# Patient Record
Sex: MALE | Race: WHITE | NOT HISPANIC OR LATINO
[De-identification: names, ages, dates, MRNs, and addresses within clinical notes are randomized per-mention and may not be internally consistent; named-entity substitution may affect disease eponyms.]

---

## 2015-06-24 RX ORDER — OMEPRAZOLE 10 MG/1
1 CAPSULE, DELAYED RELEASE ORAL
Qty: 0 | Refills: 0 | COMMUNITY
Start: 2015-06-24

## 2017-08-24 ENCOUNTER — APPOINTMENT (OUTPATIENT)
Dept: CARDIOLOGY | Facility: CLINIC | Age: 82
End: 2017-08-24
Payer: MEDICARE

## 2017-08-24 ENCOUNTER — NON-APPOINTMENT (OUTPATIENT)
Age: 82
End: 2017-08-24

## 2017-08-24 VITALS
DIASTOLIC BLOOD PRESSURE: 83 MMHG | HEIGHT: 71 IN | SYSTOLIC BLOOD PRESSURE: 146 MMHG | OXYGEN SATURATION: 95 % | BODY MASS INDEX: 44.1 KG/M2 | HEART RATE: 64 BPM | WEIGHT: 315 LBS

## 2017-08-24 VITALS — DIASTOLIC BLOOD PRESSURE: 86 MMHG | SYSTOLIC BLOOD PRESSURE: 152 MMHG

## 2017-08-24 DIAGNOSIS — Z00.00 ENCOUNTER FOR GENERAL ADULT MEDICAL EXAMINATION W/OUT ABNORMAL FINDINGS: ICD-10-CM

## 2017-08-24 PROCEDURE — 93000 ELECTROCARDIOGRAM COMPLETE: CPT

## 2017-08-24 PROCEDURE — 99214 OFFICE O/P EST MOD 30 MIN: CPT | Mod: 25

## 2017-08-31 ENCOUNTER — APPOINTMENT (OUTPATIENT)
Dept: CARDIOLOGY | Facility: CLINIC | Age: 82
End: 2017-08-31
Payer: MEDICARE

## 2017-08-31 PROCEDURE — 93306 TTE W/DOPPLER COMPLETE: CPT

## 2017-10-05 ENCOUNTER — OUTPATIENT (OUTPATIENT)
Dept: OUTPATIENT SERVICES | Facility: HOSPITAL | Age: 82
LOS: 1 days | End: 2017-10-05
Payer: MEDICARE

## 2017-10-05 ENCOUNTER — APPOINTMENT (OUTPATIENT)
Dept: CT IMAGING | Facility: CLINIC | Age: 82
End: 2017-10-05
Payer: MEDICARE

## 2017-10-05 DIAGNOSIS — I77.810 THORACIC AORTIC ECTASIA: ICD-10-CM

## 2017-10-05 DIAGNOSIS — Z98.89 OTHER SPECIFIED POSTPROCEDURAL STATES: Chronic | ICD-10-CM

## 2017-10-05 PROCEDURE — 71275 CT ANGIOGRAPHY CHEST: CPT | Mod: 26

## 2017-10-05 PROCEDURE — 71275 CT ANGIOGRAPHY CHEST: CPT

## 2017-10-05 PROCEDURE — 82565 ASSAY OF CREATININE: CPT

## 2018-03-26 ENCOUNTER — APPOINTMENT (OUTPATIENT)
Dept: CARDIOLOGY | Facility: CLINIC | Age: 83
End: 2018-03-26
Payer: MEDICARE

## 2018-03-26 ENCOUNTER — NON-APPOINTMENT (OUTPATIENT)
Age: 83
End: 2018-03-26

## 2018-03-26 VITALS
DIASTOLIC BLOOD PRESSURE: 82 MMHG | WEIGHT: 309 LBS | OXYGEN SATURATION: 97 % | HEART RATE: 68 BPM | SYSTOLIC BLOOD PRESSURE: 154 MMHG | BODY MASS INDEX: 43.1 KG/M2

## 2018-03-26 VITALS — DIASTOLIC BLOOD PRESSURE: 60 MMHG | SYSTOLIC BLOOD PRESSURE: 124 MMHG

## 2018-03-26 PROCEDURE — 93000 ELECTROCARDIOGRAM COMPLETE: CPT

## 2018-03-26 PROCEDURE — 99214 OFFICE O/P EST MOD 30 MIN: CPT

## 2018-09-06 ENCOUNTER — APPOINTMENT (OUTPATIENT)
Dept: CARDIOLOGY | Facility: CLINIC | Age: 83
End: 2018-09-06

## 2018-10-08 ENCOUNTER — APPOINTMENT (OUTPATIENT)
Dept: CARDIOLOGY | Facility: CLINIC | Age: 83
End: 2018-10-08
Payer: MEDICARE

## 2018-10-08 ENCOUNTER — NON-APPOINTMENT (OUTPATIENT)
Age: 83
End: 2018-10-08

## 2018-10-08 VITALS
HEIGHT: 72 IN | BODY MASS INDEX: 40.36 KG/M2 | SYSTOLIC BLOOD PRESSURE: 128 MMHG | OXYGEN SATURATION: 97 % | RESPIRATION RATE: 18 BRPM | HEART RATE: 67 BPM | DIASTOLIC BLOOD PRESSURE: 72 MMHG | WEIGHT: 298 LBS

## 2018-10-08 PROCEDURE — 99214 OFFICE O/P EST MOD 30 MIN: CPT

## 2018-10-08 PROCEDURE — 93000 ELECTROCARDIOGRAM COMPLETE: CPT

## 2018-10-08 PROCEDURE — 93306 TTE W/DOPPLER COMPLETE: CPT

## 2019-01-13 ENCOUNTER — INPATIENT (INPATIENT)
Facility: HOSPITAL | Age: 84
LOS: 3 days | Discharge: HOME | DRG: 100 | End: 2019-01-17
Attending: FAMILY MEDICINE | Admitting: HOSPITALIST
Payer: MEDICARE

## 2019-01-13 VITALS
OXYGEN SATURATION: 95 % | SYSTOLIC BLOOD PRESSURE: 131 MMHG | RESPIRATION RATE: 18 BRPM | HEART RATE: 86 BPM | DIASTOLIC BLOOD PRESSURE: 82 MMHG | TEMPERATURE: 98 F

## 2019-01-13 DIAGNOSIS — R56.9 UNSPECIFIED CONVULSIONS: ICD-10-CM

## 2019-01-13 DIAGNOSIS — Z98.89 OTHER SPECIFIED POSTPROCEDURAL STATES: Chronic | ICD-10-CM

## 2019-01-13 LAB
ALBUMIN SERPL ELPH-MCNC: 3.7 G/DL — SIGNIFICANT CHANGE UP (ref 3.3–5.2)
ALP SERPL-CCNC: 52 U/L — SIGNIFICANT CHANGE UP (ref 40–120)
ALT FLD-CCNC: 12 U/L — SIGNIFICANT CHANGE UP
AMPHET UR-MCNC: NEGATIVE — SIGNIFICANT CHANGE UP
ANION GAP SERPL CALC-SCNC: 13 MMOL/L — SIGNIFICANT CHANGE UP (ref 5–17)
APAP SERPL-MCNC: <7.5 UG/ML — LOW (ref 10–26)
APPEARANCE UR: CLEAR — SIGNIFICANT CHANGE UP
APTT BLD: 41.9 SEC — HIGH (ref 27.5–36.3)
AST SERPL-CCNC: 13 U/L — SIGNIFICANT CHANGE UP
BACTERIA # UR AUTO: ABNORMAL
BARBITURATES UR SCN-MCNC: NEGATIVE — SIGNIFICANT CHANGE UP
BASE EXCESS BLDV CALC-SCNC: -2.5 MMOL/L — LOW (ref -2–2)
BASOPHILS # BLD AUTO: 0 K/UL — SIGNIFICANT CHANGE UP (ref 0–0.2)
BASOPHILS NFR BLD AUTO: 0.3 % — SIGNIFICANT CHANGE UP (ref 0–2)
BENZODIAZ UR-MCNC: NEGATIVE — SIGNIFICANT CHANGE UP
BILIRUB SERPL-MCNC: 0.3 MG/DL — LOW (ref 0.4–2)
BILIRUB UR-MCNC: NEGATIVE — SIGNIFICANT CHANGE UP
BLD GP AB SCN SERPL QL: SIGNIFICANT CHANGE UP
BUN SERPL-MCNC: 23 MG/DL — HIGH (ref 8–20)
CA-I SERPL-SCNC: 1.17 MMOL/L — SIGNIFICANT CHANGE UP (ref 1.15–1.33)
CALCIUM SERPL-MCNC: 9.2 MG/DL — SIGNIFICANT CHANGE UP (ref 8.6–10.2)
CHLORIDE BLDV-SCNC: 111 MMOL/L — HIGH (ref 98–107)
CHLORIDE SERPL-SCNC: 106 MMOL/L — SIGNIFICANT CHANGE UP (ref 98–107)
CO2 SERPL-SCNC: 20 MMOL/L — LOW (ref 22–29)
COCAINE METAB.OTHER UR-MCNC: NEGATIVE — SIGNIFICANT CHANGE UP
COLOR SPEC: YELLOW — SIGNIFICANT CHANGE UP
CREAT SERPL-MCNC: 1.18 MG/DL — SIGNIFICANT CHANGE UP (ref 0.5–1.3)
DIFF PNL FLD: ABNORMAL
EOSINOPHIL # BLD AUTO: 0.2 K/UL — SIGNIFICANT CHANGE UP (ref 0–0.5)
EOSINOPHIL NFR BLD AUTO: 1.7 % — SIGNIFICANT CHANGE UP (ref 0–5)
EPI CELLS # UR: SIGNIFICANT CHANGE UP
ETHANOL SERPL-MCNC: <10 MG/DL — SIGNIFICANT CHANGE UP
GAS PNL BLDV: 141 MMOL/L — SIGNIFICANT CHANGE UP (ref 135–145)
GAS PNL BLDV: SIGNIFICANT CHANGE UP
GAS PNL BLDV: SIGNIFICANT CHANGE UP
GLUCOSE BLDV-MCNC: 128 MG/DL — HIGH (ref 70–99)
GLUCOSE SERPL-MCNC: 128 MG/DL — HIGH (ref 70–115)
GLUCOSE UR QL: NEGATIVE MG/DL — SIGNIFICANT CHANGE UP
HCO3 BLDV-SCNC: 22 MMOL/L — SIGNIFICANT CHANGE UP (ref 20–26)
HCT VFR BLD CALC: 41.6 % — LOW (ref 42–52)
HCT VFR BLDA CALC: 43 — SIGNIFICANT CHANGE UP (ref 39–50)
HGB BLD CALC-MCNC: 14.1 G/DL — SIGNIFICANT CHANGE UP (ref 13–17)
HGB BLD-MCNC: 13.5 G/DL — LOW (ref 14–18)
INR BLD: 5.04 RATIO — CRITICAL HIGH (ref 0.88–1.16)
KETONES UR-MCNC: ABNORMAL
LACTATE BLDV-MCNC: 1.9 MMOL/L — SIGNIFICANT CHANGE UP (ref 0.5–2)
LEUKOCYTE ESTERASE UR-ACNC: ABNORMAL
LYMPHOCYTES # BLD AUTO: 1.1 K/UL — SIGNIFICANT CHANGE UP (ref 1–4.8)
LYMPHOCYTES # BLD AUTO: 12.3 % — LOW (ref 20–55)
MCHC RBC-ENTMCNC: 27.9 PG — SIGNIFICANT CHANGE UP (ref 27–31)
MCHC RBC-ENTMCNC: 32.5 G/DL — SIGNIFICANT CHANGE UP (ref 32–36)
MCV RBC AUTO: 86 FL — SIGNIFICANT CHANGE UP (ref 80–94)
METHADONE UR-MCNC: NEGATIVE — SIGNIFICANT CHANGE UP
MONOCYTES # BLD AUTO: 0.6 K/UL — SIGNIFICANT CHANGE UP (ref 0–0.8)
MONOCYTES NFR BLD AUTO: 6.4 % — SIGNIFICANT CHANGE UP (ref 3–10)
NEUTROPHILS # BLD AUTO: 6.8 K/UL — SIGNIFICANT CHANGE UP (ref 1.8–8)
NEUTROPHILS NFR BLD AUTO: 79.2 % — HIGH (ref 37–73)
NITRITE UR-MCNC: NEGATIVE — SIGNIFICANT CHANGE UP
OPIATES UR-MCNC: NEGATIVE — SIGNIFICANT CHANGE UP
OTHER CELLS CSF MANUAL: 14 ML/DL — LOW (ref 18–22)
PCO2 BLDV: 42 MMHG — SIGNIFICANT CHANGE UP (ref 35–50)
PCP SPEC-MCNC: SIGNIFICANT CHANGE UP
PCP UR-MCNC: NEGATIVE — SIGNIFICANT CHANGE UP
PH BLDV: 7.35 — SIGNIFICANT CHANGE UP (ref 7.32–7.43)
PH UR: 5 — SIGNIFICANT CHANGE UP (ref 5–8)
PLATELET # BLD AUTO: 229 K/UL — SIGNIFICANT CHANGE UP (ref 150–400)
PO2 BLDV: 41 MMHG — SIGNIFICANT CHANGE UP (ref 25–45)
POTASSIUM BLDV-SCNC: 4.2 MMOL/L — SIGNIFICANT CHANGE UP (ref 3.4–4.5)
POTASSIUM SERPL-MCNC: 4.3 MMOL/L — SIGNIFICANT CHANGE UP (ref 3.5–5.3)
POTASSIUM SERPL-SCNC: 4.3 MMOL/L — SIGNIFICANT CHANGE UP (ref 3.5–5.3)
PROT SERPL-MCNC: 6.9 G/DL — SIGNIFICANT CHANGE UP (ref 6.6–8.7)
PROT UR-MCNC: 30 MG/DL
PROTHROM AB SERPL-ACNC: 60.9 SEC — HIGH (ref 10–12.9)
RBC # BLD: 4.84 M/UL — SIGNIFICANT CHANGE UP (ref 4.6–6.2)
RBC # FLD: 13.8 % — SIGNIFICANT CHANGE UP (ref 11–15.6)
RBC CASTS # UR COMP ASSIST: SIGNIFICANT CHANGE UP /HPF (ref 0–4)
SALICYLATES SERPL-MCNC: <0.6 MG/DL — LOW (ref 10–20)
SAO2 % BLDV: 74 % — SIGNIFICANT CHANGE UP
SODIUM SERPL-SCNC: 139 MMOL/L — SIGNIFICANT CHANGE UP (ref 135–145)
SP GR SPEC: 1.02 — SIGNIFICANT CHANGE UP (ref 1.01–1.02)
THC UR QL: NEGATIVE — SIGNIFICANT CHANGE UP
TROPONIN T SERPL-MCNC: <0.01 NG/ML — SIGNIFICANT CHANGE UP (ref 0–0.06)
TYPE + AB SCN PNL BLD: SIGNIFICANT CHANGE UP
UROBILINOGEN FLD QL: NEGATIVE MG/DL — SIGNIFICANT CHANGE UP
WBC # BLD: 8.6 K/UL — SIGNIFICANT CHANGE UP (ref 4.8–10.8)
WBC # FLD AUTO: 8.6 K/UL — SIGNIFICANT CHANGE UP (ref 4.8–10.8)
WBC UR QL: SIGNIFICANT CHANGE UP

## 2019-01-13 PROCEDURE — 99285 EMERGENCY DEPT VISIT HI MDM: CPT

## 2019-01-13 PROCEDURE — 93010 ELECTROCARDIOGRAM REPORT: CPT

## 2019-01-13 PROCEDURE — 71045 X-RAY EXAM CHEST 1 VIEW: CPT | Mod: 26

## 2019-01-13 PROCEDURE — 99223 1ST HOSP IP/OBS HIGH 75: CPT

## 2019-01-13 PROCEDURE — 70450 CT HEAD/BRAIN W/O DYE: CPT | Mod: 26

## 2019-01-13 RX ORDER — SERTRALINE 25 MG/1
50 TABLET, FILM COATED ORAL DAILY
Qty: 0 | Refills: 0 | Status: DISCONTINUED | OUTPATIENT
Start: 2019-01-13 | End: 2019-01-17

## 2019-01-13 RX ORDER — TAMSULOSIN HYDROCHLORIDE 0.4 MG/1
0.4 CAPSULE ORAL AT BEDTIME
Qty: 0 | Refills: 0 | Status: DISCONTINUED | OUTPATIENT
Start: 2019-01-13 | End: 2019-01-17

## 2019-01-13 RX ORDER — AMLODIPINE BESYLATE 2.5 MG/1
5 TABLET ORAL DAILY
Qty: 0 | Refills: 0 | Status: DISCONTINUED | OUTPATIENT
Start: 2019-01-13 | End: 2019-01-17

## 2019-01-13 RX ORDER — VALSARTAN 80 MG/1
160 TABLET ORAL DAILY
Qty: 0 | Refills: 0 | Status: DISCONTINUED | OUTPATIENT
Start: 2019-01-13 | End: 2019-01-17

## 2019-01-13 RX ORDER — PANTOPRAZOLE SODIUM 20 MG/1
40 TABLET, DELAYED RELEASE ORAL
Qty: 0 | Refills: 0 | Status: DISCONTINUED | OUTPATIENT
Start: 2019-01-13 | End: 2019-01-17

## 2019-01-13 RX ORDER — OXYBUTYNIN CHLORIDE 5 MG
10 TABLET ORAL AT BEDTIME
Qty: 0 | Refills: 0 | Status: DISCONTINUED | OUTPATIENT
Start: 2019-01-13 | End: 2019-01-17

## 2019-01-13 RX ORDER — METOPROLOL TARTRATE 50 MG
50 TABLET ORAL
Qty: 0 | Refills: 0 | Status: DISCONTINUED | OUTPATIENT
Start: 2019-01-13 | End: 2019-01-17

## 2019-01-13 NOTE — ED PROVIDER NOTE - NS ED ROS FT
Const: Denies fever, chills  HEENT: Denies blurry vision, sore throat  Neck: Denies neck pain/stiffness  Resp: + SOB. Denies coughing  Cardiovascular: Denies CP, palpitations, LE edema  GI: Denies nausea, vomiting, abdominal pain, diarrhea, constipation, blood in stool  : Denies urinary frequency/urgency/dysuria, hematuria  MSK: Denies back pain  Neuro: + right hand weakness. Denies HA, dizziness, numbness  Skin: Denies rashes.

## 2019-01-13 NOTE — ED PROVIDER NOTE - PHYSICAL EXAMINATION
Const: Awake, alert and oriented. In no acute distress. Dried blood from the right corner of mouth, dried blood on shirt.  HEENT: NC/AT. Moist mucous membranes. Tongue with superficial abrasions & bruising bilaterally on the anterior aspect, no active bleeding, no intraoral lacerations.  Eyes: No scleral icterus. EOMI.  Neck:. Soft and supple. Full ROM without pain.  Cardiac: Regular rate and regular rhythm. +S1/S2. +AS murmur. Peripheral pulses 2+ and symmetric. No LE edema.  Resp: Speaking in full sentences. No evidence of respiratory distress. No wheezes, rales or rhonchi.  Abd: Soft, non-tender, non-distended. Normal bowel sounds in all 4 quadrants. No guarding or rebound.  Back: Spine midline and non-tender. No CVAT.  Skin: No rashes, abrasions or lacerations.  Neuro: CN II-XII grossly in tact. Symmetrical smile. PERRL. EOMI. Bilateral and symmetric sensation of face. Tongue midline. Normal finger to nose. Normal heel to shin. Normal rapid alternating movements. + Right pronator drift, 4/5 vs 5/5 strength in the right upper extremity compared to the left. Sensation symmetrically intact bilateral upper and lower extremities. Reflexes 2+ bilaterally.

## 2019-01-13 NOTE — ED PROVIDER NOTE - OBJECTIVE STATEMENT
Patient with PMH CAD s/p 2 stents ([facility]), HTN, HLD, PE on Coumadin presents BIBA for an episode of AMS which occurred this afternoon while watching the football game (? 2 pm) and on the phone with his neighbor who called him. His neighbor called 911 when he stopped speaking on the phone. When EMS arrived the patient was slumped over in his chair with blood trickling from his mouth, difficult to arouse and slow to respond, however while in the ambulance he became more responsive, speaking normally and answering questions appropriately. EMS noticed right upper extremity weakness, but it is unclear if this is new or not. Patient lives home by himself. He states he noticed this morning upon waking up that his right hand was weaker than usual and he was unable to hold his cup of coffee this morning at 9 am. He states he hadn't noticed that weakness before then, but all this week he has felt "off" and felt that he has noticed his right arm has been shaking at times. He also notes that he fell 1 week ago, hit the side of his face while at a store. It is unclear if he went to the hospital at that time. He is unsure of his medication, except that he takes Coumadin for PE, but he does not recall the dosage. He notes at various times today he has felt short of breath and that he was unable to open his mouth enough to catch his breath. He denies chest pain, nausea, vomiting. He denies smoking, EtOH or illicit drugs.  PMD: Chidi

## 2019-01-13 NOTE — H&P ADULT - PMH
CAD (coronary artery disease)    [place] filter in place    Hyperlipemia    Hypertension    PE (pulmonary embolism)

## 2019-01-13 NOTE — ED ADULT NURSE NOTE - NSIMPLEMENTINTERV_GEN_ALL_ED
Implemented All Fall Risk Interventions:  [place] to call system. Call bell, personal items and telephone within reach. Instruct patient to call for assistance. Room bathroom lighting operational. Non-slip footwear when patient is off stretcher. Physically safe environment: no spills, clutter or unnecessary equipment. Stretcher in lowest position, wheels locked, appropriate side rails in place. Provide visual cue, wrist band, yellow gown, etc. Monitor gait and stability. Monitor for mental status changes and reorient to person, place, and time. Review medications for side effects contributing to fall risk. Reinforce activity limits and safety measures with patient and family.

## 2019-01-13 NOTE — ED PROVIDER NOTE - PMH
CAD (coronary artery disease)    [place] filter in place    Hyperlipemia    Hypertension    PE (pulmonary embolism)

## 2019-01-13 NOTE — ED ADULT NURSE NOTE - OBJECTIVE STATEMENT
PT BIBA for AMS with right-sided weakness. AS per pt, he was on phone with neighbor then he ended up here. Pt states he doesn't recall what happened.  pt noted with slurred speech, was priority CT on arrival. Pt has abrasion to bridge of nose, states he fell on Monday because he was wearing the wrong bi-focal eyeglasses. Pt awake and alert at this time, waiting for test results.

## 2019-01-13 NOTE — H&P ADULT - HISTORY OF PRESENT ILLNESS
85 y/o male with PMHx of CAD s/p 2 stents, HTN, HLD, PE (on Coumadin) came to the ED post syncope. Patient said he was on the phone with his neighbor this afternoon around 3 pm and he stopped talking; his neighbor called EMS when he was not responding on the phone. Patient said he As per EMS, patient was found slumped over in his chair and blood trickling down from his mouth; he was difficult to arouse however, became responsive while in the ambulance and back to baseline. As per patient. he was a little confused after he finally woke up, and he had a tongue bite. Patient reported that on Monday he fell face down in his house attributing it to wearing is reading glass while walking instead of his regular glasses. 83 y/o male with PMHx of CAD s/p 2 stents, HTN, HLD, PE (on Coumadin) came to the ED post syncope. Patient said he was on the phone with his neighbor this afternoon around 3 pm and he stopped talking; his neighbor called EMS when he was not responding on the phone. Patient said he As per EMS, patient was found slumped over in his chair and blood trickling down from his mouth; he was difficult to arouse however, became responsive while in the ambulance and back to baseline. As per patient. he was a little confused after he finally woke up, and he had a tongue bite. Patient reported that on Monday he fell face down in his house attributing it to wearing is reading glasses while walking instead of his regular glasses. Patient also complaint of tremor and weakness in his RUE; he was unable to hold 83 y/o male with PMHx of CAD s/p 2 stents, HTN, HLD, PE (on Coumadin) came to the ED post syncope. Patient said he was on the phone with his neighbor this afternoon around 3 pm and he stopped talking; his neighbor called EMS when he was not responding on the phone. Patient said he As per EMS, patient was found slumped over in his chair and blood trickling down from his mouth; he was difficult to arouse however, became responsive while in the ambulance and back to baseline. As per patient. he was a little confused after he finally woke up, and he had a tongue bite. Patient reported that on Monday he fell face down in his house attributing it to wearing is reading glasses while walking instead of his regular glasses. Patient also complaint of tremor and weakness in his RUE; he was unable to hold a cup or write this morning with his right hand. Patient has no blurry vision, HA, tingling or numbness, chest pain, palpitation, shortness of breath, nausea/vomit, diaphoresis, abdominal pain, change in bowel/urinary habit, no bowel/bladder incontinence.

## 2019-01-13 NOTE — H&P ADULT - ASSESSMENT
85 y/o male with PMHx of CAD s/p 2 stents, HTN, HLD, PE (on Coumadin) came to the ED post syncope.     Syncope likely due to seizure/TIA   Admit to monitor bed   CT head: unremarkable   CTA neck and brain ordered follow up   Neurology consult   As per ED, patient passed dysphagia screening   PT evaluation in AM     Seizure/fall precaution 85 y/o male with PMHx of CAD s/p 2 stents, HTN, HLD, PE (on Coumadin) came to the ED post syncope.     Syncope likely due to seizure/TIA   Admit to monitor bed   CT head: unremarkable   CTA neck and brain ordered follow up   Neurology consult   As per ED, patient passed dysphagia screening   PT evaluation in AM     Seizure/fall precaution     PE   On Coumadin 4mg( M/W/F); 2mg(T/Th/Sat/Sun)   Will hold for now due to elevated INR   Monitor INR     CAD   Continue Pravastatin 20mg     HTN/HLD  Continue Amlodipine 5mg   Metoprolol 50mg bid with holding parameter   Valsartan 160mg     GIGI   On BiPAP HS     BPH   Tamsulosin 0.4mg   Oxybutynin 10mg     Supportive   DVT prophylaxis: on Coumadin   Diet: DASH

## 2019-01-13 NOTE — ED ADULT TRIAGE NOTE - CHIEF COMPLAINT QUOTE
as per ems pt was on phone with neighbor then the line went dead. pt was last normal about 45 mins ago. upon ems arrival pt with AMS and right sided weakness. pt doesn't recall anything that happened. pt noted with blood in mouth. pt noted with bite marks on tongue. pt noted with slurred speech. MD called to bedside for r/o stroke @ [id number]

## 2019-01-13 NOTE — ED PROVIDER NOTE - MEDICAL DECISION MAKING DETAILS
Patient presents with episode of AMS (? post ictal) which was witnessed on the phone by neighbor who called 911, patient at mental status baseline upon arrival to the ED, noted by EMS to have right UE weakness with pronator drift - patient noticed that upon waking up this morning, otherwise neuro-intact. Patient has 2 stents (doesn't know kind or MRI compatibility), so at this time cannot have MRI - will do CT angio of the head and neck to evaluate for thrombus. No Code Stroke called as last normal (per patient) was yesterday - likely had a seizure while on the phone with neighbor. Will try to obtain patient's medication list from his pharmacy. He will require admission and work up for likely CVA and new-onset seizure.

## 2019-01-13 NOTE — H&P ADULT - FAMILY HISTORY
Child  Still living? Yes, Estimated age: Age Unknown  Family history of diabetes mellitus, Age at diagnosis: Age Unknown

## 2019-01-13 NOTE — ED ADULT NURSE NOTE - CHIEF COMPLAINT QUOTE
as per ems pt was on phone with neighbor then the line went dead. pt was last normal about 45 mins ago. upon ems arrival pt with AMS and right sided weakness. pt doesn't recall anything that happened. pt noted with blood in mouth. pt noted with bite marks on tongue. pt noted with slurred speech. MD called to bedside for r/o stroke @ 4740

## 2019-01-13 NOTE — PHARMACOTHERAPY INTERVENTION NOTE - COMMENTS
Spoke to pt at bedside to determine warfarin dosage. Obtained all other medication records from his pharmacy

## 2019-01-13 NOTE — ED ADULT NURSE NOTE - PMH
CAD (coronary artery disease)    [place] filter in place    Hyperlipemia    Hypertension    PE (pulmonary embolism)

## 2019-01-14 DIAGNOSIS — I25.10 ATHEROSCLEROTIC HEART DISEASE OF NATIVE CORONARY ARTERY WITHOUT ANGINA PECTORIS: ICD-10-CM

## 2019-01-14 DIAGNOSIS — R56.9 UNSPECIFIED CONVULSIONS: ICD-10-CM

## 2019-01-14 DIAGNOSIS — I27.82 CHRONIC PULMONARY EMBOLISM: ICD-10-CM

## 2019-01-14 DIAGNOSIS — I10 ESSENTIAL (PRIMARY) HYPERTENSION: ICD-10-CM

## 2019-01-14 LAB
ABO RH CONFIRMATION: SIGNIFICANT CHANGE UP
ANION GAP SERPL CALC-SCNC: 10 MMOL/L — SIGNIFICANT CHANGE UP (ref 5–17)
BUN SERPL-MCNC: 20 MG/DL — SIGNIFICANT CHANGE UP (ref 8–20)
CALCIUM SERPL-MCNC: 8.7 MG/DL — SIGNIFICANT CHANGE UP (ref 8.6–10.2)
CHLORIDE SERPL-SCNC: 107 MMOL/L — SIGNIFICANT CHANGE UP (ref 98–107)
CO2 SERPL-SCNC: 21 MMOL/L — LOW (ref 22–29)
CREAT SERPL-MCNC: 1.19 MG/DL — SIGNIFICANT CHANGE UP (ref 0.5–1.3)
GLUCOSE SERPL-MCNC: 106 MG/DL — SIGNIFICANT CHANGE UP (ref 70–115)
NT-PROBNP SERPL-SCNC: 431 PG/ML — HIGH (ref 0–300)
POTASSIUM SERPL-MCNC: 3.8 MMOL/L — SIGNIFICANT CHANGE UP (ref 3.5–5.3)
POTASSIUM SERPL-SCNC: 3.8 MMOL/L — SIGNIFICANT CHANGE UP (ref 3.5–5.3)
SODIUM SERPL-SCNC: 138 MMOL/L — SIGNIFICANT CHANGE UP (ref 135–145)

## 2019-01-14 PROCEDURE — 95819 EEG AWAKE AND ASLEEP: CPT | Mod: 26

## 2019-01-14 PROCEDURE — 70498 CT ANGIOGRAPHY NECK: CPT | Mod: 26

## 2019-01-14 PROCEDURE — 70496 CT ANGIOGRAPHY HEAD: CPT | Mod: 26

## 2019-01-14 PROCEDURE — 99223 1ST HOSP IP/OBS HIGH 75: CPT

## 2019-01-14 PROCEDURE — 70551 MRI BRAIN STEM W/O DYE: CPT | Mod: 26

## 2019-01-14 RX ORDER — ATORVASTATIN CALCIUM 80 MG/1
10 TABLET, FILM COATED ORAL AT BEDTIME
Qty: 0 | Refills: 0 | Status: DISCONTINUED | OUTPATIENT
Start: 2019-01-14 | End: 2019-01-17

## 2019-01-14 RX ADMIN — SERTRALINE 50 MILLIGRAM(S): 25 TABLET, FILM COATED ORAL at 11:00

## 2019-01-14 RX ADMIN — TAMSULOSIN HYDROCHLORIDE 0.4 MILLIGRAM(S): 0.4 CAPSULE ORAL at 22:48

## 2019-01-14 RX ADMIN — Medication 50 MILLIGRAM(S): at 17:29

## 2019-01-14 RX ADMIN — Medication 50 MILLIGRAM(S): at 06:01

## 2019-01-14 RX ADMIN — PANTOPRAZOLE SODIUM 40 MILLIGRAM(S): 20 TABLET, DELAYED RELEASE ORAL at 11:00

## 2019-01-14 RX ADMIN — Medication 10 MILLIGRAM(S): at 22:48

## 2019-01-14 RX ADMIN — ATORVASTATIN CALCIUM 10 MILLIGRAM(S): 80 TABLET, FILM COATED ORAL at 22:48

## 2019-01-14 RX ADMIN — VALSARTAN 160 MILLIGRAM(S): 80 TABLET ORAL at 06:00

## 2019-01-14 RX ADMIN — AMLODIPINE BESYLATE 5 MILLIGRAM(S): 2.5 TABLET ORAL at 06:01

## 2019-01-14 NOTE — CONSULT NOTE ADULT - ASSESSMENT
The patient is a 84y Male with episode of loss of consciousness.    Possible seizure.  Will check MRI brain.   Will check EEG.  Hold off on antiepileptic drug for now.     Possible syncope.   Recommend cardiac monitor for arrhythmia.    History of PE on coumadin  Monitor INR.    Case discussed with Dr Murillo.

## 2019-01-14 NOTE — CONSULT NOTE ADULT - SUBJECTIVE AND OBJECTIVE BOX
[facility]                                        Neu[facility] at [place]                                  Esther Casiano, & Haroldo                                      [address]                                           [place], [place], [address]                                                [phone]        CC: Syncope vs seizure     HISTORY:  The patient is a 84y Male who was on the phone with his neighbor and stopped talking. When he did not respond, his neighbor called EMS. The patient was found slumped over in his chair with blood trickling from his mouth. He does not recall what happened.   He reports that he had bitten his tongue but had no associated urinary incontinence.    1 Week ago he had tripped and fallen without loss of consciousness.  He has no prior history of seizure or syncope.      PAST MEDICAL & SURGICAL HISTORY:  CAD (coronary artery disease)  [place] filter in place  PE (pulmonary embolism)  Hyperlipemia  Hypertension  S/P IVC filter    MEDICATION PRIOR TO ADMISSION:  Metoprolol, Amlodipine, Omeprazole, Pravastatin, Breo Ellipta, Oxybutynin, Sertraline, Valsartan, Warfarin.    MEDICATIONS  (STANDING):  amLODIPine   Tablet 5 milliGRAM(s) Oral daily  atorvastatin 10 milliGRAM(s) Oral at bedtime  metoprolol tartrate 50 milliGRAM(s) Oral two times a day  oxybutynin 10 milliGRAM(s) Oral at bedtime  pantoprazole    Tablet 40 milliGRAM(s) Oral before breakfast  sertraline 50 milliGRAM(s) Oral daily  tamsulosin 0.4 milliGRAM(s) Oral at bedtime  valsartan 160 milliGRAM(s) Oral daily    Allergies  No Known Allergies    SOCIAL HISTORY:  Former smoker.     FAMILY HISTORY:  Family history of diabetes mellitus (Child)  Mother  heart failure   Father  Lymphoma  Brother  pneumonia   No history of seizure in parents, sibling, or child.    ROS:  Constitutional: The patient denies fevers or weight changes.  Neuro: As per HPI.  Eyes: Denies blurry vision.  Ears/nose/throat: Denies Tinnitus.   Cardiac: Denies chest pain. Denies palpitations.  Respiratory: Denies shortness of breath.  GI: Denies abdominal pain, nausea, or vomiting.  : Denies change in urinary pattern.  Integumentary: Denies rash.  Psych: Denies recent mood changes.  Heme: denies easy bleeding/bruising.    Exam:  Vital Signs Last 24 Hrs  T(C): 36.2 (2019 19:59), Max: 36.5 (2019 16:30)  T(F): 97.2 (2019 19:59), Max: 97.7 (2019 16:30)  HR: 77 (2019 07:30) (70 - 86)  BP: 149/86 (2019 07:30) (131/82 - 149/86)  RR: 19 (2019 07:30) (18 - 19)  SpO2: 97% (2019 07:30) (95% - 97%)  General: NAD.   Carotid bruits absent.     Mental status: The patient is awake, alert, and fully oriented. There is no aphasia.    Cranial nerves: There is no papilledema. Pupils react symmetrically to light. There is no visual field deficit to confrontation. Extraocular motion is full with no nystagmus. There is no ptosis. Facial sensation is intact. Facial musculature is symmetric. Palate elevates symmetrically. Tongue is midline.    Motor: There is normal bulk and tone.  Strength is 5/5 in the right arm and leg.   Strength is 5/5 in the left arm and leg.    Sensation: Intact to light touch and pin.    Reflexes: 2+ throughout and plantar responses are flexor.    Cerebellar: There is no dysmetria on finger to nose testing.    LABS:                         13.5   8.6   )-----------( 229      ( 2019 17:59 )             41.6       01-14    138  |  107  |  20.0  ----------------------------<  106  3.8   |  21.0<L>  |  1.19    Ca    8.7      2019 06:42    TPro  6.9  /  Alb  3.7  /  TBili  0.3<L>  /  DBili  x   /  AST  13  /  ALT  12  /  AlkPhos  52  01-13      PT/INR - ( 2019 17:59 )   PT: 60.9 sec;   INR: 5.04 ratio    PTT - ( 2019 17:59 )  PTT:41.9 sec    RADIOLOGY   CT head images reviewed (and concur with report): There is no acute pathology.

## 2019-01-14 NOTE — PHYSICAL THERAPY INITIAL EVALUATION ADULT - ADDITIONAL COMMENTS
Pt reports living alone in a [place] with 5 steps to enter.  Reports being Modified Independent with all PTA, without devices.  Has a RW at home.

## 2019-01-14 NOTE — ED ADULT NURSE REASSESSMENT NOTE - NS ED NURSE REASSESS COMMENT FT1
Bedside report given to [facility] RN Norah for CDU 9R. VSS, pt in no apparent distress.
Pt ambulated to bathroom with assistance, linens changed and pt placed in gown, denies pain, offers no complaints at this time, pt states "I use bipap at night for my sleep apnea", MD Marline CARBALLO notified, will continue to monitor
Pt placed on nocturnal cpap, resp even and unlabored, color good, showing NSR on monitor, offers no complaints, will continue to monitor
Pt requesting larger cpap mask, unavailable as per RT Ale pt aware and states "I don't want to sleep with it then, it keeps beeping", resp even and unlabored, color good, showing NSR on monitor, awaiting admit bed, will continue to monitor
Pt back from MRI at this time, in no apparent distress. Awaiting bed placement.
Assumed pt care at this time. Pt sleeping in stretcher, respirations are even & unlabored. Pt easy to wake. Pt waiting for room assignment, aware of plan of care.
Assuming care from previous RN, pt A&O x's 4, resp even and unlabored, color good, showing NSR on monitor, denies pain, offers no complaints at this time, pt eating without difficulty at this time, pt with patent 18G IV in LAC, no s/s of seizure like activity, pt reports fall on Monday with slight tremor in right hand, no intervention at this time, awaiting admit bed, pt aware of plan of care and verbalizes understanding, will continue to monitor

## 2019-01-15 LAB
INR BLD: 2.85 RATIO — HIGH (ref 0.88–1.16)
PROTHROM AB SERPL-ACNC: 33.9 SEC — HIGH (ref 10–12.9)

## 2019-01-15 PROCEDURE — 99223 1ST HOSP IP/OBS HIGH 75: CPT

## 2019-01-15 PROCEDURE — 99233 SBSQ HOSP IP/OBS HIGH 50: CPT

## 2019-01-15 RX ORDER — HALOPERIDOL DECANOATE 100 MG/ML
2 INJECTION INTRAMUSCULAR ONCE
Qty: 0 | Refills: 0 | Status: COMPLETED | OUTPATIENT
Start: 2019-01-15 | End: 2019-01-15

## 2019-01-15 RX ORDER — LEVETIRACETAM 250 MG/1
1000 TABLET, FILM COATED ORAL ONCE
Qty: 0 | Refills: 0 | Status: COMPLETED | OUTPATIENT
Start: 2019-01-15 | End: 2019-01-15

## 2019-01-15 RX ADMIN — SERTRALINE 50 MILLIGRAM(S): 25 TABLET, FILM COATED ORAL at 12:24

## 2019-01-15 RX ADMIN — TAMSULOSIN HYDROCHLORIDE 0.4 MILLIGRAM(S): 0.4 CAPSULE ORAL at 21:44

## 2019-01-15 RX ADMIN — Medication 50 MILLIGRAM(S): at 17:09

## 2019-01-15 RX ADMIN — Medication 10 MILLIGRAM(S): at 21:44

## 2019-01-15 RX ADMIN — HALOPERIDOL DECANOATE 2 MILLIGRAM(S): 100 INJECTION INTRAMUSCULAR at 02:15

## 2019-01-15 RX ADMIN — PANTOPRAZOLE SODIUM 40 MILLIGRAM(S): 20 TABLET, DELAYED RELEASE ORAL at 05:00

## 2019-01-15 RX ADMIN — Medication 50 MILLIGRAM(S): at 05:00

## 2019-01-15 RX ADMIN — LEVETIRACETAM 1000 MILLIGRAM(S): 250 TABLET, FILM COATED ORAL at 12:24

## 2019-01-15 NOTE — CONSULT NOTE ADULT - ASSESSMENT
85 y/o male with PMH HTN, HLD, CAD, PCI, PE on coumadin presents to ED s/p syncopal episode.     Syncope  - CT head/neck- mild to moderate atherosclerosis and soft plaque of b/l proximal internal carotid arteries 50-60%; moderate narrowing distal right M1/M2 segments   - MR Head- Small tiny sliver of left convexity subdural collection , probably hemorrhage,  and probable small left posterior frontoparietal subarachnoid component without mass effect, parenchymal edema midline shift.  - Neurology following- appreciate consultation- Possible seizure, keppra started   - ECHO 10/8/18:  EF 55-60%, mild LVH, severely enlarged L atrium, mod dilated R atrium, mild TR, mod AS  - repeat TTE ordered  - Troponin neg x 1   -     PE  - supratherapeutic INR  - Hold coumadin for now  - monitor INR    HTN/ HLD  - continue with metoprolol, valsartan, pravastatin     CAD  - c/w home medications 83 y/o male with PMH HTN, HLD, CAD, PCI, PE on coumadin presents to ED s/p syncopal episode.     Syncope  - CT head/neck- mild to moderate atherosclerosis and soft plaque of b/l proximal internal carotid arteries 50-60%; moderate narrowing distal right M1/M2 segments   - MR Head- Small tiny sliver of left convexity subdural collection , probably hemorrhage,  and probable small left posterior frontoparietal subarachnoid component without mass effect, parenchymal edema midline shift.  - Neurology following- appreciate consultation- Possible seizure, keppra started   - ECHO 10/8/18:  EF 55-60%, mild LVH, severely enlarged L atrium, mod dilated R atrium, mild TR, mod AS  - repeat TTE ordered  - Troponin neg x 1   -     PE  - supratherapeutic INR  - Hold coumadin for now  - monitor INR    HTN/ HLD  - continue with metoprolol, valsartan, pravastatin     CAD  - c/w home medications  - EKG- new LBBB  - Nuclear stress ordered for 11/16. NPO after midnight  - given ataxia and ?subdural hold ASA for now.

## 2019-01-15 NOTE — PROGRESS NOTE ADULT - SUBJECTIVE AND OBJECTIVE BOX
SUBJECTIVE    REVIEW OF SYSTEMS    General: Not in any pain	    Skin/Breast: No rash  	  ENMT: No visual problems, no sore throat	    Respiratory and Thorax: No cough, No CP, No SOB  	  Cardiovascular: No CP, No palpitations    Gastrointestinal: No Abd pain, No N/V/D    Musculoskeletal: No Joint pain, No back pain	    Neurological: No headache    Psychiatric: No anxiety      OBJECTIVE    Vital Signs Last 24 Hrs  T(C): 36.8 (01-15-19 @ 15:15), Max: 36.9 (01-15-19 @ 00:26)  T(F): 98.3 (01-15-19 @ 15:15), Max: 98.5 (01-15-19 @ 00:26)  HR: 72 (01-15-19 @ 17:08) (60 - 72)  BP: 134/84 (01-15-19 @ 17:08) (134/79 - 157/81)  BP(mean): --  RR: 18 (01-15-19 @ 15:15) (17 - 18)  SpO2: 98% (01-15-19 @ 10:00) (95% - 98%)    PHYSICAL EXAM:    Constitutional: Not in any distress    Eyes: No conjunctival injection    ENMT: No oral lesions    Neck: No nodes, no adenopathy    Back: Straight, no defects    Respiratory: clear b/l    Cardiovascular: RRR, no murmur    Gastrointestinal: soft, NT, ND    Extremities: No edema, no erythema    Neurological: no focal deficit    Skin: No rash      MEDICATIONS  (STANDING):  amLODIPine   Tablet 5 milliGRAM(s) Oral daily  atorvastatin 10 milliGRAM(s) Oral at bedtime  metoprolol tartrate 50 milliGRAM(s) Oral two times a day  oxybutynin 10 milliGRAM(s) Oral at bedtime  pantoprazole    Tablet 40 milliGRAM(s) Oral before breakfast  sertraline 50 milliGRAM(s) Oral daily  tamsulosin 0.4 milliGRAM(s) Oral at bedtime  valsartan 160 milliGRAM(s) Oral daily    MEDICATIONS  (PRN):          14 Jan 2019 06:42    138    |  107    |  20.0   ----------------------------<  106    3.8     |  21.0   |  1.19     Ca    8.7        14 Jan 2019 06:42      Allergies    No Known Allergies    Intolerances

## 2019-01-15 NOTE — CONSULT NOTE ADULT - ATTENDING COMMENTS
pt was seen and examined, care d/w son as well.   Hx of DVT/PE, has GFF. Fell last week since he missed the curb. Pt was having ataxia even before the fall. He became severely short of breath prior to episode of syncope. His speech is slurred with tongue contusion. He will need ischemic evaluation and a TE pt was seen and examined, care d/w son as well.   Hx of DVT/PE, has GFF. Fell last week since he missed the curb. Pt was having ataxia even before the fall. He became severely short of breath prior to episode of syncope. His speech is slurred with tongue contusion. He will need ischemic evaluation and a TTE for shortness of breath. There is no arrhythmia on the monitor. He is still off balance and will require physical therapy.   I reviewed the above and agree with a/p.

## 2019-01-15 NOTE — PROGRESS NOTE ADULT - ASSESSMENT
84y Male with episode of loss of consciousness.    Possible seizure.  Given MRI findings will begin Keppra.   Will give 1000 mg x 1 today.  Will need script for 500 mg BID on discharge.     History of PE on coumadin.    Instruct not to drive for now.     Will need outpatient neuro follow up.    Case discussed with Dr Murillo.

## 2019-01-15 NOTE — PROGRESS NOTE ADULT - SUBJECTIVE AND OBJECTIVE BOX
[facility]                                        [facility] [place]                                 Esther Casiano, & Haroldo                                  [address]                                        [place], [place], [address]                                             [phone]        CC: Syncope vs seizure     HPI:   The patient is a 84y Male who was on the phone with his neighbor and stopped talking. When he did not respond, his neighbor called EMS. The patient was found slumped over in his chair with blood trickling from his mouth. He does not recall what happened.   He reports that he had bitten his tongue but had no associated urinary incontinence.    1 Week ago he had tripped and fallen without loss of consciousness.  He has no prior history of seizure or syncope.      Interim history:  No further spells.     ROS:   Denies headache or dizziness.  Denies chest pain.  Denies shortness of breath.    MEDICATIONS  (STANDING):  amLODIPine   Tablet 5 milliGRAM(s) Oral daily  atorvastatin 10 milliGRAM(s) Oral at bedtime  metoprolol tartrate 50 milliGRAM(s) Oral two times a day  oxybutynin 10 milliGRAM(s) Oral at bedtime  pantoprazole    Tablet 40 milliGRAM(s) Oral before breakfast  sertraline 50 milliGRAM(s) Oral daily  tamsulosin 0.4 milliGRAM(s) Oral at bedtime  valsartan 160 milliGRAM(s) Oral daily      Vital Signs Last 24 Hrs  T(C): 36.7 (15 Domenico 2019 05:30), Max: 36.9 (15 Domenico 2019 00:26)  T(F): 98 (15 Domenico 2019 05:30), Max: 98.5 (15 Domenico 2019 00:26)  HR: 72 (15 Domenico 2019 05:30) (64 - 75)  BP: 157/81 (15 Domenico 2019 05:30) (134/81 - 158/87)  RR: 18 (15 Domenico 2019 05:30) (18 - 18)  SpO2: 95% (15 Domenico 2019 05:30) (95% - 98%)    Detailed Neurologic Exam:    Mental status: The patient is awake, alert, and fully oriented. There is no aphasia.    Cranial nerves: There is no papilledema. Pupils react symmetrically to light. There is no visual field deficit to confrontation. Extraocular motion is full with no nystagmus. There is no ptosis. Facial sensation is intact. Facial musculature is symmetric. Palate elevates symmetrically. Tongue is midline.    Motor: There is normal bulk and tone.  Strength is 5/5 in the right arm and leg.   Strength is 5/5 in the left arm and leg.    Sensation: Intact to light touch and pin.    Reflexes: 2+ throughout and plantar responses are flexor.    Cerebellar: There is no dysmetria on finger to nose testing.    Labs:     01-14    138  |  107  |  20.0  ----------------------------<  106  3.8   |  21.0<L>  |  1.19    Ca    8.7      14 Jan 2019 06:42    TPro  6.9  /  Alb  3.7  /  TBili  0.3<L>  /  DBili  x   /  AST  13  /  ALT  12  /  AlkPhos  52  01-13                            13.5   8.6   )-----------( 229      ( 13 Jan 2019 17:59 )             41.6       Rad:   MRI brain images reviewed (and concur with report): There is a tiny left convexity subdural hematoma as well as small left fronto-parietal subarachnoid hemorrhage.    EEG was normal.

## 2019-01-15 NOTE — CONSULT NOTE ADULT - SUBJECTIVE AND OBJECTIVE BOX
Patient is a 84y old  Male who presents with a chief complaint of Syncope/Seizure (15 Domenico 2019 08:14)      HPI:  83 y/o male with PMHx of CAD s/p 2 stents, HTN, HLD, PE (on Coumadin) came to the ED post syncope. Patient said he was on the phone with his neighbor this afternoon around 3 pm and he stopped talking; his neighbor called EMS when he was not responding on the phone. Patient said he As per EMS, patient was found slumped over in his chair and blood trickling down from his mouth; he was difficult to arouse however, became responsive while in the ambulance and back to baseline. As per patient. he was a little confused after he finally woke up, and he had a tongue bite. Patient reported that on Monday he fell face down in his house attributing it to wearing is reading glasses while walking instead of his regular glasses. Patient also complaint of tremor and weakness in his RUE; he was unable to hold a cup or write this morning with his right hand. Patient has no blurry vision, HA, tingling or numbness, chest pain, palpitation, shortness of breath, nausea/vomit, diaphoresis, abdominal pain, change in bowel/urinary habit, no bowel/bladder incontinence. (2019 21:59)  Appreciate above HPI by Dr. Fleming    Pt seen and examined in ED, woken from sleep, laying supine, moving air freely. Pt alert, oriented to self and time only. Poor historian, can not recall syncopal episode, states "had two strokes over the weekend". Difficulty finding words at times. Able to move all extremities equally, without deficit. Denies fever, chills, cough, phlegm production, shortness of breath, dyspnea on exertion, orthopnea, PND, edema, chest pain, pressure, palpitations, irregular, fast heart beat, nausea, vomiting, melena, rectal bleed, hematuria, lightheadedness, dizziness.       PAST MEDICAL & SURGICAL HISTORY:  CAD (coronary artery disease)  Marisol filter in place  PE (pulmonary embolism)  Hyperlipemia  Hypertension  S/P IVC filter      PREVIOUS DIAGNOSTIC TESTING:      ECHO  FINDINGS:  10/8/18:  EF 55-60%, mild LVH, severely enlarged L atrium, mod dilated R atrium, mild TR, mod AS,     STRESS  FINDINGS:  < from: Nuclear Stress Test-Pharmacologic (16 @ 08:27) >    NUCLEAR FINDINGS:  Review of raw data shows: Diaphragmatic artifact., Chest  wall attenuation is noted.  The left ventricle was normal LV size. There are medium  sized, severe defects in inferior and inferolateral walls  that are fixed, suggestive of infarction with minimal  danisha-infarct ischemia.  ------------------------------------------------------------------------      GATED ANALYSIS:  Post-stress resting myocardial perfusion gated SPECT  imaging was performed (LVEF = 46 %)  The distal inferior, the mid inferoseptal, the mid  inferolateral, the basal inferoseptal, the basal inferior,  and the basal inferolateral walls are hypokinetic.  The  mid inferior wall is akinetic.  ------------------------------------------------------------------------    IMPRESSIONS:Abnormal; Non-ischemic study Study  Inability to exercise due to decrease exercise capacity  and dyspnea.  No Symptom. No diagnostic ECG changes.  The left ventricle was normal LV size.  Infarction in RCA and left circumflex territory with  minimal danisha-infarct ischemia.  Post-stress resting myocardial perfusion gated SPECT  imaging was performed (LVEF = 46 %)  Segmental wall motion abnormality in RCA territory.  ------------------------------------------------------------------------      ------------------------------------------------------------------------    Confirmed on  3/1/2016 - 13:01:48 by Corrine Hernandez MD   Cardiology Fellow: Leydi Navarro, ANP-C    < end of copied text >    Allergies  No Known Allergies    Intolerances      MEDICATIONS  (STANDING):  amLODIPine   Tablet 5 milliGRAM(s) Oral daily  atorvastatin 10 milliGRAM(s) Oral at bedtime  levETIRAcetam 1000 milliGRAM(s) Oral once  metoprolol tartrate 50 milliGRAM(s) Oral two times a day  oxybutynin 10 milliGRAM(s) Oral at bedtime  pantoprazole    Tablet 40 milliGRAM(s) Oral before breakfast  sertraline 50 milliGRAM(s) Oral daily  tamsulosin 0.4 milliGRAM(s) Oral at bedtime  valsartan 160 milliGRAM(s) Oral daily    MEDICATIONS  (PRN):      FAMILY HISTORY:  Family history of diabetes mellitus (Child)      SOCIAL HISTORY:  CIGARETTES: Denies   ALCOHOL: Denies     REVIEW OF SYSTEMS:   CONSTITUTIONAL: No fever, weight loss, or fatigue  EYES: No eye pain, visual disturbances, or discharge  ENMT:  No difficulty hearing, tinnitus, vertigo; No sinus or throat pain  NECK: No pain or stiffness  RESPIRATORY: No cough, wheezing, chills or hemoptysis; No Shortness of Breath  CARDIOVASCULAR: No chest pain, palpitations, passing out, dizziness, or leg swelling  GASTROINTESTINAL: No abdominal or epigastric pain. No nausea, vomiting, or hematemesis; No diarrhea or constipation. No melena or hematochezia.  GENITOURINARY: No dysuria, frequency, hematuria, or incontinence  NEUROLOGICAL: + difficulty finding words; No headaches, memory loss, loss of strength, numbness, or tremors  SKIN: No itching, burning, rashes, or lesions   LYMPH Nodes: No enlarged glands  ENDOCRINE: No heat or cold intolerance; No hair loss  MUSCULOSKELETAL: No joint pain or swelling; No muscle, back, or extremity pain  PSYCHIATRIC: No depression, anxiety, mood swings, or difficulty sleeping  HEME/LYMPH: No easy bruising, or bleeding gums  ALLERY AND IMMUNOLOGIC: No hives or eczema	    Vital Signs Last 24 Hrs  T(C): 36.7 (15 Domenico 2019 05:30), Max: 36.9 (15 Domenico 2019 00:26)  T(F): 98 (15 Domenico 2019 05:30), Max: 98.5 (15 Domenico 2019 00:26)  HR: 72 (15 Domenico 2019 05:30) (64 - 75)  BP: 157/81 (15 Domenico 2019 05:30) (134/81 - 158/87)  RR: 18 (15 Domenico 2019 05:30) (18 - 18)  SpO2: 95% (15 Domenico 2019 05:30) (95% - 98%)      PHYSICAL EXAM:  Appearance: Normal, well nourished, 	  HEENT:  Dry oral mucosa, PERRL, EOMI, sclera non-icteric	  Lymphatic: No cervical lymphadenopathy  Cardiovascular: Normal S1 S2, No JVD, 2/6 BG LSB, No carotid bruits, No peripheral edema  Respiratory: Lungs clear to auscultation	  Psychiatry: A & O x 2, calm, cooperative   Gastrointestinal:  Soft, Non-tender, + BS, no bruits	  Skin: No rashes, No ecchymoses, No cyanosis  Neurologic: Grossly non-focal with full strength in all four extremities, no drift noted.   Extremities: Normal range of motion, No clubbing, cyanosis or edema  Vascular: Peripheral pulses palpable 2+ bilaterally      INTERPRETATION OF TELEMETRY: No telemetry overnight  ECG: SR, LBBB    LABS:                      13.5   8.6   )-----------( 229      ( 2019 17:59 )             41.6     01-14  138  |  107  |  20.0  ----------------------------<  106  3.8   |  21.0<L>  |  1.19    Ca    8.7      2019 06:42    TPro  6.9  /  Alb  3.7  /  TBili  0.3<L>  /  DBili  x   /  AST  13  /  ALT  12  /  AlkPhos  52  01-13    CARDIAC MARKERS ( 2019 17:59 )  x     / <0.01 ng/mL / x     / x     / x        PT/INR - ( 2019 17:59 )   PT: 60.9 sec;   INR: 5.04 ratio    PTT - ( 2019 17:59 )  PTT:41.9 sec    Urinalysis Basic - ( 2019 17:42 )    Color: Yellow / Appearance: Clear / S.020 / pH: x  Gluc: x / Ketone: Trace  / Bili: Negative / Urobili: Negative mg/dL   Blood: x / Protein: 30 mg/dL / Nitrite: Negative   Leuk Esterase: Trace / RBC: 0-2 /HPF / WBC 0-2   Sq Epi: x / Non Sq Epi: Occasional / Bacteria: Occasional    BNP  Serum Pro-Brain Natriuretic Peptide: 431 pg/mL (19 @ 01:50)    RADIOLOGY & ADDITIONAL STUDIES:  < from: MR Head No Cont (19 @ 10:30) >    FINDINGS:  There is prominence of the ventricles and cortical sulci. The midline   structures are intact.    There is no diffusion abnormality to suggest an acute infarction or other   causes of cytotoxic edema.     There are scattered patchy white matter abnormality in the   periventricular, subcortical, and deep white matter distribution as well   as brainstem, in particular scout likely chronic small vessel ischemic   disease.    There is a small sliver of left holo- convexity subdural collection   measuring approximately 2 mm from the table of the calvarium and probable   small adjacent posterior frontoparietal subarachnoid component without   significant mass effect. No midline shift. There is no parenchymal edema.   Normal configuration of cerebellar, mid brain, scout and medulla without   tonsillar ectopia.    There is no acute hydrocephalus.    There is normal sella / parasellar structures, tectum and pineal region.    The major intra-arterial flow voids are patent.       The mild scattered mucosal inflammatory disease of visualized paranasal   sinuses without air-fluid levels. There are bilateral mastoid   opacification.    IMPRESSION:    Small tiny sliver of left convexity subdural collection , probably   hemorrhage,  and probable small left posterior frontoparietal   subarachnoid component without mass effect, parenchymal edema midline   shift.    Chronic small vessel ischemic changes.    No acute infarct.    Bilateral mastoid inflammatory changes..    WM VILLALOBOS M.D., ATTENDING RADIOLOGIST    < end of copied text >      < from: CT Angio Head w/ IV Cont (19 @ 00:46) >    CTA HEAD:    Mild atherosclerosis of the cavernous segment of the left internal   carotid   artery.    Mild atherosclerosis of the cavernous segment of the right internal   carotid   artery.    Moderate narrowing of the right A1 segment.     Mild narrowing of the left anterior cerebral artery.    Moderate narrowing of distal right M1and M2 segments.    Severe narrowing of the right intracranial vertebral artery.  Left vertebral artery is dominant.    Persistent fetal origin of the right posterior cerebral artery with mild   narrowing.     Left middle cerebral artery, and left posterior cerebral artery are   unremarkable.    IMPRESSION:   Mild to moderate atherosclerosis and soft plaque of bilateral proximal   internal   carotid arteries resulting in 50-60% stenosis.  Moderate narrowing of distal right M1 and M2 segments.  Moderate narrowing of the right M1 segment.  No occlusion or aneurysm  No acute intracranial abnormality.    WM VILLALOBOS M.D., ATTENDING RADIOLOGIST  This document has been electronically signed. 2019  8:47AM        < end of copied text > Patient is a 84y old  Male who presents with a chief complaint of Syncope/Seizure (15 Domenico 2019 08:14)      HPI:  83 y/o male with PMHx of CAD s/p 2 stents, HTN, HLD, PE (on Coumadin) came to the ED post syncope. Patient said he was on the phone with his neighbor this afternoon around 3 pm and he stopped talking; his neighbor called EMS when he was not responding on the phone. Patient said he As per EMS, patient was found slumped over in his chair and blood trickling down from his mouth; he was difficult to arouse however, became responsive while in the ambulance and back to baseline. As per patient. he was a little confused after he finally woke up, and he had a tongue bite. Patient reported that on Monday he fell face down in his house attributing it to wearing is reading glasses while walking instead of his regular glasses. Patient also complaint of tremor and weakness in his RUE; he was unable to hold a cup or write this morning with his right hand. Patient has no blurry vision, HA, tingling or numbness, chest pain, palpitation, shortness of breath, nausea/vomit, diaphoresis, abdominal pain, change in bowel/urinary habit, no bowel/bladder incontinence. (2019 21:59)  Appreciate above HPI by Dr. Fleming    Pt seen and examined in ED, woken from sleep, laying supine, moving air freely. Pt alert, oriented to self and time only. Poor historian, can not recall syncopal episode, states "had two strokes over the weekend". Difficulty finding words at times. Able to move all extremities equally, without deficit. Denies fever, chills, cough, phlegm production, shortness of breath, dyspnea on exertion, orthopnea, PND, edema, chest pain, pressure, palpitations, irregular, fast heart beat, nausea, vomiting, melena, rectal bleed, hematuria, lightheadedness, dizziness.       PAST MEDICAL & SURGICAL HISTORY:  CAD (coronary artery disease)  Marisol filter in place  PE (pulmonary embolism)  Hyperlipemia  Hypertension  S/P IVC filter      PREVIOUS DIAGNOSTIC TESTING:      ECHO  FINDINGS:  10/8/18:  EF 55-60%, mild LVH, severely enlarged L atrium, mod dilated R atrium, mild TR, mod AS,     STRESS  FINDINGS:  < from: Nuclear Stress Test-Pharmacologic (16 @ 08:27) >    NUCLEAR FINDINGS:  Review of raw data shows: Diaphragmatic artifact., Chest  wall attenuation is noted.  The left ventricle was normal LV size. There are medium  sized, severe defects in inferior and inferolateral walls  that are fixed, suggestive of infarction with minimal  danisha-infarct ischemia.  ------------------------------------------------------------------------      GATED ANALYSIS:  Post-stress resting myocardial perfusion gated SPECT  imaging was performed (LVEF = 46 %)  The distal inferior, the mid inferoseptal, the mid  inferolateral, the basal inferoseptal, the basal inferior,  and the basal inferolateral walls are hypokinetic.  The  mid inferior wall is akinetic.  ------------------------------------------------------------------------    IMPRESSIONS:Abnormal; Non-ischemic study Study  Inability to exercise due to decrease exercise capacity  and dyspnea.  No Symptom. No diagnostic ECG changes.  The left ventricle was normal LV size.  Infarction in RCA and left circumflex territory with  minimal danisha-infarct ischemia.  Post-stress resting myocardial perfusion gated SPECT  imaging was performed (LVEF = 46 %)  Segmental wall motion abnormality in RCA territory.  ------------------------------------------------------------------------      ------------------------------------------------------------------------    Confirmed on  3/1/2016 - 13:01:48 by Corrine Hernandez MD   Cardiology Fellow: Leydi Navarro, ANP-C    < end of copied text >    Allergies  No Known Allergies    Intolerances      MEDICATIONS  (STANDING):  amLODIPine   Tablet 5 milliGRAM(s) Oral daily  atorvastatin 10 milliGRAM(s) Oral at bedtime  levETIRAcetam 1000 milliGRAM(s) Oral once  metoprolol tartrate 50 milliGRAM(s) Oral two times a day  oxybutynin 10 milliGRAM(s) Oral at bedtime  pantoprazole    Tablet 40 milliGRAM(s) Oral before breakfast  sertraline 50 milliGRAM(s) Oral daily  tamsulosin 0.4 milliGRAM(s) Oral at bedtime  valsartan 160 milliGRAM(s) Oral daily    MEDICATIONS  (PRN):      FAMILY HISTORY:  Family history of diabetes mellitus (Child)      SOCIAL HISTORY:  CIGARETTES: Denies   ALCOHOL: Denies     REVIEW OF SYSTEMS:   CONSTITUTIONAL: No fever, weight loss, or fatigue  EYES: No eye pain, visual disturbances, or discharge  ENMT:  No difficulty hearing, tinnitus, vertigo; No sinus or throat pain  NECK: No pain or stiffness  RESPIRATORY: No cough, wheezing, chills or hemoptysis; No Shortness of Breath  CARDIOVASCULAR: No chest pain, palpitations, passing out, dizziness, or leg swelling  GASTROINTESTINAL: No abdominal or epigastric pain. No nausea, vomiting, or hematemesis; No diarrhea or constipation. No melena or hematochezia.  GENITOURINARY: No dysuria, frequency, hematuria, or incontinence  NEUROLOGICAL: + difficulty finding words; No headaches, memory loss, loss of strength, numbness, or tremors  SKIN: No itching, burning, rashes, or lesions   LYMPH Nodes: No enlarged glands  ENDOCRINE: No heat or cold intolerance; No hair loss  MUSCULOSKELETAL: No joint pain or swelling; No muscle, back, or extremity pain  PSYCHIATRIC: No depression, anxiety, mood swings, or difficulty sleeping  HEME/LYMPH: No easy bruising, or bleeding gums  ALLERY AND IMMUNOLOGIC: No hives or eczema	    Vital Signs Last 24 Hrs  T(C): 36.7 (15 Domenico 2019 05:30), Max: 36.9 (15 Domenico 2019 00:26)  T(F): 98 (15 Domenico 2019 05:30), Max: 98.5 (15 Domenico 2019 00:26)  HR: 72 (15 Domenico 2019 05:30) (64 - 75)  BP: 157/81 (15 Domenico 2019 05:30) (134/81 - 158/87)  RR: 18 (15 Domenico 2019 05:30) (18 - 18)  SpO2: 95% (15 Domenico 2019 05:30) (95% - 98%)      PHYSICAL EXAM:  Appearance: Normal, well nourished, 	  HEENT:  Dry oral mucosa, PERRL, EOMI, sclera non-icteric	  Lymphatic: No cervical lymphadenopathy  Cardiovascular: Normal S1 S2, No JVD, 2/6 BG LSB, No carotid bruits, No peripheral edema  Respiratory: Lungs clear to auscultation	  Psychiatry: A & O x 2, calm, cooperative   Gastrointestinal:  Soft, Non-tender, + BS, no bruits	  Skin: No rashes, No ecchymoses, No cyanosis  Neurologic: Grossly non-focal with full strength in all four extremities, no drift noted.   Extremities: Normal range of motion, No clubbing, cyanosis or edema  Vascular: Peripheral pulses palpable 2+ bilaterally      INTERPRETATION OF TELEMETRY: no events - SR  ECG: SR, LBBB (change from prior EKg IVCD)    LABS:                      13.5   8.6   )-----------( 229      ( 2019 17:59 )             41.6     01-14  138  |  107  |  20.0  ----------------------------<  106  3.8   |  21.0<L>  |  1.19    Ca    8.7      2019 06:42    TPro  6.9  /  Alb  3.7  /  TBili  0.3<L>  /  DBili  x   /  AST  13  /  ALT  12  /  AlkPhos  52  01-13    CARDIAC MARKERS ( 2019 17:59 )  x     / <0.01 ng/mL / x     / x     / x        PT/INR - ( 2019 17:59 )   PT: 60.9 sec;   INR: 5.04 ratio    PTT - ( 2019 17:59 )  PTT:41.9 sec    Urinalysis Basic - ( 2019 17:42 )    Color: Yellow / Appearance: Clear / S.020 / pH: x  Gluc: x / Ketone: Trace  / Bili: Negative / Urobili: Negative mg/dL   Blood: x / Protein: 30 mg/dL / Nitrite: Negative   Leuk Esterase: Trace / RBC: 0-2 /HPF / WBC 0-2   Sq Epi: x / Non Sq Epi: Occasional / Bacteria: Occasional    BNP  Serum Pro-Brain Natriuretic Peptide: 431 pg/mL (19 @ 01:50)    RADIOLOGY & ADDITIONAL STUDIES:  < from: MR Head No Cont (19 @ 10:30) >    FINDINGS:  There is prominence of the ventricles and cortical sulci. The midline   structures are intact.    There is no diffusion abnormality to suggest an acute infarction or other   causes of cytotoxic edema.     There are scattered patchy white matter abnormality in the   periventricular, subcortical, and deep white matter distribution as well   as brainstem, in particular scout likely chronic small vessel ischemic   disease.    There is a small sliver of left holo- convexity subdural collection   measuring approximately 2 mm from the table of the calvarium and probable   small adjacent posterior frontoparietal subarachnoid component without   significant mass effect. No midline shift. There is no parenchymal edema.   Normal configuration of cerebellar, mid brain, scout and medulla without   tonsillar ectopia.    There is no acute hydrocephalus.    There is normal sella / parasellar structures, tectum and pineal region.    The major intra-arterial flow voids are patent.       The mild scattered mucosal inflammatory disease of visualized paranasal   sinuses without air-fluid levels. There are bilateral mastoid   opacification.    IMPRESSION:    Small tiny sliver of left convexity subdural collection , probably   hemorrhage,  and probable small left posterior frontoparietal   subarachnoid component without mass effect, parenchymal edema midline   shift.    Chronic small vessel ischemic changes.    No acute infarct.    Bilateral mastoid inflammatory changes..    WM VILLALOBOS M.D., ATTENDING RADIOLOGIST    < end of copied text >      < from: CT Angio Head w/ IV Cont (19 @ 00:46) >    CTA HEAD:    Mild atherosclerosis of the cavernous segment of the left internal   carotid   artery.    Mild atherosclerosis of the cavernous segment of the right internal   carotid   artery.    Moderate narrowing of the right A1 segment.     Mild narrowing of the left anterior cerebral artery.    Moderate narrowing of distal right M1and M2 segments.    Severe narrowing of the right intracranial vertebral artery.  Left vertebral artery is dominant.    Persistent fetal origin of the right posterior cerebral artery with mild   narrowing.     Left middle cerebral artery, and left posterior cerebral artery are   unremarkable.    IMPRESSION:   Mild to moderate atherosclerosis and soft plaque of bilateral proximal   internal   carotid arteries resulting in 50-60% stenosis.  Moderate narrowing of distal right M1 and M2 segments.  Moderate narrowing of the right M1 segment.  No occlusion or aneurysm  No acute intracranial abnormality.    WM VILLALOBOS M.D., ATTENDING RADIOLOGIST  This document has been electronically signed. 2019  8:47AM        < end of copied text > Patient is a 84y old  Male who presents with a chief complaint of Syncope/Seizure (15 Domenico 2019 08:14)      HPI:  83 y/o male with PMHx of CAD s/p 2 stents, HTN, HLD, PE (on Coumadin) came to the ED post syncope. Patient said he was on the phone with his neighbor this afternoon around 3 pm and he stopped talking; his neighbor called EMS when he was not responding on the phone. Patient said he As per EMS, patient was found slumped over in his chair and blood trickling down from his mouth; he was difficult to arouse however, became responsive while in the ambulance and back to baseline. As per patient. he was a little confused after he finally woke up, and he had a tongue bite. Patient reported that on Monday he fell face down in his house attributing it to wearing is reading glasses while walking instead of his regular glasses. Patient also complaint of tremor and weakness in his RUE; he was unable to hold a cup or write this morning with his right hand. Patient has no blurry vision, HA, tingling or numbness, chest pain, palpitation, shortness of breath, nausea/vomit, diaphoresis, abdominal pain, change in bowel/urinary habit, no bowel/bladder incontinence. (2019 21:59)  Appreciate above HPI by Dr. Fleming    Pt seen and examined in ED, woken from sleep, laying supine, moving air freely. Pt alert, oriented to self and time only. Poor historian, can not recall syncopal episode, states "had two strokes over the weekend". Difficulty finding words at times. Able to move all extremities equally, without deficit. Denies fever, chills, cough, phlegm production, shortness of breath, dyspnea on exertion, orthopnea, PND, edema, chest pain, pressure, palpitations, irregular, fast heart beat, nausea, vomiting, melena, rectal bleed, hematuria, lightheadedness, dizziness.       PAST MEDICAL & SURGICAL HISTORY:  CAD (coronary artery disease)  Marisol filter in place  PE (pulmonary embolism)  Hyperlipemia  Hypertension  S/P IVC filter      PREVIOUS DIAGNOSTIC TESTING:    ECHO  FINDINGS:  10/8/18:  EF 55-60%, mild LVH, severely enlarged L atrium, mod dilated R atrium, mild TR, mod AS,     STRESS  FINDINGS:  < from: Nuclear Stress Test-Pharmacologic (16 @ 08:27) >    NUCLEAR FINDINGS:  Review of raw data shows: Diaphragmatic artifact., Chest  wall attenuation is noted.  The left ventricle was normal LV size. There are medium  sized, severe defects in inferior and inferolateral walls  that are fixed, suggestive of infarction with minimal  danisha-infarct ischemia.  ------------------------------------------------------------------------      GATED ANALYSIS:  Post-stress resting myocardial perfusion gated SPECT  imaging was performed (LVEF = 46 %)  The distal inferior, the mid inferoseptal, the mid  inferolateral, the basal inferoseptal, the basal inferior,  and the basal inferolateral walls are hypokinetic.  The  mid inferior wall is akinetic.  ------------------------------------------------------------------------    IMPRESSIONS:Abnormal; Non-ischemic study Study  Inability to exercise due to decrease exercise capacity  and dyspnea.  No Symptom. No diagnostic ECG changes.  The left ventricle was normal LV size.  Infarction in RCA and left circumflex territory with  minimal danisha-infarct ischemia.  Post-stress resting myocardial perfusion gated SPECT  imaging was performed (LVEF = 46 %)  Segmental wall motion abnormality in RCA territory.  ------------------------------------------------------------------------      ------------------------------------------------------------------------    Confirmed on  3/1/2016 - 13:01:48 by Corrine Hernandez MD   Cardiology Fellow: Leydi Navarro, BILLY-C    < end of copied text >    Allergies  No Known Allergies    MEDICATIONS  (STANDING):  amLODIPine   Tablet 5 milliGRAM(s) Oral daily  atorvastatin 10 milliGRAM(s) Oral at bedtime  levETIRAcetam 1000 milliGRAM(s) Oral once  metoprolol tartrate 50 milliGRAM(s) Oral two times a day  oxybutynin 10 milliGRAM(s) Oral at bedtime  pantoprazole    Tablet 40 milliGRAM(s) Oral before breakfast  sertraline 50 milliGRAM(s) Oral daily  tamsulosin 0.4 milliGRAM(s) Oral at bedtime  valsartan 160 milliGRAM(s) Oral daily    FAMILY HISTORY:  Family history of diabetes mellitus (Child)      SOCIAL HISTORY:  CIGARETTES: Denies   ALCOHOL: Denies     REVIEW OF SYSTEMS:   CONSTITUTIONAL: No fever, weight loss, or fatigue  EYES: No eye pain, visual disturbances, or discharge  ENMT:  No difficulty hearing, tinnitus, vertigo; No sinus or throat pain  NECK: No pain or stiffness  RESPIRATORY: No cough, wheezing, chills or hemoptysis; No Shortness of Breath  CARDIOVASCULAR: No chest pain, palpitations, passing out, dizziness, or leg swelling  GASTROINTESTINAL: No abdominal or epigastric pain. No nausea, vomiting, or hematemesis; No diarrhea or constipation. No melena or hematochezia.  GENITOURINARY: No dysuria, frequency, hematuria, or incontinence  NEUROLOGICAL: + difficulty finding words; No headaches, memory loss, loss of strength, numbness, or tremors  SKIN: No itching, burning, rashes, or lesions   LYMPH Nodes: No enlarged glands  ENDOCRINE: No heat or cold intolerance; No hair loss  MUSCULOSKELETAL: No joint pain or swelling; No muscle, back, or extremity pain  PSYCHIATRIC: No depression, anxiety, mood swings, or difficulty sleeping  HEME/LYMPH: No easy bruising, or bleeding gums  ALLERY AND IMMUNOLOGIC: No hives or eczema	    Vital Signs Last 24 Hrs  T(C): 36.7 (15 Domenico 2019 05:30), Max: 36.9 (15 Domenico 2019 00:26)  T(F): 98 (15 Domenico 2019 05:30), Max: 98.5 (15 Domenico 2019 00:26)  HR: 72 (15 Domenico 2019 05:30) (64 - 75)  BP: 157/81 (15 Domenico 2019 05:30) (134/81 - 158/87)  RR: 18 (15 Domenico 2019 05:30) (18 - 18)  SpO2: 95% (15 Domenico 2019 05:30) (95% - 98%)      PHYSICAL EXAM:  Appearance: Normal, well nourished, 	  HEENT:  Dry oral mucosa, PERRL, EOMI, sclera non-icteric	  Lymphatic: No cervical lymphadenopathy  Cardiovascular: Normal S1 S2, No JVD, 2/6 BG LSB, No carotid bruits, No peripheral edema  Respiratory: Lungs clear to auscultation	  Psychiatry: A & O x 2, calm, cooperative   Gastrointestinal:  Soft, Non-tender, + BS, no bruits	  Skin: No rashes, No ecchymoses, No cyanosis  Neurologic: Grossly non-focal with full strength in all four extremities, no drift noted.   Extremities: Normal range of motion, No clubbing, cyanosis or edema  Vascular: Peripheral pulses palpable 2+ bilaterally      INTERPRETATION OF TELEMETRY: no events - SR  ECG: SR, LBBB (change from prior EKg IVCD)    LABS:                      13.5   8.6   )-----------( 229      ( 2019 17:59 )             41.6     01-14  138  |  107  |  20.0  ----------------------------<  106  3.8   |  21.0<L>  |  1.19    Ca    8.7      2019 06:42    TPro  6.9  /  Alb  3.7  /  TBili  0.3<L>  /  DBili  x   /  AST  13  /  ALT  12  /  AlkPhos  52  01-13    CARDIAC MARKERS ( 2019 17:59 )  x     / <0.01 ng/mL / x     / x     / x        PT/INR - ( 2019 17:59 )   PT: 60.9 sec;   INR: 5.04 ratio    PTT - ( 2019 17:59 )  PTT:41.9 sec    Urinalysis Basic - ( 2019 17:42 )    Color: Yellow / Appearance: Clear / S.020 / pH: x  Gluc: x / Ketone: Trace  / Bili: Negative / Urobili: Negative mg/dL   Blood: x / Protein: 30 mg/dL / Nitrite: Negative   Leuk Esterase: Trace / RBC: 0-2 /HPF / WBC 0-2   Sq Epi: x / Non Sq Epi: Occasional / Bacteria: Occasional    BNP  Serum Pro-Brain Natriuretic Peptide: 431 pg/mL (19 @ 01:50)    RADIOLOGY & ADDITIONAL STUDIES:  < from: MR Head No Cont (19 @ 10:30) >    FINDINGS:  There is prominence of the ventricles and cortical sulci. The midline   structures are intact.    There is no diffusion abnormality to suggest an acute infarction or other   causes of cytotoxic edema.     There are scattered patchy white matter abnormality in the   periventricular, subcortical, and deep white matter distribution as well   as brainstem, in particular scout likely chronic small vessel ischemic   disease.    There is a small sliver of left holo- convexity subdural collection   measuring approximately 2 mm from the table of the calvarium and probable   small adjacent posterior frontoparietal subarachnoid component without   significant mass effect. No midline shift. There is no parenchymal edema.   Normal configuration of cerebellar, mid brain, scout and medulla without   tonsillar ectopia.    There is no acute hydrocephalus.    There is normal sella / parasellar structures, tectum and pineal region.    The major intra-arterial flow voids are patent.       The mild scattered mucosal inflammatory disease of visualized paranasal   sinuses without air-fluid levels. There are bilateral mastoid   opacification.    IMPRESSION:    Small tiny sliver of left convexity subdural collection , probably   hemorrhage,  and probable small left posterior frontoparietal   subarachnoid component without mass effect, parenchymal edema midline   shift.    Chronic small vessel ischemic changes.    No acute infarct.    Bilateral mastoid inflammatory changes..    WM VILLALOBOS M.D., ATTENDING RADIOLOGIST    < end of copied text >      < from: CT Angio Head w/ IV Cont (19 @ 00:46) >    CTA HEAD:    Mild atherosclerosis of the cavernous segment of the left internal   carotid   artery.    Mild atherosclerosis of the cavernous segment of the right internal   carotid   artery.    Moderate narrowing of the right A1 segment.     Mild narrowing of the left anterior cerebral artery.    Moderate narrowing of distal right M1and M2 segments.    Severe narrowing of the right intracranial vertebral artery.  Left vertebral artery is dominant.    Persistent fetal origin of the right posterior cerebral artery with mild   narrowing.     Left middle cerebral artery, and left posterior cerebral artery are   unremarkable.    IMPRESSION:   Mild to moderate atherosclerosis and soft plaque of bilateral proximal   internal   carotid arteries resulting in 50-60% stenosis.  Moderate narrowing of distal right M1 and M2 segments.  Moderate narrowing of the right M1 segment.  No occlusion or aneurysm  No acute intracranial abnormality.    WM VILLALOBOS M.D., ATTENDING RADIOLOGIST  This document has been electronically signed. 2019  8:47AM        < end of copied text >

## 2019-01-16 DIAGNOSIS — R55 SYNCOPE AND COLLAPSE: ICD-10-CM

## 2019-01-16 PROCEDURE — 99232 SBSQ HOSP IP/OBS MODERATE 35: CPT

## 2019-01-16 PROCEDURE — 78452 HT MUSCLE IMAGE SPECT MULT: CPT | Mod: 26

## 2019-01-16 PROCEDURE — 93306 TTE W/DOPPLER COMPLETE: CPT | Mod: 26

## 2019-01-16 PROCEDURE — 93016 CV STRESS TEST SUPVJ ONLY: CPT

## 2019-01-16 PROCEDURE — 93018 CV STRESS TEST I&R ONLY: CPT

## 2019-01-16 RX ORDER — LEVETIRACETAM 250 MG/1
500 TABLET, FILM COATED ORAL
Qty: 0 | Refills: 0 | Status: DISCONTINUED | OUTPATIENT
Start: 2019-01-16 | End: 2019-01-17

## 2019-01-16 RX ADMIN — TAMSULOSIN HYDROCHLORIDE 0.4 MILLIGRAM(S): 0.4 CAPSULE ORAL at 21:23

## 2019-01-16 RX ADMIN — SERTRALINE 50 MILLIGRAM(S): 25 TABLET, FILM COATED ORAL at 12:48

## 2019-01-16 RX ADMIN — AMLODIPINE BESYLATE 5 MILLIGRAM(S): 2.5 TABLET ORAL at 05:26

## 2019-01-16 RX ADMIN — PANTOPRAZOLE SODIUM 40 MILLIGRAM(S): 20 TABLET, DELAYED RELEASE ORAL at 05:26

## 2019-01-16 RX ADMIN — Medication 50 MILLIGRAM(S): at 05:26

## 2019-01-16 RX ADMIN — Medication 10 MILLIGRAM(S): at 21:23

## 2019-01-16 RX ADMIN — Medication 50 MILLIGRAM(S): at 18:14

## 2019-01-16 RX ADMIN — LEVETIRACETAM 500 MILLIGRAM(S): 250 TABLET, FILM COATED ORAL at 18:13

## 2019-01-16 RX ADMIN — ATORVASTATIN CALCIUM 10 MILLIGRAM(S): 80 TABLET, FILM COATED ORAL at 21:23

## 2019-01-16 RX ADMIN — VALSARTAN 160 MILLIGRAM(S): 80 TABLET ORAL at 05:26

## 2019-01-16 NOTE — PROGRESS NOTE ADULT - ASSESSMENT
84y Male with episode of loss of consciousness.    Possible seizure.  Given MRI findings will begin Keppra.   Will order standing dose  Will need script for 500 mg BID on discharge.     History of PE on coumadin., hold coumadin for now given SDH/SAH, can likely restart in 2 weeks    Instruct not to drive for now.     Will need outpatient neuro follow up.    Thank you for allowing me to participate in the care of your patient    Harvey Santana MD, PhD   [phone]

## 2019-01-16 NOTE — PROGRESS NOTE ADULT - SUBJECTIVE AND OBJECTIVE BOX
[facility]                                        [facility]                                 Esther Casiano, & Haroldo                                  [address]                                        [place], [place], [address]                                             [phone]        CC: Syncope vs seizure     HPI:   The patient is a 84y Male who was on the phone with his neighbor and stopped talking. When he did not respond, his neighbor called EMS. The patient was found slumped over in his chair with blood trickling from his mouth. He does not recall what happened.  He reports that he had bitten his tongue but had no associated urinary incontinence.1 Week ago he had tripped and fallen without loss of consciousness. He has no prior history of seizure or syncope.  (JW)    Interim history:  no new events.  Feels well    ROS neurology: Denies headache or dizziness. Denies weakness/numbness.  Denies speech/language deficits. Denies diplopia/blurred vision.  Denies confusion.  no seizure    MEDICATIONS  (STANDING):  amLODIPine   Tablet 5 milliGRAM(s) Oral daily  atorvastatin 10 milliGRAM(s) Oral at bedtime  metoprolol tartrate 50 milliGRAM(s) Oral two times a day  oxybutynin 10 milliGRAM(s) Oral at bedtime  pantoprazole    Tablet 40 milliGRAM(s) Oral before breakfast  sertraline 50 milliGRAM(s) Oral daily  tamsulosin 0.4 milliGRAM(s) Oral at bedtime  valsartan 160 milliGRAM(s) Oral daily    MEDICATIONS  (PRN):      Vital Signs Last 24 Hrs  T(C): 36.4 (16 Jan 2019 10:59), Max: 36.8 (15 Domenico 2019 15:15)  T(F): 97.6 (16 Jan 2019 10:59), Max: 98.3 (15 Domenico 2019 15:15)  HR: 62 (16 Jan 2019 10:59) (58 - 72)  BP: 124/77 (16 Jan 2019 10:59) (124/77 - 160/85)  BP(mean): --  RR: 18 (16 Jan 2019 10:59) (18 - 18)  SpO2: 98% (16 Jan 2019 10:59) (98% - 98%)    Detailed Neurologic Exam:    Mental status: The patient is awake, alert, and fully oriented. There is no aphasia.    Cranial nerves:  Pupils react symmetrically to light. There is no visual field deficit to confrontation. Extraocular motion is full with no nystagmus. There is no ptosis. Facial sensation is intact. Facial musculature is symmetric. Palate elevates symmetrically. Tongue is midline.    Motor: There is normal bulk and tone.  Strength is 5/5 in the right arm and leg.   Strength is 5/5 in the left arm and leg.    Sensation: Intact to light touch and pin.    Reflexes: 2+ throughout and plantar responses are flexor.    Cerebellar: There is no dysmetria on finger to nose testing.    Labs:       PT/INR - ( 15 Domenico 2019 15:48 )   PT: 33.9 sec;   INR: 2.85 ratio         Rad:   MRI brain: There is a tiny left convexity subdural hematoma as well as small left fronto-parietal subarachnoid hemorrhage.    EEG was normal.

## 2019-01-16 NOTE — PROGRESS NOTE ADULT - SUBJECTIVE AND OBJECTIVE BOX
SUBJECTIVE    REVIEW OF SYSTEMS    General: Not in any pain	    Skin/Breast: No rash  	  ENMT: No visual problems, no sore throat	    Respiratory and Thorax: No cough, No CP, No SOB  	  Cardiovascular: No CP, No palpitations    Gastrointestinal: No Abd pain, No N/V/D    Musculoskeletal: No Joint pain, No back pain	    Neurological: No headache    Psychiatric: No anxiety      OBJECTIVE    Vital Signs Last 24 Hrs  T(C): 36.6 (01-16-19 @ 16:20), Max: 36.6 (01-16-19 @ 16:20)  T(F): 97.8 (01-16-19 @ 16:20), Max: 97.8 (01-16-19 @ 16:20)  HR: 66 (01-16-19 @ 16:20) (58 - 67)  BP: 126/70 (01-16-19 @ 16:20) (124/77 - 160/85)  BP(mean): --  RR: 18 (01-16-19 @ 16:20) (18 - 18)  SpO2: 98% (01-16-19 @ 10:59) (98% - 98%)    PHYSICAL EXAM:    Constitutional: Not in any distress    Eyes: No conjunctival injection    ENMT: No oral lesions    Neck: No nodes, no adenopathy    Back: Straight, no defects    Respiratory: clear b/l    Cardiovascular: RRR, no murmur    Gastrointestinal: soft, NT, ND    Extremities: No edema, no erythema    Neurological: no focal deficit    Skin: No rash      MEDICATIONS  (STANDING):  amLODIPine   Tablet 5 milliGRAM(s) Oral daily  atorvastatin 10 milliGRAM(s) Oral at bedtime  levETIRAcetam 500 milliGRAM(s) Oral two times a day  metoprolol tartrate 50 milliGRAM(s) Oral two times a day  oxybutynin 10 milliGRAM(s) Oral at bedtime  pantoprazole    Tablet 40 milliGRAM(s) Oral before breakfast  sertraline 50 milliGRAM(s) Oral daily  tamsulosin 0.4 milliGRAM(s) Oral at bedtime  valsartan 160 milliGRAM(s) Oral daily    MEDICATIONS  (PRN):                Allergies    No Known Allergies    Intolerances

## 2019-01-16 NOTE — PROGRESS NOTE ADULT - SUBJECTIVE AND OBJECTIVE BOX
CHIEF COMPLAINT:Patient is a 84y old  Male who presents with a chief complaint of Syncope/Seizure (15 Domenico 2019 20:45)    INTERVAL HISTORY:  Allergies  No Known Allergies  Intolerances    	  MEDICATIONS:  amLODIPine   Tablet 5 milliGRAM(s) Oral daily  metoprolol tartrate 50 milliGRAM(s) Oral two times a day  tamsulosin 0.4 milliGRAM(s) Oral at bedtime  valsartan 160 milliGRAM(s) Oral daily  sertraline 50 milliGRAM(s) Oral daily  pantoprazole    Tablet 40 milliGRAM(s) Oral before breakfast  atorvastatin 10 milliGRAM(s) Oral at bedtime  oxybutynin 10 milliGRAM(s) Oral at bedtime      PHYSICAL EXAM:    T(C): 36.4 (01-16-19 @ 10:59), Max: 36.8 (01-15-19 @ 15:15)  HR: 62 (01-16-19 @ 10:59) (58 - 72)  BP: 124/77 (01-16-19 @ 10:59) (124/77 - 160/85)  RR: 18 (01-16-19 @ 10:59) (18 - 18)  SpO2: 98% (01-16-19 @ 10:59) (98% - 98%)    I&O's Summary    15 Domenico 2019 07:01  -  16 Jan 2019 07:00  --------------------------------------------------------  IN: 0 mL / OUT: 1100 mL / NET: -1100 mL      Appearance: Normal	  HEENT: NC/AT  Eye: Pink Conjunctiva  Lungs: CTA B/L  CVS: RRR, Normal S1 and S2, 2/6 BG LSB, No Edema  Pulses: Normal distal pulses.  Neuro: A&O x3     TELEMETRY: 	    ECG: SR, LBBB   RADIOLOGY:   < from: MR Head No Cont (01.14.19 @ 10:30) >    IMPRESSION:    Small tiny sliver of left convexity subdural collection , probably   hemorrhage,  and probable small left posterior frontoparietal   subarachnoid component without mass effect, parenchymal edema midline   shift.    Chronic small vessel ischemic changes.    No acute infarct.    Bilateral mastoid inflammatory changes..    WM VILLALOBOS M.D., ATTENDING RADIOLOGIST CHIEF COMPLAINT:Patient is a 84y old  Male who presents with a chief complaint of Syncope/Seizure (15 Domenico 2019 20:45)    Seen and examined in Bed. Awake alert oriented x 3, jovial, conversing with son and family friends at bedside. Pt denies CP, SOB, dyspnea, palpitations, dizziness, syncope. Speech in more clear then yesterday.       INTERVAL HISTORY:  Allergies  No Known Allergies  Intolerances    	  MEDICATIONS:  amLODIPine   Tablet 5 milliGRAM(s) Oral daily  metoprolol tartrate 50 milliGRAM(s) Oral two times a day  tamsulosin 0.4 milliGRAM(s) Oral at bedtime  valsartan 160 milliGRAM(s) Oral daily  sertraline 50 milliGRAM(s) Oral daily  pantoprazole    Tablet 40 milliGRAM(s) Oral before breakfast  atorvastatin 10 milliGRAM(s) Oral at bedtime  oxybutynin 10 milliGRAM(s) Oral at bedtime      PHYSICAL EXAM:    T(C): 36.4 (01-16-19 @ 10:59), Max: 36.8 (01-15-19 @ 15:15)  HR: 62 (01-16-19 @ 10:59) (58 - 72)  BP: 124/77 (01-16-19 @ 10:59) (124/77 - 160/85)  RR: 18 (01-16-19 @ 10:59) (18 - 18)  SpO2: 98% (01-16-19 @ 10:59) (98% - 98%)    I&O's Summary    15 Domenico 2019 07:01  -  16 Jan 2019 07:00  --------------------------------------------------------  IN: 0 mL / OUT: 1100 mL / NET: -1100 mL      Appearance: Normal	  HEENT: NC/AT  Eye: Pink Conjunctiva  Lungs: CTA B/L  CVS: RRR, Normal S1 and S2, 2/6 BG LSB, No Edema  Pulses: Normal distal pulses.  Neuro: A&O x3     TELEMETRY: 	    ECG: SR, LBBB   RADIOLOGY:   < from: MR Head No Cont (01.14.19 @ 10:30) >    IMPRESSION:    Small tiny sliver of left convexity subdural collection , probably   hemorrhage,  and probable small left posterior frontoparietal   subarachnoid component without mass effect, parenchymal edema midline   shift.    Chronic small vessel ischemic changes.    No acute infarct.    Bilateral mastoid inflammatory changes..    WM VILLALOBOS M.D., ATTENDING RADIOLOGIST

## 2019-01-16 NOTE — PROGRESS NOTE ADULT - PROBLEM SELECTOR PROBLEM 3
CAD (coronary artery disease)
CAD (coronary artery disease)
Other chronic pulmonary embolism without acute cor pulmonale

## 2019-01-16 NOTE — PROGRESS NOTE ADULT - PROBLEM SELECTOR PLAN 1
will d/c anticoagulation at this time secondary to several recent falls, ICH and pt with IVC filter - discussed with pt and son, Casey and both understand
?seizure; now on keppra, small ICH
reviewed EEG, MRI; likely had TIA; neuro f/u greatly appreciated

## 2019-01-16 NOTE — PROGRESS NOTE ADULT - PROBLEM SELECTOR PLAN 2
now on Keppra; cont p/t; pt will likely need KELSEY prior to home
check pt/inr - restart coumadin when in lowered to therapeutic range
s/p nuclear stress test, which shows small defect on imaging; will discuss with cardiology, ASA on hold

## 2019-01-16 NOTE — CHART NOTE - NSCHARTNOTEFT_GEN_A_CORE
pt patient  refused bipap. pt was educated on risks vs benefits. However ,pt still refused after being informed & educated . pt prefers his home machine. Son to bring it in AM. PT knows that ,should he change his mind or needs it a hospital unit is available.

## 2019-01-16 NOTE — PROGRESS NOTE ADULT - PROBLEM SELECTOR PLAN 3
for nuclear stress tomorrow, echo ordered
hold coumadin, pt with ivc filter; pt will need phys therapy and would benefit from KELSEY, spoke with son, Casey and daughter, Ivon who are in agreement;
in setting of unexplained momentary change in mental status and paralysis, will ask pt's cardiologist (Cuong) to evaluate

## 2019-01-16 NOTE — PROGRESS NOTE ADULT - ASSESSMENT
83 y/o male with PMH HTN, HLD, CAD, PCI, PE on coumadin presents to ED s/p syncopal episode.     Syncope  - CT head/neck- mild to moderate atherosclerosis and soft plaque of b/l proximal internal carotid arteries 50-60%; moderate narrowing distal right M1/M2 segments   - MR Head- Small tiny sliver of left convexity subdural collection , probably hemorrhage,  and probable small left posterior frontoparietal subarachnoid component without mass effect, parenchymal edema midline shift.  - Neurology following- appreciate consultation- Possible seizure, keppra started   - TTE ordered    PE  - Pt with [place] filter  - Hold coumadin for now  - monitor INR    HTN/ HLD  - continue with metoprolol, valsartan, pravastatin     CAD  - c/w home medications  - EKG- new LBBB  - Nuclear stress ordered for 11/16. NPO after midnight  - given ataxia and ?subdural hold ASA for now. 85 y/o male with PMH HTN, HLD, CAD, PCI, PE on coumadin presents to ED s/p syncopal episode.     Syncope  - CT head/neck- mild to moderate atherosclerosis and soft plaque of b/l proximal internal carotid arteries 50-60%; moderate narrowing distal right M1/M2 segments   - MR Head- Small tiny sliver of left convexity subdural collection , probably hemorrhage,  and probable small left posterior frontoparietal subarachnoid component without mass effect, parenchymal edema midline shift.  - Neurology following- appreciate consultation- Possible seizure, keppra started. given SDH, coumadin on hold, maybe able to restart in 2 weeks.   - TTE ordered      PE  - Pt with Marisol filter  - increased fall risk- coumadin on hold for now.     HTN/ HLD  - continue with metoprolol, valsartan, pravastatin     CAD  - c/w home medications  - EKG- new LBBB  - Nuclear stress completed, pending results.   - given ataxia and ?subdural hold ASA for now. 83 y/o male with PMH HTN, HLD, CAD, PCI, PE on coumadin presents to ED s/p syncopal episode.     Syncope  - CT head/neck- mild to moderate atherosclerosis and soft plaque of b/l proximal internal carotid arteries 50-60%; moderate narrowing distal right M1/M2 segments   - MR Head- Small tiny sliver of left convexity subdural collection , probably hemorrhage,  and probable small left posterior frontoparietal subarachnoid component without mass effect, parenchymal edema midline shift.  - Neurology following- appreciate consultation- Possible seizure, keppra started. given SDH, coumadin on hold, maybe able to restart in 2 weeks.   - TTE ordered      PE  - Pt with Marisol filter  - increased fall risk- coumadin on hold for now.     HTN/ HLD  - continue with metoprolol, valsartan, pravastatin     CAD  - c/w home medications  - EKG- new LBBB  - Nuclear stress completed, pending results.   - given ataxia and ?subdural hold ASA for now.

## 2019-01-17 ENCOUNTER — TRANSCRIPTION ENCOUNTER (OUTPATIENT)
Age: 84
End: 2019-01-17

## 2019-01-17 VITALS
HEART RATE: 69 BPM | TEMPERATURE: 98 F | OXYGEN SATURATION: 96 % | SYSTOLIC BLOOD PRESSURE: 119 MMHG | DIASTOLIC BLOOD PRESSURE: 70 MMHG | RESPIRATION RATE: 18 BRPM

## 2019-01-17 PROCEDURE — 82803 BLOOD GASES ANY COMBINATION: CPT

## 2019-01-17 PROCEDURE — 86900 BLOOD TYPING SEROLOGIC ABO: CPT

## 2019-01-17 PROCEDURE — 85027 COMPLETE CBC AUTOMATED: CPT

## 2019-01-17 PROCEDURE — 70496 CT ANGIOGRAPHY HEAD: CPT

## 2019-01-17 PROCEDURE — 95819 EEG AWAKE AND ASLEEP: CPT

## 2019-01-17 PROCEDURE — 71045 X-RAY EXAM CHEST 1 VIEW: CPT

## 2019-01-17 PROCEDURE — 82435 ASSAY OF BLOOD CHLORIDE: CPT

## 2019-01-17 PROCEDURE — 84484 ASSAY OF TROPONIN QUANT: CPT

## 2019-01-17 PROCEDURE — 85014 HEMATOCRIT: CPT

## 2019-01-17 PROCEDURE — 99285 EMERGENCY DEPT VISIT HI MDM: CPT

## 2019-01-17 PROCEDURE — 86850 RBC ANTIBODY SCREEN: CPT

## 2019-01-17 PROCEDURE — 86901 BLOOD TYPING SEROLOGIC RH(D): CPT

## 2019-01-17 PROCEDURE — 80053 COMPREHEN METABOLIC PANEL: CPT

## 2019-01-17 PROCEDURE — 84132 ASSAY OF SERUM POTASSIUM: CPT

## 2019-01-17 PROCEDURE — 85610 PROTHROMBIN TIME: CPT

## 2019-01-17 PROCEDURE — 82330 ASSAY OF CALCIUM: CPT

## 2019-01-17 PROCEDURE — 97530 THERAPEUTIC ACTIVITIES: CPT

## 2019-01-17 PROCEDURE — 70551 MRI BRAIN STEM W/O DYE: CPT

## 2019-01-17 PROCEDURE — 36415 COLL VENOUS BLD VENIPUNCTURE: CPT

## 2019-01-17 PROCEDURE — 99232 SBSQ HOSP IP/OBS MODERATE 35: CPT

## 2019-01-17 PROCEDURE — 80307 DRUG TEST PRSMV CHEM ANLYZR: CPT

## 2019-01-17 PROCEDURE — 80048 BASIC METABOLIC PNL TOTAL CA: CPT

## 2019-01-17 PROCEDURE — 97116 GAIT TRAINING THERAPY: CPT

## 2019-01-17 PROCEDURE — 82947 ASSAY GLUCOSE BLOOD QUANT: CPT

## 2019-01-17 PROCEDURE — 78452 HT MUSCLE IMAGE SPECT MULT: CPT

## 2019-01-17 PROCEDURE — 82962 GLUCOSE BLOOD TEST: CPT

## 2019-01-17 PROCEDURE — 85730 THROMBOPLASTIN TIME PARTIAL: CPT

## 2019-01-17 PROCEDURE — 81001 URINALYSIS AUTO W/SCOPE: CPT

## 2019-01-17 PROCEDURE — 93017 CV STRESS TEST TRACING ONLY: CPT

## 2019-01-17 PROCEDURE — 83880 ASSAY OF NATRIURETIC PEPTIDE: CPT

## 2019-01-17 PROCEDURE — 93306 TTE W/DOPPLER COMPLETE: CPT

## 2019-01-17 PROCEDURE — 84295 ASSAY OF SERUM SODIUM: CPT

## 2019-01-17 PROCEDURE — 83605 ASSAY OF LACTIC ACID: CPT

## 2019-01-17 PROCEDURE — A9500: CPT

## 2019-01-17 PROCEDURE — 93005 ELECTROCARDIOGRAM TRACING: CPT

## 2019-01-17 PROCEDURE — 70498 CT ANGIOGRAPHY NECK: CPT

## 2019-01-17 PROCEDURE — 70450 CT HEAD/BRAIN W/O DYE: CPT

## 2019-01-17 PROCEDURE — 94660 CPAP INITIATION&MGMT: CPT

## 2019-01-17 RX ORDER — LEVETIRACETAM 250 MG/1
1 TABLET, FILM COATED ORAL
Qty: 0 | Refills: 0 | COMMUNITY
Start: 2019-01-17

## 2019-01-17 RX ORDER — WARFARIN SODIUM 2.5 MG/1
1 TABLET ORAL
Qty: 0 | Refills: 0 | COMMUNITY

## 2019-01-17 RX ADMIN — Medication 50 MILLIGRAM(S): at 17:01

## 2019-01-17 RX ADMIN — VALSARTAN 160 MILLIGRAM(S): 80 TABLET ORAL at 05:14

## 2019-01-17 RX ADMIN — PANTOPRAZOLE SODIUM 40 MILLIGRAM(S): 20 TABLET, DELAYED RELEASE ORAL at 05:14

## 2019-01-17 RX ADMIN — LEVETIRACETAM 500 MILLIGRAM(S): 250 TABLET, FILM COATED ORAL at 05:14

## 2019-01-17 RX ADMIN — AMLODIPINE BESYLATE 5 MILLIGRAM(S): 2.5 TABLET ORAL at 05:14

## 2019-01-17 RX ADMIN — Medication 50 MILLIGRAM(S): at 05:14

## 2019-01-17 RX ADMIN — SERTRALINE 50 MILLIGRAM(S): 25 TABLET, FILM COATED ORAL at 11:11

## 2019-01-17 RX ADMIN — LEVETIRACETAM 500 MILLIGRAM(S): 250 TABLET, FILM COATED ORAL at 17:01

## 2019-01-17 NOTE — PROGRESS NOTE ADULT - SUBJECTIVE AND OBJECTIVE BOX
CHIEF COMPLAINT:Patient is a 84y old  Male who presents with a chief complaint of Syncope/Seizure (16 Jan 2019 20:22)    INTERVAL HISTORY:  Feels better, he is able to walk with steady gait.  SOB is improved, no cp    stress test:  IMPRESSIONS:  * There is small size defect in the basal inferior wall  which is unchange between the stress and rest images.  There is no evidence of ischemia.  * The basal inferior wall is hypokinetic. Overall LVEF is  51%.  * Chest Pain: No chest pain with administration of  Regadenoson.  * Symptom: No Symptom.  * HR Response: Appropriate.  * BP Response: Appropriate.  * Heart Rhythm: Normal Sinus Rhythm - 64 BPM.  * Baseline ECG: LAD, LAFB, poor R wave progression.  * ECG Abnormalities: There were no diagnostic changes.  * Arrhythmia: Frequent APC's post injection.    ------------------------------------------------------------------------      ------------------------------------------------------------------------    Confirmed on  1/16/2019 - 12:31:13 by Lul Ta MD  Cardiology Fellow: Hayden LAMPASHLEY  ------------------------------------------------------------------------    TTE:  Summary:   1. Left ventricular ejection fraction, by visual estimation, is 55 to   60%.   2. Normal global left ventricular systolic function.   3. Basal and mid inferolateral wall is abnormal as described above.   4. Spectral Doppler shows impaired relaxation pattern of left   ventricular myocardial filling (Grade I diastolic dysfunction).   5. Mildly increased left ventricular internal cavity size.   6. There is moderate concentric left ventricular hypertrophy.   7. Thickening and calcification of the anterior and posterior mitral   valve leaflets.   8. Moderate mitral annular calcification.   9. Moderate aortic valve stenosis.  10. Mild tricuspid regurgitation.  11. Dilatation of the aortic root and ascending aorta.  12. There is no evidence of pericardial effusion.    [id number] Woody Cooper MD, Electronically signed on 1/16/2019 at 9:01:10     Allergies  No Known Allergies    	  MEDICATIONS:  amLODIPine   Tablet 5 milliGRAM(s) Oral daily  metoprolol tartrate 50 milliGRAM(s) Oral two times a day  tamsulosin 0.4 milliGRAM(s) Oral at bedtime  valsartan 160 milliGRAM(s) Oral daily  levETIRAcetam 500 milliGRAM(s) Oral two times a day  sertraline 50 milliGRAM(s) Oral daily  pantoprazole    Tablet 40 milliGRAM(s) Oral before breakfast  atorvastatin 10 milliGRAM(s) Oral at bedtime  oxybutynin 10 milliGRAM(s) Oral at bedtime    PHYSICAL EXAM:  T(C): 36.8 (01-16-19 @ 23:56), Max: 36.8 (01-16-19 @ 23:56)  HR: 69 (01-17-19 @ 04:38) (62 - 71)  BP: 140/77 (01-17-19 @ 04:38) (124/77 - 140/77)  RR: 18 (01-16-19 @ 23:56) (18 - 18)  SpO2: 98% (01-16-19 @ 10:59) (98% - 98%)  Appearance: Normal	  HEENT:   NC/AT  Eye: Pink Conjunctiva  Lungs: CTA B/L  CVS: RRR, 3/6 sm lsb  Pulses: Normal distal pulses.  Neuro: A&O x3    LABS:	 	    ASSESSMENT/PLAN: 	  1. unsteady gait, resolved  2. bp to goal  3 no more syncope, episode possible seizure  4. cont with current meds  5. stress test with old mi, lvef is preserved  pt is stable for discharge from our standpoint  thank you

## 2019-01-17 NOTE — DISCHARGE NOTE ADULT - MEDICATION SUMMARY - MEDICATIONS TO TAKE
I will START or STAY ON the medications listed below when I get home from the hospital:    valsartan 160 mg oral tablet  -- 1 tab(s) by mouth once a day  -- Indication: For Hypertension    tamsulosin 0.4 mg oral capsule  -- 1 cap(s) by mouth once a day  -- Indication: For Urine    levETIRAcetam 500 mg oral tablet  -- 1 tab(s) by mouth 2 times a day  -- Indication: For prevent seizure    sertraline 50 mg oral tablet  -- 1 tab(s) by mouth once a day  -- Indication: For antidepressant    pravastatin 20 mg oral tablet  -- 1 tab(s) by mouth once a day  -- Indication: For Cholesterol    metoprolol tartrate 50 mg oral tablet  -- 1 tab(s) by mouth 2 times a day  -- Indication: For Hypertension    Breo Ellipta 200 mcg-25 mcg/inh inhalation powder  -- 1 puff(s) inhaled once a day  -- Indication: For lungs    amLODIPine 5 mg oral tablet  -- 1 tab(s) by mouth once a day  -- Indication: For Hypertension    omeprazole 40 mg oral delayed release capsule  -- 1 cap(s) by mouth once a day  -- Indication: For gerd    oxybutynin 10 mg/24 hr oral tablet, extended release  -- 1 tab(s) by mouth once a day  -- Indication: For Urine

## 2019-01-17 NOTE — DISCHARGE NOTE ADULT - PATIENT PORTAL LINK FT
You can access the [facility] Patient Portal, offered by [facility], by registering with the following website: [email or web address]

## 2019-01-17 NOTE — DISCHARGE NOTE ADULT - HOSPITAL COURSE
85 yo 85 yo male with hx of pulm embolism, cad, and asthma presented with LOC, confusion and tongue biting  admitted to r/o cva - syncope vs seizure  found to have small subdural hematoma  EEG normal, started on Keppra by neuro  coumadin discontinued  for d/c home with home care and p/t

## 2019-01-17 NOTE — DISCHARGE NOTE ADULT - CARE PLAN
Principal Discharge DX:	Syncope  Goal:	home  Assessment and plan of treatment:	low salt diet  Secondary Diagnosis:	Hypertension  Secondary Diagnosis:	Other chronic pulmonary embolism without acute cor pulmonale  Secondary Diagnosis:	Hyperlipemia

## 2019-01-17 NOTE — PROGRESS NOTE ADULT - REASON FOR ADMISSION
Syncope/Seizure

## 2019-01-17 NOTE — DISCHARGE NOTE ADULT - MEDICATION SUMMARY - MEDICATIONS TO STOP TAKING
I will STOP taking the medications listed below when I get home from the hospital:    warfarin 2 mg oral tablet  -- Take 2 tablets MWF and 1 tablet on other days of the week

## 2019-02-27 ENCOUNTER — APPOINTMENT (OUTPATIENT)
Dept: MRI IMAGING | Facility: CLINIC | Age: 84
End: 2019-02-27
Payer: MEDICARE

## 2019-02-27 ENCOUNTER — OUTPATIENT (OUTPATIENT)
Dept: OUTPATIENT SERVICES | Facility: HOSPITAL | Age: 84
LOS: 1 days | End: 2019-02-27
Payer: MEDICARE

## 2019-02-27 DIAGNOSIS — Z98.89 OTHER SPECIFIED POSTPROCEDURAL STATES: Chronic | ICD-10-CM

## 2019-02-27 DIAGNOSIS — Z00.8 ENCOUNTER FOR OTHER GENERAL EXAMINATION: ICD-10-CM

## 2019-02-27 PROCEDURE — 70551 MRI BRAIN STEM W/O DYE: CPT | Mod: 26

## 2019-02-27 PROCEDURE — 70551 MRI BRAIN STEM W/O DYE: CPT

## 2019-04-22 ENCOUNTER — INPATIENT (INPATIENT)
Facility: HOSPITAL | Age: 84
LOS: 3 days | Discharge: HOME | DRG: 65 | End: 2019-04-26
Attending: HOSPITALIST | Admitting: HOSPITALIST
Payer: MEDICARE

## 2019-04-22 VITALS
HEART RATE: 70 BPM | RESPIRATION RATE: 18 BRPM | HEIGHT: 73 IN | OXYGEN SATURATION: 95 % | WEIGHT: 289.91 LBS | DIASTOLIC BLOOD PRESSURE: 70 MMHG | SYSTOLIC BLOOD PRESSURE: 134 MMHG | TEMPERATURE: 99 F

## 2019-04-22 DIAGNOSIS — Z98.89 OTHER SPECIFIED POSTPROCEDURAL STATES: Chronic | ICD-10-CM

## 2019-04-22 LAB
ALBUMIN SERPL ELPH-MCNC: 3.8 G/DL — SIGNIFICANT CHANGE UP (ref 3.3–5.2)
ALP SERPL-CCNC: 60 U/L — SIGNIFICANT CHANGE UP (ref 40–120)
ALT FLD-CCNC: 13 U/L — SIGNIFICANT CHANGE UP
ANION GAP SERPL CALC-SCNC: 12 MMOL/L — SIGNIFICANT CHANGE UP (ref 5–17)
APTT BLD: 38 SEC — HIGH (ref 27.5–36.3)
AST SERPL-CCNC: 13 U/L — SIGNIFICANT CHANGE UP
BASOPHILS # BLD AUTO: 0 K/UL — SIGNIFICANT CHANGE UP (ref 0–0.2)
BASOPHILS NFR BLD AUTO: 0.7 % — SIGNIFICANT CHANGE UP (ref 0–2)
BILIRUB SERPL-MCNC: 0.3 MG/DL — LOW (ref 0.4–2)
BUN SERPL-MCNC: 18 MG/DL — SIGNIFICANT CHANGE UP (ref 8–20)
CALCIUM SERPL-MCNC: 9.3 MG/DL — SIGNIFICANT CHANGE UP (ref 8.6–10.2)
CHLORIDE SERPL-SCNC: 108 MMOL/L — HIGH (ref 98–107)
CK SERPL-CCNC: 58 U/L — SIGNIFICANT CHANGE UP (ref 30–200)
CO2 SERPL-SCNC: 21 MMOL/L — LOW (ref 22–29)
CREAT SERPL-MCNC: 1.29 MG/DL — SIGNIFICANT CHANGE UP (ref 0.5–1.3)
EOSINOPHIL # BLD AUTO: 0.2 K/UL — SIGNIFICANT CHANGE UP (ref 0–0.5)
EOSINOPHIL NFR BLD AUTO: 2.8 % — SIGNIFICANT CHANGE UP (ref 0–5)
GLUCOSE SERPL-MCNC: 117 MG/DL — HIGH (ref 70–115)
HCT VFR BLD CALC: 42.3 % — SIGNIFICANT CHANGE UP (ref 42–52)
HGB BLD-MCNC: 13.6 G/DL — LOW (ref 14–18)
INR BLD: 2.98 RATIO — HIGH (ref 0.88–1.16)
LYMPHOCYTES # BLD AUTO: 1.4 K/UL — SIGNIFICANT CHANGE UP (ref 1–4.8)
LYMPHOCYTES # BLD AUTO: 18 % — LOW (ref 20–55)
MCHC RBC-ENTMCNC: 27 PG — SIGNIFICANT CHANGE UP (ref 27–31)
MCHC RBC-ENTMCNC: 32.2 G/DL — SIGNIFICANT CHANGE UP (ref 32–36)
MCV RBC AUTO: 84.1 FL — SIGNIFICANT CHANGE UP (ref 80–94)
MONOCYTES # BLD AUTO: 0.7 K/UL — SIGNIFICANT CHANGE UP (ref 0–0.8)
MONOCYTES NFR BLD AUTO: 9.5 % — SIGNIFICANT CHANGE UP (ref 3–10)
NEUTROPHILS # BLD AUTO: 5.2 K/UL — SIGNIFICANT CHANGE UP (ref 1.8–8)
NEUTROPHILS NFR BLD AUTO: 68.7 % — SIGNIFICANT CHANGE UP (ref 37–73)
NT-PROBNP SERPL-SCNC: 273 PG/ML — SIGNIFICANT CHANGE UP (ref 0–300)
PLATELET # BLD AUTO: 218 K/UL — SIGNIFICANT CHANGE UP (ref 150–400)
POTASSIUM SERPL-MCNC: 4.6 MMOL/L — SIGNIFICANT CHANGE UP (ref 3.5–5.3)
POTASSIUM SERPL-SCNC: 4.6 MMOL/L — SIGNIFICANT CHANGE UP (ref 3.5–5.3)
PROT SERPL-MCNC: 7.1 G/DL — SIGNIFICANT CHANGE UP (ref 6.6–8.7)
PROTHROM AB SERPL-ACNC: 35.4 SEC — HIGH (ref 10–12.9)
RBC # BLD: 5.03 M/UL — SIGNIFICANT CHANGE UP (ref 4.6–6.2)
RBC # FLD: 14 % — SIGNIFICANT CHANGE UP (ref 11–15.6)
SODIUM SERPL-SCNC: 141 MMOL/L — SIGNIFICANT CHANGE UP (ref 135–145)
TROPONIN T SERPL-MCNC: <0.01 NG/ML — SIGNIFICANT CHANGE UP (ref 0–0.06)
WBC # BLD: 7.6 K/UL — SIGNIFICANT CHANGE UP (ref 4.8–10.8)
WBC # FLD AUTO: 7.6 K/UL — SIGNIFICANT CHANGE UP (ref 4.8–10.8)

## 2019-04-22 PROCEDURE — 70450 CT HEAD/BRAIN W/O DYE: CPT | Mod: 26

## 2019-04-22 PROCEDURE — 71046 X-RAY EXAM CHEST 2 VIEWS: CPT | Mod: 26

## 2019-04-22 PROCEDURE — 99220: CPT

## 2019-04-22 PROCEDURE — 93880 EXTRACRANIAL BILAT STUDY: CPT | Mod: 26

## 2019-04-22 RX ORDER — VALSARTAN 80 MG/1
1 TABLET ORAL
Qty: 0 | Refills: 0 | COMMUNITY

## 2019-04-22 RX ORDER — TAMSULOSIN HYDROCHLORIDE 0.4 MG/1
0.4 CAPSULE ORAL AT BEDTIME
Qty: 0 | Refills: 0 | Status: DISCONTINUED | OUTPATIENT
Start: 2019-04-22 | End: 2019-04-26

## 2019-04-22 RX ORDER — PANTOPRAZOLE SODIUM 20 MG/1
40 TABLET, DELAYED RELEASE ORAL AT BEDTIME
Qty: 0 | Refills: 0 | Status: DISCONTINUED | OUTPATIENT
Start: 2019-04-22 | End: 2019-04-26

## 2019-04-22 RX ORDER — AMLODIPINE BESYLATE 2.5 MG/1
5 TABLET ORAL DAILY
Qty: 0 | Refills: 0 | Status: DISCONTINUED | OUTPATIENT
Start: 2019-04-22 | End: 2019-04-26

## 2019-04-22 RX ORDER — OXYBUTYNIN CHLORIDE 5 MG
10 TABLET ORAL DAILY
Qty: 0 | Refills: 0 | Status: DISCONTINUED | OUTPATIENT
Start: 2019-04-22 | End: 2019-04-22

## 2019-04-22 RX ORDER — PANTOPRAZOLE SODIUM 20 MG/1
40 TABLET, DELAYED RELEASE ORAL
Qty: 0 | Refills: 0 | Status: DISCONTINUED | OUTPATIENT
Start: 2019-04-22 | End: 2019-04-22

## 2019-04-22 RX ORDER — SERTRALINE 25 MG/1
50 TABLET, FILM COATED ORAL DAILY
Qty: 0 | Refills: 0 | Status: DISCONTINUED | OUTPATIENT
Start: 2019-04-22 | End: 2019-04-26

## 2019-04-22 RX ORDER — METOPROLOL TARTRATE 50 MG
50 TABLET ORAL ONCE
Qty: 0 | Refills: 0 | Status: COMPLETED | OUTPATIENT
Start: 2019-04-22 | End: 2019-04-22

## 2019-04-22 RX ORDER — OXYBUTYNIN CHLORIDE 5 MG
10 TABLET ORAL DAILY
Qty: 0 | Refills: 0 | Status: DISCONTINUED | OUTPATIENT
Start: 2019-04-22 | End: 2019-04-26

## 2019-04-22 RX ADMIN — PANTOPRAZOLE SODIUM 40 MILLIGRAM(S): 20 TABLET, DELAYED RELEASE ORAL at 22:41

## 2019-04-22 RX ADMIN — Medication 10 MILLIGRAM(S): at 21:43

## 2019-04-22 RX ADMIN — SERTRALINE 50 MILLIGRAM(S): 25 TABLET, FILM COATED ORAL at 21:43

## 2019-04-22 RX ADMIN — Medication 50 MILLIGRAM(S): at 21:43

## 2019-04-22 RX ADMIN — AMLODIPINE BESYLATE 5 MILLIGRAM(S): 2.5 TABLET ORAL at 21:43

## 2019-04-22 RX ADMIN — TAMSULOSIN HYDROCHLORIDE 0.4 MILLIGRAM(S): 0.4 CAPSULE ORAL at 21:43

## 2019-04-22 NOTE — ED ADULT NURSE REASSESSMENT NOTE - NS ED NURSE REASSESS COMMENT FT1
Pt on CM noted to have possible shifting from NSR to a-flutter. TRACEY Castanon notified and acknowledged. Will continue to monitor.

## 2019-04-22 NOTE — ED CDU PROVIDER INITIAL DAY NOTE - PROGRESS NOTE DETAILS
Called pharmacy to reconcile medications. Patient does not know what medications he takes daily, and does not have medication list. Called pharmacy to reconcile medications. Patient does not know what medications he takes daily, and does not have medication list.    On exam, speech clear (although do not know patient baseline), decreased strength 4/5 RUE. Able to puff out cheeks, raise eyebrows, smile.

## 2019-04-22 NOTE — ED ADULT NURSE REASSESSMENT NOTE - NS ED NURSE REASSESS COMMENT FT1
received pt from previous Rn Deep May and Malena Joshi. pt resting in stretcher, with no c/o pain or discomfort.  pt noted with decrease weakness to right side. (+) pedal pulse, cap refill. (+) 1 generalized edema noted to lower extremity.  b/l lower extremity elevated.  VSS and documented as per flow sheet. bed in lowest position,call bell within reach and safety maintained.

## 2019-04-22 NOTE — ED ADULT NURSE NOTE - NSIMPLEMENTINTERV_GEN_ALL_ED
Implemented All Fall with Harm Risk Interventions:  [place] to call system. Call bell, personal items and telephone within reach. Instruct patient to call for assistance. Room bathroom lighting operational. Non-slip footwear when patient is off stretcher. Physically safe environment: no spills, clutter or unnecessary equipment. Stretcher in lowest position, wheels locked, appropriate side rails in place. Provide visual cue, wrist band, yellow gown, etc. Monitor gait and stability. Monitor for mental status changes and reorient to person, place, and time. Review medications for side effects contributing to fall risk. Reinforce activity limits and safety measures with patient and family. Provide visual clues: red socks.

## 2019-04-22 NOTE — ED ADULT NURSE NOTE - OBJECTIVE STATEMENT
84 year old male 84 year old male awake, alert, oriented x3 84 year old male awake, alert, oriented x3 comes to ED c/o of right hand numbness starting this morning at 0700am + pulses. as per "SW supervisor" as per pt. she went over at 0830am and was very SOB when going to the door, and nauseas. pt. denies chest pain, sob. pt. denies abdominal pain or discomfort. +4 pitting edema noted to lt lower leg and +2 pitting edema noted to the rt lower leg.

## 2019-04-22 NOTE — ED ADULT NURSE NOTE - PMH
CAD (coronary artery disease)    [employer] filter in place    Hyperlipemia    Hypertension    PE (pulmonary embolism)

## 2019-04-22 NOTE — ED ADULT NURSE REASSESSMENT NOTE - NS ED NURSE REASSESS COMMENT FT1
Pt A&Ox4, VSS, in NAD, denies any pain. Pt has h/o recent CVA. Pt stated he has h/o losing balance and fall "sometime in last few months," hitting his head, small abrasion present to upper front forehead. Pt denies any residual weakness from previous stroke. Pt stated this morning at approximately 0700 he experienced sudden SOB and right upper extremity weakness while going to the bathroom. Pt currently c/o SOB and RUE weakness since ~0700 today. Pt moved to CDU for observation. Will continue to monitor.

## 2019-04-22 NOTE — ED ADULT NURSE REASSESSMENT NOTE - NS ED NURSE REASSESS COMMENT FT1
notified by monitor tech, pt cardiac monitor now displays A-fib. pt resting in stretcher with no c/o chest pain or SOB. PA Ale Acosta made aware and per PA will come to bedside and re- evaluate. VS documented as per flow sheet. monitoring on-going for any changes.

## 2019-04-22 NOTE — ED PROVIDER NOTE - OBJECTIVE STATEMENT
This patient is an 84 year old man hx of HTN who presents to the ER reporting right sided weakness and SOB.  Patient has a history of CVA with no residual weakness.  He woke up around 7 am and noticed he had right upper extremity weakness.  He states that he was unable to use his urinal due to the weakness in his hand.  The symptoms did not approve when a visitor showed up to his home around 8:30.  Friend states that the patient was very short of breath on her arrival and she encouraged him to come to the ER.  Patient sates that the symptoms resolved prior to arrival.  He has no complaints at this time.

## 2019-04-22 NOTE — ED ADULT NURSE REASSESSMENT NOTE - NS ED NURSE REASSESS COMMENT FT1
Purposeful rounding completed on patient. pt resting in stretcher in stretcher with no complaints of chest pain, SOB or dizziness. VSS and documented as per flow sheet.   TRACEY Acosta and  MD. Waqas Carter at bedside. plan care of care reviewed. Bed in lowest position, call bell within reach, and safety maintained. monitoring on-going for any changes.

## 2019-04-22 NOTE — ED ADULT TRIAGE NOTE - CHIEF COMPLAINT QUOTE
Patient arrived via EMS, awake alert, and oriented times 3, breathing unlabored. Patient complaining of SOB which started this morning.  patient also states right sided weakness and facial droop which he noticed this morning when he woke up at 0700.  History of prior stroke, unknown deficits from prior stroke.  MD called to bedside for eval.

## 2019-04-22 NOTE — ED CDU PROVIDER INITIAL DAY NOTE - PMH
CAD (coronary artery disease)    [place] filter in place    Hyperlipemia    Hypertension    PE (pulmonary embolism)

## 2019-04-22 NOTE — ED CDU PROVIDER INITIAL DAY NOTE - ATTENDING CONTRIBUTION TO CARE
I, Gilles Carter, performed a face to face bedside interview with this patient regarding history of present illness, review of symptoms and relevant past medical, social and family history.  I completed an independent physical examination. I have communicated the patient’s plan of care and disposition with the ACP.  84 year old male with PMH CVA, MI presents with episode of RUE weakness that has resolved. Also states that he felt sweaty and SOB with the Sx, now not sweaty, never had chest pain  Gen: NAD, well appearing  CV: RRR  Pul: CTA b/l  Abd: Soft, non-distended, non-tender  Neuro: no focal deficits  Pt at baseline, placed on Obs for TIA eval

## 2019-04-22 NOTE — ED ADULT NURSE NOTE - CAS EDN DISCHARGE ASSESSMENT
Alert and oriented to person, place and time/Patient A&Ox3. Right hand weakness/numbness persists. Patient NSR on CM. Denies any distress or pain at this time.

## 2019-04-22 NOTE — ED ADULT NURSE REASSESSMENT NOTE - NS ED NURSE REASSESS COMMENT FT1
pt. awake, alert. pt. denies pain or discomfort at this time. informed on plan of care. on cm. NSR. informed on plan of care. will continue to monitor.

## 2019-04-22 NOTE — ED CDU PROVIDER INITIAL DAY NOTE - PHYSICAL EXAMINATION
· CONSTITUTIONAL: Well appearing, well nourished, awake, alert, oriented to person, place, time/situation and in no apparent distress.  · EYES: Clear bilaterally, pupils equal, round and reactive to light.  · CARDIAC: Normal rate, regular rhythm.  Heart sounds S1, S2.  No murmurs, rubs or gallops.  · RESPIRATORY: Breath sounds clear and equal bilaterally.  · GASTROINTESTINAL: Abdomen soft, non-tender, no guarding.  · MUSCULOSKELETAL: Spine appears normal, range of motion is not limited, no muscle or joint tenderness  · NEUROLOGICAL: - - -  · NEURO LOC: alert   · NEURO NERVE: cranial nerves 2-12 intact, extra-ocular movements intact, tongue is midline  · NEURO SPEECH: clear   · NEURO MOTOR: normal  · NEURO POSTURING: normal   · SKIN: Skin normal color for race, warm, dry and intact. No evidence of rash.

## 2019-04-23 DIAGNOSIS — I25.10 ATHEROSCLEROTIC HEART DISEASE OF NATIVE CORONARY ARTERY WITHOUT ANGINA PECTORIS: ICD-10-CM

## 2019-04-23 DIAGNOSIS — I10 ESSENTIAL (PRIMARY) HYPERTENSION: ICD-10-CM

## 2019-04-23 DIAGNOSIS — I63.9 CEREBRAL INFARCTION, UNSPECIFIED: ICD-10-CM

## 2019-04-23 DIAGNOSIS — I48.0 PAROXYSMAL ATRIAL FIBRILLATION: ICD-10-CM

## 2019-04-23 DIAGNOSIS — E78.5 HYPERLIPIDEMIA, UNSPECIFIED: ICD-10-CM

## 2019-04-23 DIAGNOSIS — J44.9 CHRONIC OBSTRUCTIVE PULMONARY DISEASE, UNSPECIFIED: ICD-10-CM

## 2019-04-23 DIAGNOSIS — Z98.49 CATARACT EXTRACTION STATUS, UNSPECIFIED EYE: Chronic | ICD-10-CM

## 2019-04-23 DIAGNOSIS — I63.40 CEREBRAL INFARCTION DUE TO EMBOLISM OF UNSPECIFIED CEREBRAL ARTERY: ICD-10-CM

## 2019-04-23 LAB
APPEARANCE UR: CLEAR — SIGNIFICANT CHANGE UP
BILIRUB UR-MCNC: NEGATIVE — SIGNIFICANT CHANGE UP
CHOLEST SERPL-MCNC: 179 MG/DL — SIGNIFICANT CHANGE UP (ref 110–199)
COLOR SPEC: YELLOW — SIGNIFICANT CHANGE UP
DIFF PNL FLD: ABNORMAL
EPI CELLS # UR: SIGNIFICANT CHANGE UP
GLUCOSE UR QL: NEGATIVE MG/DL — SIGNIFICANT CHANGE UP
HBA1C BLD-MCNC: 6.1 % — HIGH (ref 4–5.6)
HDLC SERPL-MCNC: 34 MG/DL — LOW
KETONES UR-MCNC: NEGATIVE — SIGNIFICANT CHANGE UP
LEUKOCYTE ESTERASE UR-ACNC: NEGATIVE — SIGNIFICANT CHANGE UP
LIPID PNL WITH DIRECT LDL SERPL: 111 MG/DL — SIGNIFICANT CHANGE UP
NITRITE UR-MCNC: NEGATIVE — SIGNIFICANT CHANGE UP
PH UR: 6 — SIGNIFICANT CHANGE UP (ref 5–8)
PROT UR-MCNC: 15 MG/DL
RBC CASTS # UR COMP ASSIST: SIGNIFICANT CHANGE UP /HPF (ref 0–4)
SP GR SPEC: 1.01 — SIGNIFICANT CHANGE UP (ref 1.01–1.02)
TOTAL CHOLESTEROL/HDL RATIO MEASUREMENT: 5 RATIO — SIGNIFICANT CHANGE UP (ref 3.4–9.6)
TRIGL SERPL-MCNC: 169 MG/DL — SIGNIFICANT CHANGE UP (ref 10–200)
TROPONIN T SERPL-MCNC: <0.01 NG/ML — SIGNIFICANT CHANGE UP (ref 0–0.06)
UROBILINOGEN FLD QL: NEGATIVE MG/DL — SIGNIFICANT CHANGE UP
WBC UR QL: NEGATIVE — SIGNIFICANT CHANGE UP

## 2019-04-23 PROCEDURE — 93010 ELECTROCARDIOGRAM REPORT: CPT | Mod: 76

## 2019-04-23 PROCEDURE — 93970 EXTREMITY STUDY: CPT | Mod: 26

## 2019-04-23 PROCEDURE — 70551 MRI BRAIN STEM W/O DYE: CPT | Mod: 26

## 2019-04-23 PROCEDURE — 99223 1ST HOSP IP/OBS HIGH 75: CPT

## 2019-04-23 PROCEDURE — 99223 1ST HOSP IP/OBS HIGH 75: CPT | Mod: AI

## 2019-04-23 PROCEDURE — 99217: CPT

## 2019-04-23 RX ORDER — ASPIRIN/CALCIUM CARB/MAGNESIUM 324 MG
325 TABLET ORAL DAILY
Qty: 0 | Refills: 0 | Status: DISCONTINUED | OUTPATIENT
Start: 2019-04-23 | End: 2019-04-25

## 2019-04-23 RX ORDER — BUDESONIDE AND FORMOTEROL FUMARATE DIHYDRATE 160; 4.5 UG/1; UG/1
2 AEROSOL RESPIRATORY (INHALATION)
Qty: 0 | Refills: 0 | Status: DISCONTINUED | OUTPATIENT
Start: 2019-04-23 | End: 2019-04-26

## 2019-04-23 RX ORDER — WARFARIN SODIUM 2.5 MG/1
1 TABLET ORAL
Qty: 0 | Refills: 0 | COMMUNITY

## 2019-04-23 RX ORDER — WARFARIN SODIUM 2.5 MG/1
2 TABLET ORAL ONCE
Qty: 0 | Refills: 0 | Status: COMPLETED | OUTPATIENT
Start: 2019-04-23 | End: 2019-04-23

## 2019-04-23 RX ADMIN — TAMSULOSIN HYDROCHLORIDE 0.4 MILLIGRAM(S): 0.4 CAPSULE ORAL at 21:30

## 2019-04-23 RX ADMIN — AMLODIPINE BESYLATE 5 MILLIGRAM(S): 2.5 TABLET ORAL at 05:04

## 2019-04-23 RX ADMIN — PANTOPRAZOLE SODIUM 40 MILLIGRAM(S): 20 TABLET, DELAYED RELEASE ORAL at 21:30

## 2019-04-23 RX ADMIN — SERTRALINE 50 MILLIGRAM(S): 25 TABLET, FILM COATED ORAL at 12:55

## 2019-04-23 RX ADMIN — BUDESONIDE AND FORMOTEROL FUMARATE DIHYDRATE 2 PUFF(S): 160; 4.5 AEROSOL RESPIRATORY (INHALATION) at 19:44

## 2019-04-23 RX ADMIN — WARFARIN SODIUM 2 MILLIGRAM(S): 2.5 TABLET ORAL at 21:30

## 2019-04-23 RX ADMIN — Medication 10 MILLIGRAM(S): at 12:55

## 2019-04-23 RX ADMIN — Medication 325 MILLIGRAM(S): at 12:55

## 2019-04-23 NOTE — CONSULT NOTE ADULT - ASSESSMENT
A/P:  83yo male with PMH CAD s/p 2 stents, HTN, HLD, PE/DVT/PAF (on Coumadin), prior small SDH w/subarachnoid component 1/2019, presents to the ED today with right hand weakness and tingling since yesterday.  Patient reports when he woke up he was unable to grab his urinal due to the right hand weakness.  Currently there is slight right hand weakness with very minimal c/o decreased sensation to the RUE. No other deficits noted.  States symptoms have been resolving. He has been having some c/o SOB for the last several days as well, denies CP or cough.  He has been compliant with his medications, INR 2.98.  EKG no acute ischemia. Tele showed approx 3hrs of asymptomatic AFib last night.  CT head-scattered chronic ischemic changes and volume loss.

## 2019-04-23 NOTE — ED ADULT NURSE REASSESSMENT NOTE - NS ED NURSE REASSESS COMMENT FT1
Assumed care of the patient at 0730. Patient A&Ox3. Right hand numbness persists, no other deficits noted. No s/s of respiratory distress. Denies CP or SOB, NSR to Sinus arrythmia on CM. VSS. Respirations even and unlabored. Lungs clear B/L on auscultation. Abdomen soft and nondistended. BS+. Meal tray provided. Awaiting MRI/Echo results and neurology consult. Voiding in urinal at the bedside. Patient verbalizes understanding of plan of care. Patient with no further questions for the nurse. Will continue to monitor. Assumed care of the patient at 0730. Patient A&Ox3. Right hand numbness/weakness persists, no other deficits noted. No s/s of respiratory distress. Denies CP or SOB, NSR to Sinus arrythmia on CM. VSS. Respirations even and unlabored. Lungs clear B/L on auscultation. Abdomen soft and nondistended. BS+. Meal tray provided. Awaiting MRI/Echo results and neurology consult. Voiding in urinal at the bedside. Patient verbalizes understanding of plan of care. Patient with no further questions for the nurse. Will continue to monitor.

## 2019-04-23 NOTE — CONSULT NOTE ADULT - SUBJECTIVE AND OBJECTIVE BOX
Patient is a 84y old  Male who presents with a chief complaint of right side weak (2019 10:05)      HPI:  85yo male with PMH CAD s/p 2 stents, HTN, HLD, PE/DVT/PAF (on Coumadin), prior small SDH w/subarachnoid component 2019, presents to the ED today with right hand weakness and tingling since yesterday.  Patient reports when he woke up he was unable to grab his urinal due to the right hand weakness.  Currently there is slight right hand weakness with very minimal c/o decreased sensation to the RUE. No other deficits noted.  States symptoms have been resolving. He has been having some c/o SOB for the last several days as well, denies CP or cough.  He has been compliant with his medications, INR 2.98. Denies chest pain/pressure, palpitations, irregular and/or rapid heart beat, syncope/near syncope, dizziness, orthopnea, PND, cough, edema, n/v/d, hematuria, or hematochezia.      PAST MEDICAL & SURGICAL HISTORY:  Atrial fibrillation, unspecified type  CAD (coronary artery disease)  [place] filter in place  PE (pulmonary embolism)  Hyperlipemia  Hypertension  S/P IVC filter      PREVIOUS DIAGNOSTIC TESTING:      ECHO  FINDINGS:  < from: TTE Echo Complete w/Doppler (19 @ 16:01) >  Summary:   1. Left ventricular ejection fraction, by visual estimation, is 50 to   55%.   2. Mid inferoseptal segment and basal and mid inferolateral wall are   abnormal as described above.   3. Spectral Doppler shows impaired relaxation pattern of left   ventricular myocardial filling (Grade I diastolic dysfunction).   4. There is moderate concentric left ventricular hypertrophy.   5. There is no evidence of pericardial effusion.   6. Mild mitral annular calcification.   7. Thickening and calcification of the anterior and posterior mitral   valve leaflets.   8. Mild aortic regurgitation.   9. Dilatation of the aortic root and ascending aorta.  10. Ascending Aorta measuring 4.1 cm.  11. Endocardial visualization was enhanced with intravenous echo contrast.  12. Peak transaortic gradient equals 22.2 mmHg, mean transaortic gradient   equals 14.5 mmHg, the calculated aortic valve area equals 1.52 cm² by the   continuity equation consistent with mild aortic stenosis.  13. The aortic valve mean gradient is 14.5 mmHg consistent with mild   aortic stenosis.    < end of copied text >      STRESS  FINDINGS:  < from: Nuclear Stress Test-Pharmacologic (19 @ 09:17) >  IMPRESSIONS:  * There is small size defect in the basal inferior wall  which is unchange between the stress and rest images.  There is no evidence of ischemia.  * The basal inferior wall is hypokinetic. Overall LVEF is  51%.  * Chest Pain: No chest pain with administration of  Regadenoson.  * Symptom: No Symptom.  * HR Response: Appropriate.  * BP Response: Appropriate.  * Heart Rhythm: Normal Sinus Rhythm - 64 BPM.  * Baseline ECG: LAD, LAFB, poor R wave progression.  * ECG Abnormalities: There were no diagnostic changes.  * Arrhythmia: Frequent APC's post injection.    < end of copied text >      Allergies    No Known Allergies    Intolerances        MEDICATIONS  (STANDING):  amLODIPine   Tablet 5 milliGRAM(s) Oral daily  aspirin enteric coated 325 milliGRAM(s) Oral daily  oxybutynin XL 10 milliGRAM(s) Oral daily  pantoprazole    Tablet 40 milliGRAM(s) Oral at bedtime  sertraline 50 milliGRAM(s) Oral daily  tamsulosin 0.4 milliGRAM(s) Oral at bedtime    MEDICATIONS  (PRN):    Home Medications:  amLODIPine 5 mg oral tablet: 1 tab(s) orally once a day (2019 20:35)  Breo Ellipta 200 mcg-25 mcg/inh inhalation powder: 1 puff(s) inhaled once a day (2019 20:35)  omeprazole 40 mg oral delayed release capsule: 1 cap(s) orally once a day (2019 20:35)  oxybutynin 10 mg/24 hr oral tablet, extended release: 1 tab(s) orally once a day (2019 20:35)  sertraline 50 mg oral tablet: 1 tab(s) orally once a day (2019 20:35)  tamsulosin 0.4 mg oral capsule: 1 cap(s) orally once a day (2019 20:35)  warfarin 2 mg oral tablet: 1 tab(s) orally once a day (2019 10:57)      FAMILY HISTORY:  Family history of diabetes mellitus (Child)      SOCIAL HISTORY: lives alone with limited assistance, daughter in [place], son in [place]    CIGARETTES: quit 40+yrs, former 2ppd since     ALCOHOL: rare    REVIEW OF SYSTEMS:  CONSTITUTIONAL: No fever, weight loss, or fatigue  EYES: No eye pain, visual disturbances, or discharge  ENMT:  No difficulty hearing, tinnitus, vertigo; No sinus or throat pain  NECK: No pain or stiffness  RESPIRATORY: as per HPI  CARDIOVASCULAR: as per HPI  GASTROINTESTINAL: No abdominal or epigastric pain. No nausea, vomiting, or hematemesis; No diarrhea or constipation. No melena or hematochezia.  GENITOURINARY: No dysuria, frequency, hematuria, or incontinence  NEUROLOGICAL: as per HPI  SKIN: No itching, burning, rashes, or lesions   LYMPH Nodes: No enlarged glands  ENDOCRINE: No heat or cold intolerance; No hair loss  MUSCULOSKELETAL: No joint pain or swelling; No muscle, back, or extremity pain  PSYCHIATRIC: No depression, anxiety, mood swings, or difficulty sleeping    ALL OTHER ROS NEGATIVE	    Vital Signs Last 24 Hrs  T(C): 36.8 (2019 11:19), Max: 36.8 (2019 11:19)  T(F): 98.2 (2019 11:19), Max: 98.2 (2019 11:19)  HR: 75 (2019 11:19) (64 - 97)  BP: 126/65 (2019 11:19) (126/65 - 157/78)  BP(mean): --  RR: 18 (2019 11:19) (18 - 19)  SpO2: 94% (2019 11:19) (91% - 98%)    Daily     Daily     I&O's Detail      PHYSICAL EXAM:  Appearance: Normal, well nourished	  HEENT:   Normal oral mucosa, PERRL, EOMI, sclera non-icteric	  Lymphatic: No cervical lymphadenopathy  Cardiovascular: Normal S1 S2, No JVD, No cardiac murmurs, No carotid bruits, No peripheral edema  Respiratory: Lungs clear to auscultation	  Psychiatry: A & O x 3, Mood & affect appropriate  Gastrointestinal:  Soft, Non-tender, + BS, no bruits	  Skin: No rashes, No ecchymoses, No cyanosis  Neurologic: Grossly non-focal with slight weakness in RUE with minimal decreased sensation  Extremities: Normal range of motion, No clubbing, cyanosis or edema  Vascular: Peripheral pulses palpable 2+ bilaterally      INTERPRETATION OF TELEMETRY: SR at 77bpm, AFib  approx [id number] 70s-110s    ECG: SR at 89, occasional PVC and PAC, LAD, LBBB    LABS:                        13.6   7.6   )-----------( 218      ( 2019 10:44 )             42.3         141  |  108<H>  |  18.0  ----------------------------<  117<H>  4.6   |  21.0<L>  |  1.29    Ca    9.3      2019 10:44    TPro  7.1  /  Alb  3.8  /  TBili  0.3<L>  /  DBili  x   /  AST  13  /  ALT  13  /  AlkPhos  60      CARDIAC MARKERS ( 2019 00:31 )  x     / <0.01 ng/mL / x     / x     / x      CARDIAC MARKERS ( 2019 10:44 )  x     / <0.01 ng/mL / 58 U/L / x     / x          PT/INR - ( 2019 10:44 )   PT: 35.4 sec;   INR: 2.98 ratio         PTT - ( 2019 10:44 )  PTT:38.0 sec  Urinalysis Basic - ( 2019 00:32 )    Color: Yellow / Appearance: Clear / S.015 / pH: x  Gluc: x / Ketone: Negative  / Bili: Negative / Urobili: Negative mg/dL   Blood: x / Protein: 15 mg/dL / Nitrite: Negative   Leuk Esterase: Negative / RBC: 0-2 /HPF / WBC Negative   Sq Epi: x / Non Sq Epi: Occasional / Bacteria: x      I&O's Summary    BNP  Serum Pro-Brain Natriuretic Peptide: 273 pg/mL (19 @ 10:44)    RADIOLOGY & ADDITIONAL STUDIES:  < from: Xray Chest 2 Views PA/Lat (19 @ 12:46) >   EXAM:  XR CHEST PA LAT 2V                          PROCEDURE DATE:  2019          INTERPRETATION:      CLINICAL INDICATION: Shortness of breath.  TECHNIQUE: PA and lateral chest x-ray.    COMPARISON: Chest x-ray 2019.    FINDINGS:    The lungs are clear. The cardiomediastinal silhouette, salina, and vascular   markings are within normal limits. No pleural effusion or pneumothorax.   Multilevel degenerative change of the thoracic spine.        IMPRESSION:  No acute cardiopulmonary pathology.    < end of copied text >    < from: CT Head No Cont (19 @ 11:23) >  IMPRESSION:    1)  scattered chronic ischemic changes and volume loss. No obvious acute   infarct or hemorrhage.  2)  right mastoid disease.    < end of copied text >    < from: MR Head No Cont (19 @ 06:50) >  IMPRESSION: Scattered small acute/subacute infarcts with associated   cytotoxic edema within the bilateral cerebral hemispheres in different   vascular distributions. Embolic phenomenon should be considered.    No acute intracranial hemorrhage.    Nonspecific bilateral mastoid air cell opacification. Correlate   clinically for the possibility of bilateral mastoiditis.    < end of copied text >

## 2019-04-23 NOTE — ED CDU PROVIDER DISPOSITION NOTE - ATTENDING CONTRIBUTION TO CARE
pt for work up of stroke/tia with mri noted for multiple areas of infarct; pt to be admitted for stroke;

## 2019-04-23 NOTE — ED ADULT NURSE REASSESSMENT NOTE - GENITOURINARY WDL
Bladder non-tender and non-distended. .
Bladder non-tender and non-distended.
Bladder non-tender and non-distended.
Bladder non-tender and non-distended. Urine clear yellow.

## 2019-04-23 NOTE — H&P ADULT - ASSESSMENT
84 year old male with PMH HTN, Dyslipidemia, CAD s/p PCI, COPD, PE s/p IVCF presented with right hand weakness.   NIH 3 at admission, mild slurring.  INR 2.98, , , A1c 6.1, EKG AF, CTH-, Carotids - , Chest X-Ray - MRI positive for CVA.    Acute multiple small bilateral acte/Subacute CVA - appear to be cardioembolic. Surprising given INR therapeutic  - Admit to Stroke Unit  - Stroke protocol  - Permissive HTN  - Continue ASA  - Continue Coumadin  - Neurology consult - discussed with Dr Richardson  - Cardiology consult for UMER in am - Discussed with Dr Ta,   - Hematology Consult for anticoagulation options -Discussed with Dr Magana  - PT/OT/ST/PMR    pAF - rate controlled, therapeutic INR  - Continue Coumadin  - No rate control medications at home    HTN - Stable  - Allow for permissive HTN  - Hold home antihypertensive for now    Dyslipidemia - uncontrolled. Target LDL 70  - Continue Statin    COPD  - Continue ICS/LABA    VTE Hx  - Continue Coumadin    Hx Seizure  - unclear if patient on Keppra or discontinued.   - Will check records    CAD - negative Troponin, no evidence ischemia  - Continue ASA, Statin    Advance Directive - patient states he would want to be DNR/DNI - RN advised to provided patient with MOLST so he can reviewed with family    Estimated Date of Discharge - potentially 4/25/19, however pending further evaluation, findings, clinical course      PMD Dr Murillo notified - asked Hospitalist service to continue to care for as he is on vacation

## 2019-04-23 NOTE — ED ADULT NURSE REASSESSMENT NOTE - NS ED NURSE REASSESS COMMENT FT1
pt resting in stretcher, no apparent distress noted. bed in lowest position, call bell within reach and safety maintained.

## 2019-04-23 NOTE — H&P ADULT - HISTORY OF PRESENT ILLNESS
84 year old male with PMH HTN, Dyslipidemia, CAD s/p PCI, COPD, Seizure disorder and PE s/p IVCF, currently on Coumadin presented to [facility] with right hand weakness.  As per patient he woke up yesterday morning and noted having difficulty holding the urinal. He also noticed some slurring but denies any dizziness, numbness, new visual complaints, aphasia, headache. He states he has has a stroke in the past but unable to recall time and deficits.  He was admitted to OBS unit yesterday and had his stroke work up - MRI showed multiple small embolic infarcts.

## 2019-04-23 NOTE — CONSULT NOTE ADULT - PROBLEM SELECTOR RECOMMENDATION 9
- Tele monitoring  - u/s b/l LE  - NPO after MN  - UMER tomorrow  - c/w ASA, statin, anticoagulant  - will discuss with neuro eventual change to Eliquis or Xarelto

## 2019-04-23 NOTE — CONSULT NOTE ADULT - ASSESSMENT
HX OF PE MANY YEARS AGO  WAS STABLE  ON COUMADIN  HX OF FACTOR V LEIDEN  MUTATION.  EMBOLIC CVAs on COUMADIN  R/O AN UNDERLYING CARDIAC PROBLEM  CONSIDER SWITCHING FROM COUMADIN TO A DIFFERENT AC.  CONSIDER A DOAC.    THANK  YOU HX OF PE MANY YEARS AGO  WAS STABLE  ON COUMADIN  HX OF FACTOR V LEIDEN  MUTATION.  EMBOLIC CVAs on COUMADIN  R/O AN UNDERLYING CARDIAC PROBLEM  CONSIDER SWITCHING FROM COUMADIN TO A DIFFERENT AC.  CONSIDER A DOAC OR CONSIDER ADDING ASA,81 MG A DAY TO COUMADIN  ? OF ADDING PLAVIX OR BRILINTA TO IT.    THANK  YOU

## 2019-04-23 NOTE — CONSULT NOTE ADULT - PROBLEM SELECTOR RECOMMENDATION 2
- currently SR  - approx 3hr asymptomatic episode of AFib, 70s-110s last night  - c/w anticoagulation

## 2019-04-23 NOTE — ED CDU PROVIDER SUBSEQUENT DAY NOTE - ATTENDING CONTRIBUTION TO CARE
I, Gilles Carter, performed a face to face bedside interview with this patient regarding history of present illness, review of symptoms and relevant past medical, social and family history.  I completed an independent physical examination. I have communicated the patient’s plan of care and disposition with the ACP.  pt placed on Obs to eval RUE weakness, improved, no acute events overnight, awaitign mri  Gen: NAD, well appearing  CV: RRR  Pul: CTA b/l  Abd: Soft, non-distended, non-tender  Neuro: no focal deficits

## 2019-04-23 NOTE — ED CDU PROVIDER SUBSEQUENT DAY NOTE - HISTORY
No pertinent interval history. Patient noted to be in A-fib on cardiac monitor without sxms. Patient given metoprolol. Troponin negative. Per monitor tech, pt has episodes of A-fib as well as PACs. Presently in NSR.

## 2019-04-23 NOTE — ED CDU PROVIDER SUBSEQUENT DAY NOTE - MEDICAL DECISION MAKING DETAILS
83 yo male placed in CDU for TIA work up. Patient pending MRI head, FLP, and neuro consult to determine further management plan.

## 2019-04-23 NOTE — ED ADULT NURSE REASSESSMENT NOTE - NS ED NURSE REASSESS COMMENT FT1
Pt alert and oriented. resting in stretcher, no signs of distress noted. Handoff report given to Malena Joshi RN and pt's safety maintained. RN with no questions or concerns at this time

## 2019-04-23 NOTE — H&P ADULT - NSICDXFAMILYHX_GEN_ALL_CORE_FT
FAMILY HISTORY:  Child  Still living? Yes, Estimated age: Age Unknown  Family history of diabetes mellitus, Age at diagnosis: Age Unknown

## 2019-04-23 NOTE — CONSULT NOTE ADULT - SUBJECTIVE AND OBJECTIVE BOX
SANDI SANTOS  84y  Male  [id number]      Patient is a 84y old  Male who presents with a chief complaint of I have a stroke (23 Apr 2019 13:08)    HPI:  84 year old male with PMH HTN, Dyslipidemia, CAD s/p PCI, COPD, Seizure disorder and PE s/p IVCF, currently on Coumadin for several years who presented to [facility] with right hand weakness since the day before admission AM,  As per patient he woke up yesterday morning and noted having difficulty holding the urinal. He also noticed some slurring but denies any dizziness, numbness, new visual complaints, aphasia, headache. He states he has had  a stroke in the past but unable to recall time and deficits.  He was admitted to OBS unit yesterday and had his stroke work up - MRI showed multiple small embolic infarcts. (23 Apr 2019 13:08)  Has an IVC filter as well.  No further DVT or PE  He was found to have factor V Leiden mutation about 3 years ago.    PAST MEDICAL & SURGICAL HISTORY:  Cerebrovascular accident (CVA)  History of COPD  CAD S/P percutaneous coronary angioplasty  Atrial fibrillation, unspecified type  [place] filter in place  PE (pulmonary embolism)  Hyperlipemia  Hypertension    S/P cataract surgery  S/P IVC filter    FAMILY HISTORY:  Family history of diabetes mellitus (Child)    REVIEW OF SYSTEMS      General: See HPI  No anorexia or weight loss.  No bleeding  No GI Sx  No  Sx  No bone or joint/muscle  pain.	  No SOB  No CP  Skin/Breast: Neg.  Endocrine: Neg.  HEENT: Neg:	    PHYSICAL EXAM:      Constitutional:  Alert and oriented  No pallor  No icterus  No LNs  Lungs: Clear  Heart: RR  Systolic murmur  Abd: Nontender  No HSM or ascites  LE: No CCE or DVT  Neuro: Right hand weakness    CBC Full  -  ( 22 Apr 2019 10:44 )  WBC Count : 7.6 K/uL  RBC Count : 5.03 M/uL  Hemoglobin : 13.6 g/dL  Hematocrit : 42.3 %  Platelet Count - Automated : 218 K/uL  Mean Cell Volume : 84.1 fl  Mean Cell Hemoglobin : 27.0 pg  Mean Cell Hemoglobin Concentration : 32.2 g/dL  Auto Neutrophil # : 5.2 K/uL  Auto Lymphocyte # : 1.4 K/uL  Auto Monocyte # : 0.7 K/uL  Auto Eosinophil # : 0.2 K/uL  Auto Basophil # : 0.0 K/uL  Auto Neutrophil % : 68.7 %  Auto Lymphocyte % : 18.0 %  Auto Monocyte % : 9.5 %  Auto Eosinophil % : 2.8 %  Auto Basophil % : 0.7 %    PT/INR - ( 22 Apr 2019 10:44 )   PT: 35.4 sec;   INR: 2.98 ratio         PTT - ( 22 Apr 2019 10:44 )  PTT:38.0 sec  22 Apr 2019 10:44    141    |  108    |  18.0   ----------------------------<  117    4.6     |  21.0   |  1.29     Ca    9.3        22 Apr 2019 10:44    TPro  7.1    /  Alb  3.8    /  TBili  0.3    /  DBili  x      /  AST  13     /  ALT  13     /  AlkPhos  60     22 Apr 2019 10:44              < from: MR Head No Cont (04.23.19 @ 06:50) >     EXAM:  MR BRAIN                          PROCEDURE DATE:  04/23/2019          INTERPRETATION:  .    CLINICAL INFORMATION: Right-sided weakness. Transient ischemic attack   workup.    TECHNIQUE: Multiplanar multisequential MRI of the brain was acquired   without the administration of IV gadolinium.    COMPARISON: Prior brain MRI examination from 2/27/2019. Prior head CT   examination from 4/22/2019.    FINDINGS: Multiple scattered foci of restricted diffusion are notable   throughout the bilateral cerebral hemispheres mostly and cortical   locations. There is an area of restricted diffusion involving the right   body of the corpus callosum.    There is redemonstration of a small chronic transcortical infarct within   the left frontal lobe.    Multiple additional nonspecific T2/FLAIR hyperintense signal changes are   noted throughout the bihemispheric white matter without associated mass   effect or restricted diffusion.    Ventricular size and configuration is unremarkable. There is prominence   of the bilateral convexity extra-axial spaces which is secondary to   volume loss. There is no displacement of the cortical veins onto the   brain surface to suggest an extra-axial collection. Flow-voids are noted   throughout the major intracranial vessels, on the T2 weighted images,   consistent with their patency.    There is scattered mucosal thickening throughout the paranasal sinuses.   There is nonspecific fluid opacification of the bilateral mastoid air   cells. Calvarial signal is otherwise within normal limits. There is   evidence of bilateral cataract removal.    IMPRESSION: Scattered small acute/subacute infarcts with associated   cytotoxic edema within the bilateral cerebral hemispheres in different   vascular distributions. Embolic phenomenon should be considered.    No acute intracranial hemorrhage.    Nonspecific bilateral mastoid air cell opacification. Correlate   clinically for the possibility of bilateral mastoidit    < end of copied text >    < from: US Duplex Venous Lower Ext Complete, Bilateral (04.23.19 @ 17:39) >   EXAM:  US DPLX LWR EXT VEINS COMPL BI                          PROCEDURE DATE:  04/23/2019          INTERPRETATION:  CLINICAL INFORMATION: CVA    COMPARISON: None available.    TECHNIQUE: Duplex sonography of the BILATERAL LOWER extremities with   color and spectral Doppler, with and without compression.      FINDINGS:    There is normal compressibility of the bilateral common femoral, femoral   and popliteal veins. Limited evaluation the calf veins. Vessels are   visualized through the posterior tibial veins.    Doppler examination shows normal spontaneous and phasic flow.    IMPRESSION:     No evidence of bilateral lower extremity deep venous thrombosis.    < end of copied text >

## 2019-04-23 NOTE — ED CDU PROVIDER DISPOSITION NOTE - CLINICAL COURSE
Pt in CDU for TIA workup, pt notes persistent weakness of R hand. MRI head showing: Scattered small acute/subacute infarcts with associated cytotoxic edema within the bilateral cerebral hemispheres in different vascular distributions. Embolic phenomenon should be considered. Pt seen by Dr. Richardson, will admit to step down unit. Pt in CDU for TIA workup, pt notes persistent weakness of R hand. MRI head showing: Scattered small acute/subacute infarcts with associated cytotoxic edema within the bilateral cerebral hemispheres in different vascular distributions. Embolic phenomenon should be considered. Pt seen by Dr. Richardson, will admit to step down unit. Cardiologist Dr. Ta aware.

## 2019-04-23 NOTE — ED CDU PROVIDER SUBSEQUENT DAY NOTE - PMH
CAD (coronary artery disease)    [place] filter in place    Hyperlipemia    Hypertension    PE (pulmonary embolism) Atrial fibrillation, unspecified type    CAD (coronary artery disease)    [place] filter in place    Hyperlipemia    Hypertension    PE (pulmonary embolism)

## 2019-04-23 NOTE — H&P ADULT - NSICDXPASTMEDICALHX_GEN_ALL_CORE_FT
PAST MEDICAL HISTORY:  Atrial fibrillation, unspecified type     CAD S/P percutaneous coronary angioplasty     Cerebrovascular accident (CVA)     [place] filter in place     History of COPD     Hyperlipemia     Hypertension     PE (pulmonary embolism)

## 2019-04-23 NOTE — ED ADULT NURSE REASSESSMENT NOTE - NURSING NEURO LEVEL OF CONSCIOUSNESS
follows commands/alert and awake
follows commands/alert and awake
alert and awake/follows commands
alert and awake/follows commands
follows commands/alert and awake
[Negative] : Heme/Lymph

## 2019-04-24 DIAGNOSIS — I63.9 CEREBRAL INFARCTION, UNSPECIFIED: ICD-10-CM

## 2019-04-24 DIAGNOSIS — I48.91 UNSPECIFIED ATRIAL FIBRILLATION: ICD-10-CM

## 2019-04-24 LAB
ALBUMIN SERPL ELPH-MCNC: 3.7 G/DL — SIGNIFICANT CHANGE UP (ref 3.3–5.2)
ALP SERPL-CCNC: 57 U/L — SIGNIFICANT CHANGE UP (ref 40–120)
ALT FLD-CCNC: 11 U/L — SIGNIFICANT CHANGE UP
ANION GAP SERPL CALC-SCNC: 14 MMOL/L — SIGNIFICANT CHANGE UP (ref 5–17)
AST SERPL-CCNC: 14 U/L — SIGNIFICANT CHANGE UP
BILIRUB SERPL-MCNC: 0.6 MG/DL — SIGNIFICANT CHANGE UP (ref 0.4–2)
BUN SERPL-MCNC: 16 MG/DL — SIGNIFICANT CHANGE UP (ref 8–20)
CALCIUM SERPL-MCNC: 9.5 MG/DL — SIGNIFICANT CHANGE UP (ref 8.6–10.2)
CHLORIDE SERPL-SCNC: 107 MMOL/L — SIGNIFICANT CHANGE UP (ref 98–107)
CHOLEST SERPL-MCNC: 189 MG/DL — SIGNIFICANT CHANGE UP (ref 110–199)
CO2 SERPL-SCNC: 19 MMOL/L — LOW (ref 22–29)
CREAT SERPL-MCNC: 1.31 MG/DL — HIGH (ref 0.5–1.3)
GLUCOSE SERPL-MCNC: 128 MG/DL — HIGH (ref 70–115)
HCT VFR BLD CALC: 41.4 % — LOW (ref 42–52)
HDLC SERPL-MCNC: 35 MG/DL — LOW
HGB BLD-MCNC: 13.4 G/DL — LOW (ref 14–18)
INR BLD: 1.95 RATIO — HIGH (ref 0.88–1.16)
LIPID PNL WITH DIRECT LDL SERPL: 125 MG/DL — SIGNIFICANT CHANGE UP
MAGNESIUM SERPL-MCNC: 1.9 MG/DL — SIGNIFICANT CHANGE UP (ref 1.6–2.6)
MCHC RBC-ENTMCNC: 26.8 PG — LOW (ref 27–31)
MCHC RBC-ENTMCNC: 32.4 G/DL — SIGNIFICANT CHANGE UP (ref 32–36)
MCV RBC AUTO: 82.8 FL — SIGNIFICANT CHANGE UP (ref 80–94)
PHOSPHATE SERPL-MCNC: 2.2 MG/DL — LOW (ref 2.4–4.7)
PLATELET # BLD AUTO: 238 K/UL — SIGNIFICANT CHANGE UP (ref 150–400)
POTASSIUM SERPL-MCNC: 3.8 MMOL/L — SIGNIFICANT CHANGE UP (ref 3.5–5.3)
POTASSIUM SERPL-SCNC: 3.8 MMOL/L — SIGNIFICANT CHANGE UP (ref 3.5–5.3)
PROT SERPL-MCNC: 7.1 G/DL — SIGNIFICANT CHANGE UP (ref 6.6–8.7)
PROTHROM AB SERPL-ACNC: 22.9 SEC — HIGH (ref 10–12.9)
RBC # BLD: 5 M/UL — SIGNIFICANT CHANGE UP (ref 4.6–6.2)
RBC # FLD: 14.2 % — SIGNIFICANT CHANGE UP (ref 11–15.6)
SODIUM SERPL-SCNC: 140 MMOL/L — SIGNIFICANT CHANGE UP (ref 135–145)
T3 SERPL-MCNC: 92 NG/DL — SIGNIFICANT CHANGE UP (ref 80–200)
T4 AB SER-ACNC: 6.8 UG/DL — SIGNIFICANT CHANGE UP (ref 4.5–12)
TOTAL CHOLESTEROL/HDL RATIO MEASUREMENT: 5 RATIO — SIGNIFICANT CHANGE UP (ref 3.4–9.6)
TRIGL SERPL-MCNC: 143 MG/DL — SIGNIFICANT CHANGE UP (ref 10–200)
TSH SERPL-MCNC: 1.35 UIU/ML — SIGNIFICANT CHANGE UP (ref 0.27–4.2)
WBC # BLD: 8.1 K/UL — SIGNIFICANT CHANGE UP (ref 4.8–10.8)
WBC # FLD AUTO: 8.1 K/UL — SIGNIFICANT CHANGE UP (ref 4.8–10.8)

## 2019-04-24 PROCEDURE — 99223 1ST HOSP IP/OBS HIGH 75: CPT

## 2019-04-24 PROCEDURE — 99233 SBSQ HOSP IP/OBS HIGH 50: CPT

## 2019-04-24 PROCEDURE — 93010 ELECTROCARDIOGRAM REPORT: CPT

## 2019-04-24 PROCEDURE — 93312 ECHO TRANSESOPHAGEAL: CPT | Mod: 26

## 2019-04-24 PROCEDURE — 70450 CT HEAD/BRAIN W/O DYE: CPT | Mod: 26

## 2019-04-24 PROCEDURE — 93320 DOPPLER ECHO COMPLETE: CPT | Mod: 26

## 2019-04-24 PROCEDURE — 93325 DOPPLER ECHO COLOR FLOW MAPG: CPT | Mod: 26

## 2019-04-24 PROCEDURE — 99232 SBSQ HOSP IP/OBS MODERATE 35: CPT

## 2019-04-24 RX ORDER — METOPROLOL TARTRATE 50 MG
25 TABLET ORAL EVERY 8 HOURS
Qty: 0 | Refills: 0 | Status: DISCONTINUED | OUTPATIENT
Start: 2019-04-24 | End: 2019-04-25

## 2019-04-24 RX ORDER — POTASSIUM PHOSPHATE, MONOBASIC POTASSIUM PHOSPHATE, DIBASIC 236; 224 MG/ML; MG/ML
15 INJECTION, SOLUTION INTRAVENOUS ONCE
Qty: 0 | Refills: 0 | Status: COMPLETED | OUTPATIENT
Start: 2019-04-24 | End: 2019-04-24

## 2019-04-24 RX ORDER — ATORVASTATIN CALCIUM 80 MG/1
40 TABLET, FILM COATED ORAL AT BEDTIME
Qty: 0 | Refills: 0 | Status: DISCONTINUED | OUTPATIENT
Start: 2019-04-24 | End: 2019-04-26

## 2019-04-24 RX ORDER — AMIODARONE HYDROCHLORIDE 400 MG/1
150 TABLET ORAL ONCE
Qty: 0 | Refills: 0 | Status: COMPLETED | OUTPATIENT
Start: 2019-04-24 | End: 2019-04-24

## 2019-04-24 RX ORDER — WARFARIN SODIUM 2.5 MG/1
4 TABLET ORAL ONCE
Qty: 0 | Refills: 0 | Status: COMPLETED | OUTPATIENT
Start: 2019-04-24 | End: 2019-04-24

## 2019-04-24 RX ORDER — METOPROLOL TARTRATE 50 MG
5 TABLET ORAL ONCE
Qty: 0 | Refills: 0 | Status: COMPLETED | OUTPATIENT
Start: 2019-04-24 | End: 2019-04-24

## 2019-04-24 RX ORDER — SODIUM CHLORIDE 9 MG/ML
500 INJECTION INTRAMUSCULAR; INTRAVENOUS; SUBCUTANEOUS ONCE
Qty: 0 | Refills: 0 | Status: COMPLETED | OUTPATIENT
Start: 2019-04-24 | End: 2019-04-24

## 2019-04-24 RX ORDER — DILTIAZEM HCL 120 MG
10 CAPSULE, EXT RELEASE 24 HR ORAL ONCE
Qty: 0 | Refills: 0 | Status: COMPLETED | OUTPATIENT
Start: 2019-04-24 | End: 2019-04-24

## 2019-04-24 RX ADMIN — Medication 25 MILLIGRAM(S): at 18:22

## 2019-04-24 RX ADMIN — AMIODARONE HYDROCHLORIDE 618 MILLIGRAM(S): 400 TABLET ORAL at 05:46

## 2019-04-24 RX ADMIN — BUDESONIDE AND FORMOTEROL FUMARATE DIHYDRATE 2 PUFF(S): 160; 4.5 AEROSOL RESPIRATORY (INHALATION) at 08:11

## 2019-04-24 RX ADMIN — SODIUM CHLORIDE 500 MILLILITER(S): 9 INJECTION INTRAMUSCULAR; INTRAVENOUS; SUBCUTANEOUS at 09:00

## 2019-04-24 RX ADMIN — WARFARIN SODIUM 4 MILLIGRAM(S): 2.5 TABLET ORAL at 22:21

## 2019-04-24 RX ADMIN — BUDESONIDE AND FORMOTEROL FUMARATE DIHYDRATE 2 PUFF(S): 160; 4.5 AEROSOL RESPIRATORY (INHALATION) at 20:12

## 2019-04-24 RX ADMIN — Medication 25 MILLIGRAM(S): at 11:49

## 2019-04-24 RX ADMIN — Medication 10 MILLIGRAM(S): at 09:00

## 2019-04-24 RX ADMIN — SERTRALINE 50 MILLIGRAM(S): 25 TABLET, FILM COATED ORAL at 18:17

## 2019-04-24 RX ADMIN — Medication 10 MILLIGRAM(S): at 18:17

## 2019-04-24 RX ADMIN — ATORVASTATIN CALCIUM 40 MILLIGRAM(S): 80 TABLET, FILM COATED ORAL at 22:21

## 2019-04-24 RX ADMIN — PANTOPRAZOLE SODIUM 40 MILLIGRAM(S): 20 TABLET, DELAYED RELEASE ORAL at 22:21

## 2019-04-24 RX ADMIN — Medication 5 MILLIGRAM(S): at 01:01

## 2019-04-24 RX ADMIN — POTASSIUM PHOSPHATE, MONOBASIC POTASSIUM PHOSPHATE, DIBASIC 62.5 MILLIMOLE(S): 236; 224 INJECTION, SOLUTION INTRAVENOUS at 18:26

## 2019-04-24 RX ADMIN — TAMSULOSIN HYDROCHLORIDE 0.4 MILLIGRAM(S): 0.4 CAPSULE ORAL at 22:21

## 2019-04-24 RX ADMIN — Medication 325 MILLIGRAM(S): at 18:16

## 2019-04-24 RX ADMIN — Medication 5 MILLIGRAM(S): at 10:13

## 2019-04-24 NOTE — PROGRESS NOTE ADULT - ASSESSMENT
84 year old male with PMH HTN, Dyslipidemia, CAD s/p PCI, COPD, PE s/p IVCF presented with right hand weakness.   NIH 3 at admission, mild slurring.  at admission, INR 2.98, , , A1c 6.1, EKG AF, CTH-, Carotids - , Chest X-Ray - MRI positive for CVA.    Acute multiple small bilateral acte/Subacute CVA - appear to be cardioembolic. Surprising given INR therapeutic  -neuro checks  - Stroke protocol  - Permissive HTN  - Continue ASA  - Continue Coumadin  - Neurology following - discussed with Dr Richardson:   - pt with afib with RVR and h/o PE, now w multiple strokes,  all while on warfarin.  - hematology consult appreciated, Dr Magana gave possible options for anticoagulation  - cardiology following- discussed w Dr Ta, possible change from Coumadin to DOAC   - for UMER today    pAF - Had been rate controlled on admission- was only taking Amlodipine 5 mg at home.- w RvR overnight. Was evaluated by nocturnist, administered IV Lopressor and Amiodarone bolus, rate was controlled for a few hours. Upon my arrival on 4BRKT approx 8:15 pt noted w rate in 160's. Administered Cardizem 10 mg IVP with good effect for one hour, by that time cardiology NP was rounding on floor and called cardiologist to reassess. Additional dose Lopressor IVP administered and started on po Lopressor. Discussed w Dr Ta. A-fib now converted  nSr in 80's  - Continue Coumadin for now, likely transition to DOAC prior to discharge    HTN - Stable  - Started on po Lopressor for rate control    Dyslipidemia -  -   - Continue Statin    COPD  - Continue ICS/LABA    VTE Hx  - Continue Coumadin for now    Hx Seizure  - unclear if patient on Keppra or discontinued.   - need to verify    CAD - negative Troponin, no evidence ischemia  - Continue ASA, Statin    Advance Directive - patient states he would want to be DNR/DNI - RN advised to provide patient with MOLST so he can reviewed with family. Pt stating he needs to speak to his daughter prior to filling it out.   Estimated Date of Discharge - potentially 4/25/19, however pending further evaluation, findings, clinical course.  PMD Dr Murillo notified - asked Hospitalist service to continue to care for as he is on vacation.    Pt evaluated by P, M & R- accepted to acute rehab when medically stable. CCM aware. 84 year old male with PMH HTN, Dyslipidemia, CAD s/p PCI, COPD, PE s/p IVCF presented with right hand weakness.   NIH 3 at admission, mild slurring.  at admission, INR 2.98, , , A1c 6.1, EKG AF, CTH-, Carotids - , Chest X-Ray - MRI positive for CVA.    Acute multiple small bilateral acte/Subacute CVA - appear to be cardioembolic. Surprising given INR therapeutic  - neuro checks  - Stroke protocol  - Permissive HTN  - Continue ASA  - Continue Coumadin  - Neurology following - discussed with Dr Richardson:   - pt with afib with RVR and h/o PE, now w multiple strokes,  all while on warfarin.  - hematology consult appreciated, Dr Magana gave possible options for anticoagulation  - cardiology following- discussed w Dr Ta, possible change from Coumadin to DOAC   - for UMER today    pAF - Had been rate controlled on admission- was only taking Amlodipine 5 mg at home.- w RvR overnight. Was evaluated by nocturnist, administered IV Lopressor and Amiodarone bolus, rate was controlled for a few hours. Upon my arrival on 4BRKT approx 8:15 pt noted w rate in 160's. Administered Cardizem 10 mg IVP with good effect for one hour, by that time cardiology NP was rounding on floor and called cardiologist to reassess. Additional dose Lopressor IVP administered and started on po Lopressor. Discussed w Dr Ta. A-fib now converted  nSr in 80's  - Continue Coumadin for now, likely transition to DOAC prior to discharge    HTN - Stable  - Started on po Lopressor for rate control    Dyslipidemia -  -   - Continue Statin    COPD  - Continue ICS/LABA    VTE Hx  - Continue Coumadin for now    Hx Seizure  - unclear if patient on Keppra or discontinued.   - need to verify    CAD - negative Troponin, no evidence ischemia  - Continue ASA, Statin    Advance Directive - patient states he would want to be DNR/DNI - RN advised to provide patient with MOLST so he can reviewed with family. Pt stating he needs to speak to his daughter prior to filling it out.   Estimated Date of Discharge - potentially 4/25/19, however pending further evaluation, findings, clinical course.  PMD Dr Murillo notified - asked Hospitalist service to continue to care for as he is on vacation.    Pt evaluated by P, M & R- accepted to acute rehab when medically stable. CCM aware.

## 2019-04-24 NOTE — PHYSICAL THERAPY INITIAL EVALUATION ADULT - ADDITIONAL COMMENTS
Pt lives in a high [place] house with 5 steps to enter with 1 rails not within reach and  5 stairs inside with 5 stairs inside to go upstairs to bedroom and bathroom with 2 rails.  Pt owns medical equipment: RW, JULIANNE, w/c, shower chair, commode  Pt lives with: alone, pt states that he has neighbors that could help if needed, a few friends that are close to assist if needed, states his in-laws live out east if he needs some help. States his daughter lives in [place] and son in [place].     Pt reports that he had a fall 1 month ago and states he has had a few falls prior to that in his home and driveway requiring outside help from neighbors

## 2019-04-24 NOTE — OCCUPATIONAL THERAPY INITIAL EVALUATION ADULT - NS ASR FOLLOW COMMAND OT EVAL
pt follows commands with appropriate processing. Pt with good attention throughout evaluation. Pt with overall good safety awareness./100% of the time/able to follow single-step instructions

## 2019-04-24 NOTE — PHYSICAL THERAPY INITIAL EVALUATION ADULT - PERTINENT HX OF CURRENT PROBLEM, REHAB EVAL
84 year old male with PMH HTN, Dyslipidemia, CAD s/p PCI, COPD, Seizure disorder and PE s/p IVCF, currently on Coumadin presented to [facility] reporting right sided weakness and SOB.  Head MRI reveals scattered small acute/subacute infarcts with associated cytotoxic edema within the bilateral cerebral hemispheres in different vascular distributions;,

## 2019-04-24 NOTE — OCCUPATIONAL THERAPY INITIAL EVALUATION ADULT - MODIFIED CLINICAL TEST OF SENSORY INTEGRATION IN BALANCE TEST
static sitting edge of bed with supervision; dynamic sitting edge of bed with contact guard assistance; static standing with minimum assistance; dynamic standing with minimum/moderate assistance

## 2019-04-24 NOTE — OCCUPATIONAL THERAPY INITIAL EVALUATION ADULT - PERTINENT HX OF CURRENT PROBLEM, REHAB EVAL
Pt presents to ED with c/o right sided weakness and SOB. Head MRI reveals scattered small acute/subacute infarcts with associated cytotoxic edema within the bilateral cerebral hemispheres in different vascular distributions; embolic phenomenon should be considered; no acute intracranial hemorrhage; nonspecific bilateral mastoid air cell opacification, correlate clinically for the possibility of bilateral mastoiditis.

## 2019-04-24 NOTE — PROGRESS NOTE ADULT - SUBJECTIVE AND OBJECTIVE BOX
CHIEF COMPLAINT:Patient is a 84y old  Male who presents with a chief complaint of I have a stroke (2019 09:26)      INTERVAL HISTORY:  Overnight patient had AFib with RVR.  HR ranged from , with most of the time lingering in the 150s-160s.  Patient asymptomatic. HR did not respond to multiple doses of IV cardizem, Lopressor, and Amiodarone.  This morning patient HR remained 150-160s with brief drops to the 80s lasting only several seconds.  Patient c/t deny any palpitations, SOB, or CP.  During exam noticed increased RUE weakness, RUE drift, and a slight expressive aphasia.      Allergies    No Known Allergies    Intolerances      	  MEDICATIONS:  amLODIPine   Tablet 5 milliGRAM(s) Oral daily  metoprolol tartrate 25 milliGRAM(s) Oral every 8 hours  tamsulosin 0.4 milliGRAM(s) Oral at bedtime      buDESOnide  80 MICROgram(s)/formoterol 4.5 MICROgram(s) Inhaler 2 Puff(s) Inhalation two times a day    sertraline 50 milliGRAM(s) Oral daily    pantoprazole    Tablet 40 milliGRAM(s) Oral at bedtime    atorvastatin 40 milliGRAM(s) Oral at bedtime    aspirin enteric coated 325 milliGRAM(s) Oral daily  oxybutynin XL 10 milliGRAM(s) Oral daily        PHYSICAL EXAM:    T(C): 36.8 (19 @ 08:01), Max: 37 (19 @ 05:00)  HR: 98 (19 @ 08:13) (68 - 160)  BP: 107/77 (19 @ 08:00) (107/77 - 156/89)  RR: 17 (19 @ 08:00) (17 - 23)  SpO2: 94% (19 @ 08:13) (92% - 98%)  Wt(kg): --    I&O's Summary    2019 07:01  -  2019 07:00  --------------------------------------------------------  IN: 0 mL / OUT: 200 mL / NET: -200 mL    2019 07:01  -  2019 10:35  --------------------------------------------------------  IN: 0 mL / OUT: 0 mL / NET: 0 mL        Daily     Daily Weight in k.9 (2019 05:00)    Appearance: Normal	  HEENT:   NC/AT  Eye: Pink Conjunctiva  Lungs: CTA B/L  CVS: RRR, Normal S1 and S2, No Edema  Pulses: Normal distal pulses.  Neuro: A&O x3    TELEMETRY: AFib w/-160s  ECG: s/p IV medications SR with APCs, LAD, incomplete LBBB  RADIOLOGY:       LABS:	 	    CARDIAC MARKERS:                            13.6   7.6   )-----------( 218      ( 2019 10:44 )             42.3         141  |  108<H>  |  18.0  ----------------------------<  117<H>  4.6   |  21.0<L>  |  1.29    Ca    9.3      2019 10:44    TPro  7.1  /  Alb  3.8  /  TBili  0.3<L>  /  DBili  x   /  AST  13  /  ALT  13  /  AlkPhos  60      proBNP:   Lipid Profile:   HgA1c: Hemoglobin A1C, Whole Blood: 6.1 % ( @ 13:07)    TSH:

## 2019-04-24 NOTE — OCCUPATIONAL THERAPY INITIAL EVALUATION ADULT - MANUAL MUSCLE TESTING RESULTS, REHAB EVAL
left shoulder 4/5, left elbow 4/5, left gross grasp 5/5; right shoulder 3/5, right elbow 3/5, right gross grasp 3/5

## 2019-04-24 NOTE — PROGRESS NOTE ADULT - SUBJECTIVE AND OBJECTIVE BOX
s/p UMER  Verbal report: No PFO   Strand on aortic valve noted  Will get blood C/Sx2 per Dr. Ta  Tolerated procedure well without complications  Seen post procedure by Dr Ta  +gag +swallow Patent airway  Neuro: CN II-XII intact    PULM: CTA renea s/p UMER  Verbal report: No PFO   Strand on aortic valve noted Infectious vs thrombus    Will get blood C/Sx2 per Dr. Ta  Tolerated procedure well without complications  Seen post procedure by Dr Ta  +gag +swallow Patent airway  Neuro: CN II-XII intact    PULM: CTA renea     A- s/p UMER    P- Continue AC  Blood C/S x2 s/p UMER  Verbal report: No PFO   Strand on aortic valve noted Infectious vs thrombus    Will get blood C/Sx2 per Dr. Ta  Tolerated procedure well without complications  Seen post procedure by Dr Ta  +gag +swallow Patent airway  Neuro: CN II-XII intact    PULM: CTA renea     A- s/p UMER    P- Continue AC Will order coumadin 4mg po tonight and check INR in AM  Blood C/S x2  Above D/W Dr Hare

## 2019-04-24 NOTE — PROGRESS NOTE ADULT - ASSESSMENT
Overnight patient had AFib with RVR.  HR ranged from , with most of the time lingering in the 150s-160s.  Patient asymptomatic. HR did not respond to multiple doses of IV cardizem, Lopressor, and Amiodarone.  This morning patient HR remained 150-160s with brief drops to the 80s lasting only several seconds.  Patient c/t deny any palpitations, SOB, or CP.  During exam noticed increased RUE weakness, RUE drift, and a slight expressive aphasia.    ASA 4  Mallampati - per anesthesia

## 2019-04-24 NOTE — PROGRESS NOTE ADULT - PROBLEM SELECTOR PLAN 2
- c/w ASA, warfarin  - added statin  - increased RUE weakness, drift, slight expressive aphasia  - repeat head ct

## 2019-04-24 NOTE — CONSULT NOTE ADULT - SUBJECTIVE AND OBJECTIVE BOX
84yM was admitted on  with right hand weakness. Summary of documentation provided and reviewed. Patient has history of PE s/p IVCF  and as taking Coumadin, INR was 2/98 upon admission.     Imaging showed (ind reviewed):  HEAD CT - 1)  scattered chronic ischemic changes and volume loss. No obvious acute infarct or hemorrhage. 2)  right mastoid disease.  BRAIN MRI - Scattered small acute/subacute infarcts with associated cytotoxic edema within the bilateral cerebral hemispheres in different vascular distributions. Embolic phenomenon should be considered. No acute intracranial hemorrhage.  Nonspecific bilateral mastoid air cell opacification. Correlate clinically for the possibility of bilateral mastoiditis.    Given cardioembolic nature of the CVAs, planned for UMER. Patient also has AFIB.     Patient currently sitting in chair and reports that his right hand continues to be weak. He also endorses left hip/knee weakness from an injury he sustained a few months ago from a fall.       REVIEW OF SYSTEMS  Constitutional - No fever, No weight loss, No fatigue  HEENT - No eye pain, No visual disturbances, No difficulty hearing, No tinnitus, No vertigo, No neck pain  Respiratory - No cough, No wheezing, No shortness of breath  Cardiovascular - No chest pain, No palpitations  Gastrointestinal - No abdominal pain, No nausea, No vomiting, No diarrhea, No constipation  Genitourinary - No dysuria, No frequency, No hematuria, No incontinence  Neurological - No headaches, No memory loss, +loss of strength, +numbness, No tremors  Skin - No itching, No rashes, No lesions   Endocrine - No temperature intolerance  Musculoskeletal - No joint pain, No joint swelling, No muscle pain  Psychiatric - No depression, No anxiety    VITALS  T(C): 36.8 (19 @ 08:01), Max: 37 (19 @ 05:00)  HR: 98 (19 @ 08:13) (68 - 160)  BP: 107/77 (19 @ 08:00) (107/77 - 156/89)  RR: 17 (19 @ 08:00) (17 - 23)  SpO2: 94% (19 @ 08:13) (92% - 98%)  Wt(kg): --    PAST MEDICAL & SURGICAL HISTORY  Cerebrovascular accident (CVA)  History of COPD  CAD S/P percutaneous coronary angioplasty  Atrial fibrillation, unspecified type  CAD (coronary artery disease)  Sidney filter in place  PE (pulmonary embolism)  Hyperlipemia  Hypertension  S/P cataract surgery  S/P IVC filter  No significant past surgical history      SOCIAL HISTORY  Smoking - Quit  EtOH - Socially  Drugs - Denied    FUNCTIONAL HISTORY  Lives alone, 5 [place], [place]  Independent, Drove    CURRENT FUNCTIONAL STATUS    Upper Body Dressing Training:     · Level of [place]	moderate assist (50% patients effort); gown	  · Physical Assist/Nonphysical Assist	1 person assist; nonverbal cues (demo/gestures); verbal cues	    Lower Body Dressing Training:     · Level of [place]	moderate assist (50% patients effort); socks in sitting via LE crossing	  · Physical Assist/Nonphysical Assist	1 person assist; nonverbal cues (demo/gestures); verbal cues	    Toilet Hygiene Training:     · Level of [place]	not observed	    Grooming Training:     · Level of [place]	minimum assist (75% patients effort)	  · Physical Assist/Nonphysical Assist	1 person assist; nonverbal cues (demo/gestures); verbal cues	    Eating/Self-Feeding Training:     · Level of [place]	not observed; pt with active orders for supervision with meals and aspiration precautions	      Bed Mobility: Scooting/Bridging:     · Level of [place]	moderate assist (50% patients effort); scooting	  · Physical Assist/Nonphysical Assist	1 person assist; nonverbal cues (demo/gestures); verbal cues	    Bed Mobility: Supine to Sit:     · Level of [place]	moderate assist (50% patients effort)	  · Physical Assist/Nonphysical Assist	1 person assist; nonverbal cues (demo/gestures); verbal cues	  · Assistive Device	bed rails	    Transfer: Bed to Chair:    Bed to Chair Transfer:    Transfer Skill: Bed to Chair   · Level of [place]	moderate assist (50% patients effort)	  · Physical Assist/Nonphysical Assist	1 person assist; nonverbal cues (demo/gestures); verbal cues	  · Weight-Bearing Restrictions	weight-bearing as tolerated	    Transfer: Sit to Stand:     · Level of [place]	moderate assist (50% patients effort)	  · Physical Assist/Nonphysical Assist	1 person assist; nonverbal cues (demo/gestures); verbal cues	  · Weight-Bearing Restrictions	weight-bearing as tolerated	    Transfer: Stand to Sit:     · Level of [place]	minimum assist (75% patients effort)	  · Physical Assist/Nonphysical Assist	1 person assist; nonverbal cues (demo/gestures); verbal cues	  · Weight-Bearing Restrictions	weight-bearing as tolerated	      FAMILY HISTORY   Family history of diabetes mellitus (Child)  No pertinent family history in first degree relatives      RECENT LABS/IMAGING  CBC Full  -  ( 2019 10:44 )  WBC Count : 7.6 K/uL  RBC Count : 5.03 M/uL  Hemoglobin : 13.6 g/dL  Hematocrit : 42.3 %  Platelet Count - Automated : 218 K/uL  Mean Cell Volume : 84.1 fl  Mean Cell Hemoglobin : 27.0 pg  Mean Cell Hemoglobin Concentration : 32.2 g/dL  Auto Neutrophil # : 5.2 K/uL  Auto Lymphocyte # : 1.4 K/uL  Auto Monocyte # : 0.7 K/uL  Auto Eosinophil # : 0.2 K/uL  Auto Basophil # : 0.0 K/uL  Auto Neutrophil % : 68.7 %  Auto Lymphocyte % : 18.0 %  Auto Monocyte % : 9.5 %  Auto Eosinophil % : 2.8 %  Auto Basophil % : 0.7 %        141  |  108<H>  |  18.0  ----------------------------<  117<H>  4.6   |  21.0<L>  |  1.29    Ca    9.3      2019 10:44    TPro  7.1  /  Alb  3.8  /  TBili  0.3<L>  /  DBili  x   /  AST  13  /  ALT  13  /  AlkPhos  60  -    Urinalysis Basic - ( 2019 00:32 )    Color: Yellow / Appearance: Clear / S.015 / pH: x  Gluc: x / Ketone: Negative  / Bili: Negative / Urobili: Negative mg/dL   Blood: x / Protein: 15 mg/dL / Nitrite: Negative   Leuk Esterase: Negative / RBC: 0-2 /HPF / WBC Negative   Sq Epi: x / Non Sq Epi: Occasional / Bacteria: x        ALLERGIES  No Known Allergies      MEDICATIONS   amLODIPine   Tablet 5 milliGRAM(s) Oral daily  aspirin enteric coated 325 milliGRAM(s) Oral daily  buDESOnide  80 MICROgram(s)/formoterol 4.5 MICROgram(s) Inhaler 2 Puff(s) Inhalation two times a day  diltiazem Injectable 10 milliGRAM(s) IV Push once  oxybutynin XL 10 milliGRAM(s) Oral daily  pantoprazole    Tablet 40 milliGRAM(s) Oral at bedtime  sertraline 50 milliGRAM(s) Oral daily  sodium chloride 0.9% Bolus 500 milliLiter(s) IV Bolus once  tamsulosin 0.4 milliGRAM(s) Oral at bedtime      ----------------------------------------------------------------------------------------  PHYSICAL EXAM  Constitutional - NAD, Comfortable  HEENT - NCAT, EOMI  Neck - Supple, No limited ROM  Chest - Breathing comfortably, No wheezing  Cardiovascular - S1S2   Abdomen - Soft   Extremities - No C/C/E, No calf tenderness   Neurologic Exam -                    Cognitive - Awake, Alert, AAO to self, place, date, year, situation     Communication - Expressive deficits, Dysarthria, Word finding delay     Cranial Nerves - CN 2-12 intact     Motor - Right UE and Left LE weakness                    LEFT    UE - ShAB 5/5, EF 5/5, EE 5/5, WE 5/5,  5/5                    RIGHT UE - ShAB 5/5, EF 5/5, EE 5/5, WE 4/5,  3/5                    LEFT    LE - HF 2/5, KE 4/5, DF 5/5, PF 5/5                    RIGHT LE - HF 5/5, KE 5/5, DF 5/5, PF 5/5        Sensory - decreased to the right UE     Coordination - Impaired on the right UE     OculoVestibular - No saccades, No nystagmus      Balance - WNL Static  Psychiatric - Mood stable, Affect WNL  ----------------------------------------------------------------------------------------  ASSESSMENT/PLAN  84yMale with functional deficits after bilateral acute CVAs  Cardioembolic CVAs - ASA, Coumadin, UMER  AFIB - Coumadin, Cardizem  Pulm - Budesonide  HTN - Norvasc  Mood - Zoloft  BPH - Flomax  PE - Coumadin  Pain - Tylenol  DVT PPX - SCDs  Rehab - Recommend ACUTE inpatient rehabilitation for the functional deficits consisting of 3 hours of therapy/day & 24 hour RN/daily PMR physician for comorbid medical management. Will continue to follow for ongoing rehab needs and recommendations. Patient will be able to tolerate 3 hours a day.    Continue bedside therapy as well as OOB throughout the day with mobilization throughout the day with staff to maintain cardiopulmonary function and prevention of secondary complications related to debility.

## 2019-04-24 NOTE — PROGRESS NOTE ADULT - SUBJECTIVE AND OBJECTIVE BOX
Interval/Overnight: Pt noted w a- fib RvR overnight. Was evaluated by nocturnist, administered IV Lopressor and Amiodarone bolus, rate was controlled for a few hours. Upon my arrival on R approx 8:15 pt noted w rate in 160's. Administered Cardizem 10 mg IVP with good effect for one hour, by that time cardiology NP was rounding on floor and called cardiologist to reassess. Additional dose Metoprolol IVP administered and started on po Lopressor.     Vital Signs Last 24 Hrs  T(C): 36.8 (2019 08:01), Max: 37 (2019 05:00)  T(F): 98.3 (2019 08:01), Max: 98.6 (2019 05:00)  HR: 98 (2019 08:13) (68 - 160)  BP: 107/77 (2019 08:00) (107/77 - 156/89)  BP(mean): 87 (2019 08:00) (87 - 104)  RR: 17 (2019 08:00) (17 - 23)  SpO2: 94% (2019 08:13) (92% - 98%)I&O's Summary    2019 07:  -  2019 07:00  --------------------------------------------------------  IN: 0 mL / OUT: 200 mL / NET: -200 mL    2019 07:  -  2019 10:52  --------------------------------------------------------  IN: 0 mL / OUT: 0 mL / NET: 0 mL    CAPILLARY BLOOD GLUCOSE    PHYSICAL EXAM:  GENERAL: NAD, sitting up inchair, conversing pleasantly w some difficulty word finding  HEENT: NC/AT  NECK: Supple, No JVD   CHEST/LUNG: CTA bilaterally; Normal effort  HEART: S1S2 Normal intensity, no murmurs, gallops or rubs noted  ABDOMEN: Soft, BS Normoactive, NT, ND, no HSM noted  EXTREMITIES:  no sig edema  SKIN: No rashes or lesions noted  NEURO: A&Ox3, RUE weakness and pronator drift, equal strength b/l LE  PSYCH: normal mood and affect; insight/judgement appropriate    LABS:                        13.4   8.1   )-----------( 238      ( 2019 10:36 )             41.4             Urinalysis Basic - ( 2019 00:32 )    Color: Yellow / Appearance: Clear / S.015 / pH: x  Gluc: x / Ketone: Negative  / Bili: Negative / Urobili: Negative mg/dL   Blood: x / Protein: 15 mg/dL / Nitrite: Negative   Leuk Esterase: Negative / RBC: 0-2 /HPF / WBC Negative   Sq Epi: x / Non Sq Epi: Occasional / Bacteria: x      RADIOLOGY & ADDITIONAL TESTS:  MR head 19  IMPRESSION: Scattered small acute/subacute infarcts with associated   cytotoxic edema within the bilateral cerebral hemispheres in different   vascular distributions. Embolic phenomenon should be considered.    No acute intracranial hemorrhage.    Nonspecific bilateral mastoid air cell opacification. Correlate   clinically for the possibility of bilateral mastoiditis.                MEDICATIONS:  MEDICATIONS  (STANDING):  amLODIPine   Tablet 5 milliGRAM(s) Oral daily  aspirin enteric coated 325 milliGRAM(s) Oral daily  atorvastatin 40 milliGRAM(s) Oral at bedtime  buDESOnide  80 MICROgram(s)/formoterol 4.5 MICROgram(s) Inhaler 2 Puff(s) Inhalation two times a day  metoprolol tartrate 25 milliGRAM(s) Oral every 8 hours  oxybutynin XL 10 milliGRAM(s) Oral daily  pantoprazole    Tablet 40 milliGRAM(s) Oral at bedtime  sertraline 50 milliGRAM(s) Oral daily  tamsulosin 0.4 milliGRAM(s) Oral at bedtime    MEDICATIONS  (PRN):

## 2019-04-24 NOTE — PHYSICAL THERAPY INITIAL EVALUATION ADULT - DIAGNOSIS, PT EVAL
Decreased Functional Mobility / s/p CVA / R sided weakness Decreased Functional Mobility / s/p neuro event/ R sided weakness

## 2019-04-24 NOTE — PROGRESS NOTE ADULT - SUBJECTIVE AND OBJECTIVE BOX
INTERVAL HISTORY:  pt had PAFwith RVR yesterday      VITAL SIGNS:  Vital Signs Last 24 Hrs  T(C): 36.8 (24 Apr 2019 08:01), Max: 37 (24 Apr 2019 05:00)  T(F): 98.3 (24 Apr 2019 08:01), Max: 98.6 (24 Apr 2019 05:00)  HR: 98 (24 Apr 2019 08:13) (68 - 160)  BP: 107/77 (24 Apr 2019 08:00) (107/77 - 156/89)  BP(mean): 87 (24 Apr 2019 08:00) (87 - 104)  RR: 17 (24 Apr 2019 08:00) (17 - 23)  SpO2: 94% (24 Apr 2019 08:13) (92% - 98%)    PHYSICAL EXAMINATION:    Mentation:  nl  Language/Speech: mild dysarthria  CN: right facial weakness  Visual Fields: full  Motor: 3+/5 RUE with dec dexterity - unchanged  Sensory:  DTR:  Babinski:      MEDS:  MEDICATIONS  (STANDING):  amLODIPine   Tablet 5 milliGRAM(s) Oral daily  aspirin enteric coated 325 milliGRAM(s) Oral daily  buDESOnide  80 MICROgram(s)/formoterol 4.5 MICROgram(s) Inhaler 2 Puff(s) Inhalation two times a day  diltiazem Injectable 10 milliGRAM(s) IV Push once  oxybutynin XL 10 milliGRAM(s) Oral daily  pantoprazole    Tablet 40 milliGRAM(s) Oral at bedtime  sertraline 50 milliGRAM(s) Oral daily  sodium chloride 0.9% Bolus 500 milliLiter(s) IV Bolus once  tamsulosin 0.4 milliGRAM(s) Oral at bedtime    MEDICATIONS  (PRN):      LABS:                          13.6   7.6   )-----------( 218      ( 22 Apr 2019 10:44 )             42.3     04-22    141  |  108<H>  |  18.0  ----------------------------<  117<H>  4.6   |  21.0<L>  |  1.29    Ca    9.3      22 Apr 2019 10:44    TPro  7.1  /  Alb  3.8  /  TBili  0.3<L>  /  DBili  x   /  AST  13  /  ALT  13  /  AlkPhos  60  04-22    LIVER FUNCTIONS - ( 22 Apr 2019 10:44 )  Alb: 3.8 g/dL / Pro: 7.1 g/dL / ALK PHOS: 60 U/L / ALT: 13 U/L / AST: 13 U/L / GGT: x               RADIOLOGY & ADDITIONAL STUDIES:      IMPRESSION & PLAN:    Multiple acute strokes  afib with RVR  h/o PE  all while on warfarin.\    Hematology consult noted  For UMER today  No further neurodiagnostic testing anticipated.   Await input from other consultants regarding "blood thinners"

## 2019-04-24 NOTE — OCCUPATIONAL THERAPY INITIAL EVALUATION ADULT - ADDITIONAL COMMENTS
Pt lives in house with 5 ITALO and 6 steps inside up to bedroom and bathroom. Bathroom has bathtub with curtains. Pt owns shower chair, RW, cane, commode. Pt is right handed. Pt drives.

## 2019-04-24 NOTE — PROGRESS NOTE ADULT - SUBJECTIVE AND OBJECTIVE BOX
CC:  Patient is a 84y old  Male who presents with a chief complaint of I have a stroke (2019 10:52)      HPI:  84 year old male with PMH HTN, Dyslipidemia, CAD s/p PCI, COPD, Seizure disorder and PE s/p IVCF, currently on Coumadin presented to [facility] with right hand weakness.  As per patient he woke up yesterday morning and noted having difficulty holding the urinal. He also noticed some slurring but denies any dizziness, numbness, new visual complaints, aphasia, headache. He states he has has a stroke in the past but unable to recall time and deficits.  He was admitted to OBS unit yesterday and had his stroke work up - MRI showed multiple small embolic infarcts. (2019 13:08)    ROS: All review of systems negative unless indicated otherwise below.     Lab Results:  CBC  CBC Full  -  ( 2019 10:36 )  WBC Count : 8.1 K/uL  RBC Count : 5.00 M/uL  Hemoglobin : 13.4 g/dL  Hematocrit : 41.4 %  Platelet Count - Automated : 238 K/uL  Mean Cell Volume : 82.8 fl  Mean Cell Hemoglobin : 26.8 pg  Mean Cell Hemoglobin Concentration : 32.4 g/dL  Auto Neutrophil # : x  Auto Lymphocyte # : x  Auto Monocyte # : x  Auto Eosinophil # : x  Auto Basophil # : x  Auto Neutrophil % : x  Auto Lymphocyte % : x  Auto Monocyte % : x  Auto Eosinophil % : x  Auto Basophil % : x    .		Differential:	[] Automated		[] Manual  Chemistry                        13.4   8.1   )-----------( 238      ( 2019 10:36 )             41.4     04-24    140  |  107  |  16.0  ----------------------------<  128<H>  3.8   |  19.0<L>  |  1.31<H>    Ca    9.5      2019 10:36  Phos  2.2     04-24  Mg     1.9     04-24    TPro  7.1  /  Alb  3.7  /  TBili  0.6  /  DBili  x   /  AST  14  /  ALT  11  /  AlkPhos  57  04-24    LIVER FUNCTIONS - ( 2019 10:36 )  Alb: 3.7 g/dL / Pro: 7.1 g/dL / ALK PHOS: 57 U/L / ALT: 11 U/L / AST: 14 U/L / GGT: x           PT/INR - ( 2019 10:36 )   PT: 22.9 sec;   INR: 1.95 ratio      Urinalysis Basic - ( 2019 00:32 )    Color: Yellow / Appearance: Clear / S.015 / pH: x  Gluc: x / Ketone: Negative  / Bili: Negative / Urobili: Negative mg/dL   Blood: x / Protein: 15 mg/dL / Nitrite: Negative   Leuk Esterase: Negative / RBC: 0-2 /HPF / WBC Negative   Sq Epi: x / Non Sq Epi: Occasional / Bacteria: x    CARDIAC MARKERS ( 2019 00:31 )  x     / <0.01 ng/mL / x     / x     / x          MEDICATIONS  (STANDING):  amLODIPine   Tablet 5 milliGRAM(s) Oral daily  aspirin enteric coated 325 milliGRAM(s) Oral daily  atorvastatin 40 milliGRAM(s) Oral at bedtime  buDESOnide  80 MICROgram(s)/formoterol 4.5 MICROgram(s) Inhaler 2 Puff(s) Inhalation two times a day  metoprolol tartrate 25 milliGRAM(s) Oral every 8 hours  oxybutynin XL 10 milliGRAM(s) Oral daily  pantoprazole    Tablet 40 milliGRAM(s) Oral at bedtime  sertraline 50 milliGRAM(s) Oral daily  tamsulosin 0.4 milliGRAM(s) Oral at bedtime    PHYSICAL EXAM:  Vital Signs Last 24 Hrs  T(C): 36.8 (2019 08:01), Max: 37 (2019 05:00)  T(F): 98.3 (2019 08:01), Max: 98.6 (2019 05:00)  HR: 98 (2019 08:13) (84 - 160)  BP: 107/77 (2019 08:00) (107/77 - 156/89)  BP(mean): 87 (2019 08:00) (87 - 104)  RR: 17 (2019 08:00) (17 - 23)  SpO2: 94% (2019 08:13) (92% - 98%)    GENERAL: NAD, well-groomed, well-developed  HEAD:  Atraumatic, Normocephalic  NECK: Supple, No JVD  NERVOUS SYSTEM:  Alert & Oriented X3, Good concentration; Motor Strength 4/5 right side, 5/5 left side  CHEST/LUNG: Clear to auscultation bilaterally, No rales, rhonchi, wheezing, or rubs  HEART: Regular rate and rhythm; s1 and s2 auscultated, No murmurs, rubs, or gallops  ABDOMEN: Soft, Nontender, Nondistended; Bowel sounds present and normoactive.   EXTREMITIES:  2+ Peripheral Pulses, No clubbing, cyanosis, or edema  SKIN: No rashes or lesions

## 2019-04-24 NOTE — CHART NOTE - NSCHARTNOTEFT_GEN_A_CORE
Pt with Afib RVR into 150’s overnight with PVC and PAC on monitor, metoprolol 5mg IVP x 1 given with improvement of HR into 90’s until approx. 4:30AM. BP labile, 112/80’s and HR in 150’s, amiodarone bolus ordered x 1 as pt NPO for UMER this AM.

## 2019-04-24 NOTE — OCCUPATIONAL THERAPY INITIAL EVALUATION ADULT - PLANNED THERAPY INTERVENTIONS, OT EVAL
toilet/motor coordination training/neuromuscular re-education/strengthening/ADL retraining/balance training/bed mobility training/fine motor coordination training/transfer training

## 2019-04-24 NOTE — PROGRESS NOTE ADULT - PROBLEM SELECTOR PLAN 1
- Tele, AFib with -160s  - EKG, repeat SR at 82bpm, APCs, LAD, incomplete LBBB  - Lopressor 5mg IVP x1  - Metoprolol Tartrate 25mg PO q8hrs  - UMER planned for this afternoon  - Thyroid panel

## 2019-04-24 NOTE — PROGRESS NOTE ADULT - ASSESSMENT
A/P:  Overnight patient had AFib with RVR.  HR ranged from , with most of the time lingering in the 150s-160s.  Patient asymptomatic. HR did not respond to multiple doses of IV cardizem, Lopressor, and Amiodarone.  This morning patient HR remained 150-160s with brief drops to the 80s lasting only several seconds.  Patient c/t deny any palpitations, SOB, or CP.  During exam noticed increased RUE weakness, RUE drift, and a slight expressive aphasia.

## 2019-04-25 ENCOUNTER — TRANSCRIPTION ENCOUNTER (OUTPATIENT)
Age: 84
End: 2019-04-25

## 2019-04-25 DIAGNOSIS — I35.9 NONRHEUMATIC AORTIC VALVE DISORDER, UNSPECIFIED: ICD-10-CM

## 2019-04-25 LAB
ALBUMIN SERPL ELPH-MCNC: 3.7 G/DL — SIGNIFICANT CHANGE UP (ref 3.3–5.2)
ALP SERPL-CCNC: 56 U/L — SIGNIFICANT CHANGE UP (ref 40–120)
ALT FLD-CCNC: 11 U/L — SIGNIFICANT CHANGE UP
ANION GAP SERPL CALC-SCNC: 12 MMOL/L — SIGNIFICANT CHANGE UP (ref 5–17)
APPEARANCE UR: CLEAR — SIGNIFICANT CHANGE UP
APTT BLD: 32.9 SEC — SIGNIFICANT CHANGE UP (ref 27.5–36.3)
AST SERPL-CCNC: 18 U/L — SIGNIFICANT CHANGE UP
BACTERIA # UR AUTO: ABNORMAL
BILIRUB SERPL-MCNC: 0.6 MG/DL — SIGNIFICANT CHANGE UP (ref 0.4–2)
BILIRUB UR-MCNC: NEGATIVE — SIGNIFICANT CHANGE UP
BUN SERPL-MCNC: 15 MG/DL — SIGNIFICANT CHANGE UP (ref 8–20)
CALCIUM SERPL-MCNC: 8.9 MG/DL — SIGNIFICANT CHANGE UP (ref 8.6–10.2)
CHLORIDE SERPL-SCNC: 105 MMOL/L — SIGNIFICANT CHANGE UP (ref 98–107)
CO2 SERPL-SCNC: 20 MMOL/L — LOW (ref 22–29)
COLOR SPEC: YELLOW — SIGNIFICANT CHANGE UP
CREAT SERPL-MCNC: 1.07 MG/DL — SIGNIFICANT CHANGE UP (ref 0.5–1.3)
DIFF PNL FLD: ABNORMAL
EPI CELLS # UR: SIGNIFICANT CHANGE UP
GLUCOSE SERPL-MCNC: 106 MG/DL — SIGNIFICANT CHANGE UP (ref 70–115)
GLUCOSE UR QL: NEGATIVE MG/DL — SIGNIFICANT CHANGE UP
HCT VFR BLD CALC: 42.2 % — SIGNIFICANT CHANGE UP (ref 42–52)
HGB BLD-MCNC: 13.5 G/DL — LOW (ref 14–18)
INR BLD: 1.91 RATIO — HIGH (ref 0.88–1.16)
KETONES UR-MCNC: NEGATIVE — SIGNIFICANT CHANGE UP
LEUKOCYTE ESTERASE UR-ACNC: NEGATIVE — SIGNIFICANT CHANGE UP
MAGNESIUM SERPL-MCNC: 2 MG/DL — SIGNIFICANT CHANGE UP (ref 1.6–2.6)
MCHC RBC-ENTMCNC: 27.2 PG — SIGNIFICANT CHANGE UP (ref 27–31)
MCHC RBC-ENTMCNC: 32 G/DL — SIGNIFICANT CHANGE UP (ref 32–36)
MCV RBC AUTO: 84.9 FL — SIGNIFICANT CHANGE UP (ref 80–94)
MRSA PCR RESULT.: SIGNIFICANT CHANGE UP
NITRITE UR-MCNC: NEGATIVE — SIGNIFICANT CHANGE UP
PH UR: 5 — SIGNIFICANT CHANGE UP (ref 5–8)
PHOSPHATE SERPL-MCNC: 3 MG/DL — SIGNIFICANT CHANGE UP (ref 2.4–4.7)
PLATELET # BLD AUTO: 206 K/UL — SIGNIFICANT CHANGE UP (ref 150–400)
POTASSIUM SERPL-MCNC: 4.4 MMOL/L — SIGNIFICANT CHANGE UP (ref 3.5–5.3)
POTASSIUM SERPL-SCNC: 4.4 MMOL/L — SIGNIFICANT CHANGE UP (ref 3.5–5.3)
PROT SERPL-MCNC: 7.1 G/DL — SIGNIFICANT CHANGE UP (ref 6.6–8.7)
PROT UR-MCNC: 30 MG/DL
PROTHROM AB SERPL-ACNC: 22.4 SEC — HIGH (ref 10–12.9)
RBC # BLD: 4.97 M/UL — SIGNIFICANT CHANGE UP (ref 4.6–6.2)
RBC # FLD: 14.1 % — SIGNIFICANT CHANGE UP (ref 11–15.6)
RBC CASTS # UR COMP ASSIST: SIGNIFICANT CHANGE UP /HPF (ref 0–4)
S AUREUS DNA NOSE QL NAA+PROBE: DETECTED
SODIUM SERPL-SCNC: 137 MMOL/L — SIGNIFICANT CHANGE UP (ref 135–145)
SP GR SPEC: 1.02 — SIGNIFICANT CHANGE UP (ref 1.01–1.02)
UROBILINOGEN FLD QL: NEGATIVE MG/DL — SIGNIFICANT CHANGE UP
WBC # BLD: 8.5 K/UL — SIGNIFICANT CHANGE UP (ref 4.8–10.8)
WBC # FLD AUTO: 8.5 K/UL — SIGNIFICANT CHANGE UP (ref 4.8–10.8)
WBC UR QL: SIGNIFICANT CHANGE UP

## 2019-04-25 PROCEDURE — 99222 1ST HOSP IP/OBS MODERATE 55: CPT

## 2019-04-25 PROCEDURE — 99233 SBSQ HOSP IP/OBS HIGH 50: CPT

## 2019-04-25 PROCEDURE — 99232 SBSQ HOSP IP/OBS MODERATE 35: CPT

## 2019-04-25 RX ORDER — MUPIROCIN 20 MG/G
1 OINTMENT TOPICAL
Qty: 0 | Refills: 0 | Status: DISCONTINUED | OUTPATIENT
Start: 2019-04-25 | End: 2019-04-26

## 2019-04-25 RX ORDER — METOPROLOL TARTRATE 50 MG
100 TABLET ORAL DAILY
Qty: 0 | Refills: 0 | Status: DISCONTINUED | OUTPATIENT
Start: 2019-04-25 | End: 2019-04-26

## 2019-04-25 RX ORDER — WARFARIN SODIUM 2.5 MG/1
5 TABLET ORAL ONCE
Qty: 0 | Refills: 0 | Status: COMPLETED | OUTPATIENT
Start: 2019-04-25 | End: 2019-04-25

## 2019-04-25 RX ORDER — ASPIRIN/CALCIUM CARB/MAGNESIUM 324 MG
81 TABLET ORAL DAILY
Qty: 0 | Refills: 0 | Status: DISCONTINUED | OUTPATIENT
Start: 2019-04-25 | End: 2019-04-26

## 2019-04-25 RX ORDER — APIXABAN 2.5 MG/1
5 TABLET, FILM COATED ORAL EVERY 12 HOURS
Qty: 0 | Refills: 0 | Status: DISCONTINUED | OUTPATIENT
Start: 2019-04-25 | End: 2019-04-25

## 2019-04-25 RX ADMIN — AMLODIPINE BESYLATE 5 MILLIGRAM(S): 2.5 TABLET ORAL at 05:17

## 2019-04-25 RX ADMIN — WARFARIN SODIUM 5 MILLIGRAM(S): 2.5 TABLET ORAL at 21:51

## 2019-04-25 RX ADMIN — Medication 10 MILLIGRAM(S): at 13:04

## 2019-04-25 RX ADMIN — Medication 100 MILLIGRAM(S): at 13:04

## 2019-04-25 RX ADMIN — Medication 25 MILLIGRAM(S): at 03:07

## 2019-04-25 RX ADMIN — PANTOPRAZOLE SODIUM 40 MILLIGRAM(S): 20 TABLET, DELAYED RELEASE ORAL at 21:51

## 2019-04-25 RX ADMIN — SERTRALINE 50 MILLIGRAM(S): 25 TABLET, FILM COATED ORAL at 13:04

## 2019-04-25 RX ADMIN — TAMSULOSIN HYDROCHLORIDE 0.4 MILLIGRAM(S): 0.4 CAPSULE ORAL at 21:51

## 2019-04-25 RX ADMIN — ATORVASTATIN CALCIUM 40 MILLIGRAM(S): 80 TABLET, FILM COATED ORAL at 21:51

## 2019-04-25 RX ADMIN — Medication 81 MILLIGRAM(S): at 13:04

## 2019-04-25 RX ADMIN — MUPIROCIN 1 APPLICATION(S): 20 OINTMENT TOPICAL at 21:51

## 2019-04-25 RX ADMIN — Medication 25 MILLIGRAM(S): at 08:38

## 2019-04-25 NOTE — CONSULT NOTE ADULT - SUBJECTIVE AND OBJECTIVE BOX
History of Present Illness:  HPI:  84 year old male with PMH HTN, Dyslipidemia, CAD s/p PCI, COPD, Seizure disorder and PE s/p IVCF, currently on Coumadin presented to [facility] with right hand weakness.  As per patient he woke up yesterday morning and noted having difficulty holding the urinal. He also noticed some slurring but denies any dizziness, numbness, new visual complaints, aphasia, headache. He states he has has a stroke in the past but unable to recall time and deficits.  He was admitted to OBS unit yesterday and had his stroke work up - MRI showed multiple small embolic infarcts. (23 Apr 2019 13:08)      Current Subjective Assessment:    Past Medical History  Cerebrovascular accident (CVA)  History of COPD  CAD S/P percutaneous coronary angioplasty  Atrial fibrillation, unspecified type  CAD (coronary artery disease)  [place] filter in place  PE (pulmonary embolism)  Hyperlipemia  Hypertension      Past Surgical History  S/P cataract surgery  S/P IVC filter  No significant past surgical history      MEDICATIONS  (STANDING):  amLODIPine   Tablet 5 milliGRAM(s) Oral daily  aspirin  chewable 81 milliGRAM(s) Oral daily  atorvastatin 40 milliGRAM(s) Oral at bedtime  buDESOnide  80 MICROgram(s)/formoterol 4.5 MICROgram(s) Inhaler 2 Puff(s) Inhalation two times a day  metoprolol succinate  milliGRAM(s) Oral daily  oxybutynin XL 10 milliGRAM(s) Oral daily  pantoprazole    Tablet 40 milliGRAM(s) Oral at bedtime  sertraline 50 milliGRAM(s) Oral daily  tamsulosin 0.4 milliGRAM(s) Oral at bedtime  warfarin 5 milliGRAM(s) Oral once    MEDICATIONS  (PRN):    Antiplatelet therapy:                           Last dose/amt:    Allergies: No Known Allergies      SOCIAL HISTORY:  Smoker: [ ] Yes  [x] No        PACK YEARS:                         WHEN QUIT?  ETOH use: [ ] Yes  [x ] No              FREQUENCY / QUANTITY:  Ilicit Drug use:  [ ] Yes  [x ] No  Occupation: none  Live with: alone  Assist device use: none    PMD: Tien Murillo  Referring Cardiologist: Keyon    Relevant Family History  FAMILY HISTORY:  Family history of diabetes mellitus (Child)      Review of Systems  GENERAL:  Fevers[] chills[] sweats[] fatigue[] weight loss[] weight gain []                                      [ x] NEGATIVE  NEURO:  parathesias[] seizures []  syncope []  confusion []                                                                [x ] NEGATIVE                 EYES: glasses[]  blurry vision[]  discharge[] pain[] glaucoma []                                                           [x] NEGATIVE                 ENMT:  difficulty hearing []  vertigo[]  dysphagia[] epistaxis[] recent dental work []                     [x ] NEGATIVE                 CV:  chest pain[] palpitations[] CASTILLO [x] diaphoresis [] edema[]                                                           [ ] NEGATIVE                                 RESPIRATORY:  wheezing[x] SOB[x] cough [] sputum[] hemoptysis[]                                                   [ ] NEGATIVE               GI:  nausea[occ]  vomiting []  diarrhea[] constipation [] melena []                                                          [ ] NEGATIVE            : hematuria[ ]  dysuria[ ] urgency[] incontinence[]                                                                          [x ] NEGATIVE                    MUSKULOSKELETAL:  arthritis[ ]  joint swelling [ ] muscle weakness [ ]                                            [x ] NEGATIVE                     SKIN/BREAST:  rash[ ] itching [ ]  hair loss[ ] masses[ ]                                                                          [ x] NEGATIVE                     PSYCH:  dementia [ ] depression [ ] anxiety[ ]                                                                                          [ x] NEGATIVE                        HEME/LYMPH:  bruises easily[ ] enlarged lymph nodes[ ] tender lymph nodes[ ]                           [x ] NEGATIVE                      ENDOCRINE:  cold intolerance[ ] heat intolerance[ ] polydipsia[ ]                                                      [x ] NEGATIVE                        Telemetry: AF 70s    PHYSICAL EXAM  Vital Signs Last 24 Hrs  T(C): 36.6 (25 Apr 2019 08:00), Max: 36.9 (24 Apr 2019 16:00)  T(F): 97.8 (25 Apr 2019 08:00), Max: 98.5 (24 Apr 2019 16:00)  HR: 83 (25 Apr 2019 12:25) (71 - 98)  BP: 116/90 (25 Apr 2019 12:25) (114/95 - 152/80)  BP(mean): 99 (25 Apr 2019 12:25) (89 - 101)  RR: 18 (25 Apr 2019 12:25) (16 - 20)  SpO2: 93% (25 Apr 2019 12:25) (93% - 98%)    General: Obese, well developed, no acute distress.                                                         Neuro: Normal exam oriented to person/place & time with no focal motor or sensory  deficits.                    Eyes: Normal exam of conjunctiva & lids, pupils small but equally reactive.   ENT: Normal exam of nasal/oral mucosa with absence of cyanosis. lower teeth missing, front permanent retainer on top.  Neck: Normal exam of jugular veins, trachea & thyroid.   Chest: Normal lung exam with good air movement absence of wheezes, rales, or rhonchi:                                                                          CV:  Auscultation: normal [x ] S3[ ] S4[ ] Irregular [ ] Rub[ ] Clicks[ ]  Murmurs none:[x ]systolic [ ]  diastolic [ ] holosystolic [ ]  Carotids: No Bruits[x ] Other____________ Abdominal Aorta: normal [ ] nonpalpable[ ]                                                                         GI: Normal exam of abdomen, liver & spleen with no noted masses or tenderness.                                                                                              Extremities: Normal no evidence of cyanosis or deformity Edema: none[ x]trace[ ]1+[ ]2+[ ]3+[ ]4+[ ]  Lower Extremity Pulses: Right[+ ] Left[+ ]Varicosities[ ]  SKIN : Normal exam to inspection & palpation.                                                           LABS:                        13.5   8.5   )-----------( 206      ( 25 Apr 2019 05:21 )             42.2     04-25    137  |  105  |  15.0  ----------------------------<  106  4.4   |  20.0<L>  |  1.07    Ca    8.9      25 Apr 2019 05:21  Phos  3.0     04-25  Mg     2.0     04-25    TPro  7.1  /  Alb  3.7  /  TBili  0.6  /  DBili  x   /  AST  18  /  ALT  11  /  AlkPhos  56  04-25    PT/INR - ( 25 Apr 2019 05:21 )   PT: 22.4 sec;   INR: 1.91 ratio         PTT - ( 25 Apr 2019 05:21 )  PTT:32.9 sec        TTE / UMER: < from: UMER Echo Doppler (04.24.19 @ 14:37) >      Summary:   1. Technically good study.   2. Normal global left ventricular systolic function.   3. Left ventricular ejection fraction, by visual estimation, is 50 to   55%.   4. Spectral Doppler shows impaired relaxation pattern of left   ventricular myocardial filling (Grade I diastolic dysfunction).   5. Normal right ventricular size and function.   6. Color flow doppler and intravenous injection of agitated saline   demonstrates the presence of anintact intra atrial septum.   7. Thickening of the anterior and posterior mitral valve leaflets.   8. Trileaflet aortic jason. There is linear and highly mobile echo   density attached to the right coronary cusp of the aortic valve,   measuring 1.7 cm. This structure moves in and out of the LVOT.   9. Mild aortic regurgitation.  10. Peak transaortic gradient equals 17.5 mmHg, mean transaortic gradient   equals 8.9 mmHg, the calculated aortic valve area equals 1.35 cm² by the   continuity equation consistent with moderate aortic stenosis.  11. The is complex atheroma in the descending aorta and simple atheroma   in the aortic root and ascending aorta.  12. No left atrial appendage thrombus.  13. Trivial pericardial effusion.    MD Brunilda Electronically signed on 4/24/2019 at 3:44:23 PM    < end of copied text > History of Present Illness:  HPI:  84 year old male with PMH HTN, Dyslipidemia, CAD s/p PCI, COPD, Seizure disorder and PE s/p IVCF, currently on Coumadin presented to [facility] with right hand weakness.  As per patient he woke up yesterday morning and noted having difficulty holding the urinal. He also noticed some slurring but denies any dizziness, numbness, new visual complaints, aphasia, headache. He states he has has a stroke in the past but unable to recall time and deficits.  He was admitted to OBS unit yesterday and had his stroke work up - MRI showed multiple small embolic infarcts. (23 Apr 2019 13:08)      Current Subjective Assessment: 84 M with no acute sob or distress complains of residual right hand weakness. Patient states he is unable to hold a fork or feed himself but that he feels it is a little better today than yesterday. Patient reports CVA in the past with slurred speech but this time he woke up in the morning of 4/22 with right hand weakness and SOB and his friend called EMS. Patient reports recently having more dyspnea on exertion, moderate, not at rest, at times relieved with inhalers, at times with wheezing, but without chest pain.     Past Medical History  Cerebrovascular accident (CVA)  History of COPD  CAD S/P percutaneous coronary angioplasty  Atrial fibrillation, unspecified type  CAD (coronary artery disease)  [place] filter in place  PE (pulmonary embolism)  Hyperlipemia  Hypertension      Past Surgical History  S/P cataract surgery  S/P IVC filter  No significant past surgical history      MEDICATIONS  (STANDING):  amLODIPine   Tablet 5 milliGRAM(s) Oral daily  aspirin  chewable 81 milliGRAM(s) Oral daily  atorvastatin 40 milliGRAM(s) Oral at bedtime  buDESOnide  80 MICROgram(s)/formoterol 4.5 MICROgram(s) Inhaler 2 Puff(s) Inhalation two times a day  metoprolol succinate  milliGRAM(s) Oral daily  oxybutynin XL 10 milliGRAM(s) Oral daily  pantoprazole    Tablet 40 milliGRAM(s) Oral at bedtime  sertraline 50 milliGRAM(s) Oral daily  tamsulosin 0.4 milliGRAM(s) Oral at bedtime  warfarin 5 milliGRAM(s) Oral once    MEDICATIONS  (PRN):      Allergies: No Known Allergies      SOCIAL HISTORY:  Smoker: [ ] Yes  [x] No        PACK YEARS:                         WHEN QUIT?  ETOH use: [ ] Yes  [x ] No              FREQUENCY / QUANTITY:  Ilicit Drug use:  [ ] Yes  [x ] No  Occupation: none  Live with: alone  Assist device use: none    PMD: Tien Murillo  Referring Cardiologist: Keyon    Relevant Family History  FAMILY HISTORY:  Family history of diabetes mellitus (Child)      Review of Systems  GENERAL:  Fevers[] chills[] sweats[] fatigue[] weight loss[] weight gain []                                      [ x] NEGATIVE  NEURO:  parathesias[] seizures []  syncope []  confusion []                                                                [x ] NEGATIVE                 EYES: glasses[]  blurry vision[]  discharge[] pain[] glaucoma []                                                           [x] NEGATIVE                 ENMT:  difficulty hearing []  vertigo[]  dysphagia[] epistaxis[] recent dental work []                     [x ] NEGATIVE                 CV:  chest pain[] palpitations[] CASTILLO [x] diaphoresis [] edema[]                                                           [ ] NEGATIVE                                 RESPIRATORY:  wheezing[x] SOB[x] cough [] sputum[] hemoptysis[]                                                   [ ] NEGATIVE               GI:  nausea[occ]  vomiting []  diarrhea[] constipation [] melena []                                                          [ ] NEGATIVE            : hematuria[ ]  dysuria[ ] urgency[] incontinence[]                                                                          [x ] NEGATIVE                    MUSKULOSKELETAL:  arthritis[ ]  joint swelling [ ] muscle weakness [ ]                                            [x ] NEGATIVE                     SKIN/BREAST:  rash[ ] itching [ ]  hair loss[ ] masses[ ]                                                                          [ x] NEGATIVE                     PSYCH:  dementia [ ] depression [ ] anxiety[ ]                                                                                          [ x] NEGATIVE                        HEME/LYMPH:  bruises easily[ ] enlarged lymph nodes[ ] tender lymph nodes[ ]                           [x ] NEGATIVE                      ENDOCRINE:  cold intolerance[ ] heat intolerance[ ] polydipsia[ ]                                                      [x ] NEGATIVE                        Telemetry: AF 70s    PHYSICAL EXAM  Vital Signs Last 24 Hrs  T(C): 36.6 (25 Apr 2019 08:00), Max: 36.9 (24 Apr 2019 16:00)  T(F): 97.8 (25 Apr 2019 08:00), Max: 98.5 (24 Apr 2019 16:00)  HR: 83 (25 Apr 2019 12:25) (71 - 98)  BP: 116/90 (25 Apr 2019 12:25) (114/95 - 152/80)  BP(mean): 99 (25 Apr 2019 12:25) (89 - 101)  RR: 18 (25 Apr 2019 12:25) (16 - 20)  SpO2: 93% (25 Apr 2019 12:25) (93% - 98%)    General: Obese, well developed, no acute distress.                                                         Neuro: Normal exam oriented to person/place & time with no focal motor or sensory  deficits.                    Eyes: Normal exam of conjunctiva & lids, pupils small but equally reactive.   ENT: Normal exam of nasal/oral mucosa with absence of cyanosis. lower teeth missing, front permanent retainer on top.  Neck: Normal exam of jugular veins, trachea & thyroid.   Chest: Normal lung exam with good air movement absence of wheezes, rales, or rhonchi:                                                                          CV:  Auscultation: normal [x ] S3[ ] S4[ ] Irregular [ ] Rub[ ] Clicks[ ]  Murmurs none:[x ]systolic [ ]  diastolic [ ] holosystolic [ ]  Carotids: No Bruits[x ] Other____________ Abdominal Aorta: normal [ ] nonpalpable[ ]                                                                         GI: Normal exam of abdomen, liver & spleen with no noted masses or tenderness.                                                                                              Extremities: Normal no evidence of cyanosis or deformity Edema: none[ x]trace[ ]1+[ ]2+[ ]3+[ ]4+[ ]  Lower Extremity Pulses: Right[+ ] Left[+ ]Varicosities[ ]  SKIN : Normal exam to inspection & palpation.                                                           LABS:                        13.5   8.5   )-----------( 206      ( 25 Apr 2019 05:21 )             42.2     04-25    137  |  105  |  15.0  ----------------------------<  106  4.4   |  20.0<L>  |  1.07    Ca    8.9      25 Apr 2019 05:21  Phos  3.0     04-25  Mg     2.0     04-25    TPro  7.1  /  Alb  3.7  /  TBili  0.6  /  DBili  x   /  AST  18  /  ALT  11  /  AlkPhos  56  04-25    PT/INR - ( 25 Apr 2019 05:21 )   PT: 22.4 sec;   INR: 1.91 ratio         PTT - ( 25 Apr 2019 05:21 )  PTT:32.9 sec        TTE / UMER: < from: UMER Echo Doppler (04.24.19 @ 14:37) >      Summary:   1. Technically good study.   2. Normal global left ventricular systolic function.   3. Left ventricular ejection fraction, by visual estimation, is 50 to   55%.   4. Spectral Doppler shows impaired relaxation pattern of left   ventricular myocardial filling (Grade I diastolic dysfunction).   5. Normal right ventricular size and function.   6. Color flow doppler and intravenous injection of agitated saline   demonstrates the presence of anintact intra atrial septum.   7. Thickening of the anterior and posterior mitral valve leaflets.   8. Trileaflet aortic jason. There is linear and highly mobile echo   density attached to the right coronary cusp of the aortic valve,   measuring 1.7 cm. This structure moves in and out of the LVOT.   9. Mild aortic regurgitation.  10. Peak transaortic gradient equals 17.5 mmHg, mean transaortic gradient   equals 8.9 mmHg, the calculated aortic valve area equals 1.35 cm² by the   continuity equation consistent with moderate aortic stenosis.  11. The is complex atheroma in the descending aorta and simple atheroma   in the aortic root and ascending aorta.  12. No left atrial appendage thrombus.  13. Trivial pericardial effusion.    MD Brunilda Electronically signed on 4/24/2019 at 3:44:23 PM    < end of copied text >

## 2019-04-25 NOTE — PROGRESS NOTE ADULT - SUBJECTIVE AND OBJECTIVE BOX
Patient in chair.   Feels well today.   Reports that his right hand and fingers are moving better.   Laughs at the idea of performing exercises.   Reeducated on motor planning for stroke recovery.  Patient is anxious about his recovery and prospects of returning home. Discussed that it will be possible after rehab.    Patient's UMER noted aortic plaques and growth on AV. CTS now consulted on further management.     Head CT (ind rev) - Stable bilateral embolic infarcts. Mild chronic microvascular changes without evidence of a new transcortical infarction or hemorrhage. MR is a more sensitive imaging modality for the evaluation of an acute infarction.         FUNCTIONAL PROGRESS  4/25  Bed Mobility  Bed Mobility Training Rolling/Turning: moderate assist (50% patient effort);  1 person assist;  nonverbal cues (demo/gestures);  verbal cues;  bed rails  Bed Mobility Training Supine-to-Sit: moderate assist (50% patient effort);  1 person assist;  nonverbal cues (demo/gestures);  verbal cues;  bed rails  Bed Mobility Training Limitations: decreased sensation;  decreased strength;  impaired coordination    Bed-Chair Transfer Training  Transfer Training Bed-to-Chair Transfer: minimum assist (75% patient effort);  1 person assist;  nonverbal cues (demo/gestures);  verbal cues;  weight-bearing as tolerated   rolling walker  Bed-to-Chair Transfer Training Transfer Safety Analysis: decreased strength;  impaired balance;  impaired coordination;  rolling walker    Sit-Stand Transfer Training  Transfer Training Sit-to-Stand Transfer: minimum assist (75% patient effort);  1 person assist;  nonverbal cues (demo/gestures);  verbal cues;  weight-bearing as tolerated   rolling walker  Transfer Training Stand-to-Sit Transfer: minimum assist (75% patient effort);  1 person assist;  nonverbal cues (demo/gestures);  verbal cues;  weight-bearing as tolerated   rolling walker  Sit-to-Stand Transfer Training Transfer Safety Analysis: decreased strength;  impaired balance;  impaired coordination;  rolling walker    Toilet Transfer Training  Transfer Training Toilet Transfer: minimum assist (75% patient effort);  1 person assist;  nonverbal cues (demo/gestures);  verbal cues;  weight-bearing as tolerated   rolling walker;  bedside commode  Toilet Transfer Training Transfer Safety Analysis: decreased strength;  impaired balance;  impaired coordination;  rolling walker;  bedside commode    Functional Endurance  Functional Endurance Detail: Functional mobility for short distances around bed area with minimum assistance and RW due to decreased strength, decreased balance and decreased coordination; cues for foot placement, positioning with relation to RW and RW management with environment/obstacle negotiation. Pt requires increased time and short distance due to decreased activity tolerance/endurance.     Lower Body Dressing Training  Lower Body Dressing Training Assistance: moderate assist (50% patient effort);  1 person assist;  nonverbal cues (demo/gestures);  verbal cues;  for socks in sitting via LE crossing;  decreased strength;  impaired balance;  decreased flexibility;  impaired coordination    Upper Body Dressing Training  Upper Body Dressing Training Assistance: minimum assist (75% patient effort);  1 person assist;  nonverbal cues (demo/gestures);  verbal cues;  for gown;  decreased strength;  impaired coordination;  impaired balance    Eating/Self-Feeding Training  Eating/Self-Feeding Training Assistance: supervsion;  set-up required;  impaired coordination      REVIEW OF SYSTEMS  Constitutional - No fever,  +fatigue  Neurological - No headaches, No memory loss, +loss of strength, +numbness, No tremors  Skin - No rashes, No lesions   Musculoskeletal - No joint pain, No joint swelling, No muscle pain  Psychiatric - No depression, +anxiety    VITALS  T(C): 36.4 (04-25-19 @ 03:26), Max: 36.9 (04-24-19 @ 16:00)  HR: 85 (04-25-19 @ 08:00) (71 - 98)  BP: 114/95 (04-25-19 @ 08:00) (114/95 - 152/80)  RR: 17 (04-25-19 @ 08:00) (16 - 20)  SpO2: 98% (04-25-19 @ 08:00) (95% - 98%)  Wt(kg): --    MEDICATIONS   amLODIPine   Tablet 5 milliGRAM(s) daily  aspirin enteric coated 325 milliGRAM(s) daily  atorvastatin 40 milliGRAM(s) at bedtime  buDESOnide  80 MICROgram(s)/formoterol 4.5 MICROgram(s) Inhaler 2 Puff(s) two times a day  metoprolol succinate  milliGRAM(s) daily  oxybutynin XL 10 milliGRAM(s) daily  pantoprazole    Tablet 40 milliGRAM(s) at bedtime  sertraline 50 milliGRAM(s) daily  tamsulosin 0.4 milliGRAM(s) at bedtime      RECENT LABS - Reviewed                        13.5   8.5   )-----------( 206      ( 25 Apr 2019 05:21 )             42.2     04-25    137  |  105  |  15.0  ----------------------------<  106  4.4   |  20.0<L>  |  1.07    Ca    8.9      25 Apr 2019 05:21  Phos  3.0     04-25  Mg     2.0     04-25    TPro  7.1  /  Alb  3.7  /  TBili  0.6  /  DBili  x   /  AST  18  /  ALT  11  /  AlkPhos  56  04-25    PT/INR - ( 25 Apr 2019 05:21 )   PT: 22.4 sec;   INR: 1.91 ratio         PTT - ( 25 Apr 2019 05:21 )  PTT:32.9 sec        ----------------------------------------------------------------------------------------  PHYSICAL EXAM  Constitutional - NAD, Comfortable  Extremities - No C/C/E, No calf tenderness   Neurologic Exam -                    Communication - Improved Expressive deficits, Dysarthria, Word finding delay     Cranial Nerves - Slight right lip droop more evident     Motor - Right UE (CVA) and Left LE (premorbid/orthopedic) weakness                    LEFT    UE - ShAB 5/5, EF 5/5, EE 5/5, WE 5/5,  5/5                    RIGHT UE - ShAB 5/5, EF 5/5, EE 5/5, WE 4/5,  3/5                    LEFT    LE - HF 2/5, KE 4/5, DF 5/5, PF 5/5                    RIGHT LE - HF 5/5, KE 5/5, DF 5/5, PF 5/5        Sensory - decreased to the right UE     Coordination - Impaired on the right UE  Psychiatric - Mood stable, Affect WNL  ----------------------------------------------------------------------------------------  ASSESSMENT/PLAN  84yMale with functional deficits after bilateral acute CVAs  Cardioembolic CVAs - ASA, Coumadin, UMER found mass, CTS contulted  AFIB - Coumadin, Cardizem, Toprol XL  Pulm - Budesonide  HTN - Norvasc  Mood - Zoloft  BPH - Flomax  PE - Coumadin  Pain - Tylenol  DVT PPX - SCDs  Rehab - Medically being optimized. Continue to recommend ACUTE inpatient rehabilitation for the functional deficits consisting of 3 hours of therapy/day & 24 hour RN/daily PMR physician for comorbid medical management. Will continue to follow for ongoing rehab needs and recommendations. Patient will be able to tolerate 3 hours a day.    Continue bedside therapy as well as OOB throughout the day with mobilization throughout the day with staff to maintain cardiopulmonary function and prevention of secondary complications related to debility.

## 2019-04-25 NOTE — PROGRESS NOTE ADULT - SUBJECTIVE AND OBJECTIVE BOX
SANDI SANTOS  84y  Male  [id number]      Patient is a 84y old  Male who presents with a chief complaint of I have a stroke (23 Apr 2019 13:08)    HPI:  84 year old male with PMH HTN, Dyslipidemia, CAD s/p PCI, COPD, Seizure disorder and PE s/p IVCF, currently on Coumadin for several years who presented to [facility] with right hand weakness since the day before admission AM,  As per patient he woke up yesterday morning and noted having difficulty holding the urinal. He also noticed some slurring but denies any dizziness, numbness, new visual complaints, aphasia, headache. He states he has had  a stroke in the past but unable to recall time and deficits.  He was admitted to OBS unit yesterday and had his stroke work up - MRI showed multiple small embolic infarcts. (23 Apr 2019 13:08)  Has an IVC filter as well.  No further DVT or PE  He was found to have factor V Leiden mutation about 3 years ago.  ASA, 81 mg a day added to Coumadin  Feeling better.  Is able to move RUE better and is a bit stronger  No bleeding.    PAST MEDICAL & SURGICAL HISTORY:  Cerebrovascular accident (CVA)  History of COPD  CAD S/P percutaneous coronary angioplasty  Atrial fibrillation, unspecified type  Hobbs filter in place  PE (pulmonary embolism)  Hyperlipemia  Hypertension    S/P cataract surgery  S/P IVC filter    FAMILY HISTORY:  Family history of diabetes mellitus (Child)    REVIEW OF SYSTEMS      General: See HPI  No anorexia or weight loss.  No bleeding  No GI Sx  No  Sx  No bone or joint/muscle  pain.	  No SOB  No CP  Skin/Breast: Neg.  Endocrine: Neg.  HEENT: Neg:	    PHYSICAL EXAM:      Constitutional:  Alert and oriented  No pallor  No icterus  No LNs  Lungs: Clear  Heart: RR  Systolic murmur  Abd: Nontender  No HSM or ascites  LE: No CCE or DVT  Neuro: Right hand weakness    CBC Full  -  ( 25 Apr 2019 05:21 )  WBC Count : 8.5 K/uL  RBC Count : 4.97 M/uL  Hemoglobin : 13.5 g/dL  Hematocrit : 42.2 %  Platelet Count - Automated : 206 K/uL  Mean Cell Volume : 84.9 fl  Mean Cell Hemoglobin : 27.2 pg  Mean Cell Hemoglobin Concentration : 32.0 g/dL  Auto Neutrophil # : x  Auto Lymphocyte # : x  Auto Monocyte # : x  Auto Eosinophil # : x  Auto Basophil # : x  Auto Neutrophil % : x  Auto Lymphocyte % : x  Auto Monocyte % : x  Auto Eosinophil % : x  Auto Basophil % : x      PT/INR - ( 25 Apr 2019 05:21 )   PT: 22.4 sec;   INR: 1.91 ratio         PTT - ( 25 Apr 2019 05:21 )  PTT:32.9 sec    TPro  7.1    /  Alb  3.8    /  TBili  0.3    /  DBili  x      /  AST  13     /  ALT  13     /  AlkPhos  60     22 Apr 2019 10:44              < from: MR Head No Cont (04.23.19 @ 06:50) >     EXAM:  MR BRAIN                          PROCEDURE DATE:  04/23/2019          INTERPRETATION:  .    CLINICAL INFORMATION: Right-sided weakness. Transient ischemic attack   workup.    TECHNIQUE: Multiplanar multisequential MRI of the brain was acquired   without the administration of IV gadolinium.    COMPARISON: Prior brain MRI examination from 2/27/2019. Prior head CT   examination from 4/22/2019.    FINDINGS: Multiple scattered foci of restricted diffusion are notable   throughout the bilateral cerebral hemispheres mostly and cortical   locations. There is an area of restricted diffusion involving the right   body of the corpus callosum.    There is redemonstration of a small chronic transcortical infarct within   the left frontal lobe.    Multiple additional nonspecific T2/FLAIR hyperintense signal changes are   noted throughout the bihemispheric white matter without associated mass   effect or restricted diffusion.    Ventricular size and configuration is unremarkable. There is prominence   of the bilateral convexity extra-axial spaces which is secondary to   volume loss. There is no displacement of the cortical veins onto the   brain surface to suggest an extra-axial collection. Flow-voids are noted   throughout the major intracranial vessels, on the T2 weighted images,   consistent with their patency.    There is scattered mucosal thickening throughout the paranasal sinuses.   There is nonspecific fluid opacification of the bilateral mastoid air   cells. Calvarial signal is otherwise within normal limits. There is   evidence of bilateral cataract removal.    IMPRESSION: Scattered small acute/subacute infarcts with associated   cytotoxic edema within the bilateral cerebral hemispheres in different   vascular distributions. Embolic phenomenon should be considered.    No acute intracranial hemorrhage.    Nonspecific bilateral mastoid air cell opacification. Correlate   clinically for the possibility of bilateral mastoidit    < end of copied text >    < from: US Duplex Venous Lower Ext Complete, Bilateral (04.23.19 @ 17:39) >   EXAM:  US DPLX LWR EXT VEINS COMPL BI                          PROCEDURE DATE:  04/23/2019          INTERPRETATION:  CLINICAL INFORMATION: CVA    COMPARISON: None available.    TECHNIQUE: Duplex sonography of the BILATERAL LOWER extremities with   color and spectral Doppler, with and without compression.      FINDINGS:    There is normal compressibility of the bilateral common femoral, femoral   and popliteal veins. Limited evaluation the calf veins. Vessels are   visualized through the posterior tibial veins.    Doppler examination shows normal spontaneous and phasic flow.    IMPRESSION:     No evidence of bilateral lower extremity deep venous thrombosis.    < end of copied text >        Assessment and Recommendation:   		  HX OF PE MANY YEARS AGO  WAS STABLE  ON COUMADIN  HX OF FACTOR V LEIDEN  MUTATION.  EMBOLIC CVAs on COUMADIN  R/O AN UNDERLYING CARDIAC PROBLEM  CONSIDER SWITCHING FROM COUMADIN TO A DIFFERENT AC.  AGREE WITH ADDING ASA,81 MG A DAY TO COUMADIN  DOING BETTER      THANK  YOU          Attending Attestation:   I was physically present for the key portions of the evaluation and management (E/M) service provided.  I agree with the above history, physical, and plan which I have reviewed and edited where appropriate.      Electronic Signatures:  Bryanna Magana)  (Signed 23-Apr-2019 20:23)  	Authored: Consult Note, Referral/Consultation, Subjective and Objective, Assessment and Recommendation, Attending Attestation      Last Updated: 23-Apr-2019 20:23 by Bryanna Magana)

## 2019-04-25 NOTE — DISCHARGE NOTE NURSING/CASE MANAGEMENT/SOCIAL WORK - NSDCPEPTSTRK_GEN_ALL_CORE
Stroke support groups for patients, families, and friends/Stroke warning signs and symptoms/Signs and symptoms of stroke/Prescribed medications/Risk factors for stroke/Stroke education booklet/Need for follow up after discharge/Call 911 for stroke

## 2019-04-25 NOTE — DISCHARGE NOTE NURSING/CASE MANAGEMENT/SOCIAL WORK - NSDCFUADDAPPT_GEN_ALL_CORE_FT
Attended by hospital pharmacist to go over all medications  Patient will be d/c to [facility] on 4/26/19

## 2019-04-25 NOTE — DISCHARGE NOTE NURSING/CASE MANAGEMENT/SOCIAL WORK - NSDCDPATPORTLINK_GEN_ALL_CORE
You can access the [facility] Portal, offered by [facility], by registering with the following website: [email or web address]

## 2019-04-25 NOTE — PROGRESS NOTE ADULT - SUBJECTIVE AND OBJECTIVE BOX
Interval/Overnight: UMER completed yesterday with cardio, notable for aortic plaque and long strand like growth attached to the aortic valve leaflet. Cardiology follow up this morning appreciated, CTsx consulted for possible removal of AV growth which could be possible cause for CVA. Cardio also recommended to decrease ASA to 81mg and start eliquis 5 mg bid if ok by neurology.     Pt seen and examined at bedside  Pt is A&O x 3, sitting up in bed  Pt is without medical complaints  +right facial droop/some word finding difficulties but otherwise easy to understand  ROS negative   VSS    Vital Signs Last 24 Hrs  T(C): 36.4 (2019 03:26), Max: 36.9 (2019 16:00)  T(F): 97.6 (2019 03:26), Max: 98.5 (2019 16:00)  HR: 85 (2019 08:00) (71 - 98)  BP: 114/95 (2019 08:00) (114/95 - 152/80)  BP(mean): 101 (2019 08:00) (89 - 101)  RR: 17 (2019 08:00) (16 - 20)  SpO2: 98% (2019 08:00) (95% - 98%)    PHYSICAL EXAM:  GENERAL: NAD, sitting up in bed, conversing pleasantly w some difficulty word finding  HEENT: NC/AT  NECK: Supple, No JVD   CHEST/LUNG: CTA bilaterally; Normal effort  HEART: S1S2 Normal intensity, no murmurs, gallops or rubs noted  ABDOMEN: Soft, BS Normoactive, NT, ND, no HSM noted  EXTREMITIES:  no sig edema  SKIN: No rashes or lesions noted  NEURO: A&Ox3, RUE weakness and pronator drift, equal strength b/l LE  PSYCH: normal mood and affect; insight/judgement appropriate    LABS:                                   13.5   8.5   )-----------( 206      ( 2019 05:21 )             42.2     -    137  |  105  |  15.0  ----------------------------<  106  4.4   |  20.0<L>  |  1.07    Ca    8.9      2019 05:21  Phos  3.0     -  Mg     2.0         TPro  7.1  /  Alb  3.7  /  TBili  0.6  /  DBili  x   /  AST  18  /  ALT  11  /  AlkPhos  56        Urinalysis Basic - ( 2019 00:32 )  Color: Yellow / Appearance: Clear / S.015 / pH: x  Gluc: x / Ketone: Negative  / Bili: Negative / Urobili: Negative mg/dL   Blood: x / Protein: 15 mg/dL / Nitrite: Negative   Leuk Esterase: Negative / RBC: 0-2 /HPF / WBC Negative   Sq Epi: x / Non Sq Epi: Occasional / Bacteria: x      RADIOLOGY & ADDITIONAL TESTS:  MR head 19  IMPRESSION: Scattered small acute/subacute infarcts with associated   cytotoxic edema within the bilateral cerebral hemispheres in different   vascular distributions. Embolic phenomenon should be considered.  No acute intracranial hemorrhage.  Nonspecific bilateral mastoid air cell opacification. Correlate   clinically for the possibility of bilateral mastoiditis.      MEDICATIONS:  MEDICATIONS  (STANDING):  amLODIPine   Tablet 5 milliGRAM(s) Oral daily  aspirin enteric coated 325 milliGRAM(s) Oral daily  atorvastatin 40 milliGRAM(s) Oral at bedtime  buDESOnide  80 MICROgram(s)/formoterol 4.5 MICROgram(s) Inhaler 2 Puff(s) Inhalation two times a day  metoprolol tartrate 25 milliGRAM(s) Oral every 8 hours  oxybutynin XL 10 milliGRAM(s) Oral daily  pantoprazole    Tablet 40 milliGRAM(s) Oral at bedtime  sertraline 50 milliGRAM(s) Oral daily  tamsulosin 0.4 milliGRAM(s) Oral at bedtime Interval/Overnight: UMER completed yesterday with cardio, notable for aortic plaque and long strand like growth attached to the aortic valve leaflet. Cardiology follow up this morning appreciated, CTsx consulted for possible removal of AV growth which could be possible cause for CVA. Cardio also recommended to decrease ASA to 81mg and start eliquis 5 mg bid if ok by neurology. After discussion with cardio and neuro plan is to continue coumadin with target INR 3-3.5    Pt seen and examined at bedside  Pt is A&O x 3, sitting up in bed  Pt is without medical complaints  +right facial droop/some word finding difficulties but otherwise easy to understand  ROS negative   VSS    Vital Signs Last 24 Hrs  T(C): 36.4 (2019 03:26), Max: 36.9 (2019 16:00)  T(F): 97.6 (2019 03:26), Max: 98.5 (2019 16:00)  HR: 85 (2019 08:00) (71 - 98)  BP: 114/95 (2019 08:00) (114/95 - 152/80)  BP(mean): 101 (2019 08:00) (89 - 101)  RR: 17 (2019 08:00) (16 - 20)  SpO2: 98% (2019 08:00) (95% - 98%)    PHYSICAL EXAM:  GENERAL: NAD, sitting up in bed, conversing pleasantly w some difficulty word finding  HEENT: NC/AT  NECK: Supple, No JVD   CHEST/LUNG: CTA bilaterally; Normal effort  HEART: S1S2 Normal intensity, no murmurs, gallops or rubs noted  ABDOMEN: Soft, BS Normoactive, NT, ND, no HSM noted  EXTREMITIES:  no sig edema  SKIN: No rashes or lesions noted  NEURO: A&Ox3, RUE weakness and pronator drift, equal strength b/l LE  PSYCH: normal mood and affect; insight/judgement appropriate    LABS:                                   13.5   8.5   )-----------( 206      ( 2019 05:21 )             42.2         137  |  105  |  15.0  ----------------------------<  106  4.4   |  20.0<L>  |  1.07    Ca    8.9      2019 05:21  Phos  3.0     04-25  Mg     2.0     -25    TPro  7.1  /  Alb  3.7  /  TBili  0.6  /  DBili  x   /  AST  18  /  ALT  11  /  AlkPhos  56  -      Urinalysis Basic - ( 2019 00:32 )  Color: Yellow / Appearance: Clear / S.015 / pH: x  Gluc: x / Ketone: Negative  / Bili: Negative / Urobili: Negative mg/dL   Blood: x / Protein: 15 mg/dL / Nitrite: Negative   Leuk Esterase: Negative / RBC: 0-2 /HPF / WBC Negative   Sq Epi: x / Non Sq Epi: Occasional / Bacteria: x      RADIOLOGY & ADDITIONAL TESTS:  MR head 19  IMPRESSION: Scattered small acute/subacute infarcts with associated   cytotoxic edema within the bilateral cerebral hemispheres in different   vascular distributions. Embolic phenomenon should be considered.  No acute intracranial hemorrhage.  Nonspecific bilateral mastoid air cell opacification. Correlate   clinically for the possibility of bilateral mastoiditis.      MEDICATIONS:  MEDICATIONS  (STANDING):  amLODIPine   Tablet 5 milliGRAM(s) Oral daily  aspirin enteric coated 325 milliGRAM(s) Oral daily  atorvastatin 40 milliGRAM(s) Oral at bedtime  buDESOnide  80 MICROgram(s)/formoterol 4.5 MICROgram(s) Inhaler 2 Puff(s) Inhalation two times a day  metoprolol tartrate 25 milliGRAM(s) Oral every 8 hours  oxybutynin XL 10 milliGRAM(s) Oral daily  pantoprazole    Tablet 40 milliGRAM(s) Oral at bedtime  sertraline 50 milliGRAM(s) Oral daily  tamsulosin 0.4 milliGRAM(s) Oral at bedtime

## 2019-04-25 NOTE — PROGRESS NOTE ADULT - ASSESSMENT
84 year old male with PMH HTN, Dyslipidemia, CAD s/p PCI, COPD, afib (on coumadin) PE s/p IVCF presented with right hand weakness. MRI positive for multiple embolic CVA, possible cardioembolic given hx of afib though therapeutic INR on admission. Seen by cardiology and had UMER on 4/24 which was notable for no PFO but + aortic plaque and long strand like growth attached to the aortic valve leaflet. Cardiology requested CTsx evaluation for possible removal of AV growth as likely the cause of CVA. Hospital course notable with afib with RVR, started on PO lopressor by cardiology. hematology was consulted for AC recommendations given stroked on coumadin, recs appreciated and discussed with cardiology. Pt to start Eliquis 5mg BID as long as OK with neuro.     1. Acute multiple small bilateral acte/Subacute CVA  - appear to be cardioembolic. Surprising given INR therapeutic on admission  - Continue ASA and coumadin for now. Cardiology recommended to stop coumadin and start Eliquis 5mg BID  - Neurology following - discussed with Dr Richardson  - pt with afib with RVR and h/o PE, now w multiple strokes,  all while on warfarin.  - hematology consult appreciated, Dr Magana gave possible options for anticoagulation  - cardiology following- discussed w Dr Ta, recommends asa and Eliquis for AC  - UMER reviewed, no PFO but + aortic plaque and long strand like growth attached to the aortic valve leaflet.    2. pAF   - Had been rate controlled on admission and was only taking Amlodipine 5 mg at home   - On 4/23 rapid afib with RVR    - Seen by cardio, beta blocker increased to toprol xl 100 mg daily and AC recommendations to start Eliquis 5mg BID and ASA 81 previously on coumadin)    3. Aortic valve vegetation found on UMER  - UMER reviewed, no PFO but + aortic plaque and long strand like growth attached to the aortic valve leaflet.  - Cardiology recs appreciated  - CTSx consulted for possible aortic valve growth removal in the near future (not on this admission)  - As per cardio, patient would need LHC prior to surgery    4. HTN   - Stable  - Started on PO Lopressor for rate control    5. Dyslipidemia   -   - Continue Statin    6. COPD  - Continue ICS/LABA    7. VTE Hx  - Will start Eliquis 5mg BID + asa 81    8. Hx Seizure  - unclear if patient on Keppra or discontinued.   - need to verify    9. CAD   - negative Troponin, no evidence ischemia  - Continue ASA, Statin    DISPO: pending CTsx evaluation and then [facility] possibly tomorrow 4/26. As per CCM, bed is available starting tomorrow. . 84 year old male with PMH HTN, Dyslipidemia, CAD s/p PCI, COPD, afib (on coumadin) PE s/p IVCF presented with right hand weakness. MRI positive for multiple embolic CVA, possible cardioembolic given hx of afib though therapeutic INR on admission. Seen by cardiology and had UMER on 4/24 which was notable for no PFO but + aortic plaque and long strand like growth attached to the aortic valve leaflet. Cardiology requested CTsx evaluation for possible removal of AV growth as likely the cause of CVA. Hospital course notable with afib with RVR, started on PO lopressor by cardiology. hematology was consulted for AC recommendations given stroked on coumadin, recs appreciated and discussed with cardiology. Pt to start Eliquis 5mg BID as long as OK with neuro.     1. Acute multiple small bilateral acte/Subacute CVA  - appear to be cardioembolic. Surprising given INR therapeutic on admission  - Continue ASA and coumadin for now. Cardiology recommended to stop coumadin and start Eliquis 5mg BID  - Neurology following - discussed with Dr Richardson  - pt with afib with RVR and h/o PE, now w multiple strokes,  all while on warfarin.  - hematology consult appreciated, Dr Magana gave possible options for anticoagulation  - cardiology following- discussed w Dr Ta, recommends asa and Eliquis for AC  - UMER reviewed, no PFO but + aortic plaque and long strand like growth attached to the aortic valve leaflet.    2. pAF   - Had been rate controlled on admission and was only taking Amlodipine 5 mg at home   - On 4/23 rapid afib with RVR    - Seen by cardio, beta blocker increased to toprol xl 100 mg daily and AC recommendations to start Eliquis 5mg BID and ASA 81 previously on coumadin)    3. Aortic valve vegetation found on UMER  - UMER reviewed, no PFO but + aortic plaque and long strand like growth attached to the aortic valve leaflet.  - Cardiology recs appreciated  - CTSx consulted for possible aortic valve growth removal in the near future (not on this admission)  - As per cardio, patient would need LHC prior to surgery    4. HTN   - Stable  - Started on PO Lopressor for rate control    5. Dyslipidemia   -   - Continue Statin    6. COPD  - Continue ICS/LABA    7. VTE Hx  - Will start Eliquis 5mg BID + asa 81    8. Hx Seizure  - unclear if patient on Keppra or discontinued.   - need to verify    9. CAD   - negative Troponin, no evidence ischemia  - Continue ASA, Statin    DISPO: pending CTsx evaluation and then DC [facility] possibly tomorrow 4/26. As per CCM, bed is available starting tomorrow.   Pt is stable for downgrade to 4T 84 year old male with PMH HTN, Dyslipidemia, CAD s/p PCI, COPD, afib (on coumadin), PE s/p IVCF presented with right hand weakness. MRI positive for multiple embolic CVA, possible cardioembolic given hx of afib though therapeutic INR on admission. Seen by cardiology and had UMER on 4/24 which was notable for no PFO but + aortic plaque and long strand like growth attached to the aortic valve leaflet. Cardiology requested CTsx evaluation for possible removal of AV growth as likely the cause of CVA. Hospital course notable with afib with RVR, started on PO lopressor by cardiology. hematology was consulted for AC recommendations given stroked on coumadin, recs appreciated and discussed with cardiology. Pt to start Eliquis 5mg BID as long as OK with neuro.     1. Acute multiple small bilateral acte/Subacute CVA  - appear to be cardioembolic. Surprising given INR therapeutic on admission  - Continue ASA and coumadin for now. Cardiology recommended to stop coumadin and start Eliquis 5mg BID + asa 81  - Neurology following - discussed with Dr Richardson  - pt with afib with RVR and h/o PE, now w multiple strokes,  all while on warfarin.  - hematology consult appreciated, Dr Magana gave possible options for anticoagulation  - cardiology following- discussed w Dr Ta, recommends asa and Eliquis for AC  - UMER reviewed, no PFO but + aortic plaque and long strand like growth attached to the aortic valve leaflet.    2. pAF   - Had been rate controlled on admission and was only taking Amlodipine 5 mg at home   - On 4/23 rapid afib with RVR    - Seen by cardio, beta blocker increased to toprol xl 100 mg daily and AC recommendations to start Eliquis 5mg BID and ASA 81 previously on coumadin)    3. Aortic valve vegetation found on UMER  - UMER reviewed, no PFO but + aortic plaque and long strand like growth attached to the aortic valve leaflet.  - Cardiology recs appreciated  - CTSx consulted for possible aortic valve growth removal in the near future (not on this admission)  - As per cardio, patient would need LHC prior to surgery    4. HTN   - Stable  - Started on PO Lopressor for rate control    5. Dyslipidemia   -   - Continue Statin    6. COPD  - Continue ICS/LABA    7. VTE Hx  - Will start Eliquis 5mg BID + asa 81    8. Hx Seizure  - unclear if patient on Keppra or discontinued.   - need to verify    9. CAD   - negative Troponin, no evidence ischemia  - Continue ASA, Statin    DISPO: pending CTsx evaluation and then DC [facility] possibly tomorrow 4/26. As per CCM, bed is available starting tomorrow.   Pt is stable for downgrade to 4T 84 year old male with PMH HTN, Dyslipidemia, CAD s/p PCI, COPD, afib (on coumadin), PE s/p IVCF, hx of factor five Leiden presented with right hand weakness. MRI positive for multiple embolic CVA, possible cardioembolic given hx of afib though therapeutic INR on admission. Seen by cardiology and had UMER on 4/24 which was notable for no PFO but + aortic plaque and long strand like growth attached to the aortic valve leaflet. Cardiology requested CTsx evaluation for possible removal of AV growth as likely the cause of CVA. Hospital course notable with afib with RVR, started on PO lopressor by cardiology. hematology was consulted for AC recommendations given stroked on coumadin, recs appreciated and discussed with cardiology. Pt to start Eliquis 5mg BID as long as OK with neuro.     1. Acute multiple small bilateral acte/Subacute CVA  - appear to be cardioembolic. Surprising given INR therapeutic on admission  - Continue ASA and coumadin for now. Cardiology recommended to stop coumadin and start Eliquis 5mg BID + asa 81  - Neurology following - discussed with Dr Richardson  - pt with afib with RVR and h/o PE, now w multiple strokes,  all while on warfarin.  - hematology consult appreciated, Dr Magana gave possible options for anticoagulation  - cardiology following- discussed w Dr Ta, recommends asa and Eliquis for AC  - UMER reviewed, no PFO but + aortic plaque and long strand like growth attached to the aortic valve leaflet.    2. pAF   - Had been rate controlled on admission and was only taking Amlodipine 5 mg at home   - On 4/23 rapid afib with RVR    - Seen by cardio, beta blocker increased to toprol xl 100 mg daily and AC recommendations to start Eliquis 5mg BID and ASA 81 previously on coumadin)    3. Aortic valve vegetation found on UMER  - UMER reviewed, no PFO but + aortic plaque and long strand like growth attached to the aortic valve leaflet.  - Cardiology recs appreciated  - CTSx consulted for possible aortic valve growth removal in the near future (not on this admission)  - As per cardio, patient would need LHC prior to surgery    4. HTN   - Stable  - Started on PO Lopressor for rate control    5. Dyslipidemia   -   - Continue Statin    6. COPD  - Continue ICS/LABA    7. VTE Hx  - Will start Eliquis 5mg BID + asa 81    8. Hx Seizure  - unclear if patient on Keppra or discontinued.   - need to verify    9. CAD   - negative Troponin, no evidence ischemia  - Continue ASA, Statin    DISPO: pending CTsx evaluation and then DC [facility] possibly tomorrow 4/26. As per [facility], bed is available starting tomorrow.   Pt is stable for downgrade to 4T 84 year old male with PMH HTN, Dyslipidemia, CAD s/p PCI, COPD, afib (on coumadin), PE s/p IVCF, hx of factor five Leiden presented with right hand weakness. MRI positive for multiple embolic CVA, possible cardioembolic given hx of afib though therapeutic INR on admission. Seen by cardiology and had UMER on 4/24 which was notable for no PFO but + aortic plaque and long strand like growth attached to the aortic valve leaflet. Cardiology requested CTsx evaluation for possible removal of AV growth as likely the cause of CVA. Hospital course notable with afib with RVR, started on PO lopressor by cardiology. hematology was consulted for AC recommendations given stroked on coumadin, recs appreciated and discussed with cardiology. Pt to start Eliquis 5mg BID as long as OK with neuro.     1. Acute multiple small bilateral acte/Subacute CVA  - appear to be cardioembolic. Surprising given INR therapeutic on admission  - Continue ASA and coumadin for now. Cardiology recommended to stop coumadin and start Eliquis 5mg BID + asa 81  - Neurology following - discussed with Dr Richardson  - pt with afib with RVR and h/o PE, now w multiple strokes,  all while on warfarin.  - hematology consult appreciated, Dr Magana gave possible options for anticoagulation  - cardiology following- discussed w Dr Ta, recommends asa and Eliquis for AC  - UMER reviewed, no PFO but + aortic plaque and long strand like growth attached to the aortic valve leaflet.    2. pAF   - Had been rate controlled on admission and was only taking Amlodipine 5 mg at home   - During hospital stay on 4/23 rapid afib with RVR    - Seen by cardio, toprol xl 100 mg daily added.   - AC recommendations were to start Eliquis 5mg BID and ASA 81 (previously on coumadin)    3. Aortic valve vegetation found on UMER  - UMER reviewed, no PFO but + aortic plaque and long strand like growth attached to the aortic valve leaflet.  - Cardiology recs appreciated  - CTSx consulted for possible aortic valve growth removal in the near future (not on this admission)  - As per cardio, patient would need LHC prior to surgery  - AC as above    4. HTN   - Stable  - Started on PO Lopressor for rate control    5. Dyslipidemia   -   - Continue Statin    6. COPD  - Continue ICS/LABA    7. VTE Hx  - Will start Eliquis 5mg BID + asa 81    8. Hx Seizure  - unclear if patient on Keppra or discontinued.   - need to verify    9. CAD   - negative Troponin, no evidence ischemia  - Continue ASA, Statin    DISPO: pending CTsx evaluation and then DC [facility] possibly tomorrow 4/26. As per [facility], bed is available starting tomorrow.   Pt is stable for downgrade to 4T 84 year old male with PMH HTN, Dyslipidemia, CAD s/p PCI, COPD, afib (on coumadin), PE s/p IVCF, hx of factor five Leiden presented with right hand weakness. MRI positive for multiple embolic CVA, possible cardioembolic given hx of afib though therapeutic INR on admission. Seen by cardiology and had UMER on 4/24 which was notable for no PFO but + aortic plaque and long strand like growth attached to the aortic valve leaflet. Cardiology requested CTsx evaluation for possible removal of AV growth as likely the cause of CVA. Hospital course notable with afib with RVR, started on PO lopressor by cardiology. hematology was consulted for AC recommendations given stroked on coumadin, recs appreciated and discussed with cardiology. Pt to start Eliquis 5mg BID as long as OK with neuro.     1. Acute multiple small bilateral acte/Subacute CVA  - appear to be cardioembolic. Surprising given INR therapeutic on admission  -Cardiology recommended to stop coumadin and start Eliquis 5mg BID + asa 81 for AC  - Neurology following - discussed with Dr Richardson  - pt with afib with RVR and h/o PE, now w multiple strokes,  all while on warfarin.  - hematology consult appreciated, Dr Magana gave possible options for anticoagulation  - cardiology following- discussed w Dr Ta, recommends asa and Eliquis for AC  - UMER reviewed, no PFO but + aortic plaque and long strand like growth attached to the aortic valve leaflet.    2. pAF   - Had been rate controlled on admission and was only taking Amlodipine 5 mg at home   - During hospital stay on 4/23 rapid afib with RVR    - Seen by cardio, toprol xl 100 mg daily added.   - AC recommendations were to start Eliquis 5mg BID and ASA 81 (previously on coumadin)    3. Aortic valve vegetation found on UMER  - UMER reviewed, no PFO but + aortic plaque and long strand like growth attached to the aortic valve leaflet.  - Cardiology recs appreciated  - CTSx consulted for possible aortic valve growth removal in the near future (not on this admission)  - As per cardio, patient would need LHC prior to surgery  - AC as above    4. HTN   - Stable  - Started on PO Lopressor for rate control    5. Dyslipidemia   -   - Continue Statin    6. COPD  - Continue ICS/LABA    7. VTE Hx  - Will start Eliquis 5mg BID + asa 81    8. Hx Seizure  - unclear if patient on Keppra or discontinued.   - need to verify    9. CAD   - negative Troponin, no evidence ischemia  - Continue ASA, Statin    DISPO: pending CTsx evaluation and then DC [facility] possibly tomorrow 4/26. As per CCM, bed is available starting tomorrow.   Pt is stable for downgrade to 4T 84 year old male with PMH HTN, Dyslipidemia, CAD s/p PCI, COPD, afib (on coumadin), PE s/p IVCF, hx of factor five Leiden presented with right hand weakness. MRI positive for multiple embolic CVA, possible cardioembolic given hx of afib though therapeutic INR on admission. Seen by cardiology and had UMER on 4/24 which was notable for no PFO but + aortic plaque and long strand like growth attached to the aortic valve leaflet. Cardiology requested CTsx evaluation for possible removal of AV growth as likely the cause of CVA. Hospital course notable with afib with RVR, started on PO lopressor by cardiology. hematology was consulted for AC recommendations given stroked on coumadin, recs appreciated and discussed with cardiology. Pt to start Eliquis 5mg BID as long as OK with neuro.     1. Acute multiple small bilateral acte/Subacute CVA  - appear to be cardioembolic. Surprising given INR therapeutic on admission  -Cardiology recommended to stop coumadin and start Eliquis 5mg BID + asa 81 for AC.  After discussion with cardio and neuro plan is to continue AC with coumadin with target INR 3-3.5  - Neurology following - discussed with Dr Richardson  - pt with afib with RVR and h/o PE, now w multiple strokes,  all while on warfarin.  - hematology consult appreciated   - cardiology following- discussed w Dr Ta   - UMER reviewed, no PFO but + aortic plaque and long strand like growth attached to the aortic valve leaflet.    2. pAF   - Had been rate controlled on admission and was only taking Amlodipine 5 mg at home   - During hospital stay on 4/23 rapid afib with RVR    - Seen by cardio, toprol xl 100 mg daily added.   -  After discussion with cardio and neuro plan is to continue AC with coumadin with target INR 3-3.5    3. Aortic valve vegetation found on UMER  - UMER reviewed, no PFO but + aortic plaque and long strand like growth attached to the aortic valve leaflet.  - Cardiology recs appreciated  - CTSx consulted for possible aortic valve growth removal in the near future (not on this admission)  - As per cardio, patient would need LHC prior to surgery  - AC as above    4. HTN   - Stable  - Started on PO Lopressor for rate control    5. Dyslipidemia   -   - Continue Statin    6. COPD  - Continue ICS/LABA    7. VTE Hx  -  After discussion with cardio and neuro plan is to continue coumadin with target INR 3-3.5    8. Hx Seizure  - unclear if patient on Keppra or discontinued.   - need to verify    9. CAD   - negative Troponin, no evidence ischemia  - Continue ASA, Statin    DISPO: pending CTsx evaluation and then DC [facility] possibly tomorrow 4/26. As per CCM, bed is available starting tomorrow.   Pt is stable for downgrade to 4T

## 2019-04-25 NOTE — CONSULT NOTE ADULT - PROBLEM SELECTOR RECOMMENDATION 9
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 Preop labs/PFTs ordered.

## 2019-04-25 NOTE — PROGRESS NOTE ADULT - SUBJECTIVE AND OBJECTIVE BOX
CHIEF COMPLAINT:Patient is a 84y old  Male who presents with a chief complaint of I have a stroke (24 Apr 2019 15:29)    INTERVAL HISTORY:  pt with multiple embolic strokes.   UMER yesterday with aortic plaque and long strand like growth attached to the aortic valve leaflet.   No new neurologic symptoms.     Allergies  No Known Allergies  	  MEDICATIONS:  amLODIPine   Tablet 5 milliGRAM(s) Oral daily  metoprolol tartrate 25 milliGRAM(s) Oral every 8 hours  tamsulosin 0.4 milliGRAM(s) Oral at bedtime  buDESOnide  80 MICROgram(s)/formoterol 4.5 MICROgram(s) Inhaler 2 Puff(s) Inhalation two times a day  sertraline 50 milliGRAM(s) Oral daily  pantoprazole    Tablet 40 milliGRAM(s) Oral at bedtime  atorvastatin 40 milliGRAM(s) Oral at bedtime  aspirin enteric coated 325 milliGRAM(s) Oral daily  oxybutynin XL 10 milliGRAM(s) Oral daily    PHYSICAL EXAM:  T(C): 36.4 (04-25-19 @ 03:26), Max: 36.9 (04-24-19 @ 16:00)  HR: 97 (04-25-19 @ 05:16) (71 - 98)  BP: 145/82 (04-25-19 @ 05:16) (129/88 - 152/80)  RR: 19 (04-25-19 @ 04:00) (16 - 20)  SpO2: 96% (04-25-19 @ 04:00) (95% - 96%)  Appearance: Normal	  HEENT:   NC/AT  Eye: Pink Conjunctiva  Lungs: CTA B/L  CVS: IRRR, Normal S1 and S2, No Edema  Pulses: Normal distal pulses.  Neuro: A&O x3      LABS:	    CARDIAC MARKERS:                            13.5   8.5   )-----------( 206      ( 25 Apr 2019 05:21 )             42.2     04-25    137  |  105  |  15.0  ----------------------------<  106  4.4   |  20.0<L>  |  1.07    Ca    8.9      25 Apr 2019 05:21  Phos  3.0     04-25  Mg     2.0     04-25    TPro  7.1  /  Alb  3.7  /  TBili  0.6  /  DBili  x   /  AST  18  /  ALT  11  /  AlkPhos  56  04-25  TSH: Thyroid Stimulating Hormone, Serum: 1.35 uIU/mL (04-24 @ 10:36)      ASSESSMENT/PLAN: 	  1. afib with RVR, BB, increase to toprol xl 100 mg daily  2. I asked CT surgery to evaluate for surgery and removal of AV growth which is a possible cause for CVA  3. Blood cultures are pending  4. Decrease asa to 81mg. Start eliquis 5 mg bid if ok by neurology  5. Pt will need LHC prior to surgery.  6. High dose statin with plaque in aorta.

## 2019-04-25 NOTE — CONSULT NOTE ADULT - PROBLEM SELECTOR RECOMMENDATION 2
Long standing AF, would benefit from reduced CVA risk from LUIS excision during open heart surgery although this is not a primary reason to operate. Long standing AF may be resistant to ablation.

## 2019-04-25 NOTE — CONSULT NOTE ADULT - ASSESSMENT
84M H/O GIGI, CAD s/p stents 1996, CVA in feb, HLD, HTN, PE/DVT, AF on coumadin, IVC filter, likely COPD although patient is not aware of dx, admitted on 4/22 with complaints of right arm weakness and SOB. 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 84 obese M H/O GIGI, CAD s/p stents 1996, CVA in feb, HLD, HTN, PE/DVT, AF on coumadin, IVC filter, likely COPD although patient is not aware of dx, admitted on 4/22 with complaints of right arm weakness and SOB. 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

## 2019-04-25 NOTE — CDI QUERY NOTE - NSCDIOTHERTXTBX_GEN_ALL_CORE_HH
Imaging noted that patient has cytotoxic edema. Can you provide significant clinical diagnosis based on this findings?    A.	Cytotoxic edema  B.	Brain edema  C.	Cerebral edema  D.	Other, please specify  E.	Not clinically significant    Supporting Documentations:     4/23/19 @ 0650 MR Head No Cont:  IMPRESSION: Scattered small acute/subacute infarcts with associated   cytotoxic edema within the bilateral cerebral hemispheres in different   vascular distributions. Embolic phenomenon should be considered.

## 2019-04-25 NOTE — CONSULT NOTE ADULT - PROBLEM SELECTOR RECOMMENDATION 3
H/O BMS in 1996 per patient as well as MI (unknown when)  Patient will need cath following rehab to assess need for bypass surgery at the time of open heart

## 2019-04-25 NOTE — PROGRESS NOTE ADULT - SUBJECTIVE AND OBJECTIVE BOX
INTERVAL HISTORY:  no interval changes. S./p UMER.  Case discussed with Dr. Hare and Keyon      VITAL SIGNS:  Vital Signs Last 24 Hrs  T(C): 36.6 (25 Apr 2019 08:00), Max: 36.9 (24 Apr 2019 16:00)  T(F): 97.8 (25 Apr 2019 08:00), Max: 98.5 (24 Apr 2019 16:00)  HR: 85 (25 Apr 2019 08:00) (71 - 98)  BP: 114/95 (25 Apr 2019 08:00) (114/95 - 152/80)  BP(mean): 101 (25 Apr 2019 08:00) (89 - 101)  RR: 17 (25 Apr 2019 08:00) (16 - 20)  SpO2: 98% (25 Apr 2019 08:00) (95% - 98%)    PHYSICAL EXAMINATION:    Mentation:    Language/Speech:  CN:  Visual Fields:  Motor:  Sensory:  DTR:  Babinski:      MEDS:  MEDICATIONS  (STANDING):  amLODIPine   Tablet 5 milliGRAM(s) Oral daily  apixaban 5 milliGRAM(s) Oral every 12 hours  aspirin  chewable 81 milliGRAM(s) Oral daily  atorvastatin 40 milliGRAM(s) Oral at bedtime  buDESOnide  80 MICROgram(s)/formoterol 4.5 MICROgram(s) Inhaler 2 Puff(s) Inhalation two times a day  metoprolol succinate  milliGRAM(s) Oral daily  oxybutynin XL 10 milliGRAM(s) Oral daily  pantoprazole    Tablet 40 milliGRAM(s) Oral at bedtime  sertraline 50 milliGRAM(s) Oral daily  tamsulosin 0.4 milliGRAM(s) Oral at bedtime    MEDICATIONS  (PRN):      LABS:                          13.5   8.5   )-----------( 206      ( 25 Apr 2019 05:21 )             42.2     04-25    137  |  105  |  15.0  ----------------------------<  106  4.4   |  20.0<L>  |  1.07    Ca    8.9      25 Apr 2019 05:21  Phos  3.0     04-25  Mg     2.0     04-25    TPro  7.1  /  Alb  3.7  /  TBili  0.6  /  DBili  x   /  AST  18  /  ALT  11  /  AlkPhos  56  04-25    LIVER FUNCTIONS - ( 25 Apr 2019 05:21 )  Alb: 3.7 g/dL / Pro: 7.1 g/dL / ALK PHOS: 56 U/L / ALT: 11 U/L / AST: 18 U/L / GGT: x               RADIOLOGY & ADDITIONAL STUDIES:    < from: UMER Echo Doppler (04.24.19 @ 14:37) >   1. Technically good study.   2. Normal global left ventricular systolic function.   3. Left ventricular ejection fraction, by visual estimation, is 50 to   55%.   4. Spectral Doppler shows impaired relaxation pattern of left   ventricular myocardial filling (Grade I diastolic dysfunction).   5. Normal right ventricular size and function.   6. Color flow doppler and intravenous injection of agitated saline   demonstrates the presence of anintact intra atrial septum.   7. Thickening of the anterior and posterior mitral valve leaflets.   8. Trileaflet aortic jason. There is linear and highly mobile echo   density attached to the right coronary cusp of the aortic valve,   measuring 1.7 cm. This structure moves in and out of the LVOT.   9. Mild aortic regurgitation.  10. Peak transaortic gradient equals 17.5 mmHg, mean transaortic gradient   equals 8.9 mmHg, the calculated aortic valve area equals 1.35 cm² by the   continuity equation consistent with moderate aortic stenosis.  11. The is complex atheroma in the descending aorta and simple atheroma   in the aortic root and ascending aorta.  12. No left atrial appendage thrombus.  13. Trivial pericardial effusion.    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   Neurologically cleared for discharge/disposition.  Please recontact as needed. INTERVAL HISTORY:  no interval changes. S./p UMER.  Case discussed with Dr. Hare and Keyon      VITAL SIGNS:  Vital Signs Last 24 Hrs  T(C): 36.6 (25 Apr 2019 08:00), Max: 36.9 (24 Apr 2019 16:00)  T(F): 97.8 (25 Apr 2019 08:00), Max: 98.5 (24 Apr 2019 16:00)  HR: 85 (25 Apr 2019 08:00) (71 - 98)  BP: 114/95 (25 Apr 2019 08:00) (114/95 - 152/80)  BP(mean): 101 (25 Apr 2019 08:00) (89 - 101)  RR: 17 (25 Apr 2019 08:00) (16 - 20)  SpO2: 98% (25 Apr 2019 08:00) (95% - 98%)    PHYSICAL EXAMINATION:    Mentation:    Language/Speech:  CN: right facial  Visual Fields:  Motor: right hemiparesisi  Sensory:  DTR:  Babinski:      MEDS:  MEDICATIONS  (STANDING):  amLODIPine   Tablet 5 milliGRAM(s) Oral daily  apixaban 5 milliGRAM(s) Oral every 12 hours  aspirin  chewable 81 milliGRAM(s) Oral daily  atorvastatin 40 milliGRAM(s) Oral at bedtime  buDESOnide  80 MICROgram(s)/formoterol 4.5 MICROgram(s) Inhaler 2 Puff(s) Inhalation two times a day  metoprolol succinate  milliGRAM(s) Oral daily  oxybutynin XL 10 milliGRAM(s) Oral daily  pantoprazole    Tablet 40 milliGRAM(s) Oral at bedtime  sertraline 50 milliGRAM(s) Oral daily  tamsulosin 0.4 milliGRAM(s) Oral at bedtime    MEDICATIONS  (PRN):      LABS:                          13.5   8.5   )-----------( 206      ( 25 Apr 2019 05:21 )             42.2     04-25    137  |  105  |  15.0  ----------------------------<  106  4.4   |  20.0<L>  |  1.07    Ca    8.9      25 Apr 2019 05:21  Phos  3.0     04-25  Mg     2.0     04-25    TPro  7.1  /  Alb  3.7  /  TBili  0.6  /  DBili  x   /  AST  18  /  ALT  11  /  AlkPhos  56  04-25    LIVER FUNCTIONS - ( 25 Apr 2019 05:21 )  Alb: 3.7 g/dL / Pro: 7.1 g/dL / ALK PHOS: 56 U/L / ALT: 11 U/L / AST: 18 U/L / GGT: x               RADIOLOGY & ADDITIONAL STUDIES:    < from: UMER Echo Doppler (04.24.19 @ 14:37) >   1. Technically good study.   2. Normal global left ventricular systolic function.   3. Left ventricular ejection fraction, by visual estimation, is 50 to   55%.   4. Spectral Doppler shows impaired relaxation pattern of left   ventricular myocardial filling (Grade I diastolic dysfunction).   5. Normal right ventricular size and function.   6. Color flow doppler and intravenous injection of agitated saline   demonstrates the presence of anintact intra atrial septum.   7. Thickening of the anterior and posterior mitral valve leaflets.   8. Trileaflet aortic jason. There is linear and highly mobile echo   density attached to the right coronary cusp of the aortic valve,   measuring 1.7 cm. This structure moves in and out of the LVOT.   9. Mild aortic regurgitation.  10. Peak transaortic gradient equals 17.5 mmHg, mean transaortic gradient   equals 8.9 mmHg, the calculated aortic valve area equals 1.35 cm² by the   continuity equation consistent with moderate aortic stenosis.  11. The is complex atheroma in the descending aorta and simple atheroma   in the aortic root and ascending aorta.  12. No left atrial appendage thrombus.  13. Trivial pericardial effusion.    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   Neurologically cleared for discharge/disposition.  Please recontact as needed.

## 2019-04-26 ENCOUNTER — INPATIENT (INPATIENT)
Facility: HOSPITAL | Age: 84
LOS: 12 days | Discharge: NURSING, REHAB OR LONG-TERM CARE | DRG: 949 | End: 2019-05-09
Attending: PSYCHIATRY & NEUROLOGY | Admitting: PSYCHIATRY & NEUROLOGY
Payer: MEDICARE

## 2019-04-26 ENCOUNTER — TRANSCRIPTION ENCOUNTER (OUTPATIENT)
Age: 84
End: 2019-04-26

## 2019-04-26 VITALS
OXYGEN SATURATION: 94 % | TEMPERATURE: 98 F | RESPIRATION RATE: 18 BRPM | DIASTOLIC BLOOD PRESSURE: 70 MMHG | SYSTOLIC BLOOD PRESSURE: 118 MMHG | HEART RATE: 73 BPM

## 2019-04-26 VITALS
TEMPERATURE: 99 F | HEIGHT: 73 IN | OXYGEN SATURATION: 95 % | WEIGHT: 311.73 LBS | SYSTOLIC BLOOD PRESSURE: 124 MMHG | DIASTOLIC BLOOD PRESSURE: 71 MMHG | HEART RATE: 88 BPM | RESPIRATION RATE: 15 BRPM

## 2019-04-26 DIAGNOSIS — Z98.89 OTHER SPECIFIED POSTPROCEDURAL STATES: Chronic | ICD-10-CM

## 2019-04-26 DIAGNOSIS — I63.9 CEREBRAL INFARCTION, UNSPECIFIED: ICD-10-CM

## 2019-04-26 DIAGNOSIS — Z98.49 CATARACT EXTRACTION STATUS, UNSPECIFIED EYE: Chronic | ICD-10-CM

## 2019-04-26 PROBLEM — I48.91 UNSPECIFIED ATRIAL FIBRILLATION: Chronic | Status: ACTIVE | Noted: 2019-04-23

## 2019-04-26 PROBLEM — Z87.09 PERSONAL HISTORY OF OTHER DISEASES OF THE RESPIRATORY SYSTEM: Chronic | Status: ACTIVE | Noted: 2019-04-23

## 2019-04-26 PROBLEM — I25.10 ATHEROSCLEROTIC HEART DISEASE OF NATIVE CORONARY ARTERY WITHOUT ANGINA PECTORIS: Chronic | Status: ACTIVE | Noted: 2019-04-23

## 2019-04-26 LAB
ALBUMIN SERPL ELPH-MCNC: 3.4 G/DL — SIGNIFICANT CHANGE UP (ref 3.3–5.2)
ALP SERPL-CCNC: 55 U/L — SIGNIFICANT CHANGE UP (ref 40–120)
ALT FLD-CCNC: 10 U/L — SIGNIFICANT CHANGE UP
ANION GAP SERPL CALC-SCNC: 12 MMOL/L — SIGNIFICANT CHANGE UP (ref 5–17)
AST SERPL-CCNC: 14 U/L — SIGNIFICANT CHANGE UP
BILIRUB SERPL-MCNC: 0.5 MG/DL — SIGNIFICANT CHANGE UP (ref 0.4–2)
BUN SERPL-MCNC: 17 MG/DL — SIGNIFICANT CHANGE UP (ref 8–20)
CALCIUM SERPL-MCNC: 9.3 MG/DL — SIGNIFICANT CHANGE UP (ref 8.6–10.2)
CHLORIDE SERPL-SCNC: 107 MMOL/L — SIGNIFICANT CHANGE UP (ref 98–107)
CO2 SERPL-SCNC: 23 MMOL/L — SIGNIFICANT CHANGE UP (ref 22–29)
CREAT SERPL-MCNC: 1.12 MG/DL — SIGNIFICANT CHANGE UP (ref 0.5–1.3)
GLUCOSE SERPL-MCNC: 100 MG/DL — SIGNIFICANT CHANGE UP (ref 70–115)
HCT VFR BLD CALC: 39.4 % — LOW (ref 42–52)
HGB BLD-MCNC: 12.6 G/DL — LOW (ref 14–18)
INR BLD: 2.33 RATIO — HIGH (ref 0.88–1.16)
MCHC RBC-ENTMCNC: 27.1 PG — SIGNIFICANT CHANGE UP (ref 27–31)
MCHC RBC-ENTMCNC: 32 G/DL — SIGNIFICANT CHANGE UP (ref 32–36)
MCV RBC AUTO: 84.7 FL — SIGNIFICANT CHANGE UP (ref 80–94)
PLATELET # BLD AUTO: 209 K/UL — SIGNIFICANT CHANGE UP (ref 150–400)
POTASSIUM SERPL-MCNC: 4.2 MMOL/L — SIGNIFICANT CHANGE UP (ref 3.5–5.3)
POTASSIUM SERPL-SCNC: 4.2 MMOL/L — SIGNIFICANT CHANGE UP (ref 3.5–5.3)
PREALB SERPL-MCNC: 19 MG/DL — SIGNIFICANT CHANGE UP (ref 18–38)
PROT SERPL-MCNC: 6.7 G/DL — SIGNIFICANT CHANGE UP (ref 6.6–8.7)
PROTHROM AB SERPL-ACNC: 27.5 SEC — HIGH (ref 10–12.9)
RBC # BLD: 4.65 M/UL — SIGNIFICANT CHANGE UP (ref 4.6–6.2)
RBC # FLD: 14.1 % — SIGNIFICANT CHANGE UP (ref 11–15.6)
SODIUM SERPL-SCNC: 142 MMOL/L — SIGNIFICANT CHANGE UP (ref 135–145)
WBC # BLD: 8.2 K/UL — SIGNIFICANT CHANGE UP (ref 4.8–10.8)
WBC # FLD AUTO: 8.2 K/UL — SIGNIFICANT CHANGE UP (ref 4.8–10.8)

## 2019-04-26 PROCEDURE — 99285 EMERGENCY DEPT VISIT HI MDM: CPT | Mod: 25

## 2019-04-26 PROCEDURE — 93320 DOPPLER ECHO COMPLETE: CPT

## 2019-04-26 PROCEDURE — 70450 CT HEAD/BRAIN W/O DYE: CPT

## 2019-04-26 PROCEDURE — 93880 EXTRACRANIAL BILAT STUDY: CPT

## 2019-04-26 PROCEDURE — 85027 COMPLETE CBC AUTOMATED: CPT

## 2019-04-26 PROCEDURE — 84480 ASSAY TRIIODOTHYRONINE (T3): CPT

## 2019-04-26 PROCEDURE — 93325 DOPPLER ECHO COLOR FLOW MAPG: CPT

## 2019-04-26 PROCEDURE — 83880 ASSAY OF NATRIURETIC PEPTIDE: CPT

## 2019-04-26 PROCEDURE — 80053 COMPREHEN METABOLIC PANEL: CPT

## 2019-04-26 PROCEDURE — 97535 SELF CARE MNGMENT TRAINING: CPT

## 2019-04-26 PROCEDURE — 85730 THROMBOPLASTIN TIME PARTIAL: CPT

## 2019-04-26 PROCEDURE — 99232 SBSQ HOSP IP/OBS MODERATE 35: CPT

## 2019-04-26 PROCEDURE — 71046 X-RAY EXAM CHEST 2 VIEWS: CPT

## 2019-04-26 PROCEDURE — 93312 ECHO TRANSESOPHAGEAL: CPT

## 2019-04-26 PROCEDURE — 94640 AIRWAY INHALATION TREATMENT: CPT

## 2019-04-26 PROCEDURE — 93306 TTE W/DOPPLER COMPLETE: CPT

## 2019-04-26 PROCEDURE — 82550 ASSAY OF CK (CPK): CPT

## 2019-04-26 PROCEDURE — 80061 LIPID PANEL: CPT

## 2019-04-26 PROCEDURE — 85610 PROTHROMBIN TIME: CPT

## 2019-04-26 PROCEDURE — 94010 BREATHING CAPACITY TEST: CPT

## 2019-04-26 PROCEDURE — 84436 ASSAY OF TOTAL THYROXINE: CPT

## 2019-04-26 PROCEDURE — 83735 ASSAY OF MAGNESIUM: CPT

## 2019-04-26 PROCEDURE — 84134 ASSAY OF PREALBUMIN: CPT

## 2019-04-26 PROCEDURE — 70551 MRI BRAIN STEM W/O DYE: CPT

## 2019-04-26 PROCEDURE — 82962 GLUCOSE BLOOD TEST: CPT

## 2019-04-26 PROCEDURE — 93005 ELECTROCARDIOGRAM TRACING: CPT

## 2019-04-26 PROCEDURE — 83036 HEMOGLOBIN GLYCOSYLATED A1C: CPT

## 2019-04-26 PROCEDURE — 97162 PT EVAL MOD COMPLEX 30 MIN: CPT

## 2019-04-26 PROCEDURE — 84484 ASSAY OF TROPONIN QUANT: CPT

## 2019-04-26 PROCEDURE — 84100 ASSAY OF PHOSPHORUS: CPT

## 2019-04-26 PROCEDURE — 97167 OT EVAL HIGH COMPLEX 60 MIN: CPT

## 2019-04-26 PROCEDURE — 36415 COLL VENOUS BLD VENIPUNCTURE: CPT

## 2019-04-26 PROCEDURE — 87641 MR-STAPH DNA AMP PROBE: CPT

## 2019-04-26 PROCEDURE — 81001 URINALYSIS AUTO W/SCOPE: CPT

## 2019-04-26 PROCEDURE — 97530 THERAPEUTIC ACTIVITIES: CPT

## 2019-04-26 PROCEDURE — G0378: CPT

## 2019-04-26 PROCEDURE — 84443 ASSAY THYROID STIM HORMONE: CPT

## 2019-04-26 PROCEDURE — 87640 STAPH A DNA AMP PROBE: CPT

## 2019-04-26 PROCEDURE — 87040 BLOOD CULTURE FOR BACTERIA: CPT

## 2019-04-26 PROCEDURE — 93970 EXTREMITY STUDY: CPT

## 2019-04-26 PROCEDURE — 99239 HOSP IP/OBS DSCHRG MGMT >30: CPT

## 2019-04-26 RX ORDER — NYSTATIN CREAM 100000 [USP'U]/G
1 CREAM TOPICAL
Qty: 0 | Refills: 0 | Status: DISCONTINUED | OUTPATIENT
Start: 2019-04-26 | End: 2019-04-28

## 2019-04-26 RX ORDER — METOPROLOL TARTRATE 50 MG
100 TABLET ORAL DAILY
Qty: 0 | Refills: 0 | Status: DISCONTINUED | OUTPATIENT
Start: 2019-04-26 | End: 2019-05-09

## 2019-04-26 RX ORDER — METOPROLOL TARTRATE 50 MG
1 TABLET ORAL
Qty: 0 | Refills: 0 | COMMUNITY
Start: 2019-04-26

## 2019-04-26 RX ORDER — ATORVASTATIN CALCIUM 80 MG/1
1 TABLET, FILM COATED ORAL
Qty: 0 | Refills: 0 | COMMUNITY
Start: 2019-04-26

## 2019-04-26 RX ORDER — WARFARIN SODIUM 2.5 MG/1
1 TABLET ORAL
Qty: 0 | Refills: 0 | COMMUNITY

## 2019-04-26 RX ORDER — AMLODIPINE BESYLATE 2.5 MG/1
5 TABLET ORAL DAILY
Qty: 0 | Refills: 0 | Status: DISCONTINUED | OUTPATIENT
Start: 2019-04-26 | End: 2019-05-09

## 2019-04-26 RX ORDER — PANTOPRAZOLE SODIUM 20 MG/1
40 TABLET, DELAYED RELEASE ORAL
Qty: 0 | Refills: 0 | Status: DISCONTINUED | OUTPATIENT
Start: 2019-04-26 | End: 2019-05-09

## 2019-04-26 RX ORDER — WARFARIN SODIUM 2.5 MG/1
5 TABLET ORAL ONCE
Qty: 0 | Refills: 0 | Status: DISCONTINUED | OUTPATIENT
Start: 2019-04-26 | End: 2019-04-26

## 2019-04-26 RX ORDER — DOCUSATE SODIUM 100 MG
100 CAPSULE ORAL
Qty: 0 | Refills: 0 | Status: DISCONTINUED | OUTPATIENT
Start: 2019-04-26 | End: 2019-05-09

## 2019-04-26 RX ORDER — TAMSULOSIN HYDROCHLORIDE 0.4 MG/1
0.4 CAPSULE ORAL AT BEDTIME
Qty: 0 | Refills: 0 | Status: DISCONTINUED | OUTPATIENT
Start: 2019-04-26 | End: 2019-05-09

## 2019-04-26 RX ORDER — WARFARIN SODIUM 2.5 MG/1
5 TABLET ORAL ONCE
Qty: 0 | Refills: 0 | Status: COMPLETED | OUTPATIENT
Start: 2019-04-26 | End: 2019-04-26

## 2019-04-26 RX ORDER — ASPIRIN/CALCIUM CARB/MAGNESIUM 324 MG
81 TABLET ORAL DAILY
Qty: 0 | Refills: 0 | Status: DISCONTINUED | OUTPATIENT
Start: 2019-04-26 | End: 2019-05-09

## 2019-04-26 RX ORDER — SERTRALINE 25 MG/1
50 TABLET, FILM COATED ORAL DAILY
Qty: 0 | Refills: 0 | Status: DISCONTINUED | OUTPATIENT
Start: 2019-04-26 | End: 2019-05-09

## 2019-04-26 RX ORDER — ASPIRIN/CALCIUM CARB/MAGNESIUM 324 MG
1 TABLET ORAL
Qty: 0 | Refills: 0 | COMMUNITY
Start: 2019-04-26

## 2019-04-26 RX ORDER — ATORVASTATIN CALCIUM 80 MG/1
40 TABLET, FILM COATED ORAL AT BEDTIME
Qty: 0 | Refills: 0 | Status: DISCONTINUED | OUTPATIENT
Start: 2019-04-26 | End: 2019-05-09

## 2019-04-26 RX ADMIN — BUDESONIDE AND FORMOTEROL FUMARATE DIHYDRATE 2 PUFF(S): 160; 4.5 AEROSOL RESPIRATORY (INHALATION) at 12:43

## 2019-04-26 RX ADMIN — Medication 100 MILLIGRAM(S): at 05:08

## 2019-04-26 RX ADMIN — Medication 81 MILLIGRAM(S): at 12:01

## 2019-04-26 RX ADMIN — AMLODIPINE BESYLATE 5 MILLIGRAM(S): 2.5 TABLET ORAL at 05:08

## 2019-04-26 RX ADMIN — SERTRALINE 50 MILLIGRAM(S): 25 TABLET, FILM COATED ORAL at 12:01

## 2019-04-26 RX ADMIN — MUPIROCIN 1 APPLICATION(S): 20 OINTMENT TOPICAL at 17:10

## 2019-04-26 RX ADMIN — ATORVASTATIN CALCIUM 40 MILLIGRAM(S): 80 TABLET, FILM COATED ORAL at 21:21

## 2019-04-26 RX ADMIN — MUPIROCIN 1 APPLICATION(S): 20 OINTMENT TOPICAL at 05:08

## 2019-04-26 RX ADMIN — Medication 10 MILLIGRAM(S): at 12:01

## 2019-04-26 RX ADMIN — TAMSULOSIN HYDROCHLORIDE 0.4 MILLIGRAM(S): 0.4 CAPSULE ORAL at 21:21

## 2019-04-26 RX ADMIN — WARFARIN SODIUM 5 MILLIGRAM(S): 2.5 TABLET ORAL at 21:21

## 2019-04-26 NOTE — PROGRESS NOTE ADULT - REASON FOR ADMISSION
I have a stroke

## 2019-04-26 NOTE — DISCHARGE NOTE PROVIDER - HOSPITAL COURSE
84 year old male with PMH HTN, Dyslipidemia, CAD s/p PCI, COPD, afib (on coumadin), PE s/p IVCF, hx of factor five Leiden presented with right hand weakness and slurred specch. MRI positive for multiple embolic CVA, possible cardioembolic given hx of AF though therapeutic INR (2.98) on admission. UMER performed on 4/24 which was notable for no PFO but + aortic plaque and long strand like growth attached to the aortic valve leaflet.  Seen by CT Surgery with recommendations for likely need for open heart surgery to excise the mass, which may be a fibroelastoma vs Lambl's excrescence after Rehab.    Hospital course notable with AF with RVR, started on PO lopressor by cardiology.  It was decided to keep the patient on Coumadin with higher target INR (3-3.5).    Patient was seen by PMR and recommended acute Rehab.    Communicate to PMD Dr Murillo    Discharge time - 35 minutes

## 2019-04-26 NOTE — DISCHARGE NOTE PROVIDER - CARE PROVIDERS DIRECT ADDRESSES
,DirectAddress_Unknown,DirectAddress_Unknown,[email or web address],DirectAddress_Unknown

## 2019-04-26 NOTE — H&P ADULT - ASSESSMENT
SANDI SANTOS is a 83yo Male with PMH prior stroke (Feb '19), HTN, HLD, CAD c/b MI s/p stent '96, COPD, seizure d/o, PE s/p IVC filter, A-fib on warfarin admitted for multiple scattered infarcts, now with gait instability, ADL impairments and functional impairments.     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 Will require open heart surgery for excision  - Per [facility] rehab note 4/26, surgery planned for after acute rehab    # Chronic A-fib  - Toprol XL for rate control  - Warfarin    # COPD: continue symbicort    # HTN  - DASH  - BB as above  - Amlodipine    # BPH - on tamsulosin and oxybutynin    # Depression: continue sertraline    # HLD: continue atorvastatin. TLC diet.    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 1hr/day      O.T. 1hr/day     S.L.P. 1hr/day   EXP FREQUENCY: 5 days per 7 day period     PRESCREEN COMPARISON: I have reviewed the prescreen information and I have found no relevant changes between the preadmission screening and my post admission evalulation     RATIONALE FOR INPATIENT ADMISSION - Patient demonstrates the following: (check all that apply)  [X] Medically appropriate for rehabilitation admission  [X] Has attainable rehab goals with an appropriate initial discharge plan  [X] Has rehabilitation potential (expected to make a significant improvement within a reasonable period of time)   [X] Requires close medical management by a rehab physician, rehab nursing care, Hospitalist and comprehensive interdisciplinary team (including PT, OT, & or SLP, Prosthetics and Orthotics) SANDI SANTOS is a 83yo Male with PMH prior stroke (Feb '19), HTN, HLD, CAD c/b MI s/p stent '96, COPD, seizure d/o, PE s/p IVC filter, A-fib on warfarin admitted for multiple scattered infarcts, now with gait instability, ADL impairments and functional impairments.     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. Will require open heart surgery for excision  - Per [facility] rehab note 4/26, surgery planned for after acute rehab    # Chronic A-fib  - Toprol XL for rate control  - Warfarin, goal INR 3-3.5    # COPD: continue symbicort    # HTN  - DASH  - BB as above  - Amlodipine    # Factor V Leiden  - Seen by hematology at [facility]. Continue AC. F/u outpt  - No evidence of DVT on 4/23 duplex of BLE    # BPH - on tamsulosin and oxybutynin    # Depression: continue sertraline    # HLD: continue atorvastatin. TLC diet.    # Factor V Leidin: pt continued on warfarin at [facility] despite hematology recommendation to switch to different AC  - AC as above    Gait Instability, ADL impairments and Functional impairments: start Comprehensive Rehab Program of PT/OT/SLP  DVT PPx: Warfarin  Diet: DASH/TLC    MEDICAL PROGNOSIS: GOOD                    REHAB POTENTIAL: GOOD  ESTIMATED DISPOSITION: HOME              ELOS: 10-14 Days   EXPECTED THERAPY:   P.T. 1hr/day      O.T. 1hr/day     S.L.P. 1hr/day   EXP FREQUENCY: 5 days per 7 day period     PRESCREEN COMPARISON: I have reviewed the prescreen information and I have found no relevant changes between the preadmission screening and my post admission evalulation     RATIONALE FOR INPATIENT ADMISSION - Patient demonstrates the following: (check all that apply)  [X] Medically appropriate for rehabilitation admission  [X] Has attainable rehab goals with an appropriate initial discharge plan  [X] Has rehabilitation potential (expected to make a significant improvement within a reasonable period of time)   [X] Requires close medical management by a rehab physician, rehab nursing care, Hospitalist and comprehensive interdisciplinary team (including PT, OT, & or SLP, Prosthetics and Orthotics) SANDI SANTOS is a 83yo Male with PMH prior stroke (Feb '19), HTN, HLD, CAD c/b MI s/p stent '96, COPD, seizure d/o, PE s/p IVC filter, A-fib on warfarin admitted for multiple scattered infarcts, now with gait instability, ADL impairments and functional impairments.     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 Will require open heart surgery for excision  - Per [facility] rehab note 4/26, surgery planned for after acute rehab    # Chronic A-fib  - Toprol XL for rate control  - Warfarin, goal INR 3-3.5    # COPD: continue symbicort    # HTN  - DASH  - BB as above  - Amlodipine    # Factor V Leiden  - Seen by hematology at [facility]. Continue AC. F/u outpt  - No evidence of DVT on 4/23 duplex of BLE    # BPH  - D/C recs show tamsulosin and oxybutynin. Only continue tamsulosin, as oxybutynin may worsen retention    # Depression: continue sertraline    # HLD: continue atorvastatin. TLC diet.    # Factor V Leidin: pt continued on warfarin at [facility] despite hematology recommendation to switch to different AC  - AC as above    Gait Instability, ADL impairments and Functional impairments: start Comprehensive Rehab Program of PT/OT/SLP  DVT PPx: Warfarin  Diet: DASH/TLC    MEDICAL PROGNOSIS: GOOD                    REHAB POTENTIAL: GOOD  ESTIMATED DISPOSITION: HOME              ELOS: 10-14 Days   EXPECTED THERAPY:   P.T. 1hr/day      O.T. 1hr/day     S.L.P. 1hr/day   EXP FREQUENCY: 5 days per 7 day period     PRESCREEN COMPARISON: I have reviewed the prescreen information and I have found no relevant changes between the preadmission screening and my post admission evalulation     RATIONALE FOR INPATIENT ADMISSION - Patient demonstrates the following: (check all that apply)  [X] Medically appropriate for rehabilitation admission  [X] Has attainable rehab goals with an appropriate initial discharge plan  [X] Has rehabilitation potential (expected to make a significant improvement within a reasonable period of time)   [X] Requires close medical management by a rehab physician, rehab nursing care, Hospitalist and comprehensive interdisciplinary team (including PT, OT, & or SLP, Prosthetics and Orthotics) SANDI SANTOS is a 85yo Male with PMH prior stroke (Feb '19), HTN, HLD, CAD c/b MI s/p stent '96, COPD, seizure d/o, PE s/p IVC filter, A-fib on warfarin admitted for multiple scattered infarcts, now with gait instability, ADL impairments and functional impairments.     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 Will require open heart surgery for excision  - Per [facility] rehab note 4/26, surgery planned for after acute rehab    # Chronic A-fib  - Toprol XL for rate control  - Warfarin, goal INR 3-3.5    # COPD: continue symbicort    # HTN  - DASH  - BB as above  - Amlodipine    # Factor V Leiden  - Seen by hematology at [facility]. Continue AC. F/u outpt  - No evidence of DVT on 4/23 duplex of BLE    # BPH  - D/C recs show tamsulosin and oxybutynin. Only continue tamsulosin, as oxybutynin may worsen retention    # Depression: continue sertraline    # HLD: continue atorvastatin. TLC diet.    # Buttock rash: nystatin    Gait Instability, ADL impairments and Functional impairments: start Comprehensive Rehab Program of PT/OT/SLP  DVT PPx: Warfarin  Diet: DASH/TLC    MEDICAL PROGNOSIS: GOOD                    REHAB POTENTIAL: GOOD  ESTIMATED DISPOSITION: HOME              ELOS: 10-14 Days   EXPECTED THERAPY:   P.T. 1hr/day      O.T. 1hr/day     S.L.P. 1hr/day   EXP FREQUENCY: 5 days per 7 day period     PRESCREEN COMPARISON: I have reviewed the prescreen information and I have found no relevant changes between the preadmission screening and my post admission evalulation     RATIONALE FOR INPATIENT ADMISSION - Patient demonstrates the following: (check all that apply)  [X] Medically appropriate for rehabilitation admission  [X] Has attainable rehab goals with an appropriate initial discharge plan  [X] Has rehabilitation potential (expected to make a significant improvement within a reasonable period of time)   [X] Requires close medical management by a rehab physician, rehab nursing care, Hospitalist and comprehensive interdisciplinary team (including PT, OT, & or SLP, Prosthetics and Orthotics)

## 2019-04-26 NOTE — DISCHARGE NOTE PROVIDER - NSDCCPCAREPLAN_GEN_ALL_CORE_FT
PRINCIPAL DISCHARGE DIAGNOSIS  Diagnosis: Cerebrovascular accident (CVA) due to embolism of cerebral artery  Assessment and Plan of Treatment: Continue medications as prescribed.  Target INR 3-3.5  Follow up with CT Surgery after discharge from Rehab      SECONDARY DISCHARGE DIAGNOSES  Diagnosis: Atrial fibrillation with rapid ventricular response  Assessment and Plan of Treatment: Continue medications and diet as prescribed.  Target INR 3-3.5    Diagnosis: Aortic valve mass  Assessment and Plan of Treatment: Follow up with CT Surgery after discharge    Diagnosis: COPD without exacerbation  Assessment and Plan of Treatment: Continue medications as prescribed.    Diagnosis: Essential hypertension  Assessment and Plan of Treatment: Continue diet and medications as prescribed.  Follow up with PMD      Diagnosis: Dyslipidemia  Assessment and Plan of Treatment: Continue diet and medications as prescribed.  Follow up with PMD    Diagnosis: Coronary artery disease involving native coronary artery of native heart without angina pectoris  Assessment and Plan of Treatment: Continue diet and medications as prescribed.  Follow up with Cardiology    Diagnosis: Cerebrovascular accident (CVA) due to embolism of cerebral artery  Assessment and Plan of Treatment: Cerebrovascular accident (CVA) due to embolism of cerebral artery

## 2019-04-26 NOTE — H&P ADULT - ATTENDING COMMENTS
Pt. seen 4/27/19 AM.  Agree with documentation above as per resident on call with amendments made as appropriate. Patient medically stable.     Pt. c/o urinary incontinence.  States occurs mostly at night.  No dysuria.  No pain.  Slept ok last night.     Vital Signs Last 24 Hrs  T(C): 36.5 (27 Apr 2019 09:37), Max: 37.2 (26 Apr 2019 19:24)  T(F): 97.7 (27 Apr 2019 09:37), Max: 99 (26 Apr 2019 19:24)  HR: 78 (27 Apr 2019 09:37) (61 - 103)  BP: 126/86 (27 Apr 2019 09:37) (118/70 - 132/88)  BP(mean): --  RR: 16 (27 Apr 2019 09:37) (15 - 18)  SpO2: 96% (27 Apr 2019 09:37) (94% - 97%)    Physical exam as amended above                          13.4   8.0   )-----------( 214      ( 27 Apr 2019 05:45 )             42.6   04-27    141  |  105  |  17  ----------------------------<  118<H>  3.6   |  25  |  1.30    Ca    9.3      27 Apr 2019 05:45    TPro  6.9  /  Alb  3.1<L>  /  TBili  0.6  /  DBili  x   /  AST  15  /  ALT  17  /  AlkPhos  58  04-27    PT/INR - ( 27 Apr 2019 05:45 )   PT: 36.1 sec;   INR: 3.11 ratio             83yo Male with PMH prior stroke (Feb '19), HTN, HLD, CAD c/b MI s/p stent '96, COPD, seizure d/o, PE s/p IVC filter, A-fib on warfarin admitted for multiple scattered infarcts, now with gait instability, ADL impairments and functional impairments.    Afib, CVA--Pt. on coumadin.  INR therapeutic.  Goal 2.5-3.5 Cont. coumadin 5mg,  ASA 81 mg    Urinary incontinence--toileting program,  check PVR  on flomax.    HTN:  bp controlled on norvasc and lopressor

## 2019-04-26 NOTE — DISCHARGE NOTE PROVIDER - CARE PROVIDER_API CALL
Tien Murillo)  Family Medicine  [address] [place] [address]  Phone: [phone]  Fax: [phone]  Follow Up Time: 1 week    Kermit Alvarado)  CTS Surgery  [address] [place] [address]  Phone: [phone]  Fax: [phone]  Follow Up Time: 2 weeks    Lul Ta)  Cardiovascular Disease  [address] [facility], [address]  [place] [address]  Phone: [phone]  Fax: [phone]  Follow Up Time: Routine    Tristin Richardson)  Neurology  [address] [place] [address]  Phone: [phone]  Fax: [phone]  Follow Up Time: 2 weeks

## 2019-04-26 NOTE — PROGRESS NOTE ADULT - PROVIDER SPECIALTY LIST ADULT
Cardiology
Hospitalist
Hospitalist
Neurology
Neurology
Rehab Medicine
Rehab Medicine
Heme/Onc

## 2019-04-26 NOTE — H&P ADULT - NSHPLABSRESULTS_GEN_ALL_CORE
RECENT LABS/IMAGING                        12.6   8.2   )-----------( 209      ( 2019 06:09 )             39.4         142  |  107  |  17.0  ----------------------------<  100  4.2   |  23.0  |  1.12    Ca    9.3      2019 06:09  Phos  3.0     -  Mg     2.0         TPro  6.7  /  Alb  3.4  /  TBili  0.5  /  DBili  x   /  AST  14  /  ALT  10  /  AlkPhos  55      INR: 2.33ratio (19 @ 06:09)  INR: 1.91ratio (19 @ 05:21)    Urinalysis Basic - ( 2019 16:47 )  Color: Yellow / Appearance: Clear / S.020 / pH: x  Gluc: x / Ketone: Negative  / Bili: Negative / Urobili: Negative mg/dL   Blood: x / Protein: 30 mg/dL / Nitrite: Negative   Leuk Esterase: Negative / RBC: 0-2 /HPF / WBC 0-2   Sq Epi: x / Non Sq Epi: Occasional / Bacteria: Occasional    < from: MR Head No Cont (19 @ 06:50) >  IMPRESSION: Scattered small acute/subacute infarcts with associated cytotoxic edema within the bilateral cerebral hemispheres in different vascular distributions. Embolic phenomenon should be considered. No acute intracranial hemorrhage. Nonspecific bilateral mastoid air cell opacification. Correlate clinically for the possibility of bilateral mastoiditis.    < from: UMER Echo Doppler (19 @ 14:37) >   8. Trileaflet aortic jason. There is linear and highly mobile echo density attached to the right coronary cusp of the aortic valve, measuring 1.7 cm. This structure moves in and out of the LVOT. RECENT LABS/IMAGING                        12.6   8.2   )-----------( 209      ( 2019 06:09 )             39.4         142  |  107  |  17.0  ----------------------------<  100  4.2   |  23.0  |  1.12    Ca    9.3      2019 06:09  Phos  3.0     -  Mg     2.0         TPro  6.7  /  Alb  3.4  /  TBili  0.5  /  DBili  x   /  AST  14  /  ALT  10  /  AlkPhos  55      INR: 2.33ratio (19 @ 06:09)  INR: 1.91ratio (19 @ 05:21)    Urinalysis Basic - ( 2019 16:47 )  Color: Yellow / Appearance: Clear / S.020 / pH: x  Gluc: x / Ketone: Negative  / Bili: Negative / Urobili: Negative mg/dL   Blood: x / Protein: 30 mg/dL / Nitrite: Negative   Leuk Esterase: Negative / RBC: 0-2 /HPF / WBC 0-2   Sq Epi: x / Non Sq Epi: Occasional / Bacteria: Occasional    < from: MR Head No Cont (19 @ 06:50) >  IMPRESSION: Scattered small acute/subacute infarcts with associated cytotoxic edema within the bilateral cerebral hemispheres in different vascular distributions. Embolic phenomenon should be considered. No acute intracranial hemorrhage. Nonspecific bilateral mastoid air cell opacification. Correlate clinically for the possibility of bilateral mastoiditis.    < from: UMER Echo Doppler (19 @ 14:37) >   8. Trileaflet aortic jason. There is linear and highly mobile echo density attached to the right coronary cusp of the aortic valve, measuring 1.7 cm. This structure moves in and out of the LVOT.    < from: US Duplex Venous Lower Ext Complete, Bilateral (19 @ 17:39) >  IMPRESSION: No evidence of bilateral lower extremity deep venous thrombosis.

## 2019-04-26 NOTE — DISCHARGE NOTE PROVIDER - PROVIDER TOKENS
PROVIDER:[[id number]],FOLLOWUP:[1 week]],PROVIDER:[TOKEN:[[id number]],FOLLOWUP:[2 weeks]],PROVIDER:[TOKEN:[[id number]],FOLLOWUP:[Routine]],PROVIDER:[TOKEN:[[id number]],FOLLOWUP:[2 weeks]]

## 2019-04-26 NOTE — H&P ADULT - EJECTION FRACTION % YES
Nutrition Care Plan    Nutrition Diagnosis:   Inadequate intake related to bowel ischemia s/p extensive bowel resection (~80 cm proximal jejunum viable), presumed short gut as evidenced by NPO, need for PN, trial trickle tube feeding.    Intervention:    Parenteral formula/solution: Continue PN -  Parenteral Nutrition Order: Non Standard at 48.2 mL/hr, min volume, custom electrolytes  Access Site: CVC  Calories Provided by Parenteral Nutrition: 1920  Protein Provided by Parenteral Nutrition: 100 g  Dextrose Provided by Parenteral Nutrition: 300 g  Other Provided by Parenteral Nutrition: Daily lipids, 50 units insulin     Enteral formula/solution: Continue trickle tube feeding as tolerated -  Enteral Nutrition Formula: Peptide Based High Protein  Current Rate: 10 mL/hr  Access Site: GJ (J-tube coiled in stomach)  Calories Provided by Tube Feedin  Protein Provided by Tube Feedin g  Free Water Provided by Tube Feedin mL     Monitoring and Evaluation:  Total energy intake:  Goal is to meet nutrition needs (progressing with PN, trickle TF)   55

## 2019-04-26 NOTE — PROGRESS NOTE ADULT - SUBJECTIVE AND OBJECTIVE BOX
Patient in chair, eating breakfast.  He is utilizing his right hand more for fine motor functions.   He reports no pain.   Mood is positive and looking to go to rehab, which may be today.     Medically, CTS recommends open heart surgery, pending workup. Planned for acfter acute rehab.   He would also need to be switched from Coumadin to Xarelto.   Patient has Factor 5 mutation.    FUNCTIONAL PROGRESS    Bed Mobility  Bed Mobility Training Rolling/Turning: moderate assist (50% patient effort);  1 person assist;  nonverbal cues (demo/gestures);  verbal cues;  bed rails  Bed Mobility Training Supine-to-Sit: moderate assist (50% patient effort);  1 person assist;  nonverbal cues (demo/gestures);  verbal cues;  bed rails  Bed Mobility Training Limitations: decreased sensation;  decreased strength;  impaired coordination    Bed-Chair Transfer Training  Transfer Training Bed-to-Chair Transfer: minimum assist (75% patient effort);  1 person assist;  nonverbal cues (demo/gestures);  verbal cues;  weight-bearing as tolerated   rolling walker  Bed-to-Chair Transfer Training Transfer Safety Analysis: decreased strength;  impaired balance;  impaired coordination;  rolling walker    Sit-Stand Transfer Training  Transfer Training Sit-to-Stand Transfer: minimum assist (75% patient effort);  1 person assist;  nonverbal cues (demo/gestures);  verbal cues;  weight-bearing as tolerated   rolling walker  Transfer Training Stand-to-Sit Transfer: minimum assist (75% patient effort);  1 person assist;  nonverbal cues (demo/gestures);  verbal cues;  weight-bearing as tolerated   rolling walker  Sit-to-Stand Transfer Training Transfer Safety Analysis: decreased strength;  impaired balance;  impaired coordination;  rolling walker    Toilet Transfer Training  Transfer Training Toilet Transfer: minimum assist (75% patient effort);  1 person assist;  nonverbal cues (demo/gestures);  verbal cues;  weight-bearing as tolerated   rolling walker;  bedside commode  Toilet Transfer Training Transfer Safety Analysis: decreased strength;  impaired balance;  impaired coordination;  rolling walker;  bedside commode    Functional Endurance  Functional Endurance Detail: Functional mobility for short distances around bed area with minimum assistance and RW due to decreased strength, decreased balance and decreased coordination; cues for foot placement, positioning with relation to RW and RW management with environment/obstacle negotiation. Pt requires increased time and short distance due to decreased activity tolerance/endurance.     Lower Body Dressing Training  Lower Body Dressing Training Assistance: moderate assist (50% patient effort);  1 person assist;  nonverbal cues (demo/gestures);  verbal cues;  for socks in sitting via LE crossing;  decreased strength;  impaired balance;  decreased flexibility;  impaired coordination    Upper Body Dressing Training  Upper Body Dressing Training Assistance: minimum assist (75% patient effort);  1 person assist;  nonverbal cues (demo/gestures);  verbal cues;  for gown;  decreased strength;  impaired coordination;  impaired balance    Eating/Self-Feeding Training  Eating/Self-Feeding Training Assistance: supervsion;  set-up required;  impaired coordination          REVIEW OF SYSTEMS  Constitutional - No fever,  +fatigue  Neurological - No headaches, No memory loss, +loss of strength, +numbness, No tremors  Psychiatric - No depression, No anxiety    VITALS  T(C): 36.9 (19 @ 05:06), Max: 37.4 (19 @ 15:17)  HR: 79 (19 @ 05:06) (76 - 83)  BP: 151/87 (19 @ 05:06) (116/90 - 151/87)  RR: 18 (19 @ 05:06) (18 - 18)  SpO2: 94% (19 @ 05:06) (93% - 96%)  Wt(kg): --    MEDICATIONS   amLODIPine   Tablet 5 milliGRAM(s) daily  aspirin  chewable 81 milliGRAM(s) daily  atorvastatin 40 milliGRAM(s) at bedtime  buDESOnide  80 MICROgram(s)/formoterol 4.5 MICROgram(s) Inhaler 2 Puff(s) two times a day  metoprolol succinate  milliGRAM(s) daily  mupirocin 2% Ointment 1 Application(s) two times a day  oxybutynin XL 10 milliGRAM(s) daily  pantoprazole    Tablet 40 milliGRAM(s) at bedtime  sertraline 50 milliGRAM(s) daily  tamsulosin 0.4 milliGRAM(s) at bedtime      RECENT LABS - Reviewed                        12.6   8.2   )-----------( 209      ( 2019 06:09 )             39.4         142  |  107  |  17.0  ----------------------------<  100  4.2   |  23.0  |  1.12    Ca    9.3      2019 06:09  Phos  3.0     04-25  Mg     2.0     04-25    TPro  6.7  /  Alb  3.4  /  TBili  0.5  /  DBili  x   /  AST  14  /  ALT  10  /  AlkPhos  55  04-26    PT/INR - ( 2019 06:09 )   PT: 27.5 sec;   INR: 2.33 ratio         PTT - ( 2019 05:21 )  PTT:32.9 sec  Urinalysis Basic - ( 2019 16:47 )    Color: Yellow / Appearance: Clear / S.020 / pH: x  Gluc: x / Ketone: Negative  / Bili: Negative / Urobili: Negative mg/dL   Blood: x / Protein: 30 mg/dL / Nitrite: Negative   Leuk Esterase: Negative / RBC: 0-2 /HPF / WBC 0-2   Sq Epi: x / Non Sq Epi: Occasional / Bacteria: Occasional          ----------------------------------------------------------------------------------------  PHYSICAL EXAM  Constitutional - NAD, Comfortable  Extremities - No C/C/E, No calf tenderness   Neurologic Exam -                    Communication - Improved Expressive deficits, Dysarthria, Word finding delay     Cranial Nerves - right lip droop       Motor - Right UE (CVA) and Left LE (premorbid/orthopedic) weakness                    LEFT    UE - ShAB 5/5, EF 5/5, EE 5/5, WE 5/5,  5/5                    RIGHT UE - ShAB 5/5, EF 5/5, EE 5/5, WE 4/5,  4/5 - fine motor impairments                     LEFT    LE - HF 2/5, KE 4/5, DF 5/5, PF 5/5                    RIGHT LE - HF 5/5, KE 5/5, DF 5/5, PF 5/5        Sensory - decreased to the right UE     Coordination - Impaired on the right UE  Psychiatric - Mood stable, Affect WNL  ----------------------------------------------------------------------------------------  ASSESSMENT/PLAN  84yMale with functional deficits after bilateral acute CVAs  Cardioembolic CVAs - AC, ASA, UMER found mass -- planned for outpatient workup with eventual open heart surgery  AFIB - AC, Cardizem, Toprol XL  Pulm - Budesonide  HTN - Norvasc  Mood - Zoloft  BPH - Flomax  PE - AC   Pain - Tylenol  DVT PPX - SCDs  Rehab - Continue to recommend ACUTE inpatient rehabilitation for the functional deficits consisting of 3 hours of therapy/day & 24 hour RN/daily PMR physician for comorbid medical management. Will continue to follow for ongoing rehab needs and recommendations. Patient will be able to tolerate 3 hours a day.    Continue bedside therapy as well as OOB throughout the day with mobilization throughout the day with staff to maintain cardiopulmonary function and prevention of secondary complications related to debility.

## 2019-04-26 NOTE — H&P ADULT - NSHPSOCIALHISTORY_GEN_ALL_CORE
SOCIAL HISTORY  Smoking - Former  EtOH - Social  Drugs - Denied    FUNCTIONAL HISTORY  Lives alone, [address], [place]  Independent, Drove    CURRENT FUNCTIONAL STATUS  Bed Mobility: _____  Transfers: _____  Gait: ______ SOCIAL HISTORY  Smoking - Former  EtOH - Social  Drugs - Denied    FUNCTIONAL HISTORY  Lives alone, [address], [place]  Independent, Drove    CURRENT FUNCTIONAL STATUS  Transfers: Mar[place]uz  Gait: modA RW 2steps  ADL: dressing modA

## 2019-04-26 NOTE — H&P ADULT - NSHPPHYSICALEXAM_GEN_ALL_CORE
Vital Signs  T(C): 36.7 (04-26 @ 09:32)  HR: 103 (04-26 @ 12:44)  BP: 114/58 (04-26 @ 09:32)  RR: 16 (04-26 @ 09:32)  SpO2: 97% (04-26 @ 12:44)    Constitutional - NAD, Comfortable  HEENT - NCAT, EOMI  Neck - Supple  Chest - CTAB  Cardiovascular - RRR  Abdomen - BS+, Soft, NTND  Extremities - No C/C/E, No calf tenderness   Neurologic Exam -                    Cognitive - Awake, Alert, AAO to self, place, date, year, situation     Communication - Dysarthria     Cranial Nerves - R lid droop     Motor -                     LEFT    UE - ShAB 5/5, EF 5/5, EE 5/5, WE 5/5,  5/5                    RIGHT UE - ShAB 5/5, EF 5/5, EE 5/5, WE 4/5,  3/5                    LEFT    LE - HF 2/5, KE 4/5, DF 5/5, PF 5/5                    RIGHT LE - HF 5/5, KE 5/5, DF 5/5, PF 5/5        Sensory - Decreased to LT in RUE     Reflexes - DTR Intact, No primitive reflexive     Coordination - impaired R FTN  Psychiatric - Mood stable, Affect WNL VS  T(C): 36.7 (04-26 @ 09:32)  HR: 103 (04-26 @ 12:44)  BP: 114/58 (04-26 @ 09:32)  RR: 16 (04-26 @ 09:32)  SpO2: 97% (04-26 @ 12:44)    Constitutional - NAD, Comfortable  HEENT - NCAT, EOMI  Neck - Supple  Chest - CTAB  Cardiovascular - RRR  Abdomen - BS+, Soft, NTND  Extremities - No C/C/E, No calf tenderness   Neurologic Exam -                    Cognitive - Awake, Alert, AAO to self, place, date, year, situation     Communication - Dysarthria     Cranial Nerves - R lid droop     Motor -                     LEFT    UE - ShAB 5/5, EF 5/5, EE 5/5, WE 5/5,  5/5                    RIGHT UE - ShAB 5/5, EF 5/5, EE 5/5, WE 4/5,  3/5                    LEFT    LE - HF 2/5, KE 4/5, DF 5/5, PF 5/5                    RIGHT LE - HF 5/5, KE 5/5, DF 5/5, PF 5/5        Sensory - Decreased to LT in RUE     Reflexes - DTR Intact, No primitive reflexive     Coordination - impaired R FTN  Psychiatric - Mood stable, Affect WNL VS  T(C): 37.2 (04-26 @ 19:24)  HR: 88 (04-26 @ 19:24)  BP: 124/71 (04-26 @ 19:24)  RR: 15 (04-26 @ 19:24)  SpO2: 95% (04-26 @ 19:24)    Constitutional - NAD, Comfortable  HEENT - NCAT, EOMI  Neck - Supple  Chest - CTAB  Cardiovascular - RRR  Abdomen - BS+, Soft, NTND  Extremities - No C/C/E, No calf tenderness   Neurologic Exam -                    Cognitive - Awake, Alert, AAO to self, place, date, year, situation     Communication - Occasional word finding difficulty, intact naming, repetition, and comprehension.     Cranial Nerves - face symmetric, EOMI, facial sensation intact, tongue midline     Motor -                     LEFT    UE - 5/5                    RIGHT UE - 5/5                    LEFT    LE - 5/5                    RIGHT LE - HF 5/5        Sensory - intact to LT     Reflexes - 2+ bilateral patellar and biceps     Coordination - impaired R FTN  Psychiatric - Mood stable, Affect WNL  Skin - diffuse rash of the bilateral buttocks, L>R VS  T(C): 37.2 (04-26 @ 19:24)  HR: 88 (04-26 @ 19:24)  BP: 124/71 (04-26 @ 19:24)  RR: 15 (04-26 @ 19:24)  SpO2: 95% (04-26 @ 19:24)    Constitutional - NAD, Comfortable  HEENT - NCAT, EOMI  Neck - Supple  Chest - CTAB  Cardiovascular - RRR  Abdomen - BS+, Soft, NTND  Extremities - No C/C/E, No calf tenderness   Neurologic Exam -                    Cognitive - Awake, Alert, AAO to self, place, date, year, situation     Communication - Occasional word finding difficulty, intact naming, repetition, and comprehension.     Cranial Nerves - face symmetric, EOMI, facial sensation intact, tongue midline     Motor -                     LEFT    UE - 5/5                    RIGHT UE - 5/5                    LEFT    LE - 5/5                    RIGHT LE - HF 5/5        Sensory - intact to LT     Reflexes - 2+ bilateral patellar and biceps     Coordination - impaired R HTS,  intact FNF  Psychiatric - Mood stable, Affect WNL  Skin - diffuse rash of the bilateral buttocks, L>R

## 2019-04-26 NOTE — H&P ADULT - NSHPREVIEWOFSYSTEMS_GEN_ALL_CORE
REVIEW OF SYSTEMS  Constitutional - Denies fevers, chills  HEENT - Denies changes in vision or hearing  Respiratory - Denies cough, dyspnea  Cardiovascular - Denies chest pain, palpitations  Gastrointestinal - Denies n/v, constipation, bowel incontinence  Genitourinary - Denies dysuria, urinary incontinence  Neurological - Denies weakness, numbness, headaches  Skin - Denies rashes  Musculoskeletal - Denies arthralgia, myalgias, back pain  Psychiatric - Denies depressed mood, anxiety REVIEW OF SYSTEMS  Constitutional - Denies fevers, chills  HEENT - Denies changes in vision or hearing  Respiratory - Denies cough, dyspnea  Cardiovascular - Denies chest pain, palpitations  Gastrointestinal - Denies n/v, constipation, bowel incontinence. Last BM today.  Genitourinary - +urinary frequency x11yrs  Neurological - +speech difficulty +R hand weak  Skin - Denies rashes  Musculoskeletal - +L knee pain  Psychiatric - Denies depressed mood, anxiety

## 2019-04-26 NOTE — H&P ADULT - HISTORY OF PRESENT ILLNESS
SANDI SANTOS is a 85yo Male with PMH prior stroke (Feb '19), HTN, HLD, CAD c/b MI s/p stent '96, COPD, seizure d/o, PE s/p IVC filter, A-fib on warfarin, presented to [facility] 4/22 w/ R hand weakness x1day with slurred speech. INR therapeutic on admission. MR brain showed scattered infarcts throughout, in no vascular distribution, suspicious for embolic phenomenon. 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 Seen by CT surg, recommended open heart surgery to excise the mass.    Heme onc noted hx of factor V Leiden mutation. 4/23 doppler BLE neg for DVT. SANDI SANTOS is a 85yo Male with PMH prior stroke (Feb '19), HTN, HLD, CAD c/b MI s/p stent '96, COPD, seizure d/o, PE s/p IVC filter, A-fib on warfarin, presented to [facility] 4/22 w/ R hand weakness x1day with slurred speech. INR therapeutic on admission. MR brain showed scattered infarcts throughout, in no vascular distribution, suspicious for embolic phenomenon. 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 Seen by CT surg, recommended open heart surgery to excise the mass.    Heme onc noted hx of factor V Leiden. 4/23 doppler BLE neg for DVT. Heme recommended change to other AC.

## 2019-04-27 LAB
ALBUMIN SERPL ELPH-MCNC: 3.1 G/DL — LOW (ref 3.3–5)
ALP SERPL-CCNC: 58 U/L — SIGNIFICANT CHANGE UP (ref 40–120)
ALT FLD-CCNC: 17 U/L DA — SIGNIFICANT CHANGE UP (ref 10–45)
ANION GAP SERPL CALC-SCNC: 11 MMOL/L — SIGNIFICANT CHANGE UP (ref 5–17)
AST SERPL-CCNC: 15 U/L — SIGNIFICANT CHANGE UP (ref 10–40)
BASOPHILS # BLD AUTO: 0.1 K/UL — SIGNIFICANT CHANGE UP (ref 0–0.2)
BASOPHILS NFR BLD AUTO: 1.5 % — SIGNIFICANT CHANGE UP (ref 0–2)
BILIRUB SERPL-MCNC: 0.6 MG/DL — SIGNIFICANT CHANGE UP (ref 0.2–1.2)
BUN SERPL-MCNC: 17 MG/DL — SIGNIFICANT CHANGE UP (ref 7–23)
CALCIUM SERPL-MCNC: 9.3 MG/DL — SIGNIFICANT CHANGE UP (ref 8.4–10.5)
CHLORIDE SERPL-SCNC: 105 MMOL/L — SIGNIFICANT CHANGE UP (ref 96–108)
CO2 SERPL-SCNC: 25 MMOL/L — SIGNIFICANT CHANGE UP (ref 22–31)
CREAT SERPL-MCNC: 1.3 MG/DL — SIGNIFICANT CHANGE UP (ref 0.5–1.3)
EOSINOPHIL # BLD AUTO: 0.4 K/UL — SIGNIFICANT CHANGE UP (ref 0–0.5)
EOSINOPHIL NFR BLD AUTO: 5 % — SIGNIFICANT CHANGE UP (ref 0–6)
GLUCOSE SERPL-MCNC: 118 MG/DL — HIGH (ref 70–99)
HCT VFR BLD CALC: 42.6 % — SIGNIFICANT CHANGE UP (ref 39–50)
HGB BLD-MCNC: 13.4 G/DL — SIGNIFICANT CHANGE UP (ref 13–17)
INR BLD: 3.11 RATIO — HIGH (ref 0.88–1.16)
LYMPHOCYTES # BLD AUTO: 1.8 K/UL — SIGNIFICANT CHANGE UP (ref 1–3.3)
LYMPHOCYTES # BLD AUTO: 22 % — SIGNIFICANT CHANGE UP (ref 13–44)
MCHC RBC-ENTMCNC: 27.4 PG — SIGNIFICANT CHANGE UP (ref 27–34)
MCHC RBC-ENTMCNC: 31.5 GM/DL — LOW (ref 32–36)
MCV RBC AUTO: 86.8 FL — SIGNIFICANT CHANGE UP (ref 80–100)
MONOCYTES # BLD AUTO: 0.7 K/UL — SIGNIFICANT CHANGE UP (ref 0–0.9)
MONOCYTES NFR BLD AUTO: 8.9 % — SIGNIFICANT CHANGE UP (ref 1–9)
NEUTROPHILS # BLD AUTO: 5 K/UL — SIGNIFICANT CHANGE UP (ref 1.8–7.4)
NEUTROPHILS NFR BLD AUTO: 62.6 % — SIGNIFICANT CHANGE UP (ref 43–77)
PLATELET # BLD AUTO: 214 K/UL — SIGNIFICANT CHANGE UP (ref 150–400)
POTASSIUM SERPL-MCNC: 3.6 MMOL/L — SIGNIFICANT CHANGE UP (ref 3.5–5.3)
POTASSIUM SERPL-SCNC: 3.6 MMOL/L — SIGNIFICANT CHANGE UP (ref 3.5–5.3)
PROT SERPL-MCNC: 6.9 G/DL — SIGNIFICANT CHANGE UP (ref 6–8.3)
PROTHROM AB SERPL-ACNC: 36.1 SEC — HIGH (ref 10–12.9)
RBC # BLD: 4.9 M/UL — SIGNIFICANT CHANGE UP (ref 4.2–5.8)
RBC # FLD: 13.2 % — SIGNIFICANT CHANGE UP (ref 10.3–14.5)
SODIUM SERPL-SCNC: 141 MMOL/L — SIGNIFICANT CHANGE UP (ref 135–145)
WBC # BLD: 8 K/UL — SIGNIFICANT CHANGE UP (ref 3.8–10.5)
WBC # FLD AUTO: 8 K/UL — SIGNIFICANT CHANGE UP (ref 3.8–10.5)

## 2019-04-27 PROCEDURE — 99223 1ST HOSP IP/OBS HIGH 75: CPT

## 2019-04-27 RX ORDER — IPRATROPIUM/ALBUTEROL SULFATE 18-103MCG
3 AEROSOL WITH ADAPTER (GRAM) INHALATION EVERY 6 HOURS
Qty: 0 | Refills: 0 | Status: DISCONTINUED | OUTPATIENT
Start: 2019-04-27 | End: 2019-05-09

## 2019-04-27 RX ORDER — WARFARIN SODIUM 2.5 MG/1
5 TABLET ORAL ONCE
Qty: 0 | Refills: 0 | Status: COMPLETED | OUTPATIENT
Start: 2019-04-27 | End: 2019-04-27

## 2019-04-27 RX ORDER — IPRATROPIUM/ALBUTEROL SULFATE 18-103MCG
3 AEROSOL WITH ADAPTER (GRAM) INHALATION ONCE
Qty: 0 | Refills: 0 | Status: COMPLETED | OUTPATIENT
Start: 2019-04-27 | End: 2019-04-27

## 2019-04-27 RX ADMIN — TAMSULOSIN HYDROCHLORIDE 0.4 MILLIGRAM(S): 0.4 CAPSULE ORAL at 21:47

## 2019-04-27 RX ADMIN — Medication 81 MILLIGRAM(S): at 11:31

## 2019-04-27 RX ADMIN — Medication 3 MILLILITER(S): at 11:47

## 2019-04-27 RX ADMIN — AMLODIPINE BESYLATE 5 MILLIGRAM(S): 2.5 TABLET ORAL at 06:44

## 2019-04-27 RX ADMIN — ATORVASTATIN CALCIUM 40 MILLIGRAM(S): 80 TABLET, FILM COATED ORAL at 21:47

## 2019-04-27 RX ADMIN — Medication 100 MILLIGRAM(S): at 06:44

## 2019-04-27 RX ADMIN — PANTOPRAZOLE SODIUM 40 MILLIGRAM(S): 20 TABLET, DELAYED RELEASE ORAL at 06:44

## 2019-04-27 RX ADMIN — WARFARIN SODIUM 5 MILLIGRAM(S): 2.5 TABLET ORAL at 21:47

## 2019-04-27 RX ADMIN — NYSTATIN CREAM 1 APPLICATION(S): 100000 CREAM TOPICAL at 06:44

## 2019-04-27 RX ADMIN — Medication 100 MILLIGRAM(S): at 06:45

## 2019-04-27 RX ADMIN — Medication 100 MILLIGRAM(S): at 17:29

## 2019-04-27 RX ADMIN — NYSTATIN CREAM 1 APPLICATION(S): 100000 CREAM TOPICAL at 17:29

## 2019-04-27 RX ADMIN — SERTRALINE 50 MILLIGRAM(S): 25 TABLET, FILM COATED ORAL at 11:31

## 2019-04-27 NOTE — CONSULT NOTE ADULT - SUBJECTIVE AND OBJECTIVE BOX
KRYS SANTOS is a 83yo Male with PMH prior stroke (Feb '19), HTN, HLD, CAD c/b MI s/p stent '96, COPD, seizure d/o, PE s/p IVC filter, A-fib on warfarin, presented to [facility] 4/22 w/ R hand weakness x1day with slurred speech. INR therapeutic on admission. MR brain showed scattered infarcts throughout, in no vascular distribution, suspicious for embolic phenomenon. 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 Seen by CT surg, recommended open heart surgery to excise the mass.    Heme onc noted hx of factor V Leiden. 4/23 doppler BLE neg for DVT. Heme recommended change to other AC.    Pt was seen and examined at bedside. Pt states he had urinary accident last night and therefore didnt sleep well. Denies of any CP, SOB, palpitation. Pt states his ext is much stronger. Pt states his primary care/cardiology kept his coumadin instead of change into NOAC. Possible surg in 3 week for heart surg to removal mass.     Allergies    No Known Allergies    Intolerances        REVIEW OF SYSTEMS:    CONSTITUTIONAL: No weakness, fevers or chills  EYES/ENT: No visual changes;  No vertigo or throat pain   NECK: No pain or stiffness  RESPIRATORY: No cough, wheezing, hemoptysis; No shortness of breath  CARDIOVASCULAR: No chest pain or palpitations  GASTROINTESTINAL: No abdominal or epigastric pain. No nausea, vomiting, or hematemesis; No diarrhea or constipation. No melena or hematochezia.  GENITOURINARY: urinary frequency   NEUROLOGICAL: No numbness or weakness  SKIN: No itching, burning, rashes, or lesions   All other review of systems is negative unless indicated above    Vital Signs Last 24 Hrs  T(C): 36.5 (27 Apr 2019 09:37), Max: 37.2 (26 Apr 2019 19:24)  T(F): 97.7 (27 Apr 2019 09:37), Max: 99 (26 Apr 2019 19:24)  HR: 78 (27 Apr 2019 09:37) (61 - 103)  BP: 126/86 (27 Apr 2019 09:37) (118/70 - 132/88)  BP(mean): --  RR: 16 (27 Apr 2019 09:37) (15 - 18)  SpO2: 96% (27 Apr 2019 09:37) (94% - 97%)    I&O's Summary    26 Apr 2019 07:01  -  27 Apr 2019 07:00  --------------------------------------------------------  IN: 0 mL / OUT: 580 mL / NET: -580 mL        CAPILLARY BLOOD GLUCOSE          PHYSICAL EXAM:    Constitutional: NAD, awake and alert, well-developed  HEENT: PERR, EOMI, Normal Hearing, MMM  Neck: Soft and supple, No LAD, No JVD  Respiratory: Breath sounds are clear bilaterally, No wheezing, rales or rhonchi  Cardiovascular: S1 and S2,  Gastrointestinal: Bowel Sounds present, soft, nontender, nondistended, no guarding, no rebound  Extremities: No peripheral edema  Vascular: 2+ peripheral pulses  Neurological: A/O x 3, no focal deficits  Musculoskeletal: 5/5 strength b/l upper and lower extremities  Skin: No rashes    MEDICATIONS:  MEDICATIONS  (STANDING):  amLODIPine   Tablet 5 milliGRAM(s) Oral daily  aspirin  chewable 81 milliGRAM(s) Oral daily  atorvastatin 40 milliGRAM(s) Oral at bedtime  docusate sodium 100 milliGRAM(s) Oral two times a day  metoprolol succinate  milliGRAM(s) Oral daily  nystatin Cream 1 Application(s) Topical two times a day  pantoprazole    Tablet 40 milliGRAM(s) Oral before breakfast  sertraline 50 milliGRAM(s) Oral daily  tamsulosin 0.4 milliGRAM(s) Oral at bedtime  warfarin 5 milliGRAM(s) Oral once      LABS: All Labs Reviewed:                        13.4   8.0   )-----------( 214      ( 27 Apr 2019 05:45 )             42.6     04-27    141  |  105  |  17  ----------------------------<  118<H>  3.6   |  25  |  1.30    Ca    9.3      27 Apr 2019 05:45    TPro  6.9  /  Alb  3.1<L>  /  TBili  0.6  /  DBili  x   /  AST  15  /  ALT  17  /  AlkPhos  58  04-27    PT/INR - ( 27 Apr 2019 05:45 )   PT: 36.1 sec;   INR: 3.11 ratio               Blood Culture: 04-24 @ 16:36  Organism --  Gram Stain Blood -- Gram Stain --  Specimen Source .Blood  Culture-Blood --    04-24 @ 16:35  Organism --  Gram Stain Blood -- Gram Stain --  Specimen Source .Blood  Culture-Blood --        RADIOLOGY/EKG:    DVT PPX:    ASSESSMENT AND PLAN:

## 2019-04-27 NOTE — CONSULT NOTE ADULT - ASSESSMENT
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 surgery 3 weeks post acute rehab for open heart for excision     *A-Fib  Rate controlled  Cont toprol   Warfarin w Goal 3-3.5 given aortic valve mass    *COPD  Stat Duoneb  Cont symbicort     *HTN  BP controlled   Cont Amlodipine     *Factor V Leiden   Cont AC w Coumadin  DVT b/l LE neg     *BPH  Cont tamsulosin     *Depression   Cont Sertraline     *HLD  Cont Statin     *Buttock rash   Nystatin

## 2019-04-28 LAB
INR BLD: 3.81 RATIO — HIGH (ref 0.88–1.16)
PROTHROM AB SERPL-ACNC: 44.5 SEC — HIGH (ref 10–12.9)

## 2019-04-28 PROCEDURE — 99232 SBSQ HOSP IP/OBS MODERATE 35: CPT

## 2019-04-28 RX ORDER — WARFARIN SODIUM 2.5 MG/1
1 TABLET ORAL ONCE
Qty: 0 | Refills: 0 | Status: DISCONTINUED | OUTPATIENT
Start: 2019-04-28 | End: 2019-04-28

## 2019-04-28 RX ORDER — NYSTATIN CREAM 100000 [USP'U]/G
1 CREAM TOPICAL
Qty: 0 | Refills: 0 | Status: DISCONTINUED | OUTPATIENT
Start: 2019-04-28 | End: 2019-05-09

## 2019-04-28 RX ORDER — WARFARIN SODIUM 2.5 MG/1
1 TABLET ORAL AT BEDTIME
Qty: 0 | Refills: 0 | Status: DISCONTINUED | OUTPATIENT
Start: 2019-04-28 | End: 2019-04-28

## 2019-04-28 RX ORDER — BUDESONIDE AND FORMOTEROL FUMARATE DIHYDRATE 160; 4.5 UG/1; UG/1
2 AEROSOL RESPIRATORY (INHALATION)
Qty: 0 | Refills: 0 | Status: DISCONTINUED | OUTPATIENT
Start: 2019-04-28 | End: 2019-05-09

## 2019-04-28 RX ORDER — WARFARIN SODIUM 2.5 MG/1
1 TABLET ORAL AT BEDTIME
Qty: 0 | Refills: 0 | Status: COMPLETED | OUTPATIENT
Start: 2019-04-28 | End: 2019-04-28

## 2019-04-28 RX ADMIN — SERTRALINE 50 MILLIGRAM(S): 25 TABLET, FILM COATED ORAL at 12:13

## 2019-04-28 RX ADMIN — Medication 100 MILLIGRAM(S): at 05:45

## 2019-04-28 RX ADMIN — BUDESONIDE AND FORMOTEROL FUMARATE DIHYDRATE 2 PUFF(S): 160; 4.5 AEROSOL RESPIRATORY (INHALATION) at 21:03

## 2019-04-28 RX ADMIN — AMLODIPINE BESYLATE 5 MILLIGRAM(S): 2.5 TABLET ORAL at 05:45

## 2019-04-28 RX ADMIN — NYSTATIN CREAM 1 APPLICATION(S): 100000 CREAM TOPICAL at 17:36

## 2019-04-28 RX ADMIN — ATORVASTATIN CALCIUM 40 MILLIGRAM(S): 80 TABLET, FILM COATED ORAL at 21:16

## 2019-04-28 RX ADMIN — Medication 81 MILLIGRAM(S): at 12:13

## 2019-04-28 RX ADMIN — NYSTATIN CREAM 1 APPLICATION(S): 100000 CREAM TOPICAL at 05:45

## 2019-04-28 RX ADMIN — TAMSULOSIN HYDROCHLORIDE 0.4 MILLIGRAM(S): 0.4 CAPSULE ORAL at 21:17

## 2019-04-28 RX ADMIN — PANTOPRAZOLE SODIUM 40 MILLIGRAM(S): 20 TABLET, DELAYED RELEASE ORAL at 05:45

## 2019-04-28 RX ADMIN — WARFARIN SODIUM 1 MILLIGRAM(S): 2.5 TABLET ORAL at 21:16

## 2019-04-28 NOTE — PROGRESS NOTE ADULT - ASSESSMENT
SANDI SANTOS is a 85yo Male with PMH prior stroke (Feb '19), HTN, HLD, CAD c/b MI s/p stent '96, COPD, seizure d/o, PE s/p IVC filter, A-fib on warfarin admitted for multiple scattered infarcts, now with gait instability, ADL impairments and functional impairments.     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 Will require open heart surgery for excision  - Per [facility] rehab note 4/26, surgery planned for after acute rehab    # Chronic A-fib  - Toprol XL for rate control  - Warfarin, goal INR 3-3.5  -INR supra therapeutic--dose 1 mg tonight.  f/u INR in AM    # COPD: continue symbicort    # HTN  - DASH  - BB as above  - Amlodipine    # Factor V Leiden  - Seen by hematology at [facility]. Continue AC. F/u outpt  - No evidence of DVT on 4/23 duplex of BLE    # BPH  - D/C recs show tamsulosin and oxybutynin. Only continue tamsulosin, as oxybutynin may worsen retention    # Depression: continue sertraline    # HLD: continue atorvastatin. TLC diet.    # Buttock rash: nystatin    Gait Instability, ADL impairments and Functional impairments: start Comprehensive Rehab Program of PT/OT/SLP  DVT PPx: Warfarin  Diet: DASH/TLC

## 2019-04-28 NOTE — PROGRESS NOTE ADULT - SUBJECTIVE AND OBJECTIVE BOX
HPI:  This is a 85yo Male with PMH prior stroke (Feb '19), HTN, HLD, CAD c/b MI s/p stent '96, COPD, seizure d/o, PE s/p IVC filter, A-fib on warfarin, presented to [facility] 4/22 w/ R hand weakness x1day with slurred speech. INR therapeutic on admission. MR brain showed scattered infarcts throughout, in no vascular distribution, suspicious for embolic phenomenon. 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 Seen by CT surg, recommended open heart surgery to excise the mass.    Heme onc noted hx of factor V Leiden. 4/23 doppler BLE neg for DVT.(26 Apr 2019 14:05)      Subjective  No new complaints.       PAST MEDICAL & SURGICAL HISTORY:  Cerebrovascular accident (CVA)  History of COPD  CAD S/P percutaneous coronary angioplasty  PAF  PE (pulmonary embolism)  Hyperlipemia  Hypertension  S/P cataract surgery  S/P IVC filter      MedsMEDICATIONS  (STANDING):  amLODIPine   Tablet 5 milliGRAM(s) Oral daily  aspirin  chewable 81 milliGRAM(s) Oral daily  atorvastatin 40 milliGRAM(s) Oral at bedtime  docusate sodium 100 milliGRAM(s) Oral two times a day  metoprolol succinate  milliGRAM(s) Oral daily  nystatin Cream 1 Application(s) Topical two times a day  pantoprazole    Tablet 40 milliGRAM(s) Oral before breakfast  sertraline 50 milliGRAM(s) Oral daily  tamsulosin 0.4 milliGRAM(s) Oral at bedtime    MEDICATIONS  (PRN):  ALBUTerol/ipratropium for Nebulization 3 milliLiter(s) Nebulizer every 6 hours PRN Shortness of Breath and/or Wheezing      Vital Signs Last 24 Hrs  T(C): 36.4 (28 Apr 2019 08:14), Max: 36.6 (27 Apr 2019 20:11)  T(F): 97.6 (28 Apr 2019 08:14), Max: 97.9 (27 Apr 2019 20:11)  HR: 67 (28 Apr 2019 08:14) (62 - 71)  BP: 106/71 (28 Apr 2019 08:14) (106/71 - 141/89)  BP(mean): --  RR: 15 (28 Apr 2019 08:14) (15 - 16)  SpO2: 96% (28 Apr 2019 08:14) (96% - 97%)  I&O's Summary    27 Apr 2019 07:01  -  28 Apr 2019 07:00  --------------------------------------------------------  IN: 0 mL / OUT: 520 mL / NET: -520 mL        PHYSICAL EXAM:  GENERAL: NAD  NECK: Supple  NERVOUS SYSTEM:  awake and alert  HEART: S1s2 NL , RRR  CHEST/LUNG: Clear to percussion bilaterally  ABDOMEN: Soft, Nontender, Nondistended; Bowel sounds present  EXTREMITIES:  No edema      LABS:  |04-28-19 @ 05:50            --  -->--------------<--            --      --  |  --  |  --  --------------------------<--  --  |  --  |  --    Mg -- / Phos--    PT 44.5<H> / INR 3.81<H>    PTT --    Lipase --  04-28-19 @ 05:50    Imaging Personally Reviewed:  [ ] YES  [ ] NO      HEALTH ISSUES - PROBLEM Dx:  CVA while being therapeutic on coumadin. 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

## 2019-04-28 NOTE — PROGRESS NOTE ADULT - SUBJECTIVE AND OBJECTIVE BOX
Subjective: Pt. with itchy rash on buttock      REVIEW OF SYSTEMS  Pertinent in last 24 h: Neurological deficits    VITALS  T(C): 36.4 (04-28-19 @ 08:14), Max: 36.6 (04-27-19 @ 20:11)  HR: 67 (04-28-19 @ 08:14) (62 - 71)  BP: 106/71 (04-28-19 @ 08:14) (106/71 - 141/89)  RR: 15 (04-28-19 @ 08:14) (15 - 16)  SpO2: 96% (04-28-19 @ 08:14) (96% - 97%)  Wt(kg): --    Physical Exam:  Constitutional - NAD, Comfortable  	HEENT - NCAT, EOMI  	Neck - Supple  	Chest - CTAB  	Cardiovascular - RRR  	Abdomen - BS+, Soft, NTND  	Extremities - No C/C/E, No calf tenderness   	Neurologic Exam -                 	   Cognitive - Awake, Alert, AAO to self, place, date, year, situation  	   Communication - Occasional word finding difficulty, intact naming, repetition, and comprehension.  	   Cranial Nerves - face symmetric, EOMI, facial sensation intact, tongue midline  	   Motor -   	                  LEFT    UE - 5/5  	                  RIGHT UE - 5/5  	                  LEFT    LE - 5/5  	                  RIGHT LE - HF 5/5     	  	   Coordination - impaired R HTS,  intact FNF  	Psychiatric - Mood stable, Affect WNL  Skin - Petechial fungal rash--diffusely on left buttock and upper thigh ~ 28cm x 20cm, Right buttock ~ 7cm x 5 cm, right thigh 6 cm x 6 cm. Petechial rash on bilateral ankles.       RECENT LABS/IMAGING                        13.4   8.0   )-----------( 214      ( 27 Apr 2019 05:45 )             42.6     04-27    141  |  105  |  17  ----------------------------<  118<H>  3.6   |  25  |  1.30    Ca    9.3      27 Apr 2019 05:45    TPro  6.9  /  Alb  3.1<L>  /  TBili  0.6  /  DBili  x   /  AST  15  /  ALT  17  /  AlkPhos  58  04-27      PT/INR - ( 28 Apr 2019 05:50 )   PT: 44.5 sec;   INR: 3.81 ratio               MEDICATIONS   ALBUTerol/ipratropium for Nebulization 3 milliLiter(s) every 6 hours PRN  amLODIPine   Tablet 5 milliGRAM(s) daily  aspirin  chewable 81 milliGRAM(s) daily  atorvastatin 40 milliGRAM(s) at bedtime  docusate sodium 100 milliGRAM(s) two times a day  metoprolol succinate  milliGRAM(s) daily  nystatin Cream 1 Application(s) two times a day  pantoprazole    Tablet 40 milliGRAM(s) before breakfast  sertraline 50 milliGRAM(s) daily  tamsulosin 0.4 milliGRAM(s) at bedtime      --------------------------------------------------------------------

## 2019-04-29 DIAGNOSIS — I82.409 ACUTE EMBOLISM AND THROMBOSIS OF UNSPECIFIED DEEP VEINS OF UNSPECIFIED LOWER EXTREMITY: ICD-10-CM

## 2019-04-29 DIAGNOSIS — R41.9 UNSPECIFIED SYMPTOMS AND SIGNS INVOLVING COGNITIVE FUNCTIONS AND AWARENESS: ICD-10-CM

## 2019-04-29 DIAGNOSIS — I48.91 UNSPECIFIED ATRIAL FIBRILLATION: ICD-10-CM

## 2019-04-29 DIAGNOSIS — I63.9 CEREBRAL INFARCTION, UNSPECIFIED: ICD-10-CM

## 2019-04-29 LAB
CULTURE RESULTS: SIGNIFICANT CHANGE UP
CULTURE RESULTS: SIGNIFICANT CHANGE UP
INR BLD: 4.44 RATIO — HIGH (ref 0.88–1.16)
PROTHROM AB SERPL-ACNC: 52.1 SEC — HIGH (ref 10–12.9)
SPECIMEN SOURCE: SIGNIFICANT CHANGE UP
SPECIMEN SOURCE: SIGNIFICANT CHANGE UP

## 2019-04-29 PROCEDURE — 99233 SBSQ HOSP IP/OBS HIGH 50: CPT

## 2019-04-29 PROCEDURE — 99223 1ST HOSP IP/OBS HIGH 75: CPT

## 2019-04-29 RX ORDER — LANOLIN ALCOHOL/MO/W.PET/CERES
6 CREAM (GRAM) TOPICAL AT BEDTIME
Qty: 0 | Refills: 0 | Status: DISCONTINUED | OUTPATIENT
Start: 2019-04-29 | End: 2019-04-29

## 2019-04-29 RX ORDER — QUETIAPINE FUMARATE 200 MG/1
12.5 TABLET, FILM COATED ORAL AT BEDTIME
Qty: 0 | Refills: 0 | Status: DISCONTINUED | OUTPATIENT
Start: 2019-04-29 | End: 2019-05-09

## 2019-04-29 RX ADMIN — SERTRALINE 50 MILLIGRAM(S): 25 TABLET, FILM COATED ORAL at 11:53

## 2019-04-29 RX ADMIN — ATORVASTATIN CALCIUM 40 MILLIGRAM(S): 80 TABLET, FILM COATED ORAL at 21:57

## 2019-04-29 RX ADMIN — AMLODIPINE BESYLATE 5 MILLIGRAM(S): 2.5 TABLET ORAL at 05:56

## 2019-04-29 RX ADMIN — PANTOPRAZOLE SODIUM 40 MILLIGRAM(S): 20 TABLET, DELAYED RELEASE ORAL at 05:57

## 2019-04-29 RX ADMIN — BUDESONIDE AND FORMOTEROL FUMARATE DIHYDRATE 2 PUFF(S): 160; 4.5 AEROSOL RESPIRATORY (INHALATION) at 08:55

## 2019-04-29 RX ADMIN — TAMSULOSIN HYDROCHLORIDE 0.4 MILLIGRAM(S): 0.4 CAPSULE ORAL at 21:57

## 2019-04-29 RX ADMIN — Medication 81 MILLIGRAM(S): at 11:53

## 2019-04-29 RX ADMIN — NYSTATIN CREAM 1 APPLICATION(S): 100000 CREAM TOPICAL at 17:48

## 2019-04-29 RX ADMIN — Medication 100 MILLIGRAM(S): at 05:57

## 2019-04-29 RX ADMIN — NYSTATIN CREAM 1 APPLICATION(S): 100000 CREAM TOPICAL at 05:57

## 2019-04-29 RX ADMIN — BUDESONIDE AND FORMOTEROL FUMARATE DIHYDRATE 2 PUFF(S): 160; 4.5 AEROSOL RESPIRATORY (INHALATION) at 20:49

## 2019-04-29 RX ADMIN — Medication 100 MILLIGRAM(S): at 17:48

## 2019-04-29 RX ADMIN — QUETIAPINE FUMARATE 12.5 MILLIGRAM(S): 200 TABLET, FILM COATED ORAL at 21:57

## 2019-04-29 NOTE — DIETITIAN INITIAL EVALUATION ADULT. - PERTINENT LABORATORY DATA
(4/27/19) Hgb 13.4 wnl, Hct 42.6 wnl, Na 141 wnl, K 36 wnl, Cl 105 wnl, CO2 25 wnl, BUN 17 wnl, Creat 1.30 wnl, Glu 118 wnl, eGFR 50 L

## 2019-04-29 NOTE — PROGRESS NOTE ADULT - ASSESSMENT
SANDI SANTOS is a 83yo Male with PMH prior stroke (Feb '19), HTN, HLD, CAD c/b MI s/p stent '96, COPD, seizure d/o, PE s/p IVC filter, A-fib on warfarin admitted for multiple scattered infarcts, now with gait instability, ADL impairments and functional impairments.     # Acute ischemic stroke: cardioembolic etiology  - Aspirin, warfarin. Coumadin held-supratherapeutic. Goal INR 3-3.5. Heme eval. Continue PT/OT/SLP therapy. Plan discuaaed c DTR at length. Melatonin added for sleep.     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 Will require open heart surgery for excision.   - Per [facility] rehab note 4/26, surgery planned for after acute rehab. Cardiology evaluation.    # Chronic A-fib  - Toprol XL for rate control  - Warfarin, goal INR 3-3.5    # COPD: continue symbicort    # HTN  - DASH  - BB as above  - Amlodipine    # Factor V Leiden  - Hematology evaluation. On Coumadin, held for supratherapeutic INR. F/u outpt  - No evidence of DVT on 4/23 duplex of BLE.     # BPH  - Flomax.     # Depression: continue sertraline    # HLD: continue atorvastatin. Cardiac diet. Follow LFTs.     # Buttock rash: nystatin    Gait Instability, ADL impairments and Functional impairments: Comprehensive Rehab Program of PT/OT/SLP  DVT PPx: Warfarin  Diet: DASH/TLC    MEDICAL PROGNOSIS: GOOD                    REHAB POTENTIAL: GOOD  ESTIMATED DISPOSITION: HOME              ELOS: 10-14 Days   EXPECTED THERAPY:   P.T. 1hr/day      O.T. 1hr/day     S.L.P. 1hr/day   EXP FREQUENCY: 5 days per 7 day period     PRESCREEN COMPARISON: I have reviewed the prescreen information and I have found no relevant changes between the preadmission screening and my post admission evalulation     RATIONALE FOR INPATIENT ADMISSION - Patient demonstrates the following: (check all that apply)  [X] Medically appropriate for rehabilitation admission  [X] Has attainable rehab goals with an appropriate initial discharge plan  [X] Has rehabilitation potential (expected to make a significant improvement within a reasonable period of time)   [X] Requires close medical management by a rehab physician, rehab nursing care, Hospitalist and comprehensive interdisciplinary team (including PT, OT, & or SLP, Prosthetics and Orthotics) SANDI SANTOS is a 83yo Male with PMH prior stroke (Feb '19), HTN, HLD, CAD c/b MI s/p stent '96, COPD, seizure d/o, PE s/p IVC filter, A-fib on warfarin admitted for multiple scattered infarcts, now with gait instability, ADL impairments and functional impairments.     # Acute ischemic stroke: cardioembolic etiology  - Aspirin, warfarin. Coumadin held-supratherapeutic. Goal INR 3-3.5. Heme eval. Continue PT/OT/SLP therapy. Plan discuaaed c DTR at length. Melatonin added for sleep.     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 Will require open heart surgery for excision.   - Per [facility] rehab note 4/26, surgery planned for after acute rehab. Cardiology evaluation.    # Chronic A-fib  - Toprol XL for rate control  - Warfarin, goal INR 3-3.5    # COPD: continue symbicort    # HTN  - DASH  - BB as above  - Amlodipine    # Factor V Leiden  - Hematology evaluation. On Coumadin, held for supratherapeutic INR. F/u outpt  - No evidence of DVT on 4/23 duplex of BLE.     # BPH  - Flomax.     # Depression: continue sertraline    # HLD: continue atorvastatin. Cardiac diet. Follow LFTs.     # Buttock rash: nystatin    Gait Instability, ADL impairments and Functional impairments: Comprehensive Rehab Program of PT/OT/SLP  DVT PPx: Warfarin  Diet: DASH/TLC

## 2019-04-29 NOTE — CONSULT NOTE ADULT - ASSESSMENT
This is an 84 year old man with a history of cardiovascular disease, stroke this past february, CAD s/p MI with a stent in 1996, an aortic mass.  Was recommended to have a resection, PE and DVT s/p IVC filter. MRI shows diffuse disease, suggestive of emboli.   Patient reports he as been worked up by another hematologist at [place] Dr. Kelsie Linda and knows he has a Factor V leiden mutation.  Clearly remembers that he was asked to have his family screened for the condition which is consistent with what is reported. Unclear if this is the heterozygous or the much more clinically relevant homozygous mutation.  Offered to simply retest it to make sure.  Patient declines.  In that case I will reach out to Dr. Linda tomorrow see if I can get a report from them or at least a verbal confirmation.  This does not change the recommendations on anticoagulation however, its clear he needs it.      Discussed possibility of changing him to Eliquis.  Discussed issues such as bleeding risk, the reversibility of it, the the variable cost prohibitions to it.  Also discussed full dose lovenox therapy.   He states that this discussion has been had before with Dr. Linda, patient again declined. In this case can stay on coumadin but treat to a higher goal INR of 3.0-3.5.  INR supratherapeutic today at 4.4.  Over past 3 days had recieved  26th 5mg, 27th 5mg, 28th 1mg.   Recommend holding it tonight then tomorrow watch INR come down, if < 3.5 restart his usual 2mg TTSS and 4mg MWF schedule. This is an 84 year old man with a history of cardiovascular disease, stroke this past february, CAD s/p MI with a stent in 1996, an aortic mass.  Was recommended to have a resection, PE and DVT s/p IVC filter. MRI shows diffuse disease, suggestive of emboli.   Patient reports he as been worked up by another hematologist at [place] Dr. Spicer (now at [place])  and knows he has a Factor V leiden mutation.  Clearly remembers that he was asked to have his family screened for the condition which is consistent with what is reported. Unclear if this is the heterozygous or the much more clinically relevant homozygous mutation.  Offered to simply retest it to make sure.  Patient declines.  In that case I will reach out to Dr. Spicer or Patient's primary Dr. Barrientos tomorrow see if I can get a report from them or at least a verbal confirmation.  This does not change the recommendations on anticoagulation however, its clear he needs it.      Discussed possibility of changing him to Eliquis.  Discussed issues such as bleeding risk, the reversibility of it, the the variable cost prohibitions to it.  Also discussed full dose lovenox therapy.   He states that this discussion has been had before with Dr. Linda, patient again declined. In this case can stay on coumadin but treat to a higher goal INR of 3.0-3.5.  INR supratherapeutic today at 4.4.  Over past 3 days had recieved  26th 5mg, 27th 5mg, 28th 1mg.   Recommend holding it tonight then tomorrow watch INR come down, if < 3.5 restart his usual 2mg TTSS and 4mg MWF schedule.      Please note this note was eddited to reflect correct name of hematologist.

## 2019-04-29 NOTE — PROGRESS NOTE ADULT - SUBJECTIVE AND OBJECTIVE BOX
Patient is a 84y old  Male who presents with a chief complaint of CVA (27 Apr 2019 12:16)      Patient seen and examined at bedside. feels well, no complaints     ALLERGIES:  No Known Allergies    MEDICATIONS:  ALBUTerol/ipratropium for Nebulization 3 milliLiter(s) Nebulizer every 6 hours PRN  buDESOnide 160 MICROgram(s)/formoterol 4.5 MICROgram(s) Inhaler 2 Puff(s) Inhalation two times a day  melatonin 6 milliGRAM(s) Oral at bedtime  nystatin Cream 1 Application(s) Topical two times a day    Vital Signs Last 24 Hrs  T(F): 97.7 (29 Apr 2019 08:10), Max: 98.1 (28 Apr 2019 20:39)  HR: 74 (29 Apr 2019 08:57) (69 - 75)  BP: 114/81 (29 Apr 2019 08:10) (114/81 - 162/89)  RR: 15 (29 Apr 2019 08:10) (14 - 15)  SpO2: 97% (29 Apr 2019 08:57) (94% - 97%)  I&O's Summary    28 Apr 2019 07:01  -  29 Apr 2019 07:00  --------------------------------------------------------  IN: 0 mL / OUT: 1 mL / NET: -1 mL    29 Apr 2019 07:01  -  29 Apr 2019 15:37  --------------------------------------------------------  IN: 300 mL / OUT: 300 mL / NET: 0 mL        PHYSICAL EXAM:  General: NAD, alert oriented   Neck: Supple, No JVD  Lungs: Clear to auscultation bilaterally  Cardio: RRR, S1/S2, No murmurs  Abdomen: Soft, Nontender, Nondistended; Bowel sounds present  Extremities: No clubbing, cyanosis, or edema      LABS:                        13.4   8.0   )-----------( 214      ( 27 Apr 2019 05:45 )             42.6     04-27    141  |  105  |  17  ----------------------------<  118  3.6   |  25  |  1.30    Ca    9.3      27 Apr 2019 05:45    TPro  6.9  /  Alb  3.1  /  TBili  0.6  /  DBili  x   /  AST  15  /  ALT  17  /  AlkPhos  58  04-27    eGFR if Non African American: 50 mL/min/1.73M2 (04-27-19 @ 05:45)  eGFR if African American: 58 mL/min/1.73M2 (04-27-19 @ 05:45)    PT/INR - ( 29 Apr 2019 06:16 )   PT: 52.1 sec;   INR: 4.44 ratio       04-24 Chol 189 mg/dL  mg/dL HDL 35 mg/dL Trig 143 mg/dL    CAPILLARY BLOOD GLUCOSE    04-23 [id number] 6.1    Culture - Blood (collected 24 Apr 2019 16:36)  Source: .Blood  Preliminary Report (26 Apr 2019 17:01):    No growth at 48 hours    Culture - Blood (collected 24 Apr 2019 16:35)  Source: .Blood  Preliminary Report (26 Apr 2019 17:01):    No growth at 48 hours    RADIOLOGY & ADDITIONAL TESTS:    Care Discussed with Consultants/Other Providers:

## 2019-04-29 NOTE — PROGRESS NOTE ADULT - SUBJECTIVE AND OBJECTIVE BOX
CHIEF COMPLAINT: Offers no complaints.       HISTORY OF PRESENT ILLNESS  SANDI SANTOS is a 83yo Male with PMH prior stroke (Feb '19), HTN, HLD, CAD c/b MI s/p stent '96, COPD, seizure d/o, PE s/p IVC filter, A-fib on warfarin, presented to [facility] 4/22 w/ R hand weakness x1day with slurred speech. INR therapeutic on admission. MR brain showed scattered infarcts throughout, in no vascular distribution, suspicious for embolic phenomenon. 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 Seen by CT surg, recommended open heart surgery to excise the mass.    Heme onc noted hx of factor V Leiden. 4/23 doppler BLE neg for DVT. Heme recommended change to other AC. (26 Apr 2019 14:05)      PAST MEDICAL & SURGICAL HISTORY:  Cerebrovascular accident (CVA)  History of COPD  CAD S/P percutaneous coronary angioplasty  Atrial fibrillation, unspecified type  [place] filter in place  PE (pulmonary embolism)  Hyperlipemia  Hypertension  S/P cataract surgery  S/P IVC filter       REVIEW OF SYMPTOMS  [X] Constitutional WNL     [X] Cardio WNL            [X] Resp WNL           [X] GI WNL                           [X] Endo WNL                     [X] Skin WNL                 [X] MSK WNL                          [X] Cognitive WNL            VITALS  Vital Signs Last 24 Hrs  T(C): 36.5 (29 Apr 2019 08:10), Max: 36.7 (28 Apr 2019 20:39)  T(F): 97.7 (29 Apr 2019 08:10), Max: 98.1 (28 Apr 2019 20:39)  HR: 74 (29 Apr 2019 08:57) (69 - 75)  BP: 114/81 (29 Apr 2019 08:10) (114/81 - 162/89)  BP(mean): --  RR: 15 (29 Apr 2019 08:10) (14 - 15)  SpO2: 97% (29 Apr 2019 08:57) (94% - 97%)      PHYSICAL EXAM  Constitutional - NAD, CASTILLO  HEENT - NCAT, EOMI  Neck - Supple, No limited ROM  Chest - CTA bilaterally, No wheeze, No rhonchi, No crackles, dyspnea on exertion  Cardiovascular -irregular, murmur  Abdomen - BS+, Soft, NTND  Extremities - No C/C/E, No calf tenderness   Skin-no rash  Wounds-      Neurologic Exam - no new deficits.                     Balance - impaired     Psychiatric - Mood stable, Affect WNL, insomnia         FUNCTIONAL PROGRESS  Gait - 40ft RW mod A   ADLs - min/CG  Transfers - min A  Functional transfer - min A    RECENT LABS          PT/INR - ( 29 Apr 2019 06:16 )   PT: 52.1 sec;   INR: 4.44 ratio               Direct LDL: 125 mg/dL (04-24-19 @ 05:05)  Direct LDL: 111 mg/dL (04-23-19 @ 05:30)    Hemoglobin A1C, Whole Blood: 6.1 % (04-23-19 @ 13:07)              RADIOLOGY/OTHER RESULTS      CURRENT MEDICATIONS  MEDICATIONS  (STANDING):  amLODIPine   Tablet 5 milliGRAM(s) Oral daily  aspirin  chewable 81 milliGRAM(s) Oral daily  atorvastatin 40 milliGRAM(s) Oral at bedtime  buDESOnide 160 MICROgram(s)/formoterol 4.5 MICROgram(s) Inhaler 2 Puff(s) Inhalation two times a day  docusate sodium 100 milliGRAM(s) Oral two times a day  melatonin 6 milliGRAM(s) Oral at bedtime  metoprolol succinate  milliGRAM(s) Oral daily  nystatin Cream 1 Application(s) Topical two times a day  pantoprazole    Tablet 40 milliGRAM(s) Oral before breakfast  sertraline 50 milliGRAM(s) Oral daily  tamsulosin 0.4 milliGRAM(s) Oral at bedtime    MEDICATIONS  (PRN):  ALBUTerol/ipratropium for Nebulization 3 milliLiter(s) Nebulizer every 6 hours PRN Shortness of Breath and/or Wheezing      ASSESSMENT & PLAN          GI/Bowel Management - colace   Management - Toilet Q2  Skin - Turn Q2  Pain - Tylenol PRN  DVT PPX - on Coumadin  Diet - reg c thins     Continue comprehensive acute rehab program consisting of 3hrs/day of OT/PT and SLP. CHIEF COMPLAINT: Reports confusion overnight, couldn't recognize his surroundings when awoke in the middle of the night. Admits to felling scared then, but was reoriented quickly.  Tolerates therapy well. No new complaints now.      HISTORY OF PRESENT ILLNESS  SANDI SANTOS is a 83yo Male with PMH prior stroke (Feb '19), HTN, HLD, CAD c/b MI s/p stent '96, COPD, seizure d/o, PE s/p IVC filter, A-fib on warfarin, presented to [facility] 4/22 w/ R hand weakness x1day with slurred speech. INR therapeutic on admission. MR brain showed scattered infarcts throughout, in no vascular distribution, suspicious for embolic phenomenon. 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 Seen by CT surg, recommended open heart surgery to excise the mass.    Heme onc noted hx of factor V Leiden. 4/23 doppler BLE neg for DVT. Heme recommended change to other AC. (26 Apr 2019 14:05)      PAST MEDICAL & SURGICAL HISTORY:  Cerebrovascular accident (CVA)  History of COPD  CAD S/P percutaneous coronary angioplasty  Atrial fibrillation, unspecified type  [place] filter in place  PE (pulmonary embolism)  Hyperlipemia  Hypertension  S/P cataract surgery  S/P IVC filter       REVIEW OF SYMPTOMS  [X] Constitutional WNL     [X] Cardio WNL            [X] Resp WNL           [X] GI WNL                           [X] Endo WNL                     [X] Skin WNL                 [X] MSK WNL                          [X] Cognitive WNL            VITALS  Vital Signs Last 24 Hrs  T(C): 36.5 (29 Apr 2019 08:10), Max: 36.7 (28 Apr 2019 20:39)  T(F): 97.7 (29 Apr 2019 08:10), Max: 98.1 (28 Apr 2019 20:39)  HR: 74 (29 Apr 2019 08:57) (69 - 75)  BP: 114/81 (29 Apr 2019 08:10) (114/81 - 162/89)  BP(mean): --  RR: 15 (29 Apr 2019 08:10) (14 - 15)  SpO2: 97% (29 Apr 2019 08:57) (94% - 97%)      PHYSICAL EXAM  Constitutional - NAD, CASTILLO  HEENT - NCAT, EOMI  Neck - Supple, No limited ROM  Chest - CTA bilaterally, No wheeze, No rhonchi, No crackles, dyspnea on exertion  Cardiovascular -irregular, murmur  Abdomen - BS+, Soft, NTND  Extremities - No C/C/E, No calf tenderness   Skin-no rash  Neurologic Exam - no new  focal deficits.                     Balance - impaired     Psychiatric - Mood stable, Affect WNL, insomnia         FUNCTIONAL PROGRESS  Gait - 40ft RW mod A   ADLs - min/CG  Transfers - min A  Functional transfer - min A    RECENT LABS          PT/INR - ( 29 Apr 2019 06:16 )   PT: 52.1 sec;   INR: 4.44 ratio               Direct LDL: 125 mg/dL (04-24-19 @ 05:05)  Direct LDL: 111 mg/dL (04-23-19 @ 05:30)    Hemoglobin A1C, Whole Blood: 6.1 % (04-23-19 @ 13:07)        CURRENT MEDICATIONS  MEDICATIONS  (STANDING):  amLODIPine   Tablet 5 milliGRAM(s) Oral daily  aspirin  chewable 81 milliGRAM(s) Oral daily  atorvastatin 40 milliGRAM(s) Oral at bedtime  buDESOnide 160 MICROgram(s)/formoterol 4.5 MICROgram(s) Inhaler 2 Puff(s) Inhalation two times a day  docusate sodium 100 milliGRAM(s) Oral two times a day  melatonin 6 milliGRAM(s) Oral at bedtime  metoprolol succinate  milliGRAM(s) Oral daily  nystatin Cream 1 Application(s) Topical two times a day  pantoprazole    Tablet 40 milliGRAM(s) Oral before breakfast  sertraline 50 milliGRAM(s) Oral daily  tamsulosin 0.4 milliGRAM(s) Oral at bedtime    MEDICATIONS  (PRN):  ALBUTerol/ipratropium for Nebulization 3 milliLiter(s) Nebulizer every 6 hours PRN Shortness of Breath and/or Wheezing      ASSESSMENT & PLAN          GI/Bowel Management - colace   Management - Toilet Q2  Skin - Turn Q2  Pain - Tylenol PRN  DVT PPX - on Coumadin  Diet - reg c thins     Continue comprehensive acute rehab program consisting of 3hrs/day of OT/PT and SLP.

## 2019-04-29 NOTE — DIETITIAN INITIAL EVALUATION ADULT. - OTHER INFO
85 y/o admitted s/p cerebral infarction (4/22) w/ PMH prior stroke (2/19), HTN, HLD, CAD c/b MI, COPD, seizure d/o afib. Pt tolerating DASH/TLC diet w/ good PO intakes (consuming % of meals per chart review); pt reports appetite is good. PTA, pt reports eating many meals away from home; based on diet recall, pt consumes a high sodium, high fat diet. Pt has class II obesity based on BMI of 38.1 (using wt of 288.5 lbs on 4/26/19); pt stated he wants to lose weight (after discharge) to prevent further health complications. Discussed dietary modifications and nutrition therapy for strokes (including DASH/TLC guidelines). Pt reports UBW ranges from 280-300 lbs (fluctuates) depending on intake. No c/o n/v/d/c; bowels assited w/ colace. No difficulties chewing per pt report. Skin intact of PU; no edema per chart review.

## 2019-04-29 NOTE — CONSULT NOTE ADULT - SUBJECTIVE AND OBJECTIVE BOX
This is a pleasant 84 year old man with history of stroke (February this year), HTN, Hyperlipidemia, CAD s/p MI s/p stent 1996, COPD, Seizure disorder, hx of PE and PE 5 years ago s/p IVC filter on Coumadin, Afib also on coumadin.  Patient was admitted to [facility] on 4/22 with right hand weakness and slurred speach, INR therapeutic on admission. Patient usually on 4mg of coumadin on MWF and 2mg on TTSS.  INR today 4.44, over last 3 days, 26th 5mg, 27th, 5mg, 28th 1mg.  He does have an outside hematologist, Dr. Kelsie Linda who had performed a hypercoagulation workup. Patient only recalls that he as told he had Factor V Leiden, but does not remember if this was the heterozygous or homozygous variant.        MRI brain showed scattered infarcts throughout, in no vascular distribution, suspicious for embolic phenomenon.   UMER found aortic valve mass, ddx fibroelastoma vs Lambl's excrescence.   doppler BLE 4/23/19  neg for DVT. This is a pleasant 84 year old man with history of stroke (February this year), HTN, Hyperlipidemia, CAD s/p MI s/p stent 1996, COPD, Seizure disorder, hx of PE and PE 5 years ago s/p IVC filter on Coumadin, Afib also on coumadin.  Patient was admitted to [facility] on 4/22 with right hand weakness and slurred speach, INR therapeutic on admission. Patient usually on 4mg of coumadin on MWF and 2mg on TTSS.  INR today 4.44, over last 3 days, 26th 5mg, 27th, 5mg, 28th 1mg.  He does have an outside hematologist, Dr. Spicer from [place] who had performed a hypercoagulation workup. Patient only recalls that he as told he had Factor V Leiden, but does not remember if this was the heterozygous or homozygous variant.        MRI brain showed scattered infarcts throughout, in no vascular distribution, suspicious for embolic phenomenon.   UMER found aortic valve mass, ddx fibroelastoma vs Lambl's excrescence.   doppler BLE 4/23/19  neg for DVT.

## 2019-04-29 NOTE — CONSULT NOTE ADULT - SUBJECTIVE AND OBJECTIVE BOX
Source:MD. Chart, patient.   Reliability: Reliable    Identifying Data: 84yyo male,  - PROBLEM Dx: cerebral  infarction          Chief Complaint:' I am confused at night , do not know where i am '    History of Present Illness:  HPI:  SANDI SANTOS is a 83yo Male with PMH prior stroke (), HTN, HLD, CAD c/b MI s/p stent , COPD, seizure d/o, PE s/p IVC filter, A-fib on warfarin, presented to [facility]  w/ R hand weakness x1day with slurred speech. INR therapeutic on admission. MR brain showed scattered infarcts throughout, in no vascular distribution, suspicious for embolic phenomenon. 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 Seen by CT surg, recommended open heart surgery to excise the mass.    Heme onc noted hx of factor V Leiden.  doppler BLE neg for DVT. Heme recommended change to other AC. (2019 14:05)      Psychiatric History of Present Illness: patient has been on Zoloft  since his CVA. never seen psychiatrist ,but lost his wife  1 y ago  and has tears  talking about it now.            Psychiatric Review of Systems:        Suicidality: denies    Injury to others: none    Mental Status Exam:  General Appearance: 84 y old, overweight , looks younger ,  Remarkable Features: pleasant, verbal, eager to talk  Affect: full range   Mood: euthymic  Psychomotor state:   Abnormal Movements and posture: laying  in a bed, comfortable  Cognition, Perceptual Abnormalities  Consciousness: alert  Orientation: x 2, not exact  date  Memory: Recent/Past/Remote:   Attention: good  Judgment/Insight: good  Fund of Information/Intelligence: fair  List abnormalities:  Ideas of reference, bizarre ideations, recurrent illusions, phobias, obsessions/compulsions not elicited  Hallucinations: Auditory/visual/tactile/olfactory/other not elicited  Delusions: Persecutory/somatic none                  Medications:  MEDICATIONS  (STANDING):  amLODIPine   Tablet 5 milliGRAM(s) Oral daily  aspirin  chewable 81 milliGRAM(s) Oral daily  atorvastatin 40 milliGRAM(s) Oral at bedtime  buDESOnide 160 MICROgram(s)/formoterol 4.5 MICROgram(s) Inhaler 2 Puff(s) Inhalation two times a day  docusate sodium 100 milliGRAM(s) Oral two times a day  metoprolol succinate  milliGRAM(s) Oral daily  nystatin Cream 1 Application(s) Topical two times a day  pantoprazole    Tablet 40 milliGRAM(s) Oral before breakfast  QUEtiapine 12.5 milliGRAM(s) Oral at bedtime  sertraline 50 milliGRAM(s) Oral daily  tamsulosin 0.4 milliGRAM(s) Oral at bedtime    MEDICATIONS  (PRN):  ALBUTerol/ipratropium for Nebulization 3 milliLiter(s) Nebulizer every 6 hours PRN Shortness of Breath and/or Wheezing    No Known Allergies    Intolerances        Past Medical and Surgical History:  PAST MEDICAL & SURGICAL HISTORY:  Cerebrovascular accident (CVA)  History of COPD  CAD S/P percutaneous coronary angioplasty  Atrial fibrillation, unspecified type  Marisol filter in place  PE (pulmonary embolism)  Hyperlipemia  Hypertension  S/P cataract surgery  S/P IVC filter  h/o sleep apnea, on CPAP machine for 10 years    Past Psychiatric History: depression since his wife  1 y ago. not treated        Hospitalizations-none  -#-    Psychotropic medication trials (agent/adequacy/ effects)-currently on Zoloft 50mg.          Substance Use History: denies      Social and Personal History: 10 y retired from a company, working as a ..,, lives alone , has daughter in [facility] and son  in [place]        Marriage/ children, romantic and meaningful relationships/Psychosocial supports- Daughter is coming from [place] to be with him          FAMILY HISTORY: not contributory  Family history of diabetes mellitus (Child)         T(C): 36.5 (19 @ 08:10), Max: 36.7 (19 @ 20:39)  HR: 74 (19 @ 08:57) (69 - 75)  BP: 114/81 (19 @ 08:10) (114/81 - 162/89)  RR: 15 (19 @ 08:10) (14 - 15)  SpO2: 97% (19 @ 08:57) (94% - 97%)  Wt(kg): --            PT/INR - ( 2019 06:16 )   PT: 52.1 sec;   INR: 4.44 ratio         Psychiatric Diagnosis: Mild neurocognitive disorder. adjustment disorder with anxiety.    Recommendations: Continue Zoloft 50mg, Seroquel 12,5 mg qhs prn for agitation, CPAP mashine to restart .      Other Treatment considerations- Supportive therapy.    Referrals-     Hospital course Will  Follow-up

## 2019-04-29 NOTE — PROGRESS NOTE ADULT - ATTENDING COMMENTS
Patient examined, chart reviewed.  Neurological exam unchanged,  Serotherapeutic  INR , will hold Coumadin, no signs of toxicity, GI ppx, will continue to monitor.  Depression with suspected mild  nocturnal delirium. Continue SSRI, will ask Psychiatry to evaluate Patient examined, chart reviewed.  Neurological exam unchanged,  Serotherapeutic  INR , will hold Coumadin, no signs of toxicity, GI ppx, will continue to monitor.  Depression with suspected mild  nocturnal delirium. Continue SSRI, will ask Psychiatry to evaluate, case discussed , will give trial of small dose Seroquel before bedtime. Full Psychiatric evaluation to follow.

## 2019-04-29 NOTE — PROGRESS NOTE ADULT - ASSESSMENT
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 surgery 3 weeks post acute rehab for open heart for excision     *A-Fib  Rate controlled  Cont toprol   Warfarin w Goal 3-3.5 given aortic valve mass    *COPD  Stat Duoneb  Cont symbicort     *HTN  BP controlled   Cont Amlodipine     *Factor V Leiden   Cont AC w Coumadin    *BPH  Cont tamsulosin     *Depression   Cont Sertraline     *HLD  Cont Statin

## 2019-04-30 LAB
INR BLD: 3.28 RATIO — HIGH (ref 0.88–1.16)
PROTHROM AB SERPL-ACNC: 38.1 SEC — HIGH (ref 10–12.9)

## 2019-04-30 PROCEDURE — 99232 SBSQ HOSP IP/OBS MODERATE 35: CPT

## 2019-04-30 RX ORDER — WARFARIN SODIUM 2.5 MG/1
2 TABLET ORAL ONCE
Qty: 0 | Refills: 0 | Status: COMPLETED | OUTPATIENT
Start: 2019-04-30 | End: 2019-04-30

## 2019-04-30 RX ADMIN — TAMSULOSIN HYDROCHLORIDE 0.4 MILLIGRAM(S): 0.4 CAPSULE ORAL at 21:38

## 2019-04-30 RX ADMIN — ATORVASTATIN CALCIUM 40 MILLIGRAM(S): 80 TABLET, FILM COATED ORAL at 21:40

## 2019-04-30 RX ADMIN — NYSTATIN CREAM 1 APPLICATION(S): 100000 CREAM TOPICAL at 15:06

## 2019-04-30 RX ADMIN — QUETIAPINE FUMARATE 12.5 MILLIGRAM(S): 200 TABLET, FILM COATED ORAL at 21:39

## 2019-04-30 RX ADMIN — Medication 81 MILLIGRAM(S): at 09:31

## 2019-04-30 RX ADMIN — Medication 100 MILLIGRAM(S): at 06:15

## 2019-04-30 RX ADMIN — BUDESONIDE AND FORMOTEROL FUMARATE DIHYDRATE 2 PUFF(S): 160; 4.5 AEROSOL RESPIRATORY (INHALATION) at 08:51

## 2019-04-30 RX ADMIN — NYSTATIN CREAM 1 APPLICATION(S): 100000 CREAM TOPICAL at 06:15

## 2019-04-30 RX ADMIN — WARFARIN SODIUM 2 MILLIGRAM(S): 2.5 TABLET ORAL at 21:38

## 2019-04-30 RX ADMIN — PANTOPRAZOLE SODIUM 40 MILLIGRAM(S): 20 TABLET, DELAYED RELEASE ORAL at 06:15

## 2019-04-30 RX ADMIN — BUDESONIDE AND FORMOTEROL FUMARATE DIHYDRATE 2 PUFF(S): 160; 4.5 AEROSOL RESPIRATORY (INHALATION) at 20:52

## 2019-04-30 RX ADMIN — AMLODIPINE BESYLATE 5 MILLIGRAM(S): 2.5 TABLET ORAL at 06:15

## 2019-04-30 RX ADMIN — SERTRALINE 50 MILLIGRAM(S): 25 TABLET, FILM COATED ORAL at 09:31

## 2019-04-30 NOTE — PROGRESS NOTE ADULT - SUBJECTIVE AND OBJECTIVE BOX
CHIEF COMPLAINT: Poor sleep.       HISTORY OF PRESENT ILLNESS  SANDI SANTOS is a 85yo Male with PMH prior stroke (Feb '19), HTN, HLD, CAD c/b MI s/p stent '96, COPD, seizure d/o, PE s/p IVC filter, A-fib on warfarin, presented to [facility] 4/22 w/ R hand weakness x1day with slurred speech. INR therapeutic on admission. MR brain showed scattered infarcts throughout, in no vascular distribution, suspicious for embolic phenomenon. 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 Seen by CT surg, recommended open heart surgery to excise the mass.    Heme onc noted hx of factor V Leiden. 4/23 doppler BLE neg for DVT. Heme recommended change to other AC. (26 Apr 2019 14:05)      PAST MEDICAL & SURGICAL HISTORY:  Cerebrovascular accident (CVA)  History of COPD  CAD S/P percutaneous coronary angioplasty  Atrial fibrillation, unspecified type  Butler filter in place  PE (pulmonary embolism)  Hyperlipemia  Hypertension  S/P cataract surgery  S/P IVC filter       REVIEW OF SYMPTOMS  [X] Constitutional WNL         [X] GI WNL                          [X]  WNL                        [X] Endo WNL                     [X] Skin WNL                [X] MSK WNL                [X] Cognitive WNL        [X] Psych WNL      VITALS  Vital Signs Last 24 Hrs  T(C): 36.9 (30 Apr 2019 08:05), Max: 36.9 (29 Apr 2019 20:12)  T(F): 98.4 (30 Apr 2019 08:05), Max: 98.5 (29 Apr 2019 20:12)  HR: 80 (30 Apr 2019 08:05) (70 - 80)  BP: 127/84 (30 Apr 2019 08:05) (127/84 - 155/88)  BP(mean): --  RR: 15 (30 Apr 2019 08:05) (15 - 16)  SpO2: 93% (30 Apr 2019 08:05) (93% - 97%)    PHYSICAL EXAM  Constitutional - NAD, CASTILLO  HEENT - NCAT, EOMI  Neck - Supple, No limited ROM  Chest - CTA bilaterally, No wheeze, No rhonchi, No crackles, dyspnea on exertion  Cardiovascular -irregular, murmur  Abdomen - BS+, Soft, NTND  Extremities - No C/C/E, No calf tenderness   Skin-no rash      Neurologic Exam - no new  focal deficits.                     Balance - impaired     Psychiatric - Mood stable, Affect WNL, insomnia         FUNCTIONAL PROGRESS  Gait - 40ft RW mod A   ADLs - min/CG  Transfers - min A  Functional transfer - min A    RECENT LABS          PT/INR - ( 30 Apr 2019 05:38 )   PT: 38.1 sec;   INR: 3.28 ratio               Direct LDL: 125 mg/dL (04-24-19 @ 05:05)  Direct LDL: 111 mg/dL (04-23-19 @ 05:30)    Hemoglobin A1C, Whole Blood: 6.1 % (04-23-19 @ 13:07)              RADIOLOGY/OTHER RESULTS      CURRENT MEDICATIONS  MEDICATIONS  (STANDING):  amLODIPine   Tablet 5 milliGRAM(s) Oral daily  aspirin  chewable 81 milliGRAM(s) Oral daily  atorvastatin 40 milliGRAM(s) Oral at bedtime  buDESOnide 160 MICROgram(s)/formoterol 4.5 MICROgram(s) Inhaler 2 Puff(s) Inhalation two times a day  docusate sodium 100 milliGRAM(s) Oral two times a day  metoprolol succinate  milliGRAM(s) Oral daily  nystatin Cream 1 Application(s) Topical two times a day  pantoprazole    Tablet 40 milliGRAM(s) Oral before breakfast  QUEtiapine 12.5 milliGRAM(s) Oral at bedtime  sertraline 50 milliGRAM(s) Oral daily  tamsulosin 0.4 milliGRAM(s) Oral at bedtime  warfarin 2 milliGRAM(s) Oral once    MEDICATIONS  (PRN):  ALBUTerol/ipratropium for Nebulization 3 milliLiter(s) Nebulizer every 6 hours PRN Shortness of Breath and/or Wheezing      ASSESSMENT & PLAN    GI/Bowel Management - colace   Management - Toilet Q2  Skin - Turn Q2  Pain - Tylenol PRN  DVT PPX - on Coumadin  Diet - reg c thins       Continue comprehensive acute rehab program consisting of 3hrs/day of OT/PT and SLP.

## 2019-04-30 NOTE — PROGRESS NOTE ADULT - SUBJECTIVE AND OBJECTIVE BOX
Brief note patient declined having the FVL retested. Brought a HIPPA form to try to get records from his primary.  Noted his primary oncologist had moved offices and health systems, they may no longer have access to [facility] information there.    FVL status makes no difference to clinical management now. He still needs long term anticoagulation.

## 2019-04-30 NOTE — PROGRESS NOTE ADULT - ATTENDING COMMENTS
No nightmares last night, but uncomfortable with hospital CPAP machine. Tolerated Seroquel last night.  Neurological exam unchanged.  Goal INR today  Case discussed with Medicine, Psychiatry and , Hematology input appreciated.  Patient will ask family to deliver home CPAP machine.  Discharge plan discussed with SHRUTHI, team.

## 2019-04-30 NOTE — PROGRESS NOTE ADULT - ASSESSMENT
SANDI SANTOS is a 85yo Male with PMH prior stroke (Feb '19), HTN, HLD, CAD c/b MI s/p stent '96, COPD, seizure d/o, PE s/p IVC filter, A-fib on warfarin admitted for multiple scattered infarcts, now with gait instability, ADL impairments and functional impairments.     # Acute ischemic stroke: cardioembolic etiology  - Aspirin, warfarin. Coumadin dose today-therapeutic. Goal INR 3-3.5. Heme in. Continue PT/OT/SLP therapy. Melatonin added for sleep.     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 Will require open heart surgery for excision.   - Per [facility] rehab note 4/26, surgery planned for after acute rehab. Cardiology evaluation.    # Chronic A-fib  - Toprol XL for rate control  - Warfarin, goal INR 3-3.5    # COPD: continue symbicort    # HTN  - DASH  - BB as above  - Amlodipine    # Factor V Leiden  - Hematology in. On Coumadin, dose today-therapeutic INR.   - No evidence of DVT on 4/23 duplex of BLE.     # BPH  - Flomax.     # Depression: continue sertraline. Psych eval. Seroquel added nightly.     # HLD: continue atorvastatin. Cardiac diet. Follow LFTs.     # Buttock rash: nystatin    Gait Instability, ADL impairments and Functional impairments: Comprehensive Rehab Program of PT/OT/SLP  DVT PPx: Warfarin  Diet: DASH/TLC

## 2019-05-01 LAB
ALBUMIN SERPL ELPH-MCNC: 3.2 G/DL — LOW (ref 3.3–5)
ALP SERPL-CCNC: 73 U/L — SIGNIFICANT CHANGE UP (ref 40–120)
ALT FLD-CCNC: 18 U/L DA — SIGNIFICANT CHANGE UP (ref 10–45)
ANION GAP SERPL CALC-SCNC: 11 MMOL/L — SIGNIFICANT CHANGE UP (ref 5–17)
AST SERPL-CCNC: 12 U/L — SIGNIFICANT CHANGE UP (ref 10–40)
BILIRUB SERPL-MCNC: 0.5 MG/DL — SIGNIFICANT CHANGE UP (ref 0.2–1.2)
BUN SERPL-MCNC: 26 MG/DL — HIGH (ref 7–23)
CALCIUM SERPL-MCNC: 9.3 MG/DL — SIGNIFICANT CHANGE UP (ref 8.4–10.5)
CHLORIDE SERPL-SCNC: 105 MMOL/L — SIGNIFICANT CHANGE UP (ref 96–108)
CO2 SERPL-SCNC: 23 MMOL/L — SIGNIFICANT CHANGE UP (ref 22–31)
CREAT SERPL-MCNC: 1.47 MG/DL — HIGH (ref 0.5–1.3)
GLUCOSE SERPL-MCNC: 119 MG/DL — HIGH (ref 70–99)
HCT VFR BLD CALC: 43.6 % — SIGNIFICANT CHANGE UP (ref 39–50)
HGB BLD-MCNC: 13.8 G/DL — SIGNIFICANT CHANGE UP (ref 13–17)
INR BLD: 2.36 RATIO — HIGH (ref 0.88–1.16)
MCHC RBC-ENTMCNC: 27.1 PG — SIGNIFICANT CHANGE UP (ref 27–34)
MCHC RBC-ENTMCNC: 31.8 GM/DL — LOW (ref 32–36)
MCV RBC AUTO: 85.2 FL — SIGNIFICANT CHANGE UP (ref 80–100)
PLATELET # BLD AUTO: 239 K/UL — SIGNIFICANT CHANGE UP (ref 150–400)
POTASSIUM SERPL-MCNC: 4 MMOL/L — SIGNIFICANT CHANGE UP (ref 3.5–5.3)
POTASSIUM SERPL-SCNC: 4 MMOL/L — SIGNIFICANT CHANGE UP (ref 3.5–5.3)
PROT SERPL-MCNC: 7.4 G/DL — SIGNIFICANT CHANGE UP (ref 6–8.3)
PROTHROM AB SERPL-ACNC: 27.1 SEC — HIGH (ref 10–12.9)
RBC # BLD: 5.11 M/UL — SIGNIFICANT CHANGE UP (ref 4.2–5.8)
RBC # FLD: 13.5 % — SIGNIFICANT CHANGE UP (ref 10.3–14.5)
SODIUM SERPL-SCNC: 139 MMOL/L — SIGNIFICANT CHANGE UP (ref 135–145)
WBC # BLD: 7.8 K/UL — SIGNIFICANT CHANGE UP (ref 3.8–10.5)
WBC # FLD AUTO: 7.8 K/UL — SIGNIFICANT CHANGE UP (ref 3.8–10.5)

## 2019-05-01 PROCEDURE — 99232 SBSQ HOSP IP/OBS MODERATE 35: CPT

## 2019-05-01 PROCEDURE — 99233 SBSQ HOSP IP/OBS HIGH 50: CPT

## 2019-05-01 RX ORDER — SODIUM CHLORIDE 9 MG/ML
1000 INJECTION INTRAMUSCULAR; INTRAVENOUS; SUBCUTANEOUS
Qty: 0 | Refills: 0 | Status: DISCONTINUED | OUTPATIENT
Start: 2019-05-01 | End: 2019-05-03

## 2019-05-01 RX ORDER — ENOXAPARIN SODIUM 100 MG/ML
140 INJECTION SUBCUTANEOUS EVERY 12 HOURS
Qty: 0 | Refills: 0 | Status: DISCONTINUED | OUTPATIENT
Start: 2019-05-01 | End: 2019-05-04

## 2019-05-01 RX ORDER — WARFARIN SODIUM 2.5 MG/1
5 TABLET ORAL ONCE
Qty: 0 | Refills: 0 | Status: COMPLETED | OUTPATIENT
Start: 2019-05-01 | End: 2019-05-01

## 2019-05-01 RX ORDER — LANOLIN ALCOHOL/MO/W.PET/CERES
3 CREAM (GRAM) TOPICAL AT BEDTIME
Qty: 0 | Refills: 0 | Status: DISCONTINUED | OUTPATIENT
Start: 2019-05-01 | End: 2019-05-09

## 2019-05-01 RX ORDER — WARFARIN SODIUM 2.5 MG/1
4 TABLET ORAL ONCE
Qty: 0 | Refills: 0 | Status: DISCONTINUED | OUTPATIENT
Start: 2019-05-01 | End: 2019-05-01

## 2019-05-01 RX ADMIN — SERTRALINE 50 MILLIGRAM(S): 25 TABLET, FILM COATED ORAL at 12:50

## 2019-05-01 RX ADMIN — ENOXAPARIN SODIUM 140 MILLIGRAM(S): 100 INJECTION SUBCUTANEOUS at 17:34

## 2019-05-01 RX ADMIN — NYSTATIN CREAM 1 APPLICATION(S): 100000 CREAM TOPICAL at 23:54

## 2019-05-01 RX ADMIN — NYSTATIN CREAM 1 APPLICATION(S): 100000 CREAM TOPICAL at 05:34

## 2019-05-01 RX ADMIN — Medication 100 MILLIGRAM(S): at 05:33

## 2019-05-01 RX ADMIN — TAMSULOSIN HYDROCHLORIDE 0.4 MILLIGRAM(S): 0.4 CAPSULE ORAL at 21:08

## 2019-05-01 RX ADMIN — BUDESONIDE AND FORMOTEROL FUMARATE DIHYDRATE 2 PUFF(S): 160; 4.5 AEROSOL RESPIRATORY (INHALATION) at 21:04

## 2019-05-01 RX ADMIN — Medication 100 MILLIGRAM(S): at 17:34

## 2019-05-01 RX ADMIN — SODIUM CHLORIDE 75 MILLILITER(S): 9 INJECTION INTRAMUSCULAR; INTRAVENOUS; SUBCUTANEOUS at 17:31

## 2019-05-01 RX ADMIN — ATORVASTATIN CALCIUM 40 MILLIGRAM(S): 80 TABLET, FILM COATED ORAL at 21:08

## 2019-05-01 RX ADMIN — AMLODIPINE BESYLATE 5 MILLIGRAM(S): 2.5 TABLET ORAL at 05:33

## 2019-05-01 RX ADMIN — Medication 81 MILLIGRAM(S): at 12:50

## 2019-05-01 RX ADMIN — Medication 3 MILLIGRAM(S): at 21:32

## 2019-05-01 RX ADMIN — PANTOPRAZOLE SODIUM 40 MILLIGRAM(S): 20 TABLET, DELAYED RELEASE ORAL at 05:33

## 2019-05-01 RX ADMIN — BUDESONIDE AND FORMOTEROL FUMARATE DIHYDRATE 2 PUFF(S): 160; 4.5 AEROSOL RESPIRATORY (INHALATION) at 08:24

## 2019-05-01 RX ADMIN — QUETIAPINE FUMARATE 12.5 MILLIGRAM(S): 200 TABLET, FILM COATED ORAL at 21:08

## 2019-05-01 RX ADMIN — WARFARIN SODIUM 5 MILLIGRAM(S): 2.5 TABLET ORAL at 21:08

## 2019-05-01 NOTE — PROGRESS NOTE ADULT - ASSESSMENT
SANDI SANTOS is a 85yo Male with PMH prior stroke (Feb '19), HTN, HLD, CAD c/b MI s/p stent '96, COPD, seizure d/o, PE s/p IVC filter, A-fib on warfarin admitted for multiple scattered infarcts, now with gait instability, ADL impairments and functional impairments.     # Acute ischemic stroke: cardioembolic etiology  - Aspirin, warfarin. Coumadin dose today. Goal INR 3-3.5. Heme in. Continue PT/OT/SLP therapy. Melatonin added for sleep.     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 Will require open heart surgery for excision.   - Per [facility] rehab note 4/26, surgery planned for after acute rehab.    # Chronic A-fib  - Toprol XL for rate control  - Warfarin, goal INR 3-3.5    # COPD: continue symbicort    # HTN  - DASH  - BB as above  - Amlodipine    # Factor V Leiden  - Hematology in. On Coumadin, dose today c goal 3-3.5.   - No evidence of DVT on 4/23 duplex of BLE.     # BPH  - Flomax.     # Depression: continue sertraline. Psych eval. Seroquel added nightly. Slept until 3 am.     # HLD: continue atorvastatin. Cardiac diet. Follow LFTs.     # Buttock rash: nystatin    Gait Instability, ADL impairments and Functional impairments: Comprehensive Rehab Program of PT/OT/SLP  DVT PPx: Warfarin  Diet: DASH/TLC SANDI SANTOS is a 85yo Male with PMH prior stroke (Feb '19), HTN, HLD, CAD c/b MI s/p stent '96, COPD, seizure d/o, PE s/p IVC filter, A-fib on warfarin admitted for multiple scattered infarcts, now with gait instability, ADL impairments and functional impairments.     # Acute ischemic stroke: cardioembolic etiology  - Aspirin, warfarin. Coumadin dose today-5mg. Goal INR 3-3.5. Heme in. Lovenox bridge until INR therapeutic, discussed c Dr Song suazo. Continue PT/OT/SLP therapy. Melatonin added for sleep.     #elevated creatinine/bun  bmp am, IVF-gentle hydration overnight. Follow up medicine am.    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 Will require open heart surgery for excision.   - Per [facility] rehab note 4/26, surgery planned for after acute rehab.    # Chronic A-fib  - Toprol XL for rate control  - Warfarin, goal INR 3-3.5. Lovenox bridge.     # COPD: continue symbicort    # HTN  - DASH  - BB as above  - Amlodipine    # Factor V Leiden  - Hematology in. On Coumadin, dose today c goal 3-3.5. Lovenox bridge until therapeutic as per gabriele.    - No evidence of DVT on 4/23 duplex of BLE.     # BPH  - Flomax.     # Depression: continue sertraline. Psych eval. Seroquel added nightly. Slept until 3 am.     # HLD: continue atorvastatin. Cardiac diet. Follow LFTs.     # Buttock rash: nystatin    Gait Instability, ADL impairments and Functional impairments: Comprehensive Rehab Program of PT/OT/SLP  DVT PPx: Warfarin  Diet: DASH/TLC

## 2019-05-01 NOTE — PROGRESS NOTE ADULT - ATTENDING COMMENTS
Better mood today, no nightmares reported, better sleep.  Stable neurological exam  Lovenox bridge started given subtherapeutic INR  Gentle IV hydration ( elevated Creatinine)    Multidisciplinary team meeting today:  patient's functional goals and needs, functional and clinical  progress were discussed, barriers to discharge were identified. Anticipate discharge home with home care, 24/7 supervision for safety.    EDOD 5/10/19 Better mood today, no nightmares reported, better sleep.  Stable neurological exam  Lovenox bridge started given subtherapeutic INR  Gentle IV hydration ( elevated Creatinine)    Multidisciplinary team meeting today:  patient's functional goals and needs, functional and clinical  progress were discussed, barriers to discharge were identified. Anticipate discharge home with home care, 24/7 supervision for safety.    EDOD 5/17/19

## 2019-05-01 NOTE — PROGRESS NOTE ADULT - SUBJECTIVE AND OBJECTIVE BOX
Patient is a 84y old  Male who presents with a chief complaint of cerebral infarction (29 Apr 2019 18:15)      Patient seen and examined at bedside.     ALLERGIES:  No Known Allergies    MEDICATIONS:  ALBUTerol/ipratropium for Nebulization 3 milliLiter(s) Nebulizer every 6 hours PRN  buDESOnide 160 MICROgram(s)/formoterol 4.5 MICROgram(s) Inhaler 2 Puff(s) Inhalation two times a day  enoxaparin Injectable 140 milliGRAM(s) SubCutaneous every 12 hours  nystatin Cream 1 Application(s) Topical two times a day  QUEtiapine 12.5 milliGRAM(s) Oral at bedtime  sodium chloride 0.9%. 1000 milliLiter(s) IV Continuous <Continuous>  warfarin 5 milliGRAM(s) Oral once    Vital Signs Last 24 Hrs  T(F): 97.8 (01 May 2019 08:26), Max: 97.8 (01 May 2019 08:26)  HR: 78 (01 May 2019 08:26) (72 - 78)  BP: 131/82 (01 May 2019 08:26) (128/76 - 151/87)  RR: 16 (01 May 2019 08:26) (15 - 16)  SpO2: 97% (01 May 2019 08:26) (94% - 97%)  I&O's Summary    30 Apr 2019 07:01  -  01 May 2019 07:00  --------------------------------------------------------  IN: 600 mL / OUT: 900 mL / NET: -300 mL        PHYSICAL EXAM:  General: NAD, comfortable   ENT: MMM  Neck: Supple, No JVD  Lungs: CTA, BLAE, No added sounds.   Cardio: RRR, S1/S2, No murmurs  Abdomen: Soft, NT/ND, BS+   Extremities: No edema  CNS: no new deficits     LABS:                        13.8   7.8   )-----------( 239      ( 01 May 2019 11:09 )             43.6     05-01    139  |  105  |  26  ----------------------------<  119  4.0   |  23  |  1.47    Ca    9.3      01 May 2019 11:09    TPro  7.4  /  Alb  3.2  /  TBili  0.5  /  DBili  x   /  AST  12  /  ALT  18  /  AlkPhos  73  05-01    eGFR if Non African American: 43 mL/min/1.73M2 (05-01-19 @ 11:09)  eGFR if : 50 mL/min/1.73M2 (05-01-19 @ 11:09)    PT/INR - ( 01 May 2019 05:45 )   PT: 27.1 sec;   INR: 2.36 ratio                 04-24 Chol 189 mg/dL  mg/dL HDL 35 mg/dL Trig 143 mg/dL        CAPILLARY BLOOD GLUCOSE        04-23 [id number] 6.1          RADIOLOGY & ADDITIONAL TESTS:    Care Discussed with Consultants/Other Providers:

## 2019-05-01 NOTE — PROGRESS NOTE ADULT - ASSESSMENT
This is an 84 year old man with a history of cardiovascular disease, stroke this past february, CAD s/p MI with a stent in 1996, an aortic mass.  Was recommended to have a resection, PE and DVT s/p IVC filter. MRI shows diffuse disease, suggestive of emboli.   Patient reports he as been worked up by another hematologist at [facility] Dr. Spicer (now at [place])  and knows he has a Factor V leiden mutation.  My office has sent the HIPPAA form for acquiring the records from his primary.   INR subtherapeutic today 2.38 down from 3.32. Couamdin over hte last3 days 1/0/2mg now 5mg tonight.  Recommend bridge on lovenox 1mg/kg BID until INR therapeutic again.

## 2019-05-01 NOTE — PROGRESS NOTE ADULT - SUBJECTIVE AND OBJECTIVE BOX
Patient exasperated over his brother not bringing in his home CPAP for him.  Otherwise feels well no complaints.

## 2019-05-01 NOTE — PROGRESS NOTE ADULT - SUBJECTIVE AND OBJECTIVE BOX
CHIEF COMPLAINT: Interrupted sleep.       HISTORY OF PRESENT ILLNESS  SANDI SANTOS is a 83yo Male with PMH prior stroke (Feb '19), HTN, HLD, CAD c/b MI s/p stent '96, COPD, seizure d/o, PE s/p IVC filter, A-fib on warfarin, presented to [facility] 4/22 w/ R hand weakness x1day with slurred speech. INR therapeutic on admission. MR brain showed scattered infarcts throughout, in no vascular distribution, suspicious for embolic phenomenon. 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 Seen by CT surg, recommended open heart surgery to excise the mass.    Heme onc noted hx of factor V Leiden. 4/23 doppler BLE neg for DVT. Heme recommended change to other AC. (26 Apr 2019 14:05)      PAST MEDICAL & SURGICAL HISTORY:  Cerebrovascular accident (CVA)  History of COPD  CAD S/P percutaneous coronary angioplasty  Atrial fibrillation, unspecified type  [place] filter in place  PE (pulmonary embolism)  Hyperlipemia  Hypertension  S/P cataract surgery  S/P IVC filter       REVIEW OF SYMPTOMS  [X] Constitutional WNL         [X] Resp WNL           [X] GI WNL                          [X]  WNL                 [X] Endo WNL                    [X] Skin WNL                 [X] MSK WNL                [X] Cognitive WNL       VITALS  Vital Signs Last 24 Hrs  T(C): 36.6 (01 May 2019 08:26), Max: 36.6 (01 May 2019 08:26)  T(F): 97.8 (01 May 2019 08:26), Max: 97.8 (01 May 2019 08:26)  HR: 78 (01 May 2019 08:26) (72 - 78)  BP: 131/82 (01 May 2019 08:26) (128/76 - 151/87)  BP(mean): --  RR: 16 (01 May 2019 08:26) (15 - 16)  SpO2: 97% (01 May 2019 08:26) (94% - 97%)     PHYSICAL EXAM  Constitutional - NAD, CASTILLO  HEENT - NCAT, EOMI  Neck - Supple, No limited ROM  Chest - CTA bilaterally, No wheeze, No rhonchi, No crackles, dyspnea on exertion  Cardiovascular -irregular, murmur  Abdomen - BS+, Soft, NTND  Extremities - No C/C/E, No calf tenderness   Skin-no rash      Neurologic Exam - no new  focal deficits.                     Balance - impaired     Psychiatric - Mood stable, Affect WNL, insomnia         FUNCTIONAL PROGRESS  Gait - 40ft RW mod A   ADLs - min/CG  Transfers - min A  Functional transfer - min A      RECENT LABS          PT/INR - ( 01 May 2019 05:45 )   PT: 27.1 sec;   INR: 2.36 ratio               Direct LDL: 125 mg/dL (04-24-19 @ 05:05)  Direct LDL: 111 mg/dL (04-23-19 @ 05:30)    Hemoglobin A1C, Whole Blood: 6.1 % (04-23-19 @ 13:07)              RADIOLOGY/OTHER RESULTS      CURRENT MEDICATIONS  MEDICATIONS  (STANDING):  amLODIPine   Tablet 5 milliGRAM(s) Oral daily  aspirin  chewable 81 milliGRAM(s) Oral daily  atorvastatin 40 milliGRAM(s) Oral at bedtime  buDESOnide 160 MICROgram(s)/formoterol 4.5 MICROgram(s) Inhaler 2 Puff(s) Inhalation two times a day  docusate sodium 100 milliGRAM(s) Oral two times a day  metoprolol succinate  milliGRAM(s) Oral daily  nystatin Cream 1 Application(s) Topical two times a day  pantoprazole    Tablet 40 milliGRAM(s) Oral before breakfast  QUEtiapine 12.5 milliGRAM(s) Oral at bedtime  sertraline 50 milliGRAM(s) Oral daily  tamsulosin 0.4 milliGRAM(s) Oral at bedtime  warfarin 4 milliGRAM(s) Oral once    MEDICATIONS  (PRN):  ALBUTerol/ipratropium for Nebulization 3 milliLiter(s) Nebulizer every 6 hours PRN Shortness of Breath and/or Wheezing      ASSESSMENT & PLAN          GI/Bowel Management - Colace   Management - Toilet Q2  Skin - Turn Q2  Pain - Tylenol PRN  DVT PPX - On Coumadin  Diet - reg c thins    Continue comprehensive acute rehab program consisting of 3hrs/day of OT/PT and SLP. CHIEF COMPLAINT: Interrupted sleep.       HISTORY OF PRESENT ILLNESS  SANDI SANTOS is a 85yo Male with PMH prior stroke (Feb '19), HTN, HLD, CAD c/b MI s/p stent '96, COPD, seizure d/o, PE s/p IVC filter, A-fib on warfarin, presented to [facility] 4/22 w/ R hand weakness x1day with slurred speech. INR therapeutic on admission. MR brain showed scattered infarcts throughout, in no vascular distribution, suspicious for embolic phenomenon. 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 Seen by CT surg, recommended open heart surgery to excise the mass.    Heme onc noted hx of factor V Leiden. 4/23 doppler BLE neg for DVT. Heme recommended change to other AC. (26 Apr 2019 14:05)      PAST MEDICAL & SURGICAL HISTORY:  Cerebrovascular accident (CVA)  History of COPD  CAD S/P percutaneous coronary angioplasty  Atrial fibrillation, unspecified type  [place] filter in place  PE (pulmonary embolism)  Hyperlipemia  Hypertension  S/P cataract surgery  S/P IVC filter       REVIEW OF SYMPTOMS  [X] Constitutional WNL         [X] Resp WNL           [X] GI WNL                          [X]  WNL                 [X] Endo WNL                    [X] Skin WNL                 [X] MSK WNL                [X] Cognitive WNL       VITALS  Vital Signs Last 24 Hrs  T(C): 36.6 (01 May 2019 08:26), Max: 36.6 (01 May 2019 08:26)  T(F): 97.8 (01 May 2019 08:26), Max: 97.8 (01 May 2019 08:26)  HR: 78 (01 May 2019 08:26) (72 - 78)  BP: 131/82 (01 May 2019 08:26) (128/76 - 151/87)  BP(mean): --  RR: 16 (01 May 2019 08:26) (15 - 16)  SpO2: 97% (01 May 2019 08:26) (94% - 97%)     PHYSICAL EXAM  Constitutional - NAD, CASTILLO  HEENT - NCAT, EOMI  Neck - Supple, No limited ROM  Chest - CTA bilaterally, No wheeze, No rhonchi, No crackles, dyspnea on exertion  Cardiovascular -irregular, murmur  Abdomen - BS+, Soft, NTND  Extremities - No C/C/E, No calf tenderness   Skin-no rash      Neurologic Exam - no new  focal deficits.                     Balance - impaired     Psychiatric - Mood stable, Affect WNL, insomnia         FUNCTIONAL PROGRESS  Gait - 40ft RW mod A   ADLs - min/CG  Transfers - min A  Functional transfer - min A      RECENT LABS          PT/INR - ( 01 May 2019 05:45 )   PT: 27.1 sec;   INR: 2.36 ratio               Direct LDL: 125 mg/dL (04-24-19 @ 05:05)  Direct LDL: 111 mg/dL (04-23-19 @ 05:30)    Hemoglobin A1C, Whole Blood: 6.1 % (04-23-19 @ 13:07)              RADIOLOGY/OTHER RESULTS      CURRENT MEDICATIONS  MEDICATIONS  (STANDING):  amLODIPine   Tablet 5 milliGRAM(s) Oral daily  aspirin  chewable 81 milliGRAM(s) Oral daily  atorvastatin 40 milliGRAM(s) Oral at bedtime  buDESOnide 160 MICROgram(s)/formoterol 4.5 MICROgram(s) Inhaler 2 Puff(s) Inhalation two times a day  docusate sodium 100 milliGRAM(s) Oral two times a day  metoprolol succinate  milliGRAM(s) Oral daily  nystatin Cream 1 Application(s) Topical two times a day  pantoprazole    Tablet 40 milliGRAM(s) Oral before breakfast  QUEtiapine 12.5 milliGRAM(s) Oral at bedtime  sertraline 50 milliGRAM(s) Oral daily  tamsulosin 0.4 milliGRAM(s) Oral at bedtime  warfarin 4 milliGRAM(s) Oral once    MEDICATIONS  (PRN):  ALBUTerol/ipratropium for Nebulization 3 milliLiter(s) Nebulizer every 6 hours PRN Shortness of Breath and/or Wheezing      ASSESSMENT & PLAN          GI/Bowel Management - Colace   Management - Toilet Q2  Skin - Turn Q2  Pain - Tylenol PRN  DVT PPX - On Coumadin c Bridge Lovenox.   Diet - reg c thins    Continue comprehensive acute rehab program consisting of 3hrs/day of OT/PT and SLP. CHIEF COMPLAINT: Interrupted sleep.       HISTORY OF PRESENT ILLNESS  SANDI SANTOS is a 85yo Male with PMH prior stroke (Feb '19), HTN, HLD, CAD c/b MI s/p stent '96, COPD, seizure d/o, PE s/p IVC filter, A-fib on warfarin, presented to [facility] 4/22 w/ R hand weakness x1day with slurred speech. INR therapeutic on admission. MR brain showed scattered infarcts throughout, in no vascular distribution, suspicious for embolic phenomenon. 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 Seen by CT surg, recommended open heart surgery to excise the mass.    Heme onc noted hx of factor V Leiden. 4/23 doppler BLE neg for DVT. Heme recommended change to other AC. (26 Apr 2019 14:05)      PAST MEDICAL & SURGICAL HISTORY:  Cerebrovascular accident (CVA)  History of COPD  CAD S/P percutaneous coronary angioplasty  Atrial fibrillation, unspecified type  [place] filter in place  PE (pulmonary embolism)  Hyperlipemia  Hypertension  S/P cataract surgery  S/P IVC filter  morbid obesity       REVIEW OF SYMPTOMS  [X] Constitutional WNL         [X] Resp WNL           [X] GI WNL                          [X]  WNL                 [X] Endo WNL                    [X] Skin WNL                 [X] MSK WNL                [X] Cognitive WNL       VITALS  Vital Signs Last 24 Hrs  T(C): 36.6 (01 May 2019 08:26), Max: 36.6 (01 May 2019 08:26)  T(F): 97.8 (01 May 2019 08:26), Max: 97.8 (01 May 2019 08:26)  HR: 78 (01 May 2019 08:26) (72 - 78)  BP: 131/82 (01 May 2019 08:26) (128/76 - 151/87)  BP(mean): --  RR: 16 (01 May 2019 08:26) (15 - 16)  SpO2: 97% (01 May 2019 08:26) (94% - 97%)     PHYSICAL EXAM  Constitutional - NAD, CASTILLO  HEENT - NCAT, EOMI  Neck - Supple, No limited ROM  Chest - CTA bilaterally, No wheeze, No rhonchi, No crackles, dyspnea on exertion  Cardiovascular -irregular, murmur  Abdomen - BS+, Soft, NTND  Extremities - No C/C/E, No calf tenderness   Skin-no rash      Neurologic Exam - no new  focal deficits.                     Balance - impaired     Psychiatric - Mood stable, Affect WNL, insomnia         FUNCTIONAL PROGRESS  Gait - 40ft RW mod A   ADLs - min/CG  Transfers - min A  Functional transfer - min A      RECENT LABS          PT/INR - ( 01 May 2019 05:45 )   PT: 27.1 sec;   INR: 2.36 ratio               Direct LDL: 125 mg/dL (04-24-19 @ 05:05)  Direct LDL: 111 mg/dL (04-23-19 @ 05:30)    Hemoglobin A1C, Whole Blood: 6.1 % (04-23-19 @ 13:07)              RADIOLOGY/OTHER RESULTS      CURRENT MEDICATIONS  MEDICATIONS  (STANDING):  amLODIPine   Tablet 5 milliGRAM(s) Oral daily  aspirin  chewable 81 milliGRAM(s) Oral daily  atorvastatin 40 milliGRAM(s) Oral at bedtime  buDESOnide 160 MICROgram(s)/formoterol 4.5 MICROgram(s) Inhaler 2 Puff(s) Inhalation two times a day  docusate sodium 100 milliGRAM(s) Oral two times a day  metoprolol succinate  milliGRAM(s) Oral daily  nystatin Cream 1 Application(s) Topical two times a day  pantoprazole    Tablet 40 milliGRAM(s) Oral before breakfast  QUEtiapine 12.5 milliGRAM(s) Oral at bedtime  sertraline 50 milliGRAM(s) Oral daily  tamsulosin 0.4 milliGRAM(s) Oral at bedtime  warfarin 4 milliGRAM(s) Oral once    MEDICATIONS  (PRN):  ALBUTerol/ipratropium for Nebulization 3 milliLiter(s) Nebulizer every 6 hours PRN Shortness of Breath and/or Wheezing      ASSESSMENT & PLAN          GI/Bowel Management - Colace   Management - Toilet Q2  Skin - Turn Q2  Pain - Tylenol PRN  DVT PPX - On Coumadin c Bridge Lovenox.   Diet - reg c thins    Continue comprehensive acute rehab program consisting of 3hrs/day of OT/PT and SLP.

## 2019-05-01 NOTE — PROGRESS NOTE ADULT - ASSESSMENT
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 surgery 3 weeks post acute rehab for open heart for excision     #MILAGRO   start IVF   Monitor BMP     # A-Fib  Rate controlled  c/w Toprol   Warfarin w Goal 3-3.5 given aortic valve mass    # COPD  c/w  symbicort     # HTN- controlled   c/w Amlodipine     # Factor V Leiden   A/C with  Coumadin    # BPH  c/w Tamsulosin     # Depression   c/w rtraline     # HLD  c/w Statin     # DVT px  c/w Coumadin

## 2019-05-02 LAB
ANION GAP SERPL CALC-SCNC: 10 MMOL/L — SIGNIFICANT CHANGE UP (ref 5–17)
BUN SERPL-MCNC: 22 MG/DL — SIGNIFICANT CHANGE UP (ref 7–23)
CALCIUM SERPL-MCNC: 8.8 MG/DL — SIGNIFICANT CHANGE UP (ref 8.4–10.5)
CHLORIDE SERPL-SCNC: 107 MMOL/L — SIGNIFICANT CHANGE UP (ref 96–108)
CO2 SERPL-SCNC: 24 MMOL/L — SIGNIFICANT CHANGE UP (ref 22–31)
CREAT SERPL-MCNC: 1.32 MG/DL — HIGH (ref 0.5–1.3)
GLUCOSE SERPL-MCNC: 119 MG/DL — HIGH (ref 70–99)
INR BLD: 2.14 RATIO — HIGH (ref 0.88–1.16)
POTASSIUM SERPL-MCNC: 4 MMOL/L — SIGNIFICANT CHANGE UP (ref 3.5–5.3)
POTASSIUM SERPL-SCNC: 4 MMOL/L — SIGNIFICANT CHANGE UP (ref 3.5–5.3)
PROTHROM AB SERPL-ACNC: 24.5 SEC — HIGH (ref 10–12.9)
SODIUM SERPL-SCNC: 141 MMOL/L — SIGNIFICANT CHANGE UP (ref 135–145)

## 2019-05-02 PROCEDURE — 99232 SBSQ HOSP IP/OBS MODERATE 35: CPT

## 2019-05-02 RX ORDER — ACETAMINOPHEN 500 MG
650 TABLET ORAL EVERY 6 HOURS
Qty: 0 | Refills: 0 | Status: DISCONTINUED | OUTPATIENT
Start: 2019-05-02 | End: 2019-05-09

## 2019-05-02 RX ORDER — LIDOCAINE 4 G/100G
1 CREAM TOPICAL DAILY
Qty: 0 | Refills: 0 | Status: DISCONTINUED | OUTPATIENT
Start: 2019-05-02 | End: 2019-05-09

## 2019-05-02 RX ORDER — ALPRAZOLAM 0.25 MG
0.25 TABLET ORAL EVERY 6 HOURS
Qty: 0 | Refills: 0 | Status: DISCONTINUED | OUTPATIENT
Start: 2019-05-02 | End: 2019-05-09

## 2019-05-02 RX ORDER — WARFARIN SODIUM 2.5 MG/1
5 TABLET ORAL ONCE
Qty: 0 | Refills: 0 | Status: COMPLETED | OUTPATIENT
Start: 2019-05-02 | End: 2019-05-02

## 2019-05-02 RX ADMIN — PANTOPRAZOLE SODIUM 40 MILLIGRAM(S): 20 TABLET, DELAYED RELEASE ORAL at 06:13

## 2019-05-02 RX ADMIN — Medication 650 MILLIGRAM(S): at 08:51

## 2019-05-02 RX ADMIN — Medication 100 MILLIGRAM(S): at 06:13

## 2019-05-02 RX ADMIN — QUETIAPINE FUMARATE 12.5 MILLIGRAM(S): 200 TABLET, FILM COATED ORAL at 21:27

## 2019-05-02 RX ADMIN — NYSTATIN CREAM 1 APPLICATION(S): 100000 CREAM TOPICAL at 06:14

## 2019-05-02 RX ADMIN — LIDOCAINE 1 PATCH: 4 CREAM TOPICAL at 18:11

## 2019-05-02 RX ADMIN — BUDESONIDE AND FORMOTEROL FUMARATE DIHYDRATE 2 PUFF(S): 160; 4.5 AEROSOL RESPIRATORY (INHALATION) at 08:50

## 2019-05-02 RX ADMIN — Medication 81 MILLIGRAM(S): at 11:55

## 2019-05-02 RX ADMIN — Medication 650 MILLIGRAM(S): at 19:17

## 2019-05-02 RX ADMIN — WARFARIN SODIUM 5 MILLIGRAM(S): 2.5 TABLET ORAL at 21:27

## 2019-05-02 RX ADMIN — AMLODIPINE BESYLATE 5 MILLIGRAM(S): 2.5 TABLET ORAL at 06:13

## 2019-05-02 RX ADMIN — LIDOCAINE 1 PATCH: 4 CREAM TOPICAL at 21:31

## 2019-05-02 RX ADMIN — Medication 100 MILLIGRAM(S): at 18:10

## 2019-05-02 RX ADMIN — Medication 650 MILLIGRAM(S): at 18:17

## 2019-05-02 RX ADMIN — TAMSULOSIN HYDROCHLORIDE 0.4 MILLIGRAM(S): 0.4 CAPSULE ORAL at 21:27

## 2019-05-02 RX ADMIN — Medication 650 MILLIGRAM(S): at 09:51

## 2019-05-02 RX ADMIN — ENOXAPARIN SODIUM 140 MILLIGRAM(S): 100 INJECTION SUBCUTANEOUS at 18:10

## 2019-05-02 RX ADMIN — ATORVASTATIN CALCIUM 40 MILLIGRAM(S): 80 TABLET, FILM COATED ORAL at 21:27

## 2019-05-02 RX ADMIN — NYSTATIN CREAM 1 APPLICATION(S): 100000 CREAM TOPICAL at 21:31

## 2019-05-02 RX ADMIN — BUDESONIDE AND FORMOTEROL FUMARATE DIHYDRATE 2 PUFF(S): 160; 4.5 AEROSOL RESPIRATORY (INHALATION) at 20:38

## 2019-05-02 RX ADMIN — LIDOCAINE 1 PATCH: 4 CREAM TOPICAL at 11:55

## 2019-05-02 RX ADMIN — SERTRALINE 50 MILLIGRAM(S): 25 TABLET, FILM COATED ORAL at 11:55

## 2019-05-02 RX ADMIN — ENOXAPARIN SODIUM 140 MILLIGRAM(S): 100 INJECTION SUBCUTANEOUS at 06:13

## 2019-05-02 NOTE — PROGRESS NOTE ADULT - ASSESSMENT
SANDI SANTOS is a 85yo Male with PMH prior stroke (Feb '19), HTN, HLD, CAD c/b MI s/p stent '96, COPD, seizure d/o, PE s/p IVC filter, A-fib on warfarin admitted for multiple scattered infarcts, now with gait instability, ADL impairments and functional impairments.     # Acute ischemic stroke: cardioembolic etiology  - Aspirin, warfarin. Coumadin dose today-5mg. Goal INR 3-3.5. Heme in. Lovenox bridge until INR therapeutic, Dr Zuleta heme following. Continue PT/OT/SLP therapy. Melatonin added for sleep.     #elevated creatinine/bun  s/p IVF-gentle hydration overnight. Improved. PO hydration encouraged.     #Left knee pain  Reports chronic pain/OA. Lidocaine patch and prn Tylenol.     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. Will require open heart surgery for excision.   - Per [facility] rehab note 4/26, surgery planned for after acute rehab.    # Chronic A-fib  - Toprol XL for rate control  - Warfarin, goal INR 3-3.5. Lovenox bridge.     # COPD: continue symbicort    # HTN  - DASH  - BB as above  - Amlodipine    # Factor V Leiden  - Hematology in. On Coumadin, dose today c goal 3-3.5. Lovenox bridge until therapeutic as per gabriele.    - No evidence of DVT on 4/23 duplex of BLE.     # BPH  - Flomax.     # Depression: continue sertraline. Psych eval. Seroquel added nightly. Denies nightmares.     # HLD: continue atorvastatin. Cardiac diet. Follow LFTs.     # Buttock rash: nystatin    Gait Instability, ADL impairments and Functional impairments: Comprehensive Rehab Program of PT/OT/SLP  DVT PPx: Warfarin  Diet: DASH/TLC SANDI SANTOS is a 85yo Male with PMH prior stroke (Feb '19), HTN, HLD, CAD c/b MI s/p stent '96, COPD, seizure d/o, PE s/p IVC filter, A-fib on warfarin admitted for multiple scattered infarcts, now with gait instability, ADL impairments and functional impairments.     # Acute ischemic stroke: cardioembolic etiology  -  Coumadin dose today-5mg. Goal INR 3-3.5. Hematology is following. Lovenox bridge until INR therapeutic.     - Continue statin with goal LDL <70  - Continue PT/OT/SLP therapy.    #elevated creatinine/bun  s/p IVF-gentle hydration overnight. Improved. PO hydration encouraged.     #Left knee pain  Reports chronic pain/OA. Lidocaine patch and prn Tylenol.     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 Will require open heart surgery for excision.   - Per [facility] rehab note 4/26, surgery planned for after acute rehab.    # Chronic A-fib  - Toprol XL for rate control  - Warfarin, goal INR 3-3.5. Lovenox bridge.     # COPD: continue symbicort    # HTN  - DASH  - BB as above  - Amlodipine    # Factor V Leiden  - Hematology in. On Coumadin, dose today c goal 3-3.5. Lovenox bridge until therapeutic as per heme.    - No evidence of DVT on 4/23 duplex of BLE.     # BPH  - Flomax.     # Depression: continue sertraline. Psych eval. Seroquel added nightly. Denies nightmares.     # HLD: continue atorvastatin. Cardiac diet. Follow LFTs.     # Buttock rash: nystatin    Gait Instability, ADL impairments and Functional impairments: Comprehensive Rehab Program of PT/OT/SLP  DVT PPx: Warfarin  Diet: DASH/TLC

## 2019-05-02 NOTE — PROGRESS NOTE ADULT - ATTENDING COMMENTS
Patient medically and neurologically  stable. Making progress towards rehab goals.   Continue rehab program. Encourage PO fluids, full AC for INR goal 3-3.5

## 2019-05-02 NOTE — PROGRESS NOTE ADULT - SUBJECTIVE AND OBJECTIVE BOX
CHIEF COMPLAINT: Left knee pain.       HISTORY OF PRESENT ILLNESS  SANDI SANTOS is a 83yo Male with PMH prior stroke (Feb '19), HTN, HLD, CAD c/b MI s/p stent '96, COPD, seizure d/o, PE s/p IVC filter, A-fib on warfarin, presented to [facility] 4/22 w/ R hand weakness x1day with slurred speech. INR therapeutic on admission. MR brain showed scattered infarcts throughout, in no vascular distribution, suspicious for embolic phenomenon. 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 Seen by CT surg, recommended open heart surgery to excise the mass.  Heme onc noted hx of factor V Leiden. 4/23 doppler BLE neg for DVT. Heme recommended change to other AC. (26 Apr 2019 14:05)        PAST MEDICAL & SURGICAL HISTORY:  Cerebrovascular accident (CVA)  History of COPD  CAD S/P percutaneous coronary angioplasty  Atrial fibrillation, unspecified type  [place] filter in place  PE (pulmonary embolism)  Hyperlipemia  Hypertension  S/P cataract surgery  S/P IVC filter  x -        REVIEW OF SYMPTOMS  [X] Constitutional WNL     [X] Cardio WNL            [X] Resp WNL           [X] GI WNL                          [X]  WNL                    [X] Endo WNL                     [X] Skin WNL              [X] Cognitive WNL          VITALS  Vital Signs Last 24 Hrs  T(C): 36.9 (01 May 2019 22:49), Max: 36.9 (01 May 2019 22:49)  T(F): 98.4 (01 May 2019 22:49), Max: 98.4 (01 May 2019 22:49)  HR: 68 (02 May 2019 06:18) (68 - 78)  BP: 132/78 (02 May 2019 06:18) (112/72 - 132/78)  BP(mean): --  RR: 16 (02 May 2019 06:18) (16 - 16)  SpO2: 95% (02 May 2019 06:18) (94% - 97%)      PHYSICAL EXAM  Constitutional - NAD, CASTILLO  HEENT - NCAT, EOMI  Neck - Supple, No limited ROM  Chest - CTA bilaterally, No wheeze, No rhonchi, No crackles, dyspnea on exertion  Cardiovascular -irregular, murmur  Abdomen - BS+, Soft, NTND  Extremities - No C/C/E, No calf tenderness   Skin-nystatin on buttocks      Neurologic Exam - no new  focal deficits.                     Balance - impaired     Psychiatric - Mood stable, Affect WNL, insomnia         FUNCTIONAL PROGRESS  Gait - 40ft RW mod A   ADLs - min/CG  Transfers - min A  Functional transfer - min A      RECENT LABS                        13.8   7.8   )-----------( 239      ( 01 May 2019 11:09 )             43.6     05-02    141  |  107  |  22  ----------------------------<  119<H>  4.0   |  24  |  1.32<H>    Ca    8.8      02 May 2019 07:20    TPro  7.4  /  Alb  3.2<L>  /  TBili  0.5  /  DBili  x   /  AST  12  /  ALT  18  /  AlkPhos  73  05-01    PT/INR - ( 02 May 2019 07:20 )   PT: 24.5 sec;   INR: 2.14 ratio           LIVER FUNCTIONS - ( 01 May 2019 11:09 )  Alb: 3.2 g/dL / Pro: 7.4 g/dL / ALK PHOS: 73 U/L / ALT: 18 U/L DA / AST: 12 U/L / GGT: x             Direct LDL: 125 mg/dL (04-24-19 @ 05:05)  Direct LDL: 111 mg/dL (04-23-19 @ 05:30)    Hemoglobin A1C, Whole Blood: 6.1 % (04-23-19 @ 13:07)              RADIOLOGY/OTHER RESULTS      CURRENT MEDICATIONS  MEDICATIONS  (STANDING):  amLODIPine   Tablet 5 milliGRAM(s) Oral daily  aspirin  chewable 81 milliGRAM(s) Oral daily  atorvastatin 40 milliGRAM(s) Oral at bedtime  buDESOnide 160 MICROgram(s)/formoterol 4.5 MICROgram(s) Inhaler 2 Puff(s) Inhalation two times a day  docusate sodium 100 milliGRAM(s) Oral two times a day  enoxaparin Injectable 140 milliGRAM(s) SubCutaneous every 12 hours  lidocaine   Patch 1 Patch Transdermal daily  metoprolol succinate  milliGRAM(s) Oral daily  nystatin Cream 1 Application(s) Topical two times a day  pantoprazole    Tablet 40 milliGRAM(s) Oral before breakfast  QUEtiapine 12.5 milliGRAM(s) Oral at bedtime  sertraline 50 milliGRAM(s) Oral daily  sodium chloride 0.9%. 1000 milliLiter(s) (75 mL/Hr) IV Continuous <Continuous>  tamsulosin 0.4 milliGRAM(s) Oral at bedtime  warfarin 5 milliGRAM(s) Oral once    MEDICATIONS  (PRN):  acetaminophen   Tablet .. 650 milliGRAM(s) Oral every 6 hours PRN Moderate Pain (4 - 6)  ALBUTerol/ipratropium for Nebulization 3 milliLiter(s) Nebulizer every 6 hours PRN Shortness of Breath and/or Wheezing  melatonin 3 milliGRAM(s) Oral at bedtime PRN Insomnia      ASSESSMENT & PLAN        GI/Bowel Management - Colace   Management - Toilet Q2  Skin - Turn Q2  Pain - Tylenol PRN  DVT PPX - On Coumadin c Bridge Lovenox.   Diet - reg c thins    Continue comprehensive acute rehab program consisting of 3hrs/day of OT/PT and SLP. CHIEF COMPLAINT: Left knee pain.       HISTORY OF PRESENT ILLNESS  SANDI SANTOS is a 83yo Male with PMH prior stroke (Feb '19), HTN, HLD, CAD c/b MI s/p stent '96, COPD, seizure d/o, PE s/p IVC filter, A-fib on warfarin, presented to [facility] 4/22 w/ R hand weakness x1day with slurred speech. INR therapeutic on admission. MR brain showed scattered infarcts throughout, in no vascular distribution, suspicious for embolic phenomenon. 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 Seen by CT surg, recommended open heart surgery to excise the mass.  Heme onc noted hx of factor V Leiden. 4/23 doppler BLE neg for DVT. Heme recommended change to other AC. (26 Apr 2019 14:05)        PAST MEDICAL & SURGICAL HISTORY:  Cerebrovascular accident (CVA)  History of COPD  CAD S/P percutaneous coronary angioplasty  Atrial fibrillation, unspecified type  [place] filter in place  PE (pulmonary embolism)  Hyperlipemia  Hypertension  S/P cataract surgery  S/P IVC filter  morbid obesity        REVIEW OF SYMPTOMS  [X] Constitutional WNL     [X] Cardio WNL            [X] Resp WNL           [X] GI WNL                          [X]  WNL                    [X] Endo WNL                     [X] Skin WNL              [X] Cognitive WNL          VITALS  Vital Signs Last 24 Hrs  T(C): 36.9 (01 May 2019 22:49), Max: 36.9 (01 May 2019 22:49)  T(F): 98.4 (01 May 2019 22:49), Max: 98.4 (01 May 2019 22:49)  HR: 68 (02 May 2019 06:18) (68 - 78)  BP: 132/78 (02 May 2019 06:18) (112/72 - 132/78)  BP(mean): --  RR: 16 (02 May 2019 06:18) (16 - 16)  SpO2: 95% (02 May 2019 06:18) (94% - 97%)      PHYSICAL EXAM  Constitutional - NAD, CASTILLO  HEENT - NCAT, EOMI  Neck - Supple, No limited ROM  Chest - CTA bilaterally, No wheeze, No rhonchi, No crackles, dyspnea on exertion  Cardiovascular -irregular, murmur  Abdomen - BS+, Soft, NTND  Extremities - No C/C/E, No calf tenderness   Skin-nystatin on buttocks      Neurologic Exam - no new  focal deficits.                     Balance - impaired     Psychiatric - Mood stable, Affect WNL, insomnia         FUNCTIONAL PROGRESS  Gait - 40ft RW mod A   ADLs - min/CG  Transfers - min A  Functional transfer - min A      RECENT LABS                        13.8   7.8   )-----------( 239      ( 01 May 2019 11:09 )             43.6     05-02    141  |  107  |  22  ----------------------------<  119<H>  4.0   |  24  |  1.32<H>    Ca    8.8      02 May 2019 07:20    TPro  7.4  /  Alb  3.2<L>  /  TBili  0.5  /  DBili  x   /  AST  12  /  ALT  18  /  AlkPhos  73  05-01    PT/INR - ( 02 May 2019 07:20 )   PT: 24.5 sec;   INR: 2.14 ratio           LIVER FUNCTIONS - ( 01 May 2019 11:09 )  Alb: 3.2 g/dL / Pro: 7.4 g/dL / ALK PHOS: 73 U/L / ALT: 18 U/L DA / AST: 12 U/L / GGT: x             Direct LDL: 125 mg/dL (04-24-19 @ 05:05)  Direct LDL: 111 mg/dL (04-23-19 @ 05:30)    Hemoglobin A1C, Whole Blood: 6.1 % (04-23-19 @ 13:07)              RADIOLOGY/OTHER RESULTS      CURRENT MEDICATIONS  MEDICATIONS  (STANDING):  amLODIPine   Tablet 5 milliGRAM(s) Oral daily  aspirin  chewable 81 milliGRAM(s) Oral daily  atorvastatin 40 milliGRAM(s) Oral at bedtime  buDESOnide 160 MICROgram(s)/formoterol 4.5 MICROgram(s) Inhaler 2 Puff(s) Inhalation two times a day  docusate sodium 100 milliGRAM(s) Oral two times a day  enoxaparin Injectable 140 milliGRAM(s) SubCutaneous every 12 hours  lidocaine   Patch 1 Patch Transdermal daily  metoprolol succinate  milliGRAM(s) Oral daily  nystatin Cream 1 Application(s) Topical two times a day  pantoprazole    Tablet 40 milliGRAM(s) Oral before breakfast  QUEtiapine 12.5 milliGRAM(s) Oral at bedtime  sertraline 50 milliGRAM(s) Oral daily  sodium chloride 0.9%. 1000 milliLiter(s) (75 mL/Hr) IV Continuous <Continuous>  tamsulosin 0.4 milliGRAM(s) Oral at bedtime  warfarin 5 milliGRAM(s) Oral once    MEDICATIONS  (PRN):  acetaminophen   Tablet .. 650 milliGRAM(s) Oral every 6 hours PRN Moderate Pain (4 - 6)  ALBUTerol/ipratropium for Nebulization 3 milliLiter(s) Nebulizer every 6 hours PRN Shortness of Breath and/or Wheezing  melatonin 3 milliGRAM(s) Oral at bedtime PRN Insomnia      ASSESSMENT & PLAN        GI/Bowel Management - Colace   Management - Toilet Q2  Skin - Turn Q2  Pain - Tylenol PRN  DVT PPX - On Coumadin c Bridge Lovenox.   Diet - reg c thins    Continue comprehensive acute rehab program consisting of 3hrs/day of OT/PT and SLP. CHIEF COMPLAINT: Left knee pain intermittent      HISTORY OF PRESENT ILLNESS  SANDI SANTOS is a 85yo Male with PMH prior stroke (Feb '19), HTN, HLD, CAD c/b MI s/p stent '96, COPD, seizure d/o, PE s/p IVC filter, A-fib on warfarin, presented to [facility] 4/22 w/ R hand weakness x1day with slurred speech. INR therapeutic on admission. MR brain showed scattered infarcts throughout, in no vascular distribution, suspicious for embolic phenomenon. 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 Seen by CT surg, recommended open heart surgery to excise the mass.  Heme onc noted hx of factor V Leiden. 4/23 doppler BLE neg for DVT. Heme recommended change to other AC. (26 Apr 2019 14:05)        PAST MEDICAL & SURGICAL HISTORY:  Cerebrovascular accident (CVA)  History of COPD  CAD S/P percutaneous coronary angioplasty  Atrial fibrillation, unspecified type  [place] filter in place  PE (pulmonary embolism)  Hyperlipemia  Hypertension  S/P cataract surgery  S/P IVC filter  morbid obesity        REVIEW OF SYMPTOMS  [X] Constitutional WNL     [X] Cardio WNL            [X] Resp WNL           [X] GI WNL                          [X]  WNL                    [X] Endo WNL                     [X] Skin WNL              [X] Cognitive WNL          VITALS  Vital Signs Last 24 Hrs  T(C): 36.9 (01 May 2019 22:49), Max: 36.9 (01 May 2019 22:49)  T(F): 98.4 (01 May 2019 22:49), Max: 98.4 (01 May 2019 22:49)  HR: 68 (02 May 2019 06:18) (68 - 78)  BP: 132/78 (02 May 2019 06:18) (112/72 - 132/78)  BP(mean): --  RR: 16 (02 May 2019 06:18) (16 - 16)  SpO2: 95% (02 May 2019 06:18) (94% - 97%)      PHYSICAL EXAM  Constitutional - NAD, CASTILLO  HEENT - NCAT, EOMI  Neck - Supple, No limited ROM  Chest - CTA bilaterally, No wheeze, No rhonchi, No crackles, dyspnea on exertion  Cardiovascular -irregular, murmur  Abdomen - BS+, Soft, NTND  Extremities - No C/C/E, No calf tenderness   Skin-nystatin on buttocks      Neurologic Exam - no new  focal deficits.                     Balance - impaired     Psychiatric - Mood stable, Affect WNL, insomnia         FUNCTIONAL PROGRESS  Gait - 40ft RW mod A   ADLs - min/CG  Transfers - min A  Functional transfer - min A      RECENT LABS                        13.8   7.8   )-----------( 239      ( 01 May 2019 11:09 )             43.6     05-02    141  |  107  |  22  ----------------------------<  119<H>  4.0   |  24  |  1.32<H>    Ca    8.8      02 May 2019 07:20    TPro  7.4  /  Alb  3.2<L>  /  TBili  0.5  /  DBili  x   /  AST  12  /  ALT  18  /  AlkPhos  73  05-01    PT/INR - ( 02 May 2019 07:20 )   PT: 24.5 sec;   INR: 2.14 ratio           LIVER FUNCTIONS - ( 01 May 2019 11:09 )  Alb: 3.2 g/dL / Pro: 7.4 g/dL / ALK PHOS: 73 U/L / ALT: 18 U/L DA / AST: 12 U/L / GGT: x             Direct LDL: 125 mg/dL (04-24-19 @ 05:05)  Direct LDL: 111 mg/dL (04-23-19 @ 05:30)    Hemoglobin A1C, Whole Blood: 6.1 % (04-23-19 @ 13:07)    CURRENT MEDICATIONS  MEDICATIONS  (STANDING):  amLODIPine   Tablet 5 milliGRAM(s) Oral daily  aspirin  chewable 81 milliGRAM(s) Oral daily  atorvastatin 40 milliGRAM(s) Oral at bedtime  buDESOnide 160 MICROgram(s)/formoterol 4.5 MICROgram(s) Inhaler 2 Puff(s) Inhalation two times a day  docusate sodium 100 milliGRAM(s) Oral two times a day  enoxaparin Injectable 140 milliGRAM(s) SubCutaneous every 12 hours  lidocaine   Patch 1 Patch Transdermal daily  metoprolol succinate  milliGRAM(s) Oral daily  nystatin Cream 1 Application(s) Topical two times a day  pantoprazole    Tablet 40 milliGRAM(s) Oral before breakfast  QUEtiapine 12.5 milliGRAM(s) Oral at bedtime  sertraline 50 milliGRAM(s) Oral daily  sodium chloride 0.9%. 1000 milliLiter(s) (75 mL/Hr) IV Continuous <Continuous>  tamsulosin 0.4 milliGRAM(s) Oral at bedtime  warfarin 5 milliGRAM(s) Oral once    MEDICATIONS  (PRN):  acetaminophen   Tablet .. 650 milliGRAM(s) Oral every 6 hours PRN Moderate Pain (4 - 6)  ALBUTerol/ipratropium for Nebulization 3 milliLiter(s) Nebulizer every 6 hours PRN Shortness of Breath and/or Wheezing  melatonin 3 milliGRAM(s) Oral at bedtime PRN Insomnia      ASSESSMENT & PLAN        GI/Bowel Management - Colace   Management - Toilet Q2  Skin - Turn Q2  Pain - Tylenol PRN  DVT PPX - On Coumadin c Bridge Lovenox.   Diet - reg c thins    Continue comprehensive acute rehab program consisting of 3hrs/day of OT/PT and SLP.

## 2019-05-02 NOTE — PROGRESS NOTE ADULT - SUBJECTIVE AND OBJECTIVE BOX
Psychiatric Consult Follow up note:5/2/19    Identifying Data: 84y    History of Present Illness:  HPI:  SANDI SANTOS is a 83yo Male with PMH prior stroke (Feb '19), HTN, HLD, CAD c/b MI s/p stent '96, COPD, seizure d/o, PE s/p IVC filter, A-fib on warfarin, presented to [facility] 4/22 w/ R hand weakness x1day with slurred speech. INR therapeutic on admission. MR brain showed scattered infarcts throughout, in no vascular distribution, suspicious for embolic phenomenon. 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 Seen by CT surg, recommended open heart surgery to excise the mass.    Heme onc noted hx of factor V Leiden. 4/23 doppler BLE neg for DVT. Heme recommended change to other AC. (26 Apr 2019 14:05)      Chief Complaint/Reason for F/U Anxiety    Health Issues: Cerebral infarction  Undefined  H/o or current diagnosis of HF- Contraindication to ACEI/ARBs  H/o or current diagnosis of HF- ACEI/ARB contraindication unknown  Family history of diabetes mellitus (Child)  No pertinent family history in first degree relatives  Handoff  MEWS Score  Cerebrovascular accident (CVA)  History of COPD  CAD S/P percutaneous coronary angioplasty  Atrial fibrillation, unspecified type  CAD (coronary artery disease)  [place] filter in place  PE (pulmonary embolism)  Hyperlipemia  Hypertension  Deep vein thrombosis (DVT)  Atrial fibrillation, unspecified type  Cerebrovascular accident (CVA)  Neurocognitive disorder  S/P cataract surgery  S/P IVC filter  No significant past surgical history      Psychiatric Review of Systems:    Current medications: acetaminophen   Tablet .. 650 milliGRAM(s) Oral every 6 hours PRN  ALBUTerol/ipratropium for Nebulization 3 milliLiter(s) Nebulizer every 6 hours PRN  ALPRAZolam 0.25 milliGRAM(s) Oral every 6 hours PRN  amLODIPine   Tablet 5 milliGRAM(s) Oral daily  aspirin  chewable 81 milliGRAM(s) Oral daily  atorvastatin 40 milliGRAM(s) Oral at bedtime  buDESOnide 160 MICROgram(s)/formoterol 4.5 MICROgram(s) Inhaler 2 Puff(s) Inhalation two times a day  docusate sodium 100 milliGRAM(s) Oral two times a day  enoxaparin Injectable 140 milliGRAM(s) SubCutaneous every 12 hours  lidocaine   Patch 1 Patch Transdermal daily  melatonin 3 milliGRAM(s) Oral at bedtime PRN  metoprolol succinate  milliGRAM(s) Oral daily  nystatin Cream 1 Application(s) Topical two times a day  pantoprazole    Tablet 40 milliGRAM(s) Oral before breakfast  QUEtiapine 12.5 milliGRAM(s) Oral at bedtime  sertraline 50 milliGRAM(s) Oral daily  sodium chloride 0.9%. 1000 milliLiter(s) IV Continuous <Continuous>  tamsulosin 0.4 milliGRAM(s) Oral at bedtime  warfarin 5 milliGRAM(s) Oral once      Labs:      Vital Signs Last 24 hours: T(C): 36.6 (05-02-19 @ 08:25), Max: 36.9 (05-01-19 @ 22:49)  HR: 70 (05-02-19 @ 08:51) (68 - 73)  BP: 147/81 (05-02-19 @ 08:25) (112/72 - 147/81)  RR: 16 (05-02-19 @ 08:25) (16 - 16)  SpO2: 95% (05-02-19 @ 08:51) (94% - 97%)    Allergies: No Known Allergies      Past Psychiatric or Substance use History:  patient has been sleeping better on Seroquel, still anxious  to go home,     Current Mental Status Exam:  Appearance:- well groomed,  Attitude: - cooperative,  Gait/Station: - , bedrest, per physiatry or PT notes.  Motor Activity: - , psychomotor restlessness  Affect: -  labile,   Mood: -, anxious,   Speech: -normal,   Thought process: -intact,  Thought content/perceptions:   Hallucinations:  , not present.  Delusions: not present,    Suicidal ideation: none  Homicidal ideation:  none   Sensorium: alert,   Orientation x3  Attention: good  Concentration: good  Memory: fair  Insight/Judgment: limited    Assessment and Plan: Continue Zoloft, Seroquel 12,5 start Xanax 0,25 PRN     Follow up status: as needed

## 2019-05-03 LAB
INR BLD: 2.65 RATIO — HIGH (ref 0.88–1.16)
PROTHROM AB SERPL-ACNC: 30.6 SEC — HIGH (ref 10–12.9)

## 2019-05-03 PROCEDURE — 99232 SBSQ HOSP IP/OBS MODERATE 35: CPT

## 2019-05-03 RX ORDER — WARFARIN SODIUM 2.5 MG/1
4 TABLET ORAL ONCE
Qty: 0 | Refills: 0 | Status: COMPLETED | OUTPATIENT
Start: 2019-05-03 | End: 2019-05-03

## 2019-05-03 RX ADMIN — NYSTATIN CREAM 1 APPLICATION(S): 100000 CREAM TOPICAL at 05:40

## 2019-05-03 RX ADMIN — WARFARIN SODIUM 4 MILLIGRAM(S): 2.5 TABLET ORAL at 21:02

## 2019-05-03 RX ADMIN — ENOXAPARIN SODIUM 140 MILLIGRAM(S): 100 INJECTION SUBCUTANEOUS at 18:23

## 2019-05-03 RX ADMIN — ENOXAPARIN SODIUM 140 MILLIGRAM(S): 100 INJECTION SUBCUTANEOUS at 05:35

## 2019-05-03 RX ADMIN — Medication 3 MILLIGRAM(S): at 21:02

## 2019-05-03 RX ADMIN — NYSTATIN CREAM 1 APPLICATION(S): 100000 CREAM TOPICAL at 21:05

## 2019-05-03 RX ADMIN — Medication 100 MILLIGRAM(S): at 18:23

## 2019-05-03 RX ADMIN — Medication 650 MILLIGRAM(S): at 06:30

## 2019-05-03 RX ADMIN — QUETIAPINE FUMARATE 12.5 MILLIGRAM(S): 200 TABLET, FILM COATED ORAL at 21:06

## 2019-05-03 RX ADMIN — LIDOCAINE 1 PATCH: 4 CREAM TOPICAL at 23:59

## 2019-05-03 RX ADMIN — Medication 81 MILLIGRAM(S): at 12:55

## 2019-05-03 RX ADMIN — Medication 650 MILLIGRAM(S): at 05:35

## 2019-05-03 RX ADMIN — PANTOPRAZOLE SODIUM 40 MILLIGRAM(S): 20 TABLET, DELAYED RELEASE ORAL at 05:36

## 2019-05-03 RX ADMIN — BUDESONIDE AND FORMOTEROL FUMARATE DIHYDRATE 2 PUFF(S): 160; 4.5 AEROSOL RESPIRATORY (INHALATION) at 20:35

## 2019-05-03 RX ADMIN — LIDOCAINE 1 PATCH: 4 CREAM TOPICAL at 12:55

## 2019-05-03 RX ADMIN — BUDESONIDE AND FORMOTEROL FUMARATE DIHYDRATE 2 PUFF(S): 160; 4.5 AEROSOL RESPIRATORY (INHALATION) at 08:49

## 2019-05-03 RX ADMIN — Medication 650 MILLIGRAM(S): at 12:55

## 2019-05-03 RX ADMIN — AMLODIPINE BESYLATE 5 MILLIGRAM(S): 2.5 TABLET ORAL at 05:35

## 2019-05-03 RX ADMIN — LIDOCAINE 1 PATCH: 4 CREAM TOPICAL at 18:22

## 2019-05-03 RX ADMIN — TAMSULOSIN HYDROCHLORIDE 0.4 MILLIGRAM(S): 0.4 CAPSULE ORAL at 21:06

## 2019-05-03 RX ADMIN — ATORVASTATIN CALCIUM 40 MILLIGRAM(S): 80 TABLET, FILM COATED ORAL at 21:02

## 2019-05-03 RX ADMIN — Medication 100 MILLIGRAM(S): at 05:35

## 2019-05-03 RX ADMIN — SERTRALINE 50 MILLIGRAM(S): 25 TABLET, FILM COATED ORAL at 12:55

## 2019-05-03 RX ADMIN — Medication 650 MILLIGRAM(S): at 13:59

## 2019-05-03 NOTE — PROGRESS NOTE ADULT - SUBJECTIVE AND OBJECTIVE BOX
CHIEF COMPLAINT: Offers no complaints.       HISTORY OF PRESENT ILLNESS  SANDI SANTOS is a 85yo Male with PMH prior stroke (Feb '19), HTN, HLD, CAD c/b MI s/p stent '96, COPD, seizure d/o, PE s/p IVC filter, A-fib on warfarin, presented to [facility] 4/22 w/ R hand weakness x1day with slurred speech. INR therapeutic on admission. MR brain showed scattered infarcts throughout, in no vascular distribution, suspicious for embolic phenomenon. 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 Seen by CT surg, recommended open heart surgery to excise the mass.    Heme onc noted hx of factor V Leiden. 4/23 doppler BLE neg for DVT. (Heme recommended change to other AC. ?) Heme eval at [facility]-continue Coumadin.       PAST MEDICAL & SURGICAL HISTORY:  Cerebrovascular accident (CVA)  History of COPD  CAD S/P percutaneous coronary angioplasty  Atrial fibrillation, unspecified type  [place] filter in place  PE (pulmonary embolism)  Hyperlipemia  Hypertension  S/P cataract surgery  S/P IVC filter  morbid obesity       REVIEW OF SYMPTOMS  [X] Constitutional WNL          [X] Resp WNL           [X] GI WNL                          [X]  WNL                   [X] Endo WNL                     [X] Skin WNL                 [X] MSK WNL                        [X] Cognitive WNL        [X] Psych WNL      VITALS  Vital Signs Last 24 Hrs  T(C): 36.6 (03 May 2019 07:27), Max: 36.7 (02 May 2019 21:33)  T(F): 97.8 (03 May 2019 07:27), Max: 98.1 (02 May 2019 21:33)  HR: 62 (03 May 2019 07:27) (62 - 69)  BP: 146/- (03 May 2019 07:27) (146/- - 150/80)  BP(mean): --  RR: 14 (03 May 2019 07:27) (14 - 14)  SpO2: 98% (03 May 2019 08:50) (96% - 98%)    PHYSICAL EXAM  Constitutional - NAD, CASTILLO  HEENT - NCAT, EOMI  Neck - Supple, No limited ROM  Chest - CTA bilaterally, No wheeze, No rhonchi, No crackles, dyspnea on exertion  Cardiovascular -irregular, murmur  Abdomen - BS+, Soft, NTND  Extremities - No C/C/E, No calf tenderness   Skin-nystatin on buttocks      Neurologic Exam - no new  focal deficits.                     Balance - impaired     Psychiatric - Mood stable, Affect WNL, insomnia         FUNCTIONAL PROGRESS  Gait - 40ft RW mod A   ADLs - min/CG  Transfers - min A  Functional transfer - min A    RECENT LABS                        13.8   7.8   )-----------( 239      ( 01 May 2019 11:09 )             43.6     05-02    141  |  107  |  22  ----------------------------<  119<H>  4.0   |  24  |  1.32<H>    Ca    8.8      02 May 2019 07:20    TPro  7.4  /  Alb  3.2<L>  /  TBili  0.5  /  DBili  x   /  AST  12  /  ALT  18  /  AlkPhos  73  05-01    PT/INR - ( 03 May 2019 05:35 )   PT: 30.6 sec;   INR: 2.65 ratio           LIVER FUNCTIONS - ( 01 May 2019 11:09 )  Alb: 3.2 g/dL / Pro: 7.4 g/dL / ALK PHOS: 73 U/L / ALT: 18 U/L DA / AST: 12 U/L / GGT: x             Direct LDL: 125 mg/dL (04-24-19 @ 05:05)  Direct LDL: 111 mg/dL (04-23-19 @ 05:30)    Hemoglobin A1C, Whole Blood: 6.1 % (04-23-19 @ 13:07)              RADIOLOGY/OTHER RESULTS      CURRENT MEDICATIONS  MEDICATIONS  (STANDING):  amLODIPine   Tablet 5 milliGRAM(s) Oral daily  aspirin  chewable 81 milliGRAM(s) Oral daily  atorvastatin 40 milliGRAM(s) Oral at bedtime  buDESOnide 160 MICROgram(s)/formoterol 4.5 MICROgram(s) Inhaler 2 Puff(s) Inhalation two times a day  docusate sodium 100 milliGRAM(s) Oral two times a day  enoxaparin Injectable 140 milliGRAM(s) SubCutaneous every 12 hours  lidocaine   Patch 1 Patch Transdermal daily  metoprolol succinate  milliGRAM(s) Oral daily  nystatin Cream 1 Application(s) Topical two times a day  pantoprazole    Tablet 40 milliGRAM(s) Oral before breakfast  QUEtiapine 12.5 milliGRAM(s) Oral at bedtime  sertraline 50 milliGRAM(s) Oral daily  sodium chloride 0.9%. 1000 milliLiter(s) (75 mL/Hr) IV Continuous <Continuous>  tamsulosin 0.4 milliGRAM(s) Oral at bedtime    MEDICATIONS  (PRN):  acetaminophen   Tablet .. 650 milliGRAM(s) Oral every 6 hours PRN Moderate Pain (4 - 6)  ALBUTerol/ipratropium for Nebulization 3 milliLiter(s) Nebulizer every 6 hours PRN Shortness of Breath and/or Wheezing  ALPRAZolam 0.25 milliGRAM(s) Oral every 6 hours PRN anxiety  melatonin 3 milliGRAM(s) Oral at bedtime PRN Insomnia      ASSESSMENT & PLAN        GI/Bowel Management - Colace   Management - Toilet Q2  Skin - Turn Q2  Pain - Tylenol PRN  DVT PPX - On Coumadin c Bridge Lovenox.   Diet - reg c thins      Continue comprehensive acute rehab program consisting of 3hrs/day of OT/PT and SLP.

## 2019-05-03 NOTE — PROGRESS NOTE ADULT - ATTENDING COMMENTS
Patient medically  and neurologically stable. Making progress towards rehab goals.   Continue rehab program. Coumadin/Lovenox bridge until INR >3.

## 2019-05-03 NOTE — PROGRESS NOTE ADULT - SUBJECTIVE AND OBJECTIVE BOX
Psychiatric Consult Follow up note:5/3/19    Identifying Data: 84y    History of Present Illness:  HPI:  SANDI SANTOS is a 83yo Male with PMH prior stroke (Feb '19), HTN, HLD, CAD c/b MI s/p stent '96, COPD, seizure d/o, PE s/p IVC filter, A-fib on warfarin, presented to [facility] 4/22 w/ R hand weakness x1day with slurred speech. INR therapeutic on admission. MR brain showed scattered infarcts throughout, in no vascular distribution, suspicious for embolic phenomenon. 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 Seen by CT surg, recommended open heart surgery to excise the mass.    Heme onc noted hx of factor V Leiden. 4/23 doppler BLE neg for DVT. Heme recommended change to other AC. (26 Apr 2019 14:05)      Chief Complaint/Reason for F/U Anxiety. Patient is doing better, compliant  with  PT and medications. Sleeps well with Seroquel    Health Issues: Cerebral infarction  Undefined  H/o or current diagnosis of HF- Contraindication to ACEI/ARBs  H/o or current diagnosis of HF- ACEI/ARB contraindication unknown  Family history of diabetes mellitus (Child)  No pertinent family history in first degree relatives  Handoff  MEWS Score  Cerebrovascular accident (CVA)  History of COPD  CAD S/P percutaneous coronary angioplasty  Atrial fibrillation, unspecified type  CAD (coronary artery disease)  [place] filter in place  PE (pulmonary embolism)  Hyperlipemia  Hypertension  Deep vein thrombosis (DVT)  Atrial fibrillation, unspecified type  Cerebrovascular accident (CVA)  Neurocognitive disorder  S/P cataract surgery  S/P IVC filter  No significant past surgical history      Psychiatric Review of Systems:    Current medications: acetaminophen   Tablet .. 650 milliGRAM(s) Oral every 6 hours PRN  ALBUTerol/ipratropium for Nebulization 3 milliLiter(s) Nebulizer every 6 hours PRN  ALPRAZolam 0.25 milliGRAM(s) Oral every 6 hours PRN  amLODIPine   Tablet 5 milliGRAM(s) Oral daily  aspirin  chewable 81 milliGRAM(s) Oral daily  atorvastatin 40 milliGRAM(s) Oral at bedtime  buDESOnide 160 MICROgram(s)/formoterol 4.5 MICROgram(s) Inhaler 2 Puff(s) Inhalation two times a day  docusate sodium 100 milliGRAM(s) Oral two times a day  enoxaparin Injectable 140 milliGRAM(s) SubCutaneous every 12 hours  lidocaine   Patch 1 Patch Transdermal daily  melatonin 3 milliGRAM(s) Oral at bedtime PRN  metoprolol succinate  milliGRAM(s) Oral daily  nystatin Cream 1 Application(s) Topical two times a day  pantoprazole    Tablet 40 milliGRAM(s) Oral before breakfast  QUEtiapine 12.5 milliGRAM(s) Oral at bedtime  sertraline 50 milliGRAM(s) Oral daily  sodium chloride 0.9%. 1000 milliLiter(s) IV Continuous <Continuous>  tamsulosin 0.4 milliGRAM(s) Oral at bedtime  warfarin 5 milliGRAM(s) Oral once      Labs:      Vital Signs Last 24 hours: T(C): 36.6 (05-02-19 @ 08:25), Max: 36.9 (05-01-19 @ 22:49)  HR: 70 (05-02-19 @ 08:51) (68 - 73)  BP: 147/81 (05-02-19 @ 08:25) (112/72 - 147/81)  RR: 16 (05-02-19 @ 08:25) (16 - 16)  SpO2: 95% (05-02-19 @ 08:51) (94% - 97%)    Allergies: No Known Allergies      Past Psychiatric or Substance use History:  patient has been sleeping better on Seroquel, still anxious  to go home,     Current Mental Status Exam:  Appearance:- well groomed,  Attitude: - cooperative,  Gait/Station: - , bedrest, per physiatry or PT notes.  Motor Activity: - , psychomotor restlessness  Affect: -  labile,   Mood: -, anxious,   Speech: -normal,   Thought process: -intact,  Thought content/perceptions:   Hallucinations:  , not present.  Delusions: not present,    Suicidal ideation: none  Homicidal ideation:  none   Sensorium: alert,   Orientation x3  Attention: good  Concentration: good  Memory: fair  Insight/Judgment: limited    Assessment and Plan: Continue Zoloft, Seroquel 12,5 start Xanax 0,25 PRN     Follow up status: as needed

## 2019-05-03 NOTE — PROGRESS NOTE ADULT - ASSESSMENT
SANDI SANTOS is a 83yo Male with PMH prior stroke (Feb '19), HTN, HLD, CAD c/b MI s/p stent '96, COPD, seizure d/o, PE s/p IVC filter, A-fib on warfarin admitted for multiple scattered infarcts, now with gait instability, ADL impairments and functional impairments.     # Acute ischemic stroke: cardioembolic etiology  -  Coumadin dose today. Goal INR 3-3.5. Hematology is following. Lovenox bridge until INR therapeutic.     - Continue statin with goal LDL <70  - Continue PT/OT/SLP therapy. Spoke with dtr in [place], discussed d/c planning process, extensive update given on father's status, potential post d/c needs discussed.    #Elevated creatinine/bun  s/p IVF. Improved creat. PO hydration encouraged. Follow labs.     #Left knee pain  Reports chronic pain/OA. Lidocaine patch and prn Tylenol.     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 Will require open heart surgery for excision.   - Per [facility] rehab note 4/26, surgery planned for after acute rehab.    # Chronic A-fib  - Toprol XL for rate control  - Warfarin, goal INR 3-3.5. Lovenox bridge.     # COPD: continue symbicort    # HTN  - DASH  - BB as above  - Amlodipine    # Factor V Leiden  - Hematology in. On Coumadin, dose today c goal 3-3.5. Lovenox bridge until therapeutic as per heme.    - No evidence of DVT on 4/23 duplex of BLE.     # BPH  - Flomax.     # Depression: continue sertraline. Psych eval. Seroquel added nightly. Denies nightmares.     # HLD: continue atorvastatin. Cardiac diet. Follow LFTs.     # Buttock rash: nystatin    Gait Instability, ADL impairments and Functional impairments: Comprehensive Rehab Program of PT/OT/SLP  DVT PPx: Warfarin  Diet: DASH/TLC SANDI SANTOS is a 83yo Male with PMH prior stroke (Feb '19), HTN, HLD, CAD c/b MI s/p stent '96, COPD, seizure d/o, PE s/p IVC filter, A-fib on warfarin admitted for multiple scattered infarcts, now with gait instability, ADL impairments and functional impairments.     # Acute ischemic stroke: cardioembolic etiology  -  Coumadin dose today. Goal INR 3-3.5. Hematology is following.     - Continue statin with goal LDL <70  - Continue PT/OT/SLP therapy. Spoke with dtr in [place], discussed d/c planning process, extensive update given on father's status, potential post d/c needs discussed.    #Elevated creatinine/bun  s/p IVF. Improved creat. PO hydration encouraged. Follow labs.     #Left knee pain  Reports chronic pain/OA. Lidocaine patch and prn Tylenol.     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 Will require open heart surgery for excision.   - Per [facility] rehab note 4/26, surgery planned for after acute rehab.    # Chronic A-fib  - Toprol XL for rate control  - Warfarin, goal INR 3-3.5. Lovenox bridge.     # COPD: continue symbicort    # HTN  - DASH  - BB as above  - Amlodipine    # Factor V Leiden  - Hematology in. On Coumadin, dose today c goal 3-3.5. Lovenox bridge until therapeutic as per heme.    - No evidence of DVT on 4/23 duplex of BLE.     # BPH  - Flomax.     # Depression: continue sertraline. Psych eval. Seroquel added nightly. Denies nightmares.     # HLD: continue atorvastatin. Cardiac diet. Follow LFTs.     # Buttock rash: nystatin    Gait Instability, ADL impairments and Functional impairments: Comprehensive Rehab Program of PT/OT/SLP  DVT PPx: Warfarin  Diet: DASH/TLC

## 2019-05-04 LAB
ANION GAP SERPL CALC-SCNC: 7 MMOL/L — SIGNIFICANT CHANGE UP (ref 5–17)
BUN SERPL-MCNC: 16 MG/DL — SIGNIFICANT CHANGE UP (ref 7–23)
CALCIUM SERPL-MCNC: 9 MG/DL — SIGNIFICANT CHANGE UP (ref 8.4–10.5)
CHLORIDE SERPL-SCNC: 105 MMOL/L — SIGNIFICANT CHANGE UP (ref 96–108)
CO2 SERPL-SCNC: 27 MMOL/L — SIGNIFICANT CHANGE UP (ref 22–31)
CREAT SERPL-MCNC: 1.34 MG/DL — HIGH (ref 0.5–1.3)
GLUCOSE SERPL-MCNC: 111 MG/DL — HIGH (ref 70–99)
INR BLD: 3.29 RATIO — HIGH (ref 0.88–1.16)
POTASSIUM SERPL-MCNC: 3.9 MMOL/L — SIGNIFICANT CHANGE UP (ref 3.5–5.3)
POTASSIUM SERPL-SCNC: 3.9 MMOL/L — SIGNIFICANT CHANGE UP (ref 3.5–5.3)
PROTHROM AB SERPL-ACNC: 38.2 SEC — HIGH (ref 10–12.9)
SODIUM SERPL-SCNC: 139 MMOL/L — SIGNIFICANT CHANGE UP (ref 135–145)

## 2019-05-04 PROCEDURE — 99232 SBSQ HOSP IP/OBS MODERATE 35: CPT

## 2019-05-04 RX ORDER — WARFARIN SODIUM 2.5 MG/1
4 TABLET ORAL ONCE
Qty: 0 | Refills: 0 | Status: COMPLETED | OUTPATIENT
Start: 2019-05-04 | End: 2019-05-04

## 2019-05-04 RX ADMIN — Medication 650 MILLIGRAM(S): at 09:00

## 2019-05-04 RX ADMIN — NYSTATIN CREAM 1 APPLICATION(S): 100000 CREAM TOPICAL at 05:13

## 2019-05-04 RX ADMIN — Medication 650 MILLIGRAM(S): at 19:21

## 2019-05-04 RX ADMIN — ATORVASTATIN CALCIUM 40 MILLIGRAM(S): 80 TABLET, FILM COATED ORAL at 21:01

## 2019-05-04 RX ADMIN — TAMSULOSIN HYDROCHLORIDE 0.4 MILLIGRAM(S): 0.4 CAPSULE ORAL at 21:01

## 2019-05-04 RX ADMIN — ENOXAPARIN SODIUM 140 MILLIGRAM(S): 100 INJECTION SUBCUTANEOUS at 05:12

## 2019-05-04 RX ADMIN — BUDESONIDE AND FORMOTEROL FUMARATE DIHYDRATE 2 PUFF(S): 160; 4.5 AEROSOL RESPIRATORY (INHALATION) at 21:01

## 2019-05-04 RX ADMIN — WARFARIN SODIUM 4 MILLIGRAM(S): 2.5 TABLET ORAL at 21:01

## 2019-05-04 RX ADMIN — NYSTATIN CREAM 1 APPLICATION(S): 100000 CREAM TOPICAL at 18:28

## 2019-05-04 RX ADMIN — Medication 81 MILLIGRAM(S): at 12:18

## 2019-05-04 RX ADMIN — Medication 100 MILLIGRAM(S): at 05:12

## 2019-05-04 RX ADMIN — LIDOCAINE 1 PATCH: 4 CREAM TOPICAL at 20:56

## 2019-05-04 RX ADMIN — QUETIAPINE FUMARATE 12.5 MILLIGRAM(S): 200 TABLET, FILM COATED ORAL at 21:01

## 2019-05-04 RX ADMIN — LIDOCAINE 1 PATCH: 4 CREAM TOPICAL at 12:18

## 2019-05-04 RX ADMIN — Medication 650 MILLIGRAM(S): at 18:36

## 2019-05-04 RX ADMIN — Medication 3 MILLIGRAM(S): at 21:00

## 2019-05-04 RX ADMIN — Medication 650 MILLIGRAM(S): at 08:17

## 2019-05-04 RX ADMIN — AMLODIPINE BESYLATE 5 MILLIGRAM(S): 2.5 TABLET ORAL at 05:12

## 2019-05-04 RX ADMIN — BUDESONIDE AND FORMOTEROL FUMARATE DIHYDRATE 2 PUFF(S): 160; 4.5 AEROSOL RESPIRATORY (INHALATION) at 08:29

## 2019-05-04 RX ADMIN — Medication 100 MILLIGRAM(S): at 18:28

## 2019-05-04 RX ADMIN — PANTOPRAZOLE SODIUM 40 MILLIGRAM(S): 20 TABLET, DELAYED RELEASE ORAL at 05:13

## 2019-05-04 RX ADMIN — SERTRALINE 50 MILLIGRAM(S): 25 TABLET, FILM COATED ORAL at 12:18

## 2019-05-04 NOTE — PROGRESS NOTE ADULT - SUBJECTIVE AND OBJECTIVE BOX
Psychiatric Consult Follow up note:5/4/19    Identifying Data: 84y    History of Present Illness:  HPI:  SANDI SANTOS is a 85yo Male with PMH prior stroke (Feb '19), HTN, HLD, CAD c/b MI s/p stent '96, COPD, seizure d/o, PE s/p IVC filter, A-fib on warfarin, presented to [facility] 4/22 w/ R hand weakness x1day with slurred speech. INR therapeutic on admission. MR brain showed scattered infarcts throughout, in no vascular distribution, suspicious for embolic phenomenon. 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 Seen by CT surg, recommended open heart surgery to excise the mass.    Heme onc noted hx of factor V Leiden. 4/23 doppler BLE neg for DVT. Heme recommended change to other AC. (26 Apr 2019 14:05)      Chief Complaint/Reason for F/U Anxiety. Patient is doing much  better, had  his CPAP mashine , brought by his friend.     Health Issues: Cerebral infarction  Undefined  H/o or current diagnosis of HF- Contraindication to ACEI/ARBs  H/o or current diagnosis of HF- ACEI/ARB contraindication unknown  Family history of diabetes mellitus (Child)  No pertinent family history in first degree relatives  Handoff  MEWS Score  Cerebrovascular accident (CVA)  History of COPD  CAD S/P percutaneous coronary angioplasty  Atrial fibrillation, unspecified type  CAD (coronary artery disease)  [place] filter in place  PE (pulmonary embolism)  Hyperlipemia  Hypertension  Deep vein thrombosis (DVT)  Atrial fibrillation, unspecified type  Cerebrovascular accident (CVA)  Neurocognitive disorder  S/P cataract surgery  S/P IVC filter  No significant past surgical history      Psychiatric Review of Systems:    Current medications: acetaminophen   Tablet .. 650 milliGRAM(s) Oral every 6 hours PRN  ALBUTerol/ipratropium for Nebulization 3 milliLiter(s) Nebulizer every 6 hours PRN  ALPRAZolam 0.25 milliGRAM(s) Oral every 6 hours PRN  amLODIPine   Tablet 5 milliGRAM(s) Oral daily  aspirin  chewable 81 milliGRAM(s) Oral daily  atorvastatin 40 milliGRAM(s) Oral at bedtime  buDESOnide 160 MICROgram(s)/formoterol 4.5 MICROgram(s) Inhaler 2 Puff(s) Inhalation two times a day  docusate sodium 100 milliGRAM(s) Oral two times a day  enoxaparin Injectable 140 milliGRAM(s) SubCutaneous every 12 hours  lidocaine   Patch 1 Patch Transdermal daily  melatonin 3 milliGRAM(s) Oral at bedtime PRN  metoprolol succinate  milliGRAM(s) Oral daily  nystatin Cream 1 Application(s) Topical two times a day  pantoprazole    Tablet 40 milliGRAM(s) Oral before breakfast  QUEtiapine 12.5 milliGRAM(s) Oral at bedtime  sertraline 50 milliGRAM(s) Oral daily  sodium chloride 0.9%. 1000 milliLiter(s) IV Continuous <Continuous>  tamsulosin 0.4 milliGRAM(s) Oral at bedtime  warfarin 5 milliGRAM(s) Oral once      Labs:      Vital Signs Last 24 hours: T(C): 36.6 (05-02-19 @ 08:25), Max: 36.9 (05-01-19 @ 22:49)  HR: 70 (05-02-19 @ 08:51) (68 - 73)  BP: 147/81 (05-02-19 @ 08:25) (112/72 - 147/81)  RR: 16 (05-02-19 @ 08:25) (16 - 16)  SpO2: 95% (05-02-19 @ 08:51) (94% - 97%)    Allergies: No Known Allergies      Past Psychiatric or Substance use History:  patient has been sleeping better on Seroquel, still anxious  to go home,     Current Mental Status Exam:  Appearance:- well groomed,  Attitude: - cooperative,  Gait/Station: - , bedrest, per physiatry or PT notes.  Motor Activity: - , psychomotor restlessness  Affect: -  appropriate   Mood: -, euthimic   Speech: -normal,   Thought process: -intact,  Thought content/perceptions:   Hallucinations:  , not present.  Delusions: not present,    Suicidal ideation: none  Homicidal ideation:  none   Sensorium: alert,   Orientation x3  Attention: good  Concentration: good  Memory: fair  Insight/Judgment: limited    Assessment and Plan: Continue Zoloft, Seroquel 12,5 start Xanax 0,25 PRN     Follow up status: as needed

## 2019-05-04 NOTE — PROGRESS NOTE ADULT - SUBJECTIVE AND OBJECTIVE BOX
CC: Gait dysfunction    Subjective: Patient seen clarke valuated at the bedside.    No acute events overngiht  Has been tolerating rehabilitation program.    acetaminophen   Tablet .. 650 milliGRAM(s) Oral every 6 hours PRN  ALBUTerol/ipratropium for Nebulization 3 milliLiter(s) Nebulizer every 6 hours PRN  ALPRAZolam 0.25 milliGRAM(s) Oral every 6 hours PRN  amLODIPine   Tablet 5 milliGRAM(s) Oral daily  aspirin  chewable 81 milliGRAM(s) Oral daily  atorvastatin 40 milliGRAM(s) Oral at bedtime  buDESOnide 160 MICROgram(s)/formoterol 4.5 MICROgram(s) Inhaler 2 Puff(s) Inhalation two times a day  docusate sodium 100 milliGRAM(s) Oral two times a day  lidocaine   Patch 1 Patch Transdermal daily  melatonin 3 milliGRAM(s) Oral at bedtime PRN  metoprolol succinate  milliGRAM(s) Oral daily  nystatin Cream 1 Application(s) Topical two times a day  pantoprazole    Tablet 40 milliGRAM(s) Oral before breakfast  QUEtiapine 12.5 milliGRAM(s) Oral at bedtime  sertraline 50 milliGRAM(s) Oral daily  tamsulosin 0.4 milliGRAM(s) Oral at bedtime  warfarin 4 milliGRAM(s) Oral once      T(C): 36.7 (05-04-19 @ 08:24), Max: 36.9 (05-03-19 @ 20:47)  HR: 66 (05-04-19 @ 08:29) (58 - 73)  BP: 133/68 (05-04-19 @ 08:24) (133/68 - 169/79)  RR: 14 (05-04-19 @ 08:24) (14 - 14)  SpO2: 94% (05-04-19 @ 08:29) (93% - 97%)    Exam:  NAD  HEENT: EOMI  Lungs: CTA bilaterally  Cardiovascular -irregular, murmur  Abdomen - BS+, Soft, NTND  Extremities - No C/C/E, No calf tenderness   Skin-nystatin on buttocks      Impression:  Cerebral infarction  Undefined  H/o or current diagnosis of HF- Contraindication to ACEI/ARBs  H/o or current diagnosis of HF- ACEI/ARB contraindication unknown  Family history of diabetes mellitus (Child)  No pertinent family history in first degree relatives  Handoff  MEWS Score  Cerebrovascular accident (CVA)  History of COPD  CAD S/P percutaneous coronary angioplasty  Atrial fibrillation, unspecified type  CAD (coronary artery disease)  [place] filter in place  PE (pulmonary embolism)  Hyperlipemia  Hypertension  Deep vein thrombosis (DVT)  Atrial fibrillation, unspecified type  Cerebrovascular accident (CVA)  Neurocognitive disorder  S/P cataract surgery  S/P IVC filter  No significant past surgical history      DULCEENT DANIELLE with 83yo Male with PMH prior stroke (Feb '19), HTN, HLD, CAD c/b MI s/p stent '96, COPD, seizure d/o, PE s/p IVC filter, A-fib on warfarin admitted for multiple scattered infarcts, now with gait instability, ADL impairments and functional impairments.     Plan:  - continue medical management as per primary team  - continue PT/OT/SLP  - DVT prophylaxis  - CVS stable  - Patient is stable to continue current rehabilitation program  - dc lovenox, INR 3.29

## 2019-05-05 LAB
INR BLD: 3.14 RATIO — HIGH (ref 0.88–1.16)
PROTHROM AB SERPL-ACNC: 36.4 SEC — HIGH (ref 10–12.9)

## 2019-05-05 PROCEDURE — 99232 SBSQ HOSP IP/OBS MODERATE 35: CPT

## 2019-05-05 RX ORDER — WARFARIN SODIUM 2.5 MG/1
4 TABLET ORAL ONCE
Qty: 0 | Refills: 0 | Status: COMPLETED | OUTPATIENT
Start: 2019-05-05 | End: 2019-05-05

## 2019-05-05 RX ORDER — SODIUM CHLORIDE 9 MG/ML
1000 INJECTION INTRAMUSCULAR; INTRAVENOUS; SUBCUTANEOUS
Qty: 0 | Refills: 0 | Status: DISCONTINUED | OUTPATIENT
Start: 2019-05-05 | End: 2019-05-06

## 2019-05-05 RX ADMIN — LIDOCAINE 1 PATCH: 4 CREAM TOPICAL at 12:09

## 2019-05-05 RX ADMIN — SODIUM CHLORIDE 50 MILLILITER(S): 9 INJECTION INTRAMUSCULAR; INTRAVENOUS; SUBCUTANEOUS at 14:10

## 2019-05-05 RX ADMIN — NYSTATIN CREAM 1 APPLICATION(S): 100000 CREAM TOPICAL at 17:38

## 2019-05-05 RX ADMIN — LIDOCAINE 1 PATCH: 4 CREAM TOPICAL at 22:04

## 2019-05-05 RX ADMIN — Medication 650 MILLIGRAM(S): at 08:00

## 2019-05-05 RX ADMIN — Medication 81 MILLIGRAM(S): at 12:09

## 2019-05-05 RX ADMIN — LIDOCAINE 1 PATCH: 4 CREAM TOPICAL at 19:20

## 2019-05-05 RX ADMIN — Medication 650 MILLIGRAM(S): at 18:20

## 2019-05-05 RX ADMIN — PANTOPRAZOLE SODIUM 40 MILLIGRAM(S): 20 TABLET, DELAYED RELEASE ORAL at 06:05

## 2019-05-05 RX ADMIN — Medication 650 MILLIGRAM(S): at 07:33

## 2019-05-05 RX ADMIN — SERTRALINE 50 MILLIGRAM(S): 25 TABLET, FILM COATED ORAL at 12:09

## 2019-05-05 RX ADMIN — AMLODIPINE BESYLATE 5 MILLIGRAM(S): 2.5 TABLET ORAL at 06:05

## 2019-05-05 RX ADMIN — WARFARIN SODIUM 4 MILLIGRAM(S): 2.5 TABLET ORAL at 21:23

## 2019-05-05 RX ADMIN — Medication 100 MILLIGRAM(S): at 06:05

## 2019-05-05 RX ADMIN — TAMSULOSIN HYDROCHLORIDE 0.4 MILLIGRAM(S): 0.4 CAPSULE ORAL at 21:23

## 2019-05-05 RX ADMIN — LIDOCAINE 1 PATCH: 4 CREAM TOPICAL at 07:32

## 2019-05-05 RX ADMIN — Medication 100 MILLIGRAM(S): at 17:37

## 2019-05-05 RX ADMIN — QUETIAPINE FUMARATE 12.5 MILLIGRAM(S): 200 TABLET, FILM COATED ORAL at 21:23

## 2019-05-05 RX ADMIN — BUDESONIDE AND FORMOTEROL FUMARATE DIHYDRATE 2 PUFF(S): 160; 4.5 AEROSOL RESPIRATORY (INHALATION) at 21:10

## 2019-05-05 RX ADMIN — ATORVASTATIN CALCIUM 40 MILLIGRAM(S): 80 TABLET, FILM COATED ORAL at 21:23

## 2019-05-05 RX ADMIN — NYSTATIN CREAM 1 APPLICATION(S): 100000 CREAM TOPICAL at 06:05

## 2019-05-05 RX ADMIN — Medication 650 MILLIGRAM(S): at 17:38

## 2019-05-05 RX ADMIN — BUDESONIDE AND FORMOTEROL FUMARATE DIHYDRATE 2 PUFF(S): 160; 4.5 AEROSOL RESPIRATORY (INHALATION) at 08:16

## 2019-05-05 NOTE — PROGRESS NOTE ADULT - SUBJECTIVE AND OBJECTIVE BOX
Psychiatric Consult Follow up note:5/5/19    Identifying Data: 84y    History of Present Illness:  HPI:  SANDI SANTOS is a 83yo Male with PMH prior stroke (Feb '19), HTN, HLD, CAD c/b MI s/p stent '96, COPD, seizure d/o, PE s/p IVC filter, A-fib on warfarin, presented to [facility] 4/22 w/ R hand weakness x1day with slurred speech. INR therapeutic on admission. MR brain showed scattered infarcts throughout, in no vascular distribution, suspicious for embolic phenomenon. 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 Seen by CT surg, recommended open heart surgery to excise the mass.    Heme onc noted hx of factor V Leiden. 4/23 doppler BLE neg for DVT. Heme recommended change to other AC. (26 Apr 2019 14:05)      Chief Complaint/Reason for F/U Anxiety. Patient is doing   better, slept well with  CPAP machine ,wants to go home, but understands why he better be here.      Health Issues: Cerebral infarction  Undefined  H/o or current diagnosis of HF- Contraindication to ACEI/ARBs  H/o or current diagnosis of HF- ACEI/ARB contraindication unknown  Family history of diabetes mellitus (Child)  No pertinent family history in first degree relatives  Handoff  MEWS Score  Cerebrovascular accident (CVA)  History of COPD  CAD S/P percutaneous coronary angioplasty  Atrial fibrillation, unspecified type  CAD (coronary artery disease)  [place] filter in place  PE (pulmonary embolism)  Hyperlipemia  Hypertension  Deep vein thrombosis (DVT)  Atrial fibrillation, unspecified type  Cerebrovascular accident (CVA)  Neurocognitive disorder  S/P cataract surgery  S/P IVC filter  No significant past surgical history      Psychiatric Review of Systems:    Current medications: acetaminophen   Tablet .. 650 milliGRAM(s) Oral every 6 hours PRN  ALBUTerol/ipratropium for Nebulization 3 milliLiter(s) Nebulizer every 6 hours PRN  ALPRAZolam 0.25 milliGRAM(s) Oral every 6 hours PRN  amLODIPine   Tablet 5 milliGRAM(s) Oral daily  aspirin  chewable 81 milliGRAM(s) Oral daily  atorvastatin 40 milliGRAM(s) Oral at bedtime  buDESOnide 160 MICROgram(s)/formoterol 4.5 MICROgram(s) Inhaler 2 Puff(s) Inhalation two times a day  docusate sodium 100 milliGRAM(s) Oral two times a day  enoxaparin Injectable 140 milliGRAM(s) SubCutaneous every 12 hours  lidocaine   Patch 1 Patch Transdermal daily  melatonin 3 milliGRAM(s) Oral at bedtime PRN  metoprolol succinate  milliGRAM(s) Oral daily  nystatin Cream 1 Application(s) Topical two times a day  pantoprazole    Tablet 40 milliGRAM(s) Oral before breakfast  QUEtiapine 12.5 milliGRAM(s) Oral at bedtime  sertraline 50 milliGRAM(s) Oral daily  sodium chloride 0.9%. 1000 milliLiter(s) IV Continuous <Continuous>  tamsulosin 0.4 milliGRAM(s) Oral at bedtime  warfarin 5 milliGRAM(s) Oral once      Labs:      Vital Signs Last 24 hours: T(C): 36.6 (05-02-19 @ 08:25), Max: 36.9 (05-01-19 @ 22:49)  HR: 70 (05-02-19 @ 08:51) (68 - 73)  BP: 147/81 (05-02-19 @ 08:25) (112/72 - 147/81)  RR: 16 (05-02-19 @ 08:25) (16 - 16)  SpO2: 95% (05-02-19 @ 08:51) (94% - 97%)    Allergies: No Known Allergies      Past Psychiatric or Substance use History:  patient has been sleeping better on Seroquel, still anxious  to go home,     Current Mental Status Exam:  Appearance:- well groomed,  Attitude: - cooperative,  Gait/Station: - , bedrest, per physiatry or PT notes.  Motor Activity: - , psychomotor restlessness  Affect: -  appropriate   Mood: -, euthimic   Speech: -normal,   Thought process: -intact,  Thought content/perceptions:   Hallucinations:  , not present.  Delusions: not present,    Suicidal ideation: none  Homicidal ideation:  none   Sensorium: alert,   Orientation x3  Attention: good  Concentration: good  Memory: fair  Insight/Judgment: limited    Assessment and Plan: Continue Zoloft, Seroquel 12,5 start Xanax 0,25 PRN     Follow up status: as needed

## 2019-05-05 NOTE — PROGRESS NOTE ADULT - SUBJECTIVE AND OBJECTIVE BOX
CC: Gait dysfunction    Subjective: Patient seen clarke valuated at the bedside.    No acute events overngiht  Has been tolerating rehabilitation program.    acetaminophen   Tablet .. 650 milliGRAM(s) Oral every 6 hours PRN  ALBUTerol/ipratropium for Nebulization 3 milliLiter(s) Nebulizer every 6 hours PRN  ALPRAZolam 0.25 milliGRAM(s) Oral every 6 hours PRN  amLODIPine   Tablet 5 milliGRAM(s) Oral daily  aspirin  chewable 81 milliGRAM(s) Oral daily  atorvastatin 40 milliGRAM(s) Oral at bedtime  buDESOnide 160 MICROgram(s)/formoterol 4.5 MICROgram(s) Inhaler 2 Puff(s) Inhalation two times a day  docusate sodium 100 milliGRAM(s) Oral two times a day  lidocaine   Patch 1 Patch Transdermal daily  melatonin 3 milliGRAM(s) Oral at bedtime PRN  metoprolol succinate  milliGRAM(s) Oral daily  nystatin Cream 1 Application(s) Topical two times a day  pantoprazole    Tablet 40 milliGRAM(s) Oral before breakfast  QUEtiapine 12.5 milliGRAM(s) Oral at bedtime  sertraline 50 milliGRAM(s) Oral daily  tamsulosin 0.4 milliGRAM(s) Oral at bedtime  warfarin 4 milliGRAM(s) Oral once    Vital Signs Last 24 Hrs  T(C): 36.7 (05 May 2019 07:48), Max: 36.9 (04 May 2019 20:53)  T(F): 98.1 (05 May 2019 07:48), Max: 98.5 (04 May 2019 20:53)  HR: 64 (05 May 2019 08:17) (64 - 80)  BP: 154/91 (05 May 2019 07:48) (154/91 - 161/91)  RR: 16 (05 May 2019 07:48) (16 - 16)  SpO2: 97% (05 May 2019 08:17) (95% - 98%)    Exam:  NAD  HEENT: EOMI  Lungs: CTA bilaterally  Cardiovascular -irregular, murmur  Abdomen - BS+, Soft, NTND  Extremities - No C/C/E, No calf tenderness   Skin-nystatin on buttocks      Impression:  Cerebral infarction  Undefined  H/o or current diagnosis of HF- Contraindication to ACEI/ARBs  H/o or current diagnosis of HF- ACEI/ARB contraindication unknown  Family history of diabetes mellitus (Child)  No pertinent family history in first degree relatives  Handoff  MEWS Score  Cerebrovascular accident (CVA)  History of COPD  CAD S/P percutaneous coronary angioplasty  Atrial fibrillation, unspecified type  CAD (coronary artery disease)  [place] filter in place  PE (pulmonary embolism)  Hyperlipemia  Hypertension  Deep vein thrombosis (DVT)  Atrial fibrillation, unspecified type  Cerebrovascular accident (CVA)  Neurocognitive disorder  S/P cataract surgery  S/P IVC filter  No significant past surgical history      SANDI SANTOS with 83yo Male with PMH prior stroke (Feb '19), HTN, HLD, CAD c/b MI s/p stent '96, COPD, seizure d/o, PE s/p IVC filter, A-fib on warfarin admitted for multiple scattered infarcts, now with gait instability, ADL impairments and functional impairments.     Plan:  - continue medical management as per primary team  - continue PT/OT/SLP  - DVT prophylaxis  - CVS stable  - Patient is stable to continue current rehabilitation program  - dc lovenox, INR > 3

## 2019-05-05 NOTE — PROGRESS NOTE ADULT - ASSESSMENT
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 surgery 3 weeks post acute rehab for open heart for excision     #MILAGRO   start IVF @ 50 ml/hr   Monitor BMP     # A-Fib  Rate controlled  c/w Toprol   Warfarin w Goal 3-3.5 given aortic valve mass    # COPD  c/w  symbicort     # HTN- controlled   c/w Amlodipine     # Factor V Leiden   A/C with  Coumadin    # BPH  c/w Tamsulosin     # Depression   c/w Sertraline     # HLD  c/w Statin     # DVT px  c/w Coumadin

## 2019-05-05 NOTE — PROGRESS NOTE ADULT - SUBJECTIVE AND OBJECTIVE BOX
Patient is a 84y old  Male who presents with a chief complaint of CVA (04 May 2019 18:11)      Patient seen and examined at bedside.     ALLERGIES:  No Known Allergies    MEDICATIONS:  acetaminophen   Tablet .. 650 milliGRAM(s) Oral every 6 hours PRN  ALBUTerol/ipratropium for Nebulization 3 milliLiter(s) Nebulizer every 6 hours PRN  ALPRAZolam 0.25 milliGRAM(s) Oral every 6 hours PRN  buDESOnide 160 MICROgram(s)/formoterol 4.5 MICROgram(s) Inhaler 2 Puff(s) Inhalation two times a day  lidocaine   Patch 1 Patch Transdermal daily  melatonin 3 milliGRAM(s) Oral at bedtime PRN  nystatin Cream 1 Application(s) Topical two times a day  QUEtiapine 12.5 milliGRAM(s) Oral at bedtime  warfarin 4 milliGRAM(s) Oral once    Vital Signs Last 24 Hrs  T(F): 98.1 (05 May 2019 07:48), Max: 98.5 (04 May 2019 20:53)  HR: 64 (05 May 2019 08:17) (64 - 80)  BP: 154/91 (05 May 2019 07:48) (154/91 - 161/91)  RR: 16 (05 May 2019 07:48) (16 - 16)  SpO2: 97% (05 May 2019 08:17) (95% - 98%)  I&O's Summary    04 May 2019 07:01  -  05 May 2019 07:00  --------------------------------------------------------  IN: 0 mL / OUT: 300 mL / NET: -300 mL    05 May 2019 07:01  -  05 May 2019 10:20  --------------------------------------------------------  IN: 0 mL / OUT: 200 mL / NET: -200 mL        PHYSICAL EXAM:  General: NAD, comfortable   ENT: MMM  Neck: Supple, No JVD  Lungs: CTA, BLAE, No added sounds.   Cardio: RRR, S1/S2, No murmurs  Abdomen: Soft, NT/ND, BS+   Extremities: No edema  CNS: no new deficits     LABS:    05-04    139  |  105  |  16  ----------------------------<  111  3.9   |  27  |  1.34    Ca    9.0      04 May 2019 06:00      eGFR if Non African American: 48 mL/min/1.73M2 (05-04-19 @ 06:00)  eGFR if : 56 mL/min/1.73M2 (05-04-19 @ 06:00)    PT/INR - ( 05 May 2019 05:42 )   PT: 36.4 sec;   INR: 3.14 ratio                 04-24 Chol 189 mg/dL  mg/dL HDL 35 mg/dL Trig 143 mg/dL        CAPILLARY BLOOD GLUCOSE        04-23 [id number] 6.1          RADIOLOGY & ADDITIONAL TESTS:    Care Discussed with Consultants/Other Providers:

## 2019-05-06 LAB
ANION GAP SERPL CALC-SCNC: 7 MMOL/L — SIGNIFICANT CHANGE UP (ref 5–17)
BUN SERPL-MCNC: 18 MG/DL — SIGNIFICANT CHANGE UP (ref 7–23)
CALCIUM SERPL-MCNC: 8.8 MG/DL — SIGNIFICANT CHANGE UP (ref 8.4–10.5)
CHLORIDE SERPL-SCNC: 103 MMOL/L — SIGNIFICANT CHANGE UP (ref 96–108)
CO2 SERPL-SCNC: 27 MMOL/L — SIGNIFICANT CHANGE UP (ref 22–31)
CREAT SERPL-MCNC: 1.83 MG/DL — HIGH (ref 0.5–1.3)
GLUCOSE SERPL-MCNC: 103 MG/DL — HIGH (ref 70–99)
HCT VFR BLD CALC: 44.8 % — SIGNIFICANT CHANGE UP (ref 39–50)
HGB BLD-MCNC: 13.9 G/DL — SIGNIFICANT CHANGE UP (ref 13–17)
INR BLD: 3.55 RATIO — HIGH (ref 0.88–1.16)
MCHC RBC-ENTMCNC: 27.1 PG — SIGNIFICANT CHANGE UP (ref 27–34)
MCHC RBC-ENTMCNC: 31 GM/DL — LOW (ref 32–36)
MCV RBC AUTO: 87.6 FL — SIGNIFICANT CHANGE UP (ref 80–100)
PLATELET # BLD AUTO: 259 K/UL — SIGNIFICANT CHANGE UP (ref 150–400)
POTASSIUM SERPL-MCNC: 4 MMOL/L — SIGNIFICANT CHANGE UP (ref 3.5–5.3)
POTASSIUM SERPL-SCNC: 4 MMOL/L — SIGNIFICANT CHANGE UP (ref 3.5–5.3)
PROTHROM AB SERPL-ACNC: 41.4 SEC — HIGH (ref 10–12.9)
RBC # BLD: 5.12 M/UL — SIGNIFICANT CHANGE UP (ref 4.2–5.8)
RBC # FLD: 13.8 % — SIGNIFICANT CHANGE UP (ref 10.3–14.5)
SODIUM SERPL-SCNC: 137 MMOL/L — SIGNIFICANT CHANGE UP (ref 135–145)
WBC # BLD: 8.4 K/UL — SIGNIFICANT CHANGE UP (ref 3.8–10.5)
WBC # FLD AUTO: 8.4 K/UL — SIGNIFICANT CHANGE UP (ref 3.8–10.5)

## 2019-05-06 PROCEDURE — 99233 SBSQ HOSP IP/OBS HIGH 50: CPT

## 2019-05-06 PROCEDURE — 99232 SBSQ HOSP IP/OBS MODERATE 35: CPT

## 2019-05-06 RX ORDER — WARFARIN SODIUM 2.5 MG/1
2 TABLET ORAL ONCE
Qty: 0 | Refills: 0 | Status: COMPLETED | OUTPATIENT
Start: 2019-05-06 | End: 2019-05-06

## 2019-05-06 RX ADMIN — SERTRALINE 50 MILLIGRAM(S): 25 TABLET, FILM COATED ORAL at 12:08

## 2019-05-06 RX ADMIN — Medication 100 MILLIGRAM(S): at 17:53

## 2019-05-06 RX ADMIN — Medication 81 MILLIGRAM(S): at 12:08

## 2019-05-06 RX ADMIN — TAMSULOSIN HYDROCHLORIDE 0.4 MILLIGRAM(S): 0.4 CAPSULE ORAL at 21:37

## 2019-05-06 RX ADMIN — Medication 650 MILLIGRAM(S): at 16:13

## 2019-05-06 RX ADMIN — Medication 650 MILLIGRAM(S): at 05:34

## 2019-05-06 RX ADMIN — WARFARIN SODIUM 2 MILLIGRAM(S): 2.5 TABLET ORAL at 21:37

## 2019-05-06 RX ADMIN — LIDOCAINE 1 PATCH: 4 CREAM TOPICAL at 18:05

## 2019-05-06 RX ADMIN — AMLODIPINE BESYLATE 5 MILLIGRAM(S): 2.5 TABLET ORAL at 05:28

## 2019-05-06 RX ADMIN — Medication 650 MILLIGRAM(S): at 06:35

## 2019-05-06 RX ADMIN — QUETIAPINE FUMARATE 12.5 MILLIGRAM(S): 200 TABLET, FILM COATED ORAL at 21:38

## 2019-05-06 RX ADMIN — ATORVASTATIN CALCIUM 40 MILLIGRAM(S): 80 TABLET, FILM COATED ORAL at 21:37

## 2019-05-06 RX ADMIN — LIDOCAINE 1 PATCH: 4 CREAM TOPICAL at 12:08

## 2019-05-06 RX ADMIN — BUDESONIDE AND FORMOTEROL FUMARATE DIHYDRATE 2 PUFF(S): 160; 4.5 AEROSOL RESPIRATORY (INHALATION) at 20:26

## 2019-05-06 RX ADMIN — Medication 100 MILLIGRAM(S): at 05:28

## 2019-05-06 RX ADMIN — Medication 650 MILLIGRAM(S): at 15:18

## 2019-05-06 RX ADMIN — PANTOPRAZOLE SODIUM 40 MILLIGRAM(S): 20 TABLET, DELAYED RELEASE ORAL at 05:29

## 2019-05-06 RX ADMIN — NYSTATIN CREAM 1 APPLICATION(S): 100000 CREAM TOPICAL at 05:30

## 2019-05-06 NOTE — PROGRESS NOTE ADULT - SUBJECTIVE AND OBJECTIVE BOX
SANDI SANTOS   84y   Male    Admitting: EMILY Ding  HPI:  84-year-old gentleman with history of hypertension, hyperlipidemia, coronary artery disease, COPD, seizure disorder, pulmonary embolism status post IVC filter, as well as atrial fibrillation, who presented to [facility] 4/22/19 with right hand weakness and slurred speech. Found to have CVA's with suspicion for embolic phenomena. 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  Hematology consulted at outside hospital, and  found patient to have factor V leiden gene mutation. Patient maintained on Coumadin anticoagulation.    PAST MEDICAL & SURGICAL HISTORY:  Cerebrovascular accident (CVA)  History of COPD  CAD S/P percutaneous coronary angioplasty  Atrial fibrillation, unspecified type  [place] filter in place  PE (pulmonary embolism)  Hyperlipemia  Hypertension  S/P cataract surgery  S/P IVC filter    HEALTH ISSUES - PROBLEM Dx:  Deep vein thrombosis (DVT): Deep vein thrombosis (DVT)  Atrial fibrillation, unspecified type: Atrial fibrillation, unspecified type  Cerebrovascular accident (CVA): Cerebrovascular accident (CVA)  Neurocognitive disorder: Neurocognitive disorder    MEDICATIONS  (STANDING):  amLODIPine   Tablet 5 milliGRAM(s) Oral daily  aspirin  chewable 81 milliGRAM(s) Oral daily  atorvastatin 40 milliGRAM(s) Oral at bedtime  buDESOnide 160 MICROgram(s)/formoterol 4.5 MICROgram(s) Inhaler 2 Puff(s) Inhalation two times a day  docusate sodium 100 milliGRAM(s) Oral two times a day  lidocaine   Patch 1 Patch Transdermal daily  metoprolol succinate  milliGRAM(s) Oral daily  nystatin Cream 1 Application(s) Topical two times a day  pantoprazole    Tablet 40 milliGRAM(s) Oral before breakfast  QUEtiapine 12.5 milliGRAM(s) Oral at bedtime  sertraline 50 milliGRAM(s) Oral daily  tamsulosin 0.4 milliGRAM(s) Oral at bedtime    MEDICATIONS  (PRN):  acetaminophen   Tablet .. 650 milliGRAM(s) Oral every 6 hours PRN Moderate Pain (4 - 6)  ALBUTerol/ipratropium for Nebulization 3 milliLiter(s) Nebulizer every 6 hours PRN Shortness of Breath and/or Wheezing  ALPRAZolam 0.25 milliGRAM(s) Oral every 6 hours PRN anxiety  melatonin 3 milliGRAM(s) Oral at bedtime PRN Insomnia    Allergies    No Known Allergies    INTERVAL HPI/OVERNIGHT EVENTS:  Patient S&E at bedside. No c/o H/A, CP or SOB.    VITAL SIGNS:  T(F): 97.9 (05-06-19 @ 07:43)  HR: 87 (05-06-19 @ 07:43)  BP: 160/90 (05-06-19 @ 07:43)  RR: 16 (05-06-19 @ 07:43)  SpO2: 97% (05-06-19 @ 07:43)    PHYSICAL EXAM:  Constitutional: NAD  Eyes: sclera non-icteric  Neck: no JVD  Respiratory: CTA b/l, good air entry b/l ant.  Cardiovascular: RRR  Gastrointestinal: soft, NTND, no masses palpable, no hepatosplenomegaly appreciated  Extremities: no calf tenderness  Neurological: Awake, alert.    Labs:  Complete Blood Count (05.01.19 @ 11:09)    WBC Count: 7.8 K/uL    RBC Count: 5.11 M/uL    Hemoglobin: 13.8 g/dL    Hematocrit: 43.6 %    Mean Cell Volume: 85.2 fl    Mean Cell Hemoglobin: 27.1 pg    Mean Cell Hemoglobin Conc: 31.8 gm/dL    Red Cell Distrib Width: 13.5 %    Platelet Count - Automated: 239 K/uL    PT/INR - ( 06 May 2019 06:12 )   PT: 41.4 sec;   INR: 3.55 ratio      RADIOLOGY & ADDITIONAL TESTS:  < from: US Duplex Venous Lower Ext Complete, Bilateral (04.23.19 @ 17:39) >   EXAM:  US DPLX LWR EXT VEINS COMPL BI                          PROCEDURE DATE:  04/23/2019          INTERPRETATION:  CLINICAL INFORMATION: CVA    COMPARISON: None available.    TECHNIQUE: Duplex sonography of the BILATERAL LOWER extremities with   color and spectral Doppler, with and without compression.      FINDINGS:    There is normal compressibility of the bilateral common femoral, femoral   and popliteal veins. Limited evaluation the calf veins. Vessels are   visualized through the posterior tibial veins.    Doppler examination shows normal spontaneous and phasic flow.    IMPRESSION:     No evidence of bilateral lower extremity deep venous thrombosis.    CASPER GONZALEZ M.D., ATTENDING RADIOLOGIST  This document has beenelectronically signed. Apr 23 2019  5:45PM        < end of copied text >

## 2019-05-06 NOTE — PROGRESS NOTE ADULT - ATTENDING COMMENTS
Unchanged neurological exam  MILAGRO on today's blood work, case discussed with Medicine, Renal evaluation requested.  Goal INR 3.0-3.5  Multidisciplinary team meeting today:  patient's functional goals and needs, functional and clinical  progress were discussed, barriers to discharge were identified. Anticipate discharge home with home care, 24/7 supervision for safety vs  [facility] facility placement.   ELOS 5-7 days

## 2019-05-06 NOTE — PROGRESS NOTE ADULT - SUBJECTIVE AND OBJECTIVE BOX
CHIEF COMPLAINT: Offers no complaints. NAD.       HISTORY OF PRESENT ILLNESS  SANDI SANTOS is a 83yo Male with PMH prior stroke (Feb '19), HTN, HLD, CAD c/b MI s/p stent '96, COPD, seizure d/o, PE s/p IVC filter, A-fib on warfarin, presented to [facility] 4/22 w/ R hand weakness x1day with slurred speech. INR therapeutic on admission. MR brain showed scattered infarcts throughout, in no vascular distribution, suspicious for embolic phenomenon. 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 Seen by CT surg, recommended open heart surgery to excise the mass.    Heme onc noted hx of factor V Leiden. 4/23 doppler BLE neg for DVT. Heme recommended change to other AC. (26 Apr 2019 14:05)      PAST MEDICAL & SURGICAL HISTORY:  Cerebrovascular accident (CVA)  History of COPD  CAD S/P percutaneous coronary angioplasty  Atrial fibrillation, unspecified type  [place] filter in place  PE (pulmonary embolism)  Hyperlipemia  Hypertension  S/P cataract surgery  S/P IVC filter  morbid obesity       REVIEW OF SYMPTOMS  [X] Constitutional WNL     [X] Cardio WNL            [X] Resp WNL           [X] GI WNL                                   [X] Endo WNL                     [X] Skin WNL                 [X] MSK WNL                             [X] Cognitive WNL        [X] Psych WNL      VITALS  Vital Signs Last 24 Hrs  T(C): 36.6 (06 May 2019 07:43), Max: 36.6 (06 May 2019 07:43)  T(F): 97.9 (06 May 2019 07:43), Max: 97.9 (06 May 2019 07:43)  HR: 87 (06 May 2019 07:43) (64 - 87)  BP: 160/90 (06 May 2019 07:43) (135/80 - 160/90)  BP(mean): --  RR: 16 (06 May 2019 07:43) (14 - 16)  SpO2: 97% (06 May 2019 07:43) (97% - 97%)    PHYSICAL EXAM  Constitutional - NAD, CASTILLO  HEENT - NCAT, EOMI  Neck - Supple, No limited ROM  Chest - CTA bilaterally, No wheeze, No rhonchi, No crackles, dyspnea on exertion  Cardiovascular -irregular, murmur  Abdomen - BS+, Soft, NTND  Extremities - No C/C/E, No calf tenderness   Skin-nystatin on buttocks      Neurologic Exam - no new  focal deficits.                     Balance - impaired     Psychiatric - Mood stable, Affect WNL, insomnia         FUNCTIONAL PROGRESS  Gait - 40ft RW mod A   ADLs - min/CG  Transfers - min A  Functional transfer - min A      RECENT LABS                        13.9   8.4   )-----------( 259      ( 06 May 2019 14:15 )             44.8     05-06    137  |  103  |  18  ----------------------------<  103<H>  4.0   |  27  |  1.83<H>    Ca    8.8      06 May 2019 14:15      PT/INR - ( 06 May 2019 06:12 )   PT: 41.4 sec;   INR: 3.55 ratio               Direct LDL: 125 mg/dL (04-24-19 @ 05:05)  Direct LDL: 111 mg/dL (04-23-19 @ 05:30)    Hemoglobin A1C, Whole Blood: 6.1 % (04-23-19 @ 13:07)              RADIOLOGY/OTHER RESULTS      CURRENT MEDICATIONS  MEDICATIONS  (STANDING):  amLODIPine   Tablet 5 milliGRAM(s) Oral daily  aspirin  chewable 81 milliGRAM(s) Oral daily  atorvastatin 40 milliGRAM(s) Oral at bedtime  buDESOnide 160 MICROgram(s)/formoterol 4.5 MICROgram(s) Inhaler 2 Puff(s) Inhalation two times a day  docusate sodium 100 milliGRAM(s) Oral two times a day  lidocaine   Patch 1 Patch Transdermal daily  metoprolol succinate  milliGRAM(s) Oral daily  nystatin Cream 1 Application(s) Topical two times a day  pantoprazole    Tablet 40 milliGRAM(s) Oral before breakfast  QUEtiapine 12.5 milliGRAM(s) Oral at bedtime  sertraline 50 milliGRAM(s) Oral daily  tamsulosin 0.4 milliGRAM(s) Oral at bedtime  warfarin 2 milliGRAM(s) Oral once    MEDICATIONS  (PRN):  acetaminophen   Tablet .. 650 milliGRAM(s) Oral every 6 hours PRN Moderate Pain (4 - 6)  ALBUTerol/ipratropium for Nebulization 3 milliLiter(s) Nebulizer every 6 hours PRN Shortness of Breath and/or Wheezing  ALPRAZolam 0.25 milliGRAM(s) Oral every 6 hours PRN anxiety  melatonin 3 milliGRAM(s) Oral at bedtime PRN Insomnia      ASSESSMENT & PLAN      GI/Bowel Management - Colace   Management - Toilet Q2  Skin - Turn Q2  Pain - Tylenol PRN  DVT PPX - On Coumadin. therapeutic.    Diet - reg c thins    Continue comprehensive acute rehab program consisting of 3hrs/day of OT/PT and SLP.

## 2019-05-06 NOTE — CHART NOTE - NSCHARTNOTEFT_GEN_A_CORE
Nutrition Follow Up Note  Hospital Course (Per Electronic Medical Record):   Source: Medical Record [X] Patient [X]     Diet: DASH/TLC  Patient tolerating his DASH/ TLC diet, consuming 100% as per flow sheet.     Current Weight: No recent f/u weight since (5/2) 313.9/142.4kg    Pertinent Medications: MEDICATIONS  (STANDING):  amLODIPine   Tablet 5 milliGRAM(s) Oral daily  aspirin  chewable 81 milliGRAM(s) Oral daily  atorvastatin 40 milliGRAM(s) Oral at bedtime  buDESOnide 160 MICROgram(s)/formoterol 4.5 MICROgram(s) Inhaler 2 Puff(s) Inhalation two times a day  docusate sodium 100 milliGRAM(s) Oral two times a day  lidocaine   Patch 1 Patch Transdermal daily  metoprolol succinate  milliGRAM(s) Oral daily  nystatin Cream 1 Application(s) Topical two times a day  pantoprazole    Tablet 40 milliGRAM(s) Oral before breakfast  QUEtiapine 12.5 milliGRAM(s) Oral at bedtime  sertraline 50 milliGRAM(s) Oral daily  tamsulosin 0.4 milliGRAM(s) Oral at bedtime  warfarin 2 milliGRAM(s) Oral once    MEDICATIONS  (PRN):  acetaminophen   Tablet .. 650 milliGRAM(s) Oral every 6 hours PRN Moderate Pain (4 - 6)  ALBUTerol/ipratropium for Nebulization 3 milliLiter(s) Nebulizer every 6 hours PRN Shortness of Breath and/or Wheezing  ALPRAZolam 0.25 milliGRAM(s) Oral every 6 hours PRN anxiety  melatonin 3 milliGRAM(s) Oral at bedtime PRN Insomnia      Pertinent Labs:  05-04 Na139 mmol/L Glu 111 mg/dL<H> K+ 3.9 mmol/L Cr  1.34 mg/dL<H> BUN 16 mg/dL 05-01 Alb 3.2 g/dL<L> 04-26 PAB 19 mg/dL 04-23 [id number] 6.1 %<H> 04-24 Chol 189 mg/dL  mg/dL HDL 35 mg/dL<L> Trig 143 mg/dL        Skin: intact    Edema: none    Last BM: on (5/5)    Estimated Needs:   [X] No Change since Previous Assessment  [X] Recalculated:     Previous Nutrition Diagnosis: Weight/Obese    Nutrition Diagnosis is [X] Ongoing, encourage weight loss      New Nutrition Diagnosis: [X] Not Applicable    Interventions:   1. Recommend continue current diet       Monitoring & Evaluation: will monitor:  [X] Weights   [X] PO Intake   [X] Follow Up (Per Protocol)  [X] Tolerance to Diet Prescription       RD to follow as per Nutrition protocol  Jazlyn Cutler RDN

## 2019-05-06 NOTE — PROGRESS NOTE ADULT - ASSESSMENT
84-year-old gentleman with history of hypertension, hyperlipidemia, coronary artery disease, COPD, seizure disorder, pulmonary embolism status post IVC filter, as well as atrial fibrillation, who presented to [facility] 4/22/19 with right hand weakness and slurred speech. Found to have CVA's with suspicion for embolic phenomena. 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  Hematology consulted at outside hospital, and  found patient to have factor V leiden gene mutation. Patient maintained on Coumadin anticoagulation.

## 2019-05-06 NOTE — PROGRESS NOTE ADULT - ASSESSMENT
SANDI SANTOS is a 85yo Male with PMH prior stroke (Feb '19), HTN, HLD, CAD c/b MI s/p stent '96, COPD, seizure d/o, PE s/p IVC filter, A-fib on warfarin admitted for multiple scattered infarcts, now with gait instability, ADL impairments and functional impairments.     # Acute ischemic stroke: cardioembolic etiology  -  Coumadin dose today. Goal INR 3-3.5. Hematology is following.     - Continue statin with goal LDL <70  - Continue PT/OT/SLP therapy.     #Elevated creatinine/bun  s/p IVF last week. Elevated creatinine. renal evaluation.     #Left knee pain  Reports chronic pain/OA. Lidocaine patch and prn Tylenol.     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 Will require open heart surgery for excision.   - Per [facility] rehab note 4/26, surgery planned for after acute rehab.    # Chronic A-fib  - Toprol XL for rate control  - Warfarin, goal INR 3-3.5. Lovenox bridge.     # COPD: continue symbicort    # HTN  - DASH  - BB as above  - Amlodipine    # Factor V Leiden  - Hematology in. On Coumadin, dose today c goal 3-3.5.   - No evidence of DVT on 4/23 duplex of BLE.     # BPH  - Flomax.     # Depression: continue sertraline. Psych in. Seroquel added nightly. Denies nightmares.     # HLD: continue atorvastatin. Cardiac diet. Follow LFTs.     # Buttock rash: nystatin    Gait Instability, ADL impairments and Functional impairments: Comprehensive Rehab Program of PT/OT/SLP  DVT PPx: Warfarin  Diet: DASH/TLC

## 2019-05-06 NOTE — PROGRESS NOTE ADULT - ASSESSMENT
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

## 2019-05-06 NOTE — PROGRESS NOTE ADULT - SUBJECTIVE AND OBJECTIVE BOX
Patient is a 84y old  Male who presents with a chief complaint of CVA (06 May 2019 09:30)      Patient seen and examined at bedside.     ALLERGIES:  No Known Allergies    MEDICATIONS:  acetaminophen   Tablet .. 650 milliGRAM(s) Oral every 6 hours PRN  ALBUTerol/ipratropium for Nebulization 3 milliLiter(s) Nebulizer every 6 hours PRN  ALPRAZolam 0.25 milliGRAM(s) Oral every 6 hours PRN  buDESOnide 160 MICROgram(s)/formoterol 4.5 MICROgram(s) Inhaler 2 Puff(s) Inhalation two times a day  lidocaine   Patch 1 Patch Transdermal daily  melatonin 3 milliGRAM(s) Oral at bedtime PRN  nystatin Cream 1 Application(s) Topical two times a day  QUEtiapine 12.5 milliGRAM(s) Oral at bedtime  warfarin 2 milliGRAM(s) Oral once    Vital Signs Last 24 Hrs  T(F): 97.9 (06 May 2019 07:43), Max: 97.9 (06 May 2019 07:43)  HR: 87 (06 May 2019 07:43) (64 - 87)  BP: 160/90 (06 May 2019 07:43) (135/80 - 160/90)  RR: 16 (06 May 2019 07:43) (14 - 16)  SpO2: 97% (06 May 2019 07:43) (97% - 97%)  I&O's Summary    05 May 2019 07:01  -  06 May 2019 07:00  --------------------------------------------------------  IN: 900 mL / OUT: 1400 mL / NET: -500 mL    06 May 2019 07:01  -  06 May 2019 13:49  --------------------------------------------------------  IN: 0 mL / OUT: 250 mL / NET: -250 mL        PHYSICAL EXAM:  General: NAD, comfortable   ENT: MMM  Neck: Supple, No JVD  Lungs: CTA, BLAE, No added sounds.   Cardio: RRR, S1/S2, No murmurs  Abdomen: Soft, NT/ND, BS+   Extremities: No edema  CNS: no new deficits     LABS:    05-04    139  |  105  |  16  ----------------------------<  111  3.9   |  27  |  1.34    Ca    9.0      04 May 2019 06:00      eGFR if Non African American: 48 mL/min/1.73M2 (05-04-19 @ 06:00)  eGFR if : 56 mL/min/1.73M2 (05-04-19 @ 06:00)    PT/INR - ( 06 May 2019 06:12 )   PT: 41.4 sec;   INR: 3.55 ratio                 04-24 Chol 189 mg/dL  mg/dL HDL 35 mg/dL Trig 143 mg/dL        CAPILLARY BLOOD GLUCOSE        04-23 [id number] 6.1          RADIOLOGY & ADDITIONAL TESTS:    Care Discussed with Consultants/Other Providers:

## 2019-05-06 NOTE — CONSULT NOTE ADULT - SUBJECTIVE AND OBJECTIVE BOX
NEPHROLOGY CONSULTATION    CHIEF COMPLAINT: CVA    HPI:  Pt is 85yo Male with PMH prior CVA (Feb '19), HTN, HLD, CAD c/b MI s/p stent '96, COPD, seizure d/o, PE, s/p IVC filter, A-fib on warfarin, presented to [facility] 4/22 w/R hand weakness x 1 day and slurred speech. INR therapeutic on admission. MR brain showed scattered infarcts throughout, in no vascular distribution, suspicious for embolic phenomenon. Cards seen, UMER found aortic valve mass. Seen by CTS, recommended open heart surgery to excise the mass. Heme onc noted hx of factor V Leiden. 4/23 b/l dopplers neg for DVT. In AR since 4/26 w/gradually worsening renal fx.    ROS:  as above    Allergies:  No Known Allergies    PAST MEDICAL & SURGICAL HISTORY:  Cerebrovascular accident (CVA)  History of COPD  CAD S/P percutaneous coronary angioplasty  Atrial fibrillation, unspecified type  PE (pulmonary embolism)  Hyperlipemia  Hypertension  S/P cataract surgery  S/P IVC filter    SOCIAL HISTORY:  negative    FAMILY HISTORY:  Family history of diabetes mellitus (Child)    MEDICATIONS  (STANDING):  amLODIPine   Tablet 5 milliGRAM(s) Oral daily  aspirin  chewable 81 milliGRAM(s) Oral daily  atorvastatin 40 milliGRAM(s) Oral at bedtime  buDESOnide 160 MICROgram(s)/formoterol 4.5 MICROgram(s) Inhaler 2 Puff(s) Inhalation two times a day  docusate sodium 100 milliGRAM(s) Oral two times a day  lidocaine   Patch 1 Patch Transdermal daily  metoprolol succinate  milliGRAM(s) Oral daily  nystatin Cream 1 Application(s) Topical two times a day  pantoprazole    Tablet 40 milliGRAM(s) Oral before breakfast  QUEtiapine 12.5 milliGRAM(s) Oral at bedtime  sertraline 50 milliGRAM(s) Oral daily  tamsulosin 0.4 milliGRAM(s) Oral at bedtime  warfarin 2 milliGRAM(s) Oral once    Home Medications:  amLODIPine 5 mg oral tablet: 1 tab(s) orally once a day (22 Apr 2019 20:35)  aspirin 81 mg oral tablet, chewable: 1 tab(s) orally once a day (26 Apr 2019 14:16)  atorvastatin 40 mg oral tablet: 1 tab(s) orally once a day (at bedtime) (26 Apr 2019 14:16)  Breo Ellipta 200 mcg-25 mcg/inh inhalation powder: 1 puff(s) inhaled once a day (22 Apr 2019 20:35)  metoprolol succinate 100 mg oral tablet, extended release: 1 tab(s) orally once a day (26 Apr 2019 14:16)  omeprazole 40 mg oral delayed release capsule: 1 cap(s) orally once a day (22 Apr 2019 20:35)  oxybutynin 10 mg/24 hr oral tablet, extended release: 1 tab(s) orally once a day (22 Apr 2019 20:35)  sertraline 50 mg oral tablet: 1 tab(s) orally once a day (22 Apr 2019 20:35)  tamsulosin 0.4 mg oral capsule: 1 cap(s) orally once a day (22 Apr 2019 20:35)  warfarin 2 mg oral tablet: 1 tab(s) orally once a day on Tue,Thurs&lt; Sat and 2 tabs on Mon, Wed, Fri and Sunday  Titrate to keep INR 3-3.5 (26 Apr 2019 14:16)    Vital Signs Last 24 Hrs  T(C): 36.6 (05-06-19 @ 07:43), Max: 36.6 (05-06-19 @ 07:43)  T(F): 97.9 (05-06-19 @ 07:43), Max: 97.9 (05-06-19 @ 07:43)  HR: 87 (05-06-19 @ 07:43) (64 - 87)  BP: 160/90 (05-06-19 @ 07:43) (135/80 - 160/90)  RR: 16 (05-06-19 @ 07:43) (14 - 16)  SpO2: 97% (05-06-19 @ 07:43) (97% - 97%)    LABS:                        13.9   8.4   )-----------( 259      ( 06 May 2019 14:15 )             44.8     05-06    137  |  103  |  18  ----------------------------<  103<H>  4.0   |  27  |  1.83<H>    Ca    8.8      06 May 2019 14:15    PT/INR - ( 06 May 2019 06:12 )   PT: 41.4 sec;   INR: 3.55 ratio      A/P: NEPHROLOGY CONSULTATION    CHIEF COMPLAINT: CVA    HPI:  Pt is 85yo Male with PMH prior CVA (Feb '19), HTN, HLD, CAD c/b MI s/p stents '96, COPD, seizure d/o, PE, s/p IVC filter, A-fib on warfarin, presented to [facility] 4/22 w/R hand weakness x 1 day and slurred speech. INR therapeutic on admission. MR brain showed scattered infarcts throughout, suspicious for embolic phenomenon. Cards seen, UMER found aortic valve mass. Seen by CTS, recommended open heart surgery to excise the mass. Heme onc noted hx of factor V Leiden. 4/23 b/l dopplers neg for DVT. In AR since 4/26 w/gradually worsening renal fx. Awake, alert, denies CP, SOB, N/V/D/C/F/C, dysuria.    ROS:  as above    Allergies:  No Known Allergies    PAST MEDICAL & SURGICAL HISTORY:  Cerebrovascular accident (CVA)  History of COPD  CAD S/P percutaneous coronary angioplasty  Atrial fibrillation, unspecified type  PE (pulmonary embolism)  Hyperlipemia  Hypertension  S/P cataract surgery  S/P IVC filter    SOCIAL HISTORY:  negative    FAMILY HISTORY:  Family history of diabetes mellitus (Child)    MEDICATIONS  (STANDING):  amLODIPine   Tablet 5 milliGRAM(s) Oral daily  aspirin  chewable 81 milliGRAM(s) Oral daily  atorvastatin 40 milliGRAM(s) Oral at bedtime  buDESOnide 160 MICROgram(s)/formoterol 4.5 MICROgram(s) Inhaler 2 Puff(s) Inhalation two times a day  docusate sodium 100 milliGRAM(s) Oral two times a day  lidocaine   Patch 1 Patch Transdermal daily  metoprolol succinate  milliGRAM(s) Oral daily  nystatin Cream 1 Application(s) Topical two times a day  pantoprazole    Tablet 40 milliGRAM(s) Oral before breakfast  QUEtiapine 12.5 milliGRAM(s) Oral at bedtime  sertraline 50 milliGRAM(s) Oral daily  tamsulosin 0.4 milliGRAM(s) Oral at bedtime  warfarin 2 milliGRAM(s) Oral once    Home Medications:  amLODIPine 5 mg oral tablet: 1 tab(s) orally once a day (22 Apr 2019 20:35)  aspirin 81 mg oral tablet, chewable: 1 tab(s) orally once a day (26 Apr 2019 14:16)  atorvastatin 40 mg oral tablet: 1 tab(s) orally once a day (at bedtime) (26 Apr 2019 14:16)  Breo Ellipta 200 mcg-25 mcg/inh inhalation powder: 1 puff(s) inhaled once a day (22 Apr 2019 20:35)  metoprolol succinate 100 mg oral tablet, extended release: 1 tab(s) orally once a day (26 Apr 2019 14:16)  omeprazole 40 mg oral delayed release capsule: 1 cap(s) orally once a day (22 Apr 2019 20:35)  oxybutynin 10 mg/24 hr oral tablet, extended release: 1 tab(s) orally once a day (22 Apr 2019 20:35)  sertraline 50 mg oral tablet: 1 tab(s) orally once a day (22 Apr 2019 20:35)  tamsulosin 0.4 mg oral capsule: 1 cap(s) orally once a day (22 Apr 2019 20:35)  warfarin 2 mg oral tablet: 1 tab(s) orally once a day on Tue,Thurs&lt; Sat and 2 tabs on Mon, Wed, Fri and Sunday  Titrate to keep INR 3-3.5 (26 Apr 2019 14:16)    Vital Signs Last 24 Hrs  T(C): 36.6 (05-06-19 @ 07:43), Max: 36.6 (05-06-19 @ 07:43)  T(F): 97.9 (05-06-19 @ 07:43), Max: 97.9 (05-06-19 @ 07:43)  HR: 87 (05-06-19 @ 07:43) (64 - 87)  BP: 160/90 (05-06-19 @ 07:43) (135/80 - 160/90)  RR: 16 (05-06-19 @ 07:43) (14 - 16)  SpO2: 97% (05-06-19 @ 07:43) (97% - 97%)    Card S1S2  Lungs fair air entry  Abd soft, NT, ND  Extr no edema    LABS:                        13.9   8.4   )-----------( 259      ( 06 May 2019 14:15 )             44.8     05-06    137  |  103  |  18  ----------------------------<  103<H>  4.0   |  27  |  1.83<H>    Ca    8.8      06 May 2019 14:15    PT/INR - ( 06 May 2019 06:12 )   PT: 41.4 sec;   INR: 3.55 ratio      A/P:    Etiology of rising Cr not clear  Will check UA, renal SONO, PVR  Encourage PO fluids  No nephrotoxins  BMP in am

## 2019-05-06 NOTE — PROGRESS NOTE ADULT - PROBLEM SELECTOR PLAN 1
Patient's status post CVA's , with embolic etiology. Has aortic valve mass. Also, reported history of pulmonary embolism.  He is maintained on Coumadin anticoagulation with INR aim approximately 3-3.5. For cardiac surgery post acute rehab.

## 2019-05-07 LAB
ANION GAP SERPL CALC-SCNC: 9 MMOL/L — SIGNIFICANT CHANGE UP (ref 5–17)
APPEARANCE UR: CLEAR — SIGNIFICANT CHANGE UP
BACTERIA # UR AUTO: NEGATIVE /HPF — SIGNIFICANT CHANGE UP
BILIRUB UR-MCNC: NEGATIVE — SIGNIFICANT CHANGE UP
BUN SERPL-MCNC: 18 MG/DL — SIGNIFICANT CHANGE UP (ref 7–23)
CALCIUM SERPL-MCNC: 9.1 MG/DL — SIGNIFICANT CHANGE UP (ref 8.4–10.5)
CHLORIDE SERPL-SCNC: 106 MMOL/L — SIGNIFICANT CHANGE UP (ref 96–108)
CO2 SERPL-SCNC: 23 MMOL/L — SIGNIFICANT CHANGE UP (ref 22–31)
COLOR SPEC: YELLOW — SIGNIFICANT CHANGE UP
CREAT SERPL-MCNC: 1.2 MG/DL — SIGNIFICANT CHANGE UP (ref 0.5–1.3)
DIFF PNL FLD: NEGATIVE — SIGNIFICANT CHANGE UP
EPI CELLS # UR: SIGNIFICANT CHANGE UP
GLUCOSE SERPL-MCNC: 113 MG/DL — HIGH (ref 70–99)
GLUCOSE UR QL: NEGATIVE — SIGNIFICANT CHANGE UP
INR BLD: 3.96 RATIO — HIGH (ref 0.88–1.16)
KETONES UR-MCNC: NEGATIVE — SIGNIFICANT CHANGE UP
LEUKOCYTE ESTERASE UR-ACNC: ABNORMAL
NITRITE UR-MCNC: NEGATIVE — SIGNIFICANT CHANGE UP
PH UR: 5 — SIGNIFICANT CHANGE UP (ref 5–8)
POTASSIUM SERPL-MCNC: 4.4 MMOL/L — SIGNIFICANT CHANGE UP (ref 3.5–5.3)
POTASSIUM SERPL-SCNC: 4.4 MMOL/L — SIGNIFICANT CHANGE UP (ref 3.5–5.3)
PROT UR-MCNC: NEGATIVE — SIGNIFICANT CHANGE UP
PROTHROM AB SERPL-ACNC: 46.3 SEC — HIGH (ref 10–12.9)
RBC CASTS # UR COMP ASSIST: NEGATIVE /HPF — SIGNIFICANT CHANGE UP (ref 0–4)
SODIUM SERPL-SCNC: 138 MMOL/L — SIGNIFICANT CHANGE UP (ref 135–145)
SP GR SPEC: 1.01 — SIGNIFICANT CHANGE UP (ref 1.01–1.02)
UROBILINOGEN FLD QL: NEGATIVE — SIGNIFICANT CHANGE UP
WBC UR QL: SIGNIFICANT CHANGE UP /HPF (ref 0–5)

## 2019-05-07 PROCEDURE — 76775 US EXAM ABDO BACK WALL LIM: CPT | Mod: 26

## 2019-05-07 PROCEDURE — 99232 SBSQ HOSP IP/OBS MODERATE 35: CPT

## 2019-05-07 PROCEDURE — 99233 SBSQ HOSP IP/OBS HIGH 50: CPT

## 2019-05-07 RX ADMIN — NYSTATIN CREAM 1 APPLICATION(S): 100000 CREAM TOPICAL at 06:02

## 2019-05-07 RX ADMIN — Medication 650 MILLIGRAM(S): at 06:05

## 2019-05-07 RX ADMIN — LIDOCAINE 1 PATCH: 4 CREAM TOPICAL at 18:30

## 2019-05-07 RX ADMIN — Medication 650 MILLIGRAM(S): at 18:02

## 2019-05-07 RX ADMIN — AMLODIPINE BESYLATE 5 MILLIGRAM(S): 2.5 TABLET ORAL at 06:00

## 2019-05-07 RX ADMIN — PANTOPRAZOLE SODIUM 40 MILLIGRAM(S): 20 TABLET, DELAYED RELEASE ORAL at 06:00

## 2019-05-07 RX ADMIN — QUETIAPINE FUMARATE 12.5 MILLIGRAM(S): 200 TABLET, FILM COATED ORAL at 21:20

## 2019-05-07 RX ADMIN — LIDOCAINE 1 PATCH: 4 CREAM TOPICAL at 12:48

## 2019-05-07 RX ADMIN — Medication 650 MILLIGRAM(S): at 06:52

## 2019-05-07 RX ADMIN — SERTRALINE 50 MILLIGRAM(S): 25 TABLET, FILM COATED ORAL at 12:48

## 2019-05-07 RX ADMIN — LIDOCAINE 1 PATCH: 4 CREAM TOPICAL at 00:00

## 2019-05-07 RX ADMIN — LIDOCAINE 1 PATCH: 4 CREAM TOPICAL at 21:22

## 2019-05-07 RX ADMIN — BUDESONIDE AND FORMOTEROL FUMARATE DIHYDRATE 2 PUFF(S): 160; 4.5 AEROSOL RESPIRATORY (INHALATION) at 09:25

## 2019-05-07 RX ADMIN — Medication 100 MILLIGRAM(S): at 18:03

## 2019-05-07 RX ADMIN — ATORVASTATIN CALCIUM 40 MILLIGRAM(S): 80 TABLET, FILM COATED ORAL at 21:20

## 2019-05-07 RX ADMIN — Medication 100 MILLIGRAM(S): at 06:00

## 2019-05-07 RX ADMIN — TAMSULOSIN HYDROCHLORIDE 0.4 MILLIGRAM(S): 0.4 CAPSULE ORAL at 21:20

## 2019-05-07 RX ADMIN — Medication 650 MILLIGRAM(S): at 19:02

## 2019-05-07 RX ADMIN — Medication 81 MILLIGRAM(S): at 12:48

## 2019-05-07 RX ADMIN — BUDESONIDE AND FORMOTEROL FUMARATE DIHYDRATE 2 PUFF(S): 160; 4.5 AEROSOL RESPIRATORY (INHALATION) at 20:46

## 2019-05-07 NOTE — PROGRESS NOTE ADULT - PROBLEM SELECTOR PLAN 1
Patient status post CVA's , with embolic etiology. Has aortic valve mass. Also, reported history of pulmonary embolism.  He is maintained on Coumadin anticoagulation with INR aim approximately 3-3.5. Coumadin on hold at present for supratherapeutic PT/INR.  Disposition planning in progress.

## 2019-05-07 NOTE — PROGRESS NOTE ADULT - SUBJECTIVE AND OBJECTIVE BOX
Denies complaints    Vital Signs Last 24 Hrs  T(C): 36.6 (19 @ 07:42), Max: 36.6 (19 @ 20:04)  T(F): 97.9 (19 @ 07:42), Max: 97.9 (19 @ 20:04)  HR: 71 (19 @ 09:27) (62 - 71)  BP: 160/86 (19 @ 07:42) (134/69 - 160/86)  RR: 15 (19 @ 07:42) (14 - 15)  SpO2: 98% (19 @ 09:27) (96% - 98%)    Card S1S2  Lungs fair air entry  Abd soft, NT, ND  Extr no edema                        13.9   8.4   )-----------( 259      ( 06 May 2019 14:15 )             44.8     07 May 2019 05:40    138    |  106    |  18     ----------------------------<  113    4.4     |  23     |  1.20     Ca    9.1        07 May 2019 05:40    PT/INR - ( 07 May 2019 05:40 )   PT: 46.3 sec;   INR: 3.96 ratio      Urinalysis Basic - ( 07 May 2019 01:08 )    Color: Yellow / Appearance: Clear / S.015 / pH: x  Gluc: x / Ketone: Negative  / Bili: Negative / Urobili: Negative   Blood: x / Protein: Negative / Nitrite: Negative   Leuk Esterase: Trace / RBC: Negative /HPF / WBC 0-2 /HPF   Sq Epi: x / Non Sq Epi: Neg.-Few / Bacteria: Negative /HPF    acetaminophen   Tablet .. 650 milliGRAM(s) Oral every 6 hours PRN  ALBUTerol/ipratropium for Nebulization 3 milliLiter(s) Nebulizer every 6 hours PRN  ALPRAZolam 0.25 milliGRAM(s) Oral every 6 hours PRN  amLODIPine   Tablet 5 milliGRAM(s) Oral daily  aspirin  chewable 81 milliGRAM(s) Oral daily  atorvastatin 40 milliGRAM(s) Oral at bedtime  buDESOnide 160 MICROgram(s)/formoterol 4.5 MICROgram(s) Inhaler 2 Puff(s) Inhalation two times a day  docusate sodium 100 milliGRAM(s) Oral two times a day  lidocaine   Patch 1 Patch Transdermal daily  melatonin 3 milliGRAM(s) Oral at bedtime PRN  metoprolol succinate  milliGRAM(s) Oral daily  nystatin Cream 1 Application(s) Topical two times a day  pantoprazole    Tablet 40 milliGRAM(s) Oral before breakfast  QUEtiapine 12.5 milliGRAM(s) Oral at bedtime  sertraline 50 milliGRAM(s) Oral daily  tamsulosin 0.4 milliGRAM(s) Oral at bedtime    A/P:    Stable renal fx  UA negative, renal SONO w/o hydro, PVR small  Encourage PO fluids  No nephrotoxins  Interval renal fx f/u

## 2019-05-07 NOTE — PROGRESS NOTE ADULT - SUBJECTIVE AND OBJECTIVE BOX
SANDI SANTOS   84y   Male    Admitting: EMILY Ding  HPI:  84-year-old gentleman with history of hypertension, hyperlipidemia, coronary artery disease, COPD, seizure disorder, pulmonary embolism status post IVC filter, as well as atrial fibrillation, who presented to [facility] 4/22/19 with right hand weakness and slurred speech. Found to have CVA's with suspicion for embolic phenomena. 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  Hematology consulted at outside hospital, and  found patient to have factor V leiden gene mutation. Patient maintained on Coumadin anticoagulation.    PAST MEDICAL & SURGICAL HISTORY:  Cerebrovascular accident (CVA)  History of COPD  CAD S/P percutaneous coronary angioplasty  Atrial fibrillation, unspecified type  [place] filter in place  PE (pulmonary embolism)  Hyperlipemia  Hypertension  S/P cataract surgery  S/P IVC filter    HEALTH ISSUES - PROBLEM Dx:  Deep vein thrombosis (DVT): Deep vein thrombosis (DVT)  Atrial fibrillation, unspecified type: Atrial fibrillation, unspecified type  Cerebrovascular accident (CVA): Cerebrovascular accident (CVA)  Neurocognitive disorder: Neurocognitive disorder    MEDICATIONS  (STANDING):  amLODIPine   Tablet 5 milliGRAM(s) Oral daily  aspirin  chewable 81 milliGRAM(s) Oral daily  atorvastatin 40 milliGRAM(s) Oral at bedtime  buDESOnide 160 MICROgram(s)/formoterol 4.5 MICROgram(s) Inhaler 2 Puff(s) Inhalation two times a day  docusate sodium 100 milliGRAM(s) Oral two times a day  lidocaine   Patch 1 Patch Transdermal daily  metoprolol succinate  milliGRAM(s) Oral daily  nystatin Cream 1 Application(s) Topical two times a day  pantoprazole    Tablet 40 milliGRAM(s) Oral before breakfast  QUEtiapine 12.5 milliGRAM(s) Oral at bedtime  sertraline 50 milliGRAM(s) Oral daily  tamsulosin 0.4 milliGRAM(s) Oral at bedtime    MEDICATIONS  (PRN):  acetaminophen   Tablet .. 650 milliGRAM(s) Oral every 6 hours PRN Moderate Pain (4 - 6)  ALBUTerol/ipratropium for Nebulization 3 milliLiter(s) Nebulizer every 6 hours PRN Shortness of Breath and/or Wheezing  ALPRAZolam 0.25 milliGRAM(s) Oral every 6 hours PRN anxiety  melatonin 3 milliGRAM(s) Oral at bedtime PRN Insomnia    Allergies    No Known Allergies    INTERVAL HPI/OVERNIGHT EVENTS:  Patient S&E at bedside. No c/o H/A, CP or SOB.    VITAL SIGNS:  T(F): 97.9 (05-07-19 @ 07:42)  HR: 71 (05-07-19 @ 09:27)  BP: 160/86 (05-07-19 @ 07:42)  RR: 15 (05-07-19 @ 07:42)  SpO2: 98% (05-07-19 @ 09:27)    PHYSICAL EXAM:  Constitutional: NAD  Eyes: sclera non-icteric  Neck: no JVD  Respiratory: CTA b/l, good air entry b/l ant.  Cardiovascular: RRR  Gastrointestinal: soft, NTND, no masses palpable, no hepatosplenomegaly appreciated  Extremities: no calf tenderness  Neurological: Awake, alert.    Labs:             13.9   8.4   )-----------( 259      ( 05-06 @ 14:15 )             44.8       05-07    138  |  106  |  18  ----------------------------<  113<H>  4.4   |  23  |  1.20    Ca    9.1      07 May 2019 05:40      PT/INR - ( 07 May 2019 05:40 )   PT: 46.3 sec;   INR: 3.96 ratio

## 2019-05-07 NOTE — PROGRESS NOTE ADULT - ATTENDING COMMENTS
No new complaints, tolerates therapy well/  Hold Coumadin, supra therapeutic INR  Discharge plan discussed at length with patient, SW, team, family/ [facility] suggested due to persisting significant gait impairment, cognitive impairment and risk of falls on full dose anticoagulation. Patient agreed with plan.

## 2019-05-07 NOTE — PROGRESS NOTE ADULT - ASSESSMENT
84-year-old gentleman with history of hypertension, hyperlipidemia, coronary artery disease, COPD, seizure disorder, pulmonary embolism status post IVC filter, as well as atrial fibrillation, who presented to [facility] 4/22/19 with right hand weakness and slurred speech. Found to have CVA's with suspicion for embolic phenomena. 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  Hematology consulted at outside hospital, and  found patient to have factor V leiden gene mutation. Patient maintained on Coumadin anticoagulation.

## 2019-05-07 NOTE — PROGRESS NOTE ADULT - SUBJECTIVE AND OBJECTIVE BOX
CHIEF COMPLAINT: Poor sleep.       HISTORY OF PRESENT ILLNESS  SANDI SANTOS is a 83yo Male with PMH prior stroke (), HTN, HLD, CAD c/b MI s/p , COPD, seizure d/o, PE s/p IVC filter, A-fib on warfarin, presented to [facility]  w/ R hand weakness x1day with slurred speech. INR therapeutic on admission. MR brain showed scattered infarcts throughout, in no vascular distribution, suspicious for embolic phenomenon. 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 Seen by CT surg, recommended open heart surgery to excise the mass.    Heme onc noted hx of factor V Leiden.  doppler BLE neg for DVT. Heme recommended change to other AC. (2019 14:05)      PAST MEDICAL & SURGICAL HISTORY:  Cerebrovascular accident (CVA)  History of COPD  CAD S/P percutaneous coronary angioplasty  Atrial fibrillation, unspecified type  [place] filter in place  PE (pulmonary embolism)  Hyperlipemia  Hypertension  S/P cataract surgery  S/P IVC filter  morbid obesity        REVIEW OF SYMPTOMS  [X] Constitutional WNL     [X] Cardio WNL            [X] Resp WNL           [X] GI WNL                          [X]  WNL                 [X] Endo WNL                     [X] Skin WNL                 [X] MSK WNL              [X] Psych WNL      VITALS  Vital Signs Last 24 Hrs  T(C): 36.6 (07 May 2019 07:42), Max: 36.6 (06 May 2019 20:04)  T(F): 97.9 (07 May 2019 07:42), Max: 97.9 (06 May 2019 20:04)  HR: 71 (07 May 2019 09:27) (62 - 71)  BP: 160/86 (07 May 2019 07:42) (134/69 - 160/86)  BP(mean): --  RR: 15 (07 May 2019 07:42) (14 - 15)  SpO2: 98% (07 May 2019 09:27) (96% - 98%)    PHYSICAL EXAM  Constitutional - NAD, CASTILLO  HEENT - NCAT, EOMI  Neck - Supple, No limited ROM  Chest - CTA bilaterally, No wheeze, No rhonchi, No crackles, dyspnea on exertion  Cardiovascular -irregular, murmur  Abdomen - BS+, Soft, NTND  Extremities - No C/C/E, No calf tenderness   Skin-nystatin on buttocks      Neurologic Exam - no new  focal deficits.                     Balance - impaired     Psychiatric - Mood stable, Affect WNL, insomnia         FUNCTIONAL PROGRESS  Gait - 40ft RW mod A   ADLs - min/CG  Transfers - min A  Functional transfer - min A      RECENT LABS                        13.9   8.4   )-----------( 259      ( 06 May 2019 14:15 )             44.8     05-07    138  |  106  |  18  ----------------------------<  113<H>  4.4   |  23  |  1.20    Ca    9.1      07 May 2019 05:40      PT/INR - ( 07 May 2019 05:40 )   PT: 46.3 sec;   INR: 3.96 ratio             Urinalysis Basic - ( 07 May 2019 01:08 )    Color: Yellow / Appearance: Clear / S.015 / pH: x  Gluc: x / Ketone: Negative  / Bili: Negative / Urobili: Negative   Blood: x / Protein: Negative / Nitrite: Negative   Leuk Esterase: Trace / RBC: Negative /HPF / WBC 0-2 /HPF   Sq Epi: x / Non Sq Epi: Neg.-Few / Bacteria: Negative /HPF      Direct LDL: 125 mg/dL (19 @ 05:05)  Direct LDL: 111 mg/dL (19 @ 05:30)    Hemoglobin A1C, Whole Blood: 6.1 % (19 @ 13:07)              RADIOLOGY/OTHER RESULTS      CURRENT MEDICATIONS  MEDICATIONS  (STANDING):  amLODIPine   Tablet 5 milliGRAM(s) Oral daily  aspirin  chewable 81 milliGRAM(s) Oral daily  atorvastatin 40 milliGRAM(s) Oral at bedtime  buDESOnide 160 MICROgram(s)/formoterol 4.5 MICROgram(s) Inhaler 2 Puff(s) Inhalation two times a day  docusate sodium 100 milliGRAM(s) Oral two times a day  lidocaine   Patch 1 Patch Transdermal daily  metoprolol succinate  milliGRAM(s) Oral daily  nystatin Cream 1 Application(s) Topical two times a day  pantoprazole    Tablet 40 milliGRAM(s) Oral before breakfast  QUEtiapine 12.5 milliGRAM(s) Oral at bedtime  sertraline 50 milliGRAM(s) Oral daily  tamsulosin 0.4 milliGRAM(s) Oral at bedtime    MEDICATIONS  (PRN):  acetaminophen   Tablet .. 650 milliGRAM(s) Oral every 6 hours PRN Moderate Pain (4 - 6)  ALBUTerol/ipratropium for Nebulization 3 milliLiter(s) Nebulizer every 6 hours PRN Shortness of Breath and/or Wheezing  ALPRAZolam 0.25 milliGRAM(s) Oral every 6 hours PRN anxiety  melatonin 3 milliGRAM(s) Oral at bedtime PRN Insomnia      ASSESSMENT & PLAN      GI/Bowel Management - Colace   Management - Toilet Q2  Skin - Turn Q2  Pain - Tylenol PRN  DVT PPX - On Coumadin. therapeutic.    Diet - reg c thins      Continue comprehensive acute rehab program consisting of 3hrs/day of OT/PT and SLP.

## 2019-05-07 NOTE — PROGRESS NOTE ADULT - ASSESSMENT
SANDI SANTOS is a 83yo Male with PMH prior stroke (Feb '19), HTN, HLD, CAD c/b MI s/p stent '96, COPD, seizure d/o, PE s/p IVC filter, A-fib on warfarin admitted for multiple scattered infarcts, now with gait instability, ADL impairments and functional impairments.     # Acute ischemic stroke: cardioembolic etiology  -  Coumadin held today. Supratherapeutic. Goal INR 3-3.5. Hematology is following.     - Continue statin with goal LDL <70  - Continue PT/OT/SLP therapy.     #Elevated creatinine/bun  Elevated creatinine. Renal evaluation pending. Will follow recommendations.      #Left knee pain  Reports chronic pain/OA. Lidocaine patch and prn Tylenol. Improved.     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 Will require open heart surgery for excision.   - Per [facility] rehab note 4/26, surgery planned for after acute rehab.    # Chronic A-fib  - Toprol XL for rate control  - Warfarin held today, goal INR 3-3.5. Repeat am.     # COPD: continue Symbicort    # HTN  - DASH  - BB as above  - Amlodipine    # Factor V Leiden  - Hematology in. On Coumadin, goal 3-3.5.   - No evidence of DVT on 4/23 duplex of BLE.     # BPH  - Flomax.     # Depression: continue sertraline. Psych in. Seroquel added nightly. Denies nightmares.     # HLD: continue atorvastatin. Cardiac diet. Follow LFTs.     # Buttock rash: nystatin    Gait Instability, ADL impairments and Functional impairments: Comprehensive Rehab Program of PT/OT/SLP  DVT PPx: Warfarin  Diet: DASH/TLC

## 2019-05-08 ENCOUNTER — TRANSCRIPTION ENCOUNTER (OUTPATIENT)
Age: 84
End: 2019-05-08

## 2019-05-08 LAB
ALBUMIN SERPL ELPH-MCNC: 3 G/DL — LOW (ref 3.3–5)
ALP SERPL-CCNC: 64 U/L — SIGNIFICANT CHANGE UP (ref 40–120)
ALT FLD-CCNC: 44 U/L DA — SIGNIFICANT CHANGE UP (ref 10–45)
ANION GAP SERPL CALC-SCNC: 8 MMOL/L — SIGNIFICANT CHANGE UP (ref 5–17)
AST SERPL-CCNC: 25 U/L — SIGNIFICANT CHANGE UP (ref 10–40)
BILIRUB SERPL-MCNC: 0.4 MG/DL — SIGNIFICANT CHANGE UP (ref 0.2–1.2)
BUN SERPL-MCNC: 20 MG/DL — SIGNIFICANT CHANGE UP (ref 7–23)
CALCIUM SERPL-MCNC: 9 MG/DL — SIGNIFICANT CHANGE UP (ref 8.4–10.5)
CHLORIDE SERPL-SCNC: 105 MMOL/L — SIGNIFICANT CHANGE UP (ref 96–108)
CO2 SERPL-SCNC: 22 MMOL/L — SIGNIFICANT CHANGE UP (ref 22–31)
CREAT SERPL-MCNC: 1.32 MG/DL — HIGH (ref 0.5–1.3)
GLUCOSE SERPL-MCNC: 113 MG/DL — HIGH (ref 70–99)
HCT VFR BLD CALC: 45.1 % — SIGNIFICANT CHANGE UP (ref 39–50)
HGB BLD-MCNC: 13.9 G/DL — SIGNIFICANT CHANGE UP (ref 13–17)
INR BLD: 3.18 RATIO — HIGH (ref 0.88–1.16)
MCHC RBC-ENTMCNC: 26.9 PG — LOW (ref 27–34)
MCHC RBC-ENTMCNC: 30.9 GM/DL — LOW (ref 32–36)
MCV RBC AUTO: 87.1 FL — SIGNIFICANT CHANGE UP (ref 80–100)
PLATELET # BLD AUTO: 243 K/UL — SIGNIFICANT CHANGE UP (ref 150–400)
POTASSIUM SERPL-MCNC: 4.2 MMOL/L — SIGNIFICANT CHANGE UP (ref 3.5–5.3)
POTASSIUM SERPL-SCNC: 4.2 MMOL/L — SIGNIFICANT CHANGE UP (ref 3.5–5.3)
PROT SERPL-MCNC: 7.1 G/DL — SIGNIFICANT CHANGE UP (ref 6–8.3)
PROTHROM AB SERPL-ACNC: 36.9 SEC — HIGH (ref 10–12.9)
RBC # BLD: 5.18 M/UL — SIGNIFICANT CHANGE UP (ref 4.2–5.8)
RBC # FLD: 13.7 % — SIGNIFICANT CHANGE UP (ref 10.3–14.5)
SODIUM SERPL-SCNC: 135 MMOL/L — SIGNIFICANT CHANGE UP (ref 135–145)
WBC # BLD: 8 K/UL — SIGNIFICANT CHANGE UP (ref 3.8–10.5)
WBC # FLD AUTO: 8 K/UL — SIGNIFICANT CHANGE UP (ref 3.8–10.5)

## 2019-05-08 PROCEDURE — 99232 SBSQ HOSP IP/OBS MODERATE 35: CPT

## 2019-05-08 RX ORDER — LANOLIN ALCOHOL/MO/W.PET/CERES
1 CREAM (GRAM) TOPICAL
Qty: 0 | Refills: 0 | DISCHARGE
Start: 2019-05-08

## 2019-05-08 RX ORDER — ASPIRIN/CALCIUM CARB/MAGNESIUM 324 MG
1 TABLET ORAL
Qty: 0 | Refills: 0 | DISCHARGE
Start: 2019-05-08

## 2019-05-08 RX ORDER — SERTRALINE 25 MG/1
1 TABLET, FILM COATED ORAL
Qty: 0 | Refills: 0 | COMMUNITY

## 2019-05-08 RX ORDER — TAMSULOSIN HYDROCHLORIDE 0.4 MG/1
1 CAPSULE ORAL
Qty: 0 | Refills: 0 | DISCHARGE
Start: 2019-05-08

## 2019-05-08 RX ORDER — OXYBUTYNIN CHLORIDE 5 MG
1 TABLET ORAL
Qty: 0 | Refills: 0 | COMMUNITY

## 2019-05-08 RX ORDER — QUETIAPINE FUMARATE 200 MG/1
12.5 TABLET, FILM COATED ORAL
Qty: 0 | Refills: 0 | DISCHARGE
Start: 2019-05-08

## 2019-05-08 RX ORDER — SERTRALINE 25 MG/1
1 TABLET, FILM COATED ORAL
Qty: 0 | Refills: 0 | DISCHARGE
Start: 2019-05-08

## 2019-05-08 RX ORDER — TAMSULOSIN HYDROCHLORIDE 0.4 MG/1
1 CAPSULE ORAL
Qty: 0 | Refills: 0 | COMMUNITY

## 2019-05-08 RX ORDER — AMLODIPINE BESYLATE 2.5 MG/1
1 TABLET ORAL
Qty: 0 | Refills: 0 | DISCHARGE
Start: 2019-05-08

## 2019-05-08 RX ORDER — NYSTATIN CREAM 100000 [USP'U]/G
1 CREAM TOPICAL
Qty: 0 | Refills: 0 | DISCHARGE
Start: 2019-05-08

## 2019-05-08 RX ORDER — WARFARIN SODIUM 2.5 MG/1
3 TABLET ORAL ONCE
Qty: 0 | Refills: 0 | Status: COMPLETED | OUTPATIENT
Start: 2019-05-08 | End: 2019-05-08

## 2019-05-08 RX ORDER — IPRATROPIUM/ALBUTEROL SULFATE 18-103MCG
3 AEROSOL WITH ADAPTER (GRAM) INHALATION
Qty: 0 | Refills: 0 | DISCHARGE
Start: 2019-05-08

## 2019-05-08 RX ORDER — PANTOPRAZOLE SODIUM 20 MG/1
1 TABLET, DELAYED RELEASE ORAL
Qty: 0 | Refills: 0 | DISCHARGE
Start: 2019-05-08

## 2019-05-08 RX ORDER — ATORVASTATIN CALCIUM 80 MG/1
1 TABLET, FILM COATED ORAL
Qty: 0 | Refills: 0 | DISCHARGE
Start: 2019-05-08

## 2019-05-08 RX ORDER — METOPROLOL TARTRATE 50 MG
1 TABLET ORAL
Qty: 0 | Refills: 0 | DISCHARGE
Start: 2019-05-08

## 2019-05-08 RX ADMIN — Medication 100 MILLIGRAM(S): at 05:45

## 2019-05-08 RX ADMIN — QUETIAPINE FUMARATE 12.5 MILLIGRAM(S): 200 TABLET, FILM COATED ORAL at 20:43

## 2019-05-08 RX ADMIN — AMLODIPINE BESYLATE 5 MILLIGRAM(S): 2.5 TABLET ORAL at 05:45

## 2019-05-08 RX ADMIN — WARFARIN SODIUM 3 MILLIGRAM(S): 2.5 TABLET ORAL at 20:42

## 2019-05-08 RX ADMIN — BUDESONIDE AND FORMOTEROL FUMARATE DIHYDRATE 2 PUFF(S): 160; 4.5 AEROSOL RESPIRATORY (INHALATION) at 08:52

## 2019-05-08 RX ADMIN — SERTRALINE 50 MILLIGRAM(S): 25 TABLET, FILM COATED ORAL at 12:08

## 2019-05-08 RX ADMIN — ATORVASTATIN CALCIUM 40 MILLIGRAM(S): 80 TABLET, FILM COATED ORAL at 20:42

## 2019-05-08 RX ADMIN — LIDOCAINE 1 PATCH: 4 CREAM TOPICAL at 12:08

## 2019-05-08 RX ADMIN — Medication 650 MILLIGRAM(S): at 20:47

## 2019-05-08 RX ADMIN — PANTOPRAZOLE SODIUM 40 MILLIGRAM(S): 20 TABLET, DELAYED RELEASE ORAL at 05:45

## 2019-05-08 RX ADMIN — Medication 100 MILLIGRAM(S): at 17:12

## 2019-05-08 RX ADMIN — Medication 81 MILLIGRAM(S): at 12:08

## 2019-05-08 RX ADMIN — TAMSULOSIN HYDROCHLORIDE 0.4 MILLIGRAM(S): 0.4 CAPSULE ORAL at 20:42

## 2019-05-08 RX ADMIN — Medication 650 MILLIGRAM(S): at 05:46

## 2019-05-08 RX ADMIN — BUDESONIDE AND FORMOTEROL FUMARATE DIHYDRATE 2 PUFF(S): 160; 4.5 AEROSOL RESPIRATORY (INHALATION) at 20:32

## 2019-05-08 RX ADMIN — LIDOCAINE 1 PATCH: 4 CREAM TOPICAL at 20:48

## 2019-05-08 RX ADMIN — NYSTATIN CREAM 1 APPLICATION(S): 100000 CREAM TOPICAL at 05:47

## 2019-05-08 RX ADMIN — Medication 650 MILLIGRAM(S): at 06:52

## 2019-05-08 RX ADMIN — NYSTATIN CREAM 1 APPLICATION(S): 100000 CREAM TOPICAL at 17:12

## 2019-05-08 RX ADMIN — Medication 650 MILLIGRAM(S): at 21:07

## 2019-05-08 NOTE — DISCHARGE NOTE PROVIDER - HOSPITAL COURSE
85yo Male with PMH prior stroke (Feb '19), HTN, HLD, CAD c/b MI s/p stent '96, COPD, seizure d/o, PE s/p IVC filter, A-fib on warfarin, presented to [facility] 4/22 w/ R hand weakness x1day with slurred speech. INR therapeutic on admission. MR brain showed scattered infarcts throughout, in no vascular distribution, suspicious for embolic phenomenon. 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 Seen by CT surg, recommended open heart surgery to excise the mass.    Heme onc noted hx of factor V Leiden. 4/23 doppler BLE neg for DVT. Heme recommended change to other AC. At rehab [facility] patient evaluated by hematology Dr Zuleta. Coumadin continued c goal INR 3-3.5. IV fluids give for elevated creatinine, renal workup negative. Patient completed comprehensive program of PT/OT/SLP and performing all activities with CG/min assistance. Cleared for d/c on 5/ with INR check and coumadin dosing. 85yo Male with PMH prior stroke (Feb '19), HTN, HLD, CAD c/b MI s/p stent '96, COPD, seizure d/o, PE s/p IVC filter, A-fib on warfarin, presented to [facility] 4/22 w/ R hand weakness x1day with slurred speech. INR therapeutic on admission. MR brain showed scattered infarcts throughout, in no vascular distribution, suspicious for embolic phenomenon. 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 Seen by CT surg, recommended open heart surgery to excise the mass.    Heme onc noted hx of factor V Leiden. 4/23 doppler BLE neg for DVT. Heme recommended change to other AC. At rehab [facility] patient evaluated by hematology Dr Zuleta. Coumadin continued c goal INR 3-3.5. IV fluids give for elevated creatinine, renal workup negative. Patient completed comprehensive program of PT/OT/SLP and performing all activities with CG/min assistance. Cleared for d/c to [facility] on 5/9/19 with Lovenox bridge until INR >3.

## 2019-05-08 NOTE — PROGRESS NOTE ADULT - PROBLEM SELECTOR PLAN 1
Patient status post CVA's , with embolic etiology. Has aortic valve mass. Also, reported history of pulmonary embolism.  He is maintained on Coumadin anticoagulation with INR aim approximately 3-3.5-level currently acceptable.  Disposition planning in progress.

## 2019-05-08 NOTE — DISCHARGE NOTE PROVIDER - NSDCCPCAREPLAN_GEN_ALL_CORE_FT
PRINCIPAL DISCHARGE DIAGNOSIS  Diagnosis: Cerebrovascular accident (CVA)  Assessment and Plan of Treatment: Cerebrovascular accident (CVA)      SECONDARY DISCHARGE DIAGNOSES  Diagnosis: HTN (hypertension)  Assessment and Plan of Treatment:     Diagnosis: Factor V Leiden  Assessment and Plan of Treatment:     Diagnosis: Deep vein thrombosis (DVT)  Assessment and Plan of Treatment: Deep vein thrombosis (DVT)

## 2019-05-08 NOTE — DISCHARGE NOTE PROVIDER - PROVIDER TOKENS
PROVIDER:[TOKEN:[[id number]]],PROVIDER:[TOKEN:[[id number]]],PROVIDER:[TOKEN:[[id number]]],PROVIDER:[TOKEN:[[id number]]] PROVIDER:[TOKEN:[[id number]]],PROVIDER:[TOKEN:[[id number]]],PROVIDER:[TOKEN:[[id number]]],PROVIDER:[TOKEN:[[id number]]],PROVIDER:[TOKEN:[[id number]]]

## 2019-05-08 NOTE — PROGRESS NOTE ADULT - SUBJECTIVE AND OBJECTIVE BOX
CHIEF COMPLAINT: Offers no complaints this am.       HISTORY OF PRESENT ILLNESS  SANDI SANTOS is a 85yo Male with PMH prior stroke (), HTN, HLD, CAD c/b MI s/p , COPD, seizure d/o, PE s/p IVC filter, A-fib on warfarin, presented to [facility]  w/ R hand weakness x1day with slurred speech. INR therapeutic on admission. MR brain showed scattered infarcts throughout, in no vascular distribution, suspicious for embolic phenomenon. 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 Seen by CT surg, recommended open heart surgery to excise the mass.    Heme onc noted hx of factor V Leiden.  doppler BLE neg for DVT. Heme recommended change to other AC. (2019 14:05)      PAST MEDICAL & SURGICAL HISTORY:  Cerebrovascular accident (CVA)  History of COPD  CAD S/P percutaneous coronary angioplasty  Atrial fibrillation, unspecified type  [place] filter in place  PE (pulmonary embolism)  Hyperlipemia  Hypertension  S/P cataract surgery  S/P IVC filter  morbid obesity       REVIEW OF SYMPTOMS  [X] Constitutional WNL             [X] GI WNL                          [X]  WNL                   [X] Endo WNL                     [X] Skin WNL                 [X] MSK WNL                            [X] Cognitive WNL        [X] Psych WNL      VITALS  Vital Signs Last 24 Hrs  T(C): 36.7 (08 May 2019 07:43), Max: 36.7 (07 May 2019 21:41)  T(F): 98 (08 May 2019 07:43), Max: 98 (07 May 2019 21:41)  HR: 70 (08 May 2019 07:43) (65 - 71)  BP: 157/96 (08 May 2019 07:43) (144/75 - 163/89)  BP(mean): --  RR: 13 (08 May 2019 07:43) (13 - 14)  SpO2: 94% (08 May 2019 08:52) (94% - 98%)     PHYSICAL EXAM  Constitutional - NAD, CASTILLO  HEENT - NCAT, EOMI  Neck - Supple, No limited ROM  Chest - CTA bilaterally, No wheeze, No rhonchi, No crackles, dyspnea on exertion  Cardiovascular -irregular, murmur  Abdomen - BS+, Soft, NTND  Extremities - No C/C/E, No calf tenderness   Skin-nystatin on buttocks      Neurologic Exam - no new  focal deficits.                     Balance - impaired     Psychiatric - Mood stable, Affect WNL, insomnia         FUNCTIONAL PROGRESS  Gait - 40ft RW mod A   ADLs - min/CG  Transfers - min A  Functional transfer - min A    RECENT LABS                        13.9   8.0   )-----------( 243      ( 08 May 2019 06:22 )             45.1         135  |  105  |  20  ----------------------------<  113<H>  4.2   |  22  |  1.32<H>    Ca    9.0      08 May 2019 06:22    TPro  7.1  /  Alb  3.0<L>  /  TBili  0.4  /  DBili  x   /  AST  25  /  ALT  44  /  AlkPhos  64  05-08    PT/INR - ( 08 May 2019 06:22 )   PT: 36.9 sec;   INR: 3.18 ratio           LIVER FUNCTIONS - ( 08 May 2019 06:22 )  Alb: 3.0 g/dL / Pro: 7.1 g/dL / ALK PHOS: 64 U/L / ALT: 44 U/L DA / AST: 25 U/L / GGT: x           Urinalysis Basic - ( 07 May 2019 01:08 )    Color: Yellow / Appearance: Clear / S.015 / pH: x  Gluc: x / Ketone: Negative  / Bili: Negative / Urobili: Negative   Blood: x / Protein: Negative / Nitrite: Negative   Leuk Esterase: Trace / RBC: Negative /HPF / WBC 0-2 /HPF   Sq Epi: x / Non Sq Epi: Neg.-Few / Bacteria: Negative /HPF      Direct LDL: 125 mg/dL (19 @ 05:05)  Direct LDL: 111 mg/dL (19 @ 05:30)    Hemoglobin A1C, Whole Blood: 6.1 % (19 @ 13:07)              RADIOLOGY/OTHER RESULTS      CURRENT MEDICATIONS  MEDICATIONS  (STANDING):  amLODIPine   Tablet 5 milliGRAM(s) Oral daily  aspirin  chewable 81 milliGRAM(s) Oral daily  atorvastatin 40 milliGRAM(s) Oral at bedtime  buDESOnide 160 MICROgram(s)/formoterol 4.5 MICROgram(s) Inhaler 2 Puff(s) Inhalation two times a day  docusate sodium 100 milliGRAM(s) Oral two times a day  lidocaine   Patch 1 Patch Transdermal daily  metoprolol succinate  milliGRAM(s) Oral daily  nystatin Cream 1 Application(s) Topical two times a day  pantoprazole    Tablet 40 milliGRAM(s) Oral before breakfast  QUEtiapine 12.5 milliGRAM(s) Oral at bedtime  sertraline 50 milliGRAM(s) Oral daily  tamsulosin 0.4 milliGRAM(s) Oral at bedtime  warfarin 3 milliGRAM(s) Oral once    MEDICATIONS  (PRN):  acetaminophen   Tablet .. 650 milliGRAM(s) Oral every 6 hours PRN Moderate Pain (4 - 6)  ALBUTerol/ipratropium for Nebulization 3 milliLiter(s) Nebulizer every 6 hours PRN Shortness of Breath and/or Wheezing  ALPRAZolam 0.25 milliGRAM(s) Oral every 6 hours PRN anxiety  melatonin 3 milliGRAM(s) Oral at bedtime PRN Insomnia      ASSESSMENT & PLAN    GI/Bowel Management - Colace   Management - Toilet Q2  Skin - Turn Q2  Pain - Tylenol PRN  DVT PPX - On Coumadin. therapeutic.    Diet - reg c thins      Continue comprehensive acute rehab program consisting of 3hrs/day of OT/PT and SLP.

## 2019-05-08 NOTE — DISCHARGE NOTE PROVIDER - CARE PROVIDERS DIRECT ADDRESSES
,DirectAddress_Unknown,DirectAddress_Unknown,[email or web address],DirectAddress_Unknown ,DirectAddress_Unknown,DirectAddress_Unknown,[email or web address],DirectAddress_Unknown,[email or web address]

## 2019-05-08 NOTE — DISCHARGE NOTE PROVIDER - CARE PROVIDER_API CALL
Tien Murillo)  Family Medicine  [address] [place] [address]  Phone: [phone]  Fax: [phone]  Follow Up Time:     Kermit Alvarado)  CTS Surgery  [address] [place] [address]  Phone: [phone]  Fax: [phone]  Follow Up Time:     Lul Ta)  Cardiovascular Disease  [address], [address]  [place] [address]  Phone: [phone]  Fax: [phone]  Follow Up Time:     Tristin Richardson)  Neurology  [address] [place] [address]  Phone: [phone]  Fax: [phone]  Follow Up Time: Tien Murillo)  Family Medicine  [address] [place] [address]  Phone: [phone]  Fax: [phone]  Follow Up Time:     Kermit Alvarado)  CTS Surgery  [address] [place] [address]  Phone: [phone]  Fax: [phone]  Follow Up Time:     Lul Ta)  Cardiovascular Disease  [address] [facility], [address]  [place] [address]  Phone: [phone]  Fax: [phone]  Follow Up Time:     Tristin Richardson)  Neurology  [address] [place] [address]  Phone: [phone]  Fax: [phone]  Follow Up Time:     Zak Spicer)  Hematology; Internal Medicine; Medical Oncology  [address] [place] [address]  Phone: [phone]  Fax: [phone]  Follow Up Time:

## 2019-05-08 NOTE — PROGRESS NOTE ADULT - ATTENDING COMMENTS
Multidisciplinary team meeting today:  patient's functional goals and needs, functional and clinical  progress were discussed, barriers to discharge were identified. Anticipate [facility] facility placement this week when a bed is available.

## 2019-05-08 NOTE — PROGRESS NOTE ADULT - ASSESSMENT
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

## 2019-05-08 NOTE — PROGRESS NOTE ADULT - SUBJECTIVE AND OBJECTIVE BOX
Patient is a 84y old  Male who presents with a chief complaint of s/p CVA c functional deficits. (08 May 2019 08:58)      Patient seen and examined at bedside.     ALLERGIES:  No Known Allergies    MEDICATIONS:  acetaminophen   Tablet .. 650 milliGRAM(s) Oral every 6 hours PRN  ALBUTerol/ipratropium for Nebulization 3 milliLiter(s) Nebulizer every 6 hours PRN  ALPRAZolam 0.25 milliGRAM(s) Oral every 6 hours PRN  buDESOnide 160 MICROgram(s)/formoterol 4.5 MICROgram(s) Inhaler 2 Puff(s) Inhalation two times a day  lidocaine   Patch 1 Patch Transdermal daily  melatonin 3 milliGRAM(s) Oral at bedtime PRN  nystatin Cream 1 Application(s) Topical two times a day  QUEtiapine 12.5 milliGRAM(s) Oral at bedtime  warfarin 3 milliGRAM(s) Oral once    Vital Signs Last 24 Hrs  T(F): 98 (08 May 2019 07:43), Max: 98 (07 May 2019 21:41)  HR: 70 (08 May 2019 07:43) (65 - 70)  BP: 157/96 (08 May 2019 07:43) (144/75 - 163/89)  RR: 13 (08 May 2019 07:43) (13 - 14)  SpO2: 94% (08 May 2019 08:52) (94% - 95%)  I&O's Summary      PHYSICAL EXAM:  General: NAD, comfortable   ENT: MMM  Neck: Supple, No JVD  Lungs: CTA, BLAE, No added sounds.   Cardio: RRR, S1/S2, No murmurs  Abdomen: Soft, NT/ND, BS+   Extremities: No edema  CNS: no new deficits     LABS:                        13.9   8.0   )-----------( 243      ( 08 May 2019 06:22 )             45.1         135  |  105  |  20  ----------------------------<  113  4.2   |  22  |  1.32    Ca    9.0      08 May 2019 06:22    TPro  7.1  /  Alb  3.0  /  TBili  0.4  /  DBili  x   /  AST  25  /  ALT  44  /  AlkPhos  64  05-08    eGFR if Non African American: 49 mL/min/1.73M2 (19 @ 06:22)  eGFR if African American: 57 mL/min/1.73M2 (19 @ 06:22)    PT/INR - ( 08 May 2019 06:22 )   PT: 36.9 sec;   INR: 3.18 ratio                  Chol 189 mg/dL  mg/dL HDL 35 mg/dL Trig 143 mg/dL        CAPILLARY BLOOD GLUCOSE         [id number] 6.1    Urinalysis Basic - ( 07 May 2019 01:08 )    Color: Yellow / Appearance: Clear / S.015 / pH: x  Gluc: x / Ketone: Negative  / Bili: Negative / Urobili: Negative   Blood: x / Protein: Negative / Nitrite: Negative   Leuk Esterase: Trace / RBC: Negative /HPF / WBC 0-2 /HPF   Sq Epi: x / Non Sq Epi: Neg.-Few / Bacteria: Negative /HPF          RADIOLOGY & ADDITIONAL TESTS:    Care Discussed with Consultants/Other Providers:

## 2019-05-08 NOTE — PROGRESS NOTE ADULT - SUBJECTIVE AND OBJECTIVE BOX
Denies complaints    Vital Signs Last 24 Hrs  T(C): 36.7 (19 @ 07:43), Max: 36.7 (19 @ 21:41)  T(F): 98 (19 @ 07:43), Max: 98 (19 @ 21:41)  HR: 70 (19 @ 07:43) (65 - 70)  BP: 157/96 (19 @ 07:43) (144/75 - 163/89)  RR: 13 (19 @ 07:43) (13 - 14)  SpO2: 94% (19 @ 08:52) (94% - 95%)    Card S1S2  Lungs fair air entry  Abd soft, NT, ND  Extr no edema                        13.9   8.0   )-----------( 243      ( 08 May 2019 06:22 )             45.1     08 May 2019 06:22    135    |  105    |  20     ----------------------------<  113    4.2     |  22     |  1.32     Ca    9.0        08 May 2019 06:22    TPro  7.1    /  Alb  3.0    /  TBili  0.4    /  DBili  x      /  AST  25     /  ALT  44     /  AlkPhos  64     08 May 2019 06:22    LIVER FUNCTIONS - ( 08 May 2019 06:22 )  Alb: 3.0 g/dL / Pro: 7.1 g/dL / ALK PHOS: 64 U/L / ALT: 44 U/L DA / AST: 25 U/L / GGT: x           PT/INR - ( 08 May 2019 06:22 )   PT: 36.9 sec;   INR: 3.18 ratio      Urinalysis Basic - ( 07 May 2019 01:08 )    Color: Yellow / Appearance: Clear / S.015 / pH: x  Gluc: x / Ketone: Negative  / Bili: Negative / Urobili: Negative   Blood: x / Protein: Negative / Nitrite: Negative   Leuk Esterase: Trace / RBC: Negative /HPF / WBC 0-2 /HPF   Sq Epi: x / Non Sq Epi: Neg.-Few / Bacteria: Negative /HPF    acetaminophen   Tablet .. 650 milliGRAM(s) Oral every 6 hours PRN  ALBUTerol/ipratropium for Nebulization 3 milliLiter(s) Nebulizer every 6 hours PRN  ALPRAZolam 0.25 milliGRAM(s) Oral every 6 hours PRN  amLODIPine   Tablet 5 milliGRAM(s) Oral daily  aspirin  chewable 81 milliGRAM(s) Oral daily  atorvastatin 40 milliGRAM(s) Oral at bedtime  buDESOnide 160 MICROgram(s)/formoterol 4.5 MICROgram(s) Inhaler 2 Puff(s) Inhalation two times a day  docusate sodium 100 milliGRAM(s) Oral two times a day  lidocaine   Patch 1 Patch Transdermal daily  melatonin 3 milliGRAM(s) Oral at bedtime PRN  metoprolol succinate  milliGRAM(s) Oral daily  nystatin Cream 1 Application(s) Topical two times a day  pantoprazole    Tablet 40 milliGRAM(s) Oral before breakfast  QUEtiapine 12.5 milliGRAM(s) Oral at bedtime  sertraline 50 milliGRAM(s) Oral daily  tamsulosin 0.4 milliGRAM(s) Oral at bedtime  warfarin 3 milliGRAM(s) Oral once    A/P:    Stable renal fx  UA negative, renal SONO w/o hydro, PVR small  Encourage PO fluids  No nephrotoxins  Interval renal fx f/u  No objection to d/c from renal pov

## 2019-05-08 NOTE — PROGRESS NOTE ADULT - ASSESSMENT
84-year-old gentleman with history of hypertension, hyperlipidemia, coronary artery disease, COPD, seizure disorder, pulmonary embolism status post IVC filter, as well as atrial fibrillation, who presented to [facility] 4/22/19 with right hand weakness and slurred speech. Found to have CVA's with suspicion for embolic phenomena. 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  Hematology consulted at outside hospital, and  found patient to have factor V leiden gene mutation. Patient maintained on Coumadin anticoagulation.

## 2019-05-08 NOTE — PROGRESS NOTE ADULT - SUBJECTIVE AND OBJECTIVE BOX
SANDI SANTOS   84y   Male    Admitting: EMILY Ding  HPI:  84-year-old gentleman with history of hypertension, hyperlipidemia, coronary artery disease, COPD, seizure disorder, pulmonary embolism status post IVC filter, as well as atrial fibrillation, who presented to [facility] 4/22/19 with right hand weakness and slurred speech. Found to have CVA's with suspicion for embolic phenomena. 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  Hematology consulted at outside hospital, and  found patient to have factor V leiden gene mutation. Patient maintained on Coumadin anticoagulation.    PAST MEDICAL & SURGICAL HISTORY:  Cerebrovascular accident (CVA)  History of COPD  CAD S/P percutaneous coronary angioplasty  Atrial fibrillation, unspecified type  [place] filter in place  PE (pulmonary embolism)  Hyperlipemia  Hypertension  S/P cataract surgery  S/P IVC filter    HEALTH ISSUES - PROBLEM Dx:  Deep vein thrombosis (DVT): Deep vein thrombosis (DVT)  Atrial fibrillation, unspecified type: Atrial fibrillation, unspecified type  Cerebrovascular accident (CVA): Cerebrovascular accident (CVA)  Neurocognitive disorder: Neurocognitive disorder    MEDICATIONS  (STANDING):  amLODIPine   Tablet 5 milliGRAM(s) Oral daily  aspirin  chewable 81 milliGRAM(s) Oral daily  atorvastatin 40 milliGRAM(s) Oral at bedtime  buDESOnide 160 MICROgram(s)/formoterol 4.5 MICROgram(s) Inhaler 2 Puff(s) Inhalation two times a day  docusate sodium 100 milliGRAM(s) Oral two times a day  lidocaine   Patch 1 Patch Transdermal daily  metoprolol succinate  milliGRAM(s) Oral daily  nystatin Cream 1 Application(s) Topical two times a day  pantoprazole    Tablet 40 milliGRAM(s) Oral before breakfast  QUEtiapine 12.5 milliGRAM(s) Oral at bedtime  sertraline 50 milliGRAM(s) Oral daily  tamsulosin 0.4 milliGRAM(s) Oral at bedtime  warfarin 3 milliGRAM(s) Oral once    MEDICATIONS  (PRN):  acetaminophen   Tablet .. 650 milliGRAM(s) Oral every 6 hours PRN Moderate Pain (4 - 6)  ALBUTerol/ipratropium for Nebulization 3 milliLiter(s) Nebulizer every 6 hours PRN Shortness of Breath and/or Wheezing  ALPRAZolam 0.25 milliGRAM(s) Oral every 6 hours PRN anxiety  melatonin 3 milliGRAM(s) Oral at bedtime PRN Insomnia    Allergies    No Known Allergies    INTERVAL HPI/OVERNIGHT EVENTS:  Patient S&E at bedside. No c/o CP or SOB or bleeding.    VITAL SIGNS:  T(F): 98 (05-08-19 @ 07:43)  HR: 70 (05-08-19 @ 07:43)  BP: 157/96 (05-08-19 @ 07:43)  RR: 13 (05-08-19 @ 07:43)  SpO2: 94% (05-08-19 @ 08:52)    PHYSICAL EXAM:  Constitutional: NAD  Eyes: sclera non-icteric  Neck: no JVD  Respiratory: CTA b/l, good air entry b/l ant.  Cardiovascular: RRR  Gastrointestinal: soft, NTND, no masses palpable, no hepatosplenomegaly appreciated  Extremities: no calf tenderness  Neurological: Awake, alert.    Labs:             13.9   8.0   )-----------( 243      ( 05-08 @ 06:22 )             45.1                13.9   8.4   )-----------( 259      ( 05-06 @ 14:15 )             44.8       05-08    135  |  105  |  20  ----------------------------<  113<H>  4.2   |  22  |  1.32<H>    Ca    9.0      08 May 2019 06:22    TPro  7.1  /  Alb  3.0<L>  /  TBili  0.4  /  DBili  x   /  AST  25  /  ALT  44  /  AlkPhos  64  05-08    PT/INR - ( 08 May 2019 06:22 )   PT: 36.9 sec;   INR: 3.18 ratio

## 2019-05-08 NOTE — PROGRESS NOTE ADULT - ASSESSMENT
SANDI SANTOS is a 85yo Male with PMH prior stroke (Feb '19), HTN, HLD, CAD c/b MI s/p stent '96, COPD, seizure d/o, PE s/p IVC filter, A-fib on warfarin admitted for multiple scattered infarcts, now with gait instability, ADL impairments and functional impairments.     # Acute ischemic stroke: cardioembolic etiology  -  Coumadin dose today-therapeutic. Goal INR 3-3.5. Hematology is following.     - Continue statin with goal LDL <70  - Continue PT/OT/SLP therapy.     #Elevated creatinine/bun  Elevated creatinine improved today. Renal in. US renal neg hydronephrosis. Avoid nephrotoxins.     #Left knee pain  Reports chronic pain/OA. Lidocaine patch and prn Tylenol. Improved.     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 Will require open heart surgery for excision.   - Per [facility] rehab note 4/26, surgery planned for after acute rehab.    # Chronic A-fib  - Toprol XL for rate control  - Warfarin dosed, goal INR 3-3.5.      # COPD: continue Symbicort    # HTN  - DASH  - BB as above  - Amlodipine    # Factor V Leiden  - Hematology in. On Coumadin, goal 3-3.5.   - No evidence of DVT on 4/23 duplex of BLE.     # BPH  - Flomax.     # Depression: continue sertraline. Psych in. Seroquel added nightly. Slept well.     # HLD: continue atorvastatin. Cardiac diet. Follow LFTs.     # Buttock rash: nystatin continues.    Gait Instability, ADL impairments and Functional impairments: Comprehensive Rehab Program of PT/OT/SLP  DVT PPx: Warfarin  Diet: DASH/TLC

## 2019-05-08 NOTE — DISCHARGE NOTE PROVIDER - NSDCFUADDINST_GEN_ALL_CORE_FT
Patient INR goal with Factor V Leiden is 3-3.5 as per neurology. Subtherapeutic on 5/9/19. Lovenox bridge mg/kg started (total 140mg SQ BID) and should be continued until INR>3. Check INR again on 5/10/19. Coumadin dosed on 5/8/19 at 3mg and on 5/9/19 at 4mg. Patient seen by gabriele.

## 2019-05-08 NOTE — DISCHARGE NOTE PROVIDER - NSDCACTIVITY_GEN_ALL_CORE
Sex allowed/Stairs allowed/Do not drive or operate machinery/Showering allowed/No heavy lifting/straining/Walking - Outdoors allowed/Do not make important decisions/Walking - Indoors allowed

## 2019-05-09 ENCOUNTER — TRANSCRIPTION ENCOUNTER (OUTPATIENT)
Age: 84
End: 2019-05-09

## 2019-05-09 ENCOUNTER — APPOINTMENT (OUTPATIENT)
Dept: CARDIOLOGY | Facility: CLINIC | Age: 84
End: 2019-05-09

## 2019-05-09 VITALS — OXYGEN SATURATION: 98 %

## 2019-05-09 LAB
INR BLD: 2.58 RATIO — HIGH (ref 0.88–1.16)
PROTHROM AB SERPL-ACNC: 29.7 SEC — HIGH (ref 10–12.9)

## 2019-05-09 PROCEDURE — 99239 HOSP IP/OBS DSCHRG MGMT >30: CPT

## 2019-05-09 RX ORDER — WARFARIN SODIUM 2.5 MG/1
1 TABLET ORAL
Qty: 0 | Refills: 0 | DISCHARGE
Start: 2019-05-09

## 2019-05-09 RX ORDER — WARFARIN SODIUM 2.5 MG/1
1 TABLET ORAL
Qty: 0 | Refills: 0 | DISCHARGE

## 2019-05-09 RX ORDER — ENOXAPARIN SODIUM 100 MG/ML
140 INJECTION SUBCUTANEOUS
Qty: 0 | Refills: 0 | DISCHARGE
Start: 2019-05-09

## 2019-05-09 RX ORDER — WARFARIN SODIUM 2.5 MG/1
4 TABLET ORAL ONCE
Refills: 0 | Status: COMPLETED | OUTPATIENT
Start: 2019-05-09 | End: 2019-05-09

## 2019-05-09 RX ORDER — ENOXAPARIN SODIUM 100 MG/ML
140 INJECTION SUBCUTANEOUS EVERY 12 HOURS
Refills: 0 | Status: DISCONTINUED | OUTPATIENT
Start: 2019-05-09 | End: 2019-05-09

## 2019-05-09 RX ADMIN — Medication 100 MILLIGRAM(S): at 06:21

## 2019-05-09 RX ADMIN — AMLODIPINE BESYLATE 5 MILLIGRAM(S): 2.5 TABLET ORAL at 06:21

## 2019-05-09 RX ADMIN — LIDOCAINE 1 PATCH: 4 CREAM TOPICAL at 00:41

## 2019-05-09 RX ADMIN — BUDESONIDE AND FORMOTEROL FUMARATE DIHYDRATE 2 PUFF(S): 160; 4.5 AEROSOL RESPIRATORY (INHALATION) at 08:31

## 2019-05-09 RX ADMIN — PANTOPRAZOLE SODIUM 40 MILLIGRAM(S): 20 TABLET, DELAYED RELEASE ORAL at 06:21

## 2019-05-09 RX ADMIN — Medication 81 MILLIGRAM(S): at 11:11

## 2019-05-09 RX ADMIN — NYSTATIN CREAM 1 APPLICATION(S): 100000 CREAM TOPICAL at 07:43

## 2019-05-09 RX ADMIN — SERTRALINE 50 MILLIGRAM(S): 25 TABLET, FILM COATED ORAL at 11:11

## 2019-05-09 RX ADMIN — WARFARIN SODIUM 4 MILLIGRAM(S): 2.5 TABLET ORAL at 09:11

## 2019-05-09 RX ADMIN — LIDOCAINE 1 PATCH: 4 CREAM TOPICAL at 11:11

## 2019-05-09 NOTE — PROGRESS NOTE ADULT - SUBJECTIVE AND OBJECTIVE BOX
CHIEF COMPLAINT: Offers no complaints this am.       HISTORY OF PRESENT ILLNESS  SANDI SANTOS is a 83yo Male with PMH prior stroke (Feb '19), HTN, HLD, CAD c/b MI s/p stent '96, COPD, seizure d/o, PE s/p IVC filter, A-fib on warfarin, presented to [facility] 4/22 w/ R hand weakness x1day with slurred speech. INR therapeutic on admission. MR brain showed scattered infarcts throughout, in no vascular distribution, suspicious for embolic phenomenon. 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 Seen by CT surg, recommended open heart surgery to excise the mass.    Heme onc noted hx of factor V Leiden. 4/23 doppler BLE neg for DVT.       PAST MEDICAL & SURGICAL HISTORY:  Cerebrovascular accident (CVA)  History of COPD  CAD S/P percutaneous coronary angioplasty  Atrial fibrillation, unspecified type  [place] filter in place  PE (pulmonary embolism)  Hyperlipemia  Hypertension  S/P cataract surgery  S/P IVC filter  morbid obesity        REVIEW OF SYMPTOMS  [X] Constitutional WNL     [X] Cardio WNL            [X] Resp WNL           [X] GI WNL                          [X]  WNL                 [X] Endo WNL                     [X] Skin WNL                 [X] MSK WNL                 [X] Cognitive WNL        [X] Psych WNL      VITALS  Vital Signs Last 24 Hrs  T(C): 37.1 (09 May 2019 08:12), Max: 37.1 (09 May 2019 08:12)  T(F): 98.8 (09 May 2019 08:12), Max: 98.8 (09 May 2019 08:12)  HR: 68 (09 May 2019 08:12) (67 - 70)  BP: 133/85 (09 May 2019 08:12) (126/68 - 144/71)  BP(mean): --  RR: 14 (09 May 2019 08:12) (14 - 14)  SpO2: 98% (09 May 2019 08:35) (94% - 98%)     PHYSICAL EXAM  Constitutional - NAD, CASTILLO  HEENT - NCAT, EOMI  Neck - Supple, No limited ROM  Chest - CTA bilaterally, No wheeze, No rhonchi, No crackles, dyspnea on exertion  Cardiovascular -irregular, murmur  Abdomen - BS+, Soft, NTND  Extremities - No C/C/E, No calf tenderness   Skin-nystatin on buttocks      Neurologic Exam - no new  focal deficits.                     Balance - impaired     Psychiatric - Mood stable, Affect WNL, insomnia         FUNCTIONAL PROGRESS  Gait - 40ft RW mod A   ADLs - min/CG  Transfers - min A  Functional transfer - min A      RECENT LABS                        13.9   8.0   )-----------( 243      ( 08 May 2019 06:22 )             45.1     05-08    135  |  105  |  20  ----------------------------<  113<H>  4.2   |  22  |  1.32<H>    Ca    9.0      08 May 2019 06:22    TPro  7.1  /  Alb  3.0<L>  /  TBili  0.4  /  DBili  x   /  AST  25  /  ALT  44  /  AlkPhos  64  05-08    PT/INR - ( 09 May 2019 07:05 )   PT: 29.7 sec;   INR: 2.58 ratio           LIVER FUNCTIONS - ( 08 May 2019 06:22 )  Alb: 3.0 g/dL / Pro: 7.1 g/dL / ALK PHOS: 64 U/L / ALT: 44 U/L DA / AST: 25 U/L / GGT: x             Direct LDL: 125 mg/dL (04-24-19 @ 05:05)  Direct LDL: 111 mg/dL (04-23-19 @ 05:30)    Hemoglobin A1C, Whole Blood: 6.1 % (04-23-19 @ 13:07)              RADIOLOGY/OTHER RESULTS      CURRENT MEDICATIONS  MEDICATIONS  (STANDING):  amLODIPine   Tablet 5 milliGRAM(s) Oral daily  aspirin  chewable 81 milliGRAM(s) Oral daily  atorvastatin 40 milliGRAM(s) Oral at bedtime  buDESOnide 160 MICROgram(s)/formoterol 4.5 MICROgram(s) Inhaler 2 Puff(s) Inhalation two times a day  docusate sodium 100 milliGRAM(s) Oral two times a day  enoxaparin Injectable 140 milliGRAM(s) SubCutaneous every 12 hours  lidocaine   Patch 1 Patch Transdermal daily  metoprolol succinate  milliGRAM(s) Oral daily  nystatin Cream 1 Application(s) Topical two times a day  pantoprazole    Tablet 40 milliGRAM(s) Oral before breakfast  QUEtiapine 12.5 milliGRAM(s) Oral at bedtime  sertraline 50 milliGRAM(s) Oral daily  tamsulosin 0.4 milliGRAM(s) Oral at bedtime  warfarin 4 milliGRAM(s) Oral once    MEDICATIONS  (PRN):  acetaminophen   Tablet .. 650 milliGRAM(s) Oral every 6 hours PRN Moderate Pain (4 - 6)  ALBUTerol/ipratropium for Nebulization 3 milliLiter(s) Nebulizer every 6 hours PRN Shortness of Breath and/or Wheezing  ALPRAZolam 0.25 milliGRAM(s) Oral every 6 hours PRN anxiety  melatonin 3 milliGRAM(s) Oral at bedtime PRN Insomnia      ASSESSMENT & PLAN    GI/Bowel Management - Colace   Management - Toilet Q2  Skin - Turn Q2  Pain - Tylenol PRN  DVT PPX - On Coumadin. therapeutic.    Diet - reg c thins      Completed comprehensive acute rehab program consisting of 3hrs/day of OT/PT and SLP.

## 2019-05-09 NOTE — PROGRESS NOTE ADULT - ATTENDING COMMENTS
No new complaints, stable exam  Patient is being discharged  to [facility] today.  Discharge instructions were discussed with patient and family, all current medications were reviewed with patient . Patient and family were educated on importance of medication compliance,  continued  care with PMD and follow-up care with the specialists in the community. Safety and fall risk precautions  were discussed in detail, counseled on healthy life style modifications.  All questions were answered to their satisfaction.

## 2019-05-09 NOTE — PROGRESS NOTE ADULT - ASSESSMENT
SANDI SANTOS is a 83yo Male with PMH prior stroke (Feb '19), HTN, HLD, CAD c/b MI s/p stent '96, COPD, seizure d/o, PE s/p IVC filter, A-fib on warfarin admitted for multiple scattered infarcts, now with gait instability, ADL impairments and functional impairments.     # Acute ischemic stroke: cardioembolic etiology  -  Coumadin dose today-subtherapeutic. Goal INR 3-3.5. Lovenox bridge until INR>3    - Continue statin with goal LDL <70  - Completed PT/OT/SLP therapy.     #Elevated creatinine/bun   Renal in. US renal neg hydronephrosis. Avoid nephrotoxins. Cleared for d/c.     #Left knee pain  Reports chronic pain/OA. Lidocaine patch and prn Tylenol. Improved.     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 Will require open heart surgery for excision.   - Per [facility] rehab note 4/26, surgery planned for after acute rehab.    # Chronic A-fib  - Toprol XL for rate control  - Warfarin dosed, goal INR 3-3.5.      # COPD: continue Symbicort    # HTN  - DASH  - BB as above  - Amlodipine    # Factor V Leiden  - Hematology in. On Coumadin, goal 3-3.5. Lovenox bridge until INR>3   - No evidence of DVT on 4/23 duplex of BLE.     # BPH  - Flomax.     # Depression: continue sertraline. Psych in. Seroquel added nightly. Slept well.     # HLD: continue atorvastatin. Cardiac diet. Follow LFTs.     # Buttock rash: nystatin continues.    Gait Instability, ADL impairments and Functional impairments: Comprehensive Rehab Program of PT/OT/SLP completed.   DVT PPx: Warfarin  Diet: DASH/TLC

## 2019-05-09 NOTE — DISCHARGE NOTE NURSING/CASE MANAGEMENT/SOCIAL WORK - NSDCPEPTSTRK_GEN_ALL_CORE
Need for follow up after discharge/Stroke support groups for patients, families, and friends/Prescribed medications/Risk factors for stroke/Stroke education booklet/Call 911 for stroke/Stroke warning signs and symptoms/Signs and symptoms of stroke

## 2019-05-09 NOTE — DISCHARGE NOTE NURSING/CASE MANAGEMENT/SOCIAL WORK - NSDCDPATPORTLINK_GEN_ALL_CORE
You can access the [facility] Patient Portal, offered by [facility], by registering with the following website: [email or web address]

## 2019-05-09 NOTE — PROGRESS NOTE ADULT - REASON FOR ADMISSION
CVA
s/p CVA c deficits.
s/p CVA c deficits.
s/p CVA c functional deficits
s/p CVA c functional deficits.
s/p CVA c functional deficits.
CVA
Rehab
CVA
CVA's

## 2019-05-09 NOTE — PROGRESS NOTE ADULT - SUBJECTIVE AND OBJECTIVE BOX
No distress    Vital Signs Last 24 Hrs  T(C): 37.1 (05-09-19 @ 08:12), Max: 37.1 (05-09-19 @ 08:12)  T(F): 98.8 (05-09-19 @ 08:12), Max: 98.8 (05-09-19 @ 08:12)  HR: 68 (05-09-19 @ 08:12) (67 - 70)  BP: 133/85 (05-09-19 @ 08:12) (126/68 - 144/71)  RR: 14 (05-09-19 @ 08:12) (14 - 14)  SpO2: 98% (05-09-19 @ 08:35) (95% - 98%)                        13.9   8.0   )-----------( 243      ( 08 May 2019 06:22 )             45.1     08 May 2019 06:22    135    |  105    |  20     ----------------------------<  113    4.2     |  22     |  1.32     Ca    9.0        08 May 2019 06:22    TPro  7.1    /  Alb  3.0    /  TBili  0.4    /  DBili  x      /  AST  25     /  ALT  44     /  AlkPhos  64     08 May 2019 06:22    LIVER FUNCTIONS - ( 08 May 2019 06:22 )  Alb: 3.0 g/dL / Pro: 7.1 g/dL / ALK PHOS: 64 U/L / ALT: 44 U/L DA / AST: 25 U/L / GGT: x           PT/INR - ( 09 May 2019 07:05 )   PT: 29.7 sec;   INR: 2.58 ratio      acetaminophen   Tablet .. 650 milliGRAM(s) Oral every 6 hours PRN  ALBUTerol/ipratropium for Nebulization 3 milliLiter(s) Nebulizer every 6 hours PRN  ALPRAZolam 0.25 milliGRAM(s) Oral every 6 hours PRN  amLODIPine   Tablet 5 milliGRAM(s) Oral daily  aspirin  chewable 81 milliGRAM(s) Oral daily  atorvastatin 40 milliGRAM(s) Oral at bedtime  buDESOnide 160 MICROgram(s)/formoterol 4.5 MICROgram(s) Inhaler 2 Puff(s) Inhalation two times a day  docusate sodium 100 milliGRAM(s) Oral two times a day  enoxaparin Injectable 140 milliGRAM(s) SubCutaneous every 12 hours  lidocaine   Patch 1 Patch Transdermal daily  melatonin 3 milliGRAM(s) Oral at bedtime PRN  metoprolol succinate  milliGRAM(s) Oral daily  nystatin Cream 1 Application(s) Topical two times a day  pantoprazole    Tablet 40 milliGRAM(s) Oral before breakfast  QUEtiapine 12.5 milliGRAM(s) Oral at bedtime  sertraline 50 milliGRAM(s) Oral daily  tamsulosin 0.4 milliGRAM(s) Oral at bedtime    A/P:    Stable renal fx  UA negative, renal SONO w/o hydro, PVR small  Encourage PO fluids  No nephrotoxins  Interval renal fx f/u  KELSEY  F/u w/PMD as op

## 2019-05-09 NOTE — PROGRESS NOTE ADULT - PROVIDER SPECIALTY LIST ADULT
Heme/Onc
Hospitalist
Nephrology
Neurology
Psychiatry
Rehab Medicine
Heme/Onc

## 2019-05-14 DIAGNOSIS — F43.22 ADJUSTMENT DISORDER WITH ANXIETY: ICD-10-CM

## 2019-05-14 DIAGNOSIS — I10 ESSENTIAL (PRIMARY) HYPERTENSION: ICD-10-CM

## 2019-05-14 DIAGNOSIS — E66.01 MORBID (SEVERE) OBESITY DUE TO EXCESS CALORIES: ICD-10-CM

## 2019-05-14 DIAGNOSIS — F32.9 MAJOR DEPRESSIVE DISORDER, SINGLE EPISODE, UNSPECIFIED: ICD-10-CM

## 2019-05-14 DIAGNOSIS — I35.9 NONRHEUMATIC AORTIC VALVE DISORDER, UNSPECIFIED: ICD-10-CM

## 2019-05-14 DIAGNOSIS — Z95.828 PRESENCE OF OTHER VASCULAR IMPLANTS AND GRAFTS: ICD-10-CM

## 2019-05-14 DIAGNOSIS — R26.9 UNSPECIFIED ABNORMALITIES OF GAIT AND MOBILITY: ICD-10-CM

## 2019-05-14 DIAGNOSIS — I48.2 CHRONIC ATRIAL FIBRILLATION: ICD-10-CM

## 2019-05-14 DIAGNOSIS — G40.909 EPILEPSY, UNSPECIFIED, NOT INTRACTABLE, WITHOUT STATUS EPILEPTICUS: ICD-10-CM

## 2019-05-14 DIAGNOSIS — E78.5 HYPERLIPIDEMIA, UNSPECIFIED: ICD-10-CM

## 2019-05-14 DIAGNOSIS — J44.9 CHRONIC OBSTRUCTIVE PULMONARY DISEASE, UNSPECIFIED: ICD-10-CM

## 2019-05-14 DIAGNOSIS — Z86.73 PERSONAL HISTORY OF TRANSIENT ISCHEMIC ATTACK (TIA), AND CEREBRAL INFARCTION WITHOUT RESIDUAL DEFICITS: ICD-10-CM

## 2019-05-14 DIAGNOSIS — R35.0 FREQUENCY OF MICTURITION: ICD-10-CM

## 2019-05-14 DIAGNOSIS — M17.12 UNILATERAL PRIMARY OSTEOARTHRITIS, LEFT KNEE: ICD-10-CM

## 2019-05-14 DIAGNOSIS — N40.1 BENIGN PROSTATIC HYPERPLASIA WITH LOWER URINARY TRACT SYMPTOMS: ICD-10-CM

## 2019-05-14 DIAGNOSIS — Z95.5 PRESENCE OF CORONARY ANGIOPLASTY IMPLANT AND GRAFT: ICD-10-CM

## 2019-05-14 DIAGNOSIS — Z79.82 LONG TERM (CURRENT) USE OF ASPIRIN: ICD-10-CM

## 2019-05-14 DIAGNOSIS — Z86.711 PERSONAL HISTORY OF PULMONARY EMBOLISM: ICD-10-CM

## 2019-05-14 DIAGNOSIS — B49 UNSPECIFIED MYCOSIS: ICD-10-CM

## 2019-05-14 DIAGNOSIS — M25.562 PAIN IN LEFT KNEE: ICD-10-CM

## 2019-05-14 DIAGNOSIS — Z51.89 ENCOUNTER FOR OTHER SPECIFIED AFTERCARE: ICD-10-CM

## 2019-05-14 DIAGNOSIS — D68.51 ACTIVATED PROTEIN C RESISTANCE: ICD-10-CM

## 2019-05-14 DIAGNOSIS — I25.2 OLD MYOCARDIAL INFARCTION: ICD-10-CM

## 2019-05-14 DIAGNOSIS — R79.89 OTHER SPECIFIED ABNORMAL FINDINGS OF BLOOD CHEMISTRY: ICD-10-CM

## 2019-05-14 DIAGNOSIS — I69.319 UNSPECIFIED SYMPTOMS AND SIGNS INVOLVING COGNITIVE FUNCTIONS FOLLOWING CEREBRAL INFARCTION: ICD-10-CM

## 2019-05-14 DIAGNOSIS — Z86.718 PERSONAL HISTORY OF OTHER VENOUS THROMBOSIS AND EMBOLISM: ICD-10-CM

## 2019-05-14 DIAGNOSIS — Z79.01 LONG TERM (CURRENT) USE OF ANTICOAGULANTS: ICD-10-CM

## 2019-05-14 DIAGNOSIS — I25.10 ATHEROSCLEROTIC HEART DISEASE OF NATIVE CORONARY ARTERY WITHOUT ANGINA PECTORIS: ICD-10-CM

## 2019-05-14 PROCEDURE — 94640 AIRWAY INHALATION TREATMENT: CPT

## 2019-05-14 PROCEDURE — 80048 BASIC METABOLIC PNL TOTAL CA: CPT

## 2019-05-14 PROCEDURE — 85610 PROTHROMBIN TIME: CPT

## 2019-05-14 PROCEDURE — 92610 EVALUATE SWALLOWING FUNCTION: CPT

## 2019-05-14 PROCEDURE — 92523 SPEECH SOUND LANG COMPREHEN: CPT

## 2019-05-14 PROCEDURE — 97535 SELF CARE MNGMENT TRAINING: CPT

## 2019-05-14 PROCEDURE — 81001 URINALYSIS AUTO W/SCOPE: CPT

## 2019-05-14 PROCEDURE — 94660 CPAP INITIATION&MGMT: CPT

## 2019-05-14 PROCEDURE — 97163 PT EVAL HIGH COMPLEX 45 MIN: CPT

## 2019-05-14 PROCEDURE — G0515: CPT

## 2019-05-14 PROCEDURE — 92507 TX SP LANG VOICE COMM INDIV: CPT

## 2019-05-14 PROCEDURE — 97110 THERAPEUTIC EXERCISES: CPT

## 2019-05-14 PROCEDURE — 36415 COLL VENOUS BLD VENIPUNCTURE: CPT

## 2019-05-14 PROCEDURE — 80053 COMPREHEN METABOLIC PANEL: CPT

## 2019-05-14 PROCEDURE — 97116 GAIT TRAINING THERAPY: CPT

## 2019-05-14 PROCEDURE — 76775 US EXAM ABDO BACK WALL LIM: CPT

## 2019-05-14 PROCEDURE — 85027 COMPLETE CBC AUTOMATED: CPT

## 2019-05-14 PROCEDURE — 97167 OT EVAL HIGH COMPLEX 60 MIN: CPT

## 2019-05-14 PROCEDURE — 97530 THERAPEUTIC ACTIVITIES: CPT

## 2019-05-29 NOTE — ED ADULT NURSE REASSESSMENT NOTE - CARDIO WDL
The patient is a 8y Female complaining of knee pain/injury.
Normal rate, regular rhythm, normal S1, S2 heart sounds heard.

## 2019-05-31 ENCOUNTER — APPOINTMENT (OUTPATIENT)
Dept: CARDIOTHORACIC SURGERY | Facility: CLINIC | Age: 84
End: 2019-05-31
Payer: MEDICARE

## 2019-05-31 PROCEDURE — 99213 OFFICE O/P EST LOW 20 MIN: CPT

## 2019-05-31 NOTE — CONSULT LETTER
[Dear  ___] : Dear  [unfilled], [Consult Letter:] : I had the pleasure of evaluating your patient, [unfilled]. [Consult Closing:] : Thank you very much for allowing me to participate in the care of this patient.  If you have any questions, please do not hesitate to contact me. [Sincerely,] : Sincerely, [FreeTextEntry2] : Lul Ta MD  [FreeTextEntry3] : Kermit Alvarado MD\par Cardiothoracic Surgery\par [facility]\par [place], [place]\par

## 2019-05-31 NOTE — ASSESSMENT
[FreeTextEntry1] : Mr. 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 Her was admitted at [facility] in April with CVA. He also has IVC filter. He lives in an assisted living facility. He has CAD and stents in the past\par \par Given his recent CVA episodes, posable aortic valve fibroelastoma, multiple medical comorbidities, and hematological issues, he will require careful evaluation for risk/benefit of aortic valve surgery.\par \par I will discuss this with Dr. Macedo in detail.\par \par \par The plan was discussed in detail with him and his daughter (via phone from [place]), and all questions were answered.

## 2019-05-31 NOTE — HISTORY OF PRESENT ILLNESS
[FreeTextEntry1] : Mr. SANTOS is a 84 year old male referred by Dr. Ta who presents for consultation for aortic valve disease. His past medical history includes HTN, MI, PVD, CVA (Warfarin), COPD, Asthma and pulmonary embolism (IVC filter).\par \par He was originally evaluated at [facility] on April 25, 2019 after admission for CVA.  A UMER was preformed noting a highly mobile echodensity attached to the right coronary cusp measuring 1.7cm that moves in and out of the LVOT.  EF 50-55% on ECHO.\par \par \par \par

## 2019-07-26 ENCOUNTER — NON-APPOINTMENT (OUTPATIENT)
Age: 84
End: 2019-07-26

## 2019-07-26 ENCOUNTER — APPOINTMENT (OUTPATIENT)
Dept: CARDIOLOGY | Facility: CLINIC | Age: 84
End: 2019-07-26
Payer: MEDICARE

## 2019-07-26 VITALS
OXYGEN SATURATION: 97 % | BODY MASS INDEX: 39.82 KG/M2 | HEART RATE: 64 BPM | HEIGHT: 72 IN | DIASTOLIC BLOOD PRESSURE: 80 MMHG | WEIGHT: 294 LBS | SYSTOLIC BLOOD PRESSURE: 128 MMHG

## 2019-07-26 PROCEDURE — 99215 OFFICE O/P EST HI 40 MIN: CPT

## 2019-07-26 PROCEDURE — 93000 ELECTROCARDIOGRAM COMPLETE: CPT

## 2019-07-26 NOTE — DISCUSSION/SUMMARY
[FreeTextEntry1] : We looked at UMER from [facility]\par I spoke with him regarding surgery.\par We spoke to Dr Alvarado as well\par Plan for UMER, cont with warfarin\par If the strand is still present, we can admit him, take him off warfarin, start heparin.\par cont with cpap\par pt will see Pulmonologist\par Plan for surgery if strand is present.

## 2019-07-26 NOTE — REVIEW OF SYSTEMS
[Feeling Fatigued] : feeling fatigued [Dyspnea on exertion] : dyspnea during exertion [Urinary Frequency] : urinary frequency [Recent Weight Gain (___ Lbs)] : no recent weight gain [Recent Weight Loss (___ Lbs)] : no recent weight loss [Blurry Vision] : no blurred vision [Eyeglasses] : not currently wearing eyeglasses [Earache] : no earache [Mouth Sores] : no mouth sores [Sore Throat] : no sore throat [Chest  Pressure] : no chest pressure [Chest Pain] : no chest pain [Lower Ext Edema] : no extremity edema [Leg Claudication] : no intermittent leg claudication [Palpitations] : no palpitations [Cough] : no cough [Wheezing] : no wheezing [Abdominal Pain] : no abdominal pain [Nausea] : no nausea [Heartburn] : no heartburn [Hematuria] : no hematuria [Joint Pain] : no joint pain [Skin: A Rash] : no rash: [Skin Lesions] : no skin lesions [Dizziness] : no dizziness [Tremor] : no tremor was seen [Convulsions] : no convulsions [Confusion] : no confusion was observed [Memory Lapses Or Loss] : no memory lapses or loss [Anxiety] : no anxiety [Under Stress] : not under stress [Excessive Thirst] : no polydipsia [Easy Bleeding] : no tendency for easy bleeding [Easy Bruising] : no tendency for easy bruising

## 2019-07-26 NOTE — PHYSICAL EXAM
[Well Groomed] : well groomed [No Deformities] : no deformities [General Appearance - In No Acute Distress] : no acute distress [Normal Conjunctiva] : the conjunctiva exhibited no abnormalities [Eyelids - No Xanthelasma] : the eyelids demonstrated no xanthelasmas [No Oral Pallor] : no oral pallor [Normal Oropharynx] : normal oropharynx [Normal Jugular Venous V Waves Present] : normal jugular venous V waves present [Respiration, Rhythm And Depth] : normal respiratory rhythm and effort [Auscultation Breath Sounds / Voice Sounds] : lungs were clear to auscultation bilaterally [Heart Rate And Rhythm] : heart rate and rhythm were normal [Heart Sounds] : normal S1 and S2 [Arterial Pulses Normal] : the arterial pulses were normal [Edema] : no peripheral edema present [Bowel Sounds] : normal bowel sounds [Abdomen Soft] : soft [Abnormal Walk] : normal gait [Nail Clubbing] : no clubbing of the fingernails [Cyanosis, Localized] : no localized cyanosis [Skin Color & Pigmentation] : normal skin color and pigmentation [Skin Turgor] : normal skin turgor [] : no rash [Oriented To Time, Place, And Person] : oriented to person, place, and time [Impaired Insight] : insight and judgment were intact [FreeTextEntry1] : 3/6 sm lsb and right second ics

## 2019-07-26 NOTE — HISTORY OF PRESENT ILLNESS
[FreeTextEntry1] : This is a 83 yo male who is here with his daughter. 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 He had CVA in april.There was a strand in the LVOT, attached to the aortic valve leaflet which could be the cause for stroke. He lopez seen Dr. Alvarado and is willing to go for surgery. \par No new CVA. \par \par From prior note:This is a very pleasant 82-year-old male with history of morbid obesity, hypertension, coronary artery disease with PCI x2 in 1996 at [facility]. I had the privilege of taking care of his wife in the hospital who has since passed away. He is here for evaluation of dyspnea on mild exertion such as taking a few steps. He has no chest pain or palpitations. He was diagnosed with PE x2, has a GFF and also on warfarin. He denies any leg edema. He denies any syncope or presyncope. \par He had a recent echocardiogram at [facility]. LVEF is normal. aortic valve area was measured at 1.3 cm, moderate aortic valve stenosis. He has a Hx of COPD and GIGI, followed by Dr. Locke.

## 2019-07-29 ENCOUNTER — APPOINTMENT (OUTPATIENT)
Dept: PULMONOLOGY | Facility: CLINIC | Age: 84
End: 2019-07-29
Payer: MEDICARE

## 2019-07-29 VITALS — BODY MASS INDEX: 40.55 KG/M2 | WEIGHT: 299 LBS

## 2019-07-29 VITALS
RESPIRATION RATE: 18 BRPM | HEART RATE: 72 BPM | DIASTOLIC BLOOD PRESSURE: 80 MMHG | SYSTOLIC BLOOD PRESSURE: 132 MMHG | OXYGEN SATURATION: 97 %

## 2019-07-29 DIAGNOSIS — R06.2 WHEEZING: ICD-10-CM

## 2019-07-29 DIAGNOSIS — Z01.811 ENCOUNTER FOR PREPROCEDURAL RESPIRATORY EXAMINATION: ICD-10-CM

## 2019-07-29 PROCEDURE — 99215 OFFICE O/P EST HI 40 MIN: CPT | Mod: 25

## 2019-07-29 PROCEDURE — 94010 BREATHING CAPACITY TEST: CPT

## 2019-07-29 NOTE — REVIEW OF SYSTEMS
[Focal Weakness] : focal weakness [Diabetes] : diabetes mellitus [As Noted in HPI] : as noted in HPI [Negative] : Pulmonary Hypertension [de-identified] : Right sided weakness

## 2019-07-29 NOTE — PHYSICAL EXAM
[General Appearance - Well Nourished] : well nourished [General Appearance - Well Developed] : well developed [Normal Conjunctiva] : the conjunctiva exhibited no abnormalities [Normal Oropharynx] : normal oropharynx [Neck Appearance] : the appearance of the neck was normal [Heart Rate And Rhythm] : heart rate and rhythm were normal [Heart Sounds] : normal S1 and S2 [Murmurs] : no murmurs present [Arterial Pulses Normal] : the arterial pulses were normal [] : no respiratory distress [Respiration, Rhythm And Depth] : normal respiratory rhythm and effort [Exaggerated Use Of Accessory Muscles For Inspiration] : no accessory muscle use [Auscultation Breath Sounds / Voice Sounds] : lungs were clear to auscultation bilaterally [Abdomen Soft] : soft [Abnormal Walk] : normal gait [Nail Clubbing] : no clubbing of the fingernails [Cyanosis, Localized] : no localized cyanosis [1+ Pitting] : 1+  pitting [Skin Turgor] : normal skin turgor [FreeTextEntry1] : Right mild facial [Oriented To Time, Place, And Person] : oriented to person, place, and time [Impaired Insight] : insight and judgment were intact [Affect] : the affect was normal

## 2019-07-31 ENCOUNTER — OUTPATIENT (OUTPATIENT)
Dept: OUTPATIENT SERVICES | Facility: HOSPITAL | Age: 84
LOS: 1 days | End: 2019-07-31
Payer: MEDICARE

## 2019-07-31 VITALS
HEIGHT: 73 IN | TEMPERATURE: 98 F | DIASTOLIC BLOOD PRESSURE: 79 MMHG | SYSTOLIC BLOOD PRESSURE: 132 MMHG | HEART RATE: 67 BPM | OXYGEN SATURATION: 97 % | WEIGHT: 291.01 LBS | RESPIRATION RATE: 18 BRPM

## 2019-07-31 DIAGNOSIS — Z92.89 PERSONAL HISTORY OF OTHER MEDICAL TREATMENT: ICD-10-CM

## 2019-07-31 DIAGNOSIS — Z98.49 CATARACT EXTRACTION STATUS, UNSPECIFIED EYE: Chronic | ICD-10-CM

## 2019-07-31 DIAGNOSIS — I26.99 OTHER PULMONARY EMBOLISM WITHOUT ACUTE COR PULMONALE: ICD-10-CM

## 2019-07-31 DIAGNOSIS — Z98.89 OTHER SPECIFIED POSTPROCEDURAL STATES: Chronic | ICD-10-CM

## 2019-07-31 DIAGNOSIS — Z01.810 ENCOUNTER FOR PREPROCEDURAL CARDIOVASCULAR EXAMINATION: ICD-10-CM

## 2019-07-31 PROCEDURE — 93005 ELECTROCARDIOGRAM TRACING: CPT

## 2019-07-31 PROCEDURE — 93010 ELECTROCARDIOGRAM REPORT: CPT

## 2019-07-31 NOTE — H&P PST ADULT - NEUROLOGICAL DETAILS
responds to verbal commands/alert and oriented x 3/sensation intact/cranial nerves intact/no spontaneous movement/responds to pain/normal strength

## 2019-07-31 NOTE — H&P PST ADULT - ASSESSMENT
85 y/o M here for UMER to evaluate strand identified in LVOT with Dr. Ta and possible hospitalization requiring IV anticoagulation with heparin gtt and possible surgical intervention.

## 2019-07-31 NOTE — H&P PST ADULT - NEGATIVE NEUROLOGICAL SYMPTOMS
Statement Selected
no transient paralysis/no weakness/no generalized seizures/no headache/no focal seizures/no paresthesias/no vertigo/no loss of sensation/no syncope/no tremors

## 2019-07-31 NOTE — H&P PST ADULT - HISTORY OF PRESENT ILLNESS
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  Pt's last CVA was in April and he has not had a stroke since. He has also had an IVC filter in place (Dr. Hutchison, Vascular Surgeon at ).  He endorses CASTILLO, denies palpitations, chest pain, lower extremity edema. He endorses compliance with coumadin 5mg daily.   His daughter is currently in visiting from [place] and would like to be here if her father is going to undergo surgery.  Plan per Dr. Ta as discussed with the patient and his daughter previously would be to evaluate if the strand is still present; admit the patient, discontinue his coumadin and start him on IV anticoagulation with Heparin gtt. Pt and his daughter both understand the plan at this time. Pt was also advised to bring his CPAP machine in case he requires admission.    His ECG reveals sinus rhythm with APCs 66bpm; left axis deviation    Assessment of LVEF:  EF: 50-55%  Assessed by: UMER  Date: 4/24/2019; report revealed normal global LV systolic function, grade 1 diastolic dysfunction, intact intra atrial septum, thickening of the anterior and posterior mitral valve leaflets, trileaflet aortic valve, linear and highly mobile echo density attached to the right coronary cusp of the aortic valve, measuring 1.7cm. the structure moves in and out of the LVOT, mild AR, peak transaortic gradient equals 17.5mmHg, mean transaortic gradient equals 8.9mmHg the calculated aortic valve area equals 1.35cm2 by the continuity equation consistent with moderate AS, complex atheroma in the descending aorta and simple atheroma in the aortic root and ascending aorta, no LUIS thrombus, trivial pericardial effusion.    Pharmacologic stress test from 1/16/2019 revealed basal inferior wall hypokinesis EF 51%,  small size defect in the basal inferior wall which is unchanged bt the stress and rest images, no evidence of ischemia, no chest pain, baseline ECG is LAD, LAFB, pooor R wave progression nsr 64bpm, freq APCs    TTE from 10/8/2018 revealed normal global LV systolic function, EF 55-60%, grade 1 diastolic dysfunction, mild concentric LVH, normal RV size and function, severely enlarged LA, moderated dilated RA, trace MR, mod AS (0.35), aortic root (4.5cm) and ascending aorta (4.3cm) are dilated, no evidence of pericardial effusion.     ASA and Mallampati per anesthesia

## 2019-07-31 NOTE — H&P PST ADULT - RS GEN PE MLT RESP DETAILS PC
good air movement/no chest wall tenderness/no intercostal retractions/no rales/normal/clear to auscultation bilaterally/airway patent/respirations non-labored/breath sounds equal

## 2019-07-31 NOTE — H&P PST ADULT - NSICDXPROBLEM_GEN_ALL_CORE_FT
PROBLEM DIAGNOSES  Problem: H/O transesophageal echocardiography (UMER) for monitoring  Assessment and Plan: -UMER to eval strand/ atheroma/ aortic valve with Dr. Ta  -Procedure d/w patient and daughter at bedside; risks and benefits explained, questions answered  -plan to admit if strand still present and dc warfarin, start IV heparin gtt  -advised pt to bring CPAP machine

## 2019-07-31 NOTE — H&P PST ADULT - NSICDXPASTMEDICALHX_GEN_ALL_CORE_FT
PAST MEDICAL HISTORY:  Aortic stenosis     Atheroma     Atrial fibrillation, unspecified type     CAD S/P percutaneous coronary angioplasty     Cerebrovascular accident (CVA)     [place] filter in place     History of COPD     Hyperlipemia     Hypertension     PE (pulmonary embolism)

## 2019-08-02 ENCOUNTER — TRANSCRIPTION ENCOUNTER (OUTPATIENT)
Age: 84
End: 2019-08-02

## 2019-08-02 ENCOUNTER — OUTPATIENT (OUTPATIENT)
Dept: OUTPATIENT SERVICES | Facility: HOSPITAL | Age: 84
LOS: 1 days | End: 2019-08-02
Payer: MEDICARE

## 2019-08-02 VITALS — DIASTOLIC BLOOD PRESSURE: 90 MMHG | SYSTOLIC BLOOD PRESSURE: 168 MMHG

## 2019-08-02 VITALS
DIASTOLIC BLOOD PRESSURE: 89 MMHG | WEIGHT: 291.01 LBS | HEART RATE: 67 BPM | SYSTOLIC BLOOD PRESSURE: 152 MMHG | OXYGEN SATURATION: 97 % | HEIGHT: 73 IN | TEMPERATURE: 98 F | RESPIRATION RATE: 18 BRPM

## 2019-08-02 DIAGNOSIS — Z98.89 OTHER SPECIFIED POSTPROCEDURAL STATES: Chronic | ICD-10-CM

## 2019-08-02 DIAGNOSIS — I69.359 HEMIPLEGIA AND HEMIPARESIS FOLLOWING CEREBRAL INFARCTION AFFECTING UNSPECIFIED SIDE: ICD-10-CM

## 2019-08-02 DIAGNOSIS — Z98.49 CATARACT EXTRACTION STATUS, UNSPECIFIED EYE: Chronic | ICD-10-CM

## 2019-08-02 PROBLEM — I70.90 UNSPECIFIED ATHEROSCLEROSIS: Chronic | Status: ACTIVE | Noted: 2019-07-31

## 2019-08-02 PROBLEM — I35.0 NONRHEUMATIC AORTIC (VALVE) STENOSIS: Chronic | Status: ACTIVE | Noted: 2019-07-31

## 2019-08-02 LAB
ANION GAP SERPL CALC-SCNC: 10 MMOL/L — SIGNIFICANT CHANGE UP (ref 5–17)
APTT BLD: 49.5 SEC — HIGH (ref 27.5–36.3)
BASOPHILS # BLD AUTO: 0.08 K/UL — SIGNIFICANT CHANGE UP (ref 0–0.2)
BASOPHILS NFR BLD AUTO: 1 % — SIGNIFICANT CHANGE UP (ref 0–2)
BUN SERPL-MCNC: 16 MG/DL — SIGNIFICANT CHANGE UP (ref 8–20)
CALCIUM SERPL-MCNC: 9.4 MG/DL — SIGNIFICANT CHANGE UP (ref 8.6–10.2)
CHLORIDE SERPL-SCNC: 106 MMOL/L — SIGNIFICANT CHANGE UP (ref 98–107)
CO2 SERPL-SCNC: 23 MMOL/L — SIGNIFICANT CHANGE UP (ref 22–29)
CREAT SERPL-MCNC: 1.21 MG/DL — SIGNIFICANT CHANGE UP (ref 0.5–1.3)
EOSINOPHIL # BLD AUTO: 0.35 K/UL — SIGNIFICANT CHANGE UP (ref 0–0.5)
EOSINOPHIL NFR BLD AUTO: 4.3 % — SIGNIFICANT CHANGE UP (ref 0–6)
GLUCOSE SERPL-MCNC: 119 MG/DL — HIGH (ref 70–115)
HCT VFR BLD CALC: 44.6 % — SIGNIFICANT CHANGE UP (ref 39–50)
HGB BLD-MCNC: 14 G/DL — SIGNIFICANT CHANGE UP (ref 13–17)
IMM GRANULOCYTES NFR BLD AUTO: 0.4 % — SIGNIFICANT CHANGE UP (ref 0–1.5)
INR BLD: 5.93 RATIO — CRITICAL HIGH (ref 0.88–1.16)
INR BLD: 6.43 RATIO — CRITICAL HIGH (ref 0.88–1.16)
LYMPHOCYTES # BLD AUTO: 1.75 K/UL — SIGNIFICANT CHANGE UP (ref 1–3.3)
LYMPHOCYTES # BLD AUTO: 21.7 % — SIGNIFICANT CHANGE UP (ref 13–44)
MCHC RBC-ENTMCNC: 26.8 PG — LOW (ref 27–34)
MCHC RBC-ENTMCNC: 31.4 GM/DL — LOW (ref 32–36)
MCV RBC AUTO: 85.3 FL — SIGNIFICANT CHANGE UP (ref 80–100)
MONOCYTES # BLD AUTO: 0.68 K/UL — SIGNIFICANT CHANGE UP (ref 0–0.9)
MONOCYTES NFR BLD AUTO: 8.4 % — SIGNIFICANT CHANGE UP (ref 2–14)
NEUTROPHILS # BLD AUTO: 5.18 K/UL — SIGNIFICANT CHANGE UP (ref 1.8–7.4)
NEUTROPHILS NFR BLD AUTO: 64.2 % — SIGNIFICANT CHANGE UP (ref 43–77)
PLATELET # BLD AUTO: 273 K/UL — SIGNIFICANT CHANGE UP (ref 150–400)
POTASSIUM SERPL-MCNC: 4.5 MMOL/L — SIGNIFICANT CHANGE UP (ref 3.5–5.3)
POTASSIUM SERPL-SCNC: 4.5 MMOL/L — SIGNIFICANT CHANGE UP (ref 3.5–5.3)
PROTHROM AB SERPL-ACNC: 72 SEC — HIGH (ref 10–12.9)
PROTHROM AB SERPL-ACNC: 78.3 SEC — HIGH (ref 10–12.9)
RBC # BLD: 5.23 M/UL — SIGNIFICANT CHANGE UP (ref 4.2–5.8)
RBC # FLD: 14.6 % — HIGH (ref 10.3–14.5)
SODIUM SERPL-SCNC: 139 MMOL/L — SIGNIFICANT CHANGE UP (ref 135–145)
WBC # BLD: 8.07 K/UL — SIGNIFICANT CHANGE UP (ref 3.8–10.5)
WBC # FLD AUTO: 8.07 K/UL — SIGNIFICANT CHANGE UP (ref 3.8–10.5)

## 2019-08-02 RX ORDER — ASPIRIN/CALCIUM CARB/MAGNESIUM 324 MG
81 TABLET ORAL DAILY
Refills: 0 | Status: DISCONTINUED | OUTPATIENT
Start: 2019-08-02 | End: 2019-08-29

## 2019-08-02 NOTE — DISCHARGE NOTE PROVIDER - NSDCCPTREATMENT_GEN_ALL_CORE_FT
PRINCIPAL PROCEDURE  Procedure: Transesophageal echocardiogram (UMER)  Findings and Treatment: -Pt was unable to have UMER today due to elevated INR  -discussed with patient, daughter, Dr. Ta and Dr. Murillo  -Advised NOT TO TAKE coumadin  -Follow up with Dr. Murillo on Monday 8/5/19  -UMER rescheduled for TUESDAY 8/6/19 at 11am APPT IS FOR 12:00PM  -take all other medications as directed  -IF YOU EXHIBIT ANY SIGNS OF BLEEDING, BLOOD IN URINE, STOOL, COUGHING UP BLOOD CALL YOUR DOCTOR IMMEDIATELY OR CALL 911

## 2019-08-02 NOTE — DISCHARGE NOTE NURSING/CASE MANAGEMENT/SOCIAL WORK - NSDCFUADDAPPT_GEN_ALL_CORE_FT
FOLLOW UP WITH dR. REYES ON MONDAY 8/5/19  RETURN TO [facility] FOR YOUR UMER APPT ON 8/6/19 AT 11AM; YOUR APPT IS SCHEDULED FOR 12:00PM

## 2019-08-02 NOTE — DISCHARGE NOTE PROVIDER - HOSPITAL COURSE
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  Pt's last CVA was in April and he has not had a stroke since. He has also had an IVC filter in place (Dr. Hutchison, Vascular Surgeon at ).  He endorses CASTILLO, denies palpitations, chest pain, lower extremity edema. He endorses compliance with coumadin 5mg daily.     His daughter is currently in visiting from [place] and would like to be here if her father is going to undergo surgery.            Pt unfortunately was unable to have UMER at this time due to elevated INR    Discussed with Dr. Ta and Dr. Murillo    Pt to return Tuesday, August 6th at 11am for UMER procedure    Pt advised not to take any coumadin until his appointment    He will follow up with Dr. Murillo on Monday

## 2019-08-02 NOTE — DISCHARGE NOTE PROVIDER - NSDCFUADDAPPT_GEN_ALL_CORE_FT
FOLLOW UP WITH dR. REYES ON MONDAY 8/5/19  RETURN TO [facility] FOR YOUR UMER APPT ON 8/6/19 AT 11AM; YOUR APPT IS SCHEDULED FOR 12:00PM

## 2019-08-02 NOTE — DISCHARGE NOTE PROVIDER - NSDCCPCAREPLAN_GEN_ALL_CORE_FT
PRINCIPAL DISCHARGE DIAGNOSIS  Diagnosis: H/O transesophageal echocardiography (UMER) for monitoring  Assessment and Plan of Treatment:

## 2019-08-02 NOTE — DISCHARGE NOTE NURSING/CASE MANAGEMENT/SOCIAL WORK - NSDCDPATPORTLINK_GEN_ALL_CORE
You can access the [facility] Patient Portal, offered by [facility], by registering with the following website: [email or web address]

## 2019-08-02 NOTE — DISCHARGE NOTE PROVIDER - CARE PROVIDER_API CALL
Tien Murillo)  Family Medicine  [address] [place] [address]  Phone: [phone]  Fax: [phone]  Follow Up Time:

## 2019-08-06 ENCOUNTER — TRANSCRIPTION ENCOUNTER (OUTPATIENT)
Age: 84
End: 2019-08-06

## 2019-08-06 ENCOUNTER — INPATIENT (INPATIENT)
Facility: HOSPITAL | Age: 84
LOS: 6 days | Discharge: HOME | DRG: 229 | End: 2019-08-13
Attending: THORACIC SURGERY (CARDIOTHORACIC VASCULAR SURGERY) | Admitting: INTERNAL MEDICINE
Payer: MEDICARE

## 2019-08-06 VITALS
RESPIRATION RATE: 16 BRPM | HEART RATE: 66 BPM | OXYGEN SATURATION: 99 % | TEMPERATURE: 98 F | DIASTOLIC BLOOD PRESSURE: 83 MMHG | SYSTOLIC BLOOD PRESSURE: 159 MMHG

## 2019-08-06 DIAGNOSIS — Z98.89 OTHER SPECIFIED POSTPROCEDURAL STATES: Chronic | ICD-10-CM

## 2019-08-06 DIAGNOSIS — Z98.49 CATARACT EXTRACTION STATUS, UNSPECIFIED EYE: Chronic | ICD-10-CM

## 2019-08-06 DIAGNOSIS — I69.359 HEMIPLEGIA AND HEMIPARESIS FOLLOWING CEREBRAL INFARCTION AFFECTING UNSPECIFIED SIDE: ICD-10-CM

## 2019-08-06 LAB
ALBUMIN SERPL ELPH-MCNC: 3.5 G/DL — SIGNIFICANT CHANGE UP (ref 3.3–5.2)
ALP SERPL-CCNC: 60 U/L — SIGNIFICANT CHANGE UP (ref 40–120)
ALT FLD-CCNC: 13 U/L — SIGNIFICANT CHANGE UP
ANION GAP SERPL CALC-SCNC: 9 MMOL/L — SIGNIFICANT CHANGE UP (ref 5–17)
APPEARANCE UR: CLEAR — SIGNIFICANT CHANGE UP
APPEARANCE UR: CLEAR — SIGNIFICANT CHANGE UP
APTT BLD: 29.6 SEC — SIGNIFICANT CHANGE UP (ref 27.5–36.3)
APTT BLD: 32.9 SEC — SIGNIFICANT CHANGE UP (ref 27.5–36.3)
AST SERPL-CCNC: 12 U/L — SIGNIFICANT CHANGE UP
BASOPHILS # BLD AUTO: 0.07 K/UL — SIGNIFICANT CHANGE UP (ref 0–0.2)
BASOPHILS NFR BLD AUTO: 0.8 % — SIGNIFICANT CHANGE UP (ref 0–2)
BILIRUB SERPL-MCNC: 0.4 MG/DL — SIGNIFICANT CHANGE UP (ref 0.4–2)
BILIRUB UR-MCNC: NEGATIVE — SIGNIFICANT CHANGE UP
BILIRUB UR-MCNC: NEGATIVE — SIGNIFICANT CHANGE UP
BLD GP AB SCN SERPL QL: SIGNIFICANT CHANGE UP
BUN SERPL-MCNC: 17 MG/DL — SIGNIFICANT CHANGE UP (ref 8–20)
CALCIUM SERPL-MCNC: 9.3 MG/DL — SIGNIFICANT CHANGE UP (ref 8.6–10.2)
CHLORIDE SERPL-SCNC: 106 MMOL/L — SIGNIFICANT CHANGE UP (ref 98–107)
CO2 SERPL-SCNC: 24 MMOL/L — SIGNIFICANT CHANGE UP (ref 22–29)
COLOR SPEC: YELLOW — SIGNIFICANT CHANGE UP
COLOR SPEC: YELLOW — SIGNIFICANT CHANGE UP
CREAT SERPL-MCNC: 1.21 MG/DL — SIGNIFICANT CHANGE UP (ref 0.5–1.3)
DIFF PNL FLD: NEGATIVE — SIGNIFICANT CHANGE UP
DIFF PNL FLD: NEGATIVE — SIGNIFICANT CHANGE UP
EOSINOPHIL # BLD AUTO: 0.37 K/UL — SIGNIFICANT CHANGE UP (ref 0–0.5)
EOSINOPHIL NFR BLD AUTO: 4.5 % — SIGNIFICANT CHANGE UP (ref 0–6)
GLUCOSE SERPL-MCNC: 100 MG/DL — SIGNIFICANT CHANGE UP (ref 70–115)
GLUCOSE UR QL: NEGATIVE MG/DL — SIGNIFICANT CHANGE UP
GLUCOSE UR QL: NEGATIVE MG/DL — SIGNIFICANT CHANGE UP
HBA1C BLD-MCNC: 6 % — HIGH (ref 4–5.6)
HCT VFR BLD CALC: 39.2 % — SIGNIFICANT CHANGE UP (ref 39–50)
HGB BLD-MCNC: 12.2 G/DL — LOW (ref 13–17)
IMM GRANULOCYTES NFR BLD AUTO: 0.4 % — SIGNIFICANT CHANGE UP (ref 0–1.5)
INR BLD: 1.6 RATIO — HIGH (ref 0.88–1.16)
INR BLD: 1.64 RATIO — HIGH (ref 0.88–1.16)
KETONES UR-MCNC: NEGATIVE — SIGNIFICANT CHANGE UP
KETONES UR-MCNC: NEGATIVE — SIGNIFICANT CHANGE UP
LEUKOCYTE ESTERASE UR-ACNC: NEGATIVE — SIGNIFICANT CHANGE UP
LEUKOCYTE ESTERASE UR-ACNC: NEGATIVE — SIGNIFICANT CHANGE UP
LYMPHOCYTES # BLD AUTO: 1.99 K/UL — SIGNIFICANT CHANGE UP (ref 1–3.3)
LYMPHOCYTES # BLD AUTO: 23.9 % — SIGNIFICANT CHANGE UP (ref 13–44)
MCHC RBC-ENTMCNC: 27 PG — SIGNIFICANT CHANGE UP (ref 27–34)
MCHC RBC-ENTMCNC: 31.1 GM/DL — LOW (ref 32–36)
MCV RBC AUTO: 86.7 FL — SIGNIFICANT CHANGE UP (ref 80–100)
MONOCYTES # BLD AUTO: 0.72 K/UL — SIGNIFICANT CHANGE UP (ref 0–0.9)
MONOCYTES NFR BLD AUTO: 8.7 % — SIGNIFICANT CHANGE UP (ref 2–14)
NEUTROPHILS # BLD AUTO: 5.13 K/UL — SIGNIFICANT CHANGE UP (ref 1.8–7.4)
NEUTROPHILS NFR BLD AUTO: 61.7 % — SIGNIFICANT CHANGE UP (ref 43–77)
NITRITE UR-MCNC: NEGATIVE — SIGNIFICANT CHANGE UP
NITRITE UR-MCNC: NEGATIVE — SIGNIFICANT CHANGE UP
NT-PROBNP SERPL-SCNC: 461 PG/ML — HIGH (ref 0–300)
PA ADP PRP-ACNC: 242 PRU — SIGNIFICANT CHANGE UP (ref 180–376)
PH UR: 6 — SIGNIFICANT CHANGE UP (ref 5–8)
PH UR: 6.5 — SIGNIFICANT CHANGE UP (ref 5–8)
PLATELET # BLD AUTO: 249 K/UL — SIGNIFICANT CHANGE UP (ref 150–400)
POTASSIUM SERPL-MCNC: 3.9 MMOL/L — SIGNIFICANT CHANGE UP (ref 3.5–5.3)
POTASSIUM SERPL-SCNC: 3.9 MMOL/L — SIGNIFICANT CHANGE UP (ref 3.5–5.3)
PREALB SERPL-MCNC: 20 MG/DL — SIGNIFICANT CHANGE UP (ref 18–38)
PROT SERPL-MCNC: 6.5 G/DL — LOW (ref 6.6–8.7)
PROT UR-MCNC: NEGATIVE MG/DL — SIGNIFICANT CHANGE UP
PROT UR-MCNC: NEGATIVE MG/DL — SIGNIFICANT CHANGE UP
PROTHROM AB SERPL-ACNC: 18.7 SEC — HIGH (ref 10–12.9)
PROTHROM AB SERPL-ACNC: 19.2 SEC — HIGH (ref 10–12.9)
RBC # BLD: 4.52 M/UL — SIGNIFICANT CHANGE UP (ref 4.2–5.8)
RBC # FLD: 14.6 % — HIGH (ref 10.3–14.5)
SODIUM SERPL-SCNC: 139 MMOL/L — SIGNIFICANT CHANGE UP (ref 135–145)
SP GR SPEC: 1.01 — SIGNIFICANT CHANGE UP (ref 1.01–1.02)
SP GR SPEC: 1.01 — SIGNIFICANT CHANGE UP (ref 1.01–1.02)
TSH SERPL-MCNC: 0.88 UIU/ML — SIGNIFICANT CHANGE UP (ref 0.27–4.2)
UROBILINOGEN FLD QL: NEGATIVE MG/DL — SIGNIFICANT CHANGE UP
UROBILINOGEN FLD QL: NEGATIVE MG/DL — SIGNIFICANT CHANGE UP
WBC # BLD: 8.31 K/UL — SIGNIFICANT CHANGE UP (ref 3.8–10.5)
WBC # FLD AUTO: 8.31 K/UL — SIGNIFICANT CHANGE UP (ref 3.8–10.5)

## 2019-08-06 PROCEDURE — 99221 1ST HOSP IP/OBS SF/LOW 40: CPT | Mod: 57

## 2019-08-06 PROCEDURE — 99152 MOD SED SAME PHYS/QHP 5/>YRS: CPT

## 2019-08-06 PROCEDURE — 93010 ELECTROCARDIOGRAM REPORT: CPT

## 2019-08-06 PROCEDURE — 93454 CORONARY ARTERY ANGIO S&I: CPT | Mod: 26

## 2019-08-06 PROCEDURE — 93880 EXTRACRANIAL BILAT STUDY: CPT | Mod: 26

## 2019-08-06 PROCEDURE — 99223 1ST HOSP IP/OBS HIGH 75: CPT | Mod: 57

## 2019-08-06 PROCEDURE — 93312 ECHO TRANSESOPHAGEAL: CPT | Mod: 26

## 2019-08-06 PROCEDURE — 93325 DOPPLER ECHO COLOR FLOW MAPG: CPT | Mod: 26

## 2019-08-06 PROCEDURE — 93320 DOPPLER ECHO COMPLETE: CPT | Mod: 26

## 2019-08-06 RX ORDER — CHLORHEXIDINE GLUCONATE 213 G/1000ML
15 SOLUTION TOPICAL
Refills: 0 | Status: DISCONTINUED | OUTPATIENT
Start: 2019-08-06 | End: 2019-08-07

## 2019-08-06 RX ORDER — ATORVASTATIN CALCIUM 80 MG/1
40 TABLET, FILM COATED ORAL AT BEDTIME
Refills: 0 | Status: DISCONTINUED | OUTPATIENT
Start: 2019-08-06 | End: 2019-08-07

## 2019-08-06 RX ORDER — SODIUM CHLORIDE 9 MG/ML
3 INJECTION INTRAMUSCULAR; INTRAVENOUS; SUBCUTANEOUS EVERY 8 HOURS
Refills: 0 | Status: DISCONTINUED | OUTPATIENT
Start: 2019-08-06 | End: 2019-08-07

## 2019-08-06 RX ORDER — SERTRALINE 25 MG/1
50 TABLET, FILM COATED ORAL DAILY
Refills: 0 | Status: DISCONTINUED | OUTPATIENT
Start: 2019-08-06 | End: 2019-08-07

## 2019-08-06 RX ORDER — TAMSULOSIN HYDROCHLORIDE 0.4 MG/1
0.4 CAPSULE ORAL AT BEDTIME
Refills: 0 | Status: DISCONTINUED | OUTPATIENT
Start: 2019-08-06 | End: 2019-08-07

## 2019-08-06 RX ORDER — CHLORHEXIDINE GLUCONATE 213 G/1000ML
1 SOLUTION TOPICAL
Refills: 0 | Status: DISCONTINUED | OUTPATIENT
Start: 2019-08-06 | End: 2019-08-07

## 2019-08-06 RX ORDER — LANOLIN ALCOHOL/MO/W.PET/CERES
5 CREAM (GRAM) TOPICAL AT BEDTIME
Refills: 0 | Status: DISCONTINUED | OUTPATIENT
Start: 2019-08-06 | End: 2019-08-07

## 2019-08-06 RX ORDER — SODIUM CHLORIDE 9 MG/ML
3 INJECTION INTRAMUSCULAR; INTRAVENOUS; SUBCUTANEOUS EVERY 8 HOURS
Refills: 0 | Status: DISCONTINUED | OUTPATIENT
Start: 2019-08-06 | End: 2019-08-29

## 2019-08-06 RX ORDER — AMLODIPINE BESYLATE 2.5 MG/1
5 TABLET ORAL DAILY
Refills: 0 | Status: DISCONTINUED | OUTPATIENT
Start: 2019-08-06 | End: 2019-08-07

## 2019-08-06 RX ORDER — SODIUM CHLORIDE 9 MG/ML
1000 INJECTION INTRAMUSCULAR; INTRAVENOUS; SUBCUTANEOUS
Refills: 0 | Status: DISCONTINUED | OUTPATIENT
Start: 2019-08-06 | End: 2019-08-07

## 2019-08-06 RX ORDER — VANCOMYCIN HCL 1 G
1250 VIAL (EA) INTRAVENOUS ONCE
Refills: 0 | Status: COMPLETED | OUTPATIENT
Start: 2019-08-07 | End: 2019-08-07

## 2019-08-06 RX ORDER — PANTOPRAZOLE SODIUM 20 MG/1
40 TABLET, DELAYED RELEASE ORAL
Refills: 0 | Status: DISCONTINUED | OUTPATIENT
Start: 2019-08-06 | End: 2019-08-07

## 2019-08-06 RX ORDER — CEFUROXIME AXETIL 250 MG
1500 TABLET ORAL ONCE
Refills: 0 | Status: COMPLETED | OUTPATIENT
Start: 2019-08-07 | End: 2019-08-07

## 2019-08-06 RX ORDER — ASPIRIN/CALCIUM CARB/MAGNESIUM 324 MG
81 TABLET ORAL DAILY
Refills: 0 | Status: DISCONTINUED | OUTPATIENT
Start: 2019-08-06 | End: 2019-08-07

## 2019-08-06 RX ORDER — METOPROLOL TARTRATE 50 MG
100 TABLET ORAL DAILY
Refills: 0 | Status: DISCONTINUED | OUTPATIENT
Start: 2019-08-06 | End: 2019-08-07

## 2019-08-06 RX ORDER — ALBUTEROL 90 UG/1
2 AEROSOL, METERED ORAL EVERY 6 HOURS
Refills: 0 | Status: DISCONTINUED | OUTPATIENT
Start: 2019-08-06 | End: 2019-08-07

## 2019-08-06 RX ADMIN — CHLORHEXIDINE GLUCONATE 1 APPLICATION(S): 213 SOLUTION TOPICAL at 20:58

## 2019-08-06 RX ADMIN — ATORVASTATIN CALCIUM 40 MILLIGRAM(S): 80 TABLET, FILM COATED ORAL at 21:16

## 2019-08-06 RX ADMIN — SODIUM CHLORIDE 3 MILLILITER(S): 9 INJECTION INTRAMUSCULAR; INTRAVENOUS; SUBCUTANEOUS at 21:15

## 2019-08-06 RX ADMIN — Medication 5 MILLIGRAM(S): at 23:02

## 2019-08-06 RX ADMIN — SODIUM CHLORIDE 30 MILLILITER(S): 9 INJECTION INTRAMUSCULAR; INTRAVENOUS; SUBCUTANEOUS at 14:27

## 2019-08-06 RX ADMIN — TAMSULOSIN HYDROCHLORIDE 0.4 MILLIGRAM(S): 0.4 CAPSULE ORAL at 21:16

## 2019-08-06 NOTE — CONSULT NOTE ADULT - ATTENDING COMMENTS
Above H& P reviewed in detail and independently verified. He has recent history of TIA/CVA twice this year. He has multiple medical comorbidities  including Factor V Leiden deficiency, H/O PE, IVC Filter, and COPD. 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 He also has significant CAD.    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 Given his moderate aortic valve stenosis, and CAD, he will require CABG and possible aortic valve replacement, apart from the fibroelastoma excision. Given his multiple medical comorbidities, he is at relatively high risk for surgery.    I discussed with him and his daughter in detail. Plan is for surgery on 8/7/19. All questions were answered.

## 2019-08-06 NOTE — CONSULT NOTE ADULT - SUBJECTIVE AND OBJECTIVE BOX
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  Pt's last CVA was in April and he has not had a stroke since. He has also had an IVC filter in place (Dr. Hutchison, Vascular Surgeon at ).  He endorses CASTILLO, denies palpitations, chest pain, lower extremity edema. He endorses compliance with coumadin 5mg daily.   His daughter is currently in visiting from [facility] and would like to be here if her father is going to undergo surgery.  Plan per Dr. Ta as discussed with the patient and his daughter previously would be to evaluate if the strand is still present; admit the patient, discontinue his coumadin and start him on IV anticoagulation with Heparin gtt. Pt and his daughter both understand the plan at this time. Pt was also advised to bring his CPAP machine in case he requires admission.    His ECG reveals sinus rhythm with APCs 66bpm; left axis deviation    Current Subjective Assessment: "I'm feeling much better" Patient is an 84M with a recent history of CVA x 2 (January 2019 with confusion and balance issues) and then came home but then again April 22, presented with right side weakness and dysphasia and recovered in [facility] rehab and subsequently at sub acute rehab to improve functional status. Patient feels he still has some balance issues and uses a walker for distance but overall feels stronger. 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 Patient is currently waiting for cath today. Patient reports feeling continued shortness of breath and balance issues but no new acute changes.     Past Medical History  Aortic stenosis  Atheroma  Cerebrovascular accident (CVA)  History of COPD  CAD S/P percutaneous coronary angioplasty  Atrial fibrillation, unspecified type  CAD (coronary artery disease)  [place] filter in place  PE (pulmonary embolism)  Hyperlipemia  Hypertension      Past Surgical History  S/P cataract surgery  S/P IVC filter  No significant past surgical history      MEDICATIONS  (STANDING):  aspirin  chewable 81 milliGRAM(s) Oral daily    MEDICATIONS  (PRN):    Antiplatelet therapy:                           Last dose/amt:    Allergies: No Known Allergies      SOCIAL HISTORY:  Smoker: [x] Yes  [ ] No        PACK YEARS:                         WHEN QUIT?  ETOH use: [ ] yes  [x ] No              FREQUENCY / QUANTITY:  Ilicit Drug use:  [ ] Yes  [ x] No  Occupation: retired   Live with: alone, daughter visiting from [place]  Assist device use: walker and cane    PMD: Muratori  Referring Cardiologist: Keyon    Relevant Family History  FAMILY HISTORY:  Family history of diabetes mellitus (Child)        Review of Systems  GENERAL:  Fevers[] chills[] sweats[] fatigue[] weight loss[] weight gain []                                      [ x] NEGATIVE  NEURO:  parathesias[] seizures []  syncope []  confusion []                                                                [x ] NEGATIVE                 EYES: glasses[]  blurry vision[]  discharge[] pain[] glaucoma []                                                           [x ] NEGATIVE                 ENMT:  difficulty hearing []  vertigo[]  dysphagia[] epistaxis[] recent dental work []                     [x ] NEGATIVE                 CV:  chest pain[] palpitations[] CASTILLO [] diaphoresis [] edema[x]                                                           [ ] NEGATIVE                                 RESPIRATORY:  wheezing[] SOB[x] cough [] sputum[] hemoptysis[]                                                   [ ] NEGATIVE               GI:  nausea[]  vomiting []  diarrhea[] constipation [] melena []                                                          [ x] NEGATIVE            : hematuria[ ]  dysuria[ ] urgency[] incontinence[]                                                                          [ x] NEGATIVE                    MUSKULOSKELETAL:  arthritis[ ]  joint swelling [ ] muscle weakness [ ]                                            x[ ] NEGATIVE                     SKIN/BREAST:  rash[ ] itching [ ]  hair loss[ ] masses[ ]                                                                          [ x] NEGATIVE                     PSYCH:  dementia [ ] depression [ ] anxiety[ ]                                                                                          [ x] NEGATIVE                        HEME/LYMPH:  bruises easily[ ] enlarged lymph nodes[ ] tender lymph nodes[ ]                           [x ] NEGATIVE                      ENDOCRINE:  cold intolerance[ ] heat intolerance[ ] polydipsia[ ]                                                      [ x] NEGATIVE                        Telemetry: SR    PHYSICAL EXAM  Vital Signs Last 24 Hrs  T(C): 36.7 (06 Aug 2019 11:17), Max: 36.7 (06 Aug 2019 11:17)  T(F): 98.1 (06 Aug 2019 11:17), Max: 98.1 (06 Aug 2019 11:17)  HR: 65 (06 Aug 2019 14:52) (58 - 66)  BP: 139/77 (06 Aug 2019 14:52) (110/66 - 159/83)  BP(mean): --  RR: 18 (06 Aug 2019 14:52) (16 - 18)  SpO2: 97% (06 Aug 2019 14:52) (95% - 99%)    General: Well nourished, well developed, no acute distress.                                                         Neuro: Normal exam oriented to person/place & time with no focal motor or sensory  deficits.                    Eyes: Normal exam of conjunctiva & lids, pupils equally reactive.   ENT: Normal exam of nasal/oral mucosa with absence of cyanosis.   Neck: Normal exam of jugular veins, trachea & thyroid.   Chest: Normal lung exam with good air movement absence of wheezes, rales, or rhonchi:                                                                          CV:  Auscultation: normal [ ] S3[ ] S4[ ] Irregular [ ] Rub[ ] Clicks[ ]  Murmurs none:[ ]systolic [ ]  diastolic [ ] holosystolic [ ]  Carotids: No Bruits[ ] Other____________ Abdominal Aorta: normal [ ] nonpalpable[ ]                                                                         GI: Normal exam of abdomen, liver & spleen with no noted masses or tenderness.                                                                                              Extremities: Normal no evidence of cyanosis or deformity Edema: none[ ]trace[ ]1+[ ]2+[ ]3+[ ]4+[ ]  Lower Extremity Pulses: Right[ ] Left[ ]Varicosities[ ]  SKIN : Normal exam to inspection & palpation.                                                           LABS:          PT/INR - ( 06 Aug 2019 12:17 )   PT: 19.2 sec;   INR: 1.64 ratio         PTT - ( 06 Aug 2019 12:17 )  PTT:32.9 sec            Cardiac Cath:  pending

## 2019-08-06 NOTE — PROGRESS NOTE ADULT - SUBJECTIVE AND OBJECTIVE BOX
S/P cath  80% mid to distal LAD  70% mid LCX  100% RCA with collaterals  Official cath report pending.  Plan admit to Dr Alvarado for cardiac surgery consult  Right radial band benign  Remove band at 1830 if stable  OOB as tolerated  F/U with CT surgery

## 2019-08-06 NOTE — PROGRESS NOTE ADULT - SUBJECTIVE AND OBJECTIVE BOX
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  Pt's last CVA was in April and he has not had a stroke since. He has also had an IVC filter in place (Dr. Hutchison, Vascular Surgeon at ).  He endorses CASTILLO, denies palpitations, chest pain, lower extremity edema. He endorses compliance with coumadin 5mg daily.   His daughter is currently in visiting from [place] and would like to be here if her father is going to undergo surgery.  S/P UMER  Strand on valve noted  Plan for CT surgery with Dr Alvarado.  Patient to be admitted in CT surgery.  Post UMER NPO for 2 hours  Patient for cardiac cath pre-op for surgery,  VSS  ICU Vital Signs Last 24 Hrs  T(C): 36.7 (06 Aug 2019 11:17), Max: 36.7 (06 Aug 2019 11:17)  T(F): 98.1 (06 Aug 2019 11:17), Max: 98.1 (06 Aug 2019 11:17)  HR: 61 (06 Aug 2019 13:15) (61 - 66)  BP: 137/75 (06 Aug 2019 13:15) (110/66 - 159/83)  BP(mean): --  ABP: --  ABP(mean): --  RR: 17 (06 Aug 2019 13:15) (16 - 17)  SpO2: 96% (06 Aug 2019 13:15) (96% - 99%)

## 2019-08-07 ENCOUNTER — APPOINTMENT (OUTPATIENT)
Dept: CARDIOTHORACIC SURGERY | Facility: HOSPITAL | Age: 84
End: 2019-08-07

## 2019-08-07 ENCOUNTER — RESULT REVIEW (OUTPATIENT)
Age: 84
End: 2019-08-07

## 2019-08-07 DIAGNOSIS — I63.9 CEREBRAL INFARCTION, UNSPECIFIED: ICD-10-CM

## 2019-08-07 DIAGNOSIS — I25.10 ATHEROSCLEROTIC HEART DISEASE OF NATIVE CORONARY ARTERY WITHOUT ANGINA PECTORIS: ICD-10-CM

## 2019-08-07 DIAGNOSIS — I27.82 CHRONIC PULMONARY EMBOLISM: ICD-10-CM

## 2019-08-07 DIAGNOSIS — Z87.09 PERSONAL HISTORY OF OTHER DISEASES OF THE RESPIRATORY SYSTEM: ICD-10-CM

## 2019-08-07 DIAGNOSIS — I35.0 NONRHEUMATIC AORTIC (VALVE) STENOSIS: ICD-10-CM

## 2019-08-07 LAB
ALBUMIN SERPL ELPH-MCNC: 3.7 G/DL — SIGNIFICANT CHANGE UP (ref 3.3–5.2)
ALP SERPL-CCNC: 63 U/L — SIGNIFICANT CHANGE UP (ref 40–120)
ALT FLD-CCNC: 13 U/L — SIGNIFICANT CHANGE UP
ANION GAP SERPL CALC-SCNC: 11 MMOL/L — SIGNIFICANT CHANGE UP (ref 5–17)
ANION GAP SERPL CALC-SCNC: 9 MMOL/L — SIGNIFICANT CHANGE UP (ref 5–17)
APTT BLD: 27 SEC — LOW (ref 27.5–36.3)
AST SERPL-CCNC: 12 U/L — SIGNIFICANT CHANGE UP
BASOPHILS # BLD AUTO: 0.07 K/UL — SIGNIFICANT CHANGE UP (ref 0–0.2)
BASOPHILS NFR BLD AUTO: 0.9 % — SIGNIFICANT CHANGE UP (ref 0–2)
BILIRUB SERPL-MCNC: 0.5 MG/DL — SIGNIFICANT CHANGE UP (ref 0.4–2)
BUN SERPL-MCNC: 15 MG/DL — SIGNIFICANT CHANGE UP (ref 8–20)
BUN SERPL-MCNC: 16 MG/DL — SIGNIFICANT CHANGE UP (ref 8–20)
CALCIUM SERPL-MCNC: 8.7 MG/DL — SIGNIFICANT CHANGE UP (ref 8.6–10.2)
CALCIUM SERPL-MCNC: 9.7 MG/DL — SIGNIFICANT CHANGE UP (ref 8.6–10.2)
CHLORIDE SERPL-SCNC: 104 MMOL/L — SIGNIFICANT CHANGE UP (ref 98–107)
CHLORIDE SERPL-SCNC: 107 MMOL/L — SIGNIFICANT CHANGE UP (ref 98–107)
CK MB CFR SERPL CALC: 21.4 NG/ML — HIGH (ref 0–6.7)
CK SERPL-CCNC: 345 U/L — HIGH (ref 30–200)
CO2 SERPL-SCNC: 20 MMOL/L — LOW (ref 22–29)
CO2 SERPL-SCNC: 26 MMOL/L — SIGNIFICANT CHANGE UP (ref 22–29)
CREAT SERPL-MCNC: 1.14 MG/DL — SIGNIFICANT CHANGE UP (ref 0.5–1.3)
CREAT SERPL-MCNC: 1.16 MG/DL — SIGNIFICANT CHANGE UP (ref 0.5–1.3)
EOSINOPHIL # BLD AUTO: 0.38 K/UL — SIGNIFICANT CHANGE UP (ref 0–0.5)
EOSINOPHIL NFR BLD AUTO: 4.7 % — SIGNIFICANT CHANGE UP (ref 0–6)
GAS PNL BLDA: SIGNIFICANT CHANGE UP
GLUCOSE BLDC GLUCOMTR-MCNC: 102 MG/DL — HIGH (ref 70–99)
GLUCOSE BLDC GLUCOMTR-MCNC: 148 MG/DL — HIGH (ref 70–99)
GLUCOSE BLDC GLUCOMTR-MCNC: 151 MG/DL — HIGH (ref 70–99)
GLUCOSE BLDC GLUCOMTR-MCNC: 159 MG/DL — HIGH (ref 70–99)
GLUCOSE BLDC GLUCOMTR-MCNC: 164 MG/DL — HIGH (ref 70–99)
GLUCOSE BLDC GLUCOMTR-MCNC: 97 MG/DL — SIGNIFICANT CHANGE UP (ref 70–99)
GLUCOSE SERPL-MCNC: 110 MG/DL — SIGNIFICANT CHANGE UP (ref 70–115)
GLUCOSE SERPL-MCNC: 132 MG/DL — HIGH (ref 70–115)
HCT VFR BLD CALC: 36.2 % — LOW (ref 39–50)
HCT VFR BLD CALC: 42 % — SIGNIFICANT CHANGE UP (ref 39–50)
HGB BLD-MCNC: 11.6 G/DL — LOW (ref 13–17)
HGB BLD-MCNC: 12.7 G/DL — LOW (ref 13–17)
IMM GRANULOCYTES NFR BLD AUTO: 0.4 % — SIGNIFICANT CHANGE UP (ref 0–1.5)
INR BLD: 1.52 RATIO — HIGH (ref 0.88–1.16)
LYMPHOCYTES # BLD AUTO: 1.89 K/UL — SIGNIFICANT CHANGE UP (ref 1–3.3)
LYMPHOCYTES # BLD AUTO: 23.6 % — SIGNIFICANT CHANGE UP (ref 13–44)
MAGNESIUM SERPL-MCNC: 2.5 MG/DL — SIGNIFICANT CHANGE UP (ref 1.6–2.6)
MCHC RBC-ENTMCNC: 26.7 PG — LOW (ref 27–34)
MCHC RBC-ENTMCNC: 27.2 PG — SIGNIFICANT CHANGE UP (ref 27–34)
MCHC RBC-ENTMCNC: 30.2 GM/DL — LOW (ref 32–36)
MCHC RBC-ENTMCNC: 32 GM/DL — SIGNIFICANT CHANGE UP (ref 32–36)
MCV RBC AUTO: 84.8 FL — SIGNIFICANT CHANGE UP (ref 80–100)
MCV RBC AUTO: 88.2 FL — SIGNIFICANT CHANGE UP (ref 80–100)
MONOCYTES # BLD AUTO: 0.71 K/UL — SIGNIFICANT CHANGE UP (ref 0–0.9)
MONOCYTES NFR BLD AUTO: 8.9 % — SIGNIFICANT CHANGE UP (ref 2–14)
MRSA PCR RESULT.: SIGNIFICANT CHANGE UP
NEUTROPHILS # BLD AUTO: 4.93 K/UL — SIGNIFICANT CHANGE UP (ref 1.8–7.4)
NEUTROPHILS NFR BLD AUTO: 61.5 % — SIGNIFICANT CHANGE UP (ref 43–77)
PHOSPHATE SERPL-MCNC: 0.7 MG/DL — CRITICAL LOW (ref 2.4–4.7)
PLATELET # BLD AUTO: 227 K/UL — SIGNIFICANT CHANGE UP (ref 150–400)
PLATELET # BLD AUTO: 250 K/UL — SIGNIFICANT CHANGE UP (ref 150–400)
POTASSIUM SERPL-MCNC: 4.1 MMOL/L — SIGNIFICANT CHANGE UP (ref 3.5–5.3)
POTASSIUM SERPL-MCNC: 4.1 MMOL/L — SIGNIFICANT CHANGE UP (ref 3.5–5.3)
POTASSIUM SERPL-SCNC: 4.1 MMOL/L — SIGNIFICANT CHANGE UP (ref 3.5–5.3)
POTASSIUM SERPL-SCNC: 4.1 MMOL/L — SIGNIFICANT CHANGE UP (ref 3.5–5.3)
PROT SERPL-MCNC: 6.8 G/DL — SIGNIFICANT CHANGE UP (ref 6.6–8.7)
PROTHROM AB SERPL-ACNC: 17.7 SEC — HIGH (ref 10–12.9)
RBC # BLD: 4.27 M/UL — SIGNIFICANT CHANGE UP (ref 4.2–5.8)
RBC # BLD: 4.76 M/UL — SIGNIFICANT CHANGE UP (ref 4.2–5.8)
RBC # FLD: 14.3 % — SIGNIFICANT CHANGE UP (ref 10.3–14.5)
RBC # FLD: 14.6 % — HIGH (ref 10.3–14.5)
S AUREUS DNA NOSE QL NAA+PROBE: DETECTED
SODIUM SERPL-SCNC: 138 MMOL/L — SIGNIFICANT CHANGE UP (ref 135–145)
SODIUM SERPL-SCNC: 139 MMOL/L — SIGNIFICANT CHANGE UP (ref 135–145)
T4 FREE SERPL-MCNC: 1.2 NG/DL — SIGNIFICANT CHANGE UP (ref 0.9–1.8)
TROPONIN T SERPL-MCNC: 0.29 NG/ML — HIGH (ref 0–0.06)
WBC # BLD: 16.42 K/UL — HIGH (ref 3.8–10.5)
WBC # BLD: 8.01 K/UL — SIGNIFICANT CHANGE UP (ref 3.8–10.5)
WBC # FLD AUTO: 16.42 K/UL — HIGH (ref 3.8–10.5)
WBC # FLD AUTO: 8.01 K/UL — SIGNIFICANT CHANGE UP (ref 3.8–10.5)

## 2019-08-07 PROCEDURE — 36415 COLL VENOUS BLD VENIPUNCTURE: CPT

## 2019-08-07 PROCEDURE — 71045 X-RAY EXAM CHEST 1 VIEW: CPT | Mod: 26

## 2019-08-07 PROCEDURE — 88305 TISSUE EXAM BY PATHOLOGIST: CPT | Mod: 26

## 2019-08-07 PROCEDURE — 33405 REPLACEMENT AORTIC VALVE OPN: CPT

## 2019-08-07 PROCEDURE — 85027 COMPLETE CBC AUTOMATED: CPT

## 2019-08-07 PROCEDURE — 33508 ENDOSCOPIC VEIN HARVEST: CPT

## 2019-08-07 PROCEDURE — 33517 CABG ARTERY-VEIN SINGLE: CPT

## 2019-08-07 PROCEDURE — 33517 CABG ARTERY-VEIN SINGLE: CPT | Mod: AS

## 2019-08-07 PROCEDURE — 33120 EXC ICAR TUM RESC W/CARD BYP: CPT

## 2019-08-07 PROCEDURE — 76376 3D RENDER W/INTRP POSTPROCES: CPT | Mod: 26

## 2019-08-07 PROCEDURE — 93010 ELECTROCARDIOGRAM REPORT: CPT | Mod: 76

## 2019-08-07 PROCEDURE — 80048 BASIC METABOLIC PNL TOTAL CA: CPT

## 2019-08-07 PROCEDURE — 93005 ELECTROCARDIOGRAM TRACING: CPT

## 2019-08-07 PROCEDURE — 85730 THROMBOPLASTIN TIME PARTIAL: CPT

## 2019-08-07 PROCEDURE — 81003 URINALYSIS AUTO W/O SCOPE: CPT

## 2019-08-07 PROCEDURE — 93355 ECHO TRANSESOPHAGEAL (TEE): CPT

## 2019-08-07 PROCEDURE — 33533 CABG ARTERIAL SINGLE: CPT | Mod: AS

## 2019-08-07 PROCEDURE — 33405 REPLACEMENT AORTIC VALVE OPN: CPT | Mod: AS

## 2019-08-07 PROCEDURE — 33533 CABG ARTERIAL SINGLE: CPT

## 2019-08-07 PROCEDURE — 33120 EXC ICAR TUM RESC W/CARD BYP: CPT | Mod: AS

## 2019-08-07 PROCEDURE — 85610 PROTHROMBIN TIME: CPT

## 2019-08-07 PROCEDURE — 93010 ELECTROCARDIOGRAM REPORT: CPT

## 2019-08-07 RX ORDER — ALBUMIN HUMAN 25 %
250 VIAL (ML) INTRAVENOUS ONCE
Refills: 0 | Status: COMPLETED | OUTPATIENT
Start: 2019-08-07 | End: 2019-08-07

## 2019-08-07 RX ORDER — POTASSIUM CHLORIDE 20 MEQ
10 PACKET (EA) ORAL
Refills: 0 | Status: COMPLETED | OUTPATIENT
Start: 2019-08-07 | End: 2019-08-07

## 2019-08-07 RX ORDER — POTASSIUM CHLORIDE 20 MEQ
10 PACKET (EA) ORAL
Refills: 0 | Status: DISCONTINUED | OUTPATIENT
Start: 2019-08-07 | End: 2019-08-08

## 2019-08-07 RX ORDER — SODIUM CHLORIDE 9 MG/ML
1000 INJECTION INTRAMUSCULAR; INTRAVENOUS; SUBCUTANEOUS
Refills: 0 | Status: DISCONTINUED | OUTPATIENT
Start: 2019-08-07 | End: 2019-08-09

## 2019-08-07 RX ORDER — ASPIRIN/CALCIUM CARB/MAGNESIUM 324 MG
325 TABLET ORAL DAILY
Refills: 0 | Status: DISCONTINUED | OUTPATIENT
Start: 2019-08-08 | End: 2019-08-13

## 2019-08-07 RX ORDER — CHLORHEXIDINE GLUCONATE 213 G/1000ML
5 SOLUTION TOPICAL EVERY 4 HOURS
Refills: 0 | Status: DISCONTINUED | OUTPATIENT
Start: 2019-08-07 | End: 2019-08-08

## 2019-08-07 RX ORDER — MAGNESIUM SULFATE 500 MG/ML
2 VIAL (ML) INJECTION ONCE
Refills: 0 | Status: COMPLETED | OUTPATIENT
Start: 2019-08-07 | End: 2019-08-07

## 2019-08-07 RX ORDER — PANTOPRAZOLE SODIUM 20 MG/1
40 TABLET, DELAYED RELEASE ORAL ONCE
Refills: 0 | Status: COMPLETED | OUTPATIENT
Start: 2019-08-07 | End: 2019-08-07

## 2019-08-07 RX ORDER — ASPIRIN/CALCIUM CARB/MAGNESIUM 324 MG
325 TABLET ORAL ONCE
Refills: 0 | Status: COMPLETED | OUTPATIENT
Start: 2019-08-07 | End: 2019-08-07

## 2019-08-07 RX ORDER — CEFUROXIME AXETIL 250 MG
1500 TABLET ORAL EVERY 8 HOURS
Refills: 0 | Status: COMPLETED | OUTPATIENT
Start: 2019-08-07 | End: 2019-08-08

## 2019-08-07 RX ORDER — SODIUM CHLORIDE 9 MG/ML
500 INJECTION, SOLUTION INTRAVENOUS ONCE
Refills: 0 | Status: COMPLETED | OUTPATIENT
Start: 2019-08-07 | End: 2019-08-07

## 2019-08-07 RX ORDER — INSULIN HUMAN 100 [IU]/ML
2 INJECTION, SOLUTION SUBCUTANEOUS
Qty: 50 | Refills: 0 | Status: DISCONTINUED | OUTPATIENT
Start: 2019-08-07 | End: 2019-08-08

## 2019-08-07 RX ORDER — DOCUSATE SODIUM 100 MG
100 CAPSULE ORAL THREE TIMES A DAY
Refills: 0 | Status: DISCONTINUED | OUTPATIENT
Start: 2019-08-08 | End: 2019-08-13

## 2019-08-07 RX ORDER — PANTOPRAZOLE SODIUM 20 MG/1
40 TABLET, DELAYED RELEASE ORAL DAILY
Refills: 0 | Status: DISCONTINUED | OUTPATIENT
Start: 2019-08-08 | End: 2019-08-13

## 2019-08-07 RX ORDER — NOREPINEPHRINE BITARTRATE/D5W 8 MG/250ML
0.05 PLASTIC BAG, INJECTION (ML) INTRAVENOUS
Qty: 8 | Refills: 0 | Status: DISCONTINUED | OUTPATIENT
Start: 2019-08-07 | End: 2019-08-08

## 2019-08-07 RX ORDER — SODIUM CHLORIDE 9 MG/ML
250 INJECTION, SOLUTION INTRAVENOUS ONCE
Refills: 0 | Status: COMPLETED | OUTPATIENT
Start: 2019-08-07 | End: 2019-08-07

## 2019-08-07 RX ORDER — WARFARIN SODIUM 2.5 MG/1
5 TABLET ORAL ONCE
Refills: 0 | Status: COMPLETED | OUTPATIENT
Start: 2019-08-07 | End: 2019-08-07

## 2019-08-07 RX ADMIN — CHLORHEXIDINE GLUCONATE 1 APPLICATION(S): 213 SOLUTION TOPICAL at 05:57

## 2019-08-07 RX ADMIN — SODIUM CHLORIDE 1500 MILLILITER(S): 9 INJECTION, SOLUTION INTRAVENOUS at 15:30

## 2019-08-07 RX ADMIN — Medication 100 MILLIEQUIVALENT(S): at 15:41

## 2019-08-07 RX ADMIN — Medication 100 MILLIEQUIVALENT(S): at 14:30

## 2019-08-07 RX ADMIN — CHLORHEXIDINE GLUCONATE 5 MILLILITER(S): 213 SOLUTION TOPICAL at 14:29

## 2019-08-07 RX ADMIN — Medication 125 MILLILITER(S): at 19:15

## 2019-08-07 RX ADMIN — Medication 100 MILLIGRAM(S): at 05:58

## 2019-08-07 RX ADMIN — PANTOPRAZOLE SODIUM 40 MILLIGRAM(S): 20 TABLET, DELAYED RELEASE ORAL at 14:28

## 2019-08-07 RX ADMIN — Medication 325 MILLIGRAM(S): at 20:30

## 2019-08-07 RX ADMIN — Medication 12.41 MICROGRAM(S)/KG/MIN: at 15:32

## 2019-08-07 RX ADMIN — INSULIN HUMAN 2 UNIT(S)/HR: 100 INJECTION, SOLUTION SUBCUTANEOUS at 17:22

## 2019-08-07 RX ADMIN — SODIUM CHLORIDE 10 MILLILITER(S): 9 INJECTION INTRAMUSCULAR; INTRAVENOUS; SUBCUTANEOUS at 13:54

## 2019-08-07 RX ADMIN — AMLODIPINE BESYLATE 5 MILLIGRAM(S): 2.5 TABLET ORAL at 05:57

## 2019-08-07 RX ADMIN — WARFARIN SODIUM 5 MILLIGRAM(S): 2.5 TABLET ORAL at 23:15

## 2019-08-07 RX ADMIN — Medication 50 GRAM(S): at 14:57

## 2019-08-07 RX ADMIN — Medication 400 MILLIGRAM(S): at 21:15

## 2019-08-07 RX ADMIN — Medication 1000 MILLILITER(S): at 18:44

## 2019-08-07 RX ADMIN — PANTOPRAZOLE SODIUM 40 MILLIGRAM(S): 20 TABLET, DELAYED RELEASE ORAL at 05:58

## 2019-08-07 RX ADMIN — SODIUM CHLORIDE 5 MILLILITER(S): 9 INJECTION INTRAMUSCULAR; INTRAVENOUS; SUBCUTANEOUS at 14:30

## 2019-08-07 RX ADMIN — SODIUM CHLORIDE 3 MILLILITER(S): 9 INJECTION INTRAMUSCULAR; INTRAVENOUS; SUBCUTANEOUS at 05:56

## 2019-08-07 RX ADMIN — CHLORHEXIDINE GLUCONATE 5 MILLILITER(S): 213 SOLUTION TOPICAL at 17:24

## 2019-08-07 RX ADMIN — Medication 100 MILLIEQUIVALENT(S): at 15:00

## 2019-08-07 RX ADMIN — Medication 1000 MILLILITER(S): at 18:49

## 2019-08-07 RX ADMIN — SODIUM CHLORIDE 5 MILLILITER(S): 9 INJECTION INTRAMUSCULAR; INTRAVENOUS; SUBCUTANEOUS at 14:29

## 2019-08-07 RX ADMIN — CHLORHEXIDINE GLUCONATE 15 MILLILITER(S): 213 SOLUTION TOPICAL at 05:58

## 2019-08-07 RX ADMIN — SODIUM CHLORIDE 2000 MILLILITER(S): 9 INJECTION, SOLUTION INTRAVENOUS at 17:45

## 2019-08-07 RX ADMIN — Medication 100 MILLIGRAM(S): at 16:04

## 2019-08-07 NOTE — PROCEDURE NOTE - NSPROCDETAILS_GEN_ALL_CORE
ultrasound guidance/guidewire recovered/sterile technique, catheter placed/lumen(s) aspirated and flushed/sterile dressing applied

## 2019-08-07 NOTE — PROGRESS NOTE ADULT - ASSESSMENT
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  He returned yesterday for UMER and cardiac catheterization and was found to have multivessel coronary artery disease.

## 2019-08-07 NOTE — PROGRESS NOTE ADULT - SUBJECTIVE AND OBJECTIVE BOX
SUBJECTIVE    Patient seen and examined by me and ICU at 5:30 PM.  Daughter at bedside, patient still intubated.  Patient awakes to commands and gives a thumbs up on how he is feeling.    OBJECTIVE    Vital Signs Last 24 Hrs  T(C): 36.9 (08-07-19 @ 20:30), Max: 36.9 (08-07-19 @ 18:00)  T(F): 98.4 (08-07-19 @ 20:30), Max: 98.4 (08-07-19 @ 18:00)  HR: 81 (08-07-19 @ 21:10) (57 - 81)  BP: 135/81 (08-07-19 @ 19:30) (113/66 - 147/89)  BP(mean): 103 (08-07-19 @ 19:30) (85 - 103)  RR: 23 (08-07-19 @ 21:10) (10 - 31)  SpO2: 96% (08-07-19 @ 21:10) (90% - 100%)    PHYSICAL EXAM:    Constitutional: Not in any distress    Eyes: No conjunctival injection    ENMT: Intubated    Neck: No nodes, no adenopathy    Back: Straight, no defects    Respiratory: Fairly clear b/l    Cardiovascular: RRR, no murmur    Gastrointestinal: soft, NT, ND    Extremities: No edema, no erythema    Neurological: no focal deficit    Skin: No rash      MEDICATIONS  (STANDING):  cefuroxime  IVPB 1500 milliGRAM(s) IV Intermittent every 8 hours  chlorhexidine 0.12% Liquid 5 milliLiter(s) Oral Mucosa every 4 hours  insulin regular Infusion 2 Unit(s)/Hr (2 mL/Hr) IV Continuous <Continuous>  norepinephrine Infusion 0.05 MICROgram(s)/kG/Min (12.413 mL/Hr) IV Continuous <Continuous>  potassium chloride  10 mEq/50 mL IVPB 10 milliEquivalent(s) IV Intermittent every 1 hour  potassium chloride  10 mEq/50 mL IVPB 10 milliEquivalent(s) IV Intermittent every 1 hour  potassium chloride  10 mEq/50 mL IVPB 10 milliEquivalent(s) IV Intermittent every 1 hour  sodium chloride 0.9%. 1000 milliLiter(s) (10 mL/Hr) IV Continuous <Continuous>  sodium chloride 0.9%. 1000 milliLiter(s) (5 mL/Hr) IV Continuous <Continuous>  warfarin 5 milliGRAM(s) Oral once    MEDICATIONS  (PRN):    CARDIAC MARKERS ( 07 Aug 2019 14:21 )  x     / 0.29 ng/mL / 345 U/L / x     / 21.4 ng/mL                            11.6   16.42 )-----------( 227      ( 07 Aug 2019 14:21 )             36.2     07 Aug 2019 14:21    138    |  107    |  15.0   ----------------------------<  132    4.1     |  20.0   |  1.14     Ca    8.7        07 Aug 2019 14:21  Phos  0.7       07 Aug 2019 14:21  Mg     2.5       07 Aug 2019 14:21    TPro  6.8    /  Alb  3.7    /  TBili  0.5    /  DBili  x      /  AST  12     /  ALT  13     /  AlkPhos  63     07 Aug 2019 06:06    Allergies    No Known Allergies    Intolerances

## 2019-08-07 NOTE — BRIEF OPERATIVE NOTE - NSICDXBRIEFPROCEDURE_GEN_ALL_CORE_FT
PROCEDURES:  CABG, with UMER 07-Aug-2019 12:17:04  Harvey Rivera  Resection of aortic valve mass 07-Aug-2019 12:16:55  Harvey Rivera

## 2019-08-07 NOTE — BRIEF OPERATIVE NOTE - NSICDXBRIEFPREOP_GEN_ALL_CORE_FT
PRE-OP DIAGNOSIS:  CAD in native artery 07-Aug-2019 12:16:25  Harvey Rivera  Aortic valve mass 07-Aug-2019 12:16:17  Harvey Rivera

## 2019-08-07 NOTE — BRIEF OPERATIVE NOTE - NSICDXBRIEFPOSTOP_GEN_ALL_CORE_FT
POST-OP DIAGNOSIS:  CAD in native artery 07-Aug-2019 12:16:40  Harvey Rivera  Aortic valve mass 07-Aug-2019 12:16:34  Harvey Rivera

## 2019-08-07 NOTE — PROGRESS NOTE ADULT - PROBLEM SELECTOR PLAN 5
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  Patient managed as an outpatient on Coumadin, anticoagulation at this time per CT surgery team.

## 2019-08-07 NOTE — BRIEF OPERATIVE NOTE - COMMENTS
EBL inaccurate due to utilization of cell-saver. C2L LIMA-LAD, SVG-RPDA. EBL inaccurate due to utilization of cell-saver. C2L LIMA-LAD, SVG-RPDA.  Aortic cross clamp time 105 minutes, Total bypass time 121 minutes.

## 2019-08-08 DIAGNOSIS — I10 ESSENTIAL (PRIMARY) HYPERTENSION: ICD-10-CM

## 2019-08-08 DIAGNOSIS — I25.810 ATHEROSCLEROSIS OF CORONARY ARTERY BYPASS GRAFT(S) WITHOUT ANGINA PECTORIS: ICD-10-CM

## 2019-08-08 DIAGNOSIS — I35.0 NONRHEUMATIC AORTIC (VALVE) STENOSIS: ICD-10-CM

## 2019-08-08 DIAGNOSIS — E78.49 OTHER HYPERLIPIDEMIA: ICD-10-CM

## 2019-08-08 DIAGNOSIS — I48.91 UNSPECIFIED ATRIAL FIBRILLATION: ICD-10-CM

## 2019-08-08 LAB
ALBUMIN SERPL ELPH-MCNC: 3.4 G/DL — SIGNIFICANT CHANGE UP (ref 3.3–5.2)
ALP SERPL-CCNC: 44 U/L — SIGNIFICANT CHANGE UP (ref 40–120)
ALT FLD-CCNC: 11 U/L — SIGNIFICANT CHANGE UP
ANION GAP SERPL CALC-SCNC: 8 MMOL/L — SIGNIFICANT CHANGE UP (ref 5–17)
APTT BLD: 23.3 SEC — LOW (ref 27.5–36.3)
AST SERPL-CCNC: 27 U/L — SIGNIFICANT CHANGE UP
BILIRUB DIRECT SERPL-MCNC: 0.2 MG/DL — SIGNIFICANT CHANGE UP (ref 0–0.3)
BILIRUB INDIRECT FLD-MCNC: 0.4 MG/DL — SIGNIFICANT CHANGE UP (ref 0.2–1)
BILIRUB SERPL-MCNC: 0.6 MG/DL — SIGNIFICANT CHANGE UP (ref 0.4–2)
BUN SERPL-MCNC: 20 MG/DL — SIGNIFICANT CHANGE UP (ref 8–20)
CALCIUM SERPL-MCNC: 8.6 MG/DL — SIGNIFICANT CHANGE UP (ref 8.6–10.2)
CHLORIDE SERPL-SCNC: 107 MMOL/L — SIGNIFICANT CHANGE UP (ref 98–107)
CK MB CFR SERPL CALC: 17.3 NG/ML — HIGH (ref 0–6.7)
CK SERPL-CCNC: 403 U/L — HIGH (ref 30–200)
CO2 SERPL-SCNC: 21 MMOL/L — LOW (ref 22–29)
CREAT SERPL-MCNC: 1.39 MG/DL — HIGH (ref 0.5–1.3)
GLUCOSE BLDC GLUCOMTR-MCNC: 118 MG/DL — HIGH (ref 70–99)
GLUCOSE BLDC GLUCOMTR-MCNC: 122 MG/DL — HIGH (ref 70–99)
GLUCOSE BLDC GLUCOMTR-MCNC: 122 MG/DL — HIGH (ref 70–99)
GLUCOSE BLDC GLUCOMTR-MCNC: 128 MG/DL — HIGH (ref 70–99)
GLUCOSE BLDC GLUCOMTR-MCNC: 129 MG/DL — HIGH (ref 70–99)
GLUCOSE BLDC GLUCOMTR-MCNC: 136 MG/DL — HIGH (ref 70–99)
GLUCOSE BLDC GLUCOMTR-MCNC: 136 MG/DL — HIGH (ref 70–99)
GLUCOSE BLDC GLUCOMTR-MCNC: 142 MG/DL — HIGH (ref 70–99)
GLUCOSE SERPL-MCNC: 128 MG/DL — HIGH (ref 70–115)
HCT VFR BLD CALC: 32.6 % — LOW (ref 39–50)
HGB BLD-MCNC: 10.5 G/DL — LOW (ref 13–17)
INR BLD: 1.44 RATIO — HIGH (ref 0.88–1.16)
MAGNESIUM SERPL-MCNC: 2.6 MG/DL — SIGNIFICANT CHANGE UP (ref 1.6–2.6)
MCHC RBC-ENTMCNC: 27.5 PG — SIGNIFICANT CHANGE UP (ref 27–34)
MCHC RBC-ENTMCNC: 32.2 GM/DL — SIGNIFICANT CHANGE UP (ref 32–36)
MCV RBC AUTO: 85.3 FL — SIGNIFICANT CHANGE UP (ref 80–100)
PHOSPHATE SERPL-MCNC: 3 MG/DL — SIGNIFICANT CHANGE UP (ref 2.4–4.7)
PLATELET # BLD AUTO: 197 K/UL — SIGNIFICANT CHANGE UP (ref 150–400)
POTASSIUM SERPL-MCNC: 5.3 MMOL/L — SIGNIFICANT CHANGE UP (ref 3.5–5.3)
POTASSIUM SERPL-SCNC: 5.3 MMOL/L — SIGNIFICANT CHANGE UP (ref 3.5–5.3)
PROT SERPL-MCNC: 5.8 G/DL — LOW (ref 6.6–8.7)
PROTHROM AB SERPL-ACNC: 16.7 SEC — HIGH (ref 10–12.9)
RBC # BLD: 3.82 M/UL — LOW (ref 4.2–5.8)
RBC # FLD: 14.4 % — SIGNIFICANT CHANGE UP (ref 10.3–14.5)
SODIUM SERPL-SCNC: 136 MMOL/L — SIGNIFICANT CHANGE UP (ref 135–145)
TROPONIN T SERPL-MCNC: 0.41 NG/ML — HIGH (ref 0–0.06)
WBC # BLD: 14.46 K/UL — HIGH (ref 3.8–10.5)
WBC # FLD AUTO: 14.46 K/UL — HIGH (ref 3.8–10.5)

## 2019-08-08 PROCEDURE — 71045 X-RAY EXAM CHEST 1 VIEW: CPT | Mod: 26

## 2019-08-08 PROCEDURE — 99232 SBSQ HOSP IP/OBS MODERATE 35: CPT

## 2019-08-08 PROCEDURE — 93010 ELECTROCARDIOGRAM REPORT: CPT

## 2019-08-08 RX ORDER — FUROSEMIDE 40 MG
40 TABLET ORAL ONCE
Refills: 0 | Status: COMPLETED | OUTPATIENT
Start: 2019-08-08 | End: 2019-08-08

## 2019-08-08 RX ORDER — IPRATROPIUM/ALBUTEROL SULFATE 18-103MCG
3 AEROSOL WITH ADAPTER (GRAM) INHALATION EVERY 6 HOURS
Refills: 0 | Status: DISCONTINUED | OUTPATIENT
Start: 2019-08-08 | End: 2019-08-13

## 2019-08-08 RX ORDER — WARFARIN SODIUM 2.5 MG/1
5 TABLET ORAL ONCE
Refills: 0 | Status: COMPLETED | OUTPATIENT
Start: 2019-08-08 | End: 2019-08-08

## 2019-08-08 RX ORDER — HYDROMORPHONE HYDROCHLORIDE 2 MG/ML
0.5 INJECTION INTRAMUSCULAR; INTRAVENOUS; SUBCUTANEOUS ONCE
Refills: 0 | Status: DISCONTINUED | OUTPATIENT
Start: 2019-08-08 | End: 2019-08-08

## 2019-08-08 RX ORDER — ATORVASTATIN CALCIUM 80 MG/1
40 TABLET, FILM COATED ORAL AT BEDTIME
Refills: 0 | Status: DISCONTINUED | OUTPATIENT
Start: 2019-08-08 | End: 2019-08-13

## 2019-08-08 RX ORDER — INSULIN LISPRO 100/ML
VIAL (ML) SUBCUTANEOUS
Refills: 0 | Status: DISCONTINUED | OUTPATIENT
Start: 2019-08-08 | End: 2019-08-11

## 2019-08-08 RX ORDER — CHLORHEXIDINE GLUCONATE 213 G/1000ML
1 SOLUTION TOPICAL DAILY
Refills: 0 | Status: DISCONTINUED | OUTPATIENT
Start: 2019-08-08 | End: 2019-08-09

## 2019-08-08 RX ORDER — SENNA PLUS 8.6 MG/1
2 TABLET ORAL AT BEDTIME
Refills: 0 | Status: DISCONTINUED | OUTPATIENT
Start: 2019-08-08 | End: 2019-08-13

## 2019-08-08 RX ORDER — ACETAMINOPHEN 500 MG
1000 TABLET ORAL ONCE
Refills: 0 | Status: COMPLETED | OUTPATIENT
Start: 2019-08-08 | End: 2019-08-08

## 2019-08-08 RX ORDER — ENOXAPARIN SODIUM 100 MG/ML
40 INJECTION SUBCUTANEOUS
Refills: 0 | Status: DISCONTINUED | OUTPATIENT
Start: 2019-08-08 | End: 2019-08-09

## 2019-08-08 RX ORDER — METOPROLOL TARTRATE 50 MG
25 TABLET ORAL EVERY 12 HOURS
Refills: 0 | Status: DISCONTINUED | OUTPATIENT
Start: 2019-08-08 | End: 2019-08-09

## 2019-08-08 RX ORDER — ACETAMINOPHEN 500 MG
1000 TABLET ORAL ONCE
Refills: 0 | Status: COMPLETED | OUTPATIENT
Start: 2019-08-08 | End: 2019-08-07

## 2019-08-08 RX ADMIN — HYDROMORPHONE HYDROCHLORIDE 0.5 MILLIGRAM(S): 2 INJECTION INTRAMUSCULAR; INTRAVENOUS; SUBCUTANEOUS at 05:35

## 2019-08-08 RX ADMIN — CHLORHEXIDINE GLUCONATE 1 APPLICATION(S): 213 SOLUTION TOPICAL at 12:42

## 2019-08-08 RX ADMIN — Medication 3 MILLILITER(S): at 14:18

## 2019-08-08 RX ADMIN — Medication 100 MILLIGRAM(S): at 21:29

## 2019-08-08 RX ADMIN — PANTOPRAZOLE SODIUM 40 MILLIGRAM(S): 20 TABLET, DELAYED RELEASE ORAL at 12:41

## 2019-08-08 RX ADMIN — Medication 325 MILLIGRAM(S): at 12:41

## 2019-08-08 RX ADMIN — Medication 1000 MILLIGRAM(S): at 14:22

## 2019-08-08 RX ADMIN — Medication 25 MILLIGRAM(S): at 09:35

## 2019-08-08 RX ADMIN — Medication 40 MILLIGRAM(S): at 09:34

## 2019-08-08 RX ADMIN — HYDROMORPHONE HYDROCHLORIDE 0.5 MILLIGRAM(S): 2 INJECTION INTRAMUSCULAR; INTRAVENOUS; SUBCUTANEOUS at 15:06

## 2019-08-08 RX ADMIN — Medication 100 MILLIGRAM(S): at 06:39

## 2019-08-08 RX ADMIN — Medication 100 MILLIGRAM(S): at 00:01

## 2019-08-08 RX ADMIN — WARFARIN SODIUM 5 MILLIGRAM(S): 2.5 TABLET ORAL at 21:29

## 2019-08-08 RX ADMIN — Medication 3 MILLILITER(S): at 08:21

## 2019-08-08 RX ADMIN — Medication 3 MILLILITER(S): at 20:55

## 2019-08-08 RX ADMIN — Medication 25 MILLIGRAM(S): at 18:02

## 2019-08-08 RX ADMIN — Medication 100 MILLIGRAM(S): at 14:30

## 2019-08-08 RX ADMIN — ATORVASTATIN CALCIUM 40 MILLIGRAM(S): 80 TABLET, FILM COATED ORAL at 21:29

## 2019-08-08 RX ADMIN — ENOXAPARIN SODIUM 40 MILLIGRAM(S): 100 INJECTION SUBCUTANEOUS at 18:02

## 2019-08-08 RX ADMIN — SENNA PLUS 2 TABLET(S): 8.6 TABLET ORAL at 21:29

## 2019-08-08 RX ADMIN — HYDROMORPHONE HYDROCHLORIDE 0.5 MILLIGRAM(S): 2 INJECTION INTRAMUSCULAR; INTRAVENOUS; SUBCUTANEOUS at 16:27

## 2019-08-08 RX ADMIN — Medication 3 MILLILITER(S): at 04:09

## 2019-08-08 RX ADMIN — Medication 40 MILLIGRAM(S): at 17:23

## 2019-08-08 RX ADMIN — HYDROMORPHONE HYDROCHLORIDE 0.5 MILLIGRAM(S): 2 INJECTION INTRAMUSCULAR; INTRAVENOUS; SUBCUTANEOUS at 05:20

## 2019-08-08 RX ADMIN — Medication 100 MILLIGRAM(S): at 11:39

## 2019-08-08 RX ADMIN — Medication 400 MILLIGRAM(S): at 13:07

## 2019-08-08 NOTE — PHYSICAL THERAPY INITIAL EVALUATION ADULT - PRECAUTIONS/LIMITATIONS, REHAB EVAL
sternal precautions/oxygen therapy device and L/min/cardiac precautions/fall precautions/surgical precautions

## 2019-08-08 NOTE — PROGRESS NOTE ADULT - ASSESSMENT
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 Patient went to the OR 8/7 for a C2L and excision of fibroelastoma. 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  Pt's last CVA was in April and he has not had a stroke since. He has also had an IVC filter in place (Dr. Hutchison, Vascular Surgeon at ).  He endorses CASTILLO, denies palpitations, chest pain, lower extremity edema. 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 Patient extubated 8/7.

## 2019-08-08 NOTE — PROGRESS NOTE ADULT - ASSESSMENT
Patient's home medications include amlodipine 5 mg daily, aspirin 81 mg, atorvastatin 40 mg daily Brio the lip there for COPD level albuterol when necessary, metoprolol  daily omeprazole 40 daily satchel lean 50 mg daily, tamsulosin 0.4 mg daily and Coumadin 5 mg daily for anticoagulation.

## 2019-08-08 NOTE — PHYSICAL THERAPY INITIAL EVALUATION ADULT - ACTIVE RANGE OF MOTION EXAMINATION, REHAB EVAL
bilateral upper extremity Active ROM was WFL (within functional limits)/bilateral  lower extremity Active ROM was WFL (within functional limits)/assessed during functional mobility, resistance not applied, within precautions

## 2019-08-08 NOTE — PROGRESS NOTE ADULT - SUBJECTIVE AND OBJECTIVE BOX
Brief Hospital Course:   85 yo male who comes in to PST 19 in anticipation fo UMER for further evaluation after multiple syncopal falls. 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 Patient went to the OR  for a C2L and excision of fibroelastoma.     Significant recent/past 24 hr events:    Subjective:    Review of Systems    ROS negative x 10 systems except as noted above    Patient is a 84y old  Male who presents with a chief complaint of s/p  CABG/fibroelastoma excision (07 Aug 2019 14:11)    HPI:    PAST MEDICAL & SURGICAL HISTORY:  Aortic stenosis  Atheroma  Cerebrovascular accident (CVA)  History of COPD  CAD S/P percutaneous coronary angioplasty  Atrial fibrillation, unspecified type  [place] filter in place  PE (pulmonary embolism)  Hyperlipemia  Hypertension  S/P cataract surgery  S/P IVC filter    FAMILY HISTORY:  Family history of diabetes mellitus (Child)      Vitals   ICU Vital Signs Last 24 Hrs  T(C): 36.9 (08 Aug 2019 00:00), Max: 36.9 (07 Aug 2019 18:00)  T(F): 98.4 (08 Aug 2019 00:00), Max: 98.4 (07 Aug 2019 18:00)  HR: 69 (08 Aug 2019 00:00) (57 - 82)  BP: 135/81 (07 Aug 2019 19:30) (113/66 - 147/89)  BP(mean): 103 (07 Aug 2019 19:30) (85 - 103)  ABP: 116/57 (08 Aug 2019 00:00) (71/36 - 161/75)  ABP(mean): 75 (08 Aug 2019 00:00) (47 - 103)  RR: 20 (08 Aug 2019 00:00) (10 - 31)  SpO2: 94% (08 Aug 2019 00:00) (90% - 100%)        ABG - ( 07 Aug 2019 22:05 )  pH, Arterial: 7.38  pH, Blood: x     /  pCO2: 38    /  pO2: 66    / HCO3: 22    / Base Excess: -2.5  /  SaO2: 92            I&O's Detail    06 Aug 2019 07:01  -  07 Aug 2019 07:00  --------------------------------------------------------  IN:    Oral Fluid: 120 mL  Total IN: 120 mL    OUT:    Voided: 500 mL  Total OUT: 500 mL    Total NET: -380 mL      07 Aug 2019 07:01  -  08 Aug 2019 00:26  --------------------------------------------------------  IN:    Albumin 5%  - 250 mL: 750 mL    insulin regular Infusion: 44 mL    Lactated Ringers IV Bolus: 750 mL    norepinephrine Infusion: 49 mL    sodium chloride 0.9%.: 50 mL    sodium chloride 0.9%.: 100 mL    Solution: 100 mL    Solution: 100 mL    Solution: 150 mL    Solution: 50 mL  Total IN: 2143 mL    OUT:    Chest Tube: 190 mL    Chest Tube: 90 mL    Indwelling Catheter - Urethral: 10 mL    Indwelling Catheter - Urethral: 585 mL    Nasoenteral Tube: 25 mL  Total OUT: 900 mL    Total NET: 1243 mL      LABS                        11.6   16.42 )-----------( 227      ( 07 Aug 2019 14:21 )             36.2     08-07    138  |  107  |  15.0  ----------------------------<  132<H>  4.1   |  20.0<L>  |  1.14    Ca    8.7      07 Aug 2019 14:21  Phos  0.7     08-  Mg     2.5     08-    TPro  6.8  /  Alb  3.7  /  TBili  0.5  /  DBili  x   /  AST  12  /  ALT  13  /  AlkPhos  63  08-07    PT/INR - ( 07 Aug 2019 14:21 )   PT: 17.7 sec;   INR: 1.52 ratio         PTT - ( 07 Aug 2019 14:21 )  PTT:27.0 sec  CARDIAC MARKERS ( 07 Aug 2019 14:21 )   U/L / CKMB21.4 ng/mL / Troponin T0.29 ng/mL /        Urinalysis Basic - ( 06 Aug 2019 22:57 )    Color: Yellow / Appearance: Clear / S.010 / pH: x  Gluc: x / Ketone: Negative  / Bili: Negative / Urobili: Negative mg/dL   Blood: x / Protein: Negative mg/dL / Nitrite: Negative   Leuk Esterase: Negative / RBC: x / WBC x   Sq Epi: x / Non Sq Epi: x / Bacteria: x    POCT Blood Glucose.: 128 mg/dL (19 @ 23:59)  POCT Blood Glucose.: 148 mg/dL (19 @ 20:20)  POCT Blood Glucose.: 159 mg/dL (19 @ 18:31)  POCT Blood Glucose.: 151 mg/dL (19 @ 18:05)  POCT Blood Glucose.: 164 mg/dL (19 @ 17:19)  POCT Blood Glucose.: 97 mg/dL (19 @ 15:49)  POCT Blood Glucose.: 102 mg/dL (19 @ 15:06)      MEDICATIONS  (STANDING):  aspirin enteric coated 325 milliGRAM(s) Oral daily  atorvastatin 40 milliGRAM(s) Oral at bedtime  cefuroxime  IVPB 1500 milliGRAM(s) IV Intermittent every 8 hours  chlorhexidine 0.12% Liquid 5 milliLiter(s) Oral Mucosa every 4 hours  docusate sodium 100 milliGRAM(s) Oral three times a day  insulin regular Infusion 2 Unit(s)/Hr (2 mL/Hr) IV Continuous <Continuous>  pantoprazole    Tablet 40 milliGRAM(s) Oral daily    MEDICATIONS  (PRN):    Allergies:  No Known Allergies      Physical Exam:   Constitutional: NAD, well-groomed, well-developed  HEENT: PERRLA, EOMI, no drainage or redness  Neck: supple,  No JVD  Respiratory:  some expiratory wheezes noted, good inspiratory effort   Cardiovascular: Regular rate, regular rhythm, normal S1, S2; no murmurs or rub  Gastrointestinal: Soft, non-tender, non distended, + bowel sounds  Extremities: NOE x 4, no peripheral edema, no cyanosis, no clubbing   Neurological: A+O x 3; speech clear and intact; no sensory, motor  deficits, normal reflexes  Skin: warm, dry, well perfused Brief Hospital Course:   85 yo male who comes in to PST 19 in anticipation fo UMER for further evaluation after multiple syncopal falls. 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 Patient went to the OR  for a C2L and excision of fibroelastoma.     Significant recent/past 24 hr events:  Extubate to high flow overnight   No hemodynamic issues       Subjective:    Review of Systems    ROS negative x 10 systems except as noted above    Patient is a 84y old  Male who presents with a chief complaint of s/p  CABG/fibroelastoma excision (07 Aug 2019 14:11)    HPI:    PAST MEDICAL & SURGICAL HISTORY:  Aortic stenosis  Atheroma  Cerebrovascular accident (CVA)  History of COPD  CAD S/P percutaneous coronary angioplasty  Atrial fibrillation, unspecified type  [place] filter in place  PE (pulmonary embolism)  Hyperlipemia  Hypertension  S/P cataract surgery  S/P IVC filter    FAMILY HISTORY:  Family history of diabetes mellitus (Child)      Vitals   ICU Vital Signs Last 24 Hrs  T(C): 36.9 (08 Aug 2019 00:00), Max: 36.9 (07 Aug 2019 18:00)  T(F): 98.4 (08 Aug 2019 00:00), Max: 98.4 (07 Aug 2019 18:00)  HR: 69 (08 Aug 2019 00:00) (57 - 82)  BP: 135/81 (07 Aug 2019 19:30) (113/66 - 147/89)  BP(mean): 103 (07 Aug 2019 19:30) (85 - 103)  ABP: 116/57 (08 Aug 2019 00:00) (71/36 - 161/75)  ABP(mean): 75 (08 Aug 2019 00:00) (47 - 103)  RR: 20 (08 Aug 2019 00:00) (10 - 31)  SpO2: 94% (08 Aug 2019 00:00) (90% - 100%)        ABG - ( 07 Aug 2019 22:05 )  pH, Arterial: 7.38  pH, Blood: x     /  pCO2: 38    /  pO2: 66    / HCO3: 22    / Base Excess: -2.5  /  SaO2: 92            I&O's Detail    06 Aug 2019 07:01  -  07 Aug 2019 07:00  --------------------------------------------------------  IN:    Oral Fluid: 120 mL  Total IN: 120 mL    OUT:    Voided: 500 mL  Total OUT: 500 mL    Total NET: -380 mL      07 Aug 2019 07:01  -  08 Aug 2019 00:26  --------------------------------------------------------  IN:    Albumin 5%  - 250 mL: 750 mL    insulin regular Infusion: 44 mL    Lactated Ringers IV Bolus: 750 mL    norepinephrine Infusion: 49 mL    sodium chloride 0.9%.: 50 mL    sodium chloride 0.9%.: 100 mL    Solution: 100 mL    Solution: 100 mL    Solution: 150 mL    Solution: 50 mL  Total IN: 2143 mL    OUT:    Chest Tube: 190 mL    Chest Tube: 90 mL    Indwelling Catheter - Urethral: 10 mL    Indwelling Catheter - Urethral: 585 mL    Nasoenteral Tube: 25 mL  Total OUT: 900 mL    Total NET: 1243 mL      LABS                        11.6   16.42 )-----------( 227      ( 07 Aug 2019 14:21 )             36.2     08-07    138  |  107  |  15.0  ----------------------------<  132<H>  4.1   |  20.0<L>  |  1.14    Ca    8.7      07 Aug 2019 14:21  Phos  0.7     08-  Mg     2.5     -    TPro  6.8  /  Alb  3.7  /  TBili  0.5  /  DBili  x   /  AST  12  /  ALT  13  /  AlkPhos  63  08-07    PT/INR - ( 07 Aug 2019 14:21 )   PT: 17.7 sec;   INR: 1.52 ratio         PTT - ( 07 Aug 2019 14:21 )  PTT:27.0 sec  CARDIAC MARKERS ( 07 Aug 2019 14:21 )   U/L / CKMB21.4 ng/mL / Troponin T0.29 ng/mL /        Urinalysis Basic - ( 06 Aug 2019 22:57 )    Color: Yellow / Appearance: Clear / S.010 / pH: x  Gluc: x / Ketone: Negative  / Bili: Negative / Urobili: Negative mg/dL   Blood: x / Protein: Negative mg/dL / Nitrite: Negative   Leuk Esterase: Negative / RBC: x / WBC x   Sq Epi: x / Non Sq Epi: x / Bacteria: x    POCT Blood Glucose.: 128 mg/dL (19 @ 23:59)  POCT Blood Glucose.: 148 mg/dL (19 @ 20:20)  POCT Blood Glucose.: 159 mg/dL (19 @ 18:31)  POCT Blood Glucose.: 151 mg/dL (19 @ 18:05)  POCT Blood Glucose.: 164 mg/dL (19 @ 17:19)  POCT Blood Glucose.: 97 mg/dL (19 @ 15:49)  POCT Blood Glucose.: 102 mg/dL (19 @ 15:06)      MEDICATIONS  (STANDING):  aspirin enteric coated 325 milliGRAM(s) Oral daily  atorvastatin 40 milliGRAM(s) Oral at bedtime  cefuroxime  IVPB 1500 milliGRAM(s) IV Intermittent every 8 hours  chlorhexidine 0.12% Liquid 5 milliLiter(s) Oral Mucosa every 4 hours  docusate sodium 100 milliGRAM(s) Oral three times a day  insulin regular Infusion 2 Unit(s)/Hr (2 mL/Hr) IV Continuous <Continuous>  pantoprazole    Tablet 40 milliGRAM(s) Oral daily    MEDICATIONS  (PRN):    Allergies:  No Known Allergies      Physical Exam:   Constitutional: NAD, well-groomed, well-developed  HEENT: PERRLA, EOMI, no drainage or redness  Neck: supple,  No JVD  Respiratory:  some expiratory wheezes noted, good inspiratory effort   Cardiovascular: Regular rate, regular rhythm, normal S1, S2; no murmurs or rub  Gastrointestinal: Soft, non-tender, non distended, + bowel sounds  Extremities: NOE x 4, no peripheral edema, no cyanosis, no clubbing   Neurological: A+O x 3; speech clear and intact; no sensory, motor  deficits, normal reflexes  Skin: warm, dry, well perfused Brief Hospital Course:  This is an 83 y/o obese, White M, former smoker (1PPD x20 years remote hx), with a PMHx of factor V Leiden (on warfarin), moderate AS, HTN, CAD with PCI x2 ( at [facility]; report requested), PE x2, 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  Pt's last CVA was in April and he has not had a stroke since. He has also had an IVC filter in place (Dr. Hutchison, Vascular Surgeon at ).  He endorses CASTILLO, denies palpitations, chest pain, lower extremity edema. Patient went to the operating room for an C2 and excision of fibroelastoma          Significant recent/past 24 hr events:  Extubate to high flow overnight   No hemodynamic issues       Subjective:    Review of Systems    ROS negative x 10 systems except as noted above    Patient is a 84y old  Male who presents with a chief complaint of s/p  CABG/fibroelastoma excision (07 Aug 2019 14:11)    HPI:    PAST MEDICAL & SURGICAL HISTORY:  Aortic stenosis  Atheroma  Cerebrovascular accident (CVA)  History of COPD  CAD S/P percutaneous coronary angioplasty  Atrial fibrillation, unspecified type  [place] filter in place  PE (pulmonary embolism)  Hyperlipemia  Hypertension  S/P cataract surgery  S/P IVC filter    FAMILY HISTORY:  Family history of diabetes mellitus (Child)      Vitals   ICU Vital Signs Last 24 Hrs  T(C): 36.9 (08 Aug 2019 00:00), Max: 36.9 (07 Aug 2019 18:00)  T(F): 98.4 (08 Aug 2019 00:00), Max: 98.4 (07 Aug 2019 18:00)  HR: 69 (08 Aug 2019 00:00) (57 - 82)  BP: 135/81 (07 Aug 2019 19:30) (113/66 - 147/89)  BP(mean): 103 (07 Aug 2019 19:30) (85 - 103)  ABP: 116/57 (08 Aug 2019 00:00) (71/36 - 161/75)  ABP(mean): 75 (08 Aug 2019 00:00) (47 - 103)  RR: 20 (08 Aug 2019 00:00) (10 - 31)  SpO2: 94% (08 Aug 2019 00:00) (90% - 100%)        ABG - ( 07 Aug 2019 22:05 )  pH, Arterial: 7.38  pH, Blood: x     /  pCO2: 38    /  pO2: 66    / HCO3: 22    / Base Excess: -2.5  /  SaO2: 92            I&O's Detail    06 Aug 2019 07:01  -  07 Aug 2019 07:00  --------------------------------------------------------  IN:    Oral Fluid: 120 mL  Total IN: 120 mL    OUT:    Voided: 500 mL  Total OUT: 500 mL    Total NET: -380 mL      07 Aug 2019 07:01  -  08 Aug 2019 00:26  --------------------------------------------------------  IN:    Albumin 5%  - 250 mL: 750 mL    insulin regular Infusion: 44 mL    Lactated Ringers IV Bolus: 750 mL    norepinephrine Infusion: 49 mL    sodium chloride 0.9%.: 50 mL    sodium chloride 0.9%.: 100 mL    Solution: 100 mL    Solution: 100 mL    Solution: 150 mL    Solution: 50 mL  Total IN: 2143 mL    OUT:    Chest Tube: 190 mL    Chest Tube: 90 mL    Indwelling Catheter - Urethral: 10 mL    Indwelling Catheter - Urethral: 585 mL    Nasoenteral Tube: 25 mL  Total OUT: 900 mL    Total NET: 1243 mL      LABS                        11.6   16.42 )-----------( 227      ( 07 Aug 2019 14:21 )             36.2     -    138  |  107  |  15.0  ----------------------------<  132<H>  4.1   |  20.0<L>  |  1.14    Ca    8.7      07 Aug 2019 14:21  Phos  0.7     08-07  Mg     2.5         TPro  6.8  /  Alb  3.7  /  TBili  0.5  /  DBili  x   /  AST  12  /  ALT  13  /  AlkPhos  63      PT/INR - ( 07 Aug 2019 14:21 )   PT: 17.7 sec;   INR: 1.52 ratio         PTT - ( 07 Aug 2019 14:21 )  PTT:27.0 sec  CARDIAC MARKERS ( 07 Aug 2019 14:21 )   U/L / CKMB21.4 ng/mL / Troponin T0.29 ng/mL /        Urinalysis Basic - ( 06 Aug 2019 22:57 )    Color: Yellow / Appearance: Clear / S.010 / pH: x  Gluc: x / Ketone: Negative  / Bili: Negative / Urobili: Negative mg/dL   Blood: x / Protein: Negative mg/dL / Nitrite: Negative   Leuk Esterase: Negative / RBC: x / WBC x   Sq Epi: x / Non Sq Epi: x / Bacteria: x    POCT Blood Glucose.: 128 mg/dL (19 @ 23:59)  POCT Blood Glucose.: 148 mg/dL (19 @ 20:20)  POCT Blood Glucose.: 159 mg/dL (19 @ 18:31)  POCT Blood Glucose.: 151 mg/dL (19 @ 18:05)  POCT Blood Glucose.: 164 mg/dL (19 @ 17:19)  POCT Blood Glucose.: 97 mg/dL (19 @ 15:49)  POCT Blood Glucose.: 102 mg/dL (19 @ 15:06)      MEDICATIONS  (STANDING):  aspirin enteric coated 325 milliGRAM(s) Oral daily  atorvastatin 40 milliGRAM(s) Oral at bedtime  cefuroxime  IVPB 1500 milliGRAM(s) IV Intermittent every 8 hours  chlorhexidine 0.12% Liquid 5 milliLiter(s) Oral Mucosa every 4 hours  docusate sodium 100 milliGRAM(s) Oral three times a day  insulin regular Infusion 2 Unit(s)/Hr (2 mL/Hr) IV Continuous <Continuous>  pantoprazole    Tablet 40 milliGRAM(s) Oral daily    MEDICATIONS  (PRN):    Allergies:  No Known Allergies      Physical Exam:   Constitutional: NAD, well-groomed, well-developed  HEENT: PERRLA, EOMI, no drainage or redness  Neck: supple,  No JVD  Respiratory:  some expiratory wheezes noted, good inspiratory effort   Cardiovascular: Regular rate, regular rhythm, normal S1, S2; no murmurs or rub  Gastrointestinal: Soft, non-tender, non distended, + bowel sounds  Extremities: NOE x 4, no peripheral edema, no cyanosis, no clubbing   Neurological: A+O x 3; speech clear and intact; no sensory, motor  deficits, normal reflexes  Skin: warm, dry, well perfused

## 2019-08-08 NOTE — PROGRESS NOTE ADULT - SUBJECTIVE AND OBJECTIVE BOX
SUBJECTIVE    Patient seen in ICU bed 5 now extubated.  Daughter, Ivon, at bedside.  Patient feeling okay today, complains of pain at the incision site.    REVIEW OF SYSTEMS    General: Patient feels beat up    Skin/Breast: No rash  	  ENMT: No visual problems, no sore throat	    Respiratory and Thorax: No cough, No CP, No SOB  	  Cardiovascular: No CP, No palpitations    Gastrointestinal: No Abd pain, No N/V/D    Musculoskeletal: No Joint pain, No back pain	    Neurological: No headache    Psychiatric: No anxiety      OBJECTIVE    Vital Signs Last 24 Hrs  T(C): 36.9 (08-08-19 @ 20:00), Max: 36.9 (08-08-19 @ 00:00)  T(F): 98.5 (08-08-19 @ 20:00), Max: 98.5 (08-08-19 @ 20:00)  HR: 83 (08-08-19 @ 21:00) (67 - 86)  BP: 156/92 (08-08-19 @ 21:00) (98/63 - 159/82)  BP(mean): 114 (08-08-19 @ 21:00) (76 - 114)  RR: 42 (08-08-19 @ 21:00) (12 - 42)  SpO2: 89% (08-08-19 @ 21:00) (89% - 98%)    PHYSICAL EXAM:    Constitutional: Not in any distress    Eyes: No conjunctival injection    ENMT: No oral lesions    Neck: No nodes, no adenopathy    Back: Straight, no defects    Respiratory: clear b/l    Cardiovascular: RRR, no murmur    Gastrointestinal: soft, NT, ND    Extremities: No edema, no erythema    Neurological: no focal deficit    Skin: No rash      MEDICATIONS  (STANDING):  ALBUTerol/ipratropium for Nebulization 3 milliLiter(s) Nebulizer every 6 hours  aspirin enteric coated 325 milliGRAM(s) Oral daily  atorvastatin 40 milliGRAM(s) Oral at bedtime  chlorhexidine 2% Cloths 1 Application(s) Topical daily  docusate sodium 100 milliGRAM(s) Oral three times a day  enoxaparin Injectable 40 milliGRAM(s) SubCutaneous two times a day  insulin lispro (HumaLOG) corrective regimen sliding scale   SubCutaneous Before meals and at bedtime  metoprolol tartrate 25 milliGRAM(s) Oral every 12 hours  pantoprazole    Tablet 40 milliGRAM(s) Oral daily  senna 2 Tablet(s) Oral at bedtime  sodium chloride 0.9%. 1000 milliLiter(s) (10 mL/Hr) IV Continuous <Continuous>  sodium chloride 0.9%. 1000 milliLiter(s) (5 mL/Hr) IV Continuous <Continuous>    MEDICATIONS  (PRN):    CARDIAC MARKERS ( 08 Aug 2019 02:33 )  x     / 0.41 ng/mL / 403 U/L / x     / 17.3 ng/mL  CARDIAC MARKERS ( 07 Aug 2019 14:21 )  x     / 0.29 ng/mL / 345 U/L / x     / 21.4 ng/mL                            10.5   14.46 )-----------( 197      ( 08 Aug 2019 02:33 )             32.6     08 Aug 2019 02:33    136    |  107    |  20.0   ----------------------------<  128    5.3     |  21.0   |  1.39     Ca    8.6        08 Aug 2019 02:33  Phos  3.0       08 Aug 2019 02:33  Mg     2.6       08 Aug 2019 02:33    TPro  5.8    /  Alb  3.4    /  TBili  0.6    /  DBili  0.2    /  AST  27     /  ALT  11     /  AlkPhos  44     08 Aug 2019 02:33    Allergies    No Known Allergies    Intolerances    OHS (Unknown)

## 2019-08-08 NOTE — DIETITIAN INITIAL EVALUATION ADULT. - PERTINENT LABORATORY DATA
08-08 Na136 mmol/L Glu 128 mg/dL<H> K+ 5.3 mmol/L Cr  1.39 mg/dL<H> BUN 20.0 mg/dL Phos 3.0 mg/dL Alb 3.4 g/dL PAB n/a     n/a  HgbA1C

## 2019-08-08 NOTE — PROGRESS NOTE ADULT - SUBJECTIVE AND OBJECTIVE BOX
CHIEF COMPLAINT:Patient is a 84y old  Male who presents with a chief complaint of s/p  CABG/fibroelastoma excision (07 Aug 2019 14:11)    INTERVAL HISTORY:  pt seen and examined.   Doing well, extubated. On hi-flow.   slight cp      Allergies  No Known Allergies    	  MEDICATIONS:  cefuroxime  IVPB 1500 milliGRAM(s) IV Intermittent every 8 hours  ALBUTerol/ipratropium for Nebulization 3 milliLiter(s) Nebulizer every 6 hours    docusate sodium 100 milliGRAM(s) Oral three times a day  pantoprazole    Tablet 40 milliGRAM(s) Oral daily  senna 2 Tablet(s) Oral at bedtime  atorvastatin 40 milliGRAM(s) Oral at bedtime  insulin regular Infusion 2 Unit(s)/Hr IV Continuous <Continuous>  aspirin enteric coated 325 milliGRAM(s) Oral daily  chlorhexidine 0.12% Liquid 5 milliLiter(s) Oral Mucosa every 4 hours  chlorhexidine 2% Cloths 1 Application(s) Topical daily  potassium chloride  10 mEq/50 mL IVPB 10 milliEquivalent(s) IV Intermittent every 1 hour  potassium chloride  10 mEq/50 mL IVPB 10 milliEquivalent(s) IV Intermittent every 1 hour  potassium chloride  10 mEq/50 mL IVPB 10 milliEquivalent(s) IV Intermittent every 1 hour  sodium chloride 0.9%. 1000 milliLiter(s) IV Continuous <Continuous>  sodium chloride 0.9%. 1000 milliLiter(s) IV Continuous <Continuous>      PHYSICAL EXAM:  T(C): 36.8 (08-08-19 @ 02:00), Max: 36.9 (08-07-19 @ 18:00)  HR: 73 (08-08-19 @ 07:00) (60 - 82)  BP: 98/63 (08-08-19 @ 06:00) (98/63 - 135/81)  RR: 19 (08-08-19 @ 07:00) (10 - 32)  SpO2: 94% (08-08-19 @ 07:00) (90% - 100%)  I&O's Summary    07 Aug 2019 07:01  -  08 Aug 2019 07:00  --------------------------------------------------------  IN: 2274 mL / OUT: 1360 mL / NET: 914 mL    Appearance: Normal	  HEENT:   NC/AT  Eye: Pink Conjunctiva  Lungs: decrease air entry at bases  CVS: RRR, Normal S1 and S2, No Edema  Pulses: Normal distal pulses.  Neuro: A&O x3    TELEMETRY: V-couplets       LABS:	 	                        10.5   14.46 )-----------( 197      ( 08 Aug 2019 02:33 )             32.6     08-08    136  |  107  |  20.0  ----------------------------<  128<H>  5.3   |  21.0<L>  |  1.39<H>    Ca    8.6      08 Aug 2019 02:33  Phos  3.0     08-08  Mg     2.6     08-08    TPro  5.8<L>  /  Alb  3.4  /  TBili  0.6  /  DBili  0.2  /  AST  27  /  ALT  11  /  AlkPhos  44  08-08    proBNP:   Lipid Profile:   HgA1c:   TSH:     ASSESSMENT/PLAN:

## 2019-08-08 NOTE — DIETITIAN INITIAL EVALUATION ADULT. - OTHER INFO
Patient is an 84-year-old male with history of hypertension COPD, pulmonary embolism, factor V Leyden mutation, coronary artery disease, S/P cardiac catheterization and was found to have multivessel coronary artery disease. 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 Pt awake and alert (s/p extubation). Pt tolerating clear liquids well. Pt with slight confusion noted. Pt reports eating well PTA, no recent wt changes. Follows a "No added salt diet". Food preferences obtained. Pt declined diet education at this time, reports getting literature in past.

## 2019-08-08 NOTE — PHYSICAL THERAPY INITIAL EVALUATION ADULT - CRITERIA FOR SKILLED THERAPEUTIC INTERVENTIONS
impairments found/functional limitations in following categories/anticipated equipment needs at discharge/anticipated discharge recommendation/predicted duration of therapy intervention/risk reduction/prevention/rehab potential/therapy frequency

## 2019-08-08 NOTE — PHYSICAL THERAPY INITIAL EVALUATION ADULT - ADDITIONAL COMMENTS
as per pt.:  lives alone in the house with 5 steps to enter, walks with RW vs holding to obstacles around the house, (+) falls in past 6 months indoor and outdoor more than 3 times, family not available to assist pt. upon D/C home

## 2019-08-08 NOTE — PROGRESS NOTE ADULT - PROBLEM SELECTOR PLAN 5
continue aspirin, BB to start soon and statin continue aspirin, BB to start soon and statin  Coumadin 5mg to start, factor 5 leiden deficiency

## 2019-08-08 NOTE — PROGRESS NOTE ADULT - ASSESSMENT
1. S/P Removal of AV mass, path pending  2. CABG, on ASA  3,. start BB today  4. CVP of 10, lasix prn  5. Hx of factor 5 leiden. Start heparin drip today, Warfarin.  6. pt

## 2019-08-09 DIAGNOSIS — N17.9 ACUTE KIDNEY FAILURE, UNSPECIFIED: ICD-10-CM

## 2019-08-09 LAB
ANION GAP SERPL CALC-SCNC: 12 MMOL/L — SIGNIFICANT CHANGE UP (ref 5–17)
ANION GAP SERPL CALC-SCNC: 14 MMOL/L — SIGNIFICANT CHANGE UP (ref 5–17)
BUN SERPL-MCNC: 27 MG/DL — HIGH (ref 8–20)
BUN SERPL-MCNC: 29 MG/DL — HIGH (ref 8–20)
CALCIUM SERPL-MCNC: 8.9 MG/DL — SIGNIFICANT CHANGE UP (ref 8.6–10.2)
CALCIUM SERPL-MCNC: 9.3 MG/DL — SIGNIFICANT CHANGE UP (ref 8.6–10.2)
CHLORIDE SERPL-SCNC: 101 MMOL/L — SIGNIFICANT CHANGE UP (ref 98–107)
CHLORIDE SERPL-SCNC: 104 MMOL/L — SIGNIFICANT CHANGE UP (ref 98–107)
CO2 SERPL-SCNC: 22 MMOL/L — SIGNIFICANT CHANGE UP (ref 22–29)
CO2 SERPL-SCNC: 22 MMOL/L — SIGNIFICANT CHANGE UP (ref 22–29)
CREAT SERPL-MCNC: 1.55 MG/DL — HIGH (ref 0.5–1.3)
CREAT SERPL-MCNC: 1.78 MG/DL — HIGH (ref 0.5–1.3)
GLUCOSE BLDC GLUCOMTR-MCNC: 118 MG/DL — HIGH (ref 70–99)
GLUCOSE BLDC GLUCOMTR-MCNC: 118 MG/DL — HIGH (ref 70–99)
GLUCOSE BLDC GLUCOMTR-MCNC: 121 MG/DL — HIGH (ref 70–99)
GLUCOSE BLDC GLUCOMTR-MCNC: 123 MG/DL — HIGH (ref 70–99)
GLUCOSE BLDC GLUCOMTR-MCNC: 136 MG/DL — HIGH (ref 70–99)
GLUCOSE SERPL-MCNC: 129 MG/DL — HIGH (ref 70–115)
GLUCOSE SERPL-MCNC: 130 MG/DL — HIGH (ref 70–115)
HCT VFR BLD CALC: 35.5 % — LOW (ref 39–50)
HGB BLD-MCNC: 11.3 G/DL — LOW (ref 13–17)
INR BLD: 1.86 RATIO — HIGH (ref 0.88–1.16)
INR BLD: 2.21 RATIO — HIGH (ref 0.88–1.16)
MAGNESIUM SERPL-MCNC: 2.4 MG/DL — SIGNIFICANT CHANGE UP (ref 1.6–2.6)
MCHC RBC-ENTMCNC: 27.3 PG — SIGNIFICANT CHANGE UP (ref 27–34)
MCHC RBC-ENTMCNC: 31.8 GM/DL — LOW (ref 32–36)
MCV RBC AUTO: 85.7 FL — SIGNIFICANT CHANGE UP (ref 80–100)
PLATELET # BLD AUTO: 222 K/UL — SIGNIFICANT CHANGE UP (ref 150–400)
POTASSIUM SERPL-MCNC: 4.4 MMOL/L — SIGNIFICANT CHANGE UP (ref 3.5–5.3)
POTASSIUM SERPL-MCNC: 4.4 MMOL/L — SIGNIFICANT CHANGE UP (ref 3.5–5.3)
POTASSIUM SERPL-SCNC: 4.4 MMOL/L — SIGNIFICANT CHANGE UP (ref 3.5–5.3)
POTASSIUM SERPL-SCNC: 4.4 MMOL/L — SIGNIFICANT CHANGE UP (ref 3.5–5.3)
PROTHROM AB SERPL-ACNC: 21.8 SEC — HIGH (ref 10–12.9)
PROTHROM AB SERPL-ACNC: 26.1 SEC — HIGH (ref 10–12.9)
RBC # BLD: 4.14 M/UL — LOW (ref 4.2–5.8)
RBC # FLD: 14.8 % — HIGH (ref 10.3–14.5)
SODIUM SERPL-SCNC: 135 MMOL/L — SIGNIFICANT CHANGE UP (ref 135–145)
SODIUM SERPL-SCNC: 140 MMOL/L — SIGNIFICANT CHANGE UP (ref 135–145)
WBC # BLD: 16.36 K/UL — HIGH (ref 3.8–10.5)
WBC # FLD AUTO: 16.36 K/UL — HIGH (ref 3.8–10.5)

## 2019-08-09 PROCEDURE — 71045 X-RAY EXAM CHEST 1 VIEW: CPT | Mod: 26

## 2019-08-09 PROCEDURE — 99223 1ST HOSP IP/OBS HIGH 75: CPT | Mod: GC

## 2019-08-09 PROCEDURE — 99223 1ST HOSP IP/OBS HIGH 75: CPT

## 2019-08-09 PROCEDURE — 99233 SBSQ HOSP IP/OBS HIGH 50: CPT

## 2019-08-09 PROCEDURE — 93306 TTE W/DOPPLER COMPLETE: CPT | Mod: 26

## 2019-08-09 PROCEDURE — 93010 ELECTROCARDIOGRAM REPORT: CPT

## 2019-08-09 RX ORDER — SODIUM CHLORIDE 9 MG/ML
3 INJECTION INTRAMUSCULAR; INTRAVENOUS; SUBCUTANEOUS EVERY 8 HOURS
Refills: 0 | Status: DISCONTINUED | OUTPATIENT
Start: 2019-08-09 | End: 2019-08-13

## 2019-08-09 RX ORDER — WARFARIN SODIUM 2.5 MG/1
1 TABLET ORAL ONCE
Refills: 0 | Status: COMPLETED | OUTPATIENT
Start: 2019-08-09 | End: 2019-08-09

## 2019-08-09 RX ORDER — BUDESONIDE AND FORMOTEROL FUMARATE DIHYDRATE 160; 4.5 UG/1; UG/1
2 AEROSOL RESPIRATORY (INHALATION)
Refills: 0 | Status: DISCONTINUED | OUTPATIENT
Start: 2019-08-09 | End: 2019-08-09

## 2019-08-09 RX ORDER — SODIUM CHLORIDE 9 MG/ML
1000 INJECTION INTRAMUSCULAR; INTRAVENOUS; SUBCUTANEOUS
Refills: 0 | Status: DISCONTINUED | OUTPATIENT
Start: 2019-08-09 | End: 2019-08-10

## 2019-08-09 RX ORDER — TAMSULOSIN HYDROCHLORIDE 0.4 MG/1
0.4 CAPSULE ORAL AT BEDTIME
Refills: 0 | Status: DISCONTINUED | OUTPATIENT
Start: 2019-08-09 | End: 2019-08-13

## 2019-08-09 RX ORDER — LANOLIN ALCOHOL/MO/W.PET/CERES
5 CREAM (GRAM) TOPICAL AT BEDTIME
Refills: 0 | Status: DISCONTINUED | OUTPATIENT
Start: 2019-08-09 | End: 2019-08-12

## 2019-08-09 RX ORDER — METOPROLOL TARTRATE 50 MG
25 TABLET ORAL ONCE
Refills: 0 | Status: COMPLETED | OUTPATIENT
Start: 2019-08-09 | End: 2019-08-09

## 2019-08-09 RX ORDER — SERTRALINE 25 MG/1
50 TABLET, FILM COATED ORAL DAILY
Refills: 0 | Status: DISCONTINUED | OUTPATIENT
Start: 2019-08-09 | End: 2019-08-13

## 2019-08-09 RX ORDER — METOPROLOL TARTRATE 50 MG
50 TABLET ORAL EVERY 12 HOURS
Refills: 0 | Status: DISCONTINUED | OUTPATIENT
Start: 2019-08-09 | End: 2019-08-13

## 2019-08-09 RX ORDER — SODIUM CHLORIDE 9 MG/ML
1000 INJECTION INTRAMUSCULAR; INTRAVENOUS; SUBCUTANEOUS
Refills: 0 | Status: DISCONTINUED | OUTPATIENT
Start: 2019-08-09 | End: 2019-08-09

## 2019-08-09 RX ADMIN — Medication 25 MILLIGRAM(S): at 05:09

## 2019-08-09 RX ADMIN — Medication 3 MILLILITER(S): at 21:25

## 2019-08-09 RX ADMIN — TAMSULOSIN HYDROCHLORIDE 0.4 MILLIGRAM(S): 0.4 CAPSULE ORAL at 20:38

## 2019-08-09 RX ADMIN — PANTOPRAZOLE SODIUM 40 MILLIGRAM(S): 20 TABLET, DELAYED RELEASE ORAL at 14:29

## 2019-08-09 RX ADMIN — Medication 3 MILLILITER(S): at 04:02

## 2019-08-09 RX ADMIN — Medication 3 MILLILITER(S): at 09:06

## 2019-08-09 RX ADMIN — SENNA PLUS 2 TABLET(S): 8.6 TABLET ORAL at 20:42

## 2019-08-09 RX ADMIN — SERTRALINE 50 MILLIGRAM(S): 25 TABLET, FILM COATED ORAL at 14:29

## 2019-08-09 RX ADMIN — Medication 100 MILLIGRAM(S): at 20:37

## 2019-08-09 RX ADMIN — WARFARIN SODIUM 1 MILLIGRAM(S): 2.5 TABLET ORAL at 20:40

## 2019-08-09 RX ADMIN — ATORVASTATIN CALCIUM 40 MILLIGRAM(S): 80 TABLET, FILM COATED ORAL at 20:37

## 2019-08-09 RX ADMIN — Medication 50 MILLIGRAM(S): at 17:04

## 2019-08-09 RX ADMIN — SODIUM CHLORIDE 3 MILLILITER(S): 9 INJECTION INTRAMUSCULAR; INTRAVENOUS; SUBCUTANEOUS at 20:36

## 2019-08-09 RX ADMIN — Medication 100 MILLIGRAM(S): at 05:09

## 2019-08-09 RX ADMIN — ENOXAPARIN SODIUM 40 MILLIGRAM(S): 100 INJECTION SUBCUTANEOUS at 05:09

## 2019-08-09 RX ADMIN — Medication 100 MILLIGRAM(S): at 14:29

## 2019-08-09 RX ADMIN — Medication 325 MILLIGRAM(S): at 14:28

## 2019-08-09 RX ADMIN — Medication 5 MILLIGRAM(S): at 20:41

## 2019-08-09 RX ADMIN — Medication 25 MILLIGRAM(S): at 09:35

## 2019-08-09 NOTE — PROGRESS NOTE ADULT - SUBJECTIVE AND OBJECTIVE BOX
CHIEF COMPLAINT:Patient is a 84y old  Male who presents with a chief complaint of s/p AV mass removal and CABG (08 Aug 2019 07:36)    INTERVAL HISTORY:  pt with SOB,   Not tachycardic. He is on hi-flow. Using IS but unable to take deep breaths. Occasional dry cough.  Still has 2 CT in place.   CXR is unchanged.    Allergies  No Known Allergies    MEDICATIONS:  metoprolol tartrate 25 milliGRAM(s) Oral every 12 hours  ALBUTerol/ipratropium for Nebulization 3 milliLiter(s) Nebulizer every 6 hours  docusate sodium 100 milliGRAM(s) Oral three times a day  pantoprazole    Tablet 40 milliGRAM(s) Oral daily  senna 2 Tablet(s) Oral at bedtime  atorvastatin 40 milliGRAM(s) Oral at bedtime  insulin lispro (HumaLOG) corrective regimen sliding scale   SubCutaneous Before meals and at bedtime  aspirin enteric coated 325 milliGRAM(s) Oral daily  chlorhexidine 2% Cloths 1 Application(s) Topical daily  enoxaparin Injectable 40 milliGRAM(s) SubCutaneous two times a day  sodium chloride 0.9%. 1000 milliLiter(s) IV Continuous <Continuous>  sodium chloride 0.9%. 1000 milliLiter(s) IV Continuous <Continuous>    PHYSICAL EXAM:  T(C): 37.1 (08-09-19 @ 04:48), Max: 37.1 (08-09-19 @ 04:48)  HR: 83 (08-09-19 @ 07:00) (68 - 96)  BP: 148/82 (08-09-19 @ 07:00) (135/71 - 159/82)  RR: 30 (08-09-19 @ 07:00) (18 - 42)  SpO2: 93% (08-09-19 @ 07:00) (89% - 96%)  I&O's Summary    08 Aug 2019 07:01  -  09 Aug 2019 07:00  --------------------------------------------------------  IN: 985 mL / OUT: 2520 mL / NET: -1535 mL  Appearance: Appears agitated 	  HEENT:   NC/AT  Eye: Pink Conjunctiva  Lungs: exp wheeze, decrease air entry at both bases   CVS: RRR, Normal S1 and S2, trace ankle edema   Pulses: Normal distal pulses.  Neuro: A&O x2    TELEMETRY: NSR, NSVT.     LABS:	 	    CARDIAC MARKERS:                            11.3   16.36 )-----------( 222      ( 09 Aug 2019 03:16 )             35.5     08-09    135  |  101  |  29.0<H>  ----------------------------<  130<H>  4.4   |  22.0  |  1.78<H>    Ca    9.3      09 Aug 2019 03:16  Phos  3.0     08-08  Mg     2.4     08-09    TPro  5.8<L>  /  Alb  3.4  /  TBili  0.6  /  DBili  0.2  /  AST  27  /  ALT  11  /  AlkPhos  44  08-08    ASSESSMENT/PLAN: 	    1. S/P CABG, no cp, no new EKG changes,   2. Pt with NSVT, Last episode yesterday, if it reoccurs load with amiodarone, Decrease warfarin dose in this case by 50%.  3. Repeat TTE, look at right heart and LVEF. Evaluate PASP  4. 1.5 L negative yesterday  5. Asked [facility] to call [facility] who know him very well  6. On warfarin, INR subtherapeutic, Adjust lovenox dose to full dose if INR is subtherapeutic  7. Pt confused at this time with days, but otherwise appropriate, no new neurologic finding. ? sundowning. Monitor closely, 1:1

## 2019-08-09 NOTE — CONSULT NOTE ADULT - SUBJECTIVE AND OBJECTIVE BOX
[facility] DIVISION OF KIDNEY DISEASES AND HYPERTENSION -- INITIAL CONSULT NOTE  --------------------------------------------------------------------------------  HPI:  84M with history of factor V Leiden on warfarin, AS, HTN, CAD sp PCI, PE x 2, CVA x 2, R sided weakness, former smoker, here for CABG, AV mass fibroelastoma removal on 8/7, IVC in place; seen/examined this AM; nephrology consulted for elevated SCr 1.78 ; new MILAGRO. Seen/examined this AM; on high flow; no complaints; appears well.    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  Pt's last CVA was in April and he has not had a stroke since. He has also had an IVC filter in place (Dr. Hutchison, Vascular Surgeon at ).  He endorses CASTILLO, denies palpitations, chest pain, lower extremity edema. 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 Patient is now extubated and is doing better. He is still having mild pain at the surgical site, and having some shortness of breath requiring high flow.      PAST HISTORY  --------------------------------------------------------------------------------  PAST MEDICAL & SURGICAL HISTORY:  Aortic stenosis  Atheroma  Cerebrovascular accident (CVA)  History of COPD  CAD S/P percutaneous coronary angioplasty  Atrial fibrillation, unspecified type  [place] filter in place  PE (pulmonary embolism)  Hyperlipemia  Hypertension  S/P cataract surgery  S/P IVC filter    FAMILY HISTORY:  Family history of diabetes mellitus (Child)    PAST SOCIAL HISTORY:    ALLERGIES & MEDICATIONS  --------------------------------------------------------------------------------  Allergies    No Known Allergies    Intolerances    OHS (Unknown)    Standing Inpatient Medications  ALBUTerol/ipratropium for Nebulization 3 milliLiter(s) Nebulizer every 6 hours  aspirin enteric coated 325 milliGRAM(s) Oral daily  atorvastatin 40 milliGRAM(s) Oral at bedtime  docusate sodium 100 milliGRAM(s) Oral three times a day  insulin lispro (HumaLOG) corrective regimen sliding scale   SubCutaneous Before meals and at bedtime  metoprolol tartrate 50 milliGRAM(s) Oral every 12 hours  pantoprazole    Tablet 40 milliGRAM(s) Oral daily  senna 2 Tablet(s) Oral at bedtime  sertraline 50 milliGRAM(s) Oral daily  sodium chloride 0.9%. 1000 milliLiter(s) IV Continuous <Continuous>  tamsulosin 0.4 milliGRAM(s) Oral at bedtime    PRN Inpatient Medications  melatonin 5 milliGRAM(s) Oral at bedtime PRN      REVIEW OF SYSTEMS  --------------------------------------------------------------------------------  Gen: No weight changes, fatigue, fevers/chills, weakness  Skin: No rashes  Head/Eyes/Ears/Mouth: No headache; Normal hearing; Normal vision w/o blurriness; No sinus pain/discomfort, sore throat  Respiratory: No dyspnea, cough, wheezing, hemoptysis  CV: No chest pain, PND, orthopnea  GI: No abdominal pain, diarrhea, constipation, nausea, vomiting, melena, hematochezia  : No increased frequency, dysuria, hematuria, nocturia  MSK: No joint pain/swelling; no back pain; no edema  Neuro: No dizziness/lightheadedness, weakness, seizures, numbness, tingling  Heme: No easy bruising or bleeding  Endo: No heat/cold intolerance  Psych: No significant nervousness, anxiety, stress, depression    All other systems were reviewed and are negative, except as noted.    VITALS/PHYSICAL EXAM  --------------------------------------------------------------------------------  T(C): 37.5 (08-09-19 @ 11:00), Max: 37.5 (08-09-19 @ 11:00)  HR: 87 (08-09-19 @ 12:00) (68 - 96)  BP: 163/93 (08-09-19 @ 12:00) (135/71 - 163/93)  RR: 30 (08-09-19 @ 12:00) (20 - 42)  SpO2: 96% (08-09-19 @ 12:00) (89% - 96%)  Wt(kg): --        08-08-19 @ 07:01  -  08-09-19 @ 07:00  --------------------------------------------------------  IN: 985 mL / OUT: 2520 mL / NET: -1535 mL    08-09-19 @ 07:01  -  08-09-19 @ 12:30  --------------------------------------------------------  IN: 0 mL / OUT: 330 mL / NET: -330 mL      Physical Exam:  	Gen: NAD   	HEENT: PERRL, supple neck, clear oropharynx  	Pulm: dec BS; chest tube x 2 in place  	CV: RRR, S1S2; no rub  	Back: No spinal or CVA tenderness; no sacral edema  	Abd: +BS, soft, nontender/nondistended  	: alcaraz  	UE: Warm, FROM, no clubbing, intact strength; no edema; no asterixis  	LE: Warm, FROM, no clubbing, intact strength; no edema  	Neuro: No focal deficits, intact gait  	Psych: Normal affect and mood  	Skin: Warm, without rashes  	Vascular access:    LABS/STUDIES  --------------------------------------------------------------------------------              11.3   16.36 >-----------<  222      [08-09-19 @ 03:16]              35.5     135  |  101  |  29.0  ----------------------------<  130      [08-09-19 @ 03:16]  4.4   |  22.0  |  1.78        Ca     9.3     [08-09-19 @ 03:16]      Mg     2.4     [08-09-19 @ 03:16]      Phos  3.0     [08-08-19 @ 02:33]    TPro  5.8  /  Alb  3.4  /  TBili  0.6  /  DBili  0.2  /  AST  27  /  ALT  11  /  AlkPhos  44  [08-08-19 @ 02:33]    PT/INR: PT 21.8 , INR 1.86       [08-09-19 @ 03:16]  PTT: 23.3       [08-08-19 @ 02:33]    Troponin 0.41      [08-08-19 @ 02:33]        [08-08-19 @ 02:33]    Creatinine Trend:  SCr 1.78 [08-09 @ 03:16]  SCr 1.39 [08-08 @ 02:33]  SCr 1.14 [08-07 @ 14:21]  SCr 1.16 [08-07 @ 06:06]  SCr 1.21 [08-06 @ 22:11]    Urinalysis - [08-06-19 @ 22:57]      Color Yellow / Appearance Clear / SG 1.010 / pH 6.5      Gluc Negative / Ketone Negative  / Bili Negative / Urobili Negative       Blood Negative / Protein Negative / Leuk Est Negative / Nitrite Negative      RBC  / WBC  / Hyaline  / Gran  / Sq Epi  / Non Sq Epi  / Bacteria       HbA1c 6.0      [08-06-19 @ 23:16]  TSH 0.88      [08-06-19 @ 22:11]  Lipid: chol 189, , HDL 35,       [04-24-19 @ 05:05]      ALOK: titer 1:160, pattern Nucleolar      [06-22-15 @ 21:09]

## 2019-08-09 NOTE — PROGRESS NOTE ADULT - SUBJECTIVE AND OBJECTIVE BOX
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  Pt's last CVA was in April and he has not had a stroke since. He has also had an IVC filter in place (Dr. Hutchison, Vascular Surgeon at ).  He endorses CASTILLO, denies palpitations, chest pain, lower extremity edema. Patient went to the operating room for an C2 and excision of fibroelastoma 8/7         Significant recent/past 24 hr events:  Extubate to high flow overnight   No hemodynamic issues       Subjective:    Review of Systems    ROS negative x 10 systems except as noted above    Patient is a 84y old  Male who presents with a chief complaint of s/p  CABG/fibroelastoma excision (07 Aug 2019 14:11)    HPI:    PAST MEDICAL & SURGICAL HISTORY:  Aortic stenosis  Atheroma  Cerebrovascular accident (CVA)  History of COPD  CAD S/P percutaneous coronary angioplasty  Atrial fibrillation, unspecified type  [place] filter in place  PE (pulmonary embolism)  Hyperlipemia  Hypertension  S/P cataract surgery  S/P IVC filter    FAMILY HISTORY:  Family history of diabetes mellitus (Child)    ALBUTerol/ipratropium for Nebulization 3 milliLiter(s) Nebulizer every 6 hours  aspirin enteric coated 325 milliGRAM(s) Oral daily  atorvastatin 40 milliGRAM(s) Oral at bedtime  chlorhexidine 2% Cloths 1 Application(s) Topical daily  docusate sodium 100 milliGRAM(s) Oral three times a day  enoxaparin Injectable 40 milliGRAM(s) SubCutaneous two times a day  insulin lispro (HumaLOG) corrective regimen sliding scale   SubCutaneous Before meals and at bedtime  metoprolol tartrate 25 milliGRAM(s) Oral every 12 hours  pantoprazole    Tablet 40 milliGRAM(s) Oral daily  senna 2 Tablet(s) Oral at bedtime  sodium chloride 0.9%. 1000 milliLiter(s) IV Continuous <Continuous>  sodium chloride 0.9%. 1000 milliLiter(s) IV Continuous <Continuous>                            10.5   14.46 )-----------( 197      ( 08 Aug 2019 02:33 )             32.6       Hemoglobin: 10.5 g/dL (08-08 @ 02:33)  Hemoglobin: 11.6 g/dL (08-07 @ 14:21)  Hemoglobin: 12.7 g/dL (08-07 @ 06:06)  Hemoglobin: 12.2 g/dL (08-06 @ 22:11)      08-08    136  |  107  |  20.0  ----------------------------<  128<H>  5.3   |  21.0<L>  |  1.39<H>    Ca    8.6      08 Aug 2019 02:33  Phos  3.0     08-08  Mg     2.6     08-08    TPro  5.8<L>  /  Alb  3.4  /  TBili  0.6  /  DBili  0.2  /  AST  27  /  ALT  11  /  AlkPhos  44  08-08    Creatinine Trend: 1.39<--, 1.14<--, 1.16<--, 1.21<--, 1.21<--    COAGS:     CARDIAC MARKERS ( 08 Aug 2019 02:33 )  x     / 0.41 ng/mL / 403 U/L / x     / 17.3 ng/mL  CARDIAC MARKERS ( 07 Aug 2019 14:21 )  x     / 0.29 ng/mL / 345 U/L / x     / 21.4 ng/mL        T(C): 37 (08-09-19 @ 00:00), Max: 37 (08-09-19 @ 00:00)  HR: 76 (08-09-19 @ 01:00) (68 - 86)  BP: 149/82 (08-09-19 @ 01:00) (98/63 - 159/82)  RR: 21 (08-09-19 @ 01:00) (12 - 42)  SpO2: 92% (08-09-19 @ 01:00) (89% - 98%)  Wt(kg): --    I&O's Summary    07 Aug 2019 07:01  -  08 Aug 2019 07:00  --------------------------------------------------------  IN: 2274 mL / OUT: 1360 mL / NET: 914 mL    08 Aug 2019 07:01  -  09 Aug 2019 02:29  --------------------------------------------------------  IN: 925 mL / OUT: 1945 mL / NET: -1020 mL        Allergies:  No Known Allergies      Physical Exam:   Constitutional: NAD, well-groomed, well-developed  HEENT: PERRLA, EOMI, no drainage or redness  Neck: supple,  No JVD  Respiratory:  some expiratory wheezes noted, good inspiratory effort   Cardiovascular: Regular rate, regular rhythm, normal S1, S2; no murmurs or rub  Gastrointestinal: Soft, non-tender, non distended, + bowel sounds  Extremities: NOE x 4, no peripheral edema, no cyanosis, no clubbing   Neurological: A+O x 3; speech clear and intact; no sensory, motor  deficits, normal reflexes  Skin: warm, dry, well perfused

## 2019-08-09 NOTE — CONSULT NOTE ADULT - ATTENDING COMMENTS
Patient seen and examined and cased discussed with resident. Agree with recommendations.     Patient meets criteria for AR. Has been to [facility], does not want to return because he does not want to perform 3 hours a day of rehab. In addition, it was too far for his neighbors to bring him his clothes and would spend 3 hours in traffic. He has made his decision and only wants [facility]. PT made aware.     Will continue to follow. Functional progress will determine ongoing rehab dispo recommendations, which may change.    Continue bedside therapy as well as OOB throughout the day with mobilization by staff to maintain cardiopulmonary function and prevention of secondary complications related to debility.

## 2019-08-09 NOTE — PROGRESS NOTE ADULT - ASSESSMENT
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  Pt's last CVA was in April and he has not had a stroke since. He has also had an IVC filter in place (Dr. Hutchison, Vascular Surgeon at ).  He endorses CASTILLO, denies palpitations, chest pain, lower extremity edema. 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 Patient extubated 8/7.   8/9 pt intro and medial chest tube d/c . pt still requiring high flow oxygen

## 2019-08-09 NOTE — CONSULT NOTE ADULT - SUBJECTIVE AND OBJECTIVE BOX
84yM was admitted on 08-06    In ED, GCS=15    Patient is a 84y old  Male who presents with a chief complaint of CABG, AV mass removal (09 Aug 2019 08:07)    HPI: Patient is an 85 y/o obese, White M, former smoker (1PPD x20 years remote hx), with a PMHx of factor V Leiden (on warfarin), moderate AS, HTN, CAD with PCI x2 (1996 at 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  Pt's last CVA was in April and he has not had a stroke since. He has also had an IVC filter in place (Dr. Hutchison, Vascular Surgeon at ).  He endorses CASTILLO, denies palpitations, chest pain, lower extremity edema. 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 Patient is now extubated and is doing better. He is still having mild pain at the surgical site, and having some shortness of breath requiring high flow.        Imaging showed (reviewed):  US DUPLEX CAROTID ARTERIES BILATERAL - 8/6 - Mild soft plaque in the carotid bulbs, greater on the left with less than 50% internal carotid artery stenosis bilaterally, not significantly changed since 4/22/2019. Incidental note of right thyroid lobe nodules measuring up to 4.3 cm; a thyroid ultrasound is recommended for complete evaluation of the thyroid.      REVIEW OF SYSTEMS  Constitutional - No fever, No weight loss, No fatigue  HEENT - No eye pain, No visual disturbances, No difficulty hearing, No tinnitus, No vertigo, No neck pain  Respiratory - + cough, No wheezing, + shortness of breath  Cardiovascular - + chest pain, No palpitations  Gastrointestinal - No abdominal pain, No nausea, No vomiting, No diarrhea, No constipation  Genitourinary - No dysuria, No frequency, No hematuria, No incontinence  Neurological - No headaches, No memory loss, No loss of strength, No numbness, No tremors  Skin - No itching, No rashes, No lesions   Endocrine - No temperature intolerance  Musculoskeletal - No joint pain, No joint swelling, No muscle pain  Psychiatric - No depression, No anxiety    VITALS  T(C): 37.1 (08-09-19 @ 04:48), Max: 37.1 (08-09-19 @ 04:48)  HR: 85 (08-09-19 @ 09:22) (68 - 96)  BP: 144/74 (08-09-19 @ 09:00) (135/71 - 159/82)  RR: 33 (08-09-19 @ 09:00) (18 - 42)  SpO2: 93% (08-09-19 @ 09:22) (89% - 94%)  Wt(kg): --    PAST MEDICAL & SURGICAL HISTORY  Aortic stenosis  Atheroma  Cerebrovascular accident (CVA)  History of COPD  CAD S/P percutaneous coronary angioplasty  Atrial fibrillation, unspecified type  CAD (coronary artery disease)  Marisol filter in place  PE (pulmonary embolism)  Hyperlipemia  Hypertension  S/P cataract surgery  S/P IVC filter  No significant past surgical history      SOCIAL HISTORY  Smoking - Denied  EtOH - Denied   Drugs - Denied    FUNCTIONAL HISTORY  Lives alone in a private home. The house has 5 steps to enter and no stairs inside. Patient uses a rolling walker to help with ambulation, but is otherwise independent with his ADLs.       CURRENT FUNCTIONAL STATUS    8/8/19  Bed Mobility: Supine to Sit:     · Level of Clarkson	moderate assist (50% patients effort); as per pt.	  · Physical Assist/Nonphysical Assist	2 person assist	    Transfer: Sit to Stand:     · Level of Clarkson	minimum assist (75% patients effort); moderate assist (50% patients effort)	  · Physical Assist/Nonphysical Assist	2 person assist	  · Weight-Bearing Restrictions	full weight-bearing; Bhanu LE	  · Assistive Device	rolling walker	    Transfer: Stand to Sit:     · Level of Clarkson	minimum assist (75% patients effort)	  · Physical Assist/Nonphysical Assist	2 person assist	  · Weight-Bearing Restrictions	full weight-bearing; Bhanu LE	  · Assistive Device	rolling walker	    Sit/Stand Transfer Safety Analysis:     · Transfer Safety Concerns Noted	decreased sequencing ability	  · Impairments Contributing to Impaired Transfers	impaired balance; decreased strength	    Gait Skills:     · Level of Clarkson	moderate assist (50% patients effort)	  · Physical Assist/Nonphysical Assist	1 person + 1 person to manage equipment	  · Weight-Bearing Restrictions	full weight-bearing; Bhanu LE	  · Assistive Device	rolling walker	  · Gait Distance	40 feet	    Gait Analysis:     · Gait Pattern Used	3-point gait	  · Gait Deviations Noted	decreased kieran; decreased step length; decreased velocity of limb motion	  · Impairments Contributing to Gait Deviations	impaired balance; decreased strength	    Stair Negotiation:     · Level of Clarkson	NA	      FAMILY HISTORY   Family history of diabetes mellitus (Child)  No pertinent family history in first degree relatives      RECENT LABS/IMAGING  CBC Full  -  ( 09 Aug 2019 03:16 )  WBC Count : 16.36 K/uL  RBC Count : 4.14 M/uL  Hemoglobin : 11.3 g/dL  Hematocrit : 35.5 %  Platelet Count - Automated : 222 K/uL  Mean Cell Volume : 85.7 fl  Mean Cell Hemoglobin : 27.3 pg  Mean Cell Hemoglobin Concentration : 31.8 gm/dL  Auto Neutrophil # : x  Auto Lymphocyte # : x  Auto Monocyte # : x  Auto Eosinophil # : x  Auto Basophil # : x  Auto Neutrophil % : x  Auto Lymphocyte % : x  Auto Monocyte % : x  Auto Eosinophil % : x  Auto Basophil % : x    08-09    135  |  101  |  29.0<H>  ----------------------------<  130<H>  4.4   |  22.0  |  1.78<H>    Ca    9.3      09 Aug 2019 03:16  Phos  3.0     08-08  Mg     2.4     08-09    TPro  5.8<L>  /  Alb  3.4  /  TBili  0.6  /  DBili  0.2  /  AST  27  /  ALT  11  /  AlkPhos  44  08-08        ALLERGIES  No Known Allergies  OHS (Unknown)      MEDICATIONS   ALBUTerol/ipratropium for Nebulization 3 milliLiter(s) Nebulizer every 6 hours  aspirin enteric coated 325 milliGRAM(s) Oral daily  atorvastatin 40 milliGRAM(s) Oral at bedtime  docusate sodium 100 milliGRAM(s) Oral three times a day  insulin lispro (HumaLOG) corrective regimen sliding scale   SubCutaneous Before meals and at bedtime  melatonin 5 milliGRAM(s) Oral at bedtime PRN  metoprolol tartrate 50 milliGRAM(s) Oral every 12 hours  pantoprazole    Tablet 40 milliGRAM(s) Oral daily  senna 2 Tablet(s) Oral at bedtime  sertraline 50 milliGRAM(s) Oral daily  tamsulosin 0.4 milliGRAM(s) Oral at bedtime      ----------------------------------------------------------------------------------------  PHYSICAL EXAM  Constitutional - NAD, Comfortably breathing with high flow  HEENT - NCAT, EOMI  Neck - Supple, No limited ROM  Chest - Breathing comfortably, No wheezing  Cardiovascular - S1S2   Extremities - No C/C/E, No calf tenderness   Neurologic Exam -                    Cognitive - Awake, Alert, AAO to self, place, date, year, situation     Communication - Fluent, No dysarthria     Cranial Nerves - CN 2-12 intact     Motor -                     LEFT    UE - ShAB 5/5, EF 5/5, EE 5/5, WE 5/5,  5/5                    RIGHT UE - ShAB 5/5, EF 5/5, EE 5/5, WE 5/5,  5/5                    LEFT    LE - HF 5/5, KE 5/5, DF 5/5, PF 5/5                    RIGHT LE - HF 5/5, KE 5/5, DF 5/5, PF 5/5        Sensory - Intact to LT     Psychiatric - Mood stable, Affect WNL  ----------------------------------------------------------------------------------------  ASSESSMENT/PLAN  84yMale with functional deficits after 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 He is doing well post-operatively.    CAD - Aspirin, Lipitor  COPD - Duoneb  HTN - Lopressor  DM - Insulin sliding scale  BPH - Flomax  Pain - Tylenol  DVT PPX - SCDs        Rehab - Patient will require rehabilitation after his cardiac surgery.   Recommend ACUTE inpatient rehabilitation for the functional deficits consisting of 3 hours of therapy/day & 24 hour RN/daily PMR physician for comorbid medical management.   Patient states that he only wants to be discharged to [facility] as it is closer to his home and he can have friends and neighbors visit him and bring him things. He states that he does not want to return to [facility] for acute rehabilitation and says that 3 hours of therapy a day was too much for him. Will continue to follow for ongoing rehab needs and recommendations.      Continue bedside therapy as well as OOB throughout the day with mobilization throughout the day with staff to maintain cardiopulmonary function and prevention of secondary complications related to debility.

## 2019-08-09 NOTE — CONSULT NOTE ADULT - SUBJECTIVE AND OBJECTIVE BOX
PULMONARY CONSULT NOTE      [id number]    Patient is a 84y old  Male who presents with a chief complaint of CABG, AV mass removal (09 Aug 2019 08:07)      INTERVAL HPI/OVERNIGHT EVENTS:Pt known to us with severe GIGI--AHI 50 on CPAP 11 x years.  Adm for excision AV fibroelastoma, CABG x 2.  Postop with hypoxemia.  Pt morbidly obese.  Has normal PFT's.  Denies congestion,cough.    MEDICATIONS  (STANDING):  ALBUTerol/ipratropium for Nebulization 3 milliLiter(s) Nebulizer every 6 hours  aspirin enteric coated 325 milliGRAM(s) Oral daily  atorvastatin 40 milliGRAM(s) Oral at bedtime  buDESOnide 160 MICROgram(s)/formoterol 4.5 MICROgram(s) Inhaler 2 Puff(s) Inhalation two times a day  docusate sodium 100 milliGRAM(s) Oral three times a day  insulin lispro (HumaLOG) corrective regimen sliding scale   SubCutaneous Before meals and at bedtime  metoprolol tartrate 50 milliGRAM(s) Oral every 12 hours  metoprolol tartrate 25 milliGRAM(s) Oral once  pantoprazole    Tablet 40 milliGRAM(s) Oral daily  senna 2 Tablet(s) Oral at bedtime  sertraline 50 milliGRAM(s) Oral daily  tamsulosin 0.4 milliGRAM(s) Oral at bedtime      MEDICATIONS  (PRN):  melatonin 5 milliGRAM(s) Oral at bedtime PRN Insomnia      Allergies    No Known Allergies    Intolerances    OHS (Unknown)      PAST MEDICAL & SURGICAL HISTORY:  Aortic stenosis  Atheroma  Cerebrovascular accident (CVA)  History of COPD  CAD S/P percutaneous coronary angioplasty  Atrial fibrillation, unspecified type  Marenisco filter in place  PE (pulmonary embolism)  Hyperlipemia  Hypertension  S/P cataract surgery  S/P IVC filter      FAMILY HISTORY:  Family history of diabetes mellitus (Child)      SOCIAL HISTORY  Smoking History: former 20 pk-yr smoker.    REVIEW OF SYSTEMS:    CONSTITUTIONAL:  As per HPI.    HEENT:  Eyes:  No diplopia or blurred vision. ENT:  No earache, sore throat or runny nose.    CARDIOVASCULAR:  No pressure, squeezing, tightness, heaviness or aching about the chest; no palpitations.    RESPIRATORY:  Per HPI    GASTROINTESTINAL:  No nausea, vomiting or diarrhea.    GENITOURINARY:  No dysuria, frequency or urgency.    MUSCULOSKELETAL:  No joint pains    SKIN:  No new lesions.    NEUROLOGIC:  No paresthesias, fasciculations, seizures or weakness.    PSYCHIATRIC:  No disorder of thought or mood.    ENDOCRINE:  No heat or cold intolerance, polyuria or polydipsia.    HEMATOLOGICAL:  No easy bruising or bleeding.     Vital Signs Last 24 Hrs  T(C): 37.1 (09 Aug 2019 04:48), Max: 37.1 (09 Aug 2019 04:48)  T(F): 98.7 (09 Aug 2019 04:48), Max: 98.7 (09 Aug 2019 04:48)  HR: 82 (09 Aug 2019 08:00) (68 - 96)  BP: 148/77 (09 Aug 2019 08:00) (135/71 - 159/82)  BP(mean): 104 (09 Aug 2019 08:00) (95 - 114)  RR: 29 (09 Aug 2019 08:00) (18 - 42)  SpO2: 92% (09 Aug 2019 08:00) (89% - 94%)    PHYSICAL EXAMINATION:    GENERAL: The patient is a well-developed, Obese WM_in no apparent distress.     HEENT: Head is normocephalic and atraumatic. Extraocular muscles are intact. Mucous membranes are moist.     NECK: Supple.     LUNGS:diminished bs bases. few wheezes.  HEART: Regular rate and rhythm without murmur.    ABDOMEN: Soft, nontender, and nondistended.  No hepatosplenomegaly is noted.    EXTREMITIES: Without any cyanosis, clubbing, rash, lesions or edema.    NEUROLOGIC: Grossly intact.    SKIN: No ulceration or induration present.      LABS:                        11.3   16.36 )-----------( 222      ( 09 Aug 2019 03:16 )             35.5     08-09    135  |  101  |  29.0<H>  ----------------------------<  130<H>  4.4   |  22.0  |  1.78<H>    Ca    9.3      09 Aug 2019 03:16  Phos  3.0     08-08  Mg     2.4     08-09    TPro  5.8<L>  /  Alb  3.4  /  TBili  0.6  /  DBili  0.2  /  AST  27  /  ALT  11  /  AlkPhos  44  08-08    PT/INR - ( 09 Aug 2019 03:16 )   PT: 21.8 sec;   INR: 1.86 ratio         PTT - ( 08 Aug 2019 02:33 )  PTT:23.3 sec    ABG - ( 07 Aug 2019 22:05 )  pH, Arterial: 7.38  pH, Blood: x     /  pCO2: 38    /  pO2: 66    / HCO3: 22    / Base Excess: -2.5  /  SaO2: 92                CARDIAC MARKERS ( 08 Aug 2019 02:33 )  x     / 0.41 ng/mL / 403 U/L / x     / 17.3 ng/mL  CARDIAC MARKERS ( 07 Aug 2019 14:21 )  x     / 0.29 ng/mL / 345 U/L / x     / 21.4 ng/mL        Serum Pro-Brain Natriuretic Peptide: 461 pg/mL (08-06-19 @ 22:11)          MICROBIOLOGY:    RADIOLOGY & ADDITIONAL STUDIES:< from: Xray Chest 1 View- PORTABLE-Routine (08.08.19 @ 05:33) >    The lungs  are clear.  No pleural abnormality is seen.        The heart and mediastinum are within normal limits following cardiac    surgery.     Visualized osseous structures are intact.    < end of copied text >

## 2019-08-09 NOTE — PROGRESS NOTE ADULT - SUBJECTIVE AND OBJECTIVE BOX
SUBJECTIVE    Patient seen and examined by me at 11 AM this morning in ICU.  Patient is a little confused this morning, but pleasant and responds 50% of the time appropriately.    REVIEW OF SYSTEMS    General: Not in any pain	    Skin/Breast: No rash  	  ENMT: No visual problems, no sore throat	    Respiratory and Thorax: No cough, No CP, No SOB  	  Cardiovascular: No CP, No palpitations    Gastrointestinal: No Abd pain, No N/V/D    Musculoskeletal: No Joint pain, No back pain	    Neurological: No headache    Psychiatric: No anxiety      OBJECTIVE    Vital Signs Last 24 Hrs  T(C): 37.8 (08-09-19 @ 18:52), Max: 37.8 (08-09-19 @ 18:52)  T(F): 100 (08-09-19 @ 18:52), Max: 100 (08-09-19 @ 18:52)  HR: 92 (08-09-19 @ 16:00) (75 - 96)  BP: 152/90 (08-09-19 @ 16:00) (137/83 - 163/93)  BP(mean): 105 (08-09-19 @ 14:00) (102 - 122)  RR: 23 (08-09-19 @ 16:00) (20 - 42)  SpO2: 94% (08-09-19 @ 16:00) (80% - 96%)    PHYSICAL EXAM:    Constitutional: Not in any distress    Eyes: No conjunctival injection    ENMT: No oral lesions    Neck: No nodes, no adenopathy    Back: Straight, no defects    Respiratory: clear b/l    Cardiovascular: RRR, no murmur    Gastrointestinal: soft, NT, ND    Extremities: No edema, no erythema    Neurological: no focal deficit    Skin: No rash      MEDICATIONS  (STANDING):  ALBUTerol/ipratropium for Nebulization 3 milliLiter(s) Nebulizer every 6 hours  aspirin enteric coated 325 milliGRAM(s) Oral daily  atorvastatin 40 milliGRAM(s) Oral at bedtime  docusate sodium 100 milliGRAM(s) Oral three times a day  insulin lispro (HumaLOG) corrective regimen sliding scale   SubCutaneous Before meals and at bedtime  metoprolol tartrate 50 milliGRAM(s) Oral every 12 hours  pantoprazole    Tablet 40 milliGRAM(s) Oral daily  senna 2 Tablet(s) Oral at bedtime  sertraline 50 milliGRAM(s) Oral daily  sodium chloride 0.9% lock flush 3 milliLiter(s) IV Push every 8 hours  sodium chloride 0.9%. 1000 milliLiter(s) (75 mL/Hr) IV Continuous <Continuous>  tamsulosin 0.4 milliGRAM(s) Oral at bedtime  warfarin 1 milliGRAM(s) Oral once    MEDICATIONS  (PRN):  melatonin 5 milliGRAM(s) Oral at bedtime PRN Insomnia    CARDIAC MARKERS ( 08 Aug 2019 02:33 )  x     / 0.41 ng/mL / 403 U/L / x     / 17.3 ng/mL                            11.3   16.36 )-----------( 222      ( 09 Aug 2019 03:16 )             35.5     09 Aug 2019 13:08    140    |  104    |  27.0   ----------------------------<  129    4.4     |  22.0   |  1.55     Ca    8.9        09 Aug 2019 13:08  Phos  3.0       08 Aug 2019 02:33  Mg     2.4       09 Aug 2019 03:16    TPro  5.8    /  Alb  3.4    /  TBili  0.6    /  DBili  0.2    /  AST  27     /  ALT  11     /  AlkPhos  44     08 Aug 2019 02:33    Allergies    No Known Allergies    Intolerances    OHS (Unknown)

## 2019-08-09 NOTE — CHART NOTE - NSCHARTNOTEFT_GEN_A_CORE
CTS Transfer Acceptance Note to 4 CTU from CTICU     Pt seen an examined.  Pt on 1:1 observation for safety and is on high flow for hypoxemia.  Pt is  confused and in NAD.  VSS

## 2019-08-10 DIAGNOSIS — R41.0 DISORIENTATION, UNSPECIFIED: ICD-10-CM

## 2019-08-10 DIAGNOSIS — K59.01 SLOW TRANSIT CONSTIPATION: ICD-10-CM

## 2019-08-10 DIAGNOSIS — M72.9 FIBROBLASTIC DISORDER, UNSPECIFIED: ICD-10-CM

## 2019-08-10 DIAGNOSIS — J44.9 CHRONIC OBSTRUCTIVE PULMONARY DISEASE, UNSPECIFIED: ICD-10-CM

## 2019-08-10 DIAGNOSIS — D68.51 ACTIVATED PROTEIN C RESISTANCE: ICD-10-CM

## 2019-08-10 LAB
ANION GAP SERPL CALC-SCNC: 11 MMOL/L — SIGNIFICANT CHANGE UP (ref 5–17)
BUN SERPL-MCNC: 28 MG/DL — HIGH (ref 8–20)
CALCIUM SERPL-MCNC: 9.1 MG/DL — SIGNIFICANT CHANGE UP (ref 8.6–10.2)
CHLORIDE SERPL-SCNC: 104 MMOL/L — SIGNIFICANT CHANGE UP (ref 98–107)
CO2 SERPL-SCNC: 24 MMOL/L — SIGNIFICANT CHANGE UP (ref 22–29)
CREAT SERPL-MCNC: 1.52 MG/DL — HIGH (ref 0.5–1.3)
GLUCOSE BLDC GLUCOMTR-MCNC: 125 MG/DL — HIGH (ref 70–99)
GLUCOSE BLDC GLUCOMTR-MCNC: 129 MG/DL — HIGH (ref 70–99)
GLUCOSE BLDC GLUCOMTR-MCNC: 138 MG/DL — HIGH (ref 70–99)
GLUCOSE BLDC GLUCOMTR-MCNC: 145 MG/DL — HIGH (ref 70–99)
GLUCOSE SERPL-MCNC: 119 MG/DL — HIGH (ref 70–115)
HCT VFR BLD CALC: 33.9 % — LOW (ref 39–50)
HGB BLD-MCNC: 10.3 G/DL — LOW (ref 13–17)
INR BLD: 2.02 RATIO — HIGH (ref 0.88–1.16)
MAGNESIUM SERPL-MCNC: 2.3 MG/DL — SIGNIFICANT CHANGE UP (ref 1.6–2.6)
MCHC RBC-ENTMCNC: 26.7 PG — LOW (ref 27–34)
MCHC RBC-ENTMCNC: 30.4 GM/DL — LOW (ref 32–36)
MCV RBC AUTO: 87.8 FL — SIGNIFICANT CHANGE UP (ref 80–100)
PLATELET # BLD AUTO: 191 K/UL — SIGNIFICANT CHANGE UP (ref 150–400)
POTASSIUM SERPL-MCNC: 4.3 MMOL/L — SIGNIFICANT CHANGE UP (ref 3.5–5.3)
POTASSIUM SERPL-SCNC: 4.3 MMOL/L — SIGNIFICANT CHANGE UP (ref 3.5–5.3)
PROTHROM AB SERPL-ACNC: 23.8 SEC — HIGH (ref 10–12.9)
RBC # BLD: 3.86 M/UL — LOW (ref 4.2–5.8)
RBC # FLD: 14.9 % — HIGH (ref 10.3–14.5)
SODIUM SERPL-SCNC: 139 MMOL/L — SIGNIFICANT CHANGE UP (ref 135–145)
WBC # BLD: 11.53 K/UL — HIGH (ref 3.8–10.5)
WBC # FLD AUTO: 11.53 K/UL — HIGH (ref 3.8–10.5)

## 2019-08-10 PROCEDURE — 99233 SBSQ HOSP IP/OBS HIGH 50: CPT

## 2019-08-10 PROCEDURE — 71045 X-RAY EXAM CHEST 1 VIEW: CPT | Mod: 26

## 2019-08-10 RX ORDER — AMIODARONE HYDROCHLORIDE 400 MG/1
400 TABLET ORAL EVERY 8 HOURS
Refills: 0 | Status: DISCONTINUED | OUTPATIENT
Start: 2019-08-10 | End: 2019-08-11

## 2019-08-10 RX ORDER — AMIODARONE HYDROCHLORIDE 400 MG/1
TABLET ORAL
Refills: 0 | Status: DISCONTINUED | OUTPATIENT
Start: 2019-08-10 | End: 2019-08-11

## 2019-08-10 RX ORDER — WARFARIN SODIUM 2.5 MG/1
2.5 TABLET ORAL ONCE
Refills: 0 | Status: COMPLETED | OUTPATIENT
Start: 2019-08-10 | End: 2019-08-10

## 2019-08-10 RX ORDER — SORBITOL SOLUTION 70 %
30 SOLUTION, ORAL MISCELLANEOUS ONCE
Refills: 0 | Status: COMPLETED | OUTPATIENT
Start: 2019-08-10 | End: 2019-08-10

## 2019-08-10 RX ADMIN — ATORVASTATIN CALCIUM 40 MILLIGRAM(S): 80 TABLET, FILM COATED ORAL at 21:12

## 2019-08-10 RX ADMIN — SENNA PLUS 2 TABLET(S): 8.6 TABLET ORAL at 21:12

## 2019-08-10 RX ADMIN — Medication 3 MILLILITER(S): at 09:52

## 2019-08-10 RX ADMIN — SODIUM CHLORIDE 3 MILLILITER(S): 9 INJECTION INTRAMUSCULAR; INTRAVENOUS; SUBCUTANEOUS at 05:41

## 2019-08-10 RX ADMIN — SODIUM CHLORIDE 3 MILLILITER(S): 9 INJECTION INTRAMUSCULAR; INTRAVENOUS; SUBCUTANEOUS at 21:14

## 2019-08-10 RX ADMIN — Medication 30 MILLILITER(S): at 17:53

## 2019-08-10 RX ADMIN — Medication 5 MILLIGRAM(S): at 21:12

## 2019-08-10 RX ADMIN — Medication 100 MILLIGRAM(S): at 21:12

## 2019-08-10 RX ADMIN — WARFARIN SODIUM 2.5 MILLIGRAM(S): 2.5 TABLET ORAL at 21:12

## 2019-08-10 RX ADMIN — AMIODARONE HYDROCHLORIDE 400 MILLIGRAM(S): 400 TABLET ORAL at 21:14

## 2019-08-10 RX ADMIN — Medication 3 MILLILITER(S): at 14:58

## 2019-08-10 RX ADMIN — SODIUM CHLORIDE 3 MILLILITER(S): 9 INJECTION INTRAMUSCULAR; INTRAVENOUS; SUBCUTANEOUS at 14:21

## 2019-08-10 RX ADMIN — Medication 3 MILLILITER(S): at 04:29

## 2019-08-10 RX ADMIN — Medication 50 MILLIGRAM(S): at 17:53

## 2019-08-10 RX ADMIN — SERTRALINE 50 MILLIGRAM(S): 25 TABLET, FILM COATED ORAL at 14:21

## 2019-08-10 RX ADMIN — TAMSULOSIN HYDROCHLORIDE 0.4 MILLIGRAM(S): 0.4 CAPSULE ORAL at 21:12

## 2019-08-10 RX ADMIN — Medication 50 MILLIGRAM(S): at 05:41

## 2019-08-10 RX ADMIN — Medication 3 MILLILITER(S): at 21:05

## 2019-08-10 RX ADMIN — Medication 100 MILLIGRAM(S): at 05:41

## 2019-08-10 RX ADMIN — PANTOPRAZOLE SODIUM 40 MILLIGRAM(S): 20 TABLET, DELAYED RELEASE ORAL at 09:20

## 2019-08-10 RX ADMIN — Medication 325 MILLIGRAM(S): at 09:20

## 2019-08-10 NOTE — PROGRESS NOTE ADULT - SUBJECTIVE AND OBJECTIVE BOX
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  Pt's last CVA was in April and he has not had a stroke since. He has also had an IVC filter in place (Dr. Hutchison, Vascular Surgeon at ).  He endorses CASTILLO, denies palpitations, chest pain, lower extremity edema. 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 Patient extubated 8/7.   8/9 pt intro and medial chest tube d/c . pt still requiring high flow oxygen     ROS: All review of systems negative unless indicated otherwise below.     Lab Results:  CBC  CBC Full  -  ( 10 Aug 2019 06:13 )  WBC Count : 11.53 K/uL  RBC Count : 3.86 M/uL  Hemoglobin : 10.3 g/dL  Hematocrit : 33.9 %  Platelet Count - Automated : 191 K/uL  Mean Cell Volume : 87.8 fl  Mean Cell Hemoglobin : 26.7 pg  Mean Cell Hemoglobin Concentration : 30.4 gm/dL  Auto Neutrophil # : x  Auto Lymphocyte # : x  Auto Monocyte # : x  Auto Eosinophil # : x  Auto Basophil # : x  Auto Neutrophil % : x                          10.3   11.53 )-----------( 191      ( 10 Aug 2019 06:13 )             33.9     08-10    139  |  104  |  28.0<H>  ----------------------------<  119<H>  4.3   |  24.0  |  1.52<H>    Ca    9.1      10 Aug 2019 06:13  Mg     2.3     08-10    PT/INR - ( 10 Aug 2019 06:13 )   PT: 23.8 sec;   INR: 2.02 ratio      Operative Findings:  · Operative Findings	CAD, Aortic valve mass.	    Specimens/Blood Loss/IV/Output/Protocol/VTE:   Specimens/Blood Loss/IV/Output/Protocol/VTE:  · Specimens	Fibroelastoma	  · Estimated Blood Loss	200 milliLiter(s)	  · Antibiotic Protocol	Followed protocol	  · Venous Thromboembolism Prophylaxis Therapy	Heparin	      Other Details/Condition Post op/Disposition:  · Comments/Other Details	EBL inaccurate due to utilization of cell-saver. C2L LIMA-LAD, SVG-RPDA. Aortic cross clamp time 105 minutes, Total bypass time 121 minutes.	  		  		  		    ECHO:    Summary:   1. Technically difficult study.   2. Left ventricular ejection fraction, by visual estimation, is 55 to   60%.   3. Normal global left ventricular systolic function.   4. Abnormal septal motion consistent with post-operative status.   5. The left ventricular diastolic function could not be assessed in this   study.   6. Normal left ventricular internal cavity size.   7. Mild to moderate mitral annular calcification.   8. Moderate thickening and calcification of the anterior and posterior   mitral valve leaflets.   9. Trace mitral valve regurgitation.  10. Mild aortic valve stenosis.  11. There is no evidence of pericardial effusion.  12. Endocardial visualization was enhanced with intravenous echo contrast.    CATH REPORT:     VENTRICLES: No left ventriculogram was performed.  CORONARY VESSELS: The coronary circulation is right dominant.  LM:   --  LM: The vessel was large sized and mildly calcified. Angiography  showed mild atherosclerosis with no flow limiting lesions.  LAD:   --  Proximal LAD: There was a tubular 50 % stenosis. The lesion was  moderately calcified.  --  Mid LAD: There was a tubular 80 % stenosis.  --  Distal LAD: This is a good target for bypass.  --  D1: The vessel was medium to large sized. The artery was supplied by  collaterals from the left system There was a stenosis at the ostium of the  vessel segment. This lesion is a chronic total occlusion.  CX:   --  Mid circumflex: Moderate to severe disease.  --  OM1: The vessel was large sized. Angiography showed mild  atherosclerosis with no flow limiting lesions.  RCA:   --  Proximal RCA: There was a stenosis. This lesion is a chronic  total occlusion.  --  RPDA: The vessel was medium sized. Angiography showed mild  atherosclerosis with no flow limiting lesions. The artery was supplied by  collaterals. This is a good target for bypass.  COMPLICATIONS: No complications occurred during the cath lab visit.  DIAGNOSTIC IMPRESSIONS: Severe LAD disease  Moderate to severe CX disease   of the RCA,    DIAGNOSTIC RECOMMENDATIONS: Evaluation by CT surgery for AVR ,  fibroelastoma removal and bypass to LAD and RCA PDA,    Prepared and signed by  Wagner Santoyo MD    MEDICATIONS  (STANDING):  ALBUTerol/ipratropium for Nebulization 3 milliLiter(s) Nebulizer every 6 hours  aspirin enteric coated 325 milliGRAM(s) Oral daily  atorvastatin 40 milliGRAM(s) Oral at bedtime  docusate sodium 100 milliGRAM(s) Oral three times a day  insulin lispro (HumaLOG) corrective regimen sliding scale   SubCutaneous Before meals and at bedtime  metoprolol tartrate 50 milliGRAM(s) Oral every 12 hours  pantoprazole    Tablet 40 milliGRAM(s) Oral daily  senna 2 Tablet(s) Oral at bedtime  sertraline 50 milliGRAM(s) Oral daily  sodium chloride 0.9% lock flush 3 milliLiter(s) IV Push every 8 hours  sorbitol 70% Solution 30 milliLiter(s) Oral once  tamsulosin 0.4 milliGRAM(s) Oral at bedtime  warfarin 2.5 milliGRAM(s) Oral once    MEDICATIONS  (PRN):  melatonin 5 milliGRAM(s) Oral at bedtime PRN Insomnia    PHYSICAL EXAM:  Vital Signs Last 24 Hrs  T(C): 36.9 (10 Aug 2019 16:06), Max: 37.8 (09 Aug 2019 18:52)  T(F): 98.5 (10 Aug 2019 16:06), Max: 100 (09 Aug 2019 18:52)  HR: 93 (10 Aug 2019 16:06) (76 - 93)  BP: 124/64 (10 Aug 2019 11:00) (111/62 - 166/85)  BP(mean): --  RR: 18 (10 Aug 2019 16:06) (16 - 22)  SpO2: 93% (10 Aug 2019 16:06) (93% - 97%)    GENERAL: NAD, well-groomed, well-developed, obese  HEAD:  Atraumatic, Normocephalic  NECK: Supple, No JVD  NERVOUS SYSTEM:  Alert & Oriented X3, Good concentration; Motor Strength 5/5 B/L upper and lower extremities, sensation intact  CHEST/LUNG: diminished to auscultation bilaterally, bibasilar crackles. No rales, rhonchi, wheezing, or rubs, chest tube in place/pacing wires in place.   STERNUM: chest wall integrity stable, no drainage on op-site dressing, no clicks.   HEART: Regular rate and rhythm; s1 and s2 auscultated, No murmurs, rubs, or gallops  ABDOMEN: obese Soft, Nontender, Nondistended; Bowel sounds present and normoactive.   EXTREMITIES:  2+ Peripheral Pulses, No clubbing, cyanosis, trace edema, LLE ace wrapped.  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  Pt's last CVA was in April and he has not had a stroke since. He has also had an IVC filter in place (Dr. Hutchison, Vascular Surgeon at ).  He endorses CASTILLO, denies palpitations, chest pain, lower extremity edema. 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 Patient extubated 8/7. 8/9 pt intro and medial chest tube d/c.     Problem/Plan - 1:  ·  Problem: CAD (coronary artery disease) of artery bypass graft.  Plan: S/p CABG  no new CP  no new EKG changes   continue metoprolol, lipitor, ASA, coumadin  5 beats NSVT last night, 3 beats NSVT this AM. Will load with amiodarone  EF 55-60% on echo, normal global left vent systolic function, mild AS, trace MR, no effusion. Diastolic dysfunction could not be assessed.   Strict I/O Daily weights  net negative 1L   132kg on admission, 118 kg this AM - 14kg loss this admission.     Problem/Plan - 2:  ·  Problem: Atrial fibrillation, unspecified type.  Plan: Patient SR now 90s   continue coumadin,   INR 2.02 today.  no need for lovenox now, but may use if subtherapeutic.     Problem/Plan - 3:  ·  Problem: Acute kidney injury.  Plan: avoid nephrotoxins  resolving  creatinine 1.52.     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     Problem/Plan - 5:  ·  Problem: COPD (chronic obstructive pulmonary disease).  Plan: Patient long time smoker   Encourage incentive spirometer  Duoenbs ordered  wean highflow as donna - now on 3 L NC   aggressive chest pt and pulmonary toileting.     Problem/Plan - 6:  Problem: Factor 5 Leiden mutation, heterozygous.     Plan: continue coumadin  goal INR 2-3.        In Conclusion :    85 y/o male s/p fibroelastoma removal and CABG , moderate AS   Doing better now on 3L NC from [place]  NSVT, on amiodarone ,    Hx of PE and Factor V Leiden,  on coumadin ,    Pleural tube draining still,    PT/OT .

## 2019-08-10 NOTE — PROGRESS NOTE ADULT - SUBJECTIVE AND OBJECTIVE BOX
SUBJECTIVE    Patient doing well, feels better today than yesterday.  Sometimes has shortness of breath on exertion.    REVIEW OF SYSTEMS    General: Not in any pain	    Skin/Breast: No rash  	  ENMT: No visual problems, no sore throat	    Respiratory and Thorax: No cough, No CP, occas SOB  	  Cardiovascular: No CP, No palpitations    Gastrointestinal: No Abd pain, No N/V/D    Musculoskeletal: No Joint pain, No back pain	    Neurological: No headache    Psychiatric: No anxiety      OBJECTIVE    Vital Signs Last 24 Hrs  T(C): 36.8 (08-10-19 @ 10:00), Max: 37.8 (08-09-19 @ 18:52)  T(F): 98.3 (08-10-19 @ 10:00), Max: 100 (08-09-19 @ 18:52)  HR: 82 (08-10-19 @ 11:00) (76 - 92)  BP: 124/64 (08-10-19 @ 11:00) (111/62 - 166/85)  BP(mean): --  RR: 18 (08-10-19 @ 11:00) (16 - 23)  SpO2: 97% (08-10-19 @ 10:00) (93% - 97%)    PHYSICAL EXAM:    Constitutional: Not in any distress    Eyes: No conjunctival injection    ENMT: No oral lesions    Neck: No nodes, no adenopathy    Back: Straight, no defects    Respiratory: clear b/l    Cardiovascular: RRR, no murmur    Gastrointestinal: soft, NT, ND    Extremities: No edema, no erythema    Neurological: no focal deficit    Skin: No rash      MEDICATIONS  (STANDING):  ALBUTerol/ipratropium for Nebulization 3 milliLiter(s) Nebulizer every 6 hours  aspirin enteric coated 325 milliGRAM(s) Oral daily  atorvastatin 40 milliGRAM(s) Oral at bedtime  docusate sodium 100 milliGRAM(s) Oral three times a day  insulin lispro (HumaLOG) corrective regimen sliding scale   SubCutaneous Before meals and at bedtime  metoprolol tartrate 50 milliGRAM(s) Oral every 12 hours  pantoprazole    Tablet 40 milliGRAM(s) Oral daily  senna 2 Tablet(s) Oral at bedtime  sertraline 50 milliGRAM(s) Oral daily  sodium chloride 0.9% lock flush 3 milliLiter(s) IV Push every 8 hours  sorbitol 70% Solution 30 milliLiter(s) Oral once  tamsulosin 0.4 milliGRAM(s) Oral at bedtime  warfarin 2.5 milliGRAM(s) Oral once    MEDICATIONS  (PRN):  melatonin 5 milliGRAM(s) Oral at bedtime PRN Insomnia                              10.3   11.53 )-----------( 191      ( 10 Aug 2019 06:13 )             33.9     10 Aug 2019 06:13    139    |  104    |  28.0   ----------------------------<  119    4.3     |  24.0   |  1.52     Ca    9.1        10 Aug 2019 06:13  Mg     2.3       10 Aug 2019 06:13      Allergies    No Known Allergies    Intolerances    OHS (Unknown)

## 2019-08-10 NOTE — PROGRESS NOTE ADULT - SUBJECTIVE AND OBJECTIVE BOX
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  Pt's last CVA was in April and he has not had a stroke since. He has also had an IVC filter in place (Dr. Hutchison, Vascular Surgeon at ).  He endorses CASTILLO, denies palpitations, chest pain, lower extremity edema. 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 Patient extubated 8/7.   8/9 pt intro and medial chest tube d/c . pt still requiring high flow oxygen     ROS: All review of systems negative unless indicated otherwise below.     Lab Results:  CBC  CBC Full  -  ( 10 Aug 2019 06:13 )  WBC Count : 11.53 K/uL  RBC Count : 3.86 M/uL  Hemoglobin : 10.3 g/dL  Hematocrit : 33.9 %  Platelet Count - Automated : 191 K/uL  Mean Cell Volume : 87.8 fl  Mean Cell Hemoglobin : 26.7 pg  Mean Cell Hemoglobin Concentration : 30.4 gm/dL  Auto Neutrophil # : x  Auto Lymphocyte # : x  Auto Monocyte # : x  Auto Eosinophil # : x  Auto Basophil # : x  Auto Neutrophil % : x  Auto Lymphocyte % : x  Auto Monocyte % : x  Auto Eosinophil % : x  Auto Basophil % : x    .		Differential:	[] Automated		[] Manual  Chemistry                        10.3   11.53 )-----------( 191      ( 10 Aug 2019 06:13 )             33.9     08-10    139  |  104  |  28.0<H>  ----------------------------<  119<H>  4.3   |  24.0  |  1.52<H>    Ca    9.1      10 Aug 2019 06:13  Mg     2.3     08-10    PT/INR - ( 10 Aug 2019 06:13 )   PT: 23.8 sec;   INR: 2.02 ratio      Operative Findings:  · Operative Findings	CAD, Aortic valve mass.	    Specimens/Blood Loss/IV/Output/Protocol/VTE:   Specimens/Blood Loss/IV/Output/Protocol/VTE:  · Specimens	Fibroelastoma	  · Estimated Blood Loss	200 milliLiter(s)	  · Antibiotic Protocol	Followed protocol	  · Venous Thromboembolism Prophylaxis Therapy	Heparin	      Other Details/Condition Post op/Disposition:  · Comments/Other Details	EBL inaccurate due to utilization of cell-saver. C2L LIMA-LAD, SVG-RPDA. Aortic cross clamp time 105 minutes, Total bypass time 121 minutes.	  		  · Condition Post op	Critical	  · Disposition	CTICU	    ECHO:  < from: TTE Echo Complete w/Doppler (08.09.19 @ 11:27) >  Summary:   1. Technically difficult study.   2. Left ventricular ejection fraction, by visual estimation, is 55 to   60%.   3. Normal global left ventricular systolic function.   4. Abnormal septal motion consistent with post-operative status.   5. The left ventricular diastolic function could not be assessed in this   study.   6. Normal left ventricular internal cavity size.   7. Mild to moderate mitral annular calcification.   8. Moderate thickening and calcification of the anterior and posterior   mitral valve leaflets.   9. Trace mitral valve regurgitation.  10. Mild aortic valve stenosis.  11. There is no evidence of pericardial effusion.  12. Endocardial visualization was enhanced with intravenous echo contrast.    < end of copied text >    CATH REPORT:   < from: Cardiac Cath Lab - Adult (08.06.19 @ 15:20) >  VENTRICLES: No left ventriculogram was performed.  CORONARY VESSELS: The coronary circulation is right dominant.  LM:   --  LM: The vessel was large sized and mildly calcified. Angiography  showed mild atherosclerosis with no flow limiting lesions.  LAD:   --  Proximal LAD: There was a tubular 50 % stenosis. The lesion was  moderately calcified.  --  Mid LAD: There was a tubular 80 % stenosis.  --  Distal LAD: This is a good target for bypass.  --  D1: The vessel was medium to large sized. The artery was supplied by  collaterals from the left system There was a stenosis at the ostium of the  vessel segment. This lesion is a chronic total occlusion.  CX:   --  Mid circumflex: Moderate to severe disease.  --  OM1: The vessel was large sized. Angiography showed mild  atherosclerosis with no flow limiting lesions.  RCA:   --  Proximal RCA: There was a stenosis. This lesion is a chronic  total occlusion.  --  RPDA: The vessel was medium sized. Angiography showed mild  atherosclerosis with no flow limiting lesions. The artery was supplied by  collaterals. This is a good target for bypass.  COMPLICATIONS: No complications occurred during the cath lab visit.  DIAGNOSTIC IMPRESSIONS: Severe LAD disease  Moderate to severe CX disease   of the RCA  DIAGNOSTIC RECOMMENDATIONS: Evaluation by CT surgery for AVR ,  fibroelastoma removal and bypass to LAD and RCA PDA  Prepared and signed by  Wagner Santoyo MD    < end of copied text >    MEDICATIONS  (STANDING):  ALBUTerol/ipratropium for Nebulization 3 milliLiter(s) Nebulizer every 6 hours  aspirin enteric coated 325 milliGRAM(s) Oral daily  atorvastatin 40 milliGRAM(s) Oral at bedtime  docusate sodium 100 milliGRAM(s) Oral three times a day  insulin lispro (HumaLOG) corrective regimen sliding scale   SubCutaneous Before meals and at bedtime  metoprolol tartrate 50 milliGRAM(s) Oral every 12 hours  pantoprazole    Tablet 40 milliGRAM(s) Oral daily  senna 2 Tablet(s) Oral at bedtime  sertraline 50 milliGRAM(s) Oral daily  sodium chloride 0.9% lock flush 3 milliLiter(s) IV Push every 8 hours  sorbitol 70% Solution 30 milliLiter(s) Oral once  tamsulosin 0.4 milliGRAM(s) Oral at bedtime  warfarin 2.5 milliGRAM(s) Oral once    MEDICATIONS  (PRN):  melatonin 5 milliGRAM(s) Oral at bedtime PRN Insomnia    PHYSICAL EXAM:  Vital Signs Last 24 Hrs  T(C): 36.9 (10 Aug 2019 16:06), Max: 37.8 (09 Aug 2019 18:52)  T(F): 98.5 (10 Aug 2019 16:06), Max: 100 (09 Aug 2019 18:52)  HR: 93 (10 Aug 2019 16:06) (76 - 93)  BP: 124/64 (10 Aug 2019 11:00) (111/62 - 166/85)  BP(mean): --  RR: 18 (10 Aug 2019 16:06) (16 - 22)  SpO2: 93% (10 Aug 2019 16:06) (93% - 97%)    GENERAL: NAD, well-groomed, well-developed, obese  HEAD:  Atraumatic, Normocephalic  NECK: Supple, No JVD  NERVOUS SYSTEM:  Alert & Oriented X3, Good concentration; Motor Strength 5/5 B/L upper and lower extremities, sensation intact  CHEST/LUNG: diminished to auscultation bilaterally, bibasilar crackles. No rales, rhonchi, wheezing, or rubs, chest tube in place/pacing wires in place.   STERNUM: chest wall integrity stable, no drainage on op-site dressing, no clicks.   HEART: Regular rate and rhythm; s1 and s2 auscultated, No murmurs, rubs, or gallops  ABDOMEN: obese Soft, Nontender, Nondistended; Bowel sounds present and normoactive.   EXTREMITIES:  2+ Peripheral Pulses, No clubbing, cyanosis, trace edema, LLE ace wrapped.  SKIN: No rashes or lesions

## 2019-08-10 NOTE — PROGRESS NOTE ADULT - ASSESSMENT
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  Pt's last CVA was in April and he has not had a stroke since. He has also had an IVC filter in place (Dr. Hutchison, Vascular Surgeon at ).  He endorses CASTILLO, denies palpitations, chest pain, lower extremity edema. 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 Patient extubated 8/7. 8/9 pt intro and medial chest tube d/c.

## 2019-08-10 NOTE — PROGRESS NOTE ADULT - SUBJECTIVE AND OBJECTIVE BOX
Subjective:  PT in bed NAD.  off 1:1 observation.  Off high flow     VITAL SIGNS  T(C): 36.8 (08-10-19 @ 10:00), Max: 37.8 (19 @ 18:52)  HR: 82 (08-10-19 @ 11:00) (76 - 92)  BP: 124/64 (08-10-19 @ 11:00) (111/62 - 166/85)  RR: 18 (08-10-19 @ 11:00) (16 - 26)  SpO2: 97% (08-10-19 @ 10:00) (93% - 97%) RA             Daily     Daily Weight in k.9 (10 Aug 2019 04:17)  Admit Wt: Drug Dosing Weight  Height (cm): 185.42 (07 Aug 2019 06:26)  Weight (kg): 132.4 (07 Aug 2019 06:26)    Telemetry:   SR   LVEF:  nml     MEDICATIONS  ALBUTerol/ipratropium for Nebulization 3 milliLiter(s) Nebulizer every 6 hours  aspirin enteric coated 325 milliGRAM(s) Oral daily  atorvastatin 40 milliGRAM(s) Oral at bedtime  docusate sodium 100 milliGRAM(s) Oral three times a day  insulin lispro (HumaLOG) corrective regimen sliding scale   SubCutaneous Before meals and at bedtime  melatonin 5 milliGRAM(s) Oral at bedtime PRN  metoprolol tartrate 50 milliGRAM(s) Oral every 12 hours  pantoprazole    Tablet 40 milliGRAM(s) Oral daily  senna 2 Tablet(s) Oral at bedtime  sertraline 50 milliGRAM(s) Oral daily  sodium chloride 0.9% lock flush 3 milliLiter(s) IV Push every 8 hours  sorbitol 70% Solution 30 milliLiter(s) Oral once  tamsulosin 0.4 milliGRAM(s) Oral at bedtime  warfarin 2.5 milliGRAM(s) Oral once    MEDICATIONS  (PRN):  melatonin 5 milliGRAM(s) Oral at bedtime PRN Insomnia        PHYSICAL EXAM  General: Alert Awake NAD   Respiratory:  decreased at bases b/l + Lt CT with serosang drainage   CV: RRR S1 S2   Abdomen:  soft NT ND + BS - BM  Extremities:  1+ edema b/ l LE + EVH site C/D/I    Incisions: MSI C/D/I  stable sternum       Chest tubes: LT CT 220cc x 24 hours no lizzette        I&O's Detail    09 Aug 2019 07:01  -  10 Aug 2019 07:00  --------------------------------------------------------  IN:    Oral Fluid: 220 mL    sodium chloride 0.9%: 150 mL  Total IN: 370 mL    OUT:    Chest Tube: 220 mL    Indwelling Catheter - Urethral: 310 mL    Voided: 900 mL  Total OUT: 1430 mL    Total NET: -1060 mL      10 Aug 2019 07:01  -  10 Aug 2019 13:01  --------------------------------------------------------  IN:    Oral Fluid: 240 mL  Total IN: 240 mL    OUT:  Total OUT: 0 mL    Total NET: 240 mL          LABS  08-10    139  |  104  |  28.0<H>  ----------------------------<  119<H>  4.3   |  24.0  |  1.52<H>    Ca    9.1      10 Aug 2019 06:13  Mg     2.3     08-10                                   10.3   11.53 )-----------( 191      ( 10 Aug 2019 06:13 )             33.9          PT/INR - ( 10 Aug 2019 06:13 )   PT: 23.8 sec;   INR: 2.02 ratio                    CAPILLARY BLOOD GLUCOSE      POCT Blood Glucose.: 129 mg/dL (10 Aug 2019 12:10)  POCT Blood Glucose.: 125 mg/dL (10 Aug 2019 08:05)  POCT Blood Glucose.: 121 mg/dL (09 Aug 2019 21:57)  POCT Blood Glucose.: 118 mg/dL (09 Aug 2019 16:58)  POCT Blood Glucose.: 118 mg/dL (09 Aug 2019 15:25)           Today's CXR:  < from: Xray Chest 1 View- PORTABLE-Routine (08.10.19 @ 05:47) >  Left chest tube in place.  Mild left-sided postoperative medial pneumomediastinum noted  The lungs  are clear.  No pleural abnormality is seen.        The heart and mediastinum are within normal limits following cardiac    surgery.     Visualized osseous structures are intact.        IMPRESSION:    Status post open heart surgery. Small LEFT postoperative   pneumomediastinum.  No evidence of active chest pathology.    Catheters in place.    < end of copied text >      PAST MEDICAL & SURGICAL HISTORY:  Aortic stenosis  Atheroma  Cerebrovascular accident (CVA)  History of COPD  CAD S/P percutaneous coronary angioplasty  Atrial fibrillation, unspecified type  [place] filter in place  PE (pulmonary embolism)  Hyperlipemia  Hypertension  S/P cataract surgery  S/P IVC filter

## 2019-08-10 NOTE — PROGRESS NOTE ADULT - PROBLEM SELECTOR PLAN 5
Patient long time smoker   Encourage incentive spirometer  Duoenbs ordered  wean highflow as donna - now on 3 L NC   aggressive chest pt and pulmonary toileting.

## 2019-08-10 NOTE — PROGRESS NOTE ADULT - ASSESSMENT
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  Pt's last CVA was in April and he has not had a stroke since. He has also had an IVC filter in place (Dr. Hutchison, Vascular Surgeon at ).  He endorses CASTILLO, denies palpitations, chest pain, lower extremity edema. 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 Patient extubated 8/7.   8/9 pt intro and medial chest tube d/c . pt still requiring high flow oxygen

## 2019-08-11 ENCOUNTER — TRANSCRIPTION ENCOUNTER (OUTPATIENT)
Age: 84
End: 2019-08-11

## 2019-08-11 DIAGNOSIS — E87.6 HYPOKALEMIA: ICD-10-CM

## 2019-08-11 DIAGNOSIS — K59.00 CONSTIPATION, UNSPECIFIED: ICD-10-CM

## 2019-08-11 DIAGNOSIS — E78.5 HYPERLIPIDEMIA, UNSPECIFIED: ICD-10-CM

## 2019-08-11 DIAGNOSIS — G47.00 INSOMNIA, UNSPECIFIED: ICD-10-CM

## 2019-08-11 DIAGNOSIS — Z29.9 ENCOUNTER FOR PROPHYLACTIC MEASURES, UNSPECIFIED: ICD-10-CM

## 2019-08-11 DIAGNOSIS — I10 ESSENTIAL (PRIMARY) HYPERTENSION: ICD-10-CM

## 2019-08-11 LAB
ANION GAP SERPL CALC-SCNC: 10 MMOL/L — SIGNIFICANT CHANGE UP (ref 5–17)
BUN SERPL-MCNC: 28 MG/DL — HIGH (ref 8–20)
CALCIUM SERPL-MCNC: 8.5 MG/DL — LOW (ref 8.6–10.2)
CHLORIDE SERPL-SCNC: 104 MMOL/L — SIGNIFICANT CHANGE UP (ref 98–107)
CO2 SERPL-SCNC: 25 MMOL/L — SIGNIFICANT CHANGE UP (ref 22–29)
CREAT SERPL-MCNC: 1.28 MG/DL — SIGNIFICANT CHANGE UP (ref 0.5–1.3)
GLUCOSE BLDC GLUCOMTR-MCNC: 115 MG/DL — HIGH (ref 70–99)
GLUCOSE BLDC GLUCOMTR-MCNC: 133 MG/DL — HIGH (ref 70–99)
GLUCOSE SERPL-MCNC: 129 MG/DL — HIGH (ref 70–115)
HCT VFR BLD CALC: 32.3 % — LOW (ref 39–50)
HGB BLD-MCNC: 10.2 G/DL — LOW (ref 13–17)
INR BLD: 2.06 RATIO — HIGH (ref 0.88–1.16)
MAGNESIUM SERPL-MCNC: 2.1 MG/DL — SIGNIFICANT CHANGE UP (ref 1.6–2.6)
MCHC RBC-ENTMCNC: 27.4 PG — SIGNIFICANT CHANGE UP (ref 27–34)
MCHC RBC-ENTMCNC: 31.6 GM/DL — LOW (ref 32–36)
MCV RBC AUTO: 86.8 FL — SIGNIFICANT CHANGE UP (ref 80–100)
PLATELET # BLD AUTO: 211 K/UL — SIGNIFICANT CHANGE UP (ref 150–400)
POTASSIUM SERPL-MCNC: 3.7 MMOL/L — SIGNIFICANT CHANGE UP (ref 3.5–5.3)
POTASSIUM SERPL-SCNC: 3.7 MMOL/L — SIGNIFICANT CHANGE UP (ref 3.5–5.3)
PROTHROM AB SERPL-ACNC: 24.2 SEC — HIGH (ref 10–12.9)
RBC # BLD: 3.72 M/UL — LOW (ref 4.2–5.8)
RBC # FLD: 14.8 % — HIGH (ref 10.3–14.5)
SODIUM SERPL-SCNC: 139 MMOL/L — SIGNIFICANT CHANGE UP (ref 135–145)
WBC # BLD: 11.23 K/UL — HIGH (ref 3.8–10.5)
WBC # FLD AUTO: 11.23 K/UL — HIGH (ref 3.8–10.5)

## 2019-08-11 PROCEDURE — 93010 ELECTROCARDIOGRAM REPORT: CPT

## 2019-08-11 PROCEDURE — 99233 SBSQ HOSP IP/OBS HIGH 50: CPT

## 2019-08-11 PROCEDURE — 99024 POSTOP FOLLOW-UP VISIT: CPT

## 2019-08-11 RX ORDER — ACETAMINOPHEN 500 MG
650 TABLET ORAL EVERY 6 HOURS
Refills: 0 | Status: DISCONTINUED | OUTPATIENT
Start: 2019-08-11 | End: 2019-08-13

## 2019-08-11 RX ORDER — PSYLLIUM SEED (WITH DEXTROSE)
1 POWDER (GRAM) ORAL DAILY
Refills: 0 | Status: DISCONTINUED | OUTPATIENT
Start: 2019-08-11 | End: 2019-08-13

## 2019-08-11 RX ORDER — POTASSIUM CHLORIDE 20 MEQ
40 PACKET (EA) ORAL ONCE
Refills: 0 | Status: COMPLETED | OUTPATIENT
Start: 2019-08-11 | End: 2019-08-11

## 2019-08-11 RX ORDER — WARFARIN SODIUM 2.5 MG/1
2.5 TABLET ORAL ONCE
Refills: 0 | Status: COMPLETED | OUTPATIENT
Start: 2019-08-11 | End: 2019-08-11

## 2019-08-11 RX ORDER — AMIODARONE HYDROCHLORIDE 400 MG/1
400 TABLET ORAL EVERY 8 HOURS
Refills: 0 | Status: DISCONTINUED | OUTPATIENT
Start: 2019-08-11 | End: 2019-08-13

## 2019-08-11 RX ORDER — AMIODARONE HYDROCHLORIDE 400 MG/1
TABLET ORAL
Refills: 0 | Status: DISCONTINUED | OUTPATIENT
Start: 2019-08-11 | End: 2019-08-13

## 2019-08-11 RX ORDER — AMIODARONE HYDROCHLORIDE 400 MG/1
200 TABLET ORAL DAILY
Refills: 0 | Status: DISCONTINUED | OUTPATIENT
Start: 2019-08-14 | End: 2019-08-13

## 2019-08-11 RX ADMIN — WARFARIN SODIUM 2.5 MILLIGRAM(S): 2.5 TABLET ORAL at 21:06

## 2019-08-11 RX ADMIN — AMIODARONE HYDROCHLORIDE 400 MILLIGRAM(S): 400 TABLET ORAL at 21:06

## 2019-08-11 RX ADMIN — TAMSULOSIN HYDROCHLORIDE 0.4 MILLIGRAM(S): 0.4 CAPSULE ORAL at 21:06

## 2019-08-11 RX ADMIN — Medication 100 MILLIGRAM(S): at 21:06

## 2019-08-11 RX ADMIN — Medication 3 MILLILITER(S): at 09:15

## 2019-08-11 RX ADMIN — Medication 325 MILLIGRAM(S): at 09:29

## 2019-08-11 RX ADMIN — SERTRALINE 50 MILLIGRAM(S): 25 TABLET, FILM COATED ORAL at 09:29

## 2019-08-11 RX ADMIN — SODIUM CHLORIDE 3 MILLILITER(S): 9 INJECTION INTRAMUSCULAR; INTRAVENOUS; SUBCUTANEOUS at 09:13

## 2019-08-11 RX ADMIN — Medication 100 MILLIGRAM(S): at 13:00

## 2019-08-11 RX ADMIN — Medication 50 MILLIGRAM(S): at 17:18

## 2019-08-11 RX ADMIN — AMIODARONE HYDROCHLORIDE 400 MILLIGRAM(S): 400 TABLET ORAL at 05:55

## 2019-08-11 RX ADMIN — AMIODARONE HYDROCHLORIDE 400 MILLIGRAM(S): 400 TABLET ORAL at 13:00

## 2019-08-11 RX ADMIN — Medication 50 MILLIGRAM(S): at 05:55

## 2019-08-11 RX ADMIN — Medication 1 PACKET(S): at 17:18

## 2019-08-11 RX ADMIN — SENNA PLUS 2 TABLET(S): 8.6 TABLET ORAL at 21:06

## 2019-08-11 RX ADMIN — Medication 40 MILLIEQUIVALENT(S): at 09:29

## 2019-08-11 RX ADMIN — ATORVASTATIN CALCIUM 40 MILLIGRAM(S): 80 TABLET, FILM COATED ORAL at 21:06

## 2019-08-11 RX ADMIN — PANTOPRAZOLE SODIUM 40 MILLIGRAM(S): 20 TABLET, DELAYED RELEASE ORAL at 09:29

## 2019-08-11 RX ADMIN — SODIUM CHLORIDE 3 MILLILITER(S): 9 INJECTION INTRAMUSCULAR; INTRAVENOUS; SUBCUTANEOUS at 21:06

## 2019-08-11 RX ADMIN — Medication 3 MILLILITER(S): at 21:10

## 2019-08-11 RX ADMIN — Medication 3 MILLILITER(S): at 04:00

## 2019-08-11 RX ADMIN — Medication 5 MILLIGRAM(S): at 21:06

## 2019-08-11 RX ADMIN — SODIUM CHLORIDE 3 MILLILITER(S): 9 INJECTION INTRAMUSCULAR; INTRAVENOUS; SUBCUTANEOUS at 05:55

## 2019-08-11 RX ADMIN — Medication 100 MILLIGRAM(S): at 05:55

## 2019-08-11 NOTE — PROGRESS NOTE ADULT - SUBJECTIVE AND OBJECTIVE BOX
Subjective: OOB to chair, denies any SOB or chest pain, NAD noted.    VITAL SIGNS  Vital Signs Last 24 Hrs  T(C): 37 (19 @ 05:53), Max: 37 (08-10-19 @ 21:09)  T(F): 98.6 (19 @ 05:53), Max: 98.6 (08-10-19 @ 21:09)  HR: 90 (19 @ 05:53) (80 - 103)  BP: 140/79 (19 @ 05:53) (111/62 - 149/83)  RR: 16 (19 @ 05:53) (16 - 18)  SpO2: 94% (19 @ 05:53) (92% - 97%)  on (O2)              Telemetry: NSR,   LVEF: normal    MEDICATIONS  ALBUTerol/ipratropium for Nebulization 3 milliLiter(s) Nebulizer every 6 hours  amiodarone    Tablet   Oral   amiodarone    Tablet 400 milliGRAM(s) Oral every 8 hours  aspirin enteric coated 325 milliGRAM(s) Oral daily  atorvastatin 40 milliGRAM(s) Oral at bedtime  docusate sodium 100 milliGRAM(s) Oral three times a day  insulin lispro (HumaLOG) corrective regimen sliding scale   SubCutaneous Before meals and at bedtime  melatonin 5 milliGRAM(s) Oral at bedtime PRN  metoprolol tartrate 50 milliGRAM(s) Oral every 12 hours  pantoprazole    Tablet 40 milliGRAM(s) Oral daily  potassium chloride   Powder 40 milliEquivalent(s) Oral once  senna 2 Tablet(s) Oral at bedtime  sertraline 50 milliGRAM(s) Oral daily  sodium chloride 0.9% lock flush 3 milliLiter(s) IV Push every 8 hours  tamsulosin 0.4 milliGRAM(s) Oral at bedtime  warfarin 2.5 milliGRAM(s) Oral once    PHYSICAL EXAM  General: well nourished, well developed, no acute distress  Neurology: alert and oriented x 3, nonfocal, no gross deficits  Respiratory: clear to auscultation bilaterally  CV: regular rate and rhythm, normal S1, S2  Abdomen: soft, nontender, obese, nondistended, positive bowel sounds, last bowel movement 19  Extremities: warm, well perfused. +1 ankle edema. + DP pulses  Incisions: Midline sternal incision, + Mepilex CDI. Sternum stable. Left SVG incision CDI with +Mepilex dressing.  Chest tubes: left chest tube to water seal, no air leak detected    I&O's Detail    10 Aug 2019 07:01  -  11 Aug 2019 07:00  --------------------------------------------------------  IN:    Oral Fluid: 440 mL  Total IN: 440 mL    OUT:    Chest Tube: 140 mL    Voided: 1250 mL  Total OUT: 1390 mL    Total NET: -950 mL    Daily Weight in k.4 (11 Aug 2019 06:49)  Admit Wt: Drug Dosing Weight  Height (cm): 185.42 (07 Aug 2019 06:26)  Weight (kg): 132.4 (07 Aug 2019 06:26)  BMI (kg/m2): 38.5 (07 Aug 2019 06:26)  BSA (m2): 2.53 (07 Aug 2019 06:26)    LABS      139  |  104  |  28.0<H>  ----------------------------<  129<H>  3.7   |  25.0  |  1.28    Ca    8.5<L>      11 Aug 2019 06:19  Mg     2.1                            10.2   11.23 )-----------( 211      ( 11 Aug 2019 06:25 )             32.3          PT/INR - ( 11 Aug 2019 06:24 )   PT: 24.2 sec;   INR: 2.06 ratio      CAPILLARY BLOOD GLUCOSE    POCT Blood Glucose.: 133 mg/dL (11 Aug 2019 08:11)  POCT Blood Glucose.: 138 mg/dL (10 Aug 2019 20:59)  POCT Blood Glucose.: 145 mg/dL (10 Aug 2019 17:06)  POCT Blood Glucose.: 129 mg/dL (10 Aug 2019 12:10)         EXAM:  XR CHEST PORTABLE ROUTINE 1V                          PROCEDURE DATE:  08/10/2019      INTERPRETATION:  Portable chest radiograph       CLINICAL INFORMATION:  Postoperative  Cardiac surgery.    TECHNIQUE:  Portable  AP view of the chestwas obtained.    FINDINGS:   2019 available for review.  Left chest tube in place.  Mild left-sided postoperative medial pneumomediastinum noted  The lungs  are clear.  No pleural abnormality is seen.    The heart and mediastinum are within normal limits following cardiac    surgery.     Visualized osseous structures are intact.    IMPRESSION:    Status post open heart surgery. Small LEFT postoperative   pneumomediastinum.  No evidence of active chest pathology.  Catheters in place.    JANESSA REED M.D., ATTENDING RADIOLOGIST  This document has been electronically signed. Aug 10 2019 11:41AM    Today's EKG:  Ventricular Rate 83 BPM    Atrial Rate 83 BPM    QRS Duration 122 ms    Q-T Interval 398 ms    QTC Calculation(Bezet) 467 ms    R Axis -36 degrees    T Axis -12 degrees    Diagnosis Line Normal sinus rhythm  Inferior infarct , age undetermined  Abnormal ECG    PAST MEDICAL & SURGICAL HISTORY:  Aortic stenosis  Atheroma  Cerebrovascular accident (CVA)  History of COPD  CAD S/P percutaneous coronary angioplasty  Atrial fibrillation, unspecified type  [place] filter in place  PE (pulmonary embolism)  Hyperlipemia  Hypertension  S/P cataract surgery  S/P IVC filter     Assessment and Plan:     This is an 83 y/o obese, White M, former smoker (1PPD x20 years remote hx), with a PMHx of factor V Leiden (on warfarin), moderate AS, HTN, CAD with PCI x2 ( at [facility]; report requested), PE x2, 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  Pt's last CVA was in April and he has not had a stroke since. He has also had an IVC filter in place (Dr. Hutchison, Vascular Surgeon at ).  He endorses CASTILLO, denies palpitations, chest pain, lower extremity edema. Patient went to the operating room for an C2 and excision of fibroelastoma . Patient extubated .    pt intro and medial chest tube d/c . pt still requiring high flow oxygen      ·  Problem: CAD (coronary artery disease) of artery bypass graft.  Plan: S/P  C2L on 19,       ·  Problem: Aortic stenosis.  Plan: s/p AV fibroelastoma      ·  Problem: Hypertension.  Plan: Continue DASH diet.  Continue Lopressor.     ·  Problem: Atrial fibrillation, unspecified type.  Plan: Remains in Sinus rhythm    ·  Problem: Hypokalemia.  Plan: Potassium replaced, repeat serum potassium in morning.     MILAGRO Resolved, Continue Current Management,    OP F.U As Needed, TY.

## 2019-08-11 NOTE — PROGRESS NOTE ADULT - ASSESSMENT
85 y/o male s/p fibroelastoma removal and CABG , moderate AS   Doing better now on 3L NC,     NSR,     Hx of PE and Factor V Leiden,  on coumadin ,    Pleural tube draining still,    PT/OT .

## 2019-08-11 NOTE — CHART NOTE - NSCHARTNOTEFT_GEN_A_CORE
Pt. had 13 beats of VT, pt. asymptomatic & hemodynamically stable. Amio load resumed, discussed plan with Dr. Alvarado

## 2019-08-11 NOTE — PROGRESS NOTE ADULT - SUBJECTIVE AND OBJECTIVE BOX
SUBJECTIVE    Patient doing okay, unable to sleep last night.  Patient tolerating by mouth and ambulating okay.    REVIEW OF SYSTEMS    General: Not in any pain	    Skin/Breast: No rash  	  ENMT: No visual problems, no sore throat	    Respiratory and Thorax: No cough, No CP, No SOB  	  Cardiovascular: No CP, No palpitations    Gastrointestinal: No Abd pain, No N/V/D    Musculoskeletal: No Joint pain, No back pain	    Neurological: No headache    Psychiatric: No anxiety      OBJECTIVE    Vital Signs Last 24 Hrs  T(C): 36.8 (08-11-19 @ 09:34), Max: 37 (08-10-19 @ 21:09)  T(F): 98.2 (08-11-19 @ 09:34), Max: 98.6 (08-10-19 @ 21:09)  HR: 86 (08-11-19 @ 09:34) (79 - 103)  BP: 131/82 (08-11-19 @ 10:33) (120/68 - 149/83)  BP(mean): --  RR: 19 (08-11-19 @ 09:34) (16 - 19)  SpO2: 95% (08-11-19 @ 09:34) (89% - 95%)    PHYSICAL EXAM:    Constitutional: Not in any distress    Eyes: No conjunctival injection    ENMT: No oral lesions    Neck: No nodes, no adenopathy    Back: Straight, no defects    Respiratory: clear b/l    Cardiovascular: RRR, no murmur    Gastrointestinal: soft, NT, ND    Extremities: No edema, no erythema    Neurological: no focal deficit    Skin: No rash      MEDICATIONS  (STANDING):  ALBUTerol/ipratropium for Nebulization 3 milliLiter(s) Nebulizer every 6 hours  amiodarone    Tablet   Oral   amiodarone    Tablet 400 milliGRAM(s) Oral every 8 hours  aspirin enteric coated 325 milliGRAM(s) Oral daily  atorvastatin 40 milliGRAM(s) Oral at bedtime  docusate sodium 100 milliGRAM(s) Oral three times a day  metoprolol tartrate 50 milliGRAM(s) Oral every 12 hours  pantoprazole    Tablet 40 milliGRAM(s) Oral daily  psyllium Powder 1 Packet(s) Oral daily  senna 2 Tablet(s) Oral at bedtime  sertraline 50 milliGRAM(s) Oral daily  sodium chloride 0.9% lock flush 3 milliLiter(s) IV Push every 8 hours  tamsulosin 0.4 milliGRAM(s) Oral at bedtime  warfarin 2.5 milliGRAM(s) Oral once    MEDICATIONS  (PRN):  acetaminophen   Tablet .. 650 milliGRAM(s) Oral every 6 hours PRN Moderate Pain (4 - 6)  melatonin 5 milliGRAM(s) Oral at bedtime PRN Insomnia                              10.2   11.23 )-----------( 211      ( 11 Aug 2019 06:25 )             32.3     11 Aug 2019 06:19    139    |  104    |  28.0   ----------------------------<  129    3.7     |  25.0   |  1.28     Ca    8.5        11 Aug 2019 06:19  Mg     2.1       11 Aug 2019 06:19      Allergies    No Known Allergies    Intolerances    OHS (Unknown)

## 2019-08-11 NOTE — PROGRESS NOTE ADULT - ATTENDING COMMENTS
83 y/o male with moderate AS , history of CVA x 2 s/p AV fibroelastoma removal and CABG x  2   left pleural effusion s/p left chest tube 90 ml drain   on amiodarone for NSVT   on coumadin for PE
83 y/o male s/p fibroelastoma removal and CABG , moderate AS   Doing better now on 3L NC from [place]  NSVT  will start amiodarone   Hx of PE and Factor V leiden on coumadin   Pleural tube draining still  PT/OT

## 2019-08-11 NOTE — PROGRESS NOTE ADULT - SUBJECTIVE AND OBJECTIVE BOX
[facility]                                                        Office: [address] [address]                                                       Telephone: [phone]. Fax:[phone]                                                                             PROGRESS NOTE   Reason for follow up: CAD/AVR                            Overnight: No new events.   Update: Atrial tachy this am and PVC"s with runs up to 13 beats.  Denies chest pain, sob, wheezing, or palpitations.    Subjective: "I need to go to rehab"   Complains of:  Nothing  Review of symptoms: Cardiac:  No chest pain. No dyspnea. No palpitations.  Respiratory: no cough. No dyspnea  Gastrointestinal: No diarrhea. No abdominal pain. No bleeding.     Past medical history: No updates.   Hypokalemia: Hypokalemia  Constipation: Constipation  Insomnia: Insomnia  Hyperlipemia: Hyperlipemia  Hypertension: Hypertension  Factor 5 Leiden mutation, heterozygous: Factor 5 Leiden mutation, heterozygous  COPD (chronic obstructive pulmonary disease): COPD (chronic obstructive pulmonary disease)  Fibroblastic disorder: Fibroblastic disorder  Slow transit constipation: Slow transit constipation  Delirium: Delirium  Acute kidney injury: Acute kidney injury  Atrial fibrillation, unspecified type: Atrial fibrillation, unspecified type  CAD (coronary artery disease) of artery bypass graft: CAD (coronary artery disease) of artery bypass graft  Aortic valve stenosis, etiology of cardiac valve disease unspecified: Aortic valve stenosis, etiology of cardiac valve disease unspecified  Chronic pulmonary embolism without acute cor pulmonale, unspecified pulmonary embolism type: Chronic pulmonary embolism without acute cor pulmonale, unspecified pulmonary embolism type  Cerebrovascular accident (CVA): Cerebrovascular accident (CVA)  History of COPD: History of COPD  CAD S/P percutaneous coronary angioplasty    	  Vitals:  T(C): 36.8 (08-11-19 @ 09:34), Max: 37 (08-10-19 @ 21:09)  HR: 86 (08-11-19 @ 09:34) (79 - 103)  BP: 131/82 (08-11-19 @ 10:33) (120/68 - 149/83)  RR: 19 (08-11-19 @ 09:34) (16 - 19)  SpO2: 95% (08-11-19 @ 09:34) (89% - 95%)  I&O's Summary  10 Aug 2019 07:01  -  11 Aug 2019 07:00  --------------------------------------------------------  IN: 440 mL / OUT: 1390 mL / NET: -950 mL  Weight (kg): 132.4 (08-07 @ 06:26)  PHYSICAL EXAM:  Appearance: Comfortable. No acute distress, obese male in NAD  HEENT:  Head and neck: Atraumatic. Normocephalic.  Normal oral mucosa, PERRL, Neck is supple. No JVD, No carotid bruit.   Neurologic: A & O x 3, no focal deficits. EOMI , Cranial nerves are intact.  Lymphatic: No cervical lymphadenopathy  Cardiovascular: Normal S1 S2, No murmur, rubs/gallops. No JVD, No edema  Respiratory: Lungs clear to auscultation, diminished r lung base posterior. Mid incision dry and intact +Mepilex dressing  Gastrointestinal:  Soft, Non-tender, + BS  Lower Extremities:  + edema, Left SVG incision CDI with +Mepilex dressing.  Psychiatry: Patient is calm. No agitation. Mood & affect appropriate  Skin: No rashes/ ecchymoses/cyanosis/ulcers visualized on the face, hands or feet.    CURRENT MEDICATIONS:  amiodarone    Tablet   Oral   amiodarone    Tablet 400 milliGRAM(s) Oral every 8 hours  metoprolol tartrate 50 milliGRAM(s) Oral every 12 hours  tamsulosin 0.4 milliGRAM(s) Oral at bedtime  ALBUTerol/ipratropium for Nebulization  sertraline  docusate sodium  pantoprazole    Tablet  psyllium Powder  senna  atorvastatin  aspirin enteric coated  sodium chloride 0.9% lock flush  warfarin    LABS:	 	                     10.2   11.23 )-----------( 211      ( 11 Aug 2019 06:25 )             32.3   08-11  139  |  104  |  28.0<H>  ----------------------------<  129<H>  3.7   |  25.0  |  1.28  Ca    8.5<L>      11 Aug 2019 06:19  Mg     2.1     08-11  proBNP: Serum Pro-Brain Natriuretic Peptide: 461 pg/mL (08-06 @ 22:11)  HgA1c: Hemoglobin A1C, Whole Blood: 6.0 %  TSH: Thyroid Stimulating Hormone, Serum: 0.88 uIU/mL      TELEMETRY: Reviewed, ST, with runs of PVC's up to 13 beats and atrial tachy this am, currently stable, occasional desats no

## 2019-08-11 NOTE — PROGRESS NOTE ADULT - ASSESSMENT
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  Pt's last CVA was in April and he has not had a stroke since. He has also had an IVC filter in place (Dr. Hutchison, Vascular Surgeon at ).  He endorses CASTILLO, denies palpitations, chest pain, lower extremity edema. 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 Patient extubated 8/7.   8/9 pt intro and medial chest tube d/c . pt still requiring high flow oxygen

## 2019-08-11 NOTE — PROGRESS NOTE ADULT - SUBJECTIVE AND OBJECTIVE BOX
Subjective: "I had a horrible night last night, I couldn't sleep, the melatonin isn't helping."  Pt. OOB to chair, denies any SOB or chest pain, NAD noted.    VITAL SIGNS  Vital Signs Last 24 Hrs  T(C): 37 (19 @ 05:53), Max: 37 (08-10-19 @ 21:09)  T(F): 98.6 (19 @ 05:53), Max: 98.6 (08-10-19 @ 21:09)  HR: 90 (19 @ 05:53) (80 - 103)  BP: 140/79 (19 @ 05:53) (111/62 - 149/83)  RR: 16 (19 @ 05:53) (16 - 18)  SpO2: 94% (19 @ 05:53) (92% - 97%)  on (O2)              Telemetry:  SInus rhythm  LVEF: normal    MEDICATIONS  ALBUTerol/ipratropium for Nebulization 3 milliLiter(s) Nebulizer every 6 hours  amiodarone    Tablet   Oral   amiodarone    Tablet 400 milliGRAM(s) Oral every 8 hours  aspirin enteric coated 325 milliGRAM(s) Oral daily  atorvastatin 40 milliGRAM(s) Oral at bedtime  docusate sodium 100 milliGRAM(s) Oral three times a day  insulin lispro (HumaLOG) corrective regimen sliding scale   SubCutaneous Before meals and at bedtime  melatonin 5 milliGRAM(s) Oral at bedtime PRN  metoprolol tartrate 50 milliGRAM(s) Oral every 12 hours  pantoprazole    Tablet 40 milliGRAM(s) Oral daily  potassium chloride   Powder 40 milliEquivalent(s) Oral once  senna 2 Tablet(s) Oral at bedtime  sertraline 50 milliGRAM(s) Oral daily  sodium chloride 0.9% lock flush 3 milliLiter(s) IV Push every 8 hours  tamsulosin 0.4 milliGRAM(s) Oral at bedtime  warfarin 2.5 milliGRAM(s) Oral once      PHYSICAL EXAM  General: well nourished, well developed, no acute distress  Neurology: alert and oriented x 3, nonfocal, no gross deficits  Respiratory: clear to auscultation bilaterally  CV: regular rate and rhythm, normal S1, S2  Abdomen: soft, nontender, obese, nondistended, positive bowel sounds, last bowel movement 19  Extremities: warm, well perfused. no edema. + DP pulses  Incisions: Midline sternal incision, + Mepilex CDI. Sternum stable. Left SVG incision CDI with +Mepilex dressing.  Chest tubes: left chest tube to water seal, no air leak detected    I&O's Detail    10 Aug 2019 07:01  -  11 Aug 2019 07:00  --------------------------------------------------------  IN:    Oral Fluid: 440 mL  Total IN: 440 mL    OUT:    Chest Tube: 140 mL    Voided: 1250 mL  Total OUT: 1390 mL    Total NET: -950 mL          Weights:  Daily     Daily Weight in k.4 (11 Aug 2019 06:49)  Admit Wt: Drug Dosing Weight  Height (cm): 185.42 (07 Aug 2019 06:26)  Weight (kg): 132.4 (07 Aug 2019 06:26)  BMI (kg/m2): 38.5 (07 Aug 2019 06:26)  BSA (m2): 2.53 (07 Aug 2019 06:26)    LABS      139  |  104  |  28.0<H>  ----------------------------<  129<H>  3.7   |  25.0  |  1.28    Ca    8.5<L>      11 Aug 2019 06:19  Mg     2.1                                        10.2   11.23 )-----------( 211      ( 11 Aug 2019 06:25 )             32.3          PT/INR - ( 11 Aug 2019 06:24 )   PT: 24.2 sec;   INR: 2.06 ratio                 CAPILLARY BLOOD GLUCOSE      POCT Blood Glucose.: 133 mg/dL (11 Aug 2019 08:11)  POCT Blood Glucose.: 138 mg/dL (10 Aug 2019 20:59)  POCT Blood Glucose.: 145 mg/dL (10 Aug 2019 17:06)  POCT Blood Glucose.: 129 mg/dL (10 Aug 2019 12:10)           Today's CXR:< from: Xray Chest 1 View- PORTABLE-Routine (08.10.19 @ 05:47) >  EXAM:  XR CHEST PORTABLE ROUTINE 1V                          PROCEDURE DATE:  08/10/2019          INTERPRETATION:  Portable chest radiograph       CLINICAL INFORMATION:  Postoperative  Cardiac surgery.    TECHNIQUE:  Portable  AP view of the chestwas obtained.    FINDINGS:   2019 available for review.  Left chest tube in place.  Mild left-sided postoperative medial pneumomediastinum noted  The lungs  are clear.  No pleural abnormality is seen.        The heart and mediastinum are within normal limits following cardiac    surgery.     Visualized osseous structures are intact.        IMPRESSION:    Status post open heart surgery. Small LEFT postoperative   pneumomediastinum.  No evidence of active chest pathology.    Catheters in place.        JANESSA REED M.D., ATTENDING RADIOLOGIST  This document has been electronically signed. Aug 10 2019 11:41AM   < end of copied text >      Today's EKG:< from: 12 Lead ECG (19 @ 03:46) >  Ventricular Rate 83 BPM    Atrial Rate 83 BPM    QRS Duration 122 ms    Q-T Interval 398 ms    QTC Calculation(Bezet) 467 ms    R Axis -36 degrees    T Axis -12 degrees    Diagnosis Line Normal sinus rhythm  Inferior infarct , age undetermined  Abnormal ECG    Confirmed by CAMI VITALE ([id number]) on 8/10/2019 3:07:38 PM    < end of copied text >      PAST MEDICAL & SURGICAL HISTORY:  Aortic stenosis  Atheroma  Cerebrovascular accident (CVA)  History of COPD  CAD S/P percutaneous coronary angioplasty  Atrial fibrillation, unspecified type  [place] filter in place  PE (pulmonary embolism)  Hyperlipemia  Hypertension  S/P cataract surgery  S/P IVC filter Subjective: "I had a horrible night last night, I couldn't sleep, the melatonin isn't helping."  Pt. OOB to chair, denies any SOB or chest pain, NAD noted.    VITAL SIGNS  Vital Signs Last 24 Hrs  T(C): 37 (19 @ 05:53), Max: 37 (08-10-19 @ 21:09)  T(F): 98.6 (19 @ 05:53), Max: 98.6 (08-10-19 @ 21:09)  HR: 90 (19 @ 05:53) (80 - 103)  BP: 140/79 (19 @ 05:53) (111/62 - 149/83)  RR: 16 (19 @ 05:53) (16 - 18)  SpO2: 94% (19 @ 05:53) (92% - 97%)  on (O2)              Telemetry:  SInus rhythm  LVEF: normal    MEDICATIONS  ALBUTerol/ipratropium for Nebulization 3 milliLiter(s) Nebulizer every 6 hours  amiodarone    Tablet   Oral   amiodarone    Tablet 400 milliGRAM(s) Oral every 8 hours  aspirin enteric coated 325 milliGRAM(s) Oral daily  atorvastatin 40 milliGRAM(s) Oral at bedtime  docusate sodium 100 milliGRAM(s) Oral three times a day  insulin lispro (HumaLOG) corrective regimen sliding scale   SubCutaneous Before meals and at bedtime  melatonin 5 milliGRAM(s) Oral at bedtime PRN  metoprolol tartrate 50 milliGRAM(s) Oral every 12 hours  pantoprazole    Tablet 40 milliGRAM(s) Oral daily  potassium chloride   Powder 40 milliEquivalent(s) Oral once  senna 2 Tablet(s) Oral at bedtime  sertraline 50 milliGRAM(s) Oral daily  sodium chloride 0.9% lock flush 3 milliLiter(s) IV Push every 8 hours  tamsulosin 0.4 milliGRAM(s) Oral at bedtime  warfarin 2.5 milliGRAM(s) Oral once      PHYSICAL EXAM  General: well nourished, well developed, no acute distress  Neurology: alert and oriented x 3, nonfocal, no gross deficits  Respiratory: clear to auscultation bilaterally  CV: regular rate and rhythm, normal S1, S2  Abdomen: soft, nontender, obese, nondistended, positive bowel sounds, last bowel movement 19  Extremities: warm, well perfused. +1 ankle edema. + DP pulses  Incisions: Midline sternal incision, + Mepilex CDI. Sternum stable. Left SVG incision CDI with +Mepilex dressing.  Chest tubes: left chest tube to water seal, no air leak detected    I&O's Detail    10 Aug 2019 07:01  -  11 Aug 2019 07:00  --------------------------------------------------------  IN:    Oral Fluid: 440 mL  Total IN: 440 mL    OUT:    Chest Tube: 140 mL    Voided: 1250 mL  Total OUT: 1390 mL    Total NET: -950 mL          Weights:  Daily     Daily Weight in k.4 (11 Aug 2019 06:49)  Admit Wt: Drug Dosing Weight  Height (cm): 185.42 (07 Aug 2019 06:26)  Weight (kg): 132.4 (07 Aug 2019 06:26)  BMI (kg/m2): 38.5 (07 Aug 2019 06:26)  BSA (m2): 2.53 (07 Aug 2019 06:26)    LABS      139  |  104  |  28.0<H>  ----------------------------<  129<H>  3.7   |  25.0  |  1.28    Ca    8.5<L>      11 Aug 2019 06:19  Mg     2.1                                        10.2   11.23 )-----------( 211      ( 11 Aug 2019 06:25 )             32.3          PT/INR - ( 11 Aug 2019 06:24 )   PT: 24.2 sec;   INR: 2.06 ratio                 CAPILLARY BLOOD GLUCOSE      POCT Blood Glucose.: 133 mg/dL (11 Aug 2019 08:11)  POCT Blood Glucose.: 138 mg/dL (10 Aug 2019 20:59)  POCT Blood Glucose.: 145 mg/dL (10 Aug 2019 17:06)  POCT Blood Glucose.: 129 mg/dL (10 Aug 2019 12:10)           Today's CXR:< from: Xray Chest 1 View- PORTABLE-Routine (08.10.19 @ 05:47) >  EXAM:  XR CHEST PORTABLE ROUTINE 1V                          PROCEDURE DATE:  08/10/2019          INTERPRETATION:  Portable chest radiograph       CLINICAL INFORMATION:  Postoperative  Cardiac surgery.    TECHNIQUE:  Portable  AP view of the chestwas obtained.    FINDINGS:   2019 available for review.  Left chest tube in place.  Mild left-sided postoperative medial pneumomediastinum noted  The lungs  are clear.  No pleural abnormality is seen.        The heart and mediastinum are within normal limits following cardiac    surgery.     Visualized osseous structures are intact.        IMPRESSION:    Status post open heart surgery. Small LEFT postoperative   pneumomediastinum.  No evidence of active chest pathology.    Catheters in place.        JANESSA REED M.D., ATTENDING RADIOLOGIST  This document has been electronically signed. Aug 10 2019 11:41AM   < end of copied text >      Today's EKG:< from: 12 Lead ECG (19 @ 03:46) >  Ventricular Rate 83 BPM    Atrial Rate 83 BPM    QRS Duration 122 ms    Q-T Interval 398 ms    QTC Calculation(Bezet) 467 ms    R Axis -36 degrees    T Axis -12 degrees    Diagnosis Line Normal sinus rhythm  Inferior infarct , age undetermined  Abnormal ECG    Confirmed by CAMI VITALE ([id number]) on 8/10/2019 3:07:38 PM    < end of copied text >      PAST MEDICAL & SURGICAL HISTORY:  Aortic stenosis  Atheroma  Cerebrovascular accident (CVA)  History of COPD  CAD S/P percutaneous coronary angioplasty  Atrial fibrillation, unspecified type  [place] filter in place  PE (pulmonary embolism)  Hyperlipemia  Hypertension  S/P cataract surgery  S/P IVC filter

## 2019-08-11 NOTE — PROGRESS NOTE ADULT - PROBLEM SELECTOR PLAN 5
Coumadin dosed daily.  s/p IVC filter  EKG QTC today 468, EKG in am. Remains in Sinus rhythm  D/C Amio, if pt. converts to Afib resume Amio load as per Dr. Alvarado.  Coumadin dosed daily.  s/p IVC filter  EKG QTC today 468, EKG in am.

## 2019-08-11 NOTE — PROGRESS NOTE ADULT - ASSESSMENT
This is an 85 y/o obese, White M, former smoker (1PPD x20 years remote hx), with a PMHx of factor V Leiden (on warfarin), moderate AS, HTN, CAD with PCI x2 (1996 at [facility]; report requested), PE x2, CVA x 2 with mild right sided weakness; pt completed neuro rehab at [place] and continued to have PT and OT while at home, hx falls/ syncopal episode, GIGI (CPAP compliant), presented to PST 8/6 in anticipation of a UMER with Dr. 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  Pt's last CVA was in April and he has not had a stroke since. He has also had an IVC filter in place (Dr. Hutchison, Vascular Surgeon at ). 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 Patient extubated 8/7.  8/9 pt intro and medial chest tube d/c . pt still requiring high flow oxygen.  Plan is to titrate oxygen.        Problem/Plan - 1:  ·  Problem: CAD (coronary artery disease) of artery bypass graft.  Plan: s/p C2L on 8/7  Tylenol PRN ordered for pain  Continue ASA, Lopressor, & Lipitor.  Maintain SPO2>90%, titrate oxygen to room air if tolerated.  Encourage the use of CDBE/I/S, OOB to chair, daily PT, increase mobilization.  Repeat chest xray in morning.  Continue Ace wraps to Left lower extremity & continue to elevate BLE.  Maintain Left plerual chest tube to water seal as per CTS - draining sero sanguinous 92ml this am.     Strict I/O Daily weights  net negative 3001 since admission       Problem/Plan - 2:  ·  Problem: Aortic stenosis.    Plan: s/p AV fibroelastoma  Continue Coumadin.     Problem/Plan - 3:  ·  Problem: Hypertension.    Plan: Continue DASH diet.  Continue Lopressor.      Problem/Plan - 4:  ·  Problem: Hyperlipemia.    Plan: Continue DASH diet.  Continue Lipitor.      Problem/Plan - 5:  ·  Problem: Atrial fibrillation, unspecified type.  Plan: Remains in Sinus rhythm  Coumadin dosed daily.  INR 2.06 today.    ·  Problem: Hypokalemia - 6.  Plan: Potassium replaced, repeat serum potassium in morning.        Problem/Plan - 7:  ·  Problem: Acute kidney injury.  Plan: avoid nephrotoxins  resolving  creatinine 1.28.      Problem/Plan - 8:  Problem: Factor 5 Leiden mutation, heterozygous. Plan: continue coumadin  goal INR 2-3. This is an 83 y/o obese, White M, former smoker (1PPD x20 years remote hx), with a PMHx of factor V Leiden (on warfarin), moderate AS, HTN, CAD with PCI x2 (1996 at [facility]; report requested), PE x2, CVA x 2 with mild right sided weakness; pt completed neuro rehab at [place] and continued to have PT and OT while at home, hx falls/ syncopal episode, GIGI (CPAP compliant), presented to PST 8/6 in anticipation of a UMER with Dr. 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  Pt's last CVA was in April and he has not had a stroke since. He has also had an IVC filter in place (Dr. Hutchison, Vascular Surgeon at ). 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 Patient extubated 8/7.  8/9 pt intro and medial chest tube d/c . pt still requiring high flow oxygen.  Plan is to titrate oxygen.        Problem/Plan - 1:  ·  Problem: CAD (coronary artery disease) of artery bypass graft.  Plan:  Tylenol PRN ordered for pain  Continue ASA, Lopressor, & Lipitor.  Maintain SPO2>90%, titrate oxygen to room air if tolerated.  OOB to chair, daily PT, increase mobilization.  Patient refusing OOB today, will  try with PT tomorrow.    Repeat chest x ray in morning.  Continue Ace wraps to Left lower extremity & continue to elevate BLE.  Maintain Left pleural chest tube to water seal as per CTS - draining sero sanguinous 92ml this am.     Strict I/O Daily weights  net negative 3001 since admission       Problem/Plan - 2:  ·  Problem: Aortic stenosis.    Plan: s/p AV fibroelastoma  Continue Coumadin.     Problem/Plan - 3:  ·  Problem: Hypertension.    Plan: Continue DASH diet.  Continue Lopressor.      Problem/Plan - 4:  ·  Problem: Hyperlipemia.    Plan: Continue DASH diet.  Continue Lipitor.      Problem/Plan - 5:  ·  Problem: Atrial fibrillation, unspecified type.  Plan: Remains in Sinus rhythm - On amio  Coumadin dosed daily.  INR 2.06 today.    ·  Problem: Hypokalemia - 6.  Plan: Potassium replaced, repeat serum potassium in morning.        Problem/Plan - 7:  ·  Problem: Acute kidney injury.  Plan: avoid nephrotoxins  resolving  creatinine 1.28.      Problem/Plan - 8:  Problem: Factor 5 Leiden mutation, heterozygous. Plan: continue coumadin  goal INR 2-3.

## 2019-08-12 DIAGNOSIS — R53.81 OTHER MALAISE: ICD-10-CM

## 2019-08-12 DIAGNOSIS — D68.4 ACQUIRED COAGULATION FACTOR DEFICIENCY: ICD-10-CM

## 2019-08-12 LAB
ALBUMIN SERPL ELPH-MCNC: 2.8 G/DL — LOW (ref 3.3–5.2)
ALP SERPL-CCNC: 56 U/L — SIGNIFICANT CHANGE UP (ref 40–120)
ALT FLD-CCNC: 18 U/L — SIGNIFICANT CHANGE UP
ANION GAP SERPL CALC-SCNC: 9 MMOL/L — SIGNIFICANT CHANGE UP (ref 5–17)
APTT BLD: 30 SEC — SIGNIFICANT CHANGE UP (ref 27.5–36.3)
AST SERPL-CCNC: 15 U/L — SIGNIFICANT CHANGE UP
BILIRUB SERPL-MCNC: 0.6 MG/DL — SIGNIFICANT CHANGE UP (ref 0.4–2)
BUN SERPL-MCNC: 26 MG/DL — HIGH (ref 8–20)
CALCIUM SERPL-MCNC: 8.6 MG/DL — SIGNIFICANT CHANGE UP (ref 8.6–10.2)
CHLORIDE SERPL-SCNC: 105 MMOL/L — SIGNIFICANT CHANGE UP (ref 98–107)
CO2 SERPL-SCNC: 25 MMOL/L — SIGNIFICANT CHANGE UP (ref 22–29)
CREAT SERPL-MCNC: 1.4 MG/DL — HIGH (ref 0.5–1.3)
GLUCOSE SERPL-MCNC: 118 MG/DL — HIGH (ref 70–115)
HCT VFR BLD CALC: 33.5 % — LOW (ref 39–50)
HGB BLD-MCNC: 10.3 G/DL — LOW (ref 13–17)
INR BLD: 2.08 RATIO — HIGH (ref 0.88–1.16)
MAGNESIUM SERPL-MCNC: 2.1 MG/DL — SIGNIFICANT CHANGE UP (ref 1.6–2.6)
MCHC RBC-ENTMCNC: 26.8 PG — LOW (ref 27–34)
MCHC RBC-ENTMCNC: 30.7 GM/DL — LOW (ref 32–36)
MCV RBC AUTO: 87.2 FL — SIGNIFICANT CHANGE UP (ref 80–100)
PHOSPHATE SERPL-MCNC: 3.2 MG/DL — SIGNIFICANT CHANGE UP (ref 2.4–4.7)
PLATELET # BLD AUTO: 239 K/UL — SIGNIFICANT CHANGE UP (ref 150–400)
POTASSIUM SERPL-MCNC: 4.8 MMOL/L — SIGNIFICANT CHANGE UP (ref 3.5–5.3)
POTASSIUM SERPL-SCNC: 4.8 MMOL/L — SIGNIFICANT CHANGE UP (ref 3.5–5.3)
PROT SERPL-MCNC: 6.1 G/DL — LOW (ref 6.6–8.7)
PROTHROM AB SERPL-ACNC: 24.5 SEC — HIGH (ref 10–12.9)
RBC # BLD: 3.84 M/UL — LOW (ref 4.2–5.8)
RBC # FLD: 15 % — HIGH (ref 10.3–14.5)
SODIUM SERPL-SCNC: 139 MMOL/L — SIGNIFICANT CHANGE UP (ref 135–145)
SURGICAL PATHOLOGY STUDY: SIGNIFICANT CHANGE UP
WBC # BLD: 10.24 K/UL — SIGNIFICANT CHANGE UP (ref 3.8–10.5)
WBC # FLD AUTO: 10.24 K/UL — SIGNIFICANT CHANGE UP (ref 3.8–10.5)

## 2019-08-12 PROCEDURE — 93308 TTE F-UP OR LMTD: CPT | Mod: 26

## 2019-08-12 PROCEDURE — 71045 X-RAY EXAM CHEST 1 VIEW: CPT | Mod: 26,76

## 2019-08-12 PROCEDURE — 93010 ELECTROCARDIOGRAM REPORT: CPT

## 2019-08-12 PROCEDURE — 99233 SBSQ HOSP IP/OBS HIGH 50: CPT

## 2019-08-12 PROCEDURE — 99232 SBSQ HOSP IP/OBS MODERATE 35: CPT

## 2019-08-12 RX ORDER — LANOLIN ALCOHOL/MO/W.PET/CERES
10 CREAM (GRAM) TOPICAL AT BEDTIME
Refills: 0 | Status: DISCONTINUED | OUTPATIENT
Start: 2019-08-12 | End: 2019-08-13

## 2019-08-12 RX ORDER — WARFARIN SODIUM 2.5 MG/1
2.5 TABLET ORAL ONCE
Refills: 0 | Status: COMPLETED | OUTPATIENT
Start: 2019-08-12 | End: 2019-08-12

## 2019-08-12 RX ADMIN — SERTRALINE 50 MILLIGRAM(S): 25 TABLET, FILM COATED ORAL at 12:04

## 2019-08-12 RX ADMIN — ATORVASTATIN CALCIUM 40 MILLIGRAM(S): 80 TABLET, FILM COATED ORAL at 21:15

## 2019-08-12 RX ADMIN — TAMSULOSIN HYDROCHLORIDE 0.4 MILLIGRAM(S): 0.4 CAPSULE ORAL at 21:14

## 2019-08-12 RX ADMIN — Medication 50 MILLIGRAM(S): at 06:12

## 2019-08-12 RX ADMIN — SENNA PLUS 2 TABLET(S): 8.6 TABLET ORAL at 21:15

## 2019-08-12 RX ADMIN — PANTOPRAZOLE SODIUM 40 MILLIGRAM(S): 20 TABLET, DELAYED RELEASE ORAL at 12:04

## 2019-08-12 RX ADMIN — AMIODARONE HYDROCHLORIDE 400 MILLIGRAM(S): 400 TABLET ORAL at 21:14

## 2019-08-12 RX ADMIN — Medication 325 MILLIGRAM(S): at 12:04

## 2019-08-12 RX ADMIN — WARFARIN SODIUM 2.5 MILLIGRAM(S): 2.5 TABLET ORAL at 15:55

## 2019-08-12 RX ADMIN — AMIODARONE HYDROCHLORIDE 400 MILLIGRAM(S): 400 TABLET ORAL at 06:12

## 2019-08-12 RX ADMIN — AMIODARONE HYDROCHLORIDE 400 MILLIGRAM(S): 400 TABLET ORAL at 13:39

## 2019-08-12 RX ADMIN — Medication 650 MILLIGRAM(S): at 21:14

## 2019-08-12 RX ADMIN — Medication 650 MILLIGRAM(S): at 21:44

## 2019-08-12 RX ADMIN — Medication 3 MILLILITER(S): at 03:34

## 2019-08-12 RX ADMIN — Medication 100 MILLIGRAM(S): at 21:14

## 2019-08-12 RX ADMIN — SODIUM CHLORIDE 3 MILLILITER(S): 9 INJECTION INTRAMUSCULAR; INTRAVENOUS; SUBCUTANEOUS at 15:06

## 2019-08-12 RX ADMIN — SODIUM CHLORIDE 3 MILLILITER(S): 9 INJECTION INTRAMUSCULAR; INTRAVENOUS; SUBCUTANEOUS at 06:12

## 2019-08-12 RX ADMIN — Medication 50 MILLIGRAM(S): at 17:37

## 2019-08-12 RX ADMIN — Medication 100 MILLIGRAM(S): at 06:12

## 2019-08-12 RX ADMIN — Medication 10 MILLIGRAM(S): at 21:14

## 2019-08-12 RX ADMIN — Medication 3 MILLILITER(S): at 20:24

## 2019-08-12 RX ADMIN — Medication 3 MILLILITER(S): at 09:40

## 2019-08-12 RX ADMIN — SODIUM CHLORIDE 3 MILLILITER(S): 9 INJECTION INTRAMUSCULAR; INTRAVENOUS; SUBCUTANEOUS at 21:09

## 2019-08-12 NOTE — PROGRESS NOTE ADULT - SUBJECTIVE AND OBJECTIVE BOX
Subjective:  "Im ok, better today since you took that last tube out"  OOB chair      Tele:  SR             V/S:                    T(F): 98.3 (19 @ 10:02), Max: 98.3 (19 @ 21:04)  HR: 83 (19 @ 10:02) (73 - 88)  BP: 113/71 (19 @ 10:02) (113/71 - 148/86)  RR: 18 (19 @ 10:02) (16 - 18)  SpO2: 97% (19 @ 09:21) (94% - 97%)  Wt(kg): --      LV EF: nml    Labs:      139  |  105  |  26.0<H>  ----------------------------<  118<H>  4.8   |  25.0  |  1.40<H>    Ca    8.6      12 Aug 2019 06:09  Phos  3.2       Mg     2.1         TPro  6.1<L>  /  Alb  2.8<L>  /  TBili  0.6  /  DBili  x   /  AST  15  /  ALT  18  /  AlkPhos  56                                 10.3   10.24 )-----------( 239      ( 12 Aug 2019 06:09 )             33.5        PT/INR - ( 12 Aug 2019 06:09 )   PT: 24.5 sec;   INR: 2.08 ratio         PTT - ( 12 Aug 2019 06:09 )  PTT:30.0 sec             CXR: < from: Xray Chest 1 View- PORTABLE-Urgent (19 @ 10:03) >  The left chest tube has been removed since the prior study. No evidence   of pneumothorax. Linear atelectasis in the left lung. The right lung is   clear. Poststernotomy changes..      < end of copied text >      I&O's Detail    11 Aug 2019 07:01  -  12 Aug 2019 07:00  --------------------------------------------------------  IN:    Oral Fluid: 320 mL  Total IN: 320 mL    OUT:    Chest Tube: 120 mL    Voided: 1175 mL  Total OUT: 1295 mL    Total NET: -975 mL      12 Aug 2019 07:01  -  12 Aug 2019 14:51  --------------------------------------------------------  IN:    Oral Fluid: 240 mL  Total IN: 240 mL    OUT:    Voided: 300 mL  Total OUT: 300 mL    Total NET: -60 mL          CHEST TUBE:  [x ] YES [ ] NO  OUTPUT:  50  ml   per 24 hours    AIR LEAKS:  [ ] YES [x ] NO      MACEY DRAIN:   [ ] YES [x ] NO  OUTPUT:     per 24 hours    EPICARDIAL WIRES:  [x ] YES [ ] NO      BOWEL MOVEMENT:  [x ] YES [ ] NO      Daily     Daily Weight in k.7 (12 Aug 2019 06:27)  Medications:  acetaminophen   Tablet .. 650 milliGRAM(s) Oral every 6 hours PRN  ALBUTerol/ipratropium for Nebulization 3 milliLiter(s) Nebulizer every 6 hours  amiodarone    Tablet   Oral   amiodarone    Tablet 400 milliGRAM(s) Oral every 8 hours  aspirin enteric coated 325 milliGRAM(s) Oral daily  atorvastatin 40 milliGRAM(s) Oral at bedtime  docusate sodium 100 milliGRAM(s) Oral three times a day  melatonin 5 milliGRAM(s) Oral at bedtime PRN  metoprolol tartrate 50 milliGRAM(s) Oral every 12 hours  pantoprazole    Tablet 40 milliGRAM(s) Oral daily  psyllium Powder 1 Packet(s) Oral daily  senna 2 Tablet(s) Oral at bedtime  sertraline 50 milliGRAM(s) Oral daily  sodium chloride 0.9% lock flush 3 milliLiter(s) IV Push every 8 hours  tamsulosin 0.4 milliGRAM(s) Oral at bedtime        Physical Exam:    General: well nourished, well developed, no acute distress OOB chair    Respiratory: clear to auscultation bilaterally    CV: regular rate and rhythm, normal S1, S2  + isolated PW    Abdomen: soft, nontender, obese, nondistended, positive bowel sounds, last bowel movement 19    Extremities: warm, well perfused. +1 ankle edema. + DP pulses    Incisions: Midline sternal incision, + Mepilex CDI. Sternum stable. Left SVG incision CDI with +Mepilex dressing.    Chest tubes: left chest tube to water seal, no air leak detected> removed this am                     PAST MEDICAL & SURGICAL HISTORY:  Aortic stenosis  Atheroma  Cerebrovascular accident (CVA)  History of COPD  CAD S/P percutaneous coronary angioplasty  Atrial fibrillation, unspecified type  [place] filter in place  PE (pulmonary embolism)  Hyperlipemia  Hypertension  S/P cataract surgery  S/P IVC filter

## 2019-08-12 NOTE — PROGRESS NOTE ADULT - ASSESSMENT
This is an 85 y/o obese, White M, former smoker (1PPD x20 years remote hx), with a PMHx of factor V Leiden (on warfarin), moderate AS, HTN, CAD with PCI x2 (1996 at [facility]; report requested), PE x2, CVA x 2 with mild right sided weakness; pt completed neuro rehab at [place] and continued to have PT and OT while at home, hx falls/ syncopal episode, GIGI (CPAP compliant), presented to PST 8/6 in anticipation of a UMER with Dr. 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  Pt's last CVA was in April and he has not had a stroke since. He has also had an IVC filter in place (Dr. Hutchison, Vascular Surgeon at ).  He endorses CASTILLO, denies palpitations, chest pain, lower extremity edema. 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 Patient extubated 8/7.   8/9 pt intro and medial chest tube d/c . pt still requiring high flow oxygen

## 2019-08-12 NOTE — PROGRESS NOTE ADULT - PROBLEM SELECTOR PLAN 5
Remains in Sinus rhythm  Coumadin dosed daily.  Echo prior discharge R/O effusion  s/p IVC filter  EKG QTC today 495, EKG in am.

## 2019-08-12 NOTE — PROGRESS NOTE ADULT - SUBJECTIVE AND OBJECTIVE BOX
CHIEF COMPLAINT:Patient is a 84y old  Male who presents with a chief complaint of OHS (11 Aug 2019 14:59)    INTERVAL HISTORY:  sitting in the chair. no cp. on 2l nc  left CT is off suction.     Allergies  No Known Allergies  	  MEDICATIONS:  amiodarone    Tablet   Oral   amiodarone    Tablet 400 milliGRAM(s) Oral every 8 hours  metoprolol tartrate 50 milliGRAM(s) Oral every 12 hours  tamsulosin 0.4 milliGRAM(s) Oral at bedtime  ALBUTerol/ipratropium for Nebulization 3 milliLiter(s) Nebulizer every 6 hours  acetaminophen   Tablet .. 650 milliGRAM(s) Oral every 6 hours PRN  melatonin 5 milliGRAM(s) Oral at bedtime PRN  sertraline 50 milliGRAM(s) Oral daily  docusate sodium 100 milliGRAM(s) Oral three times a day  pantoprazole    Tablet 40 milliGRAM(s) Oral daily  psyllium Powder 1 Packet(s) Oral daily  senna 2 Tablet(s) Oral at bedtime  atorvastatin 40 milliGRAM(s) Oral at bedtime  aspirin enteric coated 325 milliGRAM(s) Oral daily  sodium chloride 0.9% lock flush 3 milliLiter(s) IV Push every 8 hours    PHYSICAL EXAM:  T(C): 36.7 (08-12-19 @ 06:09), Max: 36.8 (08-11-19 @ 09:34)  HR: 80 (08-12-19 @ 06:09) (77 - 88)  BP: 125/75 (08-12-19 @ 06:09) (125/75 - 148/86)  RR: 16 (08-12-19 @ 06:09) (16 - 19)  SpO2: 95% (08-12-19 @ 06:09) (89% - 96%)  11 Aug 2019 07:01  -  12 Aug 2019 07:00  --------------------------------------------------------  IN: 320 mL / OUT: 1295 mL / NET: -975 mL    Appearance: Normal	  HEENT:   NC/AT  Eye: Pink Conjunctiva  Lungs: CTA B/L  CVS: RRR, Normal S1 and S2, No Edema, left leg wrapped.   Pulses: Normal distal pulses.  Neuro: A&O x3    TELEMETRY: nsr    LABS:	 	                         10.3   10.24 )-----------( 239      ( 12 Aug 2019 06:09 )             33.5     08-12    139  |  105  |  26.0<H>  ----------------------------<  118<H>  4.8   |  25.0  |  1.40<H>    Ca    8.6      12 Aug 2019 06:09  Phos  3.2     08-12  Mg     2.1     08-12    TPro  6.1<L>  /  Alb  2.8<L>  /  TBili  0.6  /  DBili  x   /  AST  15  /  ALT  18  /  AlkPhos  56  08-12    ASSESSMENT/PLAN: 	  1. CABG x2, on asa  2. Cont with AC with prior PEs and factor V lieden  3. PT today  4. CT removal as per CT surgery  5. Amio 200 mg daily for about 1 month postop  6. KELSEY, Pete feldman.  7. CXR today, no CHF, use lasix as needed  8. Cr is stable.

## 2019-08-12 NOTE — PROGRESS NOTE ADULT - PROBLEM SELECTOR PROBLEM 5
CAD (coronary artery disease) of artery bypass graft
History of COPD
Atrial fibrillation, unspecified type
Atrial fibrillation, unspecified type
CAD (coronary artery disease) of artery bypass graft
CAD (coronary artery disease) of artery bypass graft
COPD (chronic obstructive pulmonary disease)
Chronic pulmonary embolism without acute cor pulmonale, unspecified pulmonary embolism type

## 2019-08-12 NOTE — PROGRESS NOTE ADULT - PROBLEM SELECTOR PLAN 4
Patient seen by nephrology, started on small amounts of IV fluids.  Monitor BMP in a.m.
Lungs are clear however slightly diminished, encouraged patient to do incentive spirometry all day long.  Patient states he does.
Status post 2 vessel CABG.  Out of bed to chair and ambulate with physical therapy.  Patient appears improving, plan is for subacute rehab at discharge.  Still with one chest tube in.
cont statin
Antiplatelet therapy when appropriate with surgery.
Continue DASH diet.  Continue Lipitor.
Continue DASH diet.  Continue Lipitor.
Rate appears controlled.  Anticoagulation per CT surgery and cardiology.
Statin tomorrow
cont statin
s/p removal of fibroelastoma   Patient with chest tubes     left pleural- maintain Chest tube managed by CTS

## 2019-08-12 NOTE — PROGRESS NOTE ADULT - REASON FOR ADMISSION
CABG, AV mass removal
CP
CT surgery
Cardiac surgery
Patient is a 84y old  Male who presents with a chief complaint of CAD/AV fibroelastoma
s/p  CABG/fibroelastoma excision
s/p cabg x2 and aortic mass removal
CP
OHS
s/p AV mass removal and CABG
CAD/AV fibroelastoma
CAD/AV fibroelastoma
8/7 C2LIMA  Excision AV Fibrelastoma

## 2019-08-12 NOTE — PROGRESS NOTE ADULT - PROBLEM SELECTOR PROBLEM 4
Acute kidney injury
CAD (coronary artery disease) of artery bypass graft
COPD (chronic obstructive pulmonary disease)
Other hyperlipidemia
Atrial fibrillation, unspecified type
Cerebrovascular accident (CVA)
Fibroblastic disorder
Hyperlipemia
Hyperlipemia
Other hyperlipidemia
Other hyperlipidemia

## 2019-08-12 NOTE — PROGRESS NOTE ADULT - SUBJECTIVE AND OBJECTIVE BOX
[facility] DIVISION OF KIDNEY DISEASES AND HYPERTENSION -- FOLLOW UP NOTE  --------------------------------------------------------------------------------  Chief Complaint: MILAGRO    24 hour events/subjective:  Pt seen/examined no complaints;      PAST HISTORY  --------------------------------------------------------------------------------  No significant changes to PMH, PSH, FHx, SHx, unless otherwise noted    ALLERGIES & MEDICATIONS  --------------------------------------------------------------------------------  Allergies    No Known Allergies    Intolerances    OHS (Unknown)    Standing Inpatient Medications  ALBUTerol/ipratropium for Nebulization 3 milliLiter(s) Nebulizer every 6 hours  amiodarone    Tablet   Oral   amiodarone    Tablet 400 milliGRAM(s) Oral every 8 hours  aspirin enteric coated 325 milliGRAM(s) Oral daily  atorvastatin 40 milliGRAM(s) Oral at bedtime  docusate sodium 100 milliGRAM(s) Oral three times a day  metoprolol tartrate 50 milliGRAM(s) Oral every 12 hours  pantoprazole    Tablet 40 milliGRAM(s) Oral daily  psyllium Powder 1 Packet(s) Oral daily  senna 2 Tablet(s) Oral at bedtime  sertraline 50 milliGRAM(s) Oral daily  sodium chloride 0.9% lock flush 3 milliLiter(s) IV Push every 8 hours  tamsulosin 0.4 milliGRAM(s) Oral at bedtime  warfarin 2.5 milliGRAM(s) Oral once    PRN Inpatient Medications  acetaminophen   Tablet .. 650 milliGRAM(s) Oral every 6 hours PRN  melatonin 5 milliGRAM(s) Oral at bedtime PRN      REVIEW OF SYSTEMS  --------------------------------------------------------------------------------  Gen: No weight changes, fatigue, fevers/chills, weakness  Skin: No rashes  Head/Eyes/Ears/Mouth: No headache; Normal hearing; Normal vision w/o blurriness; No sinus pain/discomfort, sore throat  Respiratory: No dyspnea, cough, wheezing, hemoptysis  CV: No chest pain, PND, orthopnea  GI: No abdominal pain, diarrhea, constipation, nausea, vomiting, melena, hematochezia  : No increased frequency, dysuria, hematuria, nocturia  MSK: No joint pain/swelling; no back pain; no edema  Neuro: No dizziness/lightheadedness, weakness, seizures, numbness, tingling  Heme: No easy bruising or bleeding  Endo: No heat/cold intolerance  Psych: No significant nervousness, anxiety, stress, depression    All other systems were reviewed and are negative, except as noted.    VITALS/PHYSICAL EXAM  --------------------------------------------------------------------------------  T(C): 36.8 (08-12-19 @ 10:02), Max: 36.8 (08-11-19 @ 15:43)  HR: 83 (08-12-19 @ 10:02) (73 - 88)  BP: 113/71 (08-12-19 @ 10:02) (113/71 - 148/86)  RR: 18 (08-12-19 @ 10:02) (16 - 18)  SpO2: 97% (08-12-19 @ 09:21) (94% - 97%)  Wt(kg): --        08-11-19 @ 07:01  -  08-12-19 @ 07:00  --------------------------------------------------------  IN: 320 mL / OUT: 1295 mL / NET: -975 mL    08-12-19 @ 07:01  -  08-12-19 @ 15:35  --------------------------------------------------------  IN: 240 mL / OUT: 300 mL / NET: -60 mL      Physical Exam:  	Gen: NAD,    	HEENT: PERRL, supple neck, clear oropharynx  	Pulm: CTA B/L  	CV: RRR, S1S2; no rub  	Back: No spinal or CVA tenderness; no sacral edema  	Abd: +BS, soft, nontender/nondistended  	: No suprapubic tenderness  	UE: Warm, FROM, no clubbing, intact strength; no edema; no asterixis  	LE: Warm, FROM, no clubbing, intact strength; no edema  	Neuro: No focal deficits, intact gait  	Psych: Normal affect and mood  	Skin: Warm, without rashes       LABS/STUDIES  --------------------------------------------------------------------------------              10.3   10.24 >-----------<  239      [08-12-19 @ 06:09]              33.5     139  |  105  |  26.0  ----------------------------<  118      [08-12-19 @ 06:09]  4.8   |  25.0  |  1.40        Ca     8.6     [08-12-19 @ 06:09]      Mg     2.1     [08-12-19 @ 06:09]      Phos  3.2     [08-12-19 @ 06:09]    TPro  6.1  /  Alb  2.8  /  TBili  0.6  /  DBili  x   /  AST  15  /  ALT  18  /  AlkPhos  56  [08-12-19 @ 06:09]    PT/INR: PT 24.5 , INR 2.08       [08-12-19 @ 06:09]  PTT: 30.0       [08-12-19 @ 06:09]      Creatinine Trend:  SCr 1.40 [08-12 @ 06:09]  SCr 1.28 [08-11 @ 06:19]  SCr 1.52 [08-10 @ 06:13]  SCr 1.55 [08-09 @ 13:08]  SCr 1.78 [08-09 @ 03:16]    Urinalysis - [08-06-19 @ 22:57]      Color Yellow / Appearance Clear / SG 1.010 / pH 6.5      Gluc Negative / Ketone Negative  / Bili Negative / Urobili Negative       Blood Negative / Protein Negative / Leuk Est Negative / Nitrite Negative      RBC  / WBC  / Hyaline  / Gran  / Sq Epi  / Non Sq Epi  / Bacteria       HbA1c 6.0      [08-06-19 @ 23:16]  TSH 0.88      [08-06-19 @ 22:11]  Lipid: chol 189, , HDL 35,       [04-24-19 @ 05:05]

## 2019-08-12 NOTE — PROGRESS NOTE ADULT - ASSESSMENT
1) MILAGRO; ?prerenal  2) HTN  3) CAD sp CABG  4) AS    SCr downtrending; 1.4  Signing off  Outpatient follow up upon discharge;  Please recall if needed  d/w CTS NP

## 2019-08-12 NOTE — PROGRESS NOTE ADULT - SUBJECTIVE AND OBJECTIVE BOX
Patient now wants to go to acute rehab, as per recommendations of daughter.   Patient is willing to go for a week only.   CM made aware, as they also were under the idea that he had made clear about returning to [facility].  Patient is doing well. Pain is controlled.     REVIEW OF SYSTEMS  Constitutional - No fever,  +fatigue  Neurological - +loss of strength  Musculoskeletal - No joint pain, +joint swelling, No muscle pain  Psychiatric - No depression, No anxiety    FUNCTIONAL PROGRESS  8/9  Sit-Stand Transfer Training  Sit-to-Stand Transfer Training Rehab Potential: good, to achieve stated therapy goals  Transfer Training Sit-to-Stand Transfer: moderate assist (50% patient effort);  1 person assist;  rolling walker  Transfer Training Stand-to-Sit Transfer: minimum assist (75% patient effort);  1 person assist;  rolling walker  Sit-to-Stand Transfer Training Transfer Safety Analysis: decreased balance;  impaired balance;  decreased strength;  rolling walker    Gait Training  Gait Training Rehab Potential: good, to achieve stated therapy goals  Gait Training: moderate assist (50% patient effort);  1 person + 1 person to manage equipment;  portable O2; nonrebreather ;  rolling walker;  50 feet  Gait Analysis: 3-point gait   decreased step length;  decreased stride length;  impaired balance;  decreased strength;  cognitive, decreased safety awareness;  50 feet;  rolling walker    Therapeutic Exercise  Therapeutic Exercise Rehab Effort: good  Therapeutic Exercise Detail: knee extension in sitting         VITALS  T(C): 36.7 (08-12-19 @ 06:09), Max: 36.8 (08-11-19 @ 15:43)  HR: 73 (08-12-19 @ 09:21) (73 - 88)  BP: 125/75 (08-12-19 @ 06:09) (125/75 - 148/86)  RR: 16 (08-12-19 @ 06:09) (16 - 18)  SpO2: 97% (08-12-19 @ 09:21) (94% - 97%)  Wt(kg): --    MEDICATIONS   acetaminophen   Tablet .. 650 milliGRAM(s) every 6 hours PRN  ALBUTerol/ipratropium for Nebulization 3 milliLiter(s) every 6 hours  amiodarone    Tablet     amiodarone    Tablet 400 milliGRAM(s) every 8 hours  aspirin enteric coated 325 milliGRAM(s) daily  atorvastatin 40 milliGRAM(s) at bedtime  docusate sodium 100 milliGRAM(s) three times a day  melatonin 5 milliGRAM(s) at bedtime PRN  metoprolol tartrate 50 milliGRAM(s) every 12 hours  pantoprazole    Tablet 40 milliGRAM(s) daily  psyllium Powder 1 Packet(s) daily  senna 2 Tablet(s) at bedtime  sertraline 50 milliGRAM(s) daily  sodium chloride 0.9% lock flush 3 milliLiter(s) every 8 hours  tamsulosin 0.4 milliGRAM(s) at bedtime      RECENT LABS - Reviewed                        10.3   10.24 )-----------( 239      ( 12 Aug 2019 06:09 )             33.5     08-12    139  |  105  |  26.0<H>  ----------------------------<  118<H>  4.8   |  25.0  |  1.40<H>    Ca    8.6      12 Aug 2019 06:09  Phos  3.2     08-12  Mg     2.1     08-12    TPro  6.1<L>  /  Alb  2.8<L>  /  TBili  0.6  /  DBili  x   /  AST  15  /  ALT  18  /  AlkPhos  56  08-12    PT/INR - ( 12 Aug 2019 06:09 )   PT: 24.5 sec;   INR: 2.08 ratio         PTT - ( 12 Aug 2019 06:09 )  PTT:30.0 sec        ------------------------------------------------------------------------------------  PHYSICAL EXAM  Constitutional - NAD, Comfortable   HEENT - NC O2  Extremities - Left ACE wrap  Neurologic Exam -                    Motor Left sided weakness, related to surgery/pain  Psychiatric - Mood stable, Affect WNL  ----------------------------------------------------------------------------------------  ASSESSMENT/PLAN  84yMale with functional deficits after CABGx2 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 He is doing well post-operatively.  CAD - Aspirin, Lipitor  COPD - Duoneb  HTN - Lopressor  DM - Insulin sliding scale  BPH - Flomax  Pain - Tylenol  DVT PPX - SCDs  Rehab - Patient changed his mind over weekend to agree with [facility]. Recommend ACUTE inpatient rehabilitation for the functional deficits consisting of 3 hours of therapy/day & 24 hour RN/daily PMR physician for comorbid medical management. Will continue to follow for ongoing rehab needs and recommendations. Patient will be able to tolerate 3 hours a day.    Continue bedside therapy as well as OOB throughout the day with mobilization throughout the day with staff to maintain cardiopulmonary function and prevention of secondary complications related to debility.     Discussed with CM, informed rehab liaison.

## 2019-08-13 ENCOUNTER — TRANSCRIPTION ENCOUNTER (OUTPATIENT)
Age: 84
End: 2019-08-13

## 2019-08-13 ENCOUNTER — INPATIENT (INPATIENT)
Facility: HOSPITAL | Age: 84
LOS: 15 days | Discharge: HOME HEALTH | DRG: 949 | End: 2019-08-29
Attending: SPECIALIST | Admitting: SPECIALIST
Payer: MEDICARE

## 2019-08-13 VITALS
OXYGEN SATURATION: 98 % | TEMPERATURE: 98 F | DIASTOLIC BLOOD PRESSURE: 72 MMHG | SYSTOLIC BLOOD PRESSURE: 134 MMHG | HEART RATE: 87 BPM | RESPIRATION RATE: 18 BRPM

## 2019-08-13 VITALS
HEIGHT: 73 IN | RESPIRATION RATE: 14 BRPM | DIASTOLIC BLOOD PRESSURE: 75 MMHG | TEMPERATURE: 98 F | HEART RATE: 68 BPM | OXYGEN SATURATION: 96 % | SYSTOLIC BLOOD PRESSURE: 145 MMHG | WEIGHT: 302.92 LBS

## 2019-08-13 DIAGNOSIS — R21 RASH AND OTHER NONSPECIFIC SKIN ERUPTION: ICD-10-CM

## 2019-08-13 DIAGNOSIS — Z86.711 PERSONAL HISTORY OF PULMONARY EMBOLISM: ICD-10-CM

## 2019-08-13 DIAGNOSIS — Z79.01 LONG TERM (CURRENT) USE OF ANTICOAGULANTS: ICD-10-CM

## 2019-08-13 DIAGNOSIS — I48.0 PAROXYSMAL ATRIAL FIBRILLATION: ICD-10-CM

## 2019-08-13 DIAGNOSIS — Z98.49 CATARACT EXTRACTION STATUS, UNSPECIFIED EYE: Chronic | ICD-10-CM

## 2019-08-13 DIAGNOSIS — Z51.89 ENCOUNTER FOR OTHER SPECIFIED AFTERCARE: ICD-10-CM

## 2019-08-13 DIAGNOSIS — Z79.82 LONG TERM (CURRENT) USE OF ASPIRIN: ICD-10-CM

## 2019-08-13 DIAGNOSIS — R00.0 TACHYCARDIA, UNSPECIFIED: ICD-10-CM

## 2019-08-13 DIAGNOSIS — Z98.89 OTHER SPECIFIED POSTPROCEDURAL STATES: Chronic | ICD-10-CM

## 2019-08-13 DIAGNOSIS — I35.9 NONRHEUMATIC AORTIC VALVE DISORDER, UNSPECIFIED: ICD-10-CM

## 2019-08-13 DIAGNOSIS — Z87.891 PERSONAL HISTORY OF NICOTINE DEPENDENCE: ICD-10-CM

## 2019-08-13 DIAGNOSIS — R06.00 DYSPNEA, UNSPECIFIED: ICD-10-CM

## 2019-08-13 DIAGNOSIS — N40.0 BENIGN PROSTATIC HYPERPLASIA WITHOUT LOWER URINARY TRACT SYMPTOMS: ICD-10-CM

## 2019-08-13 DIAGNOSIS — Z95.1 PRESENCE OF AORTOCORONARY BYPASS GRAFT: ICD-10-CM

## 2019-08-13 DIAGNOSIS — F32.9 MAJOR DEPRESSIVE DISORDER, SINGLE EPISODE, UNSPECIFIED: ICD-10-CM

## 2019-08-13 DIAGNOSIS — D68.51 ACTIVATED PROTEIN C RESISTANCE: ICD-10-CM

## 2019-08-13 DIAGNOSIS — I45.81 LONG QT SYNDROME: ICD-10-CM

## 2019-08-13 DIAGNOSIS — J44.9 CHRONIC OBSTRUCTIVE PULMONARY DISEASE, UNSPECIFIED: ICD-10-CM

## 2019-08-13 DIAGNOSIS — I25.10 ATHEROSCLEROTIC HEART DISEASE OF NATIVE CORONARY ARTERY WITHOUT ANGINA PECTORIS: ICD-10-CM

## 2019-08-13 DIAGNOSIS — I69.351 HEMIPLEGIA AND HEMIPARESIS FOLLOWING CEREBRAL INFARCTION AFFECTING RIGHT DOMINANT SIDE: ICD-10-CM

## 2019-08-13 DIAGNOSIS — B36.9 SUPERFICIAL MYCOSIS, UNSPECIFIED: ICD-10-CM

## 2019-08-13 DIAGNOSIS — K21.9 GASTRO-ESOPHAGEAL REFLUX DISEASE WITHOUT ESOPHAGITIS: ICD-10-CM

## 2019-08-13 DIAGNOSIS — I35.0 NONRHEUMATIC AORTIC (VALVE) STENOSIS: ICD-10-CM

## 2019-08-13 DIAGNOSIS — R26.9 UNSPECIFIED ABNORMALITIES OF GAIT AND MOBILITY: ICD-10-CM

## 2019-08-13 DIAGNOSIS — I10 ESSENTIAL (PRIMARY) HYPERTENSION: ICD-10-CM

## 2019-08-13 DIAGNOSIS — R09.89 OTHER SPECIFIED SYMPTOMS AND SIGNS INVOLVING THE CIRCULATORY AND RESPIRATORY SYSTEMS: ICD-10-CM

## 2019-08-13 DIAGNOSIS — Z48.812 ENCOUNTER FOR SURGICAL AFTERCARE FOLLOWING SURGERY ON THE CIRCULATORY SYSTEM: ICD-10-CM

## 2019-08-13 DIAGNOSIS — R73.03 PREDIABETES: ICD-10-CM

## 2019-08-13 DIAGNOSIS — Z95.828 PRESENCE OF OTHER VASCULAR IMPLANTS AND GRAFTS: ICD-10-CM

## 2019-08-13 DIAGNOSIS — E78.5 HYPERLIPIDEMIA, UNSPECIFIED: ICD-10-CM

## 2019-08-13 DIAGNOSIS — R39.89 OTHER SYMPTOMS AND SIGNS INVOLVING THE GENITOURINARY SYSTEM: ICD-10-CM

## 2019-08-13 DIAGNOSIS — G31.84 MILD COGNITIVE IMPAIRMENT OF UNCERTAIN OR UNKNOWN ETIOLOGY: ICD-10-CM

## 2019-08-13 DIAGNOSIS — D72.829 ELEVATED WHITE BLOOD CELL COUNT, UNSPECIFIED: ICD-10-CM

## 2019-08-13 DIAGNOSIS — E66.9 OBESITY, UNSPECIFIED: ICD-10-CM

## 2019-08-13 DIAGNOSIS — G47.33 OBSTRUCTIVE SLEEP APNEA (ADULT) (PEDIATRIC): ICD-10-CM

## 2019-08-13 DIAGNOSIS — G47.00 INSOMNIA, UNSPECIFIED: ICD-10-CM

## 2019-08-13 DIAGNOSIS — E04.1 NONTOXIC SINGLE THYROID NODULE: ICD-10-CM

## 2019-08-13 LAB
ALBUMIN SERPL ELPH-MCNC: 2.7 G/DL — LOW (ref 3.3–5.2)
ALP SERPL-CCNC: 52 U/L — SIGNIFICANT CHANGE UP (ref 40–120)
ALT FLD-CCNC: 17 U/L — SIGNIFICANT CHANGE UP
ANION GAP SERPL CALC-SCNC: 10 MMOL/L — SIGNIFICANT CHANGE UP (ref 5–17)
AST SERPL-CCNC: 14 U/L — SIGNIFICANT CHANGE UP
BILIRUB SERPL-MCNC: 0.6 MG/DL — SIGNIFICANT CHANGE UP (ref 0.4–2)
BUN SERPL-MCNC: 27 MG/DL — HIGH (ref 8–20)
CALCIUM SERPL-MCNC: 8.5 MG/DL — LOW (ref 8.6–10.2)
CHLORIDE SERPL-SCNC: 107 MMOL/L — SIGNIFICANT CHANGE UP (ref 98–107)
CO2 SERPL-SCNC: 23 MMOL/L — SIGNIFICANT CHANGE UP (ref 22–29)
CREAT SERPL-MCNC: 1.5 MG/DL — HIGH (ref 0.5–1.3)
GLUCOSE BLDC GLUCOMTR-MCNC: 101 MG/DL — HIGH (ref 70–99)
GLUCOSE BLDC GLUCOMTR-MCNC: 123 MG/DL — HIGH (ref 70–99)
GLUCOSE SERPL-MCNC: 110 MG/DL — SIGNIFICANT CHANGE UP (ref 70–115)
HCT VFR BLD CALC: 32.2 % — LOW (ref 39–50)
HGB BLD-MCNC: 10 G/DL — LOW (ref 13–17)
INR BLD: 2.26 RATIO — HIGH (ref 0.88–1.16)
MCHC RBC-ENTMCNC: 27 PG — SIGNIFICANT CHANGE UP (ref 27–34)
MCHC RBC-ENTMCNC: 31.1 GM/DL — LOW (ref 32–36)
MCV RBC AUTO: 87 FL — SIGNIFICANT CHANGE UP (ref 80–100)
PLATELET # BLD AUTO: 244 K/UL — SIGNIFICANT CHANGE UP (ref 150–400)
POTASSIUM SERPL-MCNC: 4.2 MMOL/L — SIGNIFICANT CHANGE UP (ref 3.5–5.3)
POTASSIUM SERPL-SCNC: 4.2 MMOL/L — SIGNIFICANT CHANGE UP (ref 3.5–5.3)
PROT SERPL-MCNC: 5.8 G/DL — LOW (ref 6.6–8.7)
PROTHROM AB SERPL-ACNC: 26.7 SEC — HIGH (ref 10–12.9)
RBC # BLD: 3.7 M/UL — LOW (ref 4.2–5.8)
RBC # FLD: 14.8 % — HIGH (ref 10.3–14.5)
SODIUM SERPL-SCNC: 140 MMOL/L — SIGNIFICANT CHANGE UP (ref 135–145)
WBC # BLD: 10.7 K/UL — HIGH (ref 3.8–10.5)
WBC # FLD AUTO: 10.7 K/UL — HIGH (ref 3.8–10.5)

## 2019-08-13 PROCEDURE — 93010 ELECTROCARDIOGRAM REPORT: CPT | Mod: 77

## 2019-08-13 PROCEDURE — 99222 1ST HOSP IP/OBS MODERATE 55: CPT

## 2019-08-13 PROCEDURE — 99232 SBSQ HOSP IP/OBS MODERATE 35: CPT

## 2019-08-13 PROCEDURE — 93010 ELECTROCARDIOGRAM REPORT: CPT

## 2019-08-13 PROCEDURE — 71045 X-RAY EXAM CHEST 1 VIEW: CPT | Mod: 26

## 2019-08-13 RX ORDER — SERTRALINE 25 MG/1
50 TABLET, FILM COATED ORAL DAILY
Refills: 0 | Status: DISCONTINUED | OUTPATIENT
Start: 2019-08-13 | End: 2019-08-29

## 2019-08-13 RX ORDER — ACETAMINOPHEN 500 MG
2 TABLET ORAL
Qty: 0 | Refills: 0 | DISCHARGE
Start: 2019-08-13

## 2019-08-13 RX ORDER — IPRATROPIUM/ALBUTEROL SULFATE 18-103MCG
3 AEROSOL WITH ADAPTER (GRAM) INHALATION EVERY 6 HOURS
Refills: 0 | Status: DISCONTINUED | OUTPATIENT
Start: 2019-08-13 | End: 2019-08-15

## 2019-08-13 RX ORDER — CLOTRIMAZOLE AND BETAMETHASONE DIPROPIONATE 10; .5 MG/G; MG/G
1 CREAM TOPICAL DAILY
Refills: 0 | Status: DISCONTINUED | OUTPATIENT
Start: 2019-08-13 | End: 2019-08-13

## 2019-08-13 RX ORDER — ACETAMINOPHEN 500 MG
650 TABLET ORAL EVERY 6 HOURS
Refills: 0 | Status: DISCONTINUED | OUTPATIENT
Start: 2019-08-13 | End: 2019-08-29

## 2019-08-13 RX ORDER — ALBUTEROL 90 UG/1
1 AEROSOL, METERED ORAL EVERY 4 HOURS
Refills: 0 | Status: DISCONTINUED | OUTPATIENT
Start: 2019-08-13 | End: 2019-08-15

## 2019-08-13 RX ORDER — DEXTROSE 50 % IN WATER 50 %
25 SYRINGE (ML) INTRAVENOUS ONCE
Refills: 0 | Status: DISCONTINUED | OUTPATIENT
Start: 2019-08-13 | End: 2019-08-23

## 2019-08-13 RX ORDER — AMIODARONE HYDROCHLORIDE 400 MG/1
200 TABLET ORAL DAILY
Refills: 0 | Status: DISCONTINUED | OUTPATIENT
Start: 2019-08-13 | End: 2019-08-13

## 2019-08-13 RX ORDER — NYSTATIN/TRIAMCINOLONE ACET
1 OINTMENT (GRAM) TOPICAL
Refills: 0 | Status: DISCONTINUED | OUTPATIENT
Start: 2019-08-13 | End: 2019-08-14

## 2019-08-13 RX ORDER — ASPIRIN/CALCIUM CARB/MAGNESIUM 324 MG
81 TABLET ORAL DAILY
Refills: 0 | Status: DISCONTINUED | OUTPATIENT
Start: 2019-08-13 | End: 2019-08-29

## 2019-08-13 RX ORDER — ATORVASTATIN CALCIUM 80 MG/1
40 TABLET, FILM COATED ORAL AT BEDTIME
Refills: 0 | Status: DISCONTINUED | OUTPATIENT
Start: 2019-08-13 | End: 2019-08-29

## 2019-08-13 RX ORDER — POVIDONE-IODINE 5 %
1 AEROSOL (ML) TOPICAL
Refills: 0 | Status: DISCONTINUED | OUTPATIENT
Start: 2019-08-13 | End: 2019-08-13

## 2019-08-13 RX ORDER — INSULIN LISPRO 100/ML
VIAL (ML) SUBCUTANEOUS
Refills: 0 | Status: DISCONTINUED | OUTPATIENT
Start: 2019-08-13 | End: 2019-08-17

## 2019-08-13 RX ORDER — DOCUSATE SODIUM 100 MG
100 CAPSULE ORAL THREE TIMES A DAY
Refills: 0 | Status: DISCONTINUED | OUTPATIENT
Start: 2019-08-13 | End: 2019-08-29

## 2019-08-13 RX ORDER — TAMSULOSIN HYDROCHLORIDE 0.4 MG/1
1 CAPSULE ORAL
Qty: 0 | Refills: 0 | DISCHARGE
Start: 2019-08-13

## 2019-08-13 RX ORDER — METOPROLOL TARTRATE 50 MG
1 TABLET ORAL
Qty: 0 | Refills: 0 | DISCHARGE
Start: 2019-08-13

## 2019-08-13 RX ORDER — IPRATROPIUM/ALBUTEROL SULFATE 18-103MCG
3 AEROSOL WITH ADAPTER (GRAM) INHALATION
Qty: 0 | Refills: 0 | DISCHARGE
Start: 2019-08-13

## 2019-08-13 RX ORDER — TIOTROPIUM BROMIDE 18 UG/1
1 CAPSULE ORAL; RESPIRATORY (INHALATION) DAILY
Refills: 0 | Status: DISCONTINUED | OUTPATIENT
Start: 2019-08-13 | End: 2019-08-29

## 2019-08-13 RX ORDER — TAMSULOSIN HYDROCHLORIDE 0.4 MG/1
0.4 CAPSULE ORAL AT BEDTIME
Refills: 0 | Status: DISCONTINUED | OUTPATIENT
Start: 2019-08-13 | End: 2019-08-29

## 2019-08-13 RX ORDER — METOPROLOL TARTRATE 50 MG
50 TABLET ORAL EVERY 12 HOURS
Refills: 0 | Status: DISCONTINUED | OUTPATIENT
Start: 2019-08-13 | End: 2019-08-16

## 2019-08-13 RX ORDER — SENNA PLUS 8.6 MG/1
2 TABLET ORAL AT BEDTIME
Refills: 0 | Status: DISCONTINUED | OUTPATIENT
Start: 2019-08-13 | End: 2019-08-29

## 2019-08-13 RX ORDER — SODIUM CHLORIDE 9 MG/ML
1000 INJECTION, SOLUTION INTRAVENOUS
Refills: 0 | Status: DISCONTINUED | OUTPATIENT
Start: 2019-08-13 | End: 2019-08-23

## 2019-08-13 RX ORDER — LANOLIN ALCOHOL/MO/W.PET/CERES
6 CREAM (GRAM) TOPICAL AT BEDTIME
Refills: 0 | Status: DISCONTINUED | OUTPATIENT
Start: 2019-08-13 | End: 2019-08-29

## 2019-08-13 RX ORDER — WARFARIN SODIUM 2.5 MG/1
2.5 TABLET ORAL ONCE
Refills: 0 | Status: COMPLETED | OUTPATIENT
Start: 2019-08-13 | End: 2019-08-13

## 2019-08-13 RX ORDER — GLUCAGON INJECTION, SOLUTION 0.5 MG/.1ML
1 INJECTION, SOLUTION SUBCUTANEOUS ONCE
Refills: 0 | Status: DISCONTINUED | OUTPATIENT
Start: 2019-08-13 | End: 2019-08-23

## 2019-08-13 RX ORDER — DEXTROSE 50 % IN WATER 50 %
15 SYRINGE (ML) INTRAVENOUS ONCE
Refills: 0 | Status: DISCONTINUED | OUTPATIENT
Start: 2019-08-13 | End: 2019-08-23

## 2019-08-13 RX ORDER — PANTOPRAZOLE SODIUM 20 MG/1
40 TABLET, DELAYED RELEASE ORAL DAILY
Refills: 0 | Status: DISCONTINUED | OUTPATIENT
Start: 2019-08-13 | End: 2019-08-15

## 2019-08-13 RX ORDER — DOCUSATE SODIUM 100 MG
1 CAPSULE ORAL
Qty: 0 | Refills: 0 | DISCHARGE
Start: 2019-08-13

## 2019-08-13 RX ORDER — DEXTROSE 50 % IN WATER 50 %
12.5 SYRINGE (ML) INTRAVENOUS ONCE
Refills: 0 | Status: DISCONTINUED | OUTPATIENT
Start: 2019-08-13 | End: 2019-08-23

## 2019-08-13 RX ORDER — AMIODARONE HYDROCHLORIDE 400 MG/1
200 TABLET ORAL DAILY
Refills: 0 | Status: DISCONTINUED | OUTPATIENT
Start: 2019-08-13 | End: 2019-08-29

## 2019-08-13 RX ORDER — SENNA PLUS 8.6 MG/1
2 TABLET ORAL
Qty: 0 | Refills: 0 | DISCHARGE
Start: 2019-08-13

## 2019-08-13 RX ORDER — AMIODARONE HYDROCHLORIDE 400 MG/1
1 TABLET ORAL
Qty: 0 | Refills: 0 | DISCHARGE
Start: 2019-08-13

## 2019-08-13 RX ORDER — WARFARIN SODIUM 2.5 MG/1
2.5 TABLET ORAL ONCE
Refills: 0 | Status: DISCONTINUED | OUTPATIENT
Start: 2019-08-14 | End: 2019-08-14

## 2019-08-13 RX ORDER — PSYLLIUM SEED (WITH DEXTROSE)
1 POWDER (GRAM) ORAL DAILY
Refills: 0 | Status: DISCONTINUED | OUTPATIENT
Start: 2019-08-13 | End: 2019-08-29

## 2019-08-13 RX ADMIN — Medication 100 MILLIGRAM(S): at 06:55

## 2019-08-13 RX ADMIN — Medication 325 MILLIGRAM(S): at 11:32

## 2019-08-13 RX ADMIN — Medication 3 MILLILITER(S): at 08:18

## 2019-08-13 RX ADMIN — PANTOPRAZOLE SODIUM 40 MILLIGRAM(S): 20 TABLET, DELAYED RELEASE ORAL at 11:32

## 2019-08-13 RX ADMIN — Medication 50 MILLIGRAM(S): at 06:55

## 2019-08-13 RX ADMIN — AMIODARONE HYDROCHLORIDE 200 MILLIGRAM(S): 400 TABLET ORAL at 17:51

## 2019-08-13 RX ADMIN — WARFARIN SODIUM 2.5 MILLIGRAM(S): 2.5 TABLET ORAL at 21:51

## 2019-08-13 RX ADMIN — Medication 50 MILLIGRAM(S): at 17:51

## 2019-08-13 RX ADMIN — Medication 3 MILLILITER(S): at 20:25

## 2019-08-13 RX ADMIN — AMIODARONE HYDROCHLORIDE 400 MILLIGRAM(S): 400 TABLET ORAL at 06:55

## 2019-08-13 RX ADMIN — SERTRALINE 50 MILLIGRAM(S): 25 TABLET, FILM COATED ORAL at 11:32

## 2019-08-13 RX ADMIN — ATORVASTATIN CALCIUM 40 MILLIGRAM(S): 80 TABLET, FILM COATED ORAL at 21:50

## 2019-08-13 RX ADMIN — Medication 6 MILLIGRAM(S): at 21:50

## 2019-08-13 RX ADMIN — Medication 650 MILLIGRAM(S): at 11:34

## 2019-08-13 RX ADMIN — TAMSULOSIN HYDROCHLORIDE 0.4 MILLIGRAM(S): 0.4 CAPSULE ORAL at 21:50

## 2019-08-13 RX ADMIN — Medication 1 APPLICATION(S): at 17:51

## 2019-08-13 RX ADMIN — SODIUM CHLORIDE 3 MILLILITER(S): 9 INJECTION INTRAMUSCULAR; INTRAVENOUS; SUBCUTANEOUS at 06:51

## 2019-08-13 NOTE — H&P ADULT - NSICDXPASTMEDICALHX_GEN_ALL_CORE_FT
PAST MEDICAL HISTORY:  Aortic stenosis     Atheroma     Atrial fibrillation, unspecified type     CAD S/P percutaneous coronary angioplasty     Cerebrovascular accident (CVA)     [place] filter in place     History of COPD     Hyperlipemia     Hypertension     PE (pulmonary embolism)

## 2019-08-13 NOTE — PROGRESS NOTE ADULT - PROVIDER SPECIALTY LIST ADULT
Anesthesia
CT Surgery
Cardiology
Family Medicine
Nephrology
Rehab Medicine
Rehab Medicine
Cardiology
Family Medicine
Family Medicine
CT Surgery

## 2019-08-13 NOTE — H&P ADULT - NSHPREVIEWOFSYSTEMS_GEN_ALL_CORE
REVIEW OF SYSTEMS  Constitutional - No fever, No weight loss, No fatigue  HEENT - No eye pain, No visual disturbances, No difficulty hearing, No tinnitus, No vertigo, No neck pain  Respiratory - + cough, No wheezing, + shortness of breath  Cardiovascular - + chest pain, No palpitations  Gastrointestinal - No abdominal pain, No nausea, No vomiting, No diarrhea, No constipation  Genitourinary - No dysuria, No frequency, No hematuria, No incontinence  Neurological - No headaches, No memory loss, No loss of strength, No numbness, No tremors  Skin - No itching, No rashes, No lesions   Endocrine - No temperature intolerance  Musculoskeletal - No joint pain, No joint swelling, No muscle pain  Psychiatric - No depression, No anxiety REVIEW OF SYSTEMS  Constitutional - No fever, No weight loss, No fatigue  HEENT - No eye pain, No visual disturbances, No difficulty hearing, No tinnitus, No vertigo, No neck pain  Respiratory - + cough, No wheezing, + shortness of breath  Cardiovascular - + chest pain, No palpitations  Gastrointestinal - No abdominal pain, No nausea, No vomiting, No diarrhea, No constipation  Genitourinary - No dysuria, No frequency, No hematuria, No incontinence  Neurological - No headaches, No memory loss, No loss of strength, No numbness, No tremors  Skin - No itching,  present rash right thigh,, No lesions   Endocrine - No temperature intolerance  Musculoskeletal - No joint pain, No joint swelling, No muscle pain  Psychiatric - No depression, No anxiety

## 2019-08-13 NOTE — PROGRESS NOTE ADULT - PROBLEM SELECTOR PLAN 2
Patient still with episodes of hypoxia.  Would continue nebulizer treatments when necessary and supplemental O2 support.
Anticoagulation per cardiology and thoracic team.
Continue Coumadin with a target INR between 2 and 3.
Creatinine improving again today
Now s/p removal of fibroelastoma   Patient with chest tubes   left pleural
Status post removal of aortic valve leaflet fibroelastoma.  Repeat echo feels reasonably good left ventricular contractility and no evidence of severe aortic stenosis.
Day 2 status post three-vessel CABG.  Patient appears to be doing well, extubated, chest drain management per CT surgical team.
Now s/p removal of fibroelastoma   Patient with chest tubes   left pleural- maintain CT
Now s/p removal of fibroelastoma   Patient with chest tubes meds and left pleural
Patient SR now 90s   continue coumadin,   INR 2.02 today.  no need for lovenox now, but may use if subtherapeutic
Patient had removal of a fibroelastoma-like strand from a leaflet of His aortic valve.  This may well have been the etiology of his stroke in April.
s/p AV fibroelastoma  Continue Coumadin.  Continue plan as above.
s/p AV fibroelastoma  Continue Coumadin.  Continue plan as above.

## 2019-08-13 NOTE — PROGRESS NOTE ADULT - PROBLEM SELECTOR PROBLEM 2
Acute kidney injury
Aortic valve mass
Aortic valve stenosis, etiology of cardiac valve disease unspecified
Factor 5 Leiden mutation, heterozygous
Factor 5 Leiden mutation, heterozygous
Aortic stenosis
Aortic valve stenosis, etiology of cardiac valve disease unspecified
Aortic valve stenosis, etiology of cardiac valve disease unspecified
Atrial fibrillation, unspecified type
CAD (coronary artery disease) of artery bypass graft
History of COPD

## 2019-08-13 NOTE — DISCHARGE NOTE PROVIDER - NSDCHC_MEDRECSTATUS_GEN_ALL_CORE
Admission Reconciliation is Completed  Discharge Reconciliation is Not Complete
Admission Reconciliation is Completed  Discharge Reconciliation is Completed

## 2019-08-13 NOTE — PATIENT PROFILE ADULT - NSTOBACCONEVERSMOKERY/N_GEN_A
"        Anne Brown   2017 4:45 PM   Office Visit   MRN: [id number]    Department:  [facility]   Dept Phone:  [phone]    Description:  Female : 1956   Provider:  Marine Mart PA-C           Reason for Visit     Back Pain \"rash on R side, poss shingles\"x1week       Allergies as of 2017     No Known Allergies      You were diagnosed with     Herpes zoster without complication   [039856]       Neuropathic pain   [971542]         Vital Signs     Blood Pressure Pulse Temperature Height Weight Body Mass Index    130/84 mmHg 85 36.9 °C (98.4 °F) 1.549 m (5' 1\") 66.679 kg (147 lb) 27.79 kg/m2    Oxygen Saturation                   96%           Basic Information     Date Of Birth Sex Race Ethnicity Preferred Language    1956 Female  or  Unknown English      Health Maintenance     Patient has no pending health maintenance at this time      Current Immunizations     No immunizations on file.      Below and/or attached are the medications your provider expects you to take. Review all of your home medications and newly ordered medications with your provider and/or pharmacist. Follow medication instructions as directed by your provider and/or pharmacist. Please keep your medication list with you and share with your provider. Update the information when medications are discontinued, doses are changed, or new medications (including over-the-counter products) are added; and carry medication information at all times in the event of emergency situations     Allergies:  No Known Allergies          Medications  Valid as of: May 08, 2017 -  5:35 PM    Generic Name Brand Name Tablet Size Instructions for use    TraMADol HCl (Tab) ULTRAM 50 MG Take 1 Tab by mouth every four hours as needed.        ValACYclovir HCl (Tab) VALTREX 500 MG Take 2 Tabs by mouth 3 times a day for 7 days.        .                 Medicines prescribed today were sent to:     [facility] - [place] " (), [place] - [address]    [address] [place] () [place] [address]    Phone: [phone] Fax: [phone]    Open 24 Hours?: No      Medication refill instructions:       If your prescription bottle indicates you have medication refills left, it is not necessary to call your provider’s office. Please contact your pharmacy and they will refill your medication.    If your prescription bottle indicates you do not have any refills left, you may request refills at any time through one of the following ways: The online Needium system (except Urgent Care), by calling your provider’s office, or by asking your pharmacy to contact your provider’s office with a refill request. Medication refills are processed only during regular business hours and may not be available until the next business day. Your provider may request additional information or to have a follow-up visit with you prior to refilling your medication.   *Please Note: Medication refills are assigned a new Rx number when refilled electronically. Your pharmacy may indicate that no refills were authorized even though a new prescription for the same medication is available at the pharmacy. Please request the medicine by name with the pharmacy before contacting your provider for a refill.        Instructions    Culebrilla  (Shingles)  La culebrilla, también conocida briseyda herpes zóster, es luis infección que causa luis erupción dolorosa en la piel y ampollas llenas de líquido. La culebrilla no está relacionada con el herpes genital, que es luis enfermedad de transmisión sexual.     Solo se manifiesta en personas que:  · Tuvieron varicela.  · Recibieron la vacuna contra la varicela. ([place] es poco frecuente).  CAUSAS  La causa de la culebrilla es el virus de la varicela zóster (VVZ), el mismo virus que causa la varicela. Después de la exposición al virus de la varicela zóster, boris permanece en el organismo en un estado inactivo (latente). La culebrilla aparece  si el virus se reactiva. [place] puede ocurrir muchos años después de la exposición inicial al virus. No se conocen las causas por las que boris virus se reactiva.  FACTORES DE RIESGO  Las personas que tuvieron varicela o recibieron la vacuna contra la varicela están en riesgo de tener culebrilla. La infección es más frecuente en las personas que:  · Tienen más de 50 años.  · Tienen el sistema de defensa del organismo (sistema inmunitario) debilitado, briseyda en los enfermos con VIH, sida o cáncer.  · [place] medicamentos que debilitan el sistema inmunitario, briseyda los medicamentos para trasplantes.  · Están sometidas a un gran estrés.  SÍNTOMAS  Los primeros síntomas de esta afección pueden incluir picazón, hormigueo o dolor en luis ping de la piel. Boris dolor se puede describir briseyda ardor, punzante o pulsátil.  Unos días o semanas después de que comienzan los síntomas, aparece luis erupción rojiza y dolorosa en un lado del cuerpo en un patrón con forma de portia o de stinson. Finalmente, la erupción se convierte en ampollas llenas de líquido que se abren, fiorella costras y se secan en dos o sheryl semanas.  En cualquier momento ivan la infección, puede presentar lo siguiente:  · Fiebre.  · Escalofríos.  · Dolor de trinidad.  · Malestar estomacal.  DIAGNÓSTICO  Esta afección se diagnostica con un examen de la piel. En algunos casos, se extraen muestras de piel o del líquido de las ampollas antes de definir el diagnóstico. Estas muestras se examinan con el microscopio o se envían al laboratorio para medina análisis.  TRATAMIENTO  No hay luis micky específica para esta afección. El médico probablemente le recete medicamentos para ayudarlo a controlar el dolor, a recuperarse más rápido y a evitar problemas a zach plazo. Entre los medicamentos se incluyen los siguientes:  · Medicamentos antivirales.  · Antiinflamatorios.  · Analgésicos.  Si la ping afectada está en el clark, podrán derivarlo a un especialista, briseyda un médico especialista  en ojos (oftalmólogo) o en oídos, nariz y garganta (otorrinolaringólogo) para evitar problemas oculares, dolor crónico o discapacidad.  INSTRUCCIONES PARA EL CUIDADO EN EL HOGAR  Medicamentos  · [place] los medicamentos solamente briseyda se lo haya indicado el médico.  · Aplique luis crema anestésica o luis para calmar la picazón en la ping afectada según las indicaciones del médico.  Cuidado de la erupción y las ampollas  · [place] un baño de agua fría o aplique compresas frías en la ping de la erupción o las ampollas briseyda se lo haya indicado el médico. [place] aliviará el dolor y la picazón.  · Mantenga la ping de la erupción cubierta con luis venda (vendaje). Use ropa holgada para ayudar a aliviar el dolor del roce con la erupción.  · Mantenga la erupción y las ampollas limpias con jabón suave y agua fresca o briseyda se lo indique el médico.  · Controle la erupción todos los días para detectar signos de infección. Estos signos incluyen enrojecimiento, hinchazón y dolor que perdura o aumenta.  · No pellizque las ampollas.  · No se rasque la ping de la erupción.  Instrucciones generales  · [place] reposo según las indicaciones del médico.  · Concurra a todas las visitas de control briseyda se lo haya indicado el médico. [place] es importante.  · Hasta tanto las ampollas formen costras, la infección puede causar varicela en las personas que nunca la tuvieron o no se vacunaron contra la varicela. Para impedir que esto suceda, evite el contacto con otras personas, en especial:  ¨ Bebés.  ¨ Embarazadas.  ¨ Niños que tienen eccema.  ¨ Personas mayores que valdez recibido un trasplante.  ¨ Personas con enfermedades crónicas, briseyda leucemia y sida.  SOLICITE ATENCIÓN MÉDICA SI:  · El dolor no se emely con los medicamentos prescritos.  · El dolor no mejora después de que la erupción desaparece.  · La erupción parece infectada. Los signos de infección incluyen enrojecimiento, hinchazón y dolor que perdura o aumenta.  SOLICITE ATENCIÓN MÉDICA DE INMEDIATO  SI:  · La erupción aparece en el clark o la nariz.  · Tiene dolor en el clark, en la ping de los ojos o tiene pérdida de la sensibilidad en un lado del clark.  · Siente dolor o un zumbido en el oído.  · Tiene pérdida del gusto.  · La afección empeora.     Esta información no tiene briseyda fin reemplazar el consejo del médico. Asegúrese de hacerle al médico cualquier pregunta que tenga.     Document Released: 09/27/2006 Document Revised: 01/08/2016  [employer] Interactive Patient Education ©2016 [employer]    Neuralgia postherpética  (Postherpetic Neuralgia)  La neuralgia postherpética (NPH) es un dolor neural que aparece después de tener culebrilla. La culebrilla es luis erupción cutánea dolorosa que aparece en un lado del cuerpo, generalmente en el tronco o el clark. Es causada por el virus de la varicela zoster, el mismo virus que causa la varicela. En las personas que tuvieron varicela, el virus puede reaparecer años más tarde y causar culebrilla.  Puede tener neuralgia postherpética si sigue teniendo dolor ivan 3 meses después de la desaparición de la erupción de la culebrilla. La neuralgia postherpética aparece en la misma ping donde se produjo la erupción cutánea de la culebrilla, y, en la mayoría de los casos, desaparece en el término de 1 año.   La aplicación de la vacuna contra la culebrilla puede prevenir la neuralgia postherpética. Se recomienda que las personas mayores de 50 años se apliquen esta vacuna, la cual puede prevenir la culebrilla y, además, reducir el riesgo de neuralgia postherpética si tiene culebrilla.  CAUSAS  La neuralgia postherpética se debe al daño neural que causa el virus de la varicela zoster. Boris daño hipersensibiliza los nervios.   FACTORES DE RIESGO  El envejecimiento es el principal factor de riesgo de desarrollar neuralgia postherpética. La mayoría de las personas que padecen boris trastorno son mayores de 60 años. Otros factores de riesgo son:  · Sentir un dolor muy  intenso antes de que aparezca la erupción cutánea de la culebrilla.  · Tener luis erupción cutánea muy extensa.  · Tener culebrilla en el nervio que inerva el clark y el sergio (nervio trigémino).  SIGNOS Y SÍNTOMAS  El dolor es el síntoma principal de neuralgia postherpética. Suele ser muy intenso y puede describirse briseyda un dolor punzante, que provoca sensación de quemazón o que se parece a luis descarga eléctrica. El dolor puede ser intermitente o permanente. Los roces suaves sobre la piel o los cambios de temperatura pueden desencadenar el dolor. [place] con el dolor, puede tener picazón.  DIAGNÓSTICO   El médico puede diagnosticar la neuralgia postherpética en función de los síntomas y de los antecedentes de culebrilla. Generalmente, no es necesario realizar análisis de laboratorio ni otras pruebas diagnósticas.  TRATAMIENTO   No hay micky para la neuralgia postherpética. El tratamiento se centrará en el alivio del dolor. Generalmente, los analgésicos de venta estelle no alivian el dolor que provoca la neuralgia postherpética. Willy vez deba consultar a un especialista en dolor. El tratamiento puede incluir:  · Antidepresivos para ayudar con el dolor y mejorar el sueño.  · Anticonvulsivos para aliviar el dolor neural.  · Analgésicos yen (opioides).  · Un parche anestésico que se coloca sobre la piel (parche de lidocaína).  INSTRUCCIONES PARA EL CUIDADO EN EL HOGAR  Recuperarse de la neuralgia postherpética puede llevar mucho tiempo. Trabaje en estrecha colaboración con el médico y procure tener un buen sistema de apoyo en medina casa.   · [place] todos los medicamentos briseyda se lo haya indicado el médico.  · Use ropa cómoda y suelta.  · Cubra las zonas sensibles con luis venda para reducir la fricción de las prendas contra la ping.  · Si el frío no empeora el dolor, intente aplicarse luis compresa fría o luis bolsa con gel para bajar la temperatura en la ping.  · Hable con el médico si está deprimido o desesperado. Convivir con  un dolor crónico puede causar depresión.  SOLICITE ATENCIÓN MÉDICA SI:  · El medicamento no le calma el dolor.  · Tiene dificultades para controlar el dolor en medina casa.     Esta información no tiene briseyda fin reemplazar el consejo del médico. Asegúrese de hacerle al médico cualquier pregunta que tenga.     Document Released: 04/05/2010 Document Revised: 01/08/2016  [employer] Interactive Patient Education ©2016 [employer] Inc.              [employer] Access Code: [email or web address]  Expires: 6/7/2017  5:35 PM    Your email address is not on file at [employer].  Email Addresses are required for you to sign up for [employer], please contact [phone] to verify your personal information and to provide your email address prior to attempting to register for [employer].    Anne Brown  [address] [place] [address]  [place] [address]    [employer]  A secure, online tool to manage your health information     [employer]’s [employer]® is a secure, online tool that connects you to your personalized health information from the privacy of your home -- day or night - making it very easy for you to manage your healthcare. Once the activation process is completed, you can even access your medical information using the [employer] tyrel, which is available for free in the Apple Tyrel store or Google Play store.     To learn more about [employer], visit [email or web address][email or web address]    There are two levels of access available (as shown below):   My Chart Features  [facility] Primary Care Doctor [facility]  Specialists [facility]  Urgent  Care Non-[facility] Primary Care Doctor   Email your healthcare team securely and privately 24/7 X X X    Manage appointments: schedule your next appointment; view details of past/upcoming appointments X      Request prescription refills. X      View recent personal medical records, including lab and immunizations X X X X   View health record, including health history, allergies, medications X X X X   Read reports about your outpatient visits,  procedures, consult and ER notes X X X X   See your discharge summary, which is a recap of your hospital and/or ER visit that includes your diagnosis, lab results, and care plan X X  X     How to register for [employer]:  Once your e-mail address has been verified, follow the following steps to sign up for [employer].     1. Go to  [email or web address]  2. Click on the Sign Up Now box, which takes you to the New Member Sign Up page. You will need to provide the following information:  a. Enter your [employer] Access Code exactly as it appears at the top of this page. (You will not need to use this code after you’ve completed the sign-up process. If you do not sign up before the expiration date, you must request a new code.)   b. Enter your date of birth.   c. Enter your home email address.   d. Click Submit, and follow the next screen’s instructions.  3. Create a [employer] ID. This will be your [employer] login ID and cannot be changed, so think of one that is secure and easy to remember.  4. Create a [employer] password. You can change your password at any time.  5. Enter your Password Reset Question and Answer. This can be used at a later time if you forget your password.   6. Enter your e-mail address. This allows you to receive e-mail notifications when new information is available in [employer].  7. Click Sign Up. You can now view your health information.    For assistance activating your [employer] account, call [phone]          No

## 2019-08-13 NOTE — PROGRESS NOTE ADULT - PROBLEM SELECTOR PROBLEM 1
CAD (coronary artery disease)
CAD (coronary artery disease) of artery bypass graft
Delirium
Hypertension, unspecified type
Insomnia
CAD (coronary artery disease) of artery bypass graft
CAD S/P percutaneous coronary angioplasty
Hypertension, unspecified type
CAD S/P percutaneous coronary angioplasty

## 2019-08-13 NOTE — DISCHARGE NOTE PROVIDER - HOSPITAL COURSE
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  Pt's last CVA was in April and he has not had a stroke since. He has also had an IVC filter in place (Dr. Hutchison, Vascular Surgeon at ).  He endorses CASTILLO, denies palpitations, chest pain, lower extremity edema. 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 Patient extubated 8/7.     8/9 pt intro and medial chest tube d/c . pt still requiring high flow oxygen

## 2019-08-13 NOTE — DISCHARGE NOTE PROVIDER - CARE PROVIDER_API CALL
Kermit Alvarado)  CTS Surgery  [address] [place] [address]  Phone: [phone]  Fax: [phone]  Follow Up Time: 1 week    Lul Ta)  Cardiovascular Disease  [address] [facility] [facility] [address]  [place] [address]  Phone: [phone]  Fax: [phone]  Follow Up Time: 2 weeks

## 2019-08-13 NOTE — H&P ADULT - NSHPPHYSICALEXAM_GEN_ALL_CORE
PHYSICAL EXAM  Constitutional - NAD, Comfortably breathing with high flow  HEENT - NCAT, EOMI  Neck - Supple, No limited ROM  Chest - Breathing comfortably, No wheezing  Cardiovascular - S1S2   Extremities - No C/C/E, No calf tenderness   Neurologic Exam -                    Cognitive - Awake, Alert, AAO to self, place, date, year, situation     Communication - Fluent, No dysarthria     Cranial Nerves - CN 2-12 intact     Motor -                     LEFT    UE - ShAB 5/5, EF 5/5, EE 5/5, WE 5/5,  5/5                    RIGHT UE - ShAB 5/5, EF 5/5, EE 5/5, WE 5/5,  5/5                    LEFT    LE - HF 5/5, KE 5/5, DF 5/5, PF 5/5                    RIGHT LE - HF 5/5, KE 5/5, DF 5/5, PF 5/5        Sensory - Intact to LT     Psychiatric - Mood stable, Affect WNL PHYSICAL EXAM  Constitutional - NAD, Comfortably breathing comfortably on room air, reclined.  HEENT - NCAT, EOMI  Neck - thick neck circumference   Chest - Breathing comfortably, upper airway wheeze. Lungs clear b/l and diminished at bases.  Cardiovascular - S1S2, occacional irregular beats  Extremities - 2+ pitting edema L (graft leg), trace on R. No calf tenderness   Neurologic Exam -                    Cognitive - Awake, Alert, AAO to self, place, year, situation. Able to recall hospital course.     Communication - Fluent. Slow speech. No dysarthria.     Cranial Nerves - no facial asymmetry     Motor -                     LEFT    UE - ShAB 5/5, EF 5/5, EE 5/5, WE 5/5,  5/5                    RIGHT UE - ShAB 5/5, EF 5/5, EE 5/5, WE 4/5,  5/5                    LEFT    LE - HF 5/5, KE 5/5, DF 5/5, PF 5/5                    RIGHT LE - HF 5/5, KE 5/5, DF 5/5, PF 5/5        Sensory - Intact to LT        Coordination: impaired FTN on right (from prior CVA)  Psychiatric - Mood stable, Affect WNL PHYSICAL EXAM  Constitutional - NAD, Comfortably breathing comfortably on room air, reclined. Becomes short of breath with limited activity.   HEENT - NCAT, EOMI  Neck - thick neck circumference   Chest - Breathing comfortably, . Lungs clear b/l and diminished at bases.  Cardiovascular - S1S2, occacional irregular beats, no JVD  Extremities - 2+ pitting edema L (graft leg), trace on R. No calf tenderness   Neurologic Exam -                    Cognitive - Awake, Alert, AAO to self, place, year, situation. Able to recall hospital course.     Communication - Fluent. Slow speech. No dysarthria.     Cranial Nerves - no facial asymmetry     Motor -                     LEFT    UE - ShAB 5/5, EF 5/5, EE 5/5, WE 5/5,  5/5                    RIGHT UE - ShAB 5/5, EF 5/5, EE 5/5, WE 4/5,  5/5                    LEFT    LE - HF 5/5, KE 5/5, DF 5/5, PF 5/5                    RIGHT LE - HF 5/5, KE 5/5, DF 5/5, PF 5/5        Sensory - Intact to LT        Coordination: very slightly impaired FTN on right (from prior CVA)  Skin-fungal rash on post right thigh.   Psychiatric - Mood stable, Affect WNL

## 2019-08-13 NOTE — H&P ADULT - ASSESSMENT
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    *Gait/ADL/functional deficits secondary to Cardiac surgery: CAD s/p CABG/aortic fibroelastoma removal  - Comprehensive Rehab Program of PT/OT   - Sternal precautions  - daily dressing change/ betadine to surgical wound daily  - Weekly CXR   - ASA, statin  - follow up outpatient Dr. Alvraado     *Pre-renal MILAGRO: downtrending. AM labs   *PAF - coumadin goal 2-2.5. Daily PT/INR.  Amiodarone. Metoprolol tartrate.   *HTN - Lopressor  *BPH - flomax  *COPD - continue Duoneb prn  *DM2 - ISS  *Incidental thyroid nodule noted on Carotic US: follow up with PCP for further eval.  *h/o FVL with PEs: IVC filter. Coumadin as above.   *GERD - Protonix  *Mood: depression - sertraline  *Insomnia - melatonin    Pain managment:     Tylenol PRN    Bowel Management:    Bladder Management:     Precautions / PROPHYLAXIS:   - Falls, Cardiac,  Seizure    - Pressure injury/Skin: Turn Q2hrs while in bed, OOB to Chair, PT/OT    - DVT: on coumadin SQ , TEDs, No SCDs due to IVC filter    MEDICAL PROGNOSIS: GOOD              REHAB POTENTIAL: GOOD              ESTIMATED DISPOSITION: HOME WITH HOME CARE             ELOS: 14-21 Days   EXPECTED THERAPY:     P.T. 2hr/day       O.T. 1hr/day       EXP FREQUENCY: 5 days per 7 day period     PRESCREEN COMPARISION:   I have reviewed the prescreen information and I have found no relevant changes between the preadmission screening and my post admission evaluation     RATIONALE FOR INPATIENT ADMISSION - Patient demonstrates the following: (check all that apply)  [X] Medically appropriate for rehabilitation admission  [X] Has attainable rehab goals with an appropriate initial discharge plan  [X] Has rehabilitation potential (expected to make a significant improvement within a reasonable period of time)   [X] Requires close medical management by a rehab physician, rehab nursing care, Hospitalist and comprehensive interdisciplinary team (including PT, OT ) ASSESSMENT/ PLAN:    84 year old male with PMHx for obesity, former tobacco use, 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    *Gait/ADL/functional deficits secondary to Cardiac surgery: CAD s/p CABG/aortic fibroelastoma removal  - Comprehensive Rehab Program of PT/OT   - Sternal precautions  - incision well healed. Ok to shower. Suture in place from previous drain site.   - Weekly CXR   - ASA, statin  - follow up outpatient Dr. Alvarado   - daily standing weights    *Pre-renal MILAGRO: downtrending. AM labs   *Previous CVA w/ R Weakness: Consider speech eval for cognitive eval prior to d/c  *Buttock/thigh macular rash: Possibly fungal/inflammatory etiology - clotrimazole/steroid cream daily  *PAF - coumadin goal 2-2.5. Daily PT/INR.  Amiodarone. Metoprolol tartrate.   *HTN - Lopressor  *BPH - flomax  *COPD - continue Duoneb prn  *DM2 - ISS  *Incidental thyroid nodule noted on Carotic US: follow up with PCP for further eval.  *h/o FVL with PEs: IVC filter. Coumadin as above.   *GERD - Protonix  *Mood: depression - sertraline  *Insomnia - melatonin    Pain managment:     Tylenol PRN    Bowel Management:    Bladder Management:     Precautions / PROPHYLAXIS:   - Falls, Cardiac,  Seizure    - Pressure injury/Skin: Turn Q2hrs while in bed, OOB to Chair, PT/OT    - DVT: on coumadin SQ , TEDs, No SCDs due to IVC filter    MEDICAL PROGNOSIS: GOOD              REHAB POTENTIAL: GOOD              ESTIMATED DISPOSITION: HOME WITH HOME CARE             ELOS: 14-21 Days   EXPECTED THERAPY:     P.T. 2hr/day       O.T. 1hr/day       EXP FREQUENCY: 5 days per 7 day period     PRESCREEN COMPARISION:   I have reviewed the prescreen information and I have found no relevant changes between the preadmission screening and my post admission evaluation     RATIONALE FOR INPATIENT ADMISSION - Patient demonstrates the following: (check all that apply)  [X] Medically appropriate for rehabilitation admission  [X] Has attainable rehab goals with an appropriate initial discharge plan  [X] Has rehabilitation potential (expected to make a significant improvement within a reasonable period of time)   [X] Requires close medical management by a rehab physician, rehab nursing care, Hospitalist and comprehensive interdisciplinary team (including PT, OT ) ASSESSMENT/ PLAN:    84 year old male with PMHx for obesity, former tobacco use, 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    *Gait/ADL/functional deficits secondary to Cardiac surgery: CAD s/p CABG/aortic fibroelastoma removal  - Comprehensive Rehab Program of PT/OT   - Sternal precautions  - incision well healed. Ok to shower. Suture in place from previous drain site.   - Weekly CXR   - ASA, statin  - follow up outpatient Dr. Alvarado   - daily standing weights    *Pre-renal MILAGRO: downtrending. AM labs   *Previous CVA w/ R Weakness: Consider speech eval for cognitive eval prior to d/c  *Buttock/thigh macular rash: Possibly fungal/inflammatory etiology - clotrimazole/steroid cream daily  *PAF - coumadin goal 2-2.5. Daily PT/INR.  Amiodarone. Metoprolol tartrate.   *HTN - Lopressor  *BPH - flomax  *COPD - continue Duoneb prn  *DM2 - ISS  *Incidental thyroid nodule noted on Carotic US: follow up with PCP for further eval.  *h/o FVL with PEs: IVC filter. Coumadin as above.   *GERD - Protonix  *Mood: depression - sertraline  *Insomnia - melatonin    Pain managment:     Tylenol PRN    Bowel Management:    continent. Regular BMs     Senna and Docusate    Bladder Management: BPH - Tamsulosin. continent    Precautions / PROPHYLAXIS:   - Falls, Cardiac,  Seizure    - Pressure injury/Skin: Turn Q2hrs while in bed, OOB to Chair, PT/OT    - DVT: on coumadin SQ , TEDs, No SCDs due to IVC filter    MEDICAL PROGNOSIS: GOOD              REHAB POTENTIAL: GOOD              ESTIMATED DISPOSITION: HOME WITH HOME CARE             ELOS: 14-21 Days   EXPECTED THERAPY:     P.T. 2hr/day       O.T. 1hr/day       EXP FREQUENCY: 5 days per 7 day period     PRESCREEN COMPARISION:   I have reviewed the prescreen information and I have found no relevant changes between the preadmission screening and my post admission evaluation     RATIONALE FOR INPATIENT ADMISSION - Patient demonstrates the following: (check all that apply)  [X] Medically appropriate for rehabilitation admission  [X] Has attainable rehab goals with an appropriate initial discharge plan  [X] Has rehabilitation potential (expected to make a significant improvement within a reasonable period of time)   [X] Requires close medical management by a rehab physician, rehab nursing care, Hospitalist and comprehensive interdisciplinary team (including PT, OT )

## 2019-08-13 NOTE — DISCHARGE NOTE PROVIDER - NSDCCPTREATMENT_GEN_ALL_CORE_FT
PRINCIPAL PROCEDURE  Procedure: CABG, with UMER  Findings and Treatment:       SECONDARY PROCEDURE  Procedure: Resection of aortic valve mass  Findings and Treatment:

## 2019-08-13 NOTE — DISCHARGE NOTE NURSING/CASE MANAGEMENT/SOCIAL WORK - NSDCPEPTSTRK_GEN_ALL_CORE
Prescribed medications/Signs and symptoms of stroke/Call 911 for stroke/Need for follow up after discharge/Stroke education booklet/Risk factors for stroke/Stroke warning signs and symptoms/Stroke support groups for patients, families, and friends

## 2019-08-13 NOTE — DISCHARGE NOTE PROVIDER - NSDCACTIVITY_GEN_ALL_CORE
Do not make important decisions/Walking - Indoors allowed/Walking - Outdoors allowed/Do not drive or operate machinery/Stairs allowed/Showering allowed/No heavy lifting/straining

## 2019-08-13 NOTE — H&P ADULT - NSHPLABSRESULTS_GEN_ALL_CORE
Imaging showed (reviewed):    US DUPLEX CAROTID ARTERIES BILATERAL - 8/6 - Mild soft plaque in the carotid bulbs, greater on the left with less than 50% internal carotid artery stenosis bilaterally, not significantly changed since 4/22/2019. Incidental note of right thyroid lobe nodules measuring up to 4.3 cm; a thyroid ultrasound is recommended for complete evaluation of the thyroid.

## 2019-08-13 NOTE — PROGRESS NOTE ADULT - SUBJECTIVE AND OBJECTIVE BOX
Patient in recliner.   Looking forward to going to .   Reports no pain, but is fatigued.  Wants to get "this over with".    REVIEW OF SYSTEMS  Constitutional - No fever,  +fatigue  HEENT - No vertigo, No neck pain  Neurological - No headaches, No memory loss, No loss of strength, No numbness, No tremors  Skin - No rashes, +lesions   Musculoskeletal - No joint pain, No joint swelling, No muscle pain  Psychiatric - No depression, No anxiety    FUNCTIONAL PROGRESS  8/9  Sit-Stand Transfer Training  Sit-to-Stand Transfer Training Rehab Potential: good, to achieve stated therapy goals  Transfer Training Sit-to-Stand Transfer: moderate assist (50% patient effort);  1 person assist;  rolling walker  Transfer Training Stand-to-Sit Transfer: minimum assist (75% patient effort);  1 person assist;  rolling walker  Sit-to-Stand Transfer Training Transfer Safety Analysis: decreased balance;  impaired balance;  decreased strength;  rolling walker    Gait Training  Gait Training Rehab Potential: good, to achieve stated therapy goals  Gait Training: moderate assist (50% patient effort);  1 person + 1 person to manage equipment;  portable O2; nonrebreather ;  rolling walker;  50 feet  Gait Analysis: 3-point gait   decreased step length;  decreased stride length;  impaired balance;  decreased strength;  cognitive, decreased safety awareness;  50 feet;  rolling walker      VITALS  T(C): 36.6 (08-13-19 @ 06:00), Max: 36.9 (08-12-19 @ 21:22)  HR: 68 (08-13-19 @ 08:22) (68 - 87)  BP: 108/68 (08-13-19 @ 06:00) (108/68 - 142/82)  RR: 18 (08-13-19 @ 06:00) (18 - 20)  SpO2: 96% (08-13-19 @ 08:22) (95% - 98%)  Wt(kg): --    MEDICATIONS   acetaminophen   Tablet .. 650 milliGRAM(s) every 6 hours PRN  ALBUTerol/ipratropium for Nebulization 3 milliLiter(s) every 6 hours  amiodarone    Tablet     amiodarone    Tablet 400 milliGRAM(s) every 8 hours  aspirin enteric coated 325 milliGRAM(s) daily  atorvastatin 40 milliGRAM(s) at bedtime  docusate sodium 100 milliGRAM(s) three times a day  melatonin 10 milliGRAM(s) at bedtime  metoprolol tartrate 50 milliGRAM(s) every 12 hours  pantoprazole    Tablet 40 milliGRAM(s) daily  psyllium Powder 1 Packet(s) daily  senna 2 Tablet(s) at bedtime  sertraline 50 milliGRAM(s) daily  sodium chloride 0.9% lock flush 3 milliLiter(s) every 8 hours  tamsulosin 0.4 milliGRAM(s) at bedtime      RECENT LABS - Reviewed                        10.0   10.70 )-----------( 244      ( 13 Aug 2019 06:02 )             32.2     08-13    140  |  107  |  27.0<H>  ----------------------------<  110  4.2   |  23.0  |  1.50<H>    Ca    8.5<L>      13 Aug 2019 06:02  Phos  3.2     08-12  Mg     2.1     08-12    TPro  5.8<L>  /  Alb  2.7<L>  /  TBili  0.6  /  DBili  x   /  AST  14  /  ALT  17  /  AlkPhos  52  08-13    PT/INR - ( 13 Aug 2019 06:02 )   PT: 26.7 sec;   INR: 2.26 ratio         PTT - ( 12 Aug 2019 06:09 )  PTT:30.0 sec        ------------------------------------------------------------------------------  PHYSICAL EXAM  Constitutional - NAD, Comfortable   Extremities - Left ACE wrap  Neurologic Exam -                    Motor Left sided weakness, related to surgery/pain  Psychiatric - Mood stable, Affect WNL  ----------------------------------------------------------------------------------------  ASSESSMENT/PLAN  84yMale with functional deficits after CABGx2 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 He is doing well post-operatively.  CAD - Aspirin, Lipitor  AFIB - Coumadin  COPD - Duoneb  HTN - Lopressor  DM - Insulin sliding scale  BPH - Flomax  Pain - Tylenol  DVT PPX - SCDs  Rehab - Continue to recommend ACUTE inpatient rehabilitation for the functional deficits consisting of 3 hours of therapy/day & 24 hour RN/daily PMR physician for comorbid medical management. Will continue to follow for ongoing rehab needs and recommendations. Patient will be able to tolerate 3 hours a day.    Continue bedside therapy as well as OOB throughout the day with mobilization throughout the day with staff to maintain cardiopulmonary function and prevention of secondary complications related to debility.

## 2019-08-13 NOTE — H&P ADULT - NSHPSOCIALHISTORY_GEN_ALL_CORE
SOCIAL HISTORY  Smoking - Denied  EtOH - Denied   Drugs - Denied    FUNCTIONAL HISTORY  Lives alone in a private home. The house has 5 steps to enter and no stairs inside. Patient uses a rolling walker to help with ambulation, but is otherwise independent with his ADLs.     CURRENT FUNCTIONAL STATUS:  Bed mobility: Mod assist x2  Transfers: min-mod assist x2  Gait: RW x2 mod assist 40 ft

## 2019-08-13 NOTE — PROGRESS NOTE ADULT - PROBLEM SELECTOR PROBLEM 3
Chronic pulmonary embolism without acute cor pulmonale, unspecified pulmonary embolism type
Acute kidney injury
Atrial fibrillation, unspecified type
CAD (coronary artery disease) of artery bypass graft
Chronic pulmonary embolism without acute cor pulmonale, unspecified pulmonary embolism type
History of COPD
Acute kidney injury
Chronic pulmonary embolism without acute cor pulmonale, unspecified pulmonary embolism type
History of COPD
Hypertension
Hypertension

## 2019-08-13 NOTE — DISCHARGE NOTE NURSING/CASE MANAGEMENT/SOCIAL WORK - NSDCDPATPORTLINK_GEN_ALL_CORE
You can access the [facility] Patient Portal, offered by [facility], by registering with the following website: [email or web address]

## 2019-08-13 NOTE — DISCHARGE NOTE PROVIDER - PROVIDER RX CONTACT NUMBER
[phone]
I will START or STAY ON the medications listed below when I get home from the hospital:  None

## 2019-08-13 NOTE — H&P ADULT - HISTORY OF PRESENT ILLNESS
84 year old M with significant PMH for obesity, former tobacco smoker, Factov V leiden (managed on Coumadin), Cardiac history (mod AS, CAD, HTN), CVA x2 with residual R sided weakness (completed neurorehab in April at [place] with d/c home therapy, GIGI (CPAP compliant) who presented to [facility] on 8/6 in anticipation of UMER with Dr. Ta for surgical cardiac evaluation of LVOT. 84 year old M with significant PMH for obesity, former tobacco smoker, Factov V leiden (managed on Coumadin), Cardiac history (mod AS, CAD, HTN), PE on coumadin w/ IVC filter in place, CVA x2 with residual R sided weakness (completed neurorehab in April at [place] with d/c home therapy, GIGI and COPD (CPAP compliant) who presented to [facility] on 8/6 in anticipation of UMER with Dr. Ta for surgical cardiac evaluation of LVOT.  He was subsequently admitted to [facility] CT surgery service for CABG x2 and excision of fibroelastoma on 8/7 by Dr. Alvarado. Patient extubated without post-operative complications.  Hospital course significant for pain at surgical site and hypoxia requiring high flow, now weaned off. Chest tube removed on 8/13. Hospital course also notable for pre-renal MILAGRO, evaluated by nephrology with recommendations to follow up given Cr downtrending. Post operatively noted to have episodes of A fib / PACs on telemetry, loaded with amiodarone and now continued on maintenance dose.      He was evaluated by PT/OT noted to be min - mod assist for ADLs. He ambulated with RW with 2 person assist.  He previously uses a rolling walker

## 2019-08-13 NOTE — PROGRESS NOTE ADULT - SUBJECTIVE AND OBJECTIVE BOX
SUBJECTIVE    Patient feeling well looking forward to going to acute rehab.  Appears confused at times.  (Mildly)    REVIEW OF SYSTEMS    General: Not in any pain	    Skin/Breast: No rash  	  ENMT: No visual problems, no sore throat	    Respiratory and Thorax: No cough, No CP, No SOB  	  Cardiovascular: No CP, No palpitations    Gastrointestinal: No Abd pain, No N/V/D    Musculoskeletal: No Joint pain, No back pain	    Neurological: No headache    Psychiatric: No anxiety      OBJECTIVE    Vital Signs Last 24 Hrs  T(C): 36.8 (08-13-19 @ 20:11), Max: 36.8 (08-13-19 @ 20:11)  T(F): 98.3 (08-13-19 @ 20:11), Max: 98.3 (08-13-19 @ 20:11)  HR: 77 (08-13-19 @ 20:11) (68 - 87)  BP: 115/63 (08-13-19 @ 20:11) (108/68 - 145/75)  BP(mean): --  RR: 14 (08-13-19 @ 20:11) (14 - 18)  SpO2: 95% (08-13-19 @ 20:11) (95% - 98%)    PHYSICAL EXAM:    Constitutional: Not in any distress    Eyes: No conjunctival injection    ENMT: No oral lesions    Neck: No nodes, no adenopathy    Back: Straight, no defects    Respiratory: clear b/l    Cardiovascular: RRR, no murmur    Gastrointestinal: soft, NT, ND    Extremities: No edema, no erythema    Neurological: no focal deficit    Skin: No rash      MEDICATIONS  (STANDING):  ALBUTerol/ipratropium for Nebulization 3 milliLiter(s) Nebulizer every 6 hours  amiodarone    Tablet   Oral   amiodarone    Tablet 400 milliGRAM(s) Oral every 8 hours  aspirin enteric coated 325 milliGRAM(s) Oral daily  atorvastatin 40 milliGRAM(s) Oral at bedtime  docusate sodium 100 milliGRAM(s) Oral three times a day  melatonin 10 milliGRAM(s) Oral at bedtime  metoprolol tartrate 50 milliGRAM(s) Oral every 12 hours  pantoprazole    Tablet 40 milliGRAM(s) Oral daily  psyllium Powder 1 Packet(s) Oral daily  senna 2 Tablet(s) Oral at bedtime  sertraline 50 milliGRAM(s) Oral daily  sodium chloride 0.9% lock flush 3 milliLiter(s) IV Push every 8 hours  tamsulosin 0.4 milliGRAM(s) Oral at bedtime    MEDICATIONS  (PRN):  acetaminophen   Tablet .. 650 milliGRAM(s) Oral every 6 hours PRN Moderate Pain (4 - 6)                              10.0   10.70 )-----------( 244      ( 13 Aug 2019 06:02 )             32.2     13 Aug 2019 06:02    140    |  107    |  27.0   ----------------------------<  110    4.2     |  23.0   |  1.50     Ca    8.5        13 Aug 2019 06:02  Phos  3.2       12 Aug 2019 06:09  Mg     2.1       12 Aug 2019 06:09    TPro  5.8    /  Alb  2.7    /  TBili  0.6    /  DBili  x      /  AST  14     /  ALT  17     /  AlkPhos  52     13 Aug 2019 06:02    Allergies    No Known Allergies    Intolerances    OHS (Unknown)

## 2019-08-13 NOTE — DISCHARGE NOTE PROVIDER - NSDCCPCAREPLAN_GEN_ALL_CORE_FT
PRINCIPAL DISCHARGE DIAGNOSIS  Diagnosis: CAD (coronary artery disease)  Assessment and Plan of Treatment: Upon discharge from rehab, make a follow up appointment with Dr. Alvarado by calling [phone].  Follow up with your cardiologist and primary care doctor within 7-10 days after discharge. Sternal wound precautions, Blood glucose fingerstick checks qAC/HS, daily weights, DASH diet, ensure supplements bid, daily shower,  PT/OT Rehab  per rehab facility. BMP, CBC twice a week. CXR weekly. Vitals per routine. Silks in place, can be removed in 3-4 days.      SECONDARY DISCHARGE DIAGNOSES  Diagnosis: Aortic valve mass  Assessment and Plan of Treatment: S/P fibroelastoma removal    Diagnosis: Acquired factor V deficiency disease  Assessment and Plan of Treatment: Maintain Coumadin for INR goal 2-2.5. Continue Coumadin 2.5 mg daily.    Diagnosis: Hypertension, unspecified type  Assessment and Plan of Treatment: Take all medications as prescribed.    Diagnosis: Constipation  Assessment and Plan of Treatment: Take all medications as prescribed. PRINCIPAL DISCHARGE DIAGNOSIS  Diagnosis: CAD (coronary artery disease)  Assessment and Plan of Treatment: Upon discharge from rehab, make a follow up appointment with Dr. Alvarado by calling [phone].  Follow up with your cardiologist and primary care doctor within 7-10 days after discharge. Sternal wound precautions, Blood glucose fingerstick checks qAC/HS, daily weights, DASH diet, ensure supplements bid, daily shower,  PT/OT Rehab  per rehab facility. BMP, CBC twice a week. CXR weekly. Vitals per routine. Silks in place, can be removed in 3-4 days.      SECONDARY DISCHARGE DIAGNOSES  Diagnosis: Constipation  Assessment and Plan of Treatment: Take all medications as prescribed.    Diagnosis: Hypertension, unspecified type  Assessment and Plan of Treatment: Take all medications as prescribed.    Diagnosis: Aortic valve mass  Assessment and Plan of Treatment: S/P fibroelastoma removal    Diagnosis: Acquired factor V deficiency disease  Assessment and Plan of Treatment: Maintain Coumadin for INR goal 2-2.5. Continue Coumadin 2.5 mg daily.    Diagnosis: Acute kidney injury  Assessment and Plan of Treatment: Creatinine slightly up today from yesterday but was mildly elevated at baseline. Avoid overdiuresis. Consider intermittent Lasix as needed. Trend 2-3 x weekly BMP.    Diagnosis: Atrial fibrillation, unspecified type  Assessment and Plan of Treatment: Currently SR with PACs/PVCs  Continue Amiodarone daily and Lopressor  On A/C

## 2019-08-13 NOTE — DISCHARGE NOTE PROVIDER - NSDCFUADDINST_GEN_ALL_CORE_FT
Please call the Cardiothoracic Surgery office at [phone] if you are experiencing any shortness of breath, chest pain, fevers or chills, drainage from the incisions, persistent nausea, vomiting or if you have any questions about your medications. If the symptoms are severe, call 911 and go to the nearest hospital. You can also call [phone] for an emergency [facility] ambulance, which will take you to the closest [facility].    If you need any assistance for making any appointments for a new consult or referral in any specialty, please call our [facility] Clinical Coordination Center at [phone].

## 2019-08-13 NOTE — DISCHARGE NOTE PROVIDER - NSDCFUADDAPPT_GEN_ALL_CORE_FT
Call the office for an appointment with Dr Alvarado when you have a discharge date from acute rehab.

## 2019-08-13 NOTE — PROGRESS NOTE ADULT - PROBLEM SELECTOR PLAN 3
INR therapeutic today, continue Coumadin per surgical team.  Patient takes Coumadin 5 mg daily at home to present to maintain an INR of between 2.5 and 3.5 (Because Of second stroke while having an INR that was therapeutic back in March of this year)
Anticoagulation per cardiology.
Appears stable.
Patient long time smoker   Encourage incentive spirometer  Duoenbs ordered  wean highflow as donna   aggressive chest pt and pulmonary toileting
Status post 2 vessel CABG.  Patient has had episodes with short runs of V. tach overnight, now on amiodarone.  Continue chest tube per surgery.
Would continue Coumadin for now and manage at rehab.  Patient takes 5 mg daily at home.
Continue DASH diet.  Continue Lopressor
Continue DASH diet.  Continue Lopressor
Patient has history of recurrent PE as well as recurrent DVTs, has IVC filter in place.  Would start full anticoagulation when surgically optimized.
Patient has not had an acute exacerbation of COPD in several years.  Patient is maintained on Brio lip that and albuterol via nebulizer at home.  In the past has required prednisone.  Monitor lungs closely.
Patient long time smoker   Encourage incentive spirometer  Duoenbs ordered
Patient long time smoker   Encourage incentive spirometer  Duoenbs ordered  wean highflow as donna - now on 3 L NC   aggressive chest pt and pulmonary toileting
avoid nephrotoxins  resolving  creatinine 1.52

## 2019-08-13 NOTE — PROGRESS NOTE ADULT - PROBLEM SELECTOR PLAN 1
Appears improved today.
Patient is status post 2 vessel CABG.  Now on optimized medical regimen and medically stable for transfer to acute rehab.
Status post 2 vessel CABG.  Chest tube removed today.  Repeat echocardiogram done.  Patient appears stable for transfer tomorrow to acute rehab [place], patient is agreeable.
Will increase melatonin today.
tolerating BB at present ,
Multiple anti hypertensive at home   Will start BB in morning, Sinus rhythm on monitor
Patient is just status post CABG of 3 vessels intubated in the ICU.  Patient awakes to commands and responds appropriately with gestures, physical exam reveals clear lungs.  Management per ICU team
S/p CABG  no new CP  no new EKG changes   continue metoprolol, lipitor, ASA, coumadin  5 beats NSVT last night, 3 beats NSVT this AM. Will load with amiodarone  EF 55-60% on echo, normal global left vent systolic function, mild AS, trace MR, no effusion. Diastolic dysfunction could not be assessed.   Strict I/O Daily weights  net negative 1L   132kg on admission, 118 kg this AM - 14kg loss this admission
Would increase beta-blocker, or restart amlodipine which he takes at home if blood pressure control becomes an issue.
s/p C2L on 8/7  Continue ASA, Lopressor, & Lipitor CAD  Maintain SPO2>90%, titrate oxygen to room air if tolerated.  D/C pleural tube  Pacing wires cut to skin  Encourage the use of CDBE/I/S, OOB to chair, daily PT, increase mobilization.
s/p C2L on 8/7  Tylenol PRN ordered for pain  Continue ASA, Lopressor, & Lipitor.  Maintain SPO2>90%, titrate oxygen to room air if tolerated.  Encourage the use of CDBE/I/S, OOB to chair, daily PT, increase mobilization.  Repeat chest xray in morning.  Continue Ace wraps to Left lower extremity & continue to elevate BLE.  Maintain Left plerual chest tube to water seal as per Dr. Alvarado.  Discussed plan with Dr. Colin & CTS in morning rounds.
tolerating BB at present ,
Patient is day 2 status post three-vessel CABG.  Patient having episodes of delirium, likely related to being postoperatively treated in the ICU.

## 2019-08-13 NOTE — DISCHARGE NOTE PROVIDER - PROVIDER TOKENS
PROVIDER:[TOKEN:[[id number]],FOLLOWUP:[1 week]],PROVIDER:[TOKEN:[[id number]],FOLLOWUP:[2 weeks]]

## 2019-08-13 NOTE — DISCHARGE NOTE PROVIDER - NSDCPNSUBOBJ_GEN_ALL_CORE
Subjective: "I feel pretty good. I am going to [place] today" Patient in NAD seated in chair, knows date, place and person. Reports of mild confusion which may wax and wane in the wake of prior stroke but currently no sym[toms.         VITAL SIGNS    Vital Signs Last 24 Hrs    T(C): 36.4 (19 @ 10:10), Max: 36.9 (19 @ 21:22)    T(F): 97.6 (19 @ 10:10), Max: 98.4 (19 @ 21:22)    HR: 73 (19 @ 10:10) (68 - 87)    BP: 132/70 (19 @ 10:10) (108/68 - 142/82)    RR: 18 (19 @ 10:10) (18 - 20)    SpO2: 96% (19 @ 08:22) (95% - 98%)  on +- 2 L NC                 Telemetry/Alarms:  SR 87 with PVCs    LVEF: nl        MEDICATIONS    acetaminophen   Tablet .. 650 milliGRAM(s) Oral every 6 hours PRN    ALBUTerol/ipratropium for Nebulization 3 milliLiter(s) Nebulizer every 6 hours    amiodarone    Tablet   Oral     amiodarone    Tablet 400 milliGRAM(s) Oral every 8 hours    aspirin enteric coated 325 milliGRAM(s) Oral daily    atorvastatin 40 milliGRAM(s) Oral at bedtime    docusate sodium 100 milliGRAM(s) Oral three times a day    melatonin 10 milliGRAM(s) Oral at bedtime    metoprolol tartrate 50 milliGRAM(s) Oral every 12 hours    pantoprazole    Tablet 40 milliGRAM(s) Oral daily    psyllium Powder 1 Packet(s) Oral daily    senna 2 Tablet(s) Oral at bedtime    sertraline 50 milliGRAM(s) Oral daily    sodium chloride 0.9% lock flush 3 milliLiter(s) IV Push every 8 hours    tamsulosin 0.4 milliGRAM(s) Oral at bedtime            PHYSICAL EXAM    General: obese well developed, no acute distress    Neurology: alert and oriented x 3, nonfocal, no gross deficits    Respiratory: clear to auscultation bilaterally    CV: regular rate and rhythm, normal S1, S2    Abdomen: soft, nontender, nondistended, positive bowel sounds    Extremities: warm, well perfused. no edema. + DP pulses    Incisions: midline sternal incision, c/d/i. sternum stable.    Chest tubes: left chest tube site gaping > steris applied, midline + silks            0812 @ 07:01  -   @ 07:00    --------------------------------------------------------    IN: 1150 mL / OUT: 1250 mL / NET: -100 mL                Weights:    Daily       Daily Weight in k.6 (13 Aug 2019 05:49)    Admit Wt: Drug Dosing Weight    Height (cm): 185.42 (07 Aug 2019 06:26)    Weight (kg): 132.4 (07 Aug 2019 06:26)    BMI (kg/m2): 38.5 (07 Aug 2019 06:26)    BSA (m2): 2.53 (07 Aug 2019 06:26)        All laboratory results, radiology and medications reviewed.        LABS            140  |  107  |  27.0<H>    ----------------------------<  110    4.2   |  23.0  |  1.50<H>        Ca    8.5<L>      13 Aug 2019 06:02    Phos  3.2         Mg     2.1     12        TPro  5.8<L>  /  Alb  2.7<L>  /  TBili  0.6  /  DBili  x   /  AST  14  /  ALT  17  /  AlkPhos  52  13                                       10.0     10.70 )-----------( 244      ( 13 Aug 2019 06:02 )               32.2              PT/INR - ( 13 Aug 2019 06:02 )   PT: 26.7 sec;   INR: 2.26 ratio               PTT - ( 12 Aug 2019 06:09 )  PTT:30.0 sec    Bilirubin Total, Serum: 0.6 mg/dL ( @ 06:02)        CAPILLARY BLOOD GLUCOSE                         Today's CXR:     Findings:    Cardio megaly. Left basilar atelectasis. The right lung is clear.     Poststernotomy changes. No evidence of pneumothorax or pleural effusion..        Impression:    Stable exam without significant change since the previous study..        < end of copied text >                Today's EKG: NSR , resolving bundle        PAST MEDICAL & SURGICAL HISTORY:    Aortic stenosis    Atheroma    Cerebrovascular accident (CVA)    History of COPD    CAD S/P percutaneous coronary angioplasty    Atrial fibrillation, unspecified type    [place] filter in place    PE (pulmonary embolism)    Hyperlipemia    Hypertension    S/P cataract surgery    S/P IVC filter

## 2019-08-14 ENCOUNTER — TRANSCRIPTION ENCOUNTER (OUTPATIENT)
Age: 84
End: 2019-08-14

## 2019-08-14 LAB
ALBUMIN SERPL ELPH-MCNC: 2.4 G/DL — LOW (ref 3.3–5)
ALP SERPL-CCNC: 57 U/L — SIGNIFICANT CHANGE UP (ref 40–120)
ALT FLD-CCNC: 19 U/L DA — SIGNIFICANT CHANGE UP (ref 10–45)
ANION GAP SERPL CALC-SCNC: 8 MMOL/L — SIGNIFICANT CHANGE UP (ref 5–17)
AST SERPL-CCNC: 16 U/L — SIGNIFICANT CHANGE UP (ref 10–40)
BASOPHILS # BLD AUTO: 0.05 K/UL — SIGNIFICANT CHANGE UP (ref 0–0.2)
BASOPHILS NFR BLD AUTO: 0.4 % — SIGNIFICANT CHANGE UP (ref 0–2)
BILIRUB SERPL-MCNC: 0.6 MG/DL — SIGNIFICANT CHANGE UP (ref 0.2–1.2)
BUN SERPL-MCNC: 28 MG/DL — HIGH (ref 7–23)
CALCIUM SERPL-MCNC: 8.4 MG/DL — SIGNIFICANT CHANGE UP (ref 8.4–10.5)
CHLORIDE SERPL-SCNC: 107 MMOL/L — SIGNIFICANT CHANGE UP (ref 96–108)
CO2 SERPL-SCNC: 25 MMOL/L — SIGNIFICANT CHANGE UP (ref 22–31)
CREAT SERPL-MCNC: 1.77 MG/DL — HIGH (ref 0.5–1.3)
EOSINOPHIL # BLD AUTO: 0.92 K/UL — HIGH (ref 0–0.5)
EOSINOPHIL NFR BLD AUTO: 8 % — HIGH (ref 0–6)
GLUCOSE BLDC GLUCOMTR-MCNC: 105 MG/DL — HIGH (ref 70–99)
GLUCOSE BLDC GLUCOMTR-MCNC: 108 MG/DL — HIGH (ref 70–99)
GLUCOSE BLDC GLUCOMTR-MCNC: 121 MG/DL — HIGH (ref 70–99)
GLUCOSE SERPL-MCNC: 104 MG/DL — HIGH (ref 70–99)
HCT VFR BLD CALC: 32.7 % — LOW (ref 39–50)
HGB BLD-MCNC: 10.2 G/DL — LOW (ref 13–17)
IMM GRANULOCYTES NFR BLD AUTO: 1.6 % — HIGH (ref 0–1.5)
INR BLD: 2.18 RATIO — HIGH (ref 0.88–1.16)
LYMPHOCYTES # BLD AUTO: 1.6 K/UL — SIGNIFICANT CHANGE UP (ref 1–3.3)
LYMPHOCYTES # BLD AUTO: 13.9 % — SIGNIFICANT CHANGE UP (ref 13–44)
MAGNESIUM SERPL-MCNC: 1.9 MG/DL — SIGNIFICANT CHANGE UP (ref 1.6–2.6)
MCHC RBC-ENTMCNC: 27.3 PG — SIGNIFICANT CHANGE UP (ref 27–34)
MCHC RBC-ENTMCNC: 31.2 GM/DL — LOW (ref 32–36)
MCV RBC AUTO: 87.7 FL — SIGNIFICANT CHANGE UP (ref 80–100)
MONOCYTES # BLD AUTO: 0.98 K/UL — HIGH (ref 0–0.9)
MONOCYTES NFR BLD AUTO: 8.5 % — SIGNIFICANT CHANGE UP (ref 2–14)
NEUTROPHILS # BLD AUTO: 7.77 K/UL — HIGH (ref 1.8–7.4)
NEUTROPHILS NFR BLD AUTO: 67.6 % — SIGNIFICANT CHANGE UP (ref 43–77)
NRBC # BLD: 0 /100 WBCS — SIGNIFICANT CHANGE UP (ref 0–0)
PHOSPHATE SERPL-MCNC: 3.7 MG/DL — SIGNIFICANT CHANGE UP (ref 2.5–4.5)
PLATELET # BLD AUTO: 272 K/UL — SIGNIFICANT CHANGE UP (ref 150–400)
POTASSIUM SERPL-MCNC: 4 MMOL/L — SIGNIFICANT CHANGE UP (ref 3.5–5.3)
POTASSIUM SERPL-SCNC: 4 MMOL/L — SIGNIFICANT CHANGE UP (ref 3.5–5.3)
PROT SERPL-MCNC: 6.3 G/DL — SIGNIFICANT CHANGE UP (ref 6–8.3)
PROTHROM AB SERPL-ACNC: 25 SEC — HIGH (ref 10–12.9)
RBC # BLD: 3.73 M/UL — LOW (ref 4.2–5.8)
RBC # FLD: 14.9 % — HIGH (ref 10.3–14.5)
SODIUM SERPL-SCNC: 140 MMOL/L — SIGNIFICANT CHANGE UP (ref 135–145)
WBC # BLD: 11.5 K/UL — HIGH (ref 3.8–10.5)
WBC # FLD AUTO: 11.5 K/UL — HIGH (ref 3.8–10.5)

## 2019-08-14 PROCEDURE — 99223 1ST HOSP IP/OBS HIGH 75: CPT

## 2019-08-14 PROCEDURE — 99233 SBSQ HOSP IP/OBS HIGH 50: CPT

## 2019-08-14 RX ORDER — NYSTATIN/TRIAMCINOLONE ACET
1 OINTMENT (GRAM) TOPICAL
Refills: 0 | Status: DISCONTINUED | OUTPATIENT
Start: 2019-08-14 | End: 2019-08-22

## 2019-08-14 RX ORDER — WARFARIN SODIUM 2.5 MG/1
3 TABLET ORAL AT BEDTIME
Refills: 0 | Status: COMPLETED | OUTPATIENT
Start: 2019-08-14 | End: 2019-08-14

## 2019-08-14 RX ADMIN — Medication 1 APPLICATION(S): at 16:16

## 2019-08-14 RX ADMIN — Medication 6 MILLIGRAM(S): at 22:22

## 2019-08-14 RX ADMIN — Medication 3 MILLILITER(S): at 15:02

## 2019-08-14 RX ADMIN — WARFARIN SODIUM 3 MILLIGRAM(S): 2.5 TABLET ORAL at 22:23

## 2019-08-14 RX ADMIN — Medication 81 MILLIGRAM(S): at 12:22

## 2019-08-14 RX ADMIN — Medication 3 MILLILITER(S): at 08:36

## 2019-08-14 RX ADMIN — Medication 1 PACKET(S): at 12:22

## 2019-08-14 RX ADMIN — Medication 50 MILLIGRAM(S): at 17:15

## 2019-08-14 RX ADMIN — Medication 3 MILLILITER(S): at 04:44

## 2019-08-14 RX ADMIN — Medication 1 APPLICATION(S): at 07:35

## 2019-08-14 RX ADMIN — Medication 50 MILLIGRAM(S): at 05:36

## 2019-08-14 RX ADMIN — ATORVASTATIN CALCIUM 40 MILLIGRAM(S): 80 TABLET, FILM COATED ORAL at 22:23

## 2019-08-14 RX ADMIN — TAMSULOSIN HYDROCHLORIDE 0.4 MILLIGRAM(S): 0.4 CAPSULE ORAL at 22:22

## 2019-08-14 RX ADMIN — SENNA PLUS 2 TABLET(S): 8.6 TABLET ORAL at 22:23

## 2019-08-14 RX ADMIN — Medication 100 MILLIGRAM(S): at 22:23

## 2019-08-14 RX ADMIN — AMIODARONE HYDROCHLORIDE 200 MILLIGRAM(S): 400 TABLET ORAL at 05:36

## 2019-08-14 RX ADMIN — Medication 3 MILLILITER(S): at 21:50

## 2019-08-14 RX ADMIN — PANTOPRAZOLE SODIUM 40 MILLIGRAM(S): 20 TABLET, DELAYED RELEASE ORAL at 12:21

## 2019-08-14 RX ADMIN — SERTRALINE 50 MILLIGRAM(S): 25 TABLET, FILM COATED ORAL at 12:21

## 2019-08-14 RX ADMIN — Medication 100 MILLIGRAM(S): at 13:21

## 2019-08-14 NOTE — PROGRESS NOTE ADULT - SUBJECTIVE AND OBJECTIVE BOX
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 Ta for surgical cardiac evaluation of LVOT.  He was subsequently admitted to [facility] CT surgery service for CABG x2 and excision of fibroelastoma on 8/7 by Dr. Alvarado. Patient extubated without post-operative complications.  Hospital course significant for pain at surgical site and hypoxia requiring high flow, now weaned off. Chest tube removed on 8/13. Hospital course also notable for pre-renal MILAGRO, evaluated by nephrology with recommendations to follow up given Cr downtrending. Post operatively noted to have episodes of A fib / PACs on telemetry, loaded with amiodarone and now continued on maintenance dose.      He was evaluated by PT/OT noted to be min - mod assist for ADLs. He ambulated with RW with 2 person assist.  He previously uses a rolling walker      seen at the bedside, c/o feels tingling in his chest around incision, mild discomfort, 2/10, non radiating no n/v, no sob. no dysuria    Review of Systems: per hpi          Allergies and Intolerances:        Allergies:  	No Known Allergies:        Intolerances:  	OHS: Food, Unknown, OHS    Home Medications:   * Patient Currently Takes Medications as of 13-Aug-2019 11:45 documented in Structured Notes  · 	senna oral tablet: 2 tab(s) orally once a day (at bedtime)  · 	docusate sodium 100 mg oral capsule: 1 cap(s) orally 3 times a day  · 	ipratropium-albuterol 0.5 mg-2.5 mg/3 mLinhalation solution: 3 milliliter(s) inhaled every 6 hours, As Needed  · 	metoprolol tartrate 50 mg oral tablet: 1 tab(s) orally every 12 hours  · 	Pacerone 200 mg oral tablet: 1 tab(s) orally once a day  · 	tamsulosin 0.4 mg oral capsule: 1 cap(s) orally once a day (at bedtime)  · 	acetaminophen 325 mg oral tablet: 2 tab(s) orally every 6 hours, As needed, Moderate Pain (4 - 6)  · 	pantoprazole 40 mg oral delayed release tablet: 1 tab(s) orally once a day (before a meal)  · 	melatonin 3 mg oral tablet: 1 tab(s) orally once a day (at bedtime), As needed, Insomnia  · 	aspirin 81 mg oral tablet, chewable: 1 tab(s) orally once a day  · 	atorvastatin 40 mg oral tablet: 1 tab(s) orally once a day (at bedtime)  · 	sertraline 50 mg oral tablet: 1 tab(s) orally once a day  · 	Breo Ellipta 200 mcg-25 mcg/inh inhalation powder: 1 puff(s) inhaled once a day  · 	Coumadin 2.5 mg oral tablet: 1 tab(s) orally once a day    .    Patient History:   Past Medical, Past Surgical, and Family History:  PAST MEDICAL HISTORY:  Aortic stenosis     Atheroma     Atrial fibrillation, unspecified type     CAD S/P percutaneous coronary angioplasty     Cerebrovascular accident (CVA)     [place] filter in place     History of COPD     Hyperlipemia     Hypertension     PE (pulmonary embolism).     PAST SURGICAL HISTORY:  S/P cataract surgery     S/P IVC filter.     FAMILY HISTORY:  Child  Still living? Yes, Estimated age: Age Unknown  Family history of diabetes mellitus, Age at diagnosis: Age Unknown.    Social History: Smoking - Denied  	EtOH - Denied   	Drugs - Denied    Vital Signs Last 24 Hrs  T(C): 36.8 (14 Aug 2019 08:50), Max: 36.8 (13 Aug 2019 20:11)  T(F): 98.3 (14 Aug 2019 08:50), Max: 98.3 (13 Aug 2019 20:11)  HR: 79 (14 Aug 2019 08:50) (68 - 87)  BP: 112/70 (14 Aug 2019 08:50) (112/70 - 145/75)  BP(mean): --  RR: 15 (14 Aug 2019 08:50) (14 - 18)  SpO2: 93% (14 Aug 2019 08:50) (93% - 98%)    GENERAL- NAD  EAR/NOSE/MOUTH/THROAT - no pharyngeal exudates, no oral leisions,  MMM  EYES- JASON, conjunctiva and Sclera clear  NECK- supple  RESPIRATORY-  clear to auscultation bilaterally  CARDIOVASCULAR - SIS2, RRR  GI - soft NT BS present  EXTREMITIES- b/l LE edema  NEUROLOGY- no gross focal deficits  SKIN- chest incision clean, no rashes, warm to touch  PSYCHIATRY- AAO X 3  MUSCULOSKELETAL- ROM normal                  10.2                 140  | 25   | 28           11.50 >-----------< 272     ------------------------< 104                   32.7                 4.0  | 107  | 1.77                                         Ca 8.4   Mg 1.9   Ph 3.7      INR: 2.18: RECOMMENDED RANGES FOR THERAPEUTIC INR:    2.0-3.0 for most medical and surgical thromboembolic states    2.0-3.0 for atrial fibrillation    2.0-3.0 for bileaflet mechanical valve in aortic position    2.5-3.5 for mechanical heart valves   Chest 2004;[id number]  The presence of direct thrombin inhibitors (argatroban, refludan)  may falsely increase results. ratio (08.14.19 @ 05:10)      CAPILLARY BLOOD GLUCOSE      POCT Blood Glucose.: 121 mg/dL (14 Aug 2019 07:34)  POCT Blood Glucose.: 123 mg/dL (13 Aug 2019 21:53)  POCT Blood Glucose.: 101 mg/dL (13 Aug 2019 17:00)    Hemoglobin A1C, Whole Blood: 6.0 % (08.06.19 @ 23:16)      Labs reviewed:     CXR personally reviewed:      < from: Xray Chest 1 View- PORTABLE-Routine (08.13.19 @ 06:13) >     EXAM:  XR CHEST PORTABLE ROUTINE 1V                          PROCEDURE DATE:  08/13/2019          INTERPRETATION:  CHEST AP PORTABLE:    History: chf.     Date and time of exam: 8/13/2019 4:42 AM.    Technique: A single AP view of the chest was obtained.    Comparison exam: 8/12/2019 9:28 AM.    Findings:  Cardio megaly. Left basilar atelectasis. The right lung is clear.   Poststernotomy changes. No evidence of pneumothorax or pleural effusion..    Impression:  Stable exam without significant change since the previous study..    < end of copied text >    ECG reviewed and interpreted:  < from: 12 Lead ECG (08.13.19 @ 16:31) >    Ventricular Rate 82 BPM    Atrial Rate 83 BPM    QRS Duration 128 ms    Q-T Interval 426 ms    QTC Calculation(Bezet) 497 ms    R Axis -43 degrees    T Axis 81 degrees    Diagnosis Line Consider sinus rhythm with APCs   Left axis deviation  Left bundle branch block  Abnormal ECG  No previous ECGs available    < from: TTE Echo Limited or F/U (08.12.19 @ 15:20) >    Summary:   1. Technically limited study. Limited information is available. Unable   to visualize valves and right heart chambers.   2. Endocardial visualization was enhanced with intravenous echo contrast.   3. Left ventricular ejection fraction, by visual estimation, is 50 to   55%.   4. Basal inferior segment and basal inferolateral segment are abnormal   as described above.   5. Trivial pericardial effusion.    < end of copied text >

## 2019-08-14 NOTE — DIETITIAN INITIAL EVALUATION ADULT. - ENERGY NEEDS
Height: 73Inches   Weight: 303lb   Body Mass Index (BMI): 40.0kg/m2   Ideal Body Weight Range: 184lb (+/-10%)   Percent Ideal Body Weight ~165%

## 2019-08-14 NOTE — DIETITIAN INITIAL EVALUATION ADULT. - OTHER INFO
84yr Old Male - Dx: CABG - Initial Assessment - Consistent Carbohydrate Diet w/ Thin Liquids (Recommend Change to Consistent Carbohydrate DASH-TLC Diet w/ Thin Liquids), Denies Food Allergy/Intolerance, Tolerates Diet Well, No Chewing/Swallowing Complications (Per Patient), Consumed 100% of 1st Meal (as Per Documentation), Surgical Incision & No Pressure Ulcers (as Per Nursing Flow Sheets), No Edema Noted (as Per Nursing Flow Sheets), No Recent Nausea/Vomiting (as Per Patient)

## 2019-08-14 NOTE — DIETITIAN INITIAL EVALUATION ADULT. - NUTRITION DIAGNOSIS
Call returned -- not available per automated answering system  LTC to discuss options  
Patient contacted -- stated her baby is now 2 years old  Has started a relationship and is considering birth control  Has tried OCP's in the past -- make her \"crazy\"  Did try Mirena IUD which \"had to be removed as the hormones were too much for me\"  Considering the [place] which has no hormones  Recommend contacting Insurance to confirm which form of birth control is a covered benefit  Appointment scheduled, Monday, April 17, 2017 @ 1764  
Patient is returning a phone call to the Rn   
Pt would like to discuss birth control options.  Offered appt in a few weeks, but pt would like to talk first.  Please call back to advise.   
yes...

## 2019-08-14 NOTE — CONSULT NOTE ADULT - ASSESSMENT
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 Daily PT/INR.  Amiodarone. Metoprolol tartrate.     HTN - Lopressor    BPH - flomax    COPD - continue Duoneb prn    DM2 - c/w  ISS  monitor accuchecks for now    Incidental thyroid nodule noted on Carotic US: follow up with PCP for further eval.    GERD - Protonix    Mood: depression - sertraline    Insomnia - melatonin    BPH - Tamsulosin.     will follow

## 2019-08-14 NOTE — DIETITIAN INITIAL EVALUATION ADULT. - DIET TYPE
on Consistent Carbohydrate Diet w/ Thin Liquids  Recommend Change to Consistent Carbohydrate DASH-TLC Diet   Nutrition Education Provided on Consistent Carbohydrate DASH-TLC Diet consistent carbohydrate (no snacks)/DASH/TLC (sodium and cholesterol restricted diet)

## 2019-08-14 NOTE — PROGRESS NOTE ADULT - ASSESSMENT
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    #Gait/ADL/functional deficits secondary to Cardiac surgery: CAD s/p CABG/aortic fibroelastoma removal  - Comprehensive Rehab Program of PT/OT   - Sternal precautions  - incision well healed. Ok to shower. Suture in place from previous drain site.   - Weekly CXR   - ASA, statin  - follow up outpatient Dr. Alvarado   - daily standing weights (bed weights documented, 137.4kg on admission, 126.3kg today: discrepancy likely due to use of bed scale, will again discuss only using standing scale to weigh patient with nursing.)     #Pre-renal MILAGRO:   BUN/Cr worsened today   Will discuss with hospitalist  Not currently on diuretics, no effusions noted on last crxay from the 13th  Encourage po intake of fluids  BMP in am     #Previous CVA w/ R Weakness: Consider speech eval for cognitive eval prior to d/c    #Buttock/thigh rash: Possibly fungal/inflammatory etiology - mycolog cream daily-change to BID     #PAF - coumadin goal 2-2.5. Daily PT/INR and dose coumadin, will give 3mg tonight, INR 2.18.    Continue Amiodarone and Metoprolol tartrate.     #HTN - Lopressor    #BPH - flomax    #COPD - continue Duoneb prn    #DM2 - ISS, accuchecks ranging 101-123, continue to monitor     #Incidental thyroid nodule noted on Carotic US: follow up with PCP for further eval.    #h/o FVL with PEs: IVC filter. Coumadin as above.     #GERD - Protonix    #Mood: depression - sertraline  QTc still prolonged in the 490s, will discuss with hospitalist and pharmacist (other options?)     #Insomnia - continue melatonin, slept well overnight     #Pain management:  Tylenol PRN    Bowel Management:    continent. Regular BMs     Senna and Docusate, Metamucil    Bladder Management: BPH - continue Tamsulosin. 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    #Gait/ADL/functional deficits secondary to Cardiac surgery: CAD s/p CABG/aortic fibroelastoma removal  - Comprehensive Rehab Program of PT/OT   - Sternal precautions  - incision well healed. Ok to shower. Suture in place from previous drain site.   - Weekly CXR   - ASA, statin  - follow up outpatient Dr. Alvarado   - daily standing weights (bed weights documented, 137.4kg on admission, 126.3kg today: discrepancy likely due to use of bed scale, will again discuss only using standing scale to weigh patient with nursing.)     #Pre-renal MILAGRO:   BUN/Cr worsened today   Will discuss with hospitalist  Not currently on diuretics, no effusions noted on last crxay from the 13th  Encourage po intake of fluids  BMP in am     #Previous CVA w/ R Weakness: Consider speech eval for cognitive eval prior to d/c    #Buttock/thigh rash: Possibly fungal/inflammatory etiology - mycolog cream daily-change to BID     #PAF - coumadin goal 2-2.5. Daily PT/INR and dose coumadin, will give 3mg tonight, INR 2.18.    Continue Amiodarone and Metoprolol tartrate.   (Last TSH on 8/6 was 0.88, continue to monitor for symptoms of hypothyroidism 2/2 amiodarone use)     #HTN - Lopressor    #BPH - flomax    #COPD - continue Duoneb prn    #DM2 - ISS, accuchecks ranging 101-123, continue to monitor   HA1C on 8/6 was 6.0%    #Incidental thyroid nodule noted on Carotic US: follow up with PCP for further eval.    #h/o FVL with PEs: IVC filter. Coumadin as above.     #GERD - Protonix    #Mood: depression - sertraline  QTc still prolonged in the 490s, will discuss with hospitalist and pharmacist (other options?)     #Insomnia - continue melatonin, slept well overnight     #Pain management:  Tylenol PRN    Bowel Management:    continent. Regular BMs     Senna and Docusate, Metamucil    Bladder Management: BPH - continue Tamsulosin. 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    #Gait/ADL/functional deficits secondary to Cardiac surgery: CAD s/p CABG/aortic fibroelastoma removal  - Comprehensive Rehab Program of PT/OT   - Sternal precautions  - incision well healed. Ok to shower. Suture in place from previous drain site.   - Weekly CXR   - ASA, statin  - follow up outpatient Dr. Alvarado   - daily standing weights (bed weights documented, 137.4kg on admission, 126.3kg today: discrepancy likely due to use of bed scale, will again discuss only using standing scale to weigh patient with nursing.)     #Pre-renal MILAGRO:   BUN/Cr worsened today   Will discuss with hospitalist  Not currently on diuretics, no effusions noted on last crxay from the 13th  Encourage po intake of fluids  BMP in am     #Previous CVA w/ R Weakness: Consider speech eval for cognitive eval prior to d/c    #Buttock/thigh rash: Possibly fungal/inflammatory etiology - mycolog cream daily-change to BID     #PAF - coumadin goal 2-2.5. Daily PT/INR and dose coumadin, will give 3mg tonight, INR 2.18.    Continue Amiodarone and Metoprolol tartrate.   (Last TSH on 8/6 was 0.88, continue to monitor for symptoms of hypothyroidism 2/2 amiodarone use)     #HTN - Lopressor    #BPH - flomax    #COPD - continue Duoneb prn    #DM2 - ISS, accuchecks ranging 101-123, continue to monitor   HA1C on 8/6 was 6.0%    #Incidental thyroid nodule noted on Carotic US: follow up with PCP for further eval.    #h/o FVL with PEs: IVC filter. Coumadin as above.     #GERD - Protonix    #Mood: depression - sertraline  QTc still prolonged in the 490s, discussed with pharamist since patient on zoloft-continue to moitor, QTc ranges 470-low 500s on previous EKGs, will order follow up EKG for monitoring next week     #Insomnia - continue melatonin, slept well overnight     #Mild leukocytosis   -Will monitor  -Asymptomatic       #Morbid obesity   -Nutritional counseling   -RD to eval and educate     #Pain management:  Tylenol PRN      Bowel Management:    continent. Regular BMs     Senna and Docusate, Metamucil    Bladder Management: BPH - continue Tamsulosin. continent    Precautions / PROPHYLAXIS:   - Falls, Cardiac,  Seizure    - Pressure injury/Skin: Turn Q2hrs while in bed, OOB to Chair, PT/OT    - DVT: on coumadin SQ , TEDs, No SCDs due to IVC filter

## 2019-08-14 NOTE — PROGRESS NOTE ADULT - SUBJECTIVE AND OBJECTIVE BOX
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 Ta for surgical cardiac evaluation of LVOT.  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 Alvarado. Patient extubated without post-operative complications.  Hospital course significant for pain at surgical site and hypoxia requiring high flow, now weaned off. Chest tube removed on 8/13. Hospital course also notable for pre-renal MILAGRO, evaluated by nephrology with recommendations to follow up given Cr downtrending. Post operatively noted to have episodes of A fib / PACs on telemetry, loaded with amiodarone and now continued on maintenance dose.      He was evaluated by PT/OT noted to be min - mod assist for ADLs. He ambulated with RW with 2 person assist.  He previously uses a rolling walker    Subjective: No overnight events.       Review of Systems:  Constitutional - No fever or chills   Respiratory - + cough (occasional), Occasional wheezing, + shortness of breath with activity   Cardiovascular - Denies chest pain, No palpitations  Gastrointestinal - No abdominal pain, No nausea, No vomiting, No diarrhea, No constipation, +BM this morning   Genitourinary - No dysuria, No frequency, No incontinence  Neurological - No headaches, denies weakness   Skin -  present rash right thigh-no itching   Musculoskeletal - No joint pain, No joint swelling, No muscle pain Psychiatric - No depression, No anxiety	  	        LABS:     Prothrombin Time and INR, Plasma in AM (08.14.19 @ 05:10)    Prothrombin Time, Plasma: 25.0: Effective October 30th, 2018 the reference range for PT has changed. sec    INR: 2.18: RECOMMENDED RANGES FOR THERAPEUTIC INR:       08-14    140  |  107  |  28<H>  ----------------------------<  104<H>  4.0   |  25  |  1.77<H>    Ca    8.4      14 Aug 2019 05:05  Phos  3.7     08-14  Mg     1.9     08-14    TPro  6.3  /  Alb  2.4<L>  /  TBili  0.6  /  DBili  x   /  AST  16  /  ALT  19  /  AlkPhos  57  08-14                        10.2   11.50 )-----------( 272      ( 14 Aug 2019 05:05 )             32.7       Radiology/Other:     < from: 12 Lead ECG (08.13.19 @ 16:31) >    Ventricular Rate 82 BPM    Atrial Rate 83 BPM    QRS Duration 128 ms    Q-T Interval 426 ms    QTC Calculation(Bezet) 497 ms    R Axis -43 degrees    T Axis 81 degrees    Diagnosis Line Consider sinus rhythm with APCs   Left axis deviation  Left bundle branch block  Abnormal ECG  No previous ECGs available  Confirmed by ROBBIN CARVALHO, STANLEY COTTO ([id number]) on 8/14/2019 8:26:06 AM    < end of copied text >        =  Physical Exam: = Vital Signs Last 24 Hrs T(C): 36.8 (14 Aug 2019 08:50), Max: 36.8 (13 Aug 2019 20:11) T(F): 98.3 (14 Aug 2019 08:50), Max: 98.3 (13 Aug 2019 20:11) HR: 79 (14 Aug 2019 08:50) (68 - 87) BP: 112/70 (14 Aug 2019 08:50) (112/70 - 145/75) RR: 15 (14 Aug 2019 08:50) (14 - 18) SpO2: 93% (14 Aug 2019 08:50) (93% - 98%)   Constitutional - NAD  Chest - CTA   Cardiovascular - S1S2  Extremities - trace edema bilat lower extremities (L > R). No calf tenderness   Neurologic Exam -                    Cognitive - Awake, Alert, AAO to self, place, year, situation. Able to recall hospital course.     Communication - Fluent. Slow speech. No dysarthria.     Cranial Nerves - no facial asymmetry     Motor -                     LEFT    UE - ShAB 5/5, EF 5/5, EE 5/5, WE 5/5,  5/5                    RIGHT UE - ShAB 5/5, EF 5/5, EE 5/5, WE 4/5,  5/5                    LEFT    LE - HF 5/5, KE 5/5, DF 5/5, PF 5/5                    RIGHT LE - HF 5/5, KE 5/5, DF 5/5, PF 5/5     Skin-fungal maculoparular rash with what appears to be satellite lesions to the bilat posterior thighs and bilateral buttocks.	  	      MEDICATIONS  (STANDING):  ALBUTerol    90 MICROgram(s) HFA Inhaler 1 Puff(s) Inhalation every 4 hours  ALBUTerol/ipratropium for Nebulization 3 milliLiter(s) Nebulizer every 6 hours  amiodarone    Tablet 200 milliGRAM(s) Oral daily  aspirin  chewable 81 milliGRAM(s) Oral daily  atorvastatin 40 milliGRAM(s) Oral at bedtime  dextrose 5%. 1000 milliLiter(s) (50 mL/Hr) IV Continuous <Continuous>  dextrose 50% Injectable 12.5 Gram(s) IV Push once  dextrose 50% Injectable 25 Gram(s) IV Push once  dextrose 50% Injectable 25 Gram(s) IV Push once  docusate sodium 100 milliGRAM(s) Oral three times a day  insulin lispro (HumaLOG) corrective regimen sliding scale   SubCutaneous three times a day before meals  melatonin 6 milliGRAM(s) Oral at bedtime  metoprolol tartrate 50 milliGRAM(s) Oral every 12 hours  nystatin/triamcinolone Cream 1 Application(s) Topical <User Schedule>  pantoprazole    Tablet 40 milliGRAM(s) Oral daily  psyllium Powder 1 Packet(s) Oral daily  senna 2 Tablet(s) Oral at bedtime  sertraline 50 milliGRAM(s) Oral daily  tamsulosin 0.4 milliGRAM(s) Oral at bedtime  tiotropium 18 MICROgram(s) Capsule 1 Capsule(s) Inhalation daily  warfarin 3 milliGRAM(s) Oral at bedtime    MEDICATIONS  (PRN):  acetaminophen   Tablet .. 650 milliGRAM(s) Oral every 6 hours PRN Moderate Pain (4 - 6)  dextrose 40% Gel 15 Gram(s) Oral once PRN Blood Glucose LESS THAN 70 milliGRAM(s)/deciliter  glucagon  Injectable 1 milliGRAM(s) IntraMuscular once PRN Glucose LESS THAN 70 milligrams/deciliter

## 2019-08-14 NOTE — DIETITIAN INITIAL EVALUATION ADULT. - ADD RECOMMEND
1) Monitor Weights, Intake, Tolerance, Skin, POCT & Labwork   2) Recommend Change to Consistent Carbohydrate DASH-TLC Diet 3) Nutrition Education Provided on Consistent Carbohydrate DASH-TLC Diet 4) Continue Nutrition Plan of Care

## 2019-08-14 NOTE — CONSULT NOTE ADULT - SUBJECTIVE AND OBJECTIVE BOX
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 Ta for surgical cardiac evaluation of LVOT.  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 Alvarado. Patient extubated without post-operative complications.  Hospital course significant for pain at surgical site and hypoxia requiring high flow, now weaned off. Chest tube removed on 8/13. Hospital course also notable for pre-renal MILAGRO, evaluated by nephrology with recommendations to follow up given Cr downtrending. Post operatively noted to have episodes of A fib / PACs on telemetry, loaded with amiodarone and now continued on maintenance dose.      He was evaluated by PT/OT noted to be min - mod assist for ADLs. He ambulated with RW with 2 person assist.  He previously uses a rolling walker      seen at the bedside, c/o feels tingling in his chest around incision, mild discomfort, 2/10, non radiating no n/v, no sob. no dysuria     Review of Systems: per hpi          Allergies and Intolerances:        Allergies:  	No Known Allergies:        Intolerances:  	OHS: Food, Unknown, OHS    Home Medications:   * Patient Currently Takes Medications as of 13-Aug-2019 11:45 documented in Structured Notes  · 	senna oral tablet: 2 tab(s) orally once a day (at bedtime)  · 	docusate sodium 100 mg oral capsule: 1 cap(s) orally 3 times a day  · 	ipratropium-albuterol 0.5 mg-2.5 mg/3 mLinhalation solution: 3 milliliter(s) inhaled every 6 hours, As Needed  · 	metoprolol tartrate 50 mg oral tablet: 1 tab(s) orally every 12 hours  · 	Pacerone 200 mg oral tablet: 1 tab(s) orally once a day  · 	tamsulosin 0.4 mg oral capsule: 1 cap(s) orally once a day (at bedtime)  · 	acetaminophen 325 mg oral tablet: 2 tab(s) orally every 6 hours, As needed, Moderate Pain (4 - 6)  · 	pantoprazole 40 mg oral delayed release tablet: 1 tab(s) orally once a day (before a meal)  · 	melatonin 3 mg oral tablet: 1 tab(s) orally once a day (at bedtime), As needed, Insomnia  · 	aspirin 81 mg oral tablet, chewable: 1 tab(s) orally once a day  · 	atorvastatin 40 mg oral tablet: 1 tab(s) orally once a day (at bedtime)  · 	sertraline 50 mg oral tablet: 1 tab(s) orally once a day  · 	Breo Ellipta 200 mcg-25 mcg/inh inhalation powder: 1 puff(s) inhaled once a day  · 	Coumadin 2.5 mg oral tablet: 1 tab(s) orally once a day    .    Patient History:    Past Medical, Past Surgical, and Family History:  PAST MEDICAL HISTORY:  Aortic stenosis     Atheroma     Atrial fibrillation, unspecified type     CAD S/P percutaneous coronary angioplasty     Cerebrovascular accident (CVA)     Alma filter in place     History of COPD     Hyperlipemia     Hypertension     PE (pulmonary embolism).     PAST SURGICAL HISTORY:  S/P cataract surgery     S/P IVC filter.     FAMILY HISTORY:  Child  Still living? Yes, Estimated age: Age Unknown  Family history of diabetes mellitus, Age at diagnosis: Age Unknown.     Social History: Smoking - Denied  	EtOH - Denied   	Drugs - Denied    Vital Signs Last 24 Hrs  T(C): 36.8 (14 Aug 2019 08:50), Max: 36.8 (13 Aug 2019 20:11)  T(F): 98.3 (14 Aug 2019 08:50), Max: 98.3 (13 Aug 2019 20:11)  HR: 79 (14 Aug 2019 08:50) (68 - 87)  BP: 112/70 (14 Aug 2019 08:50) (112/70 - 145/75)  BP(mean): --  RR: 15 (14 Aug 2019 08:50) (14 - 18)  SpO2: 93% (14 Aug 2019 08:50) (93% - 98%)    GENERAL- NAD  EAR/NOSE/MOUTH/THROAT - no pharyngeal exudates, no oral leisions,  MMM  EYES- JASON, conjunctiva and Sclera clear  NECK- supple  RESPIRATORY-  clear to auscultation bilaterally  CARDIOVASCULAR - SIS2, RRR  GI - soft NT BS present  EXTREMITIES- b/l LE edema  NEUROLOGY- no gross focal deficits  SKIN- chest incision clean, no rashes, warm to touch  PSYCHIATRY- AAO X 3  MUSCULOSKELETAL- ROM normal                  10.2                 140  | 25   | 28           11.50 >-----------< 272     ------------------------< 104                   32.7                 4.0  | 107  | 1.77                                         Ca 8.4   Mg 1.9   Ph 3.7      INR: 2.18: RECOMMENDED RANGES FOR THERAPEUTIC INR:    2.0-3.0 for most medical and surgical thromboembolic states    2.0-3.0 for atrial fibrillation    2.0-3.0 for bileaflet mechanical valve in aortic position    2.5-3.5 for mechanical heart valves   Chest 2004;126:[id number]  The presence of direct thrombin inhibitors (argatroban, refludan)  may falsely increase results. ratio (08.14.19 @ 05:10)      CAPILLARY BLOOD GLUCOSE      POCT Blood Glucose.: 121 mg/dL (14 Aug 2019 07:34)  POCT Blood Glucose.: 123 mg/dL (13 Aug 2019 21:53)  POCT Blood Glucose.: 101 mg/dL (13 Aug 2019 17:00)    Hemoglobin A1C, Whole Blood: 6.0 % (08.06.19 @ 23:16)      Labs reviewed:     CXR personally reviewed:      < from: Xray Chest 1 View- PORTABLE-Routine (08.13.19 @ 06:13) >     EXAM:  XR CHEST PORTABLE ROUTINE 1V                          PROCEDURE DATE:  08/13/2019          INTERPRETATION:  CHEST AP PORTABLE:    History: chf.     Date and time of exam: 8/13/2019 4:42 AM.    Technique: A single AP view of the chest was obtained.    Comparison exam: 8/12/2019 9:28 AM.    Findings:  Cardio megaly. Left basilar atelectasis. The right lung is clear.   Poststernotomy changes. No evidence of pneumothorax or pleural effusion..    Impression:  Stable exam without significant change since the previous study..    < end of copied text >    ECG reviewed and interpreted:  < from: 12 Lead ECG (08.13.19 @ 16:31) >    Ventricular Rate 82 BPM    Atrial Rate 83 BPM    QRS Duration 128 ms    Q-T Interval 426 ms    QTC Calculation(Bezet) 497 ms    R Axis -43 degrees    T Axis 81 degrees    Diagnosis Line Consider sinus rhythm with APCs   Left axis deviation  Left bundle branch block  Abnormal ECG  No previous ECGs available    < from: TTE Echo Limited or F/U (08.12.19 @ 15:20) >    Summary:   1. Technically limited study. Limited information is available. Unable   to visualize valves and right heart chambers.   2. Endocardial visualization was enhanced with intravenous echo contrast.   3. Left ventricular ejection fraction, by visual estimation, is 50 to   55%.   4. Basal inferior segment and basal inferolateral segment are abnormal   as described above.   5. Trivial pericardial effusion.    < end of copied text >

## 2019-08-14 NOTE — PROGRESS NOTE ADULT - ASSESSMENT
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 for debility and functional deficits after cardiac surgery involving CABG and excision of fibroelastoma- pt/ot/dvt ppx, - ASA, statin, pain meds    Pre-renal MILAGRO: encourage hydration , po fluids for now will monitor renal function     Factov V leiden/PE/ PAF - IVC filter, c/W current dose of coumadin, coumadin goal 2-2.5. Daily PT/INR.  Amiodarone. Metoprolol tartrate.     HTN - Lopressor    BPH - flomax    COPD - continue Duoneb prn    DM2 - c/w  ISS  monitor accuchecks for now    Incidental thyroid nodule noted on Carotic US: follow up with PCP for further eval.    GERD - Protonix    Mood: depression - sertraline    Insomnia - melatonin    BPH - Tamsulosin.     will follow

## 2019-08-15 LAB
ANION GAP SERPL CALC-SCNC: 8 MMOL/L — SIGNIFICANT CHANGE UP (ref 5–17)
BUN SERPL-MCNC: 26 MG/DL — HIGH (ref 7–23)
CALCIUM SERPL-MCNC: 8.2 MG/DL — LOW (ref 8.4–10.5)
CHLORIDE SERPL-SCNC: 108 MMOL/L — SIGNIFICANT CHANGE UP (ref 96–108)
CO2 SERPL-SCNC: 24 MMOL/L — SIGNIFICANT CHANGE UP (ref 22–31)
CREAT SERPL-MCNC: 1.49 MG/DL — HIGH (ref 0.5–1.3)
GLUCOSE BLDC GLUCOMTR-MCNC: 106 MG/DL — HIGH (ref 70–99)
GLUCOSE BLDC GLUCOMTR-MCNC: 117 MG/DL — HIGH (ref 70–99)
GLUCOSE BLDC GLUCOMTR-MCNC: 118 MG/DL — HIGH (ref 70–99)
GLUCOSE SERPL-MCNC: 115 MG/DL — HIGH (ref 70–99)
HCT VFR BLD CALC: 31.3 % — LOW (ref 39–50)
HGB BLD-MCNC: 9.7 G/DL — LOW (ref 13–17)
INR BLD: 2.12 RATIO — HIGH (ref 0.88–1.16)
MCHC RBC-ENTMCNC: 26.6 PG — LOW (ref 27–34)
MCHC RBC-ENTMCNC: 31 GM/DL — LOW (ref 32–36)
MCV RBC AUTO: 86 FL — SIGNIFICANT CHANGE UP (ref 80–100)
NRBC # BLD: 0 /100 WBCS — SIGNIFICANT CHANGE UP (ref 0–0)
PLATELET # BLD AUTO: 299 K/UL — SIGNIFICANT CHANGE UP (ref 150–400)
POTASSIUM SERPL-MCNC: 3.8 MMOL/L — SIGNIFICANT CHANGE UP (ref 3.5–5.3)
POTASSIUM SERPL-SCNC: 3.8 MMOL/L — SIGNIFICANT CHANGE UP (ref 3.5–5.3)
PROTHROM AB SERPL-ACNC: 24.3 SEC — HIGH (ref 10–12.9)
RBC # BLD: 3.64 M/UL — LOW (ref 4.2–5.8)
RBC # FLD: 14.8 % — HIGH (ref 10.3–14.5)
SODIUM SERPL-SCNC: 140 MMOL/L — SIGNIFICANT CHANGE UP (ref 135–145)
WBC # BLD: 10.64 K/UL — HIGH (ref 3.8–10.5)
WBC # FLD AUTO: 10.64 K/UL — HIGH (ref 3.8–10.5)

## 2019-08-15 PROCEDURE — 99232 SBSQ HOSP IP/OBS MODERATE 35: CPT | Mod: GC

## 2019-08-15 PROCEDURE — 99233 SBSQ HOSP IP/OBS HIGH 50: CPT

## 2019-08-15 RX ORDER — WARFARIN SODIUM 2.5 MG/1
3 TABLET ORAL ONCE
Refills: 0 | Status: COMPLETED | OUTPATIENT
Start: 2019-08-15 | End: 2019-08-15

## 2019-08-15 RX ORDER — PANTOPRAZOLE SODIUM 20 MG/1
40 TABLET, DELAYED RELEASE ORAL
Refills: 0 | Status: DISCONTINUED | OUTPATIENT
Start: 2019-08-15 | End: 2019-08-29

## 2019-08-15 RX ORDER — IPRATROPIUM/ALBUTEROL SULFATE 18-103MCG
3 AEROSOL WITH ADAPTER (GRAM) INHALATION EVERY 6 HOURS
Refills: 0 | Status: DISCONTINUED | OUTPATIENT
Start: 2019-08-15 | End: 2019-08-29

## 2019-08-15 RX ADMIN — ATORVASTATIN CALCIUM 40 MILLIGRAM(S): 80 TABLET, FILM COATED ORAL at 21:49

## 2019-08-15 RX ADMIN — Medication 1 APPLICATION(S): at 06:27

## 2019-08-15 RX ADMIN — Medication 50 MILLIGRAM(S): at 06:26

## 2019-08-15 RX ADMIN — Medication 100 MILLIGRAM(S): at 06:26

## 2019-08-15 RX ADMIN — TAMSULOSIN HYDROCHLORIDE 0.4 MILLIGRAM(S): 0.4 CAPSULE ORAL at 21:49

## 2019-08-15 RX ADMIN — AMIODARONE HYDROCHLORIDE 200 MILLIGRAM(S): 400 TABLET ORAL at 06:26

## 2019-08-15 RX ADMIN — WARFARIN SODIUM 3 MILLIGRAM(S): 2.5 TABLET ORAL at 21:49

## 2019-08-15 RX ADMIN — PANTOPRAZOLE SODIUM 40 MILLIGRAM(S): 20 TABLET, DELAYED RELEASE ORAL at 12:14

## 2019-08-15 RX ADMIN — Medication 81 MILLIGRAM(S): at 12:14

## 2019-08-15 RX ADMIN — Medication 50 MILLIGRAM(S): at 17:25

## 2019-08-15 RX ADMIN — Medication 3 MILLILITER(S): at 08:40

## 2019-08-15 RX ADMIN — Medication 6 MILLIGRAM(S): at 21:49

## 2019-08-15 RX ADMIN — SERTRALINE 50 MILLIGRAM(S): 25 TABLET, FILM COATED ORAL at 12:14

## 2019-08-15 RX ADMIN — Medication 1 APPLICATION(S): at 17:26

## 2019-08-15 RX ADMIN — SENNA PLUS 2 TABLET(S): 8.6 TABLET ORAL at 21:49

## 2019-08-15 RX ADMIN — Medication 100 MILLIGRAM(S): at 21:49

## 2019-08-15 NOTE — PROGRESS NOTE ADULT - ASSESSMENT
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    #Gait/ADL/functional deficits secondary to Cardiac surgery: CAD s/p CABG/aortic fibroelastoma removal  - Continue comprehensive Rehab Program of PT/OT   - Sternal precautions  - incision well healed. Ok to shower. Suture in place from previous drain site-will remove next week  - Weekly CXR   - ASA, statin  - follow up outpatient Dr. Alvarado   - daily standing weights (bed weights documented, 137.4kg on admission, 126.3kg 8/14-both bed weights. Standing weight this morning 131.9kg--continue to monitor)     #Pre-renal MILAGRO:   BUN/Cr improved today   Not currently on diuretics, no effusions noted on last crxay from the 13th  Hospitalist agrees to continue to encourage po intake of fluids  Follow labs     #Previous CVA w/ R Weakness: Consider speech eval for cognitive eval prior to d/c    #Buttock/thigh rash: Possibly fungal/inflammatory etiology - mycolog cream BID      #PAF - coumadin goal 2-2.5. Daily PT/INR and dose coumadin  INR pending this morning  Continue Amiodarone and Metoprolol tartrate.   (Last TSH on 8/6 was 0.88, continue to monitor for symptoms of hypothyroidism 2/2 amiodarone use)     #HTN - Lopressor  SBPs ranging 110s-130    #BPH - flomax    #COPD - continue Duoneb prn  Patient reports that he does not have a hx of COPD, will encourage patient to f/u with PCP upon discharge and have PFTs done if recommended by PCP     #DM2 - ISS, accuchecks ranging 101-123, continue to monitor   HA1C on 8/6 was 6.0%    #Incidental thyroid nodule noted on Carotic US: follow up with PCP for further eval.    #h/o FVL with PEs: IVC filter. Coumadin as above.     #GERD - Protonix    #Mood: depression - sertraline  QTc still prolonged in the 490s, discussed with pharamist since patient on zoloft-continue to moitor, QTc ranges 470-low 500s on previous EKGs, will order follow up EKG for monitoring next week   (Zoloft is home medication)    #Insomnia - continue melatonin, slept well overnight     #Mild leukocytosis -improved today   -Will monitor  -Asymptomatic       #Morbid obesity   -Nutritional counseling   -RD to eval and educate     #Pain management:  Tylenol PRN      Bowel Management:    continent. Regular BMs     Senna and Docusate, Metamucil    Bladder Management: BPH - continue Tamsulosin. 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    #Gait/ADL/functional deficits secondary to Cardiac surgery: CAD s/p CABG/aortic fibroelastoma removal  - Continue comprehensive Rehab Program of PT/OT   - Sternal precautions  - incision well healed. Ok to shower. Suture in place from previous drain site-will remove next week  - Weekly CXR   - ASA, statin  - follow up outpatient Dr. Alvarado   - daily standing weights (bed weights documented, 137.4kg on admission, 126.3kg 8/14-both bed weights. Standing weight this morning 131.9kg--continue to monitor)     #Pre-renal MILAGRO:   BUN/Cr improved today   Not currently on diuretics, no effusions noted on last crxay from the 13th  Hospitalist agrees to continue to encourage po intake of fluids  Follow labs     #Previous CVA w/ R Weakness: Consider speech eval for cognitive eval prior to d/c    #Buttock/thigh rash: Possibly fungal/inflammatory etiology - mycolog cream BID      #PAF - coumadin goal 2-2.5. Daily PT/INR and dose coumadin  INR pending this morning  Continue Amiodarone and Metoprolol tartrate.   (Last TSH on 8/6 was 0.88, continue to monitor for symptoms of hypothyroidism 2/2 amiodarone use)     #HTN - Lopressor  SBPs ranging 110s-130    #BPH - flomax    #COPD - continue Duoneb prn  Patient reports that he does not have a hx of COPD, will encourage patient to f/u with PCP upon discharge and have PFTs done if recommended by PCP     #DM2 - ISS, accuchecks ranging 101-123, continue to monitor   HA1C on 8/6 was 6.0%    #Incidental thyroid nodule noted on Carotic US: follow up with PCP for further eval.    #h/o FVL with PEs: IVC filter. Coumadin as above.     #GERD - Protonix    #Mood: depression - sertraline  QTc still prolonged in the 490s, discussed with pharmacist since patient on zoloft-continue to moitor, QTc ranges 470-low 500s on previous EKGs, will order follow up EKG for monitoring next week   (Zoloft is home medication)    #Insomnia - continue melatonin, slept well overnight     #Mild leukocytosis -improved today   -Will monitor  -Asymptomatic       #Morbid obesity   -Nutritional counseling   -RD to eval and educate     #Pain management:  Tylenol PRN      Bowel Management:    continent. Regular BMs     Senna and Docusate, Metamucil    Bladder Management: BPH - continue Tamsulosin. continent    Precautions / PROPHYLAXIS:   - Falls, Cardiac,  Seizure    - Pressure injury/Skin: Turn Q2hrs while in bed, OOB to Chair, PT/OT    - DVT: on coumadin SQ , TEDs, No SCDs due to IVC filter

## 2019-08-15 NOTE — CONSULT NOTE ADULT - ASSESSMENT
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 Daily PT/INR.  Amiodarone. Metoprolol tartrate.     HTN - Lopressor    BPH - flomax    COPD - continue Duoneb prn    DM2 - c/w  ISS  monitor Accu-Cheks for now    Incidental thyroid nodule noted on Carotic US: follow up with PCP for further eval.    GERD - Protonix( omeprazole non formulary, patient can bring own supply)    Mood: depression - sertraline    Insomnia - melatonin      will follow

## 2019-08-15 NOTE — PROGRESS NOTE ADULT - ATTENDING COMMENTS
Chart reviewed. Patient seen at bedside.   84 year old male with multiple medical comorbidities including prior stroke ? deficits, admitted to rehab after recent CABGX2 and excision of fibroelastoma.   Sternal incision clean,   Left calf surgical site : with some ecchymosis.    2. Labs with - INR therapeutic, mild leucocytosis. Patient afebrile.    3. Continue full rehab program

## 2019-08-15 NOTE — PROGRESS NOTE ADULT - SUBJECTIVE AND OBJECTIVE BOX
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 Ta for surgical cardiac evaluation of LVOT.  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 Alvarado. Patient extubated without post-operative complications.  Hospital course significant for pain at surgical site and hypoxia requiring high flow, now weaned off. Chest tube removed on 8/13. Hospital course also notable for pre-renal MILAGRO, evaluated by nephrology with recommendations to follow up given Cr downtrending. Post operatively noted to have episodes of A fib / PACs on telemetry, loaded with amiodarone and now continued on maintenance dose.      He was evaluated by PT/OT noted to be min - mod assist for ADLs. He ambulated with RW with 2 person assist.  He previously uses a rolling walker    Subjective: No overnight events. Feeling well this morning.       Review of Systems:  Constitutional - No fever or chills   Respiratory - + cough (occasional), occasional wheezing, + shortness of breath with activity-per patient SOB is manageable and not interrupting his participation in therapy   Cardiovascular - Denies chest pain, No palpitations  Gastrointestinal - No abdominal pain, No nausea, No vomiting, No diarrhea, No constipation, +BM this morning   Genitourinary - No dysuria, No frequency, No incontinence  Neurological - No headaches  Skin -  present rash right thigh-no itching   Musculoskeletal - No joint pain, No joint swelling, No muscle pain Psychiatric - No depression, No anxiety	  	        LABS:     INR PENDING FOR THIS MORNING     Prothrombin Time and INR, Plasma in AM (08.14.19 @ 05:10)    Prothrombin Time, Plasma: 25.0: Effective October 30th, 2018 the reference range for PT has changed. sec    INR: 2.18: RECOMMENDED RANGES FOR THERAPEUTIC INR:       08-15    140  |  108  |  26<H>  ----------------------------<  115<H>  3.8   |  24  |  1.49<H>    Ca    8.2<L>      15 Aug 2019 06:20  Phos  3.7     08-14  Mg     1.9     08-14    TPro  6.3  /  Alb  2.4<L>  /  TBili  0.6  /  DBili  x   /  AST  16  /  ALT  19  /  AlkPhos  57  08-14                        9.7    10.64 )-----------( 299      ( 15 Aug 2019 06:20 )             31.3         Radiology/Other:     < from: 12 Lead ECG (08.13.19 @ 16:31) >    Ventricular Rate 82 BPM    Atrial Rate 83 BPM    QRS Duration 128 ms    Q-T Interval 426 ms    QTC Calculation(Bezet) 497 ms    R Axis -43 degrees    T Axis 81 degrees    Diagnosis Line Consider sinus rhythm with APCs   Left axis deviation  Left bundle branch block  Abnormal ECG  No previous ECGs available  Confirmed by ROBBIN CARVALHO, STANLEY COTTO ([id number]) on 8/14/2019 8:26:06 AM    < end of copied text >        =  Physical Exam: = Vital Signs Last 24 Hrs T(C): 37.1 (14 Aug 2019 22:15), Max: 37.1 (14 Aug 2019 22:15) T(F): 98.7 (14 Aug 2019 22:15), Max: 98.7 (14 Aug 2019 22:15) HR: 67 (14 Aug 2019 22:15) (67 - 96) BP: 128/74 (14 Aug 2019 22:15) (112/70 - 138/90) RR: 14 (14 Aug 2019 22:15) (14 - 15) SpO2: 98% (15 Aug 2019 08:41) (93% - 98%)   Constitutional - NAD  Chest - CTA   Cardiovascular - S1S2  Extremities - trace edema bilat lower extremities (L > R). No calf tenderness   Neurologic Exam -                    Cognitive - Awake, Alert, AAO to self, place, year, situation. Able to recall hospital course.     Communication - Fluent. Slow speech. No dysarthria.     Cranial Nerves - no facial asymmetry     Motor -                     LEFT    UE - ShAB 5/5, EF 5/5, EE 5/5, WE 5/5,  5/5                    RIGHT UE - ShAB 5/5, EF 5/5, EE 5/5, WE 4/5,  5/5                    LEFT    LE - HF 5/5, KE 5/5, DF 5/5, PF 5/5                    RIGHT LE - HF 5/5, KE 5/5, DF 5/5, PF 5/5     Skin-fungal maculoparular rash with what appears to be satellite lesions to the bilat posterior thighs and bilateral buttocks.	  	      MEDICATIONS  (STANDING):  ALBUTerol    90 MICROgram(s) HFA Inhaler 1 Puff(s) Inhalation every 4 hours  ALBUTerol/ipratropium for Nebulization 3 milliLiter(s) Nebulizer every 6 hours  amiodarone    Tablet 200 milliGRAM(s) Oral daily  aspirin  chewable 81 milliGRAM(s) Oral daily  atorvastatin 40 milliGRAM(s) Oral at bedtime  dextrose 5%. 1000 milliLiter(s) (50 mL/Hr) IV Continuous <Continuous>  dextrose 50% Injectable 12.5 Gram(s) IV Push once  dextrose 50% Injectable 25 Gram(s) IV Push once  dextrose 50% Injectable 25 Gram(s) IV Push once  docusate sodium 100 milliGRAM(s) Oral three times a day  insulin lispro (HumaLOG) corrective regimen sliding scale   SubCutaneous three times a day before meals  melatonin 6 milliGRAM(s) Oral at bedtime  metoprolol tartrate 50 milliGRAM(s) Oral every 12 hours  nystatin/triamcinolone Cream 1 Application(s) Topical two times a day  pantoprazole    Tablet 40 milliGRAM(s) Oral before breakfast  psyllium Powder 1 Packet(s) Oral daily  senna 2 Tablet(s) Oral at bedtime  sertraline 50 milliGRAM(s) Oral daily  tamsulosin 0.4 milliGRAM(s) Oral at bedtime  tiotropium 18 MICROgram(s) Capsule 1 Capsule(s) Inhalation daily    MEDICATIONS  (PRN):  acetaminophen   Tablet .. 650 milliGRAM(s) Oral every 6 hours PRN Moderate Pain (4 - 6)  dextrose 40% Gel 15 Gram(s) Oral once PRN Blood Glucose LESS THAN 70 milliGRAM(s)/deciliter  glucagon  Injectable 1 milliGRAM(s) IntraMuscular once PRN Glucose LESS THAN 70 milligrams/deciliter

## 2019-08-15 NOTE — CONSULT NOTE ADULT - SUBJECTIVE AND OBJECTIVE BOX
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 aT for surgical cardiac evaluation of LVOT.  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 Alvarado. Patient extubated without post-operative complications.  Hospital course significant for pain at surgical site and hypoxia requiring high flow, now weaned off. Chest tube removed on 8/13. Hospital course also notable for pre-renal MILAGRO, evaluated by nephrology with recommendations to follow up given Cr downtrending. Post operatively noted to have episodes of A fib / PACs on telemetry, loaded with amiodarone and now continued on maintenance dose.      He was evaluated by PT/OT noted to be min - mod assist for ADLs. He ambulated with RW with 2 person assist.  He previously uses a rolling walker      seen at the bedside, c/o heartburn , 3/10,  has burning sensation in the chest , says protonic does not work for him, he takes omeprazole for heart burn.  no n/v, no sob. no dysuria, also noted to be tachycardic per RN this am, received metoprolol, with improvement.         ICU Vital Signs Last 24 Hrs  T(C): 36.8 (15 Aug 2019 08:52), Max: 37.1 (14 Aug 2019 22:15)  T(F): 98.3 (15 Aug 2019 08:52), Max: 98.7 (14 Aug 2019 22:15)  HR: 86 (15 Aug 2019 08:52) (67 - 148)  BP: 114/78 (15 Aug 2019 08:52) (114/78 - 138/90)  BP(mean): --  ABP: --  ABP(mean): --  RR: 14 (15 Aug 2019 08:52) (14 - 14)  SpO2: 94% (15 Aug 2019 08:52) (94% - 98%)      GENERAL- NAD  EAR/NOSE/MOUTH/THROAT - no pharyngeal exudates, no oral lesions  MMM  EYES- JASON, conjunctiva and Sclera clear  NECK- supple  RESPIRATORY-  clear to auscultation bilaterally  CARDIOVASCULAR - SIS2, RRR  GI - soft NT BS present  EXTREMITIES- b/l LE edema  NEUROLOGY- no gross focal deficits  SKIN- chest incision clean, no rashes, warm to touch  PSYCHIATRY- AAO X 3  MUSCULOSKELETAL- ROM normal                9.7                  140  | 24   | 26           10.64 >-----------< 299     ------------------------< 115                   31.3                 3.8  | 108  | 1.49                                         Ca 8.2   Mg x     Ph x        PT/INR - ( 14 Aug 2019 05:10 )   PT: 25.0 sec;   INR: 2.18 ratio       CAPILLARY BLOOD GLUCOSE      POCT Blood Glucose.: 118 mg/dL (15 Aug 2019 07:50)  POCT Blood Glucose.: 105 mg/dL (14 Aug 2019 17:09)  POCT Blood Glucose.: 108 mg/dL (14 Aug 2019 12:11)

## 2019-08-16 LAB
GLUCOSE BLDC GLUCOMTR-MCNC: 101 MG/DL — HIGH (ref 70–99)
GLUCOSE BLDC GLUCOMTR-MCNC: 111 MG/DL — HIGH (ref 70–99)
GLUCOSE BLDC GLUCOMTR-MCNC: 88 MG/DL — SIGNIFICANT CHANGE UP (ref 70–99)
INR BLD: 2.18 RATIO — HIGH (ref 0.88–1.16)
PROTHROM AB SERPL-ACNC: 25 SEC — HIGH (ref 10–12.9)

## 2019-08-16 PROCEDURE — 99232 SBSQ HOSP IP/OBS MODERATE 35: CPT | Mod: GC

## 2019-08-16 PROCEDURE — 99232 SBSQ HOSP IP/OBS MODERATE 35: CPT

## 2019-08-16 RX ORDER — WARFARIN SODIUM 2.5 MG/1
3 TABLET ORAL DAILY
Refills: 0 | Status: COMPLETED | OUTPATIENT
Start: 2019-08-16 | End: 2019-08-18

## 2019-08-16 RX ORDER — METOPROLOL TARTRATE 50 MG
50 TABLET ORAL EVERY 8 HOURS
Refills: 0 | Status: DISCONTINUED | OUTPATIENT
Start: 2019-08-16 | End: 2019-08-29

## 2019-08-16 RX ADMIN — Medication 50 MILLIGRAM(S): at 22:06

## 2019-08-16 RX ADMIN — WARFARIN SODIUM 3 MILLIGRAM(S): 2.5 TABLET ORAL at 22:06

## 2019-08-16 RX ADMIN — Medication 1 APPLICATION(S): at 22:06

## 2019-08-16 RX ADMIN — SERTRALINE 50 MILLIGRAM(S): 25 TABLET, FILM COATED ORAL at 12:22

## 2019-08-16 RX ADMIN — SENNA PLUS 2 TABLET(S): 8.6 TABLET ORAL at 22:06

## 2019-08-16 RX ADMIN — Medication 1 APPLICATION(S): at 06:41

## 2019-08-16 RX ADMIN — Medication 50 MILLIGRAM(S): at 06:41

## 2019-08-16 RX ADMIN — Medication 100 MILLIGRAM(S): at 06:41

## 2019-08-16 RX ADMIN — Medication 50 MILLIGRAM(S): at 15:25

## 2019-08-16 RX ADMIN — PANTOPRAZOLE SODIUM 40 MILLIGRAM(S): 20 TABLET, DELAYED RELEASE ORAL at 06:41

## 2019-08-16 RX ADMIN — Medication 6 MILLIGRAM(S): at 22:06

## 2019-08-16 RX ADMIN — Medication 100 MILLIGRAM(S): at 22:06

## 2019-08-16 RX ADMIN — ATORVASTATIN CALCIUM 40 MILLIGRAM(S): 80 TABLET, FILM COATED ORAL at 22:06

## 2019-08-16 RX ADMIN — Medication 81 MILLIGRAM(S): at 12:22

## 2019-08-16 RX ADMIN — AMIODARONE HYDROCHLORIDE 200 MILLIGRAM(S): 400 TABLET ORAL at 06:40

## 2019-08-16 RX ADMIN — TAMSULOSIN HYDROCHLORIDE 0.4 MILLIGRAM(S): 0.4 CAPSULE ORAL at 22:06

## 2019-08-16 NOTE — PROGRESS NOTE ADULT - ASSESSMENT
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 Daily PT/INR.  Amiodarone. Metoprolol tartrate.     HTN/tacycadic intermittently - will change Lopressor to 50 q 8 with parameters.    BPH - flomax    COPD - continue Duoneb prn    DM2 - c/w  ISS  monitor Accu-Cheks for now    Incidental thyroid nodule noted on Carotic US: follow up with PCP for further eval.    GERD - Protonix( omeprazole non formulary, patient can bring own supply)    Mood: depression - sertraline    Insomnia - melatonin      will follow

## 2019-08-16 NOTE — PROGRESS NOTE ADULT - ASSESSMENT
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    *Gait/ADL/functional deficits secondary to Cardiac surgery: CAD s/p CABG/aortic fibroelastoma removal  - Continue comprehensive Rehab Program of PT/OT   - Sternal precautions  - incision well healed. Ok to shower. Suture in place from previous drain site-will remove next week  - Weekly CXR   - ASA, statin  - follow up outpatient Dr. Alvarado   - daily standing weights  - 137.4kg on admission bed weights.     131.9kg standing 8/15     132.2kg standing 8/16    *Pre-renal MILAGRO:  BUN/Cr improved   - Not currently on diuretics, no effusions noted on last crxay from the 13th  - Hospitalist agrees to continue to encourage po intake of fluids  - BMP tomorrow AM     *Previous CVA w/ R Weakness: Consider speech eval for cognitive eval prior to d/c. Consult Dr. Hernandez neuropsych for eval     #Buttock/thigh rash: Possibly fungal/inflammatory etiology - mycolog cream BID      *PAF - coumadin goal 2-2.5. Daily PT/INR and dose coumadin  - Coumadin ordered for 3 doses of 3mg daily until 8/19  - Monitor INR to dose adjust as needed  - Continue Amiodarone and Metoprolol tartrate.     *HTN - Lopressor. SBPs ranging 110s-130    *BPH - flomax    *COPD   - Patient reports that he does not have a hx of COPD, will encourage patient to f/u with PCP upon discharge and have PFTs done if recommended by PCP   - continue Duoneb as needed   - takes Breo at home; however non-formulary. Will ask patient to provide.    *DM2 - ISS, accuchecks ranging 101-123, continue to monitor   HA1C on 8/6 was 6.0%    *Incidental thyroid nodule noted on Carotic US: follow up with PCP for further eval.  - (Last TSH on 8/6 was 0.88, continue to monitor for symptoms of hypothyroidism 2/2 amiodarone use vs nodules)   - ?contributing to possible cognitive deficits    *h/o FVL with PEs: IVC filter. Coumadin as above.     *GERD - Protonix    *Mood: depression - sertraline    *Prolonged QTc   - still prolonged in the 490s, discussed with pharmacist since patient [facility] QTc ranges 470-low 500s on previous EKGs, will order follow up EKG for monitoring next week   (Zoloft is home medication)    *Insomnia - continue melatonin, slept well overnight     *Mild leukocytosis -improved     *Morbid obesity   -Nutritional counseling   -RD to eval and educate     *Pain management:  Tylenol PRN      Bowel Management:    continent. Regular BMs     Senna and Docusate, Metamucil    Bladder Management: BPH - continue Tamsulosin. 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    *Gait/ADL/functional deficits secondary to Cardiac surgery: CAD s/p CABG/aortic fibroelastoma removal  - Continue comprehensive Rehab Program of PT/OT   - Sternal precautions  - incision well healed. Ok to shower. Suture in place from previous drain site-will remove next week  - Weekly CXR   - ASA, statin  - follow up outpatient Dr. Alvarado   - daily standing weights  - 137.4kg on admission bed weights.     131.9kg standing 8/15     132.2kg standing 8/16    *Pre-renal MILAGRO:  BUN/Cr improved   - Not currently on diuretics, no effusions noted on last crxay from the 13th  - Hospitalist agrees to continue to encourage po intake of fluids  - BMP tomorrow AM     *Previous CVA w/ R Weakness: Consider speech eval for cognitive eval prior to d/c. Consult Dr. Hernandez neuropsych for eval     #Buttock/thigh rash: Possibly fungal/inflammatory etiology - mycolog cream BID      *PAF - coumadin goal 2-2.5. Daily PT/INR and dose coumadin  - Coumadin ordered for 3 doses of 3mg daily until 8/19  - Monitor INR to dose adjust as needed  - Continue Amiodarone and Metoprolol tartrate.     *HTN - Lopressor. SBPs ranging 110s-130    *BPH - flomax    *COPD   - Patient reports that he does not have a hx of COPD, will encourage patient to f/u with PCP upon discharge and have PFTs done if recommended by PCP   - continue Duoneb as needed   - takes Breo at home; however non-formulary. Will ask patient to provide.    *DM2 - ISS, accuchecks ranging 101-123, continue to monitor   HA1C on 8/6 was 6.0%    *Incidental thyroid nodule noted on Carotic US: follow up with PCP for further eval.  - (Last TSH on 8/6 was 0.88, continue to monitor for symptoms of hypothyroidism 2/2 amiodarone use vs nodules)   - ?contributing to possible cognitive deficits    *h/o FVL with PEs: IVC filter. Coumadin as above.     *GERD - Protonix    *Mood: depression - sertraline    *Prolonged QTc   - still prolonged in the 490s, discussed with pharmacist since patient on zoloft-continue to [facility], QTc ranges 470-low 500s on previous EKGs, will order follow up EKG for monitoring next week   (Zoloft is home medication)    *Insomnia - continue melatonin, slept well overnight     *Mild leukocytosis -improved     *Morbid obesity   -Nutritional counseling   -RD to eval and educate     *Pain management:  Tylenol PRN      Bowel Management:    continent. Regular BMs     Senna and Docusate, Metamucil    Bladder Management: BPH - continue Tamsulosin. continent    Precautions / PROPHYLAXIS:   - Falls, Cardiac,  Seizure    - Pressure injury/Skin: Turn Q2hrs while in bed, OOB to Chair, PT/OT    - DVT: on coumadin SQ , TEDs, No SCDs due to IVC filter    DSPO: anticipate d/c on 8/28. Pt requesting to go back to [facility]. Supportive family however far away.  SW following looking into assisted living. Therapy concerned for pt's cognitive status and will likely need supervision.

## 2019-08-16 NOTE — PROGRESS NOTE ADULT - SUBJECTIVE AND OBJECTIVE BOX
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 Ta for surgical cardiac evaluation of LVOT.  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 Alvarado. Patient extubated without post-operative complications.  Hospital course significant for pain at surgical site and hypoxia requiring high flow, now weaned off. Chest tube removed on 8/13. Hospital course also notable for pre-renal MILAGRO, evaluated by nephrology with recommendations to follow up given Cr downtrending. Post operatively noted to have episodes of A fib / PACs on telemetry, loaded with amiodarone and now continued on maintenance dose.      He was evaluated by PT/OT noted to be min - mod assist for ADLs. He ambulated with RW with 2 person assist.  He previously uses a rolling walker    seen at the bedside, no n/v, no sob. no dysuria    Vital Signs Last 24 Hrs  T(C): 36.9 (16 Aug 2019 09:13), Max: 37 (15 Aug 2019 21:45)  T(F): 98.5 (16 Aug 2019 09:13), Max: 98.6 (15 Aug 2019 21:45)  HR: 110 (16 Aug 2019 09:13) (77 - 110)  BP: 121/77 (16 Aug 2019 09:13) (121/77 - 136/78)  BP(mean): --  RR: 16 (16 Aug 2019 09:13) (14 - 16)  SpO2: 93% (16 Aug 2019 09:13) (93% - 94%)    GENERAL- NAD  EAR/NOSE/MOUTH/THROAT - no pharyngeal exudates, no oral lesions  MMM  EYES- JASON, conjunctiva and Sclera clear  NECK- supple  RESPIRATORY-  clear to auscultation bilaterally  CARDIOVASCULAR - SIS2, RRR  GI - soft NT BS present  EXTREMITIES- b/l LE edema  NEUROLOGY- no gross focal deficits  SKIN- chest incision clean, no rashes, warm to touch  PSYCHIATRY- AAO X 3  MUSCULOSKELETAL- ROM normal                 9.7                  140  | 24   | 26           10.64 >-----------< 299     ------------------------< 115                   31.3                 3.8  | 108  | 1.49                                         Ca 8.2   Mg x     Ph x        CAPILLARY BLOOD GLUCOSE      POCT Blood Glucose.: 111 mg/dL (16 Aug 2019 07:52)  POCT Blood Glucose.: 117 mg/dL (15 Aug 2019 16:32)  POCT Blood Glucose.: 106 mg/dL (15 Aug 2019 11:55)

## 2019-08-16 NOTE — PROGRESS NOTE ADULT - ATTENDING COMMENTS
Chart reviewed. Patient seen at bedside. Feels well. Denies any CP.  Continues to have dyspnea.   Sternal incision healing well  Labs - INR therapeutic  Continue full rehab program Chart reviewed. Patient seen at bedside. Feels well. Denies any CP.  Continues to have dyspnea.   Sternal incision healing well  Labs - INR therapeutic  Continue full rehab program    2. Concern for prior ? cognitive impairment- will have neuropsych eval

## 2019-08-16 NOTE — PROGRESS NOTE ADULT - SUBJECTIVE AND OBJECTIVE BOX
SUBJECTIVE:  No acute events overnight. Pt statues he is progressing well with therapy but feels that his main issue is his breathing and endurance. His shortness of breath is mainly with exertion. No other acute issues. BM regular.  L sided arm weakness improving.     ROS: +Cough, non productive. + CASTILLO. Denies lightheadedness, headache, fevers/ chills, dysuria, constipation.     -------------------------------------   OBJECTIVE:    Vital Signs Last 24 Hrs  T(C): 36.9 (16 Aug 2019 09:13), Max: 37 (15 Aug 2019 21:45)  T(F): 98.5 (16 Aug 2019 09:13), Max: 98.6 (15 Aug 2019 21:45)  HR: 110 (16 Aug 2019 09:13) (77 - 110)  BP: 121/77 (16 Aug 2019 09:13) (121/77 - 136/78)  BP(mean): --  RR: 16 (16 Aug 2019 09:13) (14 - 16)  SpO2: 93% (16 Aug 2019 09:13) (93% - 94%)    PHYSICAL EXAM  General: NAD, comfortable. Sitting up in bed.   Cardio: RRR, S1/S2+  Resp: CTAB. Improved aeration at the bases. Wheeze improved  Abdomen: soft, NTND, obese  Neuro:      Cognitive: AAO x3, slow processing at times. wax/waning memory      Communication: fluent, no dysarthria or aphasia     Cranial nerves: no facial asymmetry     Strength: focused: Overall 5/5 bilaterally. Residual trace weakness RUE.  Extrem: no edema, no calf tenderness   Skin: Sternal incision well healed open to air. Retained suture upper abdomen from previous drain. C/D/I no erythema or drainage. Fungal maculopapular rash and satellite lesions b/l posterior thighs and periumbilical.   -------------------------------------     LABS:  PT/INR - ( 16 Aug 2019 08:15 )   PT: 25.0 sec;   INR: 2.18 ratio                                 9.7    10.64 )-----------( 299      ( 15 Aug 2019 06:20 )             31.3     08-15    140  |  108  |  26<H>  ----------------------------<  115<H>  3.8   |  24  |  1.49<H>    Ca    8.2<L>      15 Aug 2019 06:20      PT/INR - ( 16 Aug 2019 08:15 )   PT: 25.0 sec;   INR: 2.18 ratio        -------------------------------------   MEDICATIONS  (STANDING):  amiodarone    Tablet 200 milliGRAM(s) Oral daily  aspirin  chewable 81 milliGRAM(s) Oral daily  atorvastatin 40 milliGRAM(s) Oral at bedtime  dextrose 5%. 1000 milliLiter(s) (50 mL/Hr) IV Continuous <Continuous>  dextrose 50% Injectable 12.5 Gram(s) IV Push once  dextrose 50% Injectable 25 Gram(s) IV Push once  dextrose 50% Injectable 25 Gram(s) IV Push once  docusate sodium 100 milliGRAM(s) Oral three times a day  insulin lispro (HumaLOG) corrective regimen sliding scale   SubCutaneous three times a day before meals  melatonin 6 milliGRAM(s) Oral at bedtime  metoprolol tartrate 50 milliGRAM(s) Oral every 8 hours  nystatin/triamcinolone Cream 1 Application(s) Topical two times a day  pantoprazole    Tablet 40 milliGRAM(s) Oral before breakfast  psyllium Powder 1 Packet(s) Oral daily  senna 2 Tablet(s) Oral at bedtime  sertraline 50 milliGRAM(s) Oral daily  tamsulosin 0.4 milliGRAM(s) Oral at bedtime  tiotropium 18 MICROgram(s) Capsule 1 Capsule(s) Inhalation daily  warfarin 3 milliGRAM(s) Oral daily    MEDICATIONS  (PRN):  acetaminophen   Tablet .. 650 milliGRAM(s) Oral every 6 hours PRN Moderate Pain (4 - 6)  ALBUTerol/ipratropium for Nebulization 3 milliLiter(s) Nebulizer every 6 hours PRN Shortness of Breath and/or Wheezing  dextrose 40% Gel 15 Gram(s) Oral once PRN Blood Glucose LESS THAN 70 milliGRAM(s)/deciliter  glucagon  Injectable 1 milliGRAM(s) IntraMuscular once PRN Glucose LESS THAN 70 milligrams/deciliter SUBJECTIVE:  No acute events overnight. Pt statues he is progressing well with therapy but feels that his main issue is his breathing and endurance. His shortness of breath is mainly with exertion. No other acute issues. BM regular.  L sided arm weakness improving.     ROS: +Cough, non productive. + CASTILLO. Denies lightheadedness, headache, fevers/ chills, dysuria, constipation.     -------------------------------------   OBJECTIVE:    Vital Signs Last 24 Hrs  T(C): 36.9 (16 Aug 2019 09:13), Max: 37 (15 Aug 2019 21:45)  T(F): 98.5 (16 Aug 2019 09:13), Max: 98.6 (15 Aug 2019 21:45)  HR: 110 (16 Aug 2019 09:13) (77 - 110)  BP: 121/77 (16 Aug 2019 09:13) (121/77 - 136/78)  BP(mean): --  RR: 16 (16 Aug 2019 09:13) (14 - 16)  SpO2: 93% (16 Aug 2019 09:13) (93% - 94%)    PHYSICAL EXAM  General: NAD, comfortable. Sitting up in bed.   Cardio: RRR, S1/S2+  Resp: CTAB. Improved aeration at the bases. Wheeze improved  Abdomen: soft, NTND, obese  Neuro:      Cognitive: AAO x3, slow processing at times. wax/waning memory      Communication: fluent, no dysarthria or aphasia     Cranial nerves: no facial asymmetry     Strength: focused: Overall 5/5 bilaterally. Residual trace weakness RUE.  Extrem: no edema, no calf tenderness   Skin: Sternal incision well healed open to air. Retained suture upper abdomen from previous drain. C/D/I no erythema or drainage. Fungal maculopapular rash and satellite lesions b/l posterior thighs and periumbilical.     Functional Exam:  Barriers: endurance, cognitive  Transfers: min assist  Gait: 30' RW with CG    -------------------------------------     LABS:  PT/INR - ( 16 Aug 2019 08:15 )   PT: 25.0 sec;   INR: 2.18 ratio                                 9.7    10.64 )-----------( 299      ( 15 Aug 2019 06:20 )             31.3     08-15    140  |  108  |  26<H>  ----------------------------<  115<H>  3.8   |  24  |  1.49<H>    Ca    8.2<L>      15 Aug 2019 06:20      PT/INR - ( 16 Aug 2019 08:15 )   PT: 25.0 sec;   INR: 2.18 ratio        -------------------------------------   MEDICATIONS  (STANDING):  amiodarone    Tablet 200 milliGRAM(s) Oral daily  aspirin  chewable 81 milliGRAM(s) Oral daily  atorvastatin 40 milliGRAM(s) Oral at bedtime  dextrose 5%. 1000 milliLiter(s) (50 mL/Hr) IV Continuous <Continuous>  dextrose 50% Injectable 12.5 Gram(s) IV Push once  dextrose 50% Injectable 25 Gram(s) IV Push once  dextrose 50% Injectable 25 Gram(s) IV Push once  docusate sodium 100 milliGRAM(s) Oral three times a day  insulin lispro (HumaLOG) corrective regimen sliding scale   SubCutaneous three times a day before meals  melatonin 6 milliGRAM(s) Oral at bedtime  metoprolol tartrate 50 milliGRAM(s) Oral every 8 hours  nystatin/triamcinolone Cream 1 Application(s) Topical two times a day  pantoprazole    Tablet 40 milliGRAM(s) Oral before breakfast  psyllium Powder 1 Packet(s) Oral daily  senna 2 Tablet(s) Oral at bedtime  sertraline 50 milliGRAM(s) Oral daily  tamsulosin 0.4 milliGRAM(s) Oral at bedtime  tiotropium 18 MICROgram(s) Capsule 1 Capsule(s) Inhalation daily  warfarin 3 milliGRAM(s) Oral daily    MEDICATIONS  (PRN):  acetaminophen   Tablet .. 650 milliGRAM(s) Oral every 6 hours PRN Moderate Pain (4 - 6)  ALBUTerol/ipratropium for Nebulization 3 milliLiter(s) Nebulizer every 6 hours PRN Shortness of Breath and/or Wheezing  dextrose 40% Gel 15 Gram(s) Oral once PRN Blood Glucose LESS THAN 70 milliGRAM(s)/deciliter  glucagon  Injectable 1 milliGRAM(s) IntraMuscular once PRN Glucose LESS THAN 70 milligrams/deciliter

## 2019-08-16 NOTE — CHART NOTE - NSCHARTNOTEFT_GEN_A_CORE
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    COMORBIDITIES/COMPLICATING CONDITIONS IMPACTING REHABILITATION:  HEALTH ISSUES - PROBLEM Dx:  Suspected pulmonary embolism  Suspected deep vein thrombosis (DVT)        PAST MEDICAL & SURGICAL HISTORY:  Aortic stenosis  Atheroma  Cerebrovascular accident (CVA)  CAD S/P percutaneous coronary angioplasty  Atrial fibrillation, unspecified type  Marisol filter in place  PE (pulmonary embolism)  Hyperlipemia  Hypertension  S/P cataract surgery  S/P IVC filter      Based upon consideration of the patient's impairments, functional status, complicating conditions and any other contributing factors and after information garnered from the assessments of all therapy disciplines involved in treating the patient and other pertinent clinicians:    INTERDISCIPLINARY REHABILITATION INTERVENTIONS:    [ x  ] Transfer Training  [  x ] Bed Mobility  [ x  ] Therapeutic Exercise  [ x  ] Balance/Coordination Exercises  [ x  ] Locomotion retraining  [ x  ] Stairs  [ x  ] Functional Transfer Training  [  x ] Bowel/Bladder program  [  x ] Pain Management  [  x ] Skin/Wound Care  [   ] Visual/Perceptual Training  [   ] Therapeutic Recreation Activities  [ x  ] Neuromuscular Re-education  [x   ] Activities of Daily Living  [   ] Speech Exercise  [   ] Swallowing Exercises  [   ] Vital Stim  [   ] Dietary Supplements  [   ] Calorie Count  [   ] Cognitive Exercises  [   ] Cognitive/Linguistic Treatment  [   ] Behavior Program  [ x  ] Neuropsych Therapy  [ x  ] Patient/Family Counseling  [   ] Family Training  [   ] Community Re-entry  [   ] Orthotic Evaluation  [   ] Prosthetic Eval/Training    MEDICAL PROGNOSIS:  fair    REHAB POTENTIAL:  fair  EXPECTED DAILY THERAPY:         PT:2 hrs/day        OT:1 hr/day        ST:NA       p&O:na    EXPECTED INTENSITY OF PROGRAM:  3 hrs/day     EXPECTED FREQUENCY OF PROGRAM:  5 days/week     ESTIMATED LOS:  2 weeks    ESTIMATED DISPOSITION:  home    INTERDISCIPLINARY FUNCTIONAL OUTCOMES/GOALS:         Gait/Mobility:Modified independent       Transfers:Modified independent       ADLs:Modified independent       Functional Transfers:Modified independent       Medication Management:supervision       Communication:na       Cognitive:supervision       Dysphagia:na       Bladder:Modified independent       Bowel:Modified independent

## 2019-08-17 LAB — GLUCOSE BLDC GLUCOMTR-MCNC: 101 MG/DL — HIGH (ref 70–99)

## 2019-08-17 PROCEDURE — 99232 SBSQ HOSP IP/OBS MODERATE 35: CPT

## 2019-08-17 RX ADMIN — Medication 50 MILLIGRAM(S): at 21:14

## 2019-08-17 RX ADMIN — Medication 100 MILLIGRAM(S): at 21:14

## 2019-08-17 RX ADMIN — SERTRALINE 50 MILLIGRAM(S): 25 TABLET, FILM COATED ORAL at 12:00

## 2019-08-17 RX ADMIN — AMIODARONE HYDROCHLORIDE 200 MILLIGRAM(S): 400 TABLET ORAL at 05:50

## 2019-08-17 RX ADMIN — WARFARIN SODIUM 3 MILLIGRAM(S): 2.5 TABLET ORAL at 21:25

## 2019-08-17 RX ADMIN — Medication 100 MILLIGRAM(S): at 12:01

## 2019-08-17 RX ADMIN — Medication 100 MILLIGRAM(S): at 05:49

## 2019-08-17 RX ADMIN — TAMSULOSIN HYDROCHLORIDE 0.4 MILLIGRAM(S): 0.4 CAPSULE ORAL at 21:14

## 2019-08-17 RX ADMIN — Medication 50 MILLIGRAM(S): at 05:49

## 2019-08-17 RX ADMIN — Medication 1 APPLICATION(S): at 17:37

## 2019-08-17 RX ADMIN — SENNA PLUS 2 TABLET(S): 8.6 TABLET ORAL at 21:14

## 2019-08-17 RX ADMIN — Medication 3 MILLILITER(S): at 21:41

## 2019-08-17 RX ADMIN — Medication 1 APPLICATION(S): at 05:50

## 2019-08-17 RX ADMIN — Medication 81 MILLIGRAM(S): at 12:00

## 2019-08-17 RX ADMIN — Medication 50 MILLIGRAM(S): at 14:10

## 2019-08-17 RX ADMIN — Medication 6 MILLIGRAM(S): at 21:14

## 2019-08-17 RX ADMIN — ATORVASTATIN CALCIUM 40 MILLIGRAM(S): 80 TABLET, FILM COATED ORAL at 21:14

## 2019-08-17 RX ADMIN — PANTOPRAZOLE SODIUM 40 MILLIGRAM(S): 20 TABLET, DELAYED RELEASE ORAL at 05:49

## 2019-08-17 NOTE — PROGRESS NOTE ADULT - ASSESSMENT
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 Daily PT/INR.  Amiodarone. Metoprolol tartrate.     HTN/tacycadic intermittently -  Lopressor to 50 q 8 with parameters.    BPH - flomax    prediabetic- ( patient says he has never a diabetic, his sugars are same for many yrs does not take any meds for dm) diet controlled, d/c iss, accuchecks     COPD - continue Duoneb prn    Incidental thyroid nodule noted on Carotic US: follow up with PCP for further eval.    GERD - Protonix( omeprazole non formulary, patient can bring own supply)    Mood: depression - sertraline    Insomnia - melatonin      will follow

## 2019-08-17 NOTE — PROGRESS NOTE ADULT - SUBJECTIVE AND OBJECTIVE BOX
Cc: Gait dysfunction and ADL dysfunction after CABG, fibroelastoma resection.     HPI: Patient with no new medical issues today.  Pain controlled, no chest pain, no N/V, no Fevers/Chills. No other new ROS  Has been tolerating rehabilitation program.  Seems to be getting improved endurance.     acetaminophen   Tablet .. 650 milliGRAM(s) Oral every 6 hours PRN  ALBUTerol/ipratropium for Nebulization 3 milliLiter(s) Nebulizer every 6 hours PRN  amiodarone    Tablet 200 milliGRAM(s) Oral daily  aspirin  chewable 81 milliGRAM(s) Oral daily  atorvastatin 40 milliGRAM(s) Oral at bedtime  dextrose 40% Gel 15 Gram(s) Oral once PRN  dextrose 5%. 1000 milliLiter(s) IV Continuous <Continuous>  dextrose 50% Injectable 12.5 Gram(s) IV Push once  dextrose 50% Injectable 25 Gram(s) IV Push once  dextrose 50% Injectable 25 Gram(s) IV Push once  docusate sodium 100 milliGRAM(s) Oral three times a day  glucagon  Injectable 1 milliGRAM(s) IntraMuscular once PRN  insulin lispro (HumaLOG) corrective regimen sliding scale   SubCutaneous three times a day before meals  melatonin 6 milliGRAM(s) Oral at bedtime  metoprolol tartrate 50 milliGRAM(s) Oral every 8 hours  nystatin/triamcinolone Cream 1 Application(s) Topical two times a day  pantoprazole    Tablet 40 milliGRAM(s) Oral before breakfast  psyllium Powder 1 Packet(s) Oral daily  senna 2 Tablet(s) Oral at bedtime  sertraline 50 milliGRAM(s) Oral daily  tamsulosin 0.4 milliGRAM(s) Oral at bedtime  tiotropium 18 MICROgram(s) Capsule 1 Capsule(s) Inhalation daily  warfarin 3 milliGRAM(s) Oral daily      T(C): 36.8 (08-17-19 @ 08:32), Max: 37.2 (08-16-19 @ 22:00)  HR: 66 (08-17-19 @ 08:32) (66 - 99)  BP: 101/64 (08-17-19 @ 08:32) (101/64 - 121/71)  RR: 14 (08-17-19 @ 08:32) (14 - 16)  SpO2: 95% (08-17-19 @ 08:32) (92% - 96%)    In NAD  HEENT- EOMI  Heart- RRR, S1S2  Lungs- few crackles.   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    Plan:  - Continue PT/OT/  - DVT prophylaxis  - Skin- Turn q2h, check skin daily  - Continue current medications; patient medically stable.   -Active issues-   - Patient is stable to continue current rehabilitation program.

## 2019-08-17 NOTE — PROGRESS NOTE ADULT - SUBJECTIVE AND OBJECTIVE BOX
84 year old M with significant PMH for obesity, former tobacco smoker, Factov V leiden (managed on Coumadin), Cardiac history (mod AS, CAD, HTN), PE on coumadin w/ IVC filter in place, CVA x2 with residual R sided weakness (completed neurorehab in April at [place] with d/c home therapy, GIGI and COPD (CPAP compliant) who presented to [facility] on 8/6 in anticipation of UMER with Dr. Ta for surgical cardiac evaluation of LVOT.  He was subsequently admitted to [facility] CT surgery service for CABG x2 and excision of fibroelastoma on 8/7 by Dr. Alvarado. Patient extubated without post-operative complications.  Hospital course significant for pain at surgical site and hypoxia requiring high flow, now weaned off. Chest tube removed on 8/13. Hospital course also notable for pre-renal MILAGRO, evaluated by nephrology with recommendations to follow up given Cr downtrending. Post operatively noted to have episodes of A fib / PACs on telemetry, loaded with amiodarone and now continued on maintenance dose.      He was evaluated by PT/OT noted to be min - mod assist for ADLs. He ambulated with RW with 2 person assist.  He previously uses a rolling walker    seen at the bedside, no n/v, no sob. no dysuria    Vital Signs Last 24 Hrs  T(C): 36.8 (17 Aug 2019 08:32), Max: 37.2 (16 Aug 2019 22:00)  T(F): 98.2 (17 Aug 2019 08:32), Max: 98.9 (16 Aug 2019 22:00)  HR: 66 (17 Aug 2019 08:32) (66 - 99)  BP: 101/64 (17 Aug 2019 08:32) (101/64 - 121/71)  BP(mean): --  RR: 14 (17 Aug 2019 08:32) (14 - 16)  SpO2: 95% (17 Aug 2019 08:32) (92% - 96%)    GENERAL- NAD  EAR/NOSE/MOUTH/THROAT - no pharyngeal exudates, no oral lesions  MMM  EYES- JASON, conjunctiva and Sclera clear  NECK- supple  RESPIRATORY-  clear to auscultation bilaterally  CARDIOVASCULAR - SIS2, RRR  GI - soft NT BS present  EXTREMITIES- b/l LE edema  NEUROLOGY- no gross focal deficits  SKIN- chest incision clean, no rashes, warm to touch  PSYCHIATRY- AAO X 3  MUSCULOSKELETAL- ROM normal     CAPILLARY BLOOD GLUCOSE      POCT Blood Glucose.: 101 mg/dL (17 Aug 2019 07:42)  POCT Blood Glucose.: 101 mg/dL (16 Aug 2019 16:34)  POCT Blood Glucose.: 88 mg/dL (16 Aug 2019 12:01)    PT/INR - ( 16 Aug 2019 08:15 )   PT: 25.0 sec;   INR: 2.18 ratio

## 2019-08-18 LAB
ANION GAP SERPL CALC-SCNC: 8 MMOL/L — SIGNIFICANT CHANGE UP (ref 5–17)
BUN SERPL-MCNC: 22 MG/DL — SIGNIFICANT CHANGE UP (ref 7–23)
CALCIUM SERPL-MCNC: 8.8 MG/DL — SIGNIFICANT CHANGE UP (ref 8.4–10.5)
CHLORIDE SERPL-SCNC: 108 MMOL/L — SIGNIFICANT CHANGE UP (ref 96–108)
CO2 SERPL-SCNC: 25 MMOL/L — SIGNIFICANT CHANGE UP (ref 22–31)
CREAT SERPL-MCNC: 1.53 MG/DL — HIGH (ref 0.5–1.3)
GLUCOSE SERPL-MCNC: 101 MG/DL — HIGH (ref 70–99)
INR BLD: 2.48 RATIO — HIGH (ref 0.88–1.16)
POTASSIUM SERPL-MCNC: 4 MMOL/L — SIGNIFICANT CHANGE UP (ref 3.5–5.3)
POTASSIUM SERPL-SCNC: 4 MMOL/L — SIGNIFICANT CHANGE UP (ref 3.5–5.3)
PROTHROM AB SERPL-ACNC: 28.6 SEC — HIGH (ref 10–12.9)
SODIUM SERPL-SCNC: 141 MMOL/L — SIGNIFICANT CHANGE UP (ref 135–145)

## 2019-08-18 PROCEDURE — 99232 SBSQ HOSP IP/OBS MODERATE 35: CPT

## 2019-08-18 RX ADMIN — ATORVASTATIN CALCIUM 40 MILLIGRAM(S): 80 TABLET, FILM COATED ORAL at 21:34

## 2019-08-18 RX ADMIN — Medication 50 MILLIGRAM(S): at 06:01

## 2019-08-18 RX ADMIN — PANTOPRAZOLE SODIUM 40 MILLIGRAM(S): 20 TABLET, DELAYED RELEASE ORAL at 06:01

## 2019-08-18 RX ADMIN — Medication 81 MILLIGRAM(S): at 12:31

## 2019-08-18 RX ADMIN — Medication 6 MILLIGRAM(S): at 21:34

## 2019-08-18 RX ADMIN — Medication 100 MILLIGRAM(S): at 14:40

## 2019-08-18 RX ADMIN — AMIODARONE HYDROCHLORIDE 200 MILLIGRAM(S): 400 TABLET ORAL at 06:03

## 2019-08-18 RX ADMIN — WARFARIN SODIUM 3 MILLIGRAM(S): 2.5 TABLET ORAL at 21:35

## 2019-08-18 RX ADMIN — Medication 100 MILLIGRAM(S): at 06:03

## 2019-08-18 RX ADMIN — Medication 50 MILLIGRAM(S): at 14:40

## 2019-08-18 RX ADMIN — SENNA PLUS 2 TABLET(S): 8.6 TABLET ORAL at 21:34

## 2019-08-18 RX ADMIN — TAMSULOSIN HYDROCHLORIDE 0.4 MILLIGRAM(S): 0.4 CAPSULE ORAL at 21:34

## 2019-08-18 RX ADMIN — Medication 1 APPLICATION(S): at 21:34

## 2019-08-18 RX ADMIN — Medication 50 MILLIGRAM(S): at 21:34

## 2019-08-18 RX ADMIN — Medication 1 APPLICATION(S): at 06:02

## 2019-08-18 RX ADMIN — SERTRALINE 50 MILLIGRAM(S): 25 TABLET, FILM COATED ORAL at 12:32

## 2019-08-18 RX ADMIN — Medication 100 MILLIGRAM(S): at 21:34

## 2019-08-18 NOTE — PROGRESS NOTE ADULT - SUBJECTIVE AND OBJECTIVE BOX
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 Ta for surgical cardiac evaluation of LVOT.  He was subsequently admitted to [facility] CT surgery service for CABG x2 and excision of fibroelastoma on 8/7 by Dr. Alvarado. Patient extubated without post-operative complications.  Hospital course significant for pain at surgical site and hypoxia requiring high flow, now weaned off. Chest tube removed on 8/13. Hospital course also notable for pre-renal MILAGRO, evaluated by nephrology with recommendations to follow up given Cr downtrending. Post operatively noted to have episodes of A fib / PACs on telemetry, loaded with amiodarone and now continued on maintenance dose.      He was evaluated by PT/OT noted to be min - mod assist for ADLs. He ambulated with RW with 2 person assist.  He previously uses a rolling walker    seen at the bedside, no n/v, no sob. no dysuria    Vital Signs Last 24 Hrs  T(C): 36.6 (18 Aug 2019 07:37), Max: 37.1 (17 Aug 2019 20:05)  T(F): 97.9 (18 Aug 2019 07:37), Max: 98.8 (17 Aug 2019 20:05)  HR: 62 (18 Aug 2019 07:37) (62 - 90)  BP: 108/67 (18 Aug 2019 07:37) (108/67 - 133/77)  BP(mean): --  RR: 14 (18 Aug 2019 07:37) (14 - 14)  SpO2: 95% (18 Aug 2019 07:37) (94% - 100%)    GENERAL- NAD  EAR/NOSE/MOUTH/THROAT - no pharyngeal exudates, no oral lesions  MMM  EYES- JASON, conjunctiva and Sclera clear  NECK- supple  RESPIRATORY-  clear to auscultation bilaterally  CARDIOVASCULAR - SIS2, RRR  GI - soft NT BS present  EXTREMITIES- b/l LE edema  NEUROLOGY- no gross focal deficits  SKIN- chest incision clean, no rashes, warm to touch  PSYCHIATRY- AAO X 3  MUSCULOSKELETAL- ROM normal        PT/INR - ( 16 Aug 2019 08:15 )   PT: 25.0 sec;   INR: 2.18 ratio       PT/INR - ( 18 Aug 2019 05:15 )   PT: 28.6 sec;   INR: 2.48 ratio

## 2019-08-18 NOTE — PROGRESS NOTE ADULT - SUBJECTIVE AND OBJECTIVE BOX
Cc: Gait dysfunction and ADL dysfunction after CABG, fibroelastoma resection.     HPI: Patient with no new medical issues today.  Pain controlled, no chest pain, no N/V, no Fevers/Chills. No other new ROS  Has been tolerating rehabilitation program.  Seems to be getting improved endurance.     acetaminophen   Tablet .. 650 milliGRAM(s) Oral every 6 hours PRN  ALBUTerol/ipratropium for Nebulization 3 milliLiter(s) Nebulizer every 6 hours PRN  amiodarone    Tablet 200 milliGRAM(s) Oral daily  aspirin  chewable 81 milliGRAM(s) Oral daily  atorvastatin 40 milliGRAM(s) Oral at bedtime  dextrose 40% Gel 15 Gram(s) Oral once PRN  dextrose 5%. 1000 milliLiter(s) IV Continuous <Continuous>  dextrose 50% Injectable 12.5 Gram(s) IV Push once  dextrose 50% Injectable 25 Gram(s) IV Push once  dextrose 50% Injectable 25 Gram(s) IV Push once  docusate sodium 100 milliGRAM(s) Oral three times a day  glucagon  Injectable 1 milliGRAM(s) IntraMuscular once PRN  insulin lispro (HumaLOG) corrective regimen sliding scale   SubCutaneous three times a day before meals  melatonin 6 milliGRAM(s) Oral at bedtime  metoprolol tartrate 50 milliGRAM(s) Oral every 8 hours  nystatin/triamcinolone Cream 1 Application(s) Topical two times a day  pantoprazole    Tablet 40 milliGRAM(s) Oral before breakfast  psyllium Powder 1 Packet(s) Oral daily  senna 2 Tablet(s) Oral at bedtime  sertraline 50 milliGRAM(s) Oral daily  tamsulosin 0.4 milliGRAM(s) Oral at bedtime  tiotropium 18 MICROgram(s) Capsule 1 Capsule(s) Inhalation daily  warfarin 3 milliGRAM(s) Oral daily      Vital Signs Last 24 Hrs  T(C): 36.6 (08-18-19 @ 07:37)  T(F): 97.9 (08-18-19 @ 07:37), Max: 98.8 (08-17-19 @ 20:05)  HR: 62 (08-18-19 @ 07:37) (62 - 90)  BP: 108/67 (08-18-19 @ 07:37)  BP(mean): --  RR: 14 (08-18-19 @ 07:37) (14 - 14)  SpO2: 95% (08-18-19 @ 07:37) (94% - 100%)    In NAD  HEENT- EOMI  Heart- RRR, S1S2, no JVD  Lungs- few crackles.   Abd- + BS, NT  Ext- No calf pain  Neuro- Exam unchanged, non focal.     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    Plan:  - Continue PT/OT  - DVT prophylaxis  - Skin- Turn q2h, check skin daily  - Continue current medications; patient medically stable.   -Active issues- INR in target range. BUN improving, Cr stable.   - Patient is stable to continue current rehabilitation program.

## 2019-08-18 NOTE — PROGRESS NOTE ADULT - ASSESSMENT
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 Daily PT/INR.  Amiodarone. Metoprolol tartrate.     HTN/tacycadic intermittently -  Lopressor to 50 q 8 with parameters.    BPH - flomax    prediabetic- diet controlled,  ( patient says he has never a diabetic, his sugars are same for many yrs does not take any meds for dm) diet controlled, d/c iss, accuchecks     COPD - continue Duoneb prn    Incidental thyroid nodule noted on Carotic US: follow up with PCP for further eval.    GERD - Protonix( omeprazole non formulary, patient can bring own supply)    depression - sertraline    Insomnia - melatonin      will follow

## 2019-08-19 LAB
HCT VFR BLD CALC: 30 % — LOW (ref 39–50)
HGB BLD-MCNC: 9.3 G/DL — LOW (ref 13–17)
MCHC RBC-ENTMCNC: 27.3 PG — SIGNIFICANT CHANGE UP (ref 27–34)
MCHC RBC-ENTMCNC: 31 GM/DL — LOW (ref 32–36)
MCV RBC AUTO: 88 FL — SIGNIFICANT CHANGE UP (ref 80–100)
NRBC # BLD: 0 /100 WBCS — SIGNIFICANT CHANGE UP (ref 0–0)
PLATELET # BLD AUTO: 357 K/UL — SIGNIFICANT CHANGE UP (ref 150–400)
RBC # BLD: 3.41 M/UL — LOW (ref 4.2–5.8)
RBC # FLD: 14.5 % — SIGNIFICANT CHANGE UP (ref 10.3–14.5)
WBC # BLD: 10.66 K/UL — HIGH (ref 3.8–10.5)
WBC # FLD AUTO: 10.66 K/UL — HIGH (ref 3.8–10.5)

## 2019-08-19 PROCEDURE — 93010 ELECTROCARDIOGRAM REPORT: CPT

## 2019-08-19 PROCEDURE — 99232 SBSQ HOSP IP/OBS MODERATE 35: CPT

## 2019-08-19 PROCEDURE — 99232 SBSQ HOSP IP/OBS MODERATE 35: CPT | Mod: GC

## 2019-08-19 RX ORDER — WARFARIN SODIUM 2.5 MG/1
3 TABLET ORAL DAILY
Refills: 0 | Status: COMPLETED | OUTPATIENT
Start: 2019-08-19 | End: 2019-08-21

## 2019-08-19 RX ADMIN — Medication 100 MILLIGRAM(S): at 06:10

## 2019-08-19 RX ADMIN — SENNA PLUS 2 TABLET(S): 8.6 TABLET ORAL at 21:13

## 2019-08-19 RX ADMIN — AMIODARONE HYDROCHLORIDE 200 MILLIGRAM(S): 400 TABLET ORAL at 06:10

## 2019-08-19 RX ADMIN — TAMSULOSIN HYDROCHLORIDE 0.4 MILLIGRAM(S): 0.4 CAPSULE ORAL at 21:13

## 2019-08-19 RX ADMIN — Medication 81 MILLIGRAM(S): at 12:12

## 2019-08-19 RX ADMIN — Medication 50 MILLIGRAM(S): at 21:14

## 2019-08-19 RX ADMIN — Medication 50 MILLIGRAM(S): at 06:10

## 2019-08-19 RX ADMIN — Medication 100 MILLIGRAM(S): at 21:14

## 2019-08-19 RX ADMIN — Medication 1 APPLICATION(S): at 17:20

## 2019-08-19 RX ADMIN — Medication 1 APPLICATION(S): at 06:09

## 2019-08-19 RX ADMIN — Medication 6 MILLIGRAM(S): at 21:13

## 2019-08-19 RX ADMIN — SERTRALINE 50 MILLIGRAM(S): 25 TABLET, FILM COATED ORAL at 12:12

## 2019-08-19 RX ADMIN — PANTOPRAZOLE SODIUM 40 MILLIGRAM(S): 20 TABLET, DELAYED RELEASE ORAL at 06:10

## 2019-08-19 RX ADMIN — Medication 50 MILLIGRAM(S): at 14:33

## 2019-08-19 RX ADMIN — WARFARIN SODIUM 3 MILLIGRAM(S): 2.5 TABLET ORAL at 21:14

## 2019-08-19 RX ADMIN — ATORVASTATIN CALCIUM 40 MILLIGRAM(S): 80 TABLET, FILM COATED ORAL at 21:14

## 2019-08-19 NOTE — PROGRESS NOTE ADULT - SUBJECTIVE AND OBJECTIVE BOX
SUBJECTIVE:    No acute events overnight/ weekend. Patient has no acute complaints.  Still has dyspnea on exertion that is stable. Voiding independently and having regular BMs. Cough improved.     ROS: + CASTILLO. +mild pain at incision site  Denies lightheadedness, headache, fevers/ chills, dysuria, constipation.     -------------------------------------   OBJECTIVE:  Vital Signs Last 24 Hrs  T(C): 37 (19 Aug 2019 08:02), Max: 37.1 (18 Aug 2019 21:05)  T(F): 98.6 (19 Aug 2019 08:02), Max: 98.8 (18 Aug 2019 21:05)  HR: 69 (19 Aug 2019 08:02) (66 - 71)  BP: 122/78 (19 Aug 2019 08:02) (112/68 - 131/75)  BP(mean): --  RR: 14 (19 Aug 2019 08:02) (14 - 14)  SpO2: 94% (19 Aug 2019 08:02) (94% - 98%)    PHYSICAL EXAM  General: NAD, comfortable. Sitting up in bed.   Cardio: RRR, S1/S2+  Resp: R side crackles at base otherwise CTAB. Conversational dyspnea after minimal exertion  Abdomen: soft, NTND, obese  Neuro:      Cognitive: AAO x3, slow processing at times. wax/waning memory      Communication: fluent, no dysarthria or aphasia     Strength: focused: Overall 5/5 bilaterally. Residual trace weakness RUE.  Extrem: no edema, no calf tenderness   Skin: Sternal incision well healed open to air. Retained suture upper abdomen from previous drain. C/D/I no erythema or drainage. Fungal maculopapular rash and satellite lesions b/l posterior thighs and periumbilical.     Functional Exam:  Barriers: endurance, cognitive  Transfers: min assist  Gait: 30' RW with CG    -------------------------------------     LABS:  PT/INR - ( 18 Aug 2019 05:15 )   PT: 28.6 sec;   INR: 2.48 ratio    PT/INR - ( 16 Aug 2019 08:15 )   PT: 25.0 sec;   INR: 2.18 ratio                 9.7    10.64 )-----------( 299      ( 15 Aug 2019 06:20 )             31.3     08-15    140  |  108  |  26<H>  ----------------------------<  115<H>  3.8   |  24  |  1.49<H>    Ca    8.2<L>      15 Aug 2019 06:20      -------------------------------------   MEDICATIONS  (STANDING):  amiodarone    Tablet 200 milliGRAM(s) Oral daily  aspirin  chewable 81 milliGRAM(s) Oral daily  atorvastatin 40 milliGRAM(s) Oral at bedtime  dextrose 5%. 1000 milliLiter(s) (50 mL/Hr) IV Continuous <Continuous>  dextrose 50% Injectable 12.5 Gram(s) IV Push once  dextrose 50% Injectable 25 Gram(s) IV Push once  dextrose 50% Injectable 25 Gram(s) IV Push once  docusate sodium 100 milliGRAM(s) Oral three times a day  insulin lispro (HumaLOG) corrective regimen sliding scale   SubCutaneous three times a day before meals  melatonin 6 milliGRAM(s) Oral at bedtime  metoprolol tartrate 50 milliGRAM(s) Oral every 8 hours  nystatin/triamcinolone Cream 1 Application(s) Topical two times a day  pantoprazole    Tablet 40 milliGRAM(s) Oral before breakfast  psyllium Powder 1 Packet(s) Oral daily  senna 2 Tablet(s) Oral at bedtime  sertraline 50 milliGRAM(s) Oral daily  tamsulosin 0.4 milliGRAM(s) Oral at bedtime  tiotropium 18 MICROgram(s) Capsule 1 Capsule(s) Inhalation daily  warfarin 3 milliGRAM(s) Oral daily    MEDICATIONS  (PRN):  acetaminophen   Tablet .. 650 milliGRAM(s) Oral every 6 hours PRN Moderate Pain (4 - 6)  ALBUTerol/ipratropium for Nebulization 3 milliLiter(s) Nebulizer every 6 hours PRN Shortness of Breath and/or Wheezing  dextrose 40% Gel 15 Gram(s) Oral once PRN Blood Glucose LESS THAN 70 milliGRAM(s)/deciliter  glucagon  Injectable 1 milliGRAM(s) IntraMuscular once PRN Glucose LESS THAN 70 milligrams/deciliter

## 2019-08-19 NOTE — PROGRESS NOTE ADULT - ATTENDING COMMENTS
Chart reviewed. Patient seen at bedside. Feels well and denies and new issues. Denies CP. Dyspnea improving slowly. Improved tolerance with walking during PT. However pulse ox low 90s during rest and during ambulation. Weekly CXR ordered today.   Repeat EKG to fu on QTc    2. Labs yesterday stable and INR therapeutic    3. Continue full rehab program. Awaiting cognitive evaluation by Neuropsychology.

## 2019-08-19 NOTE — PROGRESS NOTE ADULT - SUBJECTIVE AND OBJECTIVE BOX
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 (19 Aug 2019 11:53)      Interval HPI/ Overnight events: No events overnight    Patient seen and examined at bedside. Pt has no complaints at this time    REVIEW OF SYSTEMS:    CONSTITUTIONAL: No weakness, fevers or chills  EYES/ENT: No visual changes;  No vertigo or throat pain   NECK: No pain or stiffness  RESPIRATORY: No cough, wheezing, hemoptysis; No shortness of breath  CARDIOVASCULAR: No chest pain or palpitations  GASTROINTESTINAL: No abdominal or epigastric pain. No nausea, vomiting, or hematemesis; No diarrhea or constipation. No melena or hematochezia.  GENITOURINARY: No dysuria, frequency or hematuria  NEUROLOGICAL: No numbness or weakness  SKIN: No itching, burning, rashes, or lesions   MSK: no joint pain, no joint swelling  All other review of systems is negative unless indicated above.      ALLERGIES:  No Known Allergies  OHS (Unknown)    MEDICATIONS  (STANDING):  amiodarone    Tablet 200 milliGRAM(s) Oral daily  aspirin  chewable 81 milliGRAM(s) Oral daily  atorvastatin 40 milliGRAM(s) Oral at bedtime  dextrose 5%. 1000 milliLiter(s) (50 mL/Hr) IV Continuous <Continuous>  dextrose 50% Injectable 12.5 Gram(s) IV Push once  dextrose 50% Injectable 25 Gram(s) IV Push once  dextrose 50% Injectable 25 Gram(s) IV Push once  docusate sodium 100 milliGRAM(s) Oral three times a day  melatonin 6 milliGRAM(s) Oral at bedtime  metoprolol tartrate 50 milliGRAM(s) Oral every 8 hours  nystatin/triamcinolone Cream 1 Application(s) Topical two times a day  pantoprazole    Tablet 40 milliGRAM(s) Oral before breakfast  psyllium Powder 1 Packet(s) Oral daily  senna 2 Tablet(s) Oral at bedtime  sertraline 50 milliGRAM(s) Oral daily  tamsulosin 0.4 milliGRAM(s) Oral at bedtime  tiotropium 18 MICROgram(s) Capsule 1 Capsule(s) Inhalation daily  warfarin 3 milliGRAM(s) Oral daily    MEDICATIONS  (PRN):  acetaminophen   Tablet .. 650 milliGRAM(s) Oral every 6 hours PRN Moderate Pain (4 - 6)  ALBUTerol/ipratropium for Nebulization 3 milliLiter(s) Nebulizer every 6 hours PRN Shortness of Breath and/or Wheezing  dextrose 40% Gel 15 Gram(s) Oral once PRN Blood Glucose LESS THAN 70 milliGRAM(s)/deciliter  glucagon  Injectable 1 milliGRAM(s) IntraMuscular once PRN Glucose LESS THAN 70 milligrams/deciliter    Vital Signs Last 24 Hrs  T(F): 98.6 (19 Aug 2019 08:02), Max: 98.8 (18 Aug 2019 21:05)  HR: 69 (19 Aug 2019 08:02) (66 - 70)  BP: 122/78 (19 Aug 2019 08:02) (121/72 - 131/75)  RR: 14 (19 Aug 2019 08:02) (14 - 14)  SpO2: 94% (19 Aug 2019 08:02) (94% - 95%)  I&O's Summary      PHYSICAL EXAM:  General: NAD, well dressed, well nourished  Head: NT/AC  ENT: MMM, no oropharyngeal erythema or exudates  Neck: Supple, No JVD  Lungs: Clear to auscultation bilaterally, no wheezing, rales, or rhonchi, no accessory muscle use  Cardio: RRR, normal S1/S2, No M/R/G  Abdomen: Soft, Nontender, Nondistended; Bowel sounds present, no organomegaly  Extremities: No calf tenderness, no clubbing or  cyanosis  Vascular: no LE edema  Lymph: no cervical LAD  Skin: warm and dry  Neuro: AAOx3, answers all questions appropriately, moves all extremities    LABS:                        9.3    10.66 )-----------( 357      ( 19 Aug 2019 05:45 )             30.0     08-18    141  |  108  |  22  ----------------------------<  101  4.0   |  25  |  1.53    Ca    8.8      18 Aug 2019 05:10      eGFR if Non African American: 41 mL/min/1.73M2 (08-18-19 @ 05:10)  eGFR if African American: 48 mL/min/1.73M2 (08-18-19 @ 05:10)    PT/INR - ( 18 Aug 2019 05:15 )   PT: 28.6 sec;   INR: 2.48 ratio                                 08-06 [id number] 6.0          RADIOLOGY & ADDITIONAL TESTS:    Care Discussed with Consultants/Other Providers:

## 2019-08-19 NOTE — CHART NOTE - NSCHARTNOTEFT_GEN_A_CORE
Nutrition Follow Up Note  Hospital Course   (Per Electronic Medical Record):     Source:   Medical Record [X]      Diet:   Consistent Carbohydrate DASH-TLC Diet w/ Thin Liquids  Tolerates Diet Well  Consumes 75% of Meals (as Per Documentation)     Enteral/Parenteral Nutrition: N/A    Current Weight: 293lb on 8/17  Weight Fluctuates  Obtain New Weight to Confirm Change  Obtain Weights Weekly     Pertinent Medications: MEDICATIONS  (STANDING):  amiodarone    Tablet 200 milliGRAM(s) Oral daily  aspirin  chewable 81 milliGRAM(s) Oral daily  atorvastatin 40 milliGRAM(s) Oral at bedtime  dextrose 5%. 1000 milliLiter(s) (50 mL/Hr) IV Continuous <Continuous>  dextrose 50% Injectable 12.5 Gram(s) IV Push once  dextrose 50% Injectable 25 Gram(s) IV Push once  dextrose 50% Injectable 25 Gram(s) IV Push once  docusate sodium 100 milliGRAM(s) Oral three times a day  melatonin 6 milliGRAM(s) Oral at bedtime  metoprolol tartrate 50 milliGRAM(s) Oral every 8 hours  nystatin/triamcinolone Cream 1 Application(s) Topical two times a day  pantoprazole    Tablet 40 milliGRAM(s) Oral before breakfast  psyllium Powder 1 Packet(s) Oral daily  senna 2 Tablet(s) Oral at bedtime  sertraline 50 milliGRAM(s) Oral daily  tamsulosin 0.4 milliGRAM(s) Oral at bedtime  tiotropium 18 MICROgram(s) Capsule 1 Capsule(s) Inhalation daily  warfarin 3 milliGRAM(s) Oral daily    MEDICATIONS  (PRN):  acetaminophen   Tablet .. 650 milliGRAM(s) Oral every 6 hours PRN Moderate Pain (4 - 6)  ALBUTerol/ipratropium for Nebulization 3 milliLiter(s) Nebulizer every 6 hours PRN Shortness of Breath and/or Wheezing  dextrose 40% Gel 15 Gram(s) Oral once PRN Blood Glucose LESS THAN 70 milliGRAM(s)/deciliter  glucagon  Injectable 1 milliGRAM(s) IntraMuscular once PRN Glucose LESS THAN 70 milligrams/deciliter      Pertinent Labs:  08-18 Na141 mmol/L Glu 101 mg/dL<H> K+ 4.0 mmol/L Cr  1.53 mg/dL<H> BUN 22 mg/dL 08-14 Phos 3.7 mg/dL 08-14 Alb 2.4 g/dL<L> 08-06 PAB 20 mg/dL 08-06 [id number] 6.0 %<H>    Skin: No Pressure Ulcers   Surgical Incision(as Per Nursing Flow Sheet)     Edema: None Noted     Last Bowel Movement: on 8/18    Estimated Needs:   [X] No Change Since Previous Assessment    Previous Nutrition Diagnosis:   Obese    Nutrition Diagnosis is [X] Ongoing     New Nutrition Diagnosis: [X] Not Applicable    Interventions:   1. Recommend Continue Nutrition Plan of Care     Monitoring & Evaluation:   [X] Weights   [X] PO Intake   [X] Follow Up (Per Protocol)  [X] Tolerance to Diet Prescription   [X] Other: Labs & POCT    Registered Dietitian/Nutritionist Remains Available.  Geovany Becker RDN    Pager # [phone]  Phone# [phone]

## 2019-08-19 NOTE — PROGRESS NOTE ADULT - ASSESSMENT
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 Daily PT/INR.  Amiodarone. Metoprolol tartrate.     HTN/tachycardic intermittently -  Lopressor to 50 q 8 with parameters.    BPH - flomax    prediabetic- diet controlled, HbA1C 6.0     COPD - continue Duoneb prn    Incidental thyroid nodule noted on Carotic US: follow up with PCP for further eval.    GERD - Protonix( omeprazole non formulary, patient can bring own supply)    depression - sertraline    Insomnia - melatonin

## 2019-08-19 NOTE — PROGRESS NOTE ADULT - ASSESSMENT
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    *Gait/ADL/functional deficits secondary to Cardiac surgery: CAD s/p CABG/aortic fibroelastoma removal  - Continue comprehensive Rehab Program of PT/OT   - Sternal precautions  - incision well healed. Ok to shower. Suture in place from previous drain site-will remove next week  - Weekly CXR next on 8/20  - ASA, statin  - follow up outpatient Dr. Alvarado   - daily standing weights  - 137.4kg on admission bed weights.     131.9kg standing 8/15     132.2kg standing 8/16    131.5kg standing 8/19    *Pre-renal MILAGRO:  BUN/Cr stable but elevated  - Not currently on diuretics, no effusions noted on last CXR from the 13th  - Hospitalist agrees to continue to encourage po intake of fluids  - BMP on 8/22    *Previous CVA w/ R Weakness: Consider speech eval for cognitive eval prior to d/c. Dr. Hernandez neuropsych consulted.    #Buttock/thigh rash: Possibly fungal/inflammatory etiology - mycolog cream BID      *PAF - coumadin goal 2-2.5. Daily PT/INR and dose coumadin  - Coumadin ordered for 3 doses until 8/22  - Monitor INR to dose adjust as needed on 8/22  - Continue Amiodarone and Metoprolol tartrate.     *HTN - Lopressor. SBPs ranging 110s-130    *BPH - flomax    *COPD   - Patient reports that he does not have a hx of COPD, will encourage patient to f/u with PCP upon discharge and have PFTs done if recommended by PCP   - continue Duoneb as needed   - takes Breo at home; however non-formulary. Will ask patient to provide.    *DM2 - ISS, accuchecks ranging 101-123, continue to monitor   HA1C on 8/6 was 6.0%    *Incidental thyroid nodule noted on Carotic US: follow up with PCP for further eval.  - (Last TSH on 8/6 was 0.88, continue to monitor for symptoms of hypothyroidism 2/2 amiodarone use vs nodules)   - ?contributing to possible cognitive deficits    *h/o FVL with PEs: IVC filter. Coumadin as above.     *GERD - Protonix    *Mood: depression - sertraline    *Prolonged QTc   - still prolonged in the 490s, discussed with pharmacist since patient on zoloft-continue to moitor, QTc ranges 470-low 500s on previous EKGs  - Zoloft is home medication  - repeat EKG to monitor Qtc    *Insomnia - continue melatonin, slept well overnight     *Mild leukocytosis -improved. Labs on 8/22    *Morbid obesity   -Nutritional counseling   -RD to eval and educate     *Pain management:  Tylenol PRN      Bowel Management:    continent. Regular BMs     Senna and Docusate, Metamucil    Bladder Management: BPH - continue Tamsulosin. continent    Precautions / PROPHYLAXIS:   - Falls, Cardiac,  Seizure    - Pressure injury/Skin: Turn Q2hrs while in bed, OOB to Chair, PT/OT    - DVT: on coumadin SQ , TEDs, No SCDs due to IVC filter    DSPO: anticipate d/c on 8/28. Pt requesting to go back to [facility]. Supportive family however far away.  SW following looking into assisted living. Therapy concerned for pt's cognitive status and will likely need supervision.

## 2019-08-20 PROCEDURE — 99232 SBSQ HOSP IP/OBS MODERATE 35: CPT

## 2019-08-20 PROCEDURE — 99232 SBSQ HOSP IP/OBS MODERATE 35: CPT | Mod: GC

## 2019-08-20 RX ADMIN — PANTOPRAZOLE SODIUM 40 MILLIGRAM(S): 20 TABLET, DELAYED RELEASE ORAL at 05:16

## 2019-08-20 RX ADMIN — Medication 6 MILLIGRAM(S): at 21:45

## 2019-08-20 RX ADMIN — AMIODARONE HYDROCHLORIDE 200 MILLIGRAM(S): 400 TABLET ORAL at 05:16

## 2019-08-20 RX ADMIN — Medication 1 APPLICATION(S): at 17:52

## 2019-08-20 RX ADMIN — Medication 50 MILLIGRAM(S): at 21:46

## 2019-08-20 RX ADMIN — SERTRALINE 50 MILLIGRAM(S): 25 TABLET, FILM COATED ORAL at 12:19

## 2019-08-20 RX ADMIN — Medication 81 MILLIGRAM(S): at 12:19

## 2019-08-20 RX ADMIN — Medication 1 APPLICATION(S): at 05:16

## 2019-08-20 RX ADMIN — Medication 650 MILLIGRAM(S): at 17:52

## 2019-08-20 RX ADMIN — Medication 50 MILLIGRAM(S): at 15:29

## 2019-08-20 RX ADMIN — Medication 50 MILLIGRAM(S): at 05:16

## 2019-08-20 RX ADMIN — TAMSULOSIN HYDROCHLORIDE 0.4 MILLIGRAM(S): 0.4 CAPSULE ORAL at 21:45

## 2019-08-20 RX ADMIN — Medication 3 MILLILITER(S): at 06:44

## 2019-08-20 RX ADMIN — SENNA PLUS 2 TABLET(S): 8.6 TABLET ORAL at 21:45

## 2019-08-20 RX ADMIN — WARFARIN SODIUM 3 MILLIGRAM(S): 2.5 TABLET ORAL at 21:46

## 2019-08-20 RX ADMIN — Medication 650 MILLIGRAM(S): at 15:31

## 2019-08-20 RX ADMIN — Medication 100 MILLIGRAM(S): at 21:45

## 2019-08-20 RX ADMIN — Medication 100 MILLIGRAM(S): at 05:16

## 2019-08-20 RX ADMIN — ATORVASTATIN CALCIUM 40 MILLIGRAM(S): 80 TABLET, FILM COATED ORAL at 21:45

## 2019-08-20 NOTE — PROGRESS NOTE ADULT - ATTENDING COMMENTS
Chart reviewed. Patient seen at bedside.   Reports that he feels well and denies any CP/  mild dyspnea  Progressing well in therapy. d/c planning home 8/28

## 2019-08-20 NOTE — PROGRESS NOTE ADULT - SUBJECTIVE AND OBJECTIVE BOX
SUBJECTIVE:  No acute events overnight. No acute issues.  Pt states his dyspnea is still there, however therapy increased his distance. Endurance overall is improving.  Felt "wheezy" this AM received a breathing treatment.     ROS: + CASTILLO. +mild pain at incision site  Denies lightheadedness, headache, fevers/ chills, dysuria, constipation.     -------------------------------------   OBJECTIVE:  Vital Signs Last 24 Hrs  T(C): 36.7 (20 Aug 2019 08:29), Max: 36.7 (19 Aug 2019 20:41)  T(F): 98 (20 Aug 2019 08:29), Max: 98 (19 Aug 2019 20:41)  HR: 72 (20 Aug 2019 08:29) (65 - 72)  BP: 103/64 (20 Aug 2019 08:29) (103/64 - 130/70)  BP(mean): --  RR: 14 (20 Aug 2019 08:29) (14 - 16)  SpO2: 72% (20 Aug 2019 08:29) (72% - 95%)    PHYSICAL EXAM  General: NAD, comfortable. Sitting up in bed.   Cardio: RRR, S1/S2+  Resp: R side crackles at base otherwise CTAB. Conversational dyspnea after minimal exertion  Abdomen: soft, NTND, obese  Neuro:      Cognitive: AAO x3, slow processing at times. wax/waning memory      Communication: fluent, no dysarthria or aphasia     Strength: focused: Overall 5/5 bilaterally. Residual trace weakness RUE.  Extrem: no edema, no calf tenderness   Skin: Sternal incision well healed open to air. Retained suture upper abdomen from previous drain. C/D/I no erythema or drainage. Fungal maculopapular rash and satellite lesions b/l posterior thighs and periumbilical.     Functional Exam:  Barriers: endurance, cognitive  Transfers: min assist  Gait: 30' RW with CG. Endurance improving given performing longer distance.     -------------------------------------     LABS:  PT/INR - ( 18 Aug 2019 05:15 )   PT: 28.6 sec;   INR: 2.48 ratio    PT/INR - ( 16 Aug 2019 08:15 )   PT: 25.0 sec;   INR: 2.18 ratio        -------------------------------------   MEDICATIONS  (STANDING):  amiodarone    Tablet 200 milliGRAM(s) Oral daily  aspirin  chewable 81 milliGRAM(s) Oral daily  atorvastatin 40 milliGRAM(s) Oral at bedtime  dextrose 5%. 1000 milliLiter(s) (50 mL/Hr) IV Continuous <Continuous>  dextrose 50% Injectable 12.5 Gram(s) IV Push once  dextrose 50% Injectable 25 Gram(s) IV Push once  dextrose 50% Injectable 25 Gram(s) IV Push once  docusate sodium 100 milliGRAM(s) Oral three times a day  insulin lispro (HumaLOG) corrective regimen sliding scale   SubCutaneous three times a day before meals  melatonin 6 milliGRAM(s) Oral at bedtime  metoprolol tartrate 50 milliGRAM(s) Oral every 8 hours  nystatin/triamcinolone Cream 1 Application(s) Topical two times a day  pantoprazole    Tablet 40 milliGRAM(s) Oral before breakfast  psyllium Powder 1 Packet(s) Oral daily  senna 2 Tablet(s) Oral at bedtime  sertraline 50 milliGRAM(s) Oral daily  tamsulosin 0.4 milliGRAM(s) Oral at bedtime  tiotropium 18 MICROgram(s) Capsule 1 Capsule(s) Inhalation daily  warfarin 3 milliGRAM(s) Oral daily    MEDICATIONS  (PRN):  acetaminophen   Tablet .. 650 milliGRAM(s) Oral every 6 hours PRN Moderate Pain (4 - 6)  ALBUTerol/ipratropium for Nebulization 3 milliLiter(s) Nebulizer every 6 hours PRN Shortness of Breath and/or Wheezing  dextrose 40% Gel 15 Gram(s) Oral once PRN Blood Glucose LESS THAN 70 milliGRAM(s)/deciliter  glucagon  Injectable 1 milliGRAM(s) IntraMuscular once PRN Glucose LESS THAN 70 milligrams/deciliter

## 2019-08-20 NOTE — PROGRESS NOTE ADULT - ASSESSMENT
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 Daily PT/INR.  Amiodarone. Metoprolol tartrate.     HTN/tachycardic intermittently -  Lopressor to 50 q 8 with parameters.    BPH - flomax    prediabetic- diet controlled, HbA1C 6.0     COPD - continue Duoneb prn    Incidental thyroid nodule noted on Carotic US: follow up with PCP for further eval.    GERD - Protonix( omeprazole non formulary, patient can bring own supply)    depression - sertraline    Insomnia - melatonin

## 2019-08-20 NOTE — PROGRESS NOTE ADULT - ASSESSMENT
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    *Gait/ADL/functional deficits secondary to Cardiac surgery: CAD s/p CABG/aortic fibroelastoma removal  - Continue comprehensive Rehab Program of PT/OT   - Sternal precautions  - incision well healed. Ok to shower. Suture in place from previous drain site-will remove next week  - Weekly CXR next on 8/20 - pending  - ASA, statin  - follow up outpatient Dr. Alvarado   - daily standing weights  - 137.4kg on admission bed weights.     131.9kg standing 8/15     132.2kg standing 8/16    131.5kg standing 8/19    *Pre-renal MILAGRO:  BUN/Cr stable but elevated  - Not currently on diuretics, no effusions noted on last CXR from the 13th  - Hospitalist agrees to continue to encourage po intake of fluids  - BMP on 8/22    *Previous CVA w/ R Weakness: Consider speech eval for cognitive eval prior to d/c. Dr. Hernandez neuropsych consulted.    #Buttock/thigh rash: Possibly fungal/inflammatory etiology - mycolog cream BID      *PAF - coumadin goal 2-2.5. Daily PT/INR and dose coumadin  - Coumadin ordered for 3 doses until 8/22  - Monitor INR to dose adjust as needed on 8/22  - Continue Amiodarone and Metoprolol tartrate.     *HTN - Lopressor. SBPs ranging 110s-130    *BPH - flomax    *COPD   - Patient reports that he does not have a hx of COPD  - f/u with PCP upon discharge and have PFTs done if recommended by PCP   - continue Duoneb as needed   - takes Breo at home; however non-formulary. Will ask patient to provide.    *DM2 - ISS, accuchecks ranging 101-123, continue to monitor   HA1C on 8/6 was 6.0%    *Incidental thyroid nodule noted on Carotic US: follow up with PCP for further eval.  - (Last TSH on 8/6 was 0.88, continue to monitor for symptoms of hypothyroidism 2/2 amiodarone use vs nodules)   - ?contributing to possible cognitive deficits    *h/o FVL with PEs: IVC filter. Coumadin as above.     *GERD - Protonix    *Mood: depression - sertraline    *Prolonged QTc   - still prolonged in the 490s, discussed with pharmacist since patient on zoloft  - QTc ranges 470-low 500s on previous EKGs  - Zoloft is home medication  - repeat EKG to monitor Qtc -- 463    *Insomnia - continue melatonin, slept well overnight     *Mild leukocytosis -improved. Labs on 8/22    *Morbid obesity   - Nutritional counseling   - RD to eval and educate     *Pain management:  Tylenol PRN    Bowel Management:    continent. Regular BMs     Senna and Docusate, Metamucil    Bladder Management: BPH - continue Tamsulosin. continent    Precautions / PROPHYLAXIS:   - Falls, Cardiac,  Seizure    - Pressure injury/Skin: Turn Q2hrs while in bed, OOB to Chair, PT/OT    - DVT: on coumadin SQ , TEDs, No SCDs due to IVC filter    DSPO: anticipate d/c on 8/28. Pt requesting to go back to [facility]. Supportive family however far away.  SW following looking into assisted living. Therapy concerned for pt's cognitive status and will likely need supervision.

## 2019-08-20 NOTE — PROGRESS NOTE ADULT - SUBJECTIVE AND OBJECTIVE BOX
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 (20 Aug 2019 11:31)      Interval HPI/ Overnight events: No overnight events    Patient seen and examined at bedside. He feels well, no complaints.    REVIEW OF SYSTEMS:    CONSTITUTIONAL: No weakness, fevers or chills  EYES/ENT: No visual changes;  No vertigo or throat pain   NECK: No pain or stiffness  RESPIRATORY: No cough, wheezing, hemoptysis; No shortness of breath  CARDIOVASCULAR: No chest pain or palpitations  GASTROINTESTINAL: No abdominal or epigastric pain. No nausea, vomiting, or hematemesis; No diarrhea or constipation. No melena or hematochezia.  GENITOURINARY: No dysuria, frequency or hematuria  NEUROLOGICAL: No numbness or weakness  SKIN: No itching, burning, rashes, or lesions   MSK: no joint pain, no joint swelling  All other review of systems is negative unless indicated above.      ALLERGIES:  No Known Allergies  OHS (Unknown)    MEDICATIONS  (STANDING):  amiodarone    Tablet 200 milliGRAM(s) Oral daily  aspirin  chewable 81 milliGRAM(s) Oral daily  atorvastatin 40 milliGRAM(s) Oral at bedtime  dextrose 5%. 1000 milliLiter(s) (50 mL/Hr) IV Continuous <Continuous>  dextrose 50% Injectable 12.5 Gram(s) IV Push once  dextrose 50% Injectable 25 Gram(s) IV Push once  dextrose 50% Injectable 25 Gram(s) IV Push once  docusate sodium 100 milliGRAM(s) Oral three times a day  melatonin 6 milliGRAM(s) Oral at bedtime  metoprolol tartrate 50 milliGRAM(s) Oral every 8 hours  nystatin/triamcinolone Cream 1 Application(s) Topical two times a day  pantoprazole    Tablet 40 milliGRAM(s) Oral before breakfast  psyllium Powder 1 Packet(s) Oral daily  senna 2 Tablet(s) Oral at bedtime  sertraline 50 milliGRAM(s) Oral daily  tamsulosin 0.4 milliGRAM(s) Oral at bedtime  tiotropium 18 MICROgram(s) Capsule 1 Capsule(s) Inhalation daily  warfarin 3 milliGRAM(s) Oral daily    MEDICATIONS  (PRN):  acetaminophen   Tablet .. 650 milliGRAM(s) Oral every 6 hours PRN Moderate Pain (4 - 6)  ALBUTerol/ipratropium for Nebulization 3 milliLiter(s) Nebulizer every 6 hours PRN Shortness of Breath and/or Wheezing  dextrose 40% Gel 15 Gram(s) Oral once PRN Blood Glucose LESS THAN 70 milliGRAM(s)/deciliter  glucagon  Injectable 1 milliGRAM(s) IntraMuscular once PRN Glucose LESS THAN 70 milligrams/deciliter    Vital Signs Last 24 Hrs  T(F): 98 (20 Aug 2019 08:29), Max: 98 (19 Aug 2019 20:41)  HR: 72 (20 Aug 2019 08:29) (65 - 72)  BP: 103/64 (20 Aug 2019 08:29) (103/64 - 130/70)  RR: 14 (20 Aug 2019 08:29) (14 - 16)  SpO2: 72% (20 Aug 2019 08:29) (72% - 95%)  I&O's Summary      PHYSICAL EXAM:  General: NAD, well dressed, well nourished  Head: NT/AC  ENT: MMM, no oropharyngeal erythema or exudates  Neck: Supple, No JVD  Lungs: Clear to auscultation bilaterally, no wheezing, rales, or rhonchi, no accessory muscle use  Cardio: Sternotomy incision site healing well, no erythema or purulence. RRR, normal S1/S2, No M/R/G  Abdomen: Soft, Nontender, Nondistended; Bowel sounds present, no organomegaly  Extremities: No calf tenderness, no clubbing or  cyanosis  Vascular: no LE edema  Lymph: no cervical LAD  Skin: warm and dry  Neuro: AAOx3, answers all questions appropriately, moves all extremities      LABS:                        9.3    10.66 )-----------( 357      ( 19 Aug 2019 05:45 )             30.0     08-18    141  |  108  |  22  ----------------------------<  101  4.0   |  25  |  1.53    Ca    8.8      18 Aug 2019 05:10      eGFR if Non African American: 41 mL/min/1.73M2 (08-18-19 @ 05:10)  eGFR if African American: 48 mL/min/1.73M2 (08-18-19 @ 05:10)    PT/INR - ( 18 Aug 2019 05:15 )   PT: 28.6 sec;   INR: 2.48 ratio                                 08-06 [id number] 6.0          RADIOLOGY & ADDITIONAL TESTS:    Care Discussed with Consultants/Other Providers:

## 2019-08-21 ENCOUNTER — TRANSCRIPTION ENCOUNTER (OUTPATIENT)
Age: 84
End: 2019-08-21

## 2019-08-21 PROCEDURE — 96116 NUBHVL XM PHYS/QHP 1ST HR: CPT

## 2019-08-21 PROCEDURE — 71046 X-RAY EXAM CHEST 2 VIEWS: CPT | Mod: 26

## 2019-08-21 PROCEDURE — 99232 SBSQ HOSP IP/OBS MODERATE 35: CPT

## 2019-08-21 RX ADMIN — TAMSULOSIN HYDROCHLORIDE 0.4 MILLIGRAM(S): 0.4 CAPSULE ORAL at 21:13

## 2019-08-21 RX ADMIN — Medication 100 MILLIGRAM(S): at 06:21

## 2019-08-21 RX ADMIN — Medication 3 MILLILITER(S): at 14:14

## 2019-08-21 RX ADMIN — Medication 100 MILLIGRAM(S): at 14:00

## 2019-08-21 RX ADMIN — Medication 50 MILLIGRAM(S): at 06:21

## 2019-08-21 RX ADMIN — SERTRALINE 50 MILLIGRAM(S): 25 TABLET, FILM COATED ORAL at 11:01

## 2019-08-21 RX ADMIN — Medication 1 APPLICATION(S): at 06:22

## 2019-08-21 RX ADMIN — PANTOPRAZOLE SODIUM 40 MILLIGRAM(S): 20 TABLET, DELAYED RELEASE ORAL at 06:21

## 2019-08-21 RX ADMIN — Medication 100 MILLIGRAM(S): at 21:14

## 2019-08-21 RX ADMIN — Medication 50 MILLIGRAM(S): at 21:14

## 2019-08-21 RX ADMIN — WARFARIN SODIUM 3 MILLIGRAM(S): 2.5 TABLET ORAL at 21:13

## 2019-08-21 RX ADMIN — SENNA PLUS 2 TABLET(S): 8.6 TABLET ORAL at 21:14

## 2019-08-21 RX ADMIN — Medication 50 MILLIGRAM(S): at 14:00

## 2019-08-21 RX ADMIN — Medication 6 MILLIGRAM(S): at 21:14

## 2019-08-21 RX ADMIN — Medication 81 MILLIGRAM(S): at 11:01

## 2019-08-21 RX ADMIN — Medication 1 APPLICATION(S): at 17:32

## 2019-08-21 RX ADMIN — AMIODARONE HYDROCHLORIDE 200 MILLIGRAM(S): 400 TABLET ORAL at 06:21

## 2019-08-21 RX ADMIN — ATORVASTATIN CALCIUM 40 MILLIGRAM(S): 80 TABLET, FILM COATED ORAL at 21:14

## 2019-08-21 NOTE — DISCHARGE NOTE PROVIDER - CARE PROVIDERS DIRECT ADDRESSES
,DirectAddress_Unknown,[email or web address],DirectAddress_Unknown ,DirectAddress_Unknown,[email or web address]_Unknown,[email or web address]

## 2019-08-21 NOTE — DISCHARGE NOTE PROVIDER - NSDCFUADDAPPT_GEN_ALL_CORE_FT
If there are any concerns with your cardiac medications or concerns or questions about your cardiac surgery, you may contact the [facility] NP, Zofia Chaves, [phone] for guidance.   If you have any concerns regarding hospitalization course or medications, you may also call Vanessa Hayward -[phone].    Follow up with your PCP within 1 week. If there are any concerns with your cardiac medications or concerns or questions about your cardiac surgery, you may contact the [facility] NP, Zofia Chaves, [phone] for guidance.   If you have any concerns regarding hospitalization course or medications, you may also call Vanessa Hayward -[phone].    Follow up with your PCP within 1 week. You will need to set up lab draws to monitor your INR levels because you are on coumadin.     Keep a log of daily weights that you take upon awakening in the morning. If you gain 2 pounds over night or 5 pounds in 1 week, call your cardiologist or Zofia Chaves NP immediately.

## 2019-08-21 NOTE — DISCHARGE NOTE PROVIDER - PROVIDER TOKENS
PROVIDER:[TOKEN:[[id number]],FOLLOWUP:[1 week]],PROVIDER:[TOKEN:[[id number]],FOLLOWUP:[2 weeks]],FREE:[LAST:[Primary Care],FIRST:[Provider],PHONE:[(   )    -],FAX:[(   )    -],FOLLOWUP:[1 week]] PROVIDER:[TOKEN:[[id number]],FOLLOWUP:[1 week]],PROVIDER:[TOKEN:[[id number]],FOLLOWUP:[2 weeks]],PROVIDER:[TOKEN:[[id number]],FOLLOWUP:[1 week]],PROVIDER:[TOKEN:[[id number]],FOLLOWUP:[2 weeks]]

## 2019-08-21 NOTE — DISCHARGE NOTE PROVIDER - NSDCACTIVITY_GEN_ALL_CORE
Showering allowed/Walking - Outdoors allowed/Do not drive or operate machinery/Walking - Indoors allowed/Do not make important decisions/Stairs allowed/No heavy lifting/straining

## 2019-08-21 NOTE — DISCHARGE NOTE PROVIDER - NSDCCPCAREPLAN_GEN_ALL_CORE_FT
PRINCIPAL DISCHARGE DIAGNOSIS  Diagnosis: S/P CABG (coronary artery bypass graft)  Assessment and Plan of Treatment:       SECONDARY DISCHARGE DIAGNOSES  Diagnosis: Prolonged QT interval  Assessment and Plan of Treatment:     Diagnosis: Thyroid nodule  Assessment and Plan of Treatment:     Diagnosis: Dyspnea  Assessment and Plan of Treatment:     Diagnosis: MILAGRO (acute kidney injury)  Assessment and Plan of Treatment: PRINCIPAL DISCHARGE DIAGNOSIS  Diagnosis: S/P CABG (coronary artery bypass graft)  Assessment and Plan of Treatment: Patient had Open heart surgery - Coronary artery bypass and resection of fibroelastoma. Please follow up with Surgeon Dr. Kermit Alvarado in 1 week. Continue rehab with home care      SECONDARY DISCHARGE DIAGNOSES  Diagnosis: Prolonged QT interval  Assessment and Plan of Treatment:     Diagnosis: Thyroid nodule  Assessment and Plan of Treatment: Follow up with Primary MD    Diagnosis: Dyspnea  Assessment and Plan of Treatment: Follow up with Pulmonary in 1-2 weeks. Continue inhlaers and nebuliser treatments    Diagnosis: MILAGRO (acute kidney injury)  Assessment and Plan of Treatment: Stable. PRINCIPAL DISCHARGE DIAGNOSIS  Diagnosis: S/P CABG (coronary artery bypass graft)  Assessment and Plan of Treatment: Patient had Open heart surgery - Coronary artery bypass and resection of fibroelastoma. Please follow up with Surgeon Dr. Kermit Alvarado in 1 week. Continue rehab with home care.   Continue medications as prescribed.   Continue coumadin 2.5mg every evening. You will need your blood drawn twice a week until INR is stabilized. Goal INR 2-2.5.         SECONDARY DISCHARGE DIAGNOSES  Diagnosis: Prolonged QT interval  Assessment and Plan of Treatment: Follow up with cardiologist as outpatient for mointoring.    Diagnosis: Thyroid nodule  Assessment and Plan of Treatment: Follow up with Primary MD    Diagnosis: Dyspnea  Assessment and Plan of Treatment: Follow up with Pulmonary in 1-2 weeks. Continue inhlaer as ordered and nebuliser treatments as needed.    Diagnosis: MILAGRO (acute kidney injury)  Assessment and Plan of Treatment: Stable. Follow up with primary care MD to have follow up labs drawn as outpatient.

## 2019-08-21 NOTE — DISCHARGE NOTE PROVIDER - HOSPITAL COURSE
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 Ta for surgical cardiac evaluation of LVOT.  He was subsequently admitted to [facility] CT surgery service for CABG x2 and excision of fibroelastoma on 8/7 by Dr. Alvarado. Patient extubated without post-operative complications.  Hospital course significant for pain at surgical site and hypoxia requiring high flow, now weaned off. Chest tube removed on 8/13. Hospital course also notable for pre-renal MILAGRO, evaluated by nephrology with recommendations to follow up given Cr downtrending. Post operatively noted to have episodes of A fib / PACs on telemetry, loaded with amiodarone and now continued on maintenance dose.  He was evaluated by PT/OT noted to be min - mod assist for ADLs. He ambulated with RW with 2 person assist.  He previously uses a rolling walker.        REHAB COURSE:    Pt was stable for rehab admission on 8/13 to [facility] for comprehensive OT, PT, and SLP. Rehab course was unremarkable for acute medical issues.  The following was addressed during his rehab course:        *s/p CABG: incision is well healed. Weights remained stable. Dyspnea on exertion stable with improved endurance during therapy. Surveillance CXR were stable without overt evidence of effusion. Follow up with Dr. Alvarado outpatient         *Pre-renal MILAGRO: BUN/Cr was elevated by remained stable.  Remained euvolemic.         *Previous CVA: neuropsych evaluated the patient to evaluate cognitive deficits vs mild dementia        *Atrial fibrillation: Coumadin remained theraputic with goal 2-2.5.  Remained NSR.         *COPD: pt denies h/o COPD. Will need to follow up with PCP for PFTs if applicable. Had intermittent wheezing noted during rehab w/ relief from neb treatments. Continue home Breo.         *Incidental thyroid nodule: this was noted on carotid US while inpatient. Will need to follow up for further eval.  TSH within normal limits on 8/6.         * Prolonged Qtc noted on EKG ~460s: repeat EKG with improvement with Qtc of 463        Pt was medically cleared on ___  for discharged to home with home care. 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 Ta for surgical cardiac evaluation of LVOT.  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 Alvarado. Patient extubated without post-operative complications.  Hospital course significant for pain at surgical site and hypoxia requiring high flow, now weaned off. Chest tube removed on 8/13. Hospital course also notable for pre-renal MILAGRO, evaluated by nephrology with recommendations to follow up given Cr downtrending. Post operatively noted to have episodes of A fib / PACs on telemetry, loaded with amiodarone and now continued on maintenance dose.  He was evaluated by PT/OT noted to be min - mod assist for ADLs. He ambulated with RW with 2 person assist.  He previously uses a rolling walker.        REHAB COURSE:    Pt was stable for rehab admission on 8/13 to [facility] for comprehensive OT, PT, and SLP. Rehab course was unremarkable for acute medical issues.  The following was addressed during his rehab course:        *s/p CABG: incision is well healed. Weights remained stable. Dyspnea on exertion stable with improved endurance during therapy. Surveillance CXR were stable without overt evidence of effusion. Follow up with Dr. Alvarado outpatient         *Pre-renal MILAGRO: BUN/Cr was elevated by remained stable.  Remained euvolemic.         *Previous CVA: neuropsych evaluated the patient to evaluate cognitive deficits     *Atrial fibrillation: Coumadin remained theraputic with goal 2-2.5.         *COPD: pt denies h/o COPD. Will need to follow up with PCP for PFTs if applicable. Had intermittent wheezing noted during rehab w/ relief from neb treatments. Continue home Breo.         *Incidental thyroid nodule: this was noted on carotid US while inpatient. Will need to follow up for further eval.  TSH within normal limits on 8/6.         * Prolonged Qtc noted on EKG ~460s: repeat EKG with improvement with Qtc of 463        Pt was medically cleared on _8/28/19 for discharge to home with home care. 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 Ta for surgical cardiac evaluation of LVOT.  He was subsequently admitted to [facility] CT surgery service for CABG x2 and excision of fibroelastoma on 8/7 by Dr. Alvarado. Patient extubated without post-operative complications.  Hospital course significant for pain at surgical site and hypoxia requiring high flow, now weaned off. Chest tube removed on 8/13. Hospital course also notable for pre-renal MILAGRO, evaluated by nephrology with recommendations to follow up given Cr downtrending. Post operatively noted to have episodes of A fib / PACs on telemetry, loaded with amiodarone and now continued on maintenance dose.  He was evaluated by PT/OT noted to be min - mod assist for ADLs. He ambulated with RW with 2 person assist.  He previously uses a rolling walker.        REHAB COURSE:    Pt was stable for rehab admission on 8/13 to  [facility] for comprehensive OT, PT, and SLP. Rehab course was unremarkable for acute medical issues.  The following was addressed during his rehab course:        *s/p CABG: incision is well healed. Weights remained stable. Dyspnea on exertion stable with improved endurance during therapy. Surveillance CXR were stable without overt evidence of effusion. Follow up with Dr. Alvarado outpatient         *Pre-renal MILAGRO: BUN/Cr was elevated and remained stable but did trend up to 1.7 prior to discharge.  IVF for gentle hydration started on 8/27. Renal US done and shows: < from: US Renal (08.27.19 @ 12:25) >    There is a 4.5 cm abdominal aortic aneurysm, suboptimally visualized due     to bowel gas. Correlation with CTA of the abdomen is recommended.    Left renal cyst measuring 3.6 cm.    Underdistended urinary bladder.                *Previous CVA: neuropsych evaluated the patient to evaluate cognitive deficits     *Atrial fibrillation: Coumadin remained theraputic with goal 2-2.5.         *COPD: pt denies h/o COPD. Will need to follow up with PCP for PFTs if applicable. Had intermittent wheezing noted during rehab w/ relief from neb treatments. Continue home Breo.         *Incidental thyroid nodule: this was noted on carotid US while inpatient. Will need to follow up for further eval.  TSH within normal limits on 8/6.         * Prolonged Qtc noted on EKG ~460s: repeat EKG with improvement with Qtc of 463        Pt was medically cleared on _8/29/19 for discharge to home with home care. 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 Ta for surgical cardiac evaluation of LVOT.  He was subsequently admitted to [facility] CT surgery service for CABG x2 and excision of fibroelastoma on 8/7 by Dr. Alvarado. Patient extubated without post-operative complications.  Hospital course significant for pain at surgical site and hypoxia requiring high flow, now weaned off. Chest tube removed on 8/13. Hospital course also notable for pre-renal MILAGRO, evaluated by nephrology with recommendations to follow up given Cr downtrending. Post operatively noted to have episodes of A fib / PACs on telemetry, loaded with amiodarone and now continued on maintenance dose.  He was evaluated by PT/OT noted to be min - mod assist for ADLs. He ambulated with RW with 2 person assist.  He previously uses a rolling walker.        REHAB COURSE:    Pt was stable for rehab admission on 8/13 to [facility] for comprehensive OT, PT, and SLP. Rehab course was unremarkable for acute medical issues.  The following was addressed during his rehab course:        *s/p CABG: incision is well healed. Weights remained stable. Dyspnea on exertion stable with improved endurance during therapy. Surveillance CXR were stable without overt evidence of effusion. Follow up with Dr. Alvarado outpatient         *Pre-renal MILAGRO: BUN/Cr was elevated and remained stable but did trend up to 1.7 prior to discharge.  IVF for gentle hydration started on 8/27. Renal US done and shows: < from: US Renal (08.27.19 @ 12:25) >    There is a 4.5 cm abdominal aortic aneurysm, suboptimally visualized due     to bowel gas. Correlation with CTA of the abdomen is recommended.    Left renal cyst measuring 3.6 cm.    Underdistended urinary bladder.    At time of discharge, Cr 1.58 and BUN 22.            *Previous CVA: neuropsych evaluated the patient to evaluate cognitive deficits     *Atrial fibrillation: Coumadin remained theraputic with goal 2-2.5.         *COPD: pt denies h/o COPD. Will need to follow up with PCP for PFTs if applicable. Had intermittent wheezing noted during rehab w/ relief from neb treatments. Continue home Breo.         *Incidental thyroid nodule: this was noted on carotid US while inpatient. Will need to follow up for further eval.  TSH within normal limits on 8/6.         * Prolonged Qtc noted on EKG ~460s: repeat EKG with improvement with Qtc of 463        Pt was medically cleared on  8/29/19 for discharge to home with home care.

## 2019-08-21 NOTE — PROGRESS NOTE ADULT - SUBJECTIVE AND OBJECTIVE BOX
SUBJECTIVE:  No acute events overnight. CXR pending- Radiology called.  Patient states that he feels ok, Denies dyspnea at rest, and reports some after walking.     ROS: + CASTILLO. +mild pain at incision site  Denies lightheadedness, headache, fevers/ chills, dysuria, constipation.     -------------------------------------   OBJECTIVE  Vital Signs Last 24 Hrs  T(C): 36.7 (20 Aug 2019 21:49), Max: 36.7 (20 Aug 2019 21:49)  T(F): 98 (20 Aug 2019 21:49), Max: 98 (20 Aug 2019 21:49)  HR: 74 (21 Aug 2019 06:24) (69 - 74)  BP: 118/70 (21 Aug 2019 06:24) (118/70 - 127/69)  BP(mean): --  RR: 14 (21 Aug 2019 06:24) (14 - 14)  SpO2: 95% (21 Aug 2019 06:24) (94% - 95%)  - 137.4kg on admission bed weights.     131.9kg standing 8/15     132.2kg standing 8/16    131.5kg standing 8/19 8/21:131.7    PHYSICAL EXAM  General: NAD, comfortable. sitting in chair  Cardio: RRR, S1/S2+  Resp: R side crackles at base otherwise CTAB.   Abdomen: soft, NTND, obese  Neuro:      Cognitive: AAO x3,      Communication: no dysarthria or aphasia     Strength:Overall 5/5 bilaterally.  Extrem: no edema, no calf tenderness   Skin: Sternal incision well healed open to air. Retained suture upper abdomen from previous drain. C/D/I no erythema or drainage. Fungal maculopapular rash and satellite lesions b/l posterior thighs and periumbilical.     Functional Exam:  Barriers: endurance, cognitive  Transfers: min assist  Gait: RW with CG/close supervision  -------------------------------------     LABS:  PT/INR - ( 18 Aug 2019 05:15 )   PT: 28.6 sec;   INR: 2.48 ratio    PT/INR - ( 16 Aug 2019 08:15 )   PT: 25.0 sec;   INR: 2.18 ratio        MEDICATIONS  (STANDING):  amiodarone    Tablet 200 milliGRAM(s) Oral daily  aspirin  chewable 81 milliGRAM(s) Oral daily  atorvastatin 40 milliGRAM(s) Oral at bedtime  dextrose 5%. 1000 milliLiter(s) (50 mL/Hr) IV Continuous <Continuous>  dextrose 50% Injectable 12.5 Gram(s) IV Push once  dextrose 50% Injectable 25 Gram(s) IV Push once  dextrose 50% Injectable 25 Gram(s) IV Push once  docusate sodium 100 milliGRAM(s) Oral three times a day  melatonin 6 milliGRAM(s) Oral at bedtime  metoprolol tartrate 50 milliGRAM(s) Oral every 8 hours  nystatin/triamcinolone Cream 1 Application(s) Topical two times a day  pantoprazole    Tablet 40 milliGRAM(s) Oral before breakfast  psyllium Powder 1 Packet(s) Oral daily  senna 2 Tablet(s) Oral at bedtime  sertraline 50 milliGRAM(s) Oral daily  tamsulosin 0.4 milliGRAM(s) Oral at bedtime  tiotropium 18 MICROgram(s) Capsule 1 Capsule(s) Inhalation daily  warfarin 3 milliGRAM(s) Oral daily    MEDICATIONS  (PRN):  acetaminophen   Tablet .. 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    *Gait/ADL/functional deficits secondary to Cardiac surgery: CAD s/p CABG/aortic fibroelastoma removal  - Continue comprehensive Rehab Program of PT/OT   - incision well healed. Suture in place from previous drain site.  - Weekly CXR -fu  - ASA, statin  - follow up outpatient Dr. Alvarado   -  *Pre-renal MILAGRO:  BUN/Cr stable but elevated  - Not currently on diuretics, no effusions noted on last CXR from the 13th    *Previous CVA w/ R Weakness: Dr. Hernandez neuropsych to evaluate patient    #Buttock/thigh rash: Possibly fungal/inflammatory etiology - mycolog cream BID      *PAF - coumadin goal 2-2.5.   - Coumadin, Continue Amiodarone and Metoprolol tartrate.     *HTN - Lopressor in setting of PAF    *BPH - flomax    *COPD :  - Patient reports that he does not have a hx of COPD  - f/u with PCP upon discharge.  - continue Duoneb as needed and spiriva here  - takes Breo at home; however non-formulary.     *DM2 - ISS, accuchecks continue to monitor   HA1C on 8/6 was 6.0%    *Incidental thyroid nodule noted on Carotic US: follow up with PCP for further eval.  - (Last TSH on 8/6 was 0.88, continue to monitor for symptoms of hypothyroidism 2/2 amiodarone use vs nodules)   - ?contributing to possible cognitive deficits    *h/o FVL with PEs: IVC filter. Coumadin as above.     *GERD - Protonix    *Mood: depression - sertraline    *Prolonged QTc :- - QTc ranges 470-low 500s on previous EKGs  - Zoloft is home medication, - repeat EKG -- 463    *Insomnia - continue melatonin    Mild leukocytosis -improved. Labs on 8/22    Morbid obesity :- Nutritional counseling, - RD to eval and educate     Pain management:Tylenol PRN    Bowel Management:  continent.   Senna and Docusate, Metamucil    Bladder Management: BPH - continue Tamsulosin. continent    Precautions / PROPHYLAXIS:   - Falls, Cardiac,  sternal  - Pressure injury/Skin: Turn Q2hrs while in bed, OOB to Chair, PT/OT    - DVT: on coumadin SQ , TEDs, No SCDs due to IVC filter    Dispo: anticipate d/c on 8/28 home with home care and aide assistance

## 2019-08-21 NOTE — DISCHARGE NOTE PROVIDER - NSDCHHASSISTDEVIC_GEN_ALL_CORE
Just a note to advise how the patient is progressing in the tobacco cessation program. The patient is smoking 10 cigarettes per day but lighting each twice which is a pack a day habit. Patient did not utilize any of the habit modification techniques discussed last week but stated she would try this week. Added 150 mg Wellbutrin SR once a day for 7 days then increasing to BID to aid the patient since the 21 mg nicotine patches are not helping at this time. The patient has applied for the [employer] patient assistance program but has not been approved yet. Walker

## 2019-08-21 NOTE — DISCHARGE NOTE PROVIDER - CARE PROVIDER_API CALL
Kermit Alvarado)  CTS Surgery  [address] [place] [address]  Phone: [phone]  Fax: [phone]  Follow Up Time: 1 week    Lul Ta)  Cardiovascular Disease  [address] [facility], [address]  [place] [address]  Phone: [phone]  Fax: [phone]  Follow Up Time: 2 weeks    Primary Care, Provider  Phone: (   )    -  Fax: (   )    -  Follow Up Time: 1 week Kermit Alvarado)  CTS Surgery  [address] [place] [address]  Phone: [phone]  Fax: [phone]  Follow Up Time: 1 week    Lul Ta)  Cardiovascular Disease  [address] [facility], [address] [place] [address]  Phone: [phone]  Fax: [phone]  Follow Up Time: 2 weeks    Tien Murillo)  Family Medicine  [address] [place] [address]  Phone: [phone]  Fax: [phone]  Follow Up Time: 1 week    Robert Villalpando)  Critical Care Medicine; Internal Medicine; Pulmonary Disease; Sleep Medicine  [address] [facility], [address] [place] [address]  Phone: [phone]  Fax: [phone]  Follow Up Time: 2 weeks

## 2019-08-22 LAB
ALBUMIN SERPL ELPH-MCNC: 2.6 G/DL — LOW (ref 3.3–5)
ALP SERPL-CCNC: 73 U/L — SIGNIFICANT CHANGE UP (ref 40–120)
ALT FLD-CCNC: 10 U/L DA — SIGNIFICANT CHANGE UP (ref 10–45)
ANION GAP SERPL CALC-SCNC: 7 MMOL/L — SIGNIFICANT CHANGE UP (ref 5–17)
AST SERPL-CCNC: 12 U/L — SIGNIFICANT CHANGE UP (ref 10–40)
BILIRUB SERPL-MCNC: 0.3 MG/DL — SIGNIFICANT CHANGE UP (ref 0.2–1.2)
BUN SERPL-MCNC: 21 MG/DL — SIGNIFICANT CHANGE UP (ref 7–23)
CALCIUM SERPL-MCNC: 9 MG/DL — SIGNIFICANT CHANGE UP (ref 8.4–10.5)
CHLORIDE SERPL-SCNC: 106 MMOL/L — SIGNIFICANT CHANGE UP (ref 96–108)
CO2 SERPL-SCNC: 27 MMOL/L — SIGNIFICANT CHANGE UP (ref 22–31)
CREAT SERPL-MCNC: 1.71 MG/DL — HIGH (ref 0.5–1.3)
GLUCOSE SERPL-MCNC: 119 MG/DL — HIGH (ref 70–99)
HCT VFR BLD CALC: 32.2 % — LOW (ref 39–50)
HGB BLD-MCNC: 10 G/DL — LOW (ref 13–17)
INR BLD: 2.42 RATIO — HIGH (ref 0.88–1.16)
MCHC RBC-ENTMCNC: 27.5 PG — SIGNIFICANT CHANGE UP (ref 27–34)
MCHC RBC-ENTMCNC: 31.1 GM/DL — LOW (ref 32–36)
MCV RBC AUTO: 88.5 FL — SIGNIFICANT CHANGE UP (ref 80–100)
NRBC # BLD: 0 /100 WBCS — SIGNIFICANT CHANGE UP (ref 0–0)
PLATELET # BLD AUTO: 426 K/UL — HIGH (ref 150–400)
POTASSIUM SERPL-MCNC: 4.1 MMOL/L — SIGNIFICANT CHANGE UP (ref 3.5–5.3)
POTASSIUM SERPL-SCNC: 4.1 MMOL/L — SIGNIFICANT CHANGE UP (ref 3.5–5.3)
PROT SERPL-MCNC: 7.1 G/DL — SIGNIFICANT CHANGE UP (ref 6–8.3)
PROTHROM AB SERPL-ACNC: 27.9 SEC — HIGH (ref 10–12.9)
RBC # BLD: 3.64 M/UL — LOW (ref 4.2–5.8)
RBC # FLD: 14.1 % — SIGNIFICANT CHANGE UP (ref 10.3–14.5)
SODIUM SERPL-SCNC: 140 MMOL/L — SIGNIFICANT CHANGE UP (ref 135–145)
WBC # BLD: 9.69 K/UL — SIGNIFICANT CHANGE UP (ref 3.8–10.5)
WBC # FLD AUTO: 9.69 K/UL — SIGNIFICANT CHANGE UP (ref 3.8–10.5)

## 2019-08-22 PROCEDURE — 99232 SBSQ HOSP IP/OBS MODERATE 35: CPT

## 2019-08-22 RX ORDER — LORATADINE 10 MG/1
10 TABLET ORAL DAILY
Refills: 0 | Status: DISCONTINUED | OUTPATIENT
Start: 2019-08-22 | End: 2019-08-29

## 2019-08-22 RX ORDER — FLUTICASONE PROPIONATE 50 MCG
1 SPRAY, SUSPENSION NASAL
Refills: 0 | Status: COMPLETED | OUTPATIENT
Start: 2019-08-22 | End: 2019-08-27

## 2019-08-22 RX ORDER — KETOCONAZOLE 20 MG/G
1 AEROSOL, FOAM TOPICAL
Refills: 0 | Status: COMPLETED | OUTPATIENT
Start: 2019-08-22 | End: 2019-08-23

## 2019-08-22 RX ORDER — WARFARIN SODIUM 2.5 MG/1
3 TABLET ORAL DAILY
Refills: 0 | Status: COMPLETED | OUTPATIENT
Start: 2019-08-22 | End: 2019-08-23

## 2019-08-22 RX ADMIN — Medication 100 MILLIGRAM(S): at 21:13

## 2019-08-22 RX ADMIN — Medication 6 MILLIGRAM(S): at 21:13

## 2019-08-22 RX ADMIN — WARFARIN SODIUM 3 MILLIGRAM(S): 2.5 TABLET ORAL at 21:17

## 2019-08-22 RX ADMIN — ATORVASTATIN CALCIUM 40 MILLIGRAM(S): 80 TABLET, FILM COATED ORAL at 21:13

## 2019-08-22 RX ADMIN — Medication 50 MILLIGRAM(S): at 12:19

## 2019-08-22 RX ADMIN — SERTRALINE 50 MILLIGRAM(S): 25 TABLET, FILM COATED ORAL at 17:27

## 2019-08-22 RX ADMIN — Medication 50 MILLIGRAM(S): at 05:32

## 2019-08-22 RX ADMIN — AMIODARONE HYDROCHLORIDE 200 MILLIGRAM(S): 400 TABLET ORAL at 05:32

## 2019-08-22 RX ADMIN — Medication 100 MILLIGRAM(S): at 12:19

## 2019-08-22 RX ADMIN — Medication 50 MILLIGRAM(S): at 21:13

## 2019-08-22 RX ADMIN — Medication 100 MILLIGRAM(S): at 05:32

## 2019-08-22 RX ADMIN — TAMSULOSIN HYDROCHLORIDE 0.4 MILLIGRAM(S): 0.4 CAPSULE ORAL at 21:14

## 2019-08-22 RX ADMIN — Medication 1 APPLICATION(S): at 05:33

## 2019-08-22 RX ADMIN — KETOCONAZOLE 1 APPLICATION(S): 20 AEROSOL, FOAM TOPICAL at 17:28

## 2019-08-22 RX ADMIN — Medication 81 MILLIGRAM(S): at 12:19

## 2019-08-22 RX ADMIN — PANTOPRAZOLE SODIUM 40 MILLIGRAM(S): 20 TABLET, DELAYED RELEASE ORAL at 05:32

## 2019-08-22 RX ADMIN — Medication 1 SPRAY(S): at 17:27

## 2019-08-22 NOTE — PROGRESS NOTE ADULT - SUBJECTIVE AND OBJECTIVE BOX
SUBJECTIVE:  No acute events overnight. Has dyspnea with activity with normal pulse ox  Reports poor sleep overnight due to irritation from rash on buttock   Also reports nasal congestion  Patient states that he feels ok,      ROS: + CASTILLO.  Denies lightheadedness, headache, fevers/ chills, dysuria, constipation.     -------------------------------------   OBJECTIVE  Vital Signs Last 24 Hrs  T(C): 37.1 (22 Aug 2019 08:27), Max: 37.1 (22 Aug 2019 08:27)  T(F): 98.7 (22 Aug 2019 08:27), Max: 98.7 (22 Aug 2019 08:27)  HR: 66 (22 Aug 2019 08:27) (63 - 71)  BP: 109/74 (22 Aug 2019 08:27) (109/74 - 148/78)  BP(mean): --  RR: 14 (22 Aug 2019 08:27) (14 - 15)  SpO2: 95% (22 Aug 2019 08:27) (94% - 95%)  - 137.4kg on admission bed weights.     131.9kg standing 8/15     132.2kg standing 8/16    131.5kg standing 8/19 8/21:131.7  8/22:131.8    PHYSICAL EXAM  General: NAD, comfortable.  Cardio: RRR, S1/S2+  Resp: R side crackles at base otherwise CTAB.   Abdomen: soft, NTND, obese  Neuro:      Cognitive: AAO x3,      Communication: no dysarthria or aphasia     Strength:Overall 5/5 bilaterally.  Extrem: no edema, no calf tenderness   Skin: Sternal incision well healed open to air.. Fungal maculopapular rash and satellite lesions b/l posterior thighs and periumbilical.     Functional Exam:  Transfers: min assist  Gait: RW with CG/close supervision  -------------------------------------     LABS:  PT/INR - ( 22 Aug 2019 05:35 )   PT: 27.9 sec;   INR: 2.42 ratio    PT/INR - ( 18 Aug 2019 05:15 )   PT: 28.6 sec;   INR: 2.48 ratio    PT/INR - ( 16 Aug 2019 08:15 )   PT: 25.0 sec;   INR: 2.18 ratio                            10.0   9.69  )-----------( 426      ( 22 Aug 2019 05:35 )             32.2     08-22    140  |  106  |  21  ----------------------------<  119<H>  4.1   |  27  |  1.71<H>    Ca    9.0      22 Aug 2019 05:35    TPro  7.1  /  Alb  2.6<L>  /  TBili  0.3  /  DBili  x   /  AST  12  /  ALT  10  /  AlkPhos  73  08-22    < from: Xray Chest 2 Views PA/Lat (08.21.19 @ 11:27) >  EXAM:  XR CHEST PA LAT 2V      PROCEDURE DATE:  08/21/2019        INTERPRETATION:  Frontal and lateral chest on August 21, 2019 at 10:57   AM. Patient is status post heart surgery.    Heart is enlarged. There is respiratory motion on the lateral view.   Sternotomy again noted.    Central mediastinum is prominent on a vascular basis.    On August 13 of this year there was some mild atelectasis in the left   lung no longer present.    IMPRESSION: No acute lung finding at this time.        < end of copied text >    MEDICATIONS  (STANDING):  amiodarone    Tablet 200 milliGRAM(s) Oral daily  aspirin  chewable 81 milliGRAM(s) Oral daily  atorvastatin 40 milliGRAM(s) Oral at bedtime  dextrose 5%. 1000 milliLiter(s) (50 mL/Hr) IV Continuous <Continuous>  dextrose 50% Injectable 12.5 Gram(s) IV Push once  dextrose 50% Injectable 25 Gram(s) IV Push once  dextrose 50% Injectable 25 Gram(s) IV Push once  docusate sodium 100 milliGRAM(s) Oral three times a day  melatonin 6 milliGRAM(s) Oral at bedtime  metoprolol tartrate 50 milliGRAM(s) Oral every 8 hours  nystatin/triamcinolone Cream 1 Application(s) Topical two times a day  pantoprazole    Tablet 40 milliGRAM(s) Oral before breakfast  psyllium Powder 1 Packet(s) Oral daily  senna 2 Tablet(s) Oral at bedtime  sertraline 50 milliGRAM(s) Oral daily  tamsulosin 0.4 milliGRAM(s) Oral at bedtime  tiotropium 18 MICROgram(s) Capsule 1 Capsule(s) Inhalation daily    MEDICATIONS  (PRN):  acetaminophen   Tablet .. 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    *Gait/ADL/functional deficits secondary to Cardiac surgery: CAD s/p CABG/aortic fibroelastoma removal  - Continue comprehensive Rehab Program of PT/OT   - incision well healed. Suture in place from previous drain site.  - Weekly CXR  - ASA, statin  - follow up outpatient Dr. Alvarado   -  *Pre-renal MILAGRO:  BUN/Cr stable but elevated. Monitor closely  - Not currently on diuretics,     *Previous CVA w/ R Weakness: Dr. Hernandez neuropsych to evaluate patient    #Buttock/thigh rash: Possibly fungal/inflammatory etiology - mycolog cream BID      *PAF - coumadin goal 2-2.5. .INR therapeutic  - Coumadin, Continue Amiodarone and Metoprolol tartrate.     *HTN - Lopressor in setting of PAF    *BPH - flomax    *COPD :  - Patient reports that he does not have a hx of COPD  - f/u with PCP upon discharge.  - continue Duoneb as needed and spiriva here  - takes Breo at home; however non-formulary.   Flonase and claritin for nasal congestion- d/w medicine    *DM2 - ISS, accuchecks continue to monitor   HA1C on 8/6 was 6.0%    *Incidental thyroid nodule noted on Carotic US: follow up with PCP for further eval.  - (Last TSH on 8/6 was 0.88, continue to monitor for symptoms of hypothyroidism 2/2 amiodarone use vs nodules)   - ?contributing to possible cognitive deficits    *h/o FVL with PEs: IVC filter. Coumadin as above.     *GERD - Protonix    *Mood: depression - sertraline    *Prolonged QTc :- - QTc ranges 470-low 500s on previous EKGs  - Zoloft is home medication, - repeat EKG -- 463    *Insomnia - continue melatonin    Mild leukocytosis -improved. Labs on 8/22    Morbid obesity :- Nutritional counseling, - RD to eval and educate     Pain management:Tylenol PRN    Bowel Management:  continent.   Senna and Docusate, Metamucil    Bladder Management: BPH - continue Tamsulosin. continent    Skin: Fungal rash- d/c mycolog. start clotrimazole cream     Cognition: Per Neuropsych: patient with Mild cognitive impairment    Precautions / PROPHYLAXIS:   - Falls, Cardiac,  sternal  - Pressure injury/Skin: Turn Q2hrs while in bed, OOB to Chair, PT/OT    - DVT: on coumadin SQ , TEDs, No SCDs due to IVC filter    Dispo: anticipate d/c on 8/28 home with home care and aide assistance. Reviewed progress with team today. Continues to make progress in therapy, but limited due to poor endurance SUBJECTIVE:  No acute events overnight. Has dyspnea with activity with normal pulse ox  Reports poor sleep overnight due to irritation from rash on buttock   Also reports nasal congestion  Patient states that he feels ok,      ROS: + CASTILLO.  Denies lightheadedness, headache, fevers/ chills, dysuria, constipation.     -------------------------------------   OBJECTIVE  Vital Signs Last 24 Hrs  T(C): 37.1 (22 Aug 2019 08:27), Max: 37.1 (22 Aug 2019 08:27)  T(F): 98.7 (22 Aug 2019 08:27), Max: 98.7 (22 Aug 2019 08:27)  HR: 66 (22 Aug 2019 08:27) (63 - 71)  BP: 109/74 (22 Aug 2019 08:27) (109/74 - 148/78)  BP(mean): --  RR: 14 (22 Aug 2019 08:27) (14 - 15)  SpO2: 95% (22 Aug 2019 08:27) (94% - 95%)  - 137.4kg on admission bed weights.     131.9kg standing 8/15     132.2kg standing 8/16    131.5kg standing 8/19 8/21:131.7  8/22:131.8    PHYSICAL EXAM  General: NAD, comfortable.  Cardio: RRR, S1/S2+  Resp: R side crackles at base otherwise CTAB.   Abdomen: soft, NTND, obese  Neuro:      Cognitive: AAO x3,      Communication: no dysarthria or aphasia     Strength:Overall 5/5 bilaterally.  Extrem: no edema, no calf tenderness   Skin: Sternal incision well healed open to air.. Fungal maculopapular rash and satellite lesions b/l posterior thighs and periumbilical.     Functional Exam:  Transfers: min assist  Gait: RW with CG/close supervision  -------------------------------------     LABS:  PT/INR - ( 22 Aug 2019 05:35 )   PT: 27.9 sec;   INR: 2.42 ratio    PT/INR - ( 18 Aug 2019 05:15 )   PT: 28.6 sec;   INR: 2.48 ratio    PT/INR - ( 16 Aug 2019 08:15 )   PT: 25.0 sec;   INR: 2.18 ratio                            10.0   9.69  )-----------( 426      ( 22 Aug 2019 05:35 )             32.2     08-22    140  |  106  |  21  ----------------------------<  119<H>  4.1   |  27  |  1.71<H>    Ca    9.0      22 Aug 2019 05:35    TPro  7.1  /  Alb  2.6<L>  /  TBili  0.3  /  DBili  x   /  AST  12  /  ALT  10  /  AlkPhos  73  08-22    < from: Xray Chest 2 Views PA/Lat (08.21.19 @ 11:27) >  EXAM:  XR CHEST PA LAT 2V      PROCEDURE DATE:  08/21/2019        INTERPRETATION:  Frontal and lateral chest on August 21, 2019 at 10:57   AM. Patient is status post heart surgery.    Heart is enlarged. There is respiratory motion on the lateral view.   Sternotomy again noted.    Central mediastinum is prominent on a vascular basis.    On August 13 of this year there was some mild atelectasis in the left   lung no longer present.    IMPRESSION: No acute lung finding at this time.        < end of copied text >    MEDICATIONS  (STANDING):  amiodarone    Tablet 200 milliGRAM(s) Oral daily  aspirin  chewable 81 milliGRAM(s) Oral daily  atorvastatin 40 milliGRAM(s) Oral at bedtime  dextrose 5%. 1000 milliLiter(s) (50 mL/Hr) IV Continuous <Continuous>  dextrose 50% Injectable 12.5 Gram(s) IV Push once  dextrose 50% Injectable 25 Gram(s) IV Push once  dextrose 50% Injectable 25 Gram(s) IV Push once  docusate sodium 100 milliGRAM(s) Oral three times a day  melatonin 6 milliGRAM(s) Oral at bedtime  metoprolol tartrate 50 milliGRAM(s) Oral every 8 hours  nystatin/triamcinolone Cream 1 Application(s) Topical two times a day  pantoprazole    Tablet 40 milliGRAM(s) Oral before breakfast  psyllium Powder 1 Packet(s) Oral daily  senna 2 Tablet(s) Oral at bedtime  sertraline 50 milliGRAM(s) Oral daily  tamsulosin 0.4 milliGRAM(s) Oral at bedtime  tiotropium 18 MICROgram(s) Capsule 1 Capsule(s) Inhalation daily    MEDICATIONS  (PRN):  acetaminophen   Tablet .. 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    *Gait/ADL/functional deficits secondary to Cardiac surgery: CAD s/p CABG/aortic fibroelastoma removal  - Continue comprehensive Rehab Program of PT/OT   - incision well healed. Suture in place from previous drain site.  - Weekly CXR  - ASA, statin  - follow up outpatient Dr. Alvarado   -  *Pre-renal MILAGRO:  BUN/Cr stable but elevated. Monitor closely  - Not currently on diuretics,     *Previous CVA w/ R Weakness: Dr. Hernandez neuropsych to evaluate patient    #Buttock/thigh rash: Possibly fungal/inflammatory etiology - mycolog cream BID      *PAF - coumadin goal 2-2.5. .INR therapeutic  - Coumadin, Continue Amiodarone and Metoprolol tartrate.     *HTN - Lopressor in setting of PAF    *BPH - flomax    *COPD :  - Patient reports that he does not have a hx of COPD  - f/u with PCP upon discharge.  - continue Duoneb as needed and spiriva here  - takes Breo at home; however non-formulary.   Flonase and claritin for nasal congestion- d/w medicine    *DM2 - Diet    HA1C on 8/6 was 6.0%    *Incidental thyroid nodule noted on Carotic US: follow up with PCP for further eval.  - (Last TSH on 8/6 was 0.88, continue to monitor for symptoms of hypothyroidism 2/2 amiodarone use vs nodules)   - ?contributing to possible cognitive deficits    *h/o FVL with PEs: IVC filter. Coumadin as above.     *GERD - Protonix    *Mood: depression - sertraline    *Prolonged QTc :- - QTc ranges 470-low 500s on previous EKGs  - Zoloft is home medication, - repeat EKG -- 463    *Insomnia - continue melatonin    Mild leukocytosis -improved. Labs on 8/22    Morbid obesity :- Nutritional counseling, - RD to eval and educate     Pain management:Tylenol PRN    Bowel Management:  continent.   Senna and Docusate, Metamucil    Bladder Management: BPH - continue Tamsulosin. continent    Skin: Fungal rash- d/c mycolog. start clotrimazole cream     Cognition: Per Neuropsych: patient with Mild cognitive impairment    Precautions / PROPHYLAXIS:   - Falls, Cardiac,  sternal  - Pressure injury/Skin: Turn Q2hrs while in bed, OOB to Chair, PT/OT    - DVT: on coumadin SQ , TEDs, No SCDs due to IVC filter    Dispo: anticipate d/c on 8/28 home with home care and aide assistance. Reviewed progress with team today. Continues to make progress in therapy, but limited due to poor endurance

## 2019-08-23 PROCEDURE — 99232 SBSQ HOSP IP/OBS MODERATE 35: CPT | Mod: GC

## 2019-08-23 PROCEDURE — 99232 SBSQ HOSP IP/OBS MODERATE 35: CPT

## 2019-08-23 RX ADMIN — ATORVASTATIN CALCIUM 40 MILLIGRAM(S): 80 TABLET, FILM COATED ORAL at 21:38

## 2019-08-23 RX ADMIN — TAMSULOSIN HYDROCHLORIDE 0.4 MILLIGRAM(S): 0.4 CAPSULE ORAL at 21:38

## 2019-08-23 RX ADMIN — SERTRALINE 50 MILLIGRAM(S): 25 TABLET, FILM COATED ORAL at 11:48

## 2019-08-23 RX ADMIN — Medication 50 MILLIGRAM(S): at 05:44

## 2019-08-23 RX ADMIN — Medication 1 APPLICATION(S): at 17:36

## 2019-08-23 RX ADMIN — KETOCONAZOLE 1 APPLICATION(S): 20 AEROSOL, FOAM TOPICAL at 05:45

## 2019-08-23 RX ADMIN — Medication 1 SPRAY(S): at 05:44

## 2019-08-23 RX ADMIN — Medication 50 MILLIGRAM(S): at 21:39

## 2019-08-23 RX ADMIN — AMIODARONE HYDROCHLORIDE 200 MILLIGRAM(S): 400 TABLET ORAL at 05:44

## 2019-08-23 RX ADMIN — Medication 100 MILLIGRAM(S): at 21:38

## 2019-08-23 RX ADMIN — Medication 81 MILLIGRAM(S): at 11:48

## 2019-08-23 RX ADMIN — Medication 1 APPLICATION(S): at 06:55

## 2019-08-23 RX ADMIN — Medication 1 SPRAY(S): at 17:35

## 2019-08-23 RX ADMIN — Medication 50 MILLIGRAM(S): at 15:13

## 2019-08-23 RX ADMIN — Medication 100 MILLIGRAM(S): at 15:13

## 2019-08-23 RX ADMIN — WARFARIN SODIUM 3 MILLIGRAM(S): 2.5 TABLET ORAL at 21:39

## 2019-08-23 RX ADMIN — LORATADINE 10 MILLIGRAM(S): 10 TABLET ORAL at 11:47

## 2019-08-23 RX ADMIN — SENNA PLUS 2 TABLET(S): 8.6 TABLET ORAL at 21:38

## 2019-08-23 RX ADMIN — PANTOPRAZOLE SODIUM 40 MILLIGRAM(S): 20 TABLET, DELAYED RELEASE ORAL at 05:54

## 2019-08-23 RX ADMIN — Medication 6 MILLIGRAM(S): at 21:38

## 2019-08-23 NOTE — PROGRESS NOTE ADULT - ATTENDING COMMENTS
Chart reviewed. Patient seen at bedside  Reports improvement in nasal congestion and with skin excoriation  Denies dyspnea at rest, but with continued dyspnea with activity    2. Sternal incision healing well    3. Labs in am    4. Continue rehab program

## 2019-08-23 NOTE — PROGRESS NOTE ADULT - ASSESSMENT
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 Daily PT/INR.  Amiodarone. Metoprolol tartrate.     HTN/tacycadic intermittently -  Lopressor     BPH - flomax    prediabetic- diet controlled,  ( patient says he has never a diabetic, his sugars are same for many yrs does not take any meds for dm) diet controlled, d/c iss, accuchecks     COPD - continue Duoneb prn    Incidental thyroid nodule noted on Carotic US: follow up with PCP for further eval.    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 for debility and functional deficits after cardiac surgery involving CABG and excision of fibroelastoma- pt/ot/dvt ppx, - ASA, statin, pain meds    Pre-renal MILAGRO: encourage hydration , po fluids for now will monitor renal function    Factov V leiden/PE/ PAF - IVC filter, c/W  coumadin, check inr, coumadin goal 2-2.5. Daily PT/INR.  Amiodarone. Metoprolol tartrate.     HTN/tacycadic intermittently -  Lopressor     BPH - flomax    prediabetic- diet controlled,  ( patient says he has never a diabetic, his sugars are same for many yrs does not take any meds for dm) diet controlled, d/c iss, accuchecks     COPD - continue Duoneb prn    Incidental thyroid nodule noted on Carotic US: follow up with PCP for further eval.    GERD - Protonix     depression - sertraline    Insomnia - melatonin    will follow

## 2019-08-23 NOTE — PROGRESS NOTE ADULT - SUBJECTIVE AND OBJECTIVE BOX
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 Ta for surgical cardiac evaluation of LVOT.  He was subsequently admitted to [facility] CT surgery service for CABG x2 and excision of fibroelastoma on 8/7 by Dr. Alvarado. Patient extubated without post-operative complications.  Hospital course significant for pain at surgical site and hypoxia requiring high flow, now weaned off. Chest tube removed on 8/13. Hospital course also notable for pre-renal MILAGRO, evaluated by nephrology with recommendations to follow up given Cr downtrending. Post operatively noted to have episodes of A fib / PACs on telemetry, loaded with amiodarone and now continued on maintenance dose.      He was evaluated by PT/OT noted to be min - mod assist for ADLs. He ambulated with RW with 2 person assist.  He previously uses a rolling walker    seen at the bedside, no n/v, no sob. no dysuria      Vital Signs Last 24 Hrs  T(C): 36.7 (23 Aug 2019 08:27), Max: 36.9 (22 Aug 2019 21:10)  T(F): 98 (23 Aug 2019 08:27), Max: 98.5 (22 Aug 2019 21:10)  HR: 64 (23 Aug 2019 08:27) (64 - 84)  BP: 102/71 (23 Aug 2019 08:27) (102/71 - 138/74)  BP(mean): --  RR: 14 (23 Aug 2019 08:27) (14 - 14)  SpO2: 95% (23 Aug 2019 08:27) (95% - 97%)      GENERAL- NAD  EAR/NOSE/MOUTH/THROAT - no pharyngeal exudates, no oral lesions  MMM  EYES- JASON, conjunctiva and Sclera clear  NECK- supple  RESPIRATORY-  clear to auscultation bilaterally  CARDIOVASCULAR - SIS2, RRR  GI - soft NT BS present  EXTREMITIES- no pedal  edema  NEUROLOGY- no gross focal deficits  SKIN- chest incision clean, no rashes, warm to touch  PSYCHIATRY- AAO X 3  MUSCULOSKELETAL- ROM normal         PT/INR - ( 22 Aug 2019 05:35 )   PT: 27.9 sec;   INR: 2.42 ratio                       10.0                 140  | 27   | 21           9.69  >-----------< 426     ------------------------< 119                   32.2                 4.1  | 106  | 1.71                                         Ca 9.0   Mg x     Ph x          Labs reviewed:     CXR personally reviewed:    < from: Xray Chest 2 Views PA/Lat (08.21.19 @ 11:27) >    EXAM:  XR CHEST PA LAT 2V      PROCEDURE DATE:  08/21/2019        INTERPRETATION:  Frontal and lateral chest on August 21, 2019 at 10:57   AM. Patient is status post heart surgery.    Heart is enlarged. There is respiratory motion on the lateral view.   Sternotomy again noted.    Central mediastinum is prominent on a vascular basis.    On August 13 of this year there was some mild atelectasis in the left   lung no longer present.    IMPRESSION: No acute lung finding at this time.    < end of copied text >

## 2019-08-24 LAB
ANION GAP SERPL CALC-SCNC: 6 MMOL/L — SIGNIFICANT CHANGE UP (ref 5–17)
BUN SERPL-MCNC: 22 MG/DL — SIGNIFICANT CHANGE UP (ref 7–23)
CALCIUM SERPL-MCNC: 8.6 MG/DL — SIGNIFICANT CHANGE UP (ref 8.4–10.5)
CHLORIDE SERPL-SCNC: 109 MMOL/L — HIGH (ref 96–108)
CO2 SERPL-SCNC: 26 MMOL/L — SIGNIFICANT CHANGE UP (ref 22–31)
CREAT SERPL-MCNC: 1.72 MG/DL — HIGH (ref 0.5–1.3)
GLUCOSE SERPL-MCNC: 102 MG/DL — HIGH (ref 70–99)
INR BLD: 2.99 RATIO — HIGH (ref 0.88–1.16)
POTASSIUM SERPL-MCNC: 4 MMOL/L — SIGNIFICANT CHANGE UP (ref 3.5–5.3)
POTASSIUM SERPL-SCNC: 4 MMOL/L — SIGNIFICANT CHANGE UP (ref 3.5–5.3)
PROTHROM AB SERPL-ACNC: 34.6 SEC — HIGH (ref 10–12.9)
SODIUM SERPL-SCNC: 141 MMOL/L — SIGNIFICANT CHANGE UP (ref 135–145)

## 2019-08-24 PROCEDURE — 99232 SBSQ HOSP IP/OBS MODERATE 35: CPT

## 2019-08-24 RX ORDER — WARFARIN SODIUM 2.5 MG/1
2.5 TABLET ORAL DAILY
Refills: 0 | Status: COMPLETED | OUTPATIENT
Start: 2019-08-24 | End: 2019-08-25

## 2019-08-24 RX ADMIN — Medication 1 APPLICATION(S): at 18:10

## 2019-08-24 RX ADMIN — Medication 650 MILLIGRAM(S): at 18:09

## 2019-08-24 RX ADMIN — Medication 1 SPRAY(S): at 06:31

## 2019-08-24 RX ADMIN — Medication 50 MILLIGRAM(S): at 06:31

## 2019-08-24 RX ADMIN — PANTOPRAZOLE SODIUM 40 MILLIGRAM(S): 20 TABLET, DELAYED RELEASE ORAL at 06:31

## 2019-08-24 RX ADMIN — LORATADINE 10 MILLIGRAM(S): 10 TABLET ORAL at 11:58

## 2019-08-24 RX ADMIN — Medication 100 MILLIGRAM(S): at 14:54

## 2019-08-24 RX ADMIN — WARFARIN SODIUM 2.5 MILLIGRAM(S): 2.5 TABLET ORAL at 21:24

## 2019-08-24 RX ADMIN — Medication 1 APPLICATION(S): at 06:31

## 2019-08-24 RX ADMIN — Medication 81 MILLIGRAM(S): at 11:59

## 2019-08-24 RX ADMIN — Medication 50 MILLIGRAM(S): at 14:54

## 2019-08-24 RX ADMIN — SENNA PLUS 2 TABLET(S): 8.6 TABLET ORAL at 21:25

## 2019-08-24 RX ADMIN — Medication 100 MILLIGRAM(S): at 06:31

## 2019-08-24 RX ADMIN — ATORVASTATIN CALCIUM 40 MILLIGRAM(S): 80 TABLET, FILM COATED ORAL at 21:24

## 2019-08-24 RX ADMIN — AMIODARONE HYDROCHLORIDE 200 MILLIGRAM(S): 400 TABLET ORAL at 06:31

## 2019-08-24 RX ADMIN — SERTRALINE 50 MILLIGRAM(S): 25 TABLET, FILM COATED ORAL at 11:59

## 2019-08-24 RX ADMIN — Medication 100 MILLIGRAM(S): at 21:25

## 2019-08-24 RX ADMIN — TAMSULOSIN HYDROCHLORIDE 0.4 MILLIGRAM(S): 0.4 CAPSULE ORAL at 21:24

## 2019-08-24 RX ADMIN — Medication 6 MILLIGRAM(S): at 21:24

## 2019-08-24 NOTE — PROGRESS NOTE ADULT - SUBJECTIVE AND OBJECTIVE BOX
SUBJECTIVE:  No acute events overnight. Feels well  Nasal congestion improved  Dyspnea improved    ROS: + CASTILLO.  Denies lightheadedness, headache, fevers/ chills, dysuria, constipation, fever/ chills.     -------------------------------------   OBJECTIVE  Vital Signs Last 24 Hrs  T(C): 36.4 (24 Aug 2019 08:27), Max: 36.6 (23 Aug 2019 21:36)  T(F): 97.6 (24 Aug 2019 08:27), Max: 97.9 (23 Aug 2019 21:36)  HR: 64 (24 Aug 2019 08:27) (62 - 82)  BP: 124/73 (24 Aug 2019 08:27) (124/73 - 135/80)  BP(mean): --  RR: 14 (24 Aug 2019 08:27) (14 - 14)  SpO2: 96% (24 Aug 2019 08:27) (92% - 97%)    - 137.4kg on admission bed weights.     131.9kg standing 8/15     132.2kg standing 8/16    131.5kg standing 8/19 8/21:131.7  8/22:131.8    129 Kg standing 8/23 8/24: 129.2kg    PHYSICAL EXAM  General: NAD, comfortable.  Cardio: RRR, S1/S2+  Resp: R side crackles at base otherwise CTAB.   Abdomen: soft, NTND, obese  Neuro:      Cognitive: AAO x3,      Communication: no dysarthria or aphasia     Strength: Overall 5/5 bilaterally.  Extrem: no edema, no calf tenderness   Skin: Sternal incision well healed open to air. Fungal maculopapular rash and satellite lesions b/l posterior thighs and periumbilical.     Functional Exam:  Transfers: min assist  Gait: RW with CG/close supervision  -------------------------------------     LABS:  PT/INR - ( 24 Aug 2019 06:34 )   PT: 34.6 sec;   INR: 2.99 ratio    PT/INR - ( 22 Aug 2019 05:35 )   PT: 27.9 sec;   INR: 2.42 ratio    PT/INR - ( 18 Aug 2019 05:15 )   PT: 28.6 sec;   INR: 2.48 ratio    PT/INR - ( 16 Aug 2019 08:15 )   PT: 25.0 sec;   INR: 2.18 ratio    08-24    141  |  109<H>  |  22  ----------------------------<  102<H>  4.0   |  26  |  1.72<H>    Ca    8.6      24 Aug 2019 06:34                            10.0   9.69  )-----------( 426      ( 22 Aug 2019 05:35 )             32.2     08-22    140  |  106  |  21  ----------------------------<  119<H>  4.1   |  27  |  1.71<H>    Ca    9.0      22 Aug 2019 05:35    TPro  7.1  /  Alb  2.6<L>  /  TBili  0.3  /  DBili  x   /  AST  12  /  ALT  10  /  AlkPhos  73  08-22    -------------------------------------   MEDICATIONS  (STANDING):  amiodarone    Tablet 200 milliGRAM(s) Oral daily  aspirin  chewable 81 milliGRAM(s) Oral daily  atorvastatin 40 milliGRAM(s) Oral at bedtime  clotrimazole 1% Cream 1 Application(s) Topical two times a day  docusate sodium 100 milliGRAM(s) Oral three times a day  fluticasone propionate 50 MICROgram(s)/spray Nasal Spray 1 Spray(s) Both Nostrils two times a day  loratadine 10 milliGRAM(s) Oral daily  melatonin 6 milliGRAM(s) Oral at bedtime  metoprolol tartrate 50 milliGRAM(s) Oral every 8 hours  pantoprazole    Tablet 40 milliGRAM(s) Oral before breakfast  psyllium Powder 1 Packet(s) Oral daily  senna 2 Tablet(s) Oral at bedtime  sertraline 50 milliGRAM(s) Oral daily  tamsulosin 0.4 milliGRAM(s) Oral at bedtime  tiotropium 18 MICROgram(s) Capsule 1 Capsule(s) Inhalation daily    MEDICATIONS  (PRN):  acetaminophen   Tablet .. 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    *Gait/ADL/functional deficits secondary to Cardiac surgery: CAD s/p CABG/aortic fibroelastoma removal  - Continue comprehensive Rehab Program of PT/OT   - incision well healed.   - Weekly CXR  - ASA, statin  - follow up outpatient Dr. Alvarado     *Pre-renal MILAGRO:  BUN/Cr stable but elevated. Monitor closely  - Not currently on diuretics  - 8/22 increased Cr. Encourage fluid intake.    * Buttock/thigh rash: Possibly fungal/inflammatory etiology - mycolog cream BID      *PAF - coumadin goal 2-2.5. .INR supratherapeutic based on recommended range. Give lower dose of coumadin . INR on  monday   - Coumadin, Continue Amiodarone and Metoprolol tartrate.     *HTN - Lopressor in setting of PAF    *BPH - flomax    *COPD :  - Patient reports that he does not have a hx of COPD  - f/u with PCP upon discharge.  - continue Duoneb as needed and spiriva here  - takes Breo at home; however non-formulary.   - Flonase and claritin for nasal congestion- d/w medicine    *DM2, well controlled - Diet, HA1C on 8/6 was 6.0%    *Incidental thyroid nodule noted on Carotic US: follow up with PCP for further eval.  - Last TSH on 8/6 was 0.88    *h/o FVL with PEs: IVC filter. Coumadin as above.     *GERD - Protonix    *Mood: depression - sertraline    *Prolonged QTc :- - QTc ranges 470-low 500s on previous EKGs  - Zoloft is home medication, - repeat EKG -- 463    *Insomnia - continue melatonin    * Mild leukocytosis - improved    * Morbid obesity :- Nutritional counseling, - RD to eval and educate     Pain management: Tylenol PRN    Bowel Management:  continent.   Senna and Docusate, Metamucil    Bladder Management: BPH - continue Tamsulosin. continent    Skin: Fungal rash- d/c mycolog. clotrimazole cream     * Cognition: Per Neuropsych: patient with Mild cognitive impairment    Precautions / PROPHYLAXIS:   - Falls, Cardiac,  sternal  - Pressure injury/Skin: Turn Q2hrs while in bed, OOB to Chair, PT/OT    - DVT: on coumadin SQ , TEDs, No SCDs due to IVC filter    Dispo: anticipate d/c on 8/28 home with home care and aide assistance. Continues to make progress in therapy, but limited due to poor endurance

## 2019-08-25 PROCEDURE — 99232 SBSQ HOSP IP/OBS MODERATE 35: CPT

## 2019-08-25 RX ADMIN — Medication 100 MILLIGRAM(S): at 21:03

## 2019-08-25 RX ADMIN — Medication 81 MILLIGRAM(S): at 11:56

## 2019-08-25 RX ADMIN — Medication 1 SPRAY(S): at 05:49

## 2019-08-25 RX ADMIN — WARFARIN SODIUM 2.5 MILLIGRAM(S): 2.5 TABLET ORAL at 21:04

## 2019-08-25 RX ADMIN — TAMSULOSIN HYDROCHLORIDE 0.4 MILLIGRAM(S): 0.4 CAPSULE ORAL at 21:04

## 2019-08-25 RX ADMIN — LORATADINE 10 MILLIGRAM(S): 10 TABLET ORAL at 11:57

## 2019-08-25 RX ADMIN — Medication 50 MILLIGRAM(S): at 14:51

## 2019-08-25 RX ADMIN — Medication 1 APPLICATION(S): at 17:20

## 2019-08-25 RX ADMIN — ATORVASTATIN CALCIUM 40 MILLIGRAM(S): 80 TABLET, FILM COATED ORAL at 21:03

## 2019-08-25 RX ADMIN — Medication 50 MILLIGRAM(S): at 21:04

## 2019-08-25 RX ADMIN — Medication 6 MILLIGRAM(S): at 21:04

## 2019-08-25 RX ADMIN — SENNA PLUS 2 TABLET(S): 8.6 TABLET ORAL at 21:04

## 2019-08-25 RX ADMIN — Medication 1 SPRAY(S): at 21:03

## 2019-08-25 RX ADMIN — SERTRALINE 50 MILLIGRAM(S): 25 TABLET, FILM COATED ORAL at 11:56

## 2019-08-25 RX ADMIN — AMIODARONE HYDROCHLORIDE 200 MILLIGRAM(S): 400 TABLET ORAL at 05:49

## 2019-08-25 RX ADMIN — Medication 1 APPLICATION(S): at 05:49

## 2019-08-25 RX ADMIN — PANTOPRAZOLE SODIUM 40 MILLIGRAM(S): 20 TABLET, DELAYED RELEASE ORAL at 05:49

## 2019-08-25 RX ADMIN — Medication 50 MILLIGRAM(S): at 05:50

## 2019-08-25 RX ADMIN — Medication 100 MILLIGRAM(S): at 05:49

## 2019-08-25 NOTE — PROGRESS NOTE ADULT - SUBJECTIVE AND OBJECTIVE BOX
SUBJECTIVE:  No acute events overnight, but reports continued intermittent dyspnea  Nasal congestion improved  Dyspnea improved    ROS: + CASTILLO.  Denies lightheadedness, headache, fevers/ chills, dysuria, constipation, fever/ chills.     -------------------------------------   OBJECTIVE  Vital Signs Last 24 Hrs  T(C): 36.3 (25 Aug 2019 08:05), Max: 36.5 (24 Aug 2019 20:00)  T(F): 97.4 (25 Aug 2019 08:05), Max: 97.7 (24 Aug 2019 20:00)  HR: 67 (25 Aug 2019 08:05) (58 - 100)  BP: 113/74 (25 Aug 2019 08:05) (113/74 - 135/78)  BP(mean): --  RR: 15 (25 Aug 2019 08:05) (15 - 15)  SpO2: 96% (25 Aug 2019 11:43) (95% - 96%)    - 137.4kg on admission bed weights.     131.9kg standing 8/15     132.2kg standing 8/16    131.5kg standing 8/19 8/21:131.7  8/22:131.8    129 Kg standing 8/23 8/24: 129.2kg  8/25:130    PHYSICAL EXAM  General: NAD, comfortable.  Cardio: RRR, S1/S2+  Resp: R side crackles at base otherwise CTAB.   Abdomen: soft, NTND, obese  Neuro:      Cognitive: AAO x3,      Communication: no dysarthria or aphasia     Strength: Overall 5/5 bilaterally.  Extrem: no edema, no calf tenderness   Skin: Sternal incision well healed open to air. Fungal maculopapular rash and satellite lesions b/l posterior thighs and periumbilical.     Functional Exam:  Transfers: min assist  Gait: RW with CG/close supervision  -------------------------------------     LABS:  PT/INR - ( 24 Aug 2019 06:34 )   PT: 34.6 sec;   INR: 2.99 ratio    PT/INR - ( 22 Aug 2019 05:35 )   PT: 27.9 sec;   INR: 2.42 ratio    PT/INR - ( 18 Aug 2019 05:15 )   PT: 28.6 sec;   INR: 2.48 ratio    PT/INR - ( 16 Aug 2019 08:15 )   PT: 25.0 sec;   INR: 2.18 ratio    08-24    141  |  109<H>  |  22  ----------------------------<  102<H>  4.0   |  26  |  1.72<H>    Ca    8.6      24 Aug 2019 06:34                            10.0   9.69  )-----------( 426      ( 22 Aug 2019 05:35 )             32.2     08-22    140  |  106  |  21  ----------------------------<  119<H>  4.1   |  27  |  1.71<H>    Ca    9.0      22 Aug 2019 05:35    TPro  7.1  /  Alb  2.6<L>  /  TBili  0.3  /  DBili  x   /  AST  12  /  ALT  10  /  AlkPhos  73  08-22    -------------------------------------   MEDICATIONS  (STANDING):  amiodarone    Tablet 200 milliGRAM(s) Oral daily  aspirin  chewable 81 milliGRAM(s) Oral daily  atorvastatin 40 milliGRAM(s) Oral at bedtime  clotrimazole 1% Cream 1 Application(s) Topical two times a day  docusate sodium 100 milliGRAM(s) Oral three times a day  fluticasone propionate 50 MICROgram(s)/spray Nasal Spray 1 Spray(s) Both Nostrils two times a day  loratadine 10 milliGRAM(s) Oral daily  melatonin 6 milliGRAM(s) Oral at bedtime  metoprolol tartrate 50 milliGRAM(s) Oral every 8 hours  pantoprazole    Tablet 40 milliGRAM(s) Oral before breakfast  psyllium Powder 1 Packet(s) Oral daily  senna 2 Tablet(s) Oral at bedtime  sertraline 50 milliGRAM(s) Oral daily  tamsulosin 0.4 milliGRAM(s) Oral at bedtime  tiotropium 18 MICROgram(s) Capsule 1 Capsule(s) Inhalation daily  warfarin 2.5 milliGRAM(s) Oral daily    MEDICATIONS  (PRN):  acetaminophen   Tablet .. 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    *Gait/ADL/functional deficits secondary to Cardiac surgery: CAD s/p CABG/aortic fibroelastoma removal  - Continue comprehensive Rehab Program of PT/OT   - incision well healed.   - Weekly CXR  - ASA, statin  - follow up outpatient Dr. Alvarado     *Pre-renal MILAGRO:  BUN/Cr stable but elevated. Monitor closely  - Not currently on diuretics  - Encourage fluid intake.    * Buttock/thigh rash: Possibly fungal/inflammatory etiology - mycolog cream BID      *PAF - coumadin goal 2-2.5. .INR supratherapeutic based on recommended range. Give lower dose of coumadin Sat and Sunday . INR on  monday   - Coumadin, Continue Amiodarone and Metoprolol tartrate.     *HTN - Lopressor in setting of PAF    *BPH - flomax    *COPD :  - Patient reports that he does not have a hx of COPD  - f/u with PCP upon discharge.  - continue Duoneb as needed and spiriva here  - takes Breo at home; however non-formulary.   - Flonase and claritin for nasal congestion- improved    *DM2:- Diet, HA1C on 8/6 was 6.0%    *Incidental thyroid nodule noted on Carotic US: follow up with PCP for further eval.  - Last TSH on 8/6 was 0.88    *h/o FVL with PEs: IVC filter. Coumadin as above.     *GERD - Protonix    *Mood: depression - sertraline    *Prolonged QTc :- - QTc ranges 470-low 500s on previous EKGs  - Zoloft is home medication, - repeat EKG -- 463    *Insomnia - continue melatonin    * Mild leukocytosis - improved    * Morbid obesity :- Nutritional counseling, - RD to eval and educate     Pain management: Tylenol PRN    Bowel Management:  continent.   Senna and Docusate, Metamucil    Bladder Management: BPH - continue Tamsulosin. continent    Skin: Fungal rash- d/c mycolog. clotrimazole cream     * Cognition: Per Neuropsych: patient with Mild cognitive impairment    Precautions / PROPHYLAXIS:   - Falls, Cardiac,  sternal  - Pressure injury/Skin: Turn Q2hrs while in bed, OOB to Chair, PT/OT    - DVT: on coumadin SQ , TEDs, No SCDs due to IVC filter    Dispo: anticipate d/c on 8/28 home with home care and aide assistance. Continues to make progress in therapy, but limited due to poor endurance

## 2019-08-26 LAB
ANION GAP SERPL CALC-SCNC: 6 MMOL/L — SIGNIFICANT CHANGE UP (ref 5–17)
BUN SERPL-MCNC: 21 MG/DL — SIGNIFICANT CHANGE UP (ref 7–23)
CALCIUM SERPL-MCNC: 8.8 MG/DL — SIGNIFICANT CHANGE UP (ref 8.4–10.5)
CHLORIDE SERPL-SCNC: 107 MMOL/L — SIGNIFICANT CHANGE UP (ref 96–108)
CO2 SERPL-SCNC: 27 MMOL/L — SIGNIFICANT CHANGE UP (ref 22–31)
CREAT SERPL-MCNC: 1.78 MG/DL — HIGH (ref 0.5–1.3)
GLUCOSE SERPL-MCNC: 102 MG/DL — HIGH (ref 70–99)
HCT VFR BLD CALC: 33 % — LOW (ref 39–50)
HGB BLD-MCNC: 10.1 G/DL — LOW (ref 13–17)
INR BLD: 3.2 RATIO — HIGH (ref 0.88–1.16)
MCHC RBC-ENTMCNC: 27.1 PG — SIGNIFICANT CHANGE UP (ref 27–34)
MCHC RBC-ENTMCNC: 30.6 GM/DL — LOW (ref 32–36)
MCV RBC AUTO: 88.5 FL — SIGNIFICANT CHANGE UP (ref 80–100)
NRBC # BLD: 0 /100 WBCS — SIGNIFICANT CHANGE UP (ref 0–0)
PLATELET # BLD AUTO: 409 K/UL — HIGH (ref 150–400)
POTASSIUM SERPL-MCNC: 4.3 MMOL/L — SIGNIFICANT CHANGE UP (ref 3.5–5.3)
POTASSIUM SERPL-SCNC: 4.3 MMOL/L — SIGNIFICANT CHANGE UP (ref 3.5–5.3)
PROTHROM AB SERPL-ACNC: 37.2 SEC — HIGH (ref 10–12.9)
RBC # BLD: 3.73 M/UL — LOW (ref 4.2–5.8)
RBC # FLD: 14 % — SIGNIFICANT CHANGE UP (ref 10.3–14.5)
SODIUM SERPL-SCNC: 140 MMOL/L — SIGNIFICANT CHANGE UP (ref 135–145)
WBC # BLD: 7.34 K/UL — SIGNIFICANT CHANGE UP (ref 3.8–10.5)
WBC # FLD AUTO: 7.34 K/UL — SIGNIFICANT CHANGE UP (ref 3.8–10.5)

## 2019-08-26 PROCEDURE — 99232 SBSQ HOSP IP/OBS MODERATE 35: CPT

## 2019-08-26 RX ORDER — ATORVASTATIN CALCIUM 80 MG/1
1 TABLET, FILM COATED ORAL
Qty: 0 | Refills: 0 | DISCHARGE
Start: 2019-08-26

## 2019-08-26 RX ORDER — DOCUSATE SODIUM 100 MG
1 CAPSULE ORAL
Qty: 0 | Refills: 0 | DISCHARGE
Start: 2019-08-26

## 2019-08-26 RX ORDER — PSYLLIUM SEED (WITH DEXTROSE)
0 POWDER (GRAM) ORAL
Qty: 0 | Refills: 0 | DISCHARGE
Start: 2019-08-26

## 2019-08-26 RX ORDER — ASPIRIN/CALCIUM CARB/MAGNESIUM 324 MG
1 TABLET ORAL
Qty: 0 | Refills: 0 | DISCHARGE
Start: 2019-08-26

## 2019-08-26 RX ORDER — METOPROLOL TARTRATE 50 MG
1 TABLET ORAL
Qty: 0 | Refills: 0 | DISCHARGE
Start: 2019-08-26

## 2019-08-26 RX ORDER — PANTOPRAZOLE SODIUM 20 MG/1
1 TABLET, DELAYED RELEASE ORAL
Qty: 0 | Refills: 0 | DISCHARGE
Start: 2019-08-26

## 2019-08-26 RX ORDER — ACETAMINOPHEN 500 MG
2 TABLET ORAL
Qty: 0 | Refills: 0 | DISCHARGE
Start: 2019-08-26

## 2019-08-26 RX ORDER — AMIODARONE HYDROCHLORIDE 400 MG/1
1 TABLET ORAL
Qty: 0 | Refills: 0 | DISCHARGE
Start: 2019-08-26

## 2019-08-26 RX ORDER — TAMSULOSIN HYDROCHLORIDE 0.4 MG/1
1 CAPSULE ORAL
Qty: 0 | Refills: 0 | DISCHARGE
Start: 2019-08-26

## 2019-08-26 RX ORDER — SENNA PLUS 8.6 MG/1
2 TABLET ORAL
Qty: 0 | Refills: 0 | DISCHARGE
Start: 2019-08-26

## 2019-08-26 RX ADMIN — SENNA PLUS 2 TABLET(S): 8.6 TABLET ORAL at 21:52

## 2019-08-26 RX ADMIN — Medication 100 MILLIGRAM(S): at 21:52

## 2019-08-26 RX ADMIN — Medication 1 SPRAY(S): at 05:44

## 2019-08-26 RX ADMIN — Medication 1 APPLICATION(S): at 17:41

## 2019-08-26 RX ADMIN — PANTOPRAZOLE SODIUM 40 MILLIGRAM(S): 20 TABLET, DELAYED RELEASE ORAL at 05:45

## 2019-08-26 RX ADMIN — Medication 6 MILLIGRAM(S): at 21:52

## 2019-08-26 RX ADMIN — Medication 100 MILLIGRAM(S): at 05:44

## 2019-08-26 RX ADMIN — TAMSULOSIN HYDROCHLORIDE 0.4 MILLIGRAM(S): 0.4 CAPSULE ORAL at 21:53

## 2019-08-26 RX ADMIN — LORATADINE 10 MILLIGRAM(S): 10 TABLET ORAL at 12:14

## 2019-08-26 RX ADMIN — Medication 1 APPLICATION(S): at 05:43

## 2019-08-26 RX ADMIN — ATORVASTATIN CALCIUM 40 MILLIGRAM(S): 80 TABLET, FILM COATED ORAL at 21:52

## 2019-08-26 RX ADMIN — AMIODARONE HYDROCHLORIDE 200 MILLIGRAM(S): 400 TABLET ORAL at 05:43

## 2019-08-26 RX ADMIN — Medication 650 MILLIGRAM(S): at 21:56

## 2019-08-26 RX ADMIN — Medication 50 MILLIGRAM(S): at 05:44

## 2019-08-26 RX ADMIN — Medication 650 MILLIGRAM(S): at 22:30

## 2019-08-26 RX ADMIN — Medication 81 MILLIGRAM(S): at 12:14

## 2019-08-26 RX ADMIN — Medication 1 SPRAY(S): at 17:40

## 2019-08-26 RX ADMIN — Medication 50 MILLIGRAM(S): at 21:52

## 2019-08-26 RX ADMIN — SERTRALINE 50 MILLIGRAM(S): 25 TABLET, FILM COATED ORAL at 12:14

## 2019-08-26 NOTE — CHART NOTE - NSCHARTNOTEFT_GEN_A_CORE
Nutrition Follow Up Note  Hospital Course   (Per Electronic Medical Record):     Source:   Patient [X]  Medical Record [X]      Diet:   Consistent Carbohydrate [facility] [address] Diet w/ Thin Liquids  Tolerates Diet Well  No Chewing/Swallowing Difficulties  No Recent Nausea, Vomiting, Diarrhea or Constipation  Nutrition Education Provided on [facility] Diet   Consumes % of Meals (as Per Documentation) - States Good PO Intake/Appetite     Enteral/Parenteral Nutrition: N/A    Current Weight: 284.8lb on 8/26  Obtain Weights Daily  Weight Trending Downward    Pertinent Medications: MEDICATIONS  (STANDING):  amiodarone    Tablet 200 milliGRAM(s) Oral daily  aspirin  chewable 81 milliGRAM(s) Oral daily  atorvastatin 40 milliGRAM(s) Oral at bedtime  clotrimazole 1% Cream 1 Application(s) Topical two times a day  docusate sodium 100 milliGRAM(s) Oral three times a day  fluticasone propionate 50 MICROgram(s)/spray Nasal Spray 1 Spray(s) Both Nostrils two times a day  loratadine 10 milliGRAM(s) Oral daily  melatonin 6 milliGRAM(s) Oral at bedtime  metoprolol tartrate 50 milliGRAM(s) Oral every 8 hours  pantoprazole    Tablet 40 milliGRAM(s) Oral before breakfast  psyllium Powder 1 Packet(s) Oral daily  senna 2 Tablet(s) Oral at bedtime  sertraline 50 milliGRAM(s) Oral daily  tamsulosin 0.4 milliGRAM(s) Oral at bedtime  tiotropium 18 MICROgram(s) Capsule 1 Capsule(s) Inhalation daily    MEDICATIONS  (PRN):  acetaminophen   Tablet .. 650 milliGRAM(s) Oral every 6 hours PRN Moderate Pain (4 - 6)  ALBUTerol/ipratropium for Nebulization 3 milliLiter(s) Nebulizer every 6 hours PRN Shortness of Breath and/or Wheezing    Pertinent Labs:  08-26 Na140 mmol/L Glu 102 mg/dL<H> K+ 4.3 mmol/L Cr  1.78 mg/dL<H> BUN 21 mg/dL 08-22 Alb 2.6 g/dL<L> 08-06 PAB 20 mg/dL 08-06 [id number] 6.0 %<H>    Skin: No Pressure Ulcers  Surgical Incision(as Per Nursing Flow Sheet)     Edema: None Noted     Last Bowel Movement: on 8/25    Estimated Needs:   [X] No Change Since Previous Assessment    Previous Nutrition Diagnosis:   Obese    Nutrition Diagnosis is [X] Ongoing - Nutrition Education Provided on [facility] Diet     New Nutrition Diagnosis: [X] Not Applicable    Interventions:   1. Nutrition Education Provided on [facility] Diet   2. Recommend Continue Nutrition Plan of Care     Monitoring & Evaluation:   [X] Weights   [X] PO Intake   [X] Follow Up (Per Protocol)  [X] Tolerance to Diet Prescription   [X] Other: Labs & POCT    Registered Dietitian/Nutritionist Remains Available.  Geovany Becker RDN    Pager # [phone]  Phone# [phone]

## 2019-08-26 NOTE — PROGRESS NOTE ADULT - ASSESSMENT
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 Daily PT/INR.  Amiodarone. Metoprolol tartrate.     HTN/tacycadic intermittently -  Lopressor     BPH - flomax    prediabetic- diet controlled,  ( patient says he has never a diabetic, his sugars are same for many yrs does not take any meds for dm) diet controlled, d/c iss, accuchecks     COPD - continue Duoneb prn    Incidental thyroid nodule noted on Carotic US: follow up with PCP for further eval.    GERD - Protonix     depression - sertraline    Insomnia - melatonin    will follow

## 2019-08-26 NOTE — PROGRESS NOTE ADULT - SUBJECTIVE AND OBJECTIVE BOX
SUBJECTIVE:  No acute events overnight,  Nasal congestion improved  Dyspnea improved    ROS: + CASTILLO.  Denies lightheadedness, headache, fevers/ chills, dysuria, constipation, fever/ chills.     -------------------------------------   Vital Signs Last 24 Hrs  T(C): 36.5 (26 Aug 2019 07:50), Max: 36.5 (26 Aug 2019 07:50)  T(F): 97.7 (26 Aug 2019 07:50), Max: 97.7 (26 Aug 2019 07:50)  HR: 56 (26 Aug 2019 07:50) (56 - 68)  BP: 105/66 (26 Aug 2019 07:50) (105/66 - 138/80)  BP(mean): --  RR: 14 (26 Aug 2019 07:50) (14 - 16)  SpO2: 97% (26 Aug 2019 07:50) (97% - 98%)    - 137.4kg on admission bed weights.     131.9kg standing 8/15     132.2kg standing 8/16    131.5kg standing 8/19 8/21:131.7  8/22:131.8    129 Kg standing 8/23 8/24: 129.2kg  8/25:130  8/23:129.2    PHYSICAL EXAM  General: NAD, comfortable.  Cardio: RRR, S1/S2+  Resp: R side crackles at base otherwise CTAB.   Abdomen: soft, NTND, obese  Extrem: no edema, no calf tenderness   Skin: Sternal incision well healed open to air. Fungal maculopapular rash and satellite lesions b/l posterior thighs and periumbilical- much improved    Functional Exam:  Transfers: close supervision  Gait: RW with CG/close supervision  -------------------------------------     LABS:  PT/INR - ( 26 Aug 2019 05:38 )   PT: 37.2 sec;   INR: 3.20 ratio    PT/INR - ( 24 Aug 2019 06:34 )   PT: 34.6 sec;   INR: 2.99 ratio    PT/INR - ( 22 Aug 2019 05:35 )   PT: 27.9 sec;   INR: 2.42 ratio    PT/INR - ( 18 Aug 2019 05:15 )   PT: 28.6 sec;   INR: 2.48 ratio    PT/INR - ( 16 Aug 2019 08:15 )   PT: 25.0 sec;   INR: 2.18 ratio                              10.1   7.34  )-----------( 409      ( 26 Aug 2019 05:38 )             33.0     08-26    140  |  107  |  21  ----------------------------<  102<H>  4.3   |  27  |  1.78<H>    Ca    8.8      26 Aug 2019 05:38      08-24    141  |  109<H>  |  22  ----------------------------<  102<H>  4.0   |  26  |  1.72<H>    Ca    8.6      24 Aug 2019 06:34                            10.0   9.69  )-----------( 426      ( 22 Aug 2019 05:35 )             32.2     08-22    140  |  106  |  21  ----------------------------<  119<H>  4.1   |  27  |  1.71<H>    Ca    9.0      22 Aug 2019 05:35    TPro  7.1  /  Alb  2.6<L>  /  TBili  0.3  /  DBili  x   /  AST  12  /  ALT  10  /  AlkPhos  73  08-22    -------------------------------------   MEDICATIONS  (STANDING):  amiodarone    Tablet 200 milliGRAM(s) Oral daily  aspirin  chewable 81 milliGRAM(s) Oral daily  atorvastatin 40 milliGRAM(s) Oral at bedtime  clotrimazole 1% Cream 1 Application(s) Topical two times a day  docusate sodium 100 milliGRAM(s) Oral three times a day  fluticasone propionate 50 MICROgram(s)/spray Nasal Spray 1 Spray(s) Both Nostrils two times a day  loratadine 10 milliGRAM(s) Oral daily  melatonin 6 milliGRAM(s) Oral at bedtime  metoprolol tartrate 50 milliGRAM(s) Oral every 8 hours  pantoprazole    Tablet 40 milliGRAM(s) Oral before breakfast  psyllium Powder 1 Packet(s) Oral daily  senna 2 Tablet(s) Oral at bedtime  sertraline 50 milliGRAM(s) Oral daily  tamsulosin 0.4 milliGRAM(s) Oral at bedtime  tiotropium 18 MICROgram(s) Capsule 1 Capsule(s) Inhalation daily  warfarin 2.5 milliGRAM(s) Oral daily    MEDICATIONS  (PRN):  acetaminophen   Tablet .. 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    *Gait/ADL/functional deficits secondary to Cardiac surgery: CAD s/p CABG/aortic fibroelastoma removal  - Continue comprehensive Rehab Program of PT/OT   - incision well healed.   - Weekly CXR  - ASA, statin  - follow up outpatient Dr. Alvarado     *Pre-renal MILAGRO:  BUN/Cr stable but elevated. Monitor closely  - Not currently on diuretics  - Encourage fluid intake.    PAF - coumadin goal 2-2.5. .INR supratherapeutic based on recommended range. HOLD COUMADIN today . INR in am    - Coumadin, Continue Amiodarone and Metoprolol tartrate.     *HTN - Lopressor in setting of PAF    *COPD :  - Patient reports that he does not have a hx of COPD  - f/u with PCP upon discharge.  - continue Duoneb as needed and spiriva here  - takes Breo at home; however non-formulary.   - Flonase and claritin for nasal congestion- improved    *DM2:- Diet, HA1C on 8/6 was 6.0%    *Incidental thyroid nodule noted on Carotic US: follow up with PCP for further eval.  - Last TSH on 8/6 was 0.88    *h/o FVL with PEs: IVC filter. Coumadin as above.     *GERD - Protonix    *Mood: depression - sertraline, QTC on last EKG improved     Insomnia - continue melatonin     Mild leukocytosis - improved     Morbid obesity :- Nutritional counseling, - RD to eval and educate     Pain management: Tylenol PRN    Bowel Management:  continent.   Senna and Docusate, Metamucil    Bladder Management: BPH - continue Tamsulosin. continent. BPH - on flomax    Skin: Fungal rash- d/c mycolog. clotrimazole cream     * Cognition: Per Neuropsych: patient with Mild cognitive impairment    Precautions / PROPHYLAXIS:   - Falls, Cardiac,  sternal  - Pressure injury/Skin: Turn Q2hrs while in bed, OOB to Chair, PT/OT    - DVT: on coumadin SQ , TEDs, No SCDs due to IVC filter    Dispo: anticipate d/c on 8/28 home with home care and aide assistance.   Progressing well in therapy

## 2019-08-26 NOTE — PROGRESS NOTE ADULT - SUBJECTIVE AND OBJECTIVE BOX
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 Ta for surgical cardiac evaluation of LVOT.  He was subsequently admitted to [facility] CT surgery service for CABG x2 and excision of fibroelastoma on 8/7 by Dr. Alvarado. Patient extubated without post-operative complications.  Hospital course significant for pain at surgical site and hypoxia requiring high flow, now weaned off. Chest tube removed on 8/13. Hospital course also notable for pre-renal MILAGRO, evaluated by nephrology with recommendations to follow up given Cr downtrending. Post operatively noted to have episodes of A fib / PACs on telemetry, loaded with amiodarone and now continued on maintenance dose.      seen at the bedside, no n/v, no sob. no dysuria      Vital Signs Last 24 Hrs  T(C): 36.5 (26 Aug 2019 07:50), Max: 36.5 (26 Aug 2019 07:50)  T(F): 97.7 (26 Aug 2019 07:50), Max: 97.7 (26 Aug 2019 07:50)  HR: 56 (26 Aug 2019 07:50) (56 - 68)  BP: 105/66 (26 Aug 2019 07:50) (105/66 - 138/80)  BP(mean): --  RR: 14 (26 Aug 2019 07:50) (14 - 16)  SpO2: 97% (26 Aug 2019 07:50) (96% - 98%)      GENERAL- NAD  EAR/NOSE/MOUTH/THROAT - no pharyngeal exudates, no oral lesions  MMM  EYES- JASON, conjunctiva and Sclera clear  NECK- supple  RESPIRATORY-  clear to auscultation bilaterally  CARDIOVASCULAR - SIS2, RRR  GI - soft NT BS present  EXTREMITIES- no pedal  edema  NEUROLOGY- no gross focal deficits  SKIN- chest incision clean, no rashes, warm to touch  PSYCHIATRY- AAO X 3  MUSCULOSKELETAL- ROM normal                10.1                 140  | 27   | 21           7.34  >-----------< 409     ------------------------< 102                   33.0                 4.3  | 107  | 1.78                                         Ca 8.8   Mg x     Ph x        PT/INR - ( 26 Aug 2019 05:38 )   PT: 37.2 sec;   INR: 3.20 ratio

## 2019-08-27 LAB
APPEARANCE UR: CLEAR — SIGNIFICANT CHANGE UP
BILIRUB UR-MCNC: NEGATIVE — SIGNIFICANT CHANGE UP
COLOR SPEC: YELLOW — SIGNIFICANT CHANGE UP
DIFF PNL FLD: NEGATIVE — SIGNIFICANT CHANGE UP
GLUCOSE UR QL: NEGATIVE — SIGNIFICANT CHANGE UP
INR BLD: 3.11 RATIO — HIGH (ref 0.88–1.16)
KETONES UR-MCNC: NEGATIVE — SIGNIFICANT CHANGE UP
LEUKOCYTE ESTERASE UR-ACNC: NEGATIVE — SIGNIFICANT CHANGE UP
NITRITE UR-MCNC: NEGATIVE — SIGNIFICANT CHANGE UP
PH UR: 5 — SIGNIFICANT CHANGE UP (ref 5–8)
PROT UR-MCNC: NEGATIVE — SIGNIFICANT CHANGE UP
PROTHROM AB SERPL-ACNC: 36.1 SEC — HIGH (ref 10–12.9)
SP GR SPEC: 1.02 — SIGNIFICANT CHANGE UP (ref 1.01–1.02)
UROBILINOGEN FLD QL: NEGATIVE — SIGNIFICANT CHANGE UP

## 2019-08-27 PROCEDURE — 99233 SBSQ HOSP IP/OBS HIGH 50: CPT

## 2019-08-27 PROCEDURE — 76775 US EXAM ABDO BACK WALL LIM: CPT | Mod: 26

## 2019-08-27 RX ORDER — TAMSULOSIN HYDROCHLORIDE 0.4 MG/1
1 CAPSULE ORAL
Qty: 30 | Refills: 0
Start: 2019-08-27 | End: 2019-09-25

## 2019-08-27 RX ORDER — LORATADINE 10 MG/1
1 TABLET ORAL
Qty: 30 | Refills: 0
Start: 2019-08-27 | End: 2019-09-25

## 2019-08-27 RX ORDER — WARFARIN SODIUM 2.5 MG/1
1 TABLET ORAL
Qty: 0 | Refills: 0 | DISCHARGE

## 2019-08-27 RX ORDER — METOPROLOL TARTRATE 50 MG
1 TABLET ORAL
Qty: 90 | Refills: 0
Start: 2019-08-27 | End: 2019-09-25

## 2019-08-27 RX ORDER — SODIUM CHLORIDE 9 MG/ML
1000 INJECTION INTRAMUSCULAR; INTRAVENOUS; SUBCUTANEOUS
Refills: 0 | Status: DISCONTINUED | OUTPATIENT
Start: 2019-08-27 | End: 2019-08-28

## 2019-08-27 RX ORDER — SERTRALINE 25 MG/1
1 TABLET, FILM COATED ORAL
Qty: 50 | Refills: 0
Start: 2019-08-27 | End: 2019-10-15

## 2019-08-27 RX ORDER — AMIODARONE HYDROCHLORIDE 400 MG/1
1 TABLET ORAL
Qty: 30 | Refills: 0
Start: 2019-08-27 | End: 2019-09-25

## 2019-08-27 RX ORDER — ATORVASTATIN CALCIUM 80 MG/1
1 TABLET, FILM COATED ORAL
Qty: 30 | Refills: 0
Start: 2019-08-27 | End: 2019-09-25

## 2019-08-27 RX ORDER — SODIUM CHLORIDE 9 MG/ML
1000 INJECTION INTRAMUSCULAR; INTRAVENOUS; SUBCUTANEOUS
Refills: 0 | Status: DISCONTINUED | OUTPATIENT
Start: 2019-08-27 | End: 2019-08-27

## 2019-08-27 RX ORDER — PANTOPRAZOLE SODIUM 20 MG/1
1 TABLET, DELAYED RELEASE ORAL
Qty: 30 | Refills: 0
Start: 2019-08-27 | End: 2019-09-25

## 2019-08-27 RX ADMIN — Medication 100 MILLIGRAM(S): at 05:37

## 2019-08-27 RX ADMIN — Medication 6 MILLIGRAM(S): at 21:26

## 2019-08-27 RX ADMIN — Medication 1 SPRAY(S): at 05:37

## 2019-08-27 RX ADMIN — Medication 50 MILLIGRAM(S): at 16:12

## 2019-08-27 RX ADMIN — PANTOPRAZOLE SODIUM 40 MILLIGRAM(S): 20 TABLET, DELAYED RELEASE ORAL at 05:40

## 2019-08-27 RX ADMIN — AMIODARONE HYDROCHLORIDE 200 MILLIGRAM(S): 400 TABLET ORAL at 05:36

## 2019-08-27 RX ADMIN — ATORVASTATIN CALCIUM 40 MILLIGRAM(S): 80 TABLET, FILM COATED ORAL at 21:26

## 2019-08-27 RX ADMIN — Medication 81 MILLIGRAM(S): at 12:31

## 2019-08-27 RX ADMIN — LORATADINE 10 MILLIGRAM(S): 10 TABLET ORAL at 12:31

## 2019-08-27 RX ADMIN — Medication 100 MILLIGRAM(S): at 21:26

## 2019-08-27 RX ADMIN — SODIUM CHLORIDE 100 MILLILITER(S): 9 INJECTION INTRAMUSCULAR; INTRAVENOUS; SUBCUTANEOUS at 21:25

## 2019-08-27 RX ADMIN — SODIUM CHLORIDE 100 MILLILITER(S): 9 INJECTION INTRAMUSCULAR; INTRAVENOUS; SUBCUTANEOUS at 11:00

## 2019-08-27 RX ADMIN — Medication 50 MILLIGRAM(S): at 05:38

## 2019-08-27 RX ADMIN — Medication 1 APPLICATION(S): at 05:36

## 2019-08-27 RX ADMIN — TAMSULOSIN HYDROCHLORIDE 0.4 MILLIGRAM(S): 0.4 CAPSULE ORAL at 21:26

## 2019-08-27 RX ADMIN — SERTRALINE 50 MILLIGRAM(S): 25 TABLET, FILM COATED ORAL at 12:32

## 2019-08-27 RX ADMIN — SENNA PLUS 2 TABLET(S): 8.6 TABLET ORAL at 21:27

## 2019-08-27 NOTE — PROGRESS NOTE ADULT - SUBJECTIVE AND OBJECTIVE BOX
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 Ta for surgical cardiac evaluation of LVOT.  He was subsequently admitted to [facility] CT surgery service for CABG x2 and excision of fibroelastoma on 8/7 by Dr. Alvarado. Patient extubated without post-operative complications.  Hospital course significant for pain at surgical site and hypoxia requiring high flow, now weaned off. Chest tube removed on 8/13. Hospital course also notable for pre-renal MILAGRO, evaluated by nephrology with recommendations to follow up given Cr downtrending. Post operatively noted to have episodes of A fib / PACs on telemetry, loaded with amiodarone and now continued on maintenance dose.      seen at the bedside, no n/v, no sob. no dysuria      Vital Signs Last 24 Hrs  T(C): 36.5 (27 Aug 2019 07:42), Max: 36.5 (27 Aug 2019 07:42)  T(F): 97.7 (27 Aug 2019 07:42), Max: 97.7 (27 Aug 2019 07:42)  HR: 67 (27 Aug 2019 07:42) (67 - 109)  BP: 123/69 (27 Aug 2019 07:42) (123/69 - 146/80)  BP(mean): --  RR: 16 (27 Aug 2019 07:42) (16 - 16)  SpO2: 94% (26 Aug 2019 22:13) (94% - 98%)      GENERAL- NAD  EAR/NOSE/MOUTH/THROAT - no pharyngeal exudates, no oral lesions  MMM  EYES- JASON, conjunctiva and Sclera clear  NECK- supple  RESPIRATORY-  clear to auscultation bilaterally  CARDIOVASCULAR - SIS2, RRR  GI - soft NT BS present  EXTREMITIES- no pedal  edema  NEUROLOGY- no gross focal deficits  SKIN- chest incision clean, no rashes, warm to touch  PSYCHIATRY- AAO X 3  MUSCULOSKELETAL- ROM normal                  10.1                 140  | 27   | 21           7.34  >-----------< 409     ------------------------< 102                   33.0                 4.3  | 107  | 1.78                                         Ca 8.8   Mg x     Ph x        PT/INR - ( 27 Aug 2019 05:25 )   PT: 36.1 sec;   INR: 3.11 ratio

## 2019-08-27 NOTE — PROGRESS NOTE ADULT - ASSESSMENT
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 ascencion Darby V leiden/PE/ PAF - IVC filter, elevated inr, dose coumadin goal 2-2.5. Daily PT/INR.  Amiodarone. Metoprolol tartrate.     HTN/tacycadic intermittently -  Lopressor     BPH - flomax    prediabetic- diet controlled,  ( patient says he has never a diabetic, his sugars are same for many yrs does not take any meds for dm) diet controlled, d/c iss, accuchecks     COPD - continue Duoneb prn    Incidental thyroid nodule noted on Carotic US: follow up with PCP for further eval.    GERD - Protonix     depression - sertraline    Insomnia - melatonin    will follow

## 2019-08-27 NOTE — PROGRESS NOTE ADULT - SUBJECTIVE AND OBJECTIVE BOX
SUBJECTIVE:  No acute events overnight,  Nasal congestion improved  Dyspnea improved  Labs with uptrending creat- IVF gentle per hospitalist, Renal sonogram     ROS: + CASTILLO.  Denies lightheadedness, headache, fevers/ chills, dysuria, constipation, fever/ chills.     -------------------------------------   Vital Signs Last 24 Hrs  T(C): 36.5 (27 Aug 2019 07:42), Max: 36.5 (27 Aug 2019 07:42)  T(F): 97.7 (27 Aug 2019 07:42), Max: 97.7 (27 Aug 2019 07:42)  HR: 67 (27 Aug 2019 07:42) (67 - 109)  BP: 123/69 (27 Aug 2019 07:42) (123/69 - 146/80)  BP(mean): --  RR: 16 (27 Aug 2019 07:42) (16 - 16)  SpO2: 94% (26 Aug 2019 22:13) (94% - 98%)      - 137.4kg on admission bed weights.     131.9kg standing 8/15     132.2kg standing 8/16    131.5kg standing 8/19 8/21:131.7  8/22:131.8    129 Kg standing 8/23 8/24: 129.2kg  8/25:130  8/26:129.2  8/27:? 134.8      PHYSICAL EXAM  General: NAD, comfortable.  Cardio: RRR, S1/S2+  Resp: R side crackles at base otherwise CTAB.   Abdomen: soft, NTND, obese  Extrem: no edema, no calf tenderness   Skin: Sternal incision well healed open to air. Fungal maculopapular rash and satellite lesions b/l posterior thighs and periumbilical- much improved    Functional Exam:  Transfers: close supervision  Gait: RW with CG/close supervision  -------------------------------------     LABS:  PT/INR - ( 27 Aug 2019 05:25 )   PT: 36.1 sec;   INR: 3.11 ratio    PT/INR - ( 26 Aug 2019 05:38 )   PT: 37.2 sec;   INR: 3.20 ratio    PT/INR - ( 24 Aug 2019 06:34 )   PT: 34.6 sec;   INR: 2.99 ratio    PT/INR - ( 22 Aug 2019 05:35 )   PT: 27.9 sec;   INR: 2.42 ratio    PT/INR - ( 18 Aug 2019 05:15 )   PT: 28.6 sec;   INR: 2.48 ratio    PT/INR - ( 16 Aug 2019 08:15 )   PT: 25.0 sec;   INR: 2.18 ratio        08-26    140  |  107  |  21  ----------------------------<  102<H>  4.3   |  27  |  1.78<H>    Ca    8.8      26 Aug 2019 05:38                          10.1   7.34  )-----------( 409      ( 26 Aug 2019 05:38 )             33.0     08-26    140  |  107  |  21  ----------------------------<  102<H>  4.3   |  27  |  1.78<H>    Ca    8.8      26 Aug 2019 05:38      08-24    141  |  109<H>  |  22  ----------------------------<  102<H>  4.0   |  26  |  1.72<H>    Ca    8.6      24 Aug 2019 06:34                            10.0   9.69  )-----------( 426      ( 22 Aug 2019 05:35 )             32.2     08-22    140  |  106  |  21  ----------------------------<  119<H>  4.1   |  27  |  1.71<H>    Ca    9.0      22 Aug 2019 05:35    TPro  7.1  /  Alb  2.6<L>  /  TBili  0.3  /  DBili  x   /  AST  12  /  ALT  10  /  AlkPhos  73  08-22    -------------------------------------   MEDICATIONS  (STANDING):  amiodarone    Tablet 200 milliGRAM(s) Oral daily  aspirin  chewable 81 milliGRAM(s) Oral daily  atorvastatin 40 milliGRAM(s) Oral at bedtime  clotrimazole 1% Cream 1 Application(s) Topical two times a day  docusate sodium 100 milliGRAM(s) Oral three times a day  loratadine 10 milliGRAM(s) Oral daily  melatonin 6 milliGRAM(s) Oral at bedtime  metoprolol tartrate 50 milliGRAM(s) Oral every 8 hours  pantoprazole    Tablet 40 milliGRAM(s) Oral before breakfast  psyllium Powder 1 Packet(s) Oral daily  senna 2 Tablet(s) Oral at bedtime  sertraline 50 milliGRAM(s) Oral daily  sodium chloride 0.9%. 1000 milliLiter(s) (100 mL/Hr) IV Continuous <Continuous>  tamsulosin 0.4 milliGRAM(s) Oral at bedtime  tiotropium 18 MICROgram(s) Capsule 1 Capsule(s) Inhalation daily    MEDICATIONS  (PRN):  acetaminophen   Tablet .. 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    *Gait/ADL/functional deficits secondary to Cardiac surgery: CAD s/p CABG/aortic fibroelastoma removal  - Continue comprehensive Rehab Program of PT/OT   - incision well healed.   - Weekly CXR  - ASA, statin  - follow up outpatient Dr. Alvarado     *Pre-renal MILAGRO:  BUN/Cr stable but elevated: IVF per hospitalist, renal sonogram. CXR in am   - Not currently on diuretics  - Encourage fluid intake.    PAF - coumadin goal 2-2.5. .INR supratherapeutic based on recommended range. HOLD COUMADIN today . INR in am    - Coumadin, Continue Amiodarone and Metoprolol tartrate.     *HTN - Lopressor in setting of PAF    *COPD :  - Patient reports that he does not have a hx of COPD  - f/u with PCP upon discharge.  - continue Duoneb as needed and spiriva here  - takes Breo at home; however non-formulary.   - Flonase and claritin for nasal congestion- improved    *DM2:- Diet, HA1C on 8/6 was 6.0%    *Incidental thyroid nodule noted on Carotic US: follow up with PCP for further eval.  - Last TSH on 8/6 was 0.88    *h/o FVL with PEs: IVC filter. Coumadin as above.     *GERD - Protonix    *Mood: depression - sertraline, QTC on last EKG improved     Insomnia - continue melatonin     Mild leukocytosis - improved     Morbid obesity :- Nutritional counseling, - RD to eval and educate     Pain management: Tylenol PRN    Bowel Management:  continent.   Senna and Docusate, Metamucil    Bladder Management: BPH - continue Tamsulosin. continent. BPH - on flomax    Skin: Fungal rash- d/c mycolog. clotrimazole cream     * Cognition: Per Neuropsych: patient with Mild cognitive impairment    Precautions / PROPHYLAXIS:   - Falls, Cardiac,  sternal  - Pressure injury/Skin: Turn Q2hrs while in bed, OOB to Chair, PT/OT    - DVT: on coumadin SQ , TEDs, No SCDs due to IVC filter    Dispo: anticipate d/c on 8/28 home with home care and aide assistance.   Progressing well in therapy SUBJECTIVE:  No acute events overnight,  Nasal congestion improved  Dyspnea improved  Labs with uptrending creat- IVF gentle per hospitalist, Renal sonogram   Per OT BP in low range- 100s    ROS: + CASTILLO.  Denies lightheadedness, headache, fevers/ chills, dysuria, constipation, fever/ chills.     -------------------------------------   Vital Signs Last 24 Hrs  T(C): 36.5 (27 Aug 2019 07:42), Max: 36.5 (27 Aug 2019 07:42)  T(F): 97.7 (27 Aug 2019 07:42), Max: 97.7 (27 Aug 2019 07:42)  HR: 67 (27 Aug 2019 07:42) (67 - 109)  BP: 123/69 (27 Aug 2019 07:42) (123/69 - 146/80)  BP(mean): --  RR: 16 (27 Aug 2019 07:42) (16 - 16)  SpO2: 94% (26 Aug 2019 22:13) (94% - 98%)      - 137.4kg on admission bed weights.     131.9kg standing 8/15     132.2kg standing 8/16    131.5kg standing 8/19 8/21:131.7  8/22:131.8    129 Kg standing 8/23 8/24: 129.2kg  8/25:130  8/26:129.2  8/27:? 134.8      PHYSICAL EXAM  General: NAD, comfortable.  Cardio: RRR, S1/S2+  Resp: R side crackles at base otherwise CTAB.   Abdomen: soft, NTND, obese  Extrem: no edema, no calf tenderness   Skin: Sternal incision well healed open to air. Fungal maculopapular rash and satellite lesions b/l posterior thighs and periumbilical- much improved    Functional Exam:  Transfers: close supervision  Gait: RW with CG/close supervision  -------------------------------------     LABS:  PT/INR - ( 27 Aug 2019 05:25 )   PT: 36.1 sec;   INR: 3.11 ratio    PT/INR - ( 26 Aug 2019 05:38 )   PT: 37.2 sec;   INR: 3.20 ratio    PT/INR - ( 24 Aug 2019 06:34 )   PT: 34.6 sec;   INR: 2.99 ratio    PT/INR - ( 22 Aug 2019 05:35 )   PT: 27.9 sec;   INR: 2.42 ratio    PT/INR - ( 18 Aug 2019 05:15 )   PT: 28.6 sec;   INR: 2.48 ratio    PT/INR - ( 16 Aug 2019 08:15 )   PT: 25.0 sec;   INR: 2.18 ratio        08-26    140  |  107  |  21  ----------------------------<  102<H>  4.3   |  27  |  1.78<H>    Ca    8.8      26 Aug 2019 05:38                          10.1   7.34  )-----------( 409      ( 26 Aug 2019 05:38 )             33.0     08-26    140  |  107  |  21  ----------------------------<  102<H>  4.3   |  27  |  1.78<H>    Ca    8.8      26 Aug 2019 05:38      08-24    141  |  109<H>  |  22  ----------------------------<  102<H>  4.0   |  26  |  1.72<H>    Ca    8.6      24 Aug 2019 06:34                            10.0   9.69  )-----------( 426      ( 22 Aug 2019 05:35 )             32.2     08-22    140  |  106  |  21  ----------------------------<  119<H>  4.1   |  27  |  1.71<H>    Ca    9.0      22 Aug 2019 05:35    TPro  7.1  /  Alb  2.6<L>  /  TBili  0.3  /  DBili  x   /  AST  12  /  ALT  10  /  AlkPhos  73  08-22    -------------------------------------   MEDICATIONS  (STANDING):  amiodarone    Tablet 200 milliGRAM(s) Oral daily  aspirin  chewable 81 milliGRAM(s) Oral daily  atorvastatin 40 milliGRAM(s) Oral at bedtime  clotrimazole 1% Cream 1 Application(s) Topical two times a day  docusate sodium 100 milliGRAM(s) Oral three times a day  loratadine 10 milliGRAM(s) Oral daily  melatonin 6 milliGRAM(s) Oral at bedtime  metoprolol tartrate 50 milliGRAM(s) Oral every 8 hours  pantoprazole    Tablet 40 milliGRAM(s) Oral before breakfast  psyllium Powder 1 Packet(s) Oral daily  senna 2 Tablet(s) Oral at bedtime  sertraline 50 milliGRAM(s) Oral daily  sodium chloride 0.9%. 1000 milliLiter(s) (100 mL/Hr) IV Continuous <Continuous>  tamsulosin 0.4 milliGRAM(s) Oral at bedtime  tiotropium 18 MICROgram(s) Capsule 1 Capsule(s) Inhalation daily    MEDICATIONS  (PRN):  acetaminophen   Tablet .. 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    *Gait/ADL/functional deficits secondary to Cardiac surgery: CAD s/p CABG/aortic fibroelastoma removal  - Continue comprehensive Rehab Program of PT/OT   - incision well healed.   - Weekly CXR  - ASA, statin  - follow up outpatient Dr. Alvarado     *Pre-renal MILAGRO:  BUN/Cr stable but elevated: ? dehydrated- IVF per hospitalist, renal sonogram. CXR in am   - Not currently on diuretics  - Encourage fluid intake.    PAF - coumadin goal 2-2.5. .INR supratherapeutic based on recommended range. HOLD COUMADIN today . INR in am    - Coumadin, Continue Amiodarone and Metoprolol tartrate.     *HTN - Lopressor in setting of PAF    *COPD :  - Patient reports that he does not have a hx of COPD  - f/u with PCP upon discharge.  - continue Duoneb as needed and spiriva here  - takes Breo at home; however non-formulary.   - Flonase and claritin for nasal congestion- improved    *DM2:- Diet, HA1C on 8/6 was 6.0%    *Incidental thyroid nodule noted on Carotic US: follow up with PCP for further eval.  - Last TSH on 8/6 was 0.88    *h/o FVL with PEs: IVC filter. Coumadin as above.     *GERD - Protonix    *Mood: depression - sertraline, QTC on last EKG improved     Insomnia - continue melatonin     Mild leukocytosis - improved     Morbid obesity :- Nutritional counseling, - RD to eval and educate     Pain management: Tylenol PRN    Bowel Management:  continent.   Senna and Docusate, Metamucil    Bladder Management: BPH - continue Tamsulosin. continent. BPH - on flomax    Skin: Fungal rash- d/c mycolog. clotrimazole cream     * Cognition: Per Neuropsych: patient with Mild cognitive impairment    Precautions / PROPHYLAXIS:   - Falls, Cardiac,  sternal  - Pressure injury/Skin: Turn Q2hrs while in bed, OOB to Chair, PT/OT    - DVT: on coumadin SQ , TEDs, No SCDs due to IVC filter    Dispo: anticipate d/c on 8/28 home with home care and aide assistance.   Progressing well in therapy

## 2019-08-28 LAB
ANION GAP SERPL CALC-SCNC: 6 MMOL/L — SIGNIFICANT CHANGE UP (ref 5–17)
BUN SERPL-MCNC: 22 MG/DL — SIGNIFICANT CHANGE UP (ref 7–23)
CALCIUM SERPL-MCNC: 8.5 MG/DL — SIGNIFICANT CHANGE UP (ref 8.4–10.5)
CHLORIDE SERPL-SCNC: 109 MMOL/L — HIGH (ref 96–108)
CO2 SERPL-SCNC: 26 MMOL/L — SIGNIFICANT CHANGE UP (ref 22–31)
CREAT SERPL-MCNC: 1.65 MG/DL — HIGH (ref 0.5–1.3)
GLUCOSE SERPL-MCNC: 104 MG/DL — HIGH (ref 70–99)
INR BLD: 2.56 RATIO — HIGH (ref 0.88–1.16)
POTASSIUM SERPL-MCNC: 4.1 MMOL/L — SIGNIFICANT CHANGE UP (ref 3.5–5.3)
POTASSIUM SERPL-SCNC: 4.1 MMOL/L — SIGNIFICANT CHANGE UP (ref 3.5–5.3)
PROTHROM AB SERPL-ACNC: 29.5 SEC — HIGH (ref 10–12.9)
SODIUM SERPL-SCNC: 141 MMOL/L — SIGNIFICANT CHANGE UP (ref 135–145)

## 2019-08-28 PROCEDURE — 71045 X-RAY EXAM CHEST 1 VIEW: CPT | Mod: 26

## 2019-08-28 PROCEDURE — 99232 SBSQ HOSP IP/OBS MODERATE 35: CPT

## 2019-08-28 RX ORDER — FLUTICASONE FUROATE AND VILANTEROL TRIFENATATE 100; 25 UG/1; UG/1
1 POWDER RESPIRATORY (INHALATION)
Qty: 1 | Refills: 0
Start: 2019-08-28 | End: 2019-09-26

## 2019-08-28 RX ORDER — IPRATROPIUM/ALBUTEROL SULFATE 18-103MCG
3 AEROSOL WITH ADAPTER (GRAM) INHALATION
Qty: 1 | Refills: 0
Start: 2019-08-28 | End: 2019-09-26

## 2019-08-28 RX ORDER — FLUTICASONE FUROATE AND VILANTEROL TRIFENATATE 100; 25 UG/1; UG/1
1 POWDER RESPIRATORY (INHALATION)
Qty: 0 | Refills: 0 | DISCHARGE

## 2019-08-28 RX ORDER — WARFARIN SODIUM 2.5 MG/1
2.5 TABLET ORAL AT BEDTIME
Refills: 0 | Status: COMPLETED | OUTPATIENT
Start: 2019-08-28 | End: 2019-08-28

## 2019-08-28 RX ORDER — SODIUM CHLORIDE 9 MG/ML
1000 INJECTION INTRAMUSCULAR; INTRAVENOUS; SUBCUTANEOUS
Refills: 0 | Status: DISCONTINUED | OUTPATIENT
Start: 2019-08-28 | End: 2019-08-29

## 2019-08-28 RX ADMIN — PANTOPRAZOLE SODIUM 40 MILLIGRAM(S): 20 TABLET, DELAYED RELEASE ORAL at 07:57

## 2019-08-28 RX ADMIN — WARFARIN SODIUM 2.5 MILLIGRAM(S): 2.5 TABLET ORAL at 22:39

## 2019-08-28 RX ADMIN — ATORVASTATIN CALCIUM 40 MILLIGRAM(S): 80 TABLET, FILM COATED ORAL at 22:39

## 2019-08-28 RX ADMIN — Medication 3 MILLILITER(S): at 05:32

## 2019-08-28 RX ADMIN — TAMSULOSIN HYDROCHLORIDE 0.4 MILLIGRAM(S): 0.4 CAPSULE ORAL at 22:39

## 2019-08-28 RX ADMIN — AMIODARONE HYDROCHLORIDE 200 MILLIGRAM(S): 400 TABLET ORAL at 05:21

## 2019-08-28 RX ADMIN — Medication 50 MILLIGRAM(S): at 22:39

## 2019-08-28 RX ADMIN — SERTRALINE 50 MILLIGRAM(S): 25 TABLET, FILM COATED ORAL at 12:07

## 2019-08-28 RX ADMIN — SODIUM CHLORIDE 100 MILLILITER(S): 9 INJECTION INTRAMUSCULAR; INTRAVENOUS; SUBCUTANEOUS at 05:19

## 2019-08-28 RX ADMIN — Medication 100 MILLIGRAM(S): at 22:38

## 2019-08-28 RX ADMIN — Medication 81 MILLIGRAM(S): at 12:08

## 2019-08-28 RX ADMIN — Medication 50 MILLIGRAM(S): at 05:29

## 2019-08-28 RX ADMIN — LORATADINE 10 MILLIGRAM(S): 10 TABLET ORAL at 12:07

## 2019-08-28 RX ADMIN — Medication 1 APPLICATION(S): at 05:21

## 2019-08-28 RX ADMIN — Medication 6 MILLIGRAM(S): at 22:38

## 2019-08-28 RX ADMIN — Medication 1 APPLICATION(S): at 22:41

## 2019-08-28 RX ADMIN — Medication 50 MILLIGRAM(S): at 14:08

## 2019-08-28 RX ADMIN — SENNA PLUS 2 TABLET(S): 8.6 TABLET ORAL at 22:39

## 2019-08-28 NOTE — PROGRESS NOTE ADULT - ASSESSMENT
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 [place], would suggest continue with iv fluids, for another day then reassess considering creatinine was 1.2 on 8/11. has been creeping up slowly.     Factov V leiden/PE/ PAF - IVC filter,  inr therapeutic dose coumadin goal 2-2.5. Daily PT/INR.  Amiodarone. Metoprolol tartrate.     HTN/tacycardic intermittently -  Lopressor     BPH - flomax    prediabetic- diet controlled,  ( patient says he has never a diabetic, his sugars are same for many yrs does not take any meds for dm) diet controlled, d/c iss, accuchecks     COPD - continue Duoneb prn    Incidental thyroid nodule noted on Carotic US: follow up with PCP for further eval.    GERD - Protonix     depression - sertraline    Insomnia - melatonin    will follow

## 2019-08-28 NOTE — PROGRESS NOTE ADULT - SUBJECTIVE AND OBJECTIVE BOX
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 Ta for surgical cardiac evaluation of LVOT.  He was subsequently admitted to [facility] CT surgery service for CABG x2 and excision of fibroelastoma on  by Dr. Alvarado. Patient extubated without post-operative complications.  Hospital course significant for pain at surgical site and hypoxia requiring high flow, now weaned off. Chest tube removed on . Hospital course also notable for pre-renal MILAGRO, evaluated by nephrology with recommendations to follow up given Cr downtrending. Post operatively noted to have episodes of A fib / PACs on telemetry, loaded with amiodarone and now continued on maintenance dose.      seen at the bedside, no n/v, no sob. no dysuria, receive iv fluids for past 24 hr, with mild improvement in creatinine very reluctant to stay . creatinine  was normal ranges.       Vital Signs Last 24 Hrs  T(C): 36.6 (28 Aug 2019 05:20), Max: 36.7 (27 Aug 2019 21:57)  T(F): 97.8 (28 Aug 2019 05:20), Max: 98.1 (27 Aug 2019 21:57)  HR: 68 (28 Aug 2019 05:20) (49 - 68)  BP: 150/90 (28 Aug 2019 05:20) (150/90 - 156/88)  BP(mean): --  RR: 22 (28 Aug 2019 05:20) (18 - 22)  SpO2: 98% (28 Aug 2019 05:20) (96% - 98%)      GENERAL- NAD  EAR/NOSE/MOUTH/THROAT - no pharyngeal exudates, no oral lesions  MMM  EYES- JASON, conjunctiva and Sclera clear  NECK- supple  RESPIRATORY-  clear to auscultation bilaterally  CARDIOVASCULAR - SIS2, RRR  GI - soft NT BS present  EXTREMITIES- no pedal  edema  NEUROLOGY- no gross focal deficits  SKIN- chest incision clean, no rashes, warm to touch  PSYCHIATRY- AAO X 3  MUSCULOSKELETAL- ROM normal                    x                    141  | 26   | 22           x     >-----------< x       ------------------------< 104                   x                    4.1  | 109  | 1.65                                         Ca 8.5   Mg x     Ph x      Urinalysis Basic - ( 27 Aug 2019 14:00 )    Color: Yellow / Appearance: Clear / S.020 / pH: x  Gluc: x / Ketone: Negative  / Bili: Negative / Urobili: Negative   Blood: x / Protein: Negative / Nitrite: Negative   Leuk Esterase: Negative / RBC: x / WBC x   Sq Epi: x / Non Sq Epi: x / Bacteria: x      PT/INR - ( 28 Aug 2019 06:30 )   PT: 29.5 sec;   INR: 2.56 ratio              < from: US Renal (19 @ 12:25) >  IMPRESSION:     There is a 4.5 cm abdominal aortic aneurysm, suboptimally visualized due   to bowel gas. Correlation with CTA of the abdomen is recommended.    Left renal cyst measuring 3.6 cm.    Underdistended urinary bladder.

## 2019-08-28 NOTE — PROGRESS NOTE ADULT - ATTENDING COMMENTS
Chart reviewed. Patient seen at bedside  Labs show improvement in Creat to 1.6. Hospitalist recommends continued IVF and hence d/c held.  D/W patient  Continue rehab program for today.     2. INR therapeutic. Coumadin 2.5 mg today. INR in am     3. reviewed progress at team conference. Progressing well in therapy and meeting goals. will need supervision at discharge which has been coordinated. ? D/c home in am

## 2019-08-28 NOTE — PROGRESS NOTE ADULT - SUBJECTIVE AND OBJECTIVE BOX
SUBJECTIVE/ROS:  No acute events overnight  Dyspnea improved  Denies SOB at rest, HAs, CP, abdominal discomfort, bowel or bladder issues   Labs with uptrending creat-continue IVF gentle per hospitalist, Renal sonogram completed         Vital Signs Last 24 Hrs  T(C): 36.3 (28 Aug 2019 08:57), Max: 36.7 (27 Aug 2019 21:57)  T(F): 97.4 (28 Aug 2019 08:57), Max: 98.1 (27 Aug 2019 21:57)  HR: 64 (28 Aug 2019 08:57) (49 - 68)  BP: 135/79 (28 Aug 2019 08:57) (135/79 - 156/88)  RR: 18 (28 Aug 2019 08:57) (18 - 22)  SpO2: 94% (28 Aug 2019 08:57) (94% - 98%)      - 137.4kg on admission bed weights.     131.9kg standing 8/15     132.2kg standing 8/16    131.5kg standing 8/19 8/21:131.7  8/22:131.8    129 Kg standing 8/23 8/24: 129.2kg  8/25:130  8/26:129.2  8/27:? 134.8  8/28 130      PHYSICAL EXAM  General: NAD, comfortable.  Cardio: RRR, S1/S2+  Resp: R side crackles at base otherwise CTAB.   Abdomen: soft, NTND, obese  Extrem: no edema, no calf tenderness   Skin: Sternal incision well healed open to air. Fungal maculopapular rash and satellite lesions b/l posterior thighs and periumbilical- much improved    Functional Exam:  Transfers: close supervision  Gait: RW with CG/close supervision  -------------------------------------     LABS:  PT/INR - ( 28 Aug 2019 06:30 )   PT: 29.5 sec;   INR: 2.56 ratio    PT/INR - ( 27 Aug 2019 05:25 )   PT: 36.1 sec;   INR: 3.11 ratio    PT/INR - ( 26 Aug 2019 05:38 )   PT: 37.2 sec;   INR: 3.20 ratio    PT/INR - ( 24 Aug 2019 06:34 )   PT: 34.6 sec;   INR: 2.99 ratio    PT/INR - ( 22 Aug 2019 05:35 )   PT: 27.9 sec;   INR: 2.42 ratio    PT/INR - ( 18 Aug 2019 05:15 )   PT: 28.6 sec;   INR: 2.48 ratio    PT/INR - ( 16 Aug 2019 08:15 )   PT: 25.0 sec;   INR: 2.18 ratio      08-28    141  |  109<H>  |  22  ----------------------------<  104<H>  4.1   |  26  |  1.65<H>    Ca    8.5      28 Aug 2019 06:30        08-26    140  |  107  |  21  ----------------------------<  102<H>  4.3   |  27  |  1.78<H>    Ca    8.8      26 Aug 2019 05:38                          10.1   7.34  )-----------( 409      ( 26 Aug 2019 05:38 )             33.0     08-26    140  |  107  |  21  ----------------------------<  102<H>  4.3   |  27  |  1.78<H>    Ca    8.8      26 Aug 2019 05:38      08-24    141  |  109<H>  |  22  ----------------------------<  102<H>  4.0   |  26  |  1.72<H>    Ca    8.6      24 Aug 2019 06:34                            10.0   9.69  )-----------( 426      ( 22 Aug 2019 05:35 )             32.2     08-22    140  |  106  |  21  ----------------------------<  119<H>  4.1   |  27  |  1.71<H>    Ca    9.0      22 Aug 2019 05:35    TPro  7.1  /  Alb  2.6<L>  /  TBili  0.3  /  DBili  x   /  AST  12  /  ALT  10  /  AlkPhos  73  08-22      < from: US Renal (08.27.19 @ 12:25) >    EXAM:  US KIDNEY(S)      PROCEDURE DATE:  08/27/2019        INTERPRETATION:  CLINICAL INFORMATION: Elevated creatinine    COMPARISON: Abdominal ultrasound dated 05/07/2019.    TECHNIQUE: Sonography of the kidneys and bladder.     FINDINGS:    This study is technically limited by patient body habitus.    There is bilateral renal cortical thinning.    Right kidney:  11.3 cm. No hydronephrosis.    Left kidney:  11.9 cm. 3.6 cm cyst in the interpolar region.    Urinary bladder: Underdistended with a volume of 132 cc. Ureteral jets   could not be visualized.    4.5 cm abdominal aortic aneurysm visualized, although evaluation is   limited by overlying bowel gas.    IMPRESSION:     There is a 4.5 cm abdominal aortic aneurysm, suboptimally visualized due   to bowel gas. Correlation with CTA of the abdomen is recommended.    Left renal cyst measuring 3.6 cm.    Underdistended urinary bladder.        < from: Xray Chest 1 View- PORTABLE-Routine (08.28.19 @ 08:41) >    EXAM:  XR CHEST PORTABLE ROUTINE 1V      PROCEDURE DATE:  08/28/2019        INTERPRETATION:  INDICATION: Abnormal Chest Sounds    PRIORS: 8/21/2019    VIEWS: Portable AP radiography of the chest performed. Evaluation limited   secondary to shallow inspiration.    FINDINGS: Cardiac enlargement is identified. The patient is status post   sternotomy. No hilar or superior mediastinal abnormalities are   identified. There is no evidence for focal infiltrate, lobar   consolidation or pulmonary edema.No pleural effusion or pneumothorax is   demonstrated. No mediastinal shift is noted. No acute osseous fractures   are demonstrated.    IMPRESSION: No evidence for focal infiltrate or lobar consolidation. No   pleural effusion identified.        TALYA LINDA M.D., ATTENDING RADIOLOGIST  This document has been electronically signed. Aug 28 2019  8:46AM                < end of copied text >                JM BALDERRAMA   This document has been electronically signed. Aug 27 2019  2:19PM              < end of copied text >    -------------------------------------   MEDICATIONS  (STANDING):  amiodarone    Tablet 200 milliGRAM(s) Oral daily  aspirin  chewable 81 milliGRAM(s) Oral daily  atorvastatin 40 milliGRAM(s) Oral at bedtime  clotrimazole 1% Cream 1 Application(s) Topical two times a day  docusate sodium 100 milliGRAM(s) Oral three times a day  loratadine 10 milliGRAM(s) Oral daily  melatonin 6 milliGRAM(s) Oral at bedtime  metoprolol tartrate 50 milliGRAM(s) Oral every 8 hours  pantoprazole    Tablet 40 milliGRAM(s) Oral before breakfast  psyllium Powder 1 Packet(s) Oral daily  senna 2 Tablet(s) Oral at bedtime  sertraline 50 milliGRAM(s) Oral daily  sodium chloride 0.9%. 1000 milliLiter(s) (125 mL/Hr) IV Continuous <Continuous>  tamsulosin 0.4 milliGRAM(s) Oral at bedtime  tiotropium 18 MICROgram(s) Capsule 1 Capsule(s) Inhalation daily    MEDICATIONS  (PRN):  acetaminophen   Tablet .. 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 for debility and functional deficits after cardiac surgery involving CABG and excision of fibroelastoma.    *Gait/ADL/functional deficits secondary to Cardiac surgery: CAD s/p CABG/aortic fibroelastoma removal  - Continue comprehensive Rehab Program of PT/OT   - incision well healed.   - Weekly CXR  - ASA, statin  - follow up outpatient Dr. Alvarado     *Pre-renal MILAGRO:  BUN/Cr stable but elevated: ? dehydrated- continue IVF per hospitalist since Cr has not greatly improved, renal sonogram as above. CXR this am as above (no pleural effusions or infiltrates)  - Not currently on diuretics  - Encourage fluid intake.    PAF - coumadin goal 2-2.5. INR therapeutic at 2.56 today based on recommended range. Will give 2.5mg tonight.  INR in am    - Coumadin, Continue Amiodarone and Metoprolol tartrate.     *HTN - Lopressor in setting of PAF    *COPD :  - Patient reports that he does not have a hx of COPD  - f/u with PCP upon discharge.  - continue Duoneb as needed and spiriva here  - takes Breo at home; however non-formulary.   - Flonase and claritin for nasal congestion- improved    *DM2:- Diet, HA1C on 8/6 was 6.0%    *Incidental thyroid nodule noted on Carotic US: follow up with PCP for further eval.  - Last TSH on 8/6 was 0.88    *h/o FVL with PEs: IVC filter. Coumadin as above.     *GERD - Protonix    *Mood: depression - sertraline, QTC on last EKG improved     Insomnia - continue melatonin     Mild leukocytosis - improved     Morbid obesity :-Nutritional counseling, - RD to eval and educate     Pain management: Tylenol PRN    Bowel Management:  continent.   Senna and Docusate, Metamucil    Bladder Management: BPH - continue Tamsulosin. continent. BPH - on flomax    Skin: Fungal rash- d/c mycolog. clotrimazole cream     * Cognition: Per Neuropsych: patient with Mild cognitive impairment    Precautions / PROPHYLAXIS:   - Falls, Cardiac,  sternal  - Pressure injury/Skin: Turn Q2hrs while in bed, OOB to Chair, PT/OT    - DVT: on coumadin SQ , TEDs, No SCDs due to IVC filter    Dispo: anticipate d/c on 8/28 home with home care and aide assistance.   Progressing well in therapy         Discharge planning in place. Patient for dc tomorrow pending labs.

## 2019-08-29 ENCOUNTER — INBOUND DOCUMENT (OUTPATIENT)
Age: 84
End: 2019-08-29

## 2019-08-29 ENCOUNTER — TRANSCRIPTION ENCOUNTER (OUTPATIENT)
Age: 84
End: 2019-08-29

## 2019-08-29 VITALS
DIASTOLIC BLOOD PRESSURE: 90 MMHG | TEMPERATURE: 98 F | RESPIRATION RATE: 14 BRPM | OXYGEN SATURATION: 94 % | HEART RATE: 66 BPM | SYSTOLIC BLOOD PRESSURE: 152 MMHG

## 2019-08-29 LAB
ANION GAP SERPL CALC-SCNC: 9 MMOL/L — SIGNIFICANT CHANGE UP (ref 5–17)
BUN SERPL-MCNC: 22 MG/DL — SIGNIFICANT CHANGE UP (ref 7–23)
CALCIUM SERPL-MCNC: 8.7 MG/DL — SIGNIFICANT CHANGE UP (ref 8.4–10.5)
CHLORIDE SERPL-SCNC: 109 MMOL/L — HIGH (ref 96–108)
CO2 SERPL-SCNC: 23 MMOL/L — SIGNIFICANT CHANGE UP (ref 22–31)
CREAT SERPL-MCNC: 1.58 MG/DL — HIGH (ref 0.5–1.3)
GLUCOSE SERPL-MCNC: 110 MG/DL — HIGH (ref 70–99)
INR BLD: 2.27 RATIO — HIGH (ref 0.88–1.16)
POTASSIUM SERPL-MCNC: 4.1 MMOL/L — SIGNIFICANT CHANGE UP (ref 3.5–5.3)
POTASSIUM SERPL-SCNC: 4.1 MMOL/L — SIGNIFICANT CHANGE UP (ref 3.5–5.3)
PROTHROM AB SERPL-ACNC: 26.1 SEC — HIGH (ref 10–12.9)
SODIUM SERPL-SCNC: 141 MMOL/L — SIGNIFICANT CHANGE UP (ref 135–145)

## 2019-08-29 PROCEDURE — 99239 HOSP IP/OBS DSCHRG MGMT >30: CPT

## 2019-08-29 RX ORDER — IPRATROPIUM/ALBUTEROL SULFATE 18-103MCG
3 AEROSOL WITH ADAPTER (GRAM) INHALATION
Qty: 0 | Refills: 0 | DISCHARGE
Start: 2019-08-29

## 2019-08-29 RX ORDER — WARFARIN SODIUM 2.5 MG/1
1 TABLET ORAL
Qty: 30 | Refills: 0
Start: 2019-08-29 | End: 2019-09-27

## 2019-08-29 RX ADMIN — Medication 3 MILLILITER(S): at 06:35

## 2019-08-29 RX ADMIN — Medication 81 MILLIGRAM(S): at 11:11

## 2019-08-29 RX ADMIN — LORATADINE 10 MILLIGRAM(S): 10 TABLET ORAL at 11:11

## 2019-08-29 RX ADMIN — AMIODARONE HYDROCHLORIDE 200 MILLIGRAM(S): 400 TABLET ORAL at 05:53

## 2019-08-29 RX ADMIN — Medication 1 APPLICATION(S): at 05:54

## 2019-08-29 RX ADMIN — Medication 100 MILLIGRAM(S): at 05:53

## 2019-08-29 RX ADMIN — SERTRALINE 50 MILLIGRAM(S): 25 TABLET, FILM COATED ORAL at 11:11

## 2019-08-29 RX ADMIN — Medication 50 MILLIGRAM(S): at 05:54

## 2019-08-29 RX ADMIN — PANTOPRAZOLE SODIUM 40 MILLIGRAM(S): 20 TABLET, DELAYED RELEASE ORAL at 05:53

## 2019-08-29 NOTE — DISCHARGE NOTE NURSING/CASE MANAGEMENT/SOCIAL WORK - NSSCTYPOFSERV_GEN_ALL_CORE
Home visiting RN, PT, OT and home health aide evaluation. Your visiting nurse will call on 8/30 to schedule first visit. You will have blood draws at home twice a week. Your first lab draw is Sunday 9/1/12. Dr. Murillo will follow your coumadin dosing

## 2019-08-29 NOTE — DISCHARGE NOTE NURSING/CASE MANAGEMENT/SOCIAL WORK - NSDCFUADDAPPT_GEN_ALL_CORE_FT
If there are any concerns with your cardiac medications or concerns or questions about your cardiac surgery, you may contact the [facility] NP, Zofia Chaves, [phone] for guidance.   If you have any concerns regarding hospitalization course or medications, you may also call Vanessa Hayward -[phone].    Follow up with your PCP within 1 week. You will need to set up lab draws to monitor your INR levels because you are on coumadin.     Keep a log of daily weights that you take upon awakening in the morning. If you gain 2 pounds over night or 5 pounds in 1 week, call your cardiologist or Zofia Chaves NP immediately. If there are any concerns with your cardiac medications or concerns or questions about your cardiac surgery, you may contact the [facility] NP, Zofia Chaves, [phone] for guidance.   If you have any concerns regarding hospitalization course or medications, you may also call Vanessa Hayward -[phone].    You have an outpatient appointment with your PCP Dr. Murillo on Tuesday, September 4th at 3:15pm. Dr. Murillo will be monitoring your PT-INR lab draw results and dosing your coumadin      Keep a log of daily weights that you take upon awakening in the morning. If you gain 2 pounds over night or 5 pounds in 1 week, call your cardiologist or Zofia Chaves NP immediately.

## 2019-08-29 NOTE — PROGRESS NOTE ADULT - REASON FOR ADMISSION
debility s/p CABG and resection of intracardiac fibroelastoma.

## 2019-08-29 NOTE — DISCHARGE NOTE NURSING/CASE MANAGEMENT/SOCIAL WORK - PATIENT PORTAL LINK FT
You can access the FollowMyHealth Patient Portal offered by [facility] by registering at the following website: [email or web address]. By joining [facility]’s FollowMyHealth portal, you will also be able to view your health information using other applications (apps) compatible with our system.

## 2019-08-29 NOTE — PROGRESS NOTE ADULT - SUBJECTIVE AND OBJECTIVE BOX
SUBJECTIVE/ROS:  No acute events overnight  Dyspnea improved  Denies SOB at rest, HAs, CP, abdominal discomfort, bowel or bladder issues   Labs with uptrending creat-continue IVF gentle per hospitalist, Renal sonogram completed         Vital Signs Last 24 Hrs  Vital Signs Last 24 Hrs  T(C): 36.9 (29 Aug 2019 09:02), Max: 36.9 (29 Aug 2019 09:02)  T(F): 98.5 (29 Aug 2019 09:02), Max: 98.5 (29 Aug 2019 09:02)  HR: 66 (29 Aug 2019 09:02) (65 - 69)  BP: 152/90 (29 Aug 2019 09:02) (138/72 - 152/90)  RR: 14 (29 Aug 2019 09:02) (14 - 14)  SpO2: 94% (29 Aug 2019 09:02) (94% - 97%)      - 137.4kg on admission bed weights.     131.9kg standing 8/15     132.2kg standing 8/16    131.5kg standing 8/19 8/21:131.7  8/22:131.8    129 Kg standing 8/23 8/24: 129.2kg  8/25:130  8/26:129.2  8/27:? 134.8  8/28 130      PHYSICAL EXAM  General: NAD, comfortable.  Cardio: RRR, S1/S2+  Resp: R side crackles at base otherwise CTAB.   Abdomen: soft, NTND, obese  Extrem: no edema, no calf tenderness   Skin: Sternal incision well healed open to air. Fungal maculopapular rash and satellite lesions b/l posterior thighs and periumbilical- much improved    Functional Exam:  Transfers: close supervision  Gait: RW with CG/close supervision  -------------------------------------     LABS:  PT/INR - ( 29 Aug 2019 06:10 )   PT: 26.1 sec;   INR: 2.27 ratio    PT/INR - ( 28 Aug 2019 06:30 )   PT: 29.5 sec;   INR: 2.56 ratio    PT/INR - ( 27 Aug 2019 05:25 )   PT: 36.1 sec;   INR: 3.11 ratio    PT/INR - ( 26 Aug 2019 05:38 )   PT: 37.2 sec;   INR: 3.20 ratio    PT/INR - ( 24 Aug 2019 06:34 )   PT: 34.6 sec;   INR: 2.99 ratio    PT/INR - ( 22 Aug 2019 05:35 )   PT: 27.9 sec;   INR: 2.42 ratio    PT/INR - ( 18 Aug 2019 05:15 )   PT: 28.6 sec;   INR: 2.48 ratio    PT/INR - ( 16 Aug 2019 08:15 )   PT: 25.0 sec;   INR: 2.18 ratio      08-28 08-29    141  |  109<H>  |  22  ----------------------------<  110<H>  4.1   |  23  |  1.58<H>    Ca    8.7      29 Aug 2019 06:10        141  |  109<H>  |  22  ----------------------------<  104<H>  4.1   |  26  |  1.65<H>    Ca    8.5      28 Aug 2019 06:30        08-26    140  |  107  |  21  ----------------------------<  102<H>  4.3   |  27  |  1.78<H>    Ca    8.8      26 Aug 2019 05:38                          10.1   7.34  )-----------( 409      ( 26 Aug 2019 05:38 )             33.0     08-26    140  |  107  |  21  ----------------------------<  102<H>  4.3   |  27  |  1.78<H>    Ca    8.8      26 Aug 2019 05:38      08-24    141  |  109<H>  |  22  ----------------------------<  102<H>  4.0   |  26  |  1.72<H>    Ca    8.6      24 Aug 2019 06:34                            10.0   9.69  )-----------( 426      ( 22 Aug 2019 05:35 )             32.2     08-22    140  |  106  |  21  ----------------------------<  119<H>  4.1   |  27  |  1.71<H>    Ca    9.0      22 Aug 2019 05:35    TPro  7.1  /  Alb  2.6<L>  /  TBili  0.3  /  DBili  x   /  AST  12  /  ALT  10  /  AlkPhos  73  08-22      < from: US Renal (08.27.19 @ 12:25) >    EXAM:  US KIDNEY(S)      PROCEDURE DATE:  08/27/2019        INTERPRETATION:  CLINICAL INFORMATION: Elevated creatinine    COMPARISON: Abdominal ultrasound dated 05/07/2019.    TECHNIQUE: Sonography of the kidneys and bladder.     FINDINGS:    This study is technically limited by patient body habitus.    There is bilateral renal cortical thinning.    Right kidney:  11.3 cm. No hydronephrosis.    Left kidney:  11.9 cm. 3.6 cm cyst in the interpolar region.    Urinary bladder: Underdistended with a volume of 132 cc. Ureteral jets   could not be visualized.    4.5 cm abdominal aortic aneurysm visualized, although evaluation is   limited by overlying bowel gas.    IMPRESSION:     There is a 4.5 cm abdominal aortic aneurysm, suboptimally visualized due   to bowel gas. Correlation with CTA of the abdomen is recommended.    Left renal cyst measuring 3.6 cm.    Underdistended urinary bladder.        < from: Xray Chest 1 View- PORTABLE-Routine (08.28.19 @ 08:41) >    EXAM:  XR CHEST PORTABLE ROUTINE 1V      PROCEDURE DATE:  08/28/2019        INTERPRETATION:  INDICATION: Abnormal Chest Sounds    PRIORS: 8/21/2019    VIEWS: Portable AP radiography of the chest performed. Evaluation limited   secondary to shallow inspiration.    FINDINGS: Cardiac enlargement is identified. The patient is status post   sternotomy. No hilar or superior mediastinal abnormalities are   identified. There is no evidence for focal infiltrate, lobar   consolidation or pulmonary edema.No pleural effusion or pneumothorax is   demonstrated. No mediastinal shift is noted. No acute osseous fractures   are demonstrated.    IMPRESSION: No evidence for focal infiltrate or lobar consolidation. No   pleural effusion identified.        TALYA LINDA M.D., ATTENDING RADIOLOGIST  This document has been electronically signed. Aug 28 2019  8:46AM                < end of copied text >                JM BALDERRAMA   This document has been electronically signed. Aug 27 2019  2:19PM              < end of copied text >    -------------------------------------   MEDICATIONS  (STANDING):  MEDICATIONS  (STANDING):  amiodarone    Tablet 200 milliGRAM(s) Oral daily  aspirin  chewable 81 milliGRAM(s) Oral daily  atorvastatin 40 milliGRAM(s) Oral at bedtime  clotrimazole 1% Cream 1 Application(s) Topical two times a day  docusate sodium 100 milliGRAM(s) Oral three times a day  loratadine 10 milliGRAM(s) Oral daily  melatonin 6 milliGRAM(s) Oral at bedtime  metoprolol tartrate 50 milliGRAM(s) Oral every 8 hours  pantoprazole    Tablet 40 milliGRAM(s) Oral before breakfast  psyllium Powder 1 Packet(s) Oral daily  senna 2 Tablet(s) Oral at bedtime  sertraline 50 milliGRAM(s) Oral daily  tamsulosin 0.4 milliGRAM(s) Oral at bedtime  tiotropium 18 MICROgram(s) Capsule 1 Capsule(s) Inhalation daily    MEDICATIONS  (PRN):  acetaminophen   Tablet .. 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    *Gait/ADL/functional deficits secondary to Cardiac surgery: CAD s/p CABG/aortic fibroelastoma removal  - Continue comprehensive Rehab Program of PT/OT   - incision well healed.   - Weekly CXR  - ASA, statin  - follow up outpatient Dr. Alvarado     *Pre-renal MILAGRO:  BUN/Cr stable but elevated: ? dehydrated- continue IVF per hospitalist since Cr has not greatly improved, renal sonogram as above. CXR this am as above (no pleural effusions or infiltrates)  - Not currently on diuretics  - Encourage fluid intake.    PAF - coumadin goal 2-2.5. INR therapeutic at 2.56 today based on recommended range. Will give 2.5mg tonight.  INR in am    - Coumadin, Continue Amiodarone and Metoprolol tartrate.     *HTN - Lopressor in setting of PAF    *COPD :  - Patient reports that he does not have a hx of COPD  - f/u with PCP upon discharge.  - continue Duoneb as needed and spiriva here  - takes Breo at home; however non-formulary.   - Flonase and claritin for nasal congestion- improved    *DM2:- Diet, HA1C on 8/6 was 6.0%    *Incidental thyroid nodule noted on Carotic US: follow up with PCP for further eval.  - Last TSH on 8/6 was 0.88    *h/o FVL with PEs: IVC filter. Coumadin as above.     *GERD - Protonix    *Mood: depression - sertraline, QTC on last EKG improved     Insomnia - continue melatonin     Mild leukocytosis - improved     Morbid obesity :-Nutritional counseling, - RD to eval and educate     Pain management: Tylenol PRN    Bowel Management:  continent.   Senna and Docusate, Metamucil    Bladder Management: BPH - continue Tamsulosin. continent. BPH - on flomax    Skin: Fungal rash- d/c mycolog. clotrimazole cream     * Cognition: Per Neuropsych: patient with Mild cognitive impairment    Precautions / PROPHYLAXIS:   - Falls, Cardiac,  sternal  - Pressure injury/Skin: Turn Q2hrs while in bed, OOB to Chair, PT/OT    - DVT: on coumadin SQ , TEDs, No SCDs due to IVC filter    Dispo: anticipate d/c on 8/28 home with home care and aide assistance.   Progressing well in therapy         Discharge planning in place. Patient for dc tomorrow pending labs. SUBJECTIVE/ROS:  No acute events overnight  Dyspnea improved  Denies SOB at rest, HAs, CP, abdominal discomfort, bowel or bladder issues   Patient states that he is looking forward to going home. Follow up visit instructions reviewed, importance of monitoring weights and taking in enough PO fluids reviewed. Patient verbalized understanding.       Vital Signs Last 24 Hrs  T(C): 36.9 (29 Aug 2019 09:02), Max: 36.9 (29 Aug 2019 09:02)  T(F): 98.5 (29 Aug 2019 09:02), Max: 98.5 (29 Aug 2019 09:02)  HR: 66 (29 Aug 2019 09:02) (65 - 69)  BP: 152/90 (29 Aug 2019 09:02) (138/72 - 152/90)  RR: 14 (29 Aug 2019 09:02) (14 - 14)  SpO2: 94% (29 Aug 2019 09:02) (94% - 97%)      - 137.4kg on admission bed weights.     131.9kg standing 8/15     132.2kg standing 8/16    131.5kg standing 8/19 8/21:131.7  8/22:131.8    129 Kg standing 8/23 8/24: 129.2kg  8/25:130  8/26:129.2  8/27:? 134.8  8/28 130.3      PHYSICAL EXAM  General: NAD, comfortable.  Cardio: RRR, S1/S2+  Resp:  CTAB.   Abdomen: soft, NTND, obese  Extrem: no edema, no calf tenderness   Skin: Sternal incision well healed open to air. Fungal maculopapular rash and satellite lesions b/l posterior thighs and periumbilical- much improved  drain sites healing well, no drainage    Functional Exam:  Transfers: close supervision  Gait: RW with CG/close supervision  -------------------------------------     LABS:  PT/INR - ( 29 Aug 2019 06:10 )   PT: 26.1 sec;   INR: 2.27 ratio    PT/INR - ( 28 Aug 2019 06:30 )   PT: 29.5 sec;   INR: 2.56 ratio    PT/INR - ( 27 Aug 2019 05:25 )   PT: 36.1 sec;   INR: 3.11 ratio    PT/INR - ( 26 Aug 2019 05:38 )   PT: 37.2 sec;   INR: 3.20 ratio    PT/INR - ( 24 Aug 2019 06:34 )   PT: 34.6 sec;   INR: 2.99 ratio    PT/INR - ( 22 Aug 2019 05:35 )   PT: 27.9 sec;   INR: 2.42 ratio    PT/INR - ( 18 Aug 2019 05:15 )   PT: 28.6 sec;   INR: 2.48 ratio    PT/INR - ( 16 Aug 2019 08:15 )   PT: 25.0 sec;   INR: 2.18 ratio      08-29    141  |  109<H>  |  22  ----------------------------<  110<H>  4.1   |  23  |  1.58<H>    Ca    8.7      29 Aug 2019 06:10          141  |  109<H>  |  22  ----------------------------<  104<H>  4.1   |  26  |  1.65<H>    Ca    8.5      28 Aug 2019 06:30        08-26    140  |  107  |  21  ----------------------------<  102<H>  4.3   |  27  |  1.78<H>    Ca    8.8      26 Aug 2019 05:38                          10.1   7.34  )-----------( 409      ( 26 Aug 2019 05:38 )             33.0     08-26    140  |  107  |  21  ----------------------------<  102<H>  4.3   |  27  |  1.78<H>    Ca    8.8      26 Aug 2019 05:38      08-24    141  |  109<H>  |  22  ----------------------------<  102<H>  4.0   |  26  |  1.72<H>    Ca    8.6      24 Aug 2019 06:34                            10.0   9.69  )-----------( 426      ( 22 Aug 2019 05:35 )             32.2     08-22    140  |  106  |  21  ----------------------------<  119<H>  4.1   |  27  |  1.71<H>    Ca    9.0      22 Aug 2019 05:35    TPro  7.1  /  Alb  2.6<L>  /  TBili  0.3  /  DBili  x   /  AST  12  /  ALT  10  /  AlkPhos  73  08-22      < from: US Renal (08.27.19 @ 12:25) >    EXAM:  US KIDNEY(S)      PROCEDURE DATE:  08/27/2019        INTERPRETATION:  CLINICAL INFORMATION: Elevated creatinine    COMPARISON: Abdominal ultrasound dated 05/07/2019.    TECHNIQUE: Sonography of the kidneys and bladder.     FINDINGS:    This study is technically limited by patient body habitus.    There is bilateral renal cortical thinning.    Right kidney:  11.3 cm. No hydronephrosis.    Left kidney:  11.9 cm. 3.6 cm cyst in the interpolar region.    Urinary bladder: Underdistended with a volume of 132 cc. Ureteral jets   could not be visualized.    4.5 cm abdominal aortic aneurysm visualized, although evaluation is   limited by overlying bowel gas.    IMPRESSION:     There is a 4.5 cm abdominal aortic aneurysm, suboptimally visualized due   to bowel gas. Correlation with CTA of the abdomen is recommended.    Left renal cyst measuring 3.6 cm.    Underdistended urinary bladder.        < from: Xray Chest 1 View- PORTABLE-Routine (08.28.19 @ 08:41) >    EXAM:  XR CHEST PORTABLE ROUTINE 1V      PROCEDURE DATE:  08/28/2019        INTERPRETATION:  INDICATION: Abnormal Chest Sounds    PRIORS: 8/21/2019    VIEWS: Portable AP radiography of the chest performed. Evaluation limited   secondary to shallow inspiration.    FINDINGS: Cardiac enlargement is identified. The patient is status post   sternotomy. No hilar or superior mediastinal abnormalities are   identified. There is no evidence for focal infiltrate, lobar   consolidation or pulmonary edema.No pleural effusion or pneumothorax is   demonstrated. No mediastinal shift is noted. No acute osseous fractures   are demonstrated.    IMPRESSION: No evidence for focal infiltrate or lobar consolidation. No   pleural effusion identified.        TALYA LINDA M.D., ATTENDING RADIOLOGIST  This document has been electronically signed. Aug 28 2019  8:46AM                < end of copied text >                JM BALDERRAMA   This document has been electronically signed. Aug 27 2019  2:19PM              < end of copied text >    -------------------------------------   MEDICATIONS  (STANDING):  MEDICATIONS  (STANDING):  amiodarone    Tablet 200 milliGRAM(s) Oral daily  aspirin  chewable 81 milliGRAM(s) Oral daily  atorvastatin 40 milliGRAM(s) Oral at bedtime  clotrimazole 1% Cream 1 Application(s) Topical two times a day  docusate sodium 100 milliGRAM(s) Oral three times a day  loratadine 10 milliGRAM(s) Oral daily  melatonin 6 milliGRAM(s) Oral at bedtime  metoprolol tartrate 50 milliGRAM(s) Oral every 8 hours  pantoprazole    Tablet 40 milliGRAM(s) Oral before breakfast  psyllium Powder 1 Packet(s) Oral daily  senna 2 Tablet(s) Oral at bedtime  sertraline 50 milliGRAM(s) Oral daily  tamsulosin 0.4 milliGRAM(s) Oral at bedtime  tiotropium 18 MICROgram(s) Capsule 1 Capsule(s) Inhalation daily    MEDICATIONS  (PRN):  acetaminophen   Tablet .. 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    *Gait/ADL/functional deficits secondary to Cardiac surgery: CAD s/p CABG/aortic fibroelastoma removal  - Completed comprehensive Rehab Program of PT/OT   - incision well healed. abdominal drain sites dry and healing well.   - Weekly CXR  - ASA, statin  - follow up outpatient Dr. Alvarado     *Pre-renal MILAGRO:  BUN/Cr stable but elevated: ? dehydrated- completed IVF-Cr 1.59 today, renal sonogram as above. CXR yesterday am as above (no pleural effusions or infiltrates)  - Not currently on diuretics  - Encourage fluid intake.    PAF - coumadin goal 2-2.5. INR therapeutic at 2.27 today based on recommended range. Will continue 2.5mg nightly.  - Coumadin, Continue Amiodarone and Metoprolol tartrate.     *HTN - Lopressor in setting of PAF    *COPD :  - Patient reports that he does not have a hx of COPD  - f/u with PCP upon discharge.  - continue Duoneb as needed and spiriva here  - takes Breo at home; however non-formulary. -will resume breo at home  - Flonase and claritin for nasal congestion- improved    *DM2:- Diet, HA1C on 8/6 was 6.0%    *Incidental thyroid nodule noted on Carotic US: follow up with PCP for further eval.  - Last TSH on 8/6 was 0.88    *h/o FVL with PEs: IVC filter. Coumadin as above.     *GERD - Protonix    *Mood: depression - sertraline, QTC on last EKG improved     Insomnia - continue melatonin     Mild leukocytosis - improved     Morbid obesity :-Nutritional counseling, - RD to eval and educate     Pain management: Tylenol PRN    Bowel Management:  continent.   Senna and Docusate, Metamucil    Bladder Management: BPH - continue Tamsulosin. continent. BPH - on flomax    Skin: Fungal rash- d/c mycolog. clotrimazole cream     * Cognition: Per Neuropsych: patient with Mild cognitive impairment    Precautions / PROPHYLAXIS:   - Falls, Cardiac,  sternal  - Pressure injury/Skin: Turn Q2hrs while in bed, OOB to Chair, PT/OT    - DVT: on coumadin SQ , TEDs, No SCDs due to IVC filter    Dispo: anticipate d/c on 8/28 home with home care and aide assistance.   Progressing well in therapy         Patient is medically stable for discharge home today with home health services. Nephew to pick patient up today.

## 2019-08-29 NOTE — PROGRESS NOTE ADULT - PROVIDER SPECIALTY LIST ADULT
Hospitalist
Rehab Medicine
Hospitalist
Hospitalist
Rehab Medicine

## 2019-09-04 ENCOUNTER — INBOUND DOCUMENT (OUTPATIENT)
Age: 84
End: 2019-09-04

## 2019-09-18 ENCOUNTER — APPOINTMENT (OUTPATIENT)
Dept: CARDIOTHORACIC SURGERY | Facility: CLINIC | Age: 84
End: 2019-09-18
Payer: MEDICARE

## 2019-09-18 VITALS
SYSTOLIC BLOOD PRESSURE: 121 MMHG | BODY MASS INDEX: 38.79 KG/M2 | OXYGEN SATURATION: 98 % | DIASTOLIC BLOOD PRESSURE: 71 MMHG | WEIGHT: 286 LBS | HEART RATE: 61 BPM | RESPIRATION RATE: 18 BRPM

## 2019-09-18 PROCEDURE — 99024 POSTOP FOLLOW-UP VISIT: CPT

## 2019-09-29 PROCEDURE — 87640 STAPH A DNA AMP PROBE: CPT

## 2019-09-29 PROCEDURE — 93320 DOPPLER ECHO COMPLETE: CPT

## 2019-09-29 PROCEDURE — 86901 BLOOD TYPING SEROLOGIC RH(D): CPT

## 2019-09-29 PROCEDURE — C1894: CPT

## 2019-09-29 PROCEDURE — 85730 THROMBOPLASTIN TIME PARTIAL: CPT

## 2019-09-29 PROCEDURE — 88305 TISSUE EXAM BY PATHOLOGIST: CPT

## 2019-09-29 PROCEDURE — 83735 ASSAY OF MAGNESIUM: CPT

## 2019-09-29 PROCEDURE — C1889: CPT

## 2019-09-29 PROCEDURE — 84443 ASSAY THYROID STIM HORMONE: CPT

## 2019-09-29 PROCEDURE — 83605 ASSAY OF LACTIC ACID: CPT

## 2019-09-29 PROCEDURE — 82947 ASSAY GLUCOSE BLOOD QUANT: CPT

## 2019-09-29 PROCEDURE — 85027 COMPLETE CBC AUTOMATED: CPT

## 2019-09-29 PROCEDURE — 97530 THERAPEUTIC ACTIVITIES: CPT

## 2019-09-29 PROCEDURE — 82553 CREATINE MB FRACTION: CPT

## 2019-09-29 PROCEDURE — 86850 RBC ANTIBODY SCREEN: CPT

## 2019-09-29 PROCEDURE — 97163 PT EVAL HIGH COMPLEX 45 MIN: CPT

## 2019-09-29 PROCEDURE — 99153 MOD SED SAME PHYS/QHP EA: CPT

## 2019-09-29 PROCEDURE — 85014 HEMATOCRIT: CPT

## 2019-09-29 PROCEDURE — 86900 BLOOD TYPING SEROLOGIC ABO: CPT

## 2019-09-29 PROCEDURE — 94010 BREATHING CAPACITY TEST: CPT

## 2019-09-29 PROCEDURE — 99152 MOD SED SAME PHYS/QHP 5/>YRS: CPT

## 2019-09-29 PROCEDURE — 84484 ASSAY OF TROPONIN QUANT: CPT

## 2019-09-29 PROCEDURE — 85576 BLOOD PLATELET AGGREGATION: CPT

## 2019-09-29 PROCEDURE — 85610 PROTHROMBIN TIME: CPT

## 2019-09-29 PROCEDURE — 93308 TTE F-UP OR LMTD: CPT

## 2019-09-29 PROCEDURE — 93306 TTE W/DOPPLER COMPLETE: CPT

## 2019-09-29 PROCEDURE — 84132 ASSAY OF SERUM POTASSIUM: CPT

## 2019-09-29 PROCEDURE — 80048 BASIC METABOLIC PNL TOTAL CA: CPT

## 2019-09-29 PROCEDURE — 97116 GAIT TRAINING THERAPY: CPT

## 2019-09-29 PROCEDURE — 71045 X-RAY EXAM CHEST 1 VIEW: CPT

## 2019-09-29 PROCEDURE — 84134 ASSAY OF PREALBUMIN: CPT

## 2019-09-29 PROCEDURE — 84439 ASSAY OF FREE THYROXINE: CPT

## 2019-09-29 PROCEDURE — 93005 ELECTROCARDIOGRAM TRACING: CPT

## 2019-09-29 PROCEDURE — 81003 URINALYSIS AUTO W/O SCOPE: CPT

## 2019-09-29 PROCEDURE — 94660 CPAP INITIATION&MGMT: CPT

## 2019-09-29 PROCEDURE — P9045: CPT

## 2019-09-29 PROCEDURE — 84100 ASSAY OF PHOSPHORUS: CPT

## 2019-09-29 PROCEDURE — 82803 BLOOD GASES ANY COMBINATION: CPT

## 2019-09-29 PROCEDURE — 93454 CORONARY ARTERY ANGIO S&I: CPT

## 2019-09-29 PROCEDURE — 80053 COMPREHEN METABOLIC PANEL: CPT

## 2019-09-29 PROCEDURE — 83880 ASSAY OF NATRIURETIC PEPTIDE: CPT

## 2019-09-29 PROCEDURE — 80076 HEPATIC FUNCTION PANEL: CPT

## 2019-09-29 PROCEDURE — 82550 ASSAY OF CK (CPK): CPT

## 2019-09-29 PROCEDURE — 36415 COLL VENOUS BLD VENIPUNCTURE: CPT

## 2019-09-29 PROCEDURE — 82330 ASSAY OF CALCIUM: CPT

## 2019-09-29 PROCEDURE — 84295 ASSAY OF SERUM SODIUM: CPT

## 2019-09-29 PROCEDURE — 94640 AIRWAY INHALATION TREATMENT: CPT

## 2019-09-29 PROCEDURE — 82435 ASSAY OF BLOOD CHLORIDE: CPT

## 2019-09-29 PROCEDURE — 93312 ECHO TRANSESOPHAGEAL: CPT

## 2019-09-29 PROCEDURE — 83036 HEMOGLOBIN GLYCOSYLATED A1C: CPT

## 2019-09-29 PROCEDURE — C1887: CPT

## 2019-09-29 PROCEDURE — 94002 VENT MGMT INPAT INIT DAY: CPT

## 2019-09-29 PROCEDURE — 93325 DOPPLER ECHO COLOR FLOW MAPG: CPT

## 2019-09-29 PROCEDURE — 93880 EXTRACRANIAL BILAT STUDY: CPT

## 2019-09-29 PROCEDURE — C1769: CPT

## 2019-09-29 PROCEDURE — 82962 GLUCOSE BLOOD TEST: CPT

## 2019-09-29 PROCEDURE — 94760 N-INVAS EAR/PLS OXIMETRY 1: CPT

## 2019-09-29 PROCEDURE — 86923 COMPATIBILITY TEST ELECTRIC: CPT

## 2019-09-30 ENCOUNTER — APPOINTMENT (OUTPATIENT)
Dept: CARDIOLOGY | Facility: CLINIC | Age: 84
End: 2019-09-30
Payer: MEDICARE

## 2019-09-30 ENCOUNTER — NON-APPOINTMENT (OUTPATIENT)
Age: 84
End: 2019-09-30

## 2019-09-30 VITALS
WEIGHT: 284 LBS | SYSTOLIC BLOOD PRESSURE: 142 MMHG | DIASTOLIC BLOOD PRESSURE: 76 MMHG | OXYGEN SATURATION: 100 % | BODY MASS INDEX: 38.47 KG/M2 | HEART RATE: 63 BPM | HEIGHT: 72 IN

## 2019-09-30 VITALS — DIASTOLIC BLOOD PRESSURE: 76 MMHG | SYSTOLIC BLOOD PRESSURE: 132 MMHG

## 2019-09-30 DIAGNOSIS — F32.9 MAJOR DEPRESSIVE DISORDER, SINGLE EPISODE, UNSPECIFIED: ICD-10-CM

## 2019-09-30 DIAGNOSIS — J44.9 CHRONIC OBSTRUCTIVE PULMONARY DISEASE, UNSPECIFIED: ICD-10-CM

## 2019-09-30 DIAGNOSIS — K21.9 GASTRO-ESOPHAGEAL REFLUX DISEASE W/OUT ESOPHAGITIS: ICD-10-CM

## 2019-09-30 DIAGNOSIS — Z99.89 OBSTRUCTIVE SLEEP APNEA (ADULT) (PEDIATRIC): ICD-10-CM

## 2019-09-30 DIAGNOSIS — N40.0 BENIGN PROSTATIC HYPERPLASIA WITHOUT LOWER URINARY TRACT SYMPMS: ICD-10-CM

## 2019-09-30 DIAGNOSIS — N17.9 ACUTE KIDNEY FAILURE, UNSPECIFIED: ICD-10-CM

## 2019-09-30 DIAGNOSIS — G47.33 OBSTRUCTIVE SLEEP APNEA (ADULT) (PEDIATRIC): ICD-10-CM

## 2019-09-30 DIAGNOSIS — Z09 ENCOUNTER FOR FOLLOW-UP EXAMINATION AFTER COMPLETED TREATMENT FOR CONDITIONS OTHER THAN MALIGNANT NEOPLASM: ICD-10-CM

## 2019-09-30 PROCEDURE — 93000 ELECTROCARDIOGRAM COMPLETE: CPT

## 2019-09-30 PROCEDURE — 99214 OFFICE O/P EST MOD 30 MIN: CPT | Mod: 25

## 2019-09-30 RX ORDER — TAMSULOSIN HYDROCHLORIDE 0.4 MG/1
0.4 CAPSULE ORAL
Qty: 90 | Refills: 3 | Status: ACTIVE | COMMUNITY
Start: 2019-09-30

## 2019-09-30 RX ORDER — OXYBUTYNIN CHLORIDE 10 MG/1
10 TABLET, EXTENDED RELEASE ORAL
Refills: 0 | Status: DISCONTINUED | COMMUNITY
End: 2019-09-30

## 2019-09-30 RX ORDER — ACETAMINOPHEN 325 MG/1
325 TABLET ORAL
Refills: 0 | Status: COMPLETED | COMMUNITY
End: 2019-09-30

## 2019-09-30 RX ORDER — FLUTICASONE FUROATE AND VILANTEROL TRIFENATATE 200; 25 UG/1; UG/1
200-25 POWDER RESPIRATORY (INHALATION) DAILY
Refills: 5 | Status: ACTIVE | COMMUNITY

## 2019-09-30 RX ORDER — PRAVASTATIN SODIUM 20 MG/1
20 TABLET ORAL DAILY
Refills: 0 | Status: DISCONTINUED | COMMUNITY
End: 2019-09-30

## 2019-09-30 RX ORDER — IPRATROPIUM BROMIDE AND ALBUTEROL SULFATE 2.5; .5 MG/3ML; MG/3ML
0.5-2.5 (3) SOLUTION RESPIRATORY (INHALATION) 4 TIMES DAILY
Refills: 0 | Status: ACTIVE | COMMUNITY
Start: 2019-09-30

## 2019-10-13 PROCEDURE — 97530 THERAPEUTIC ACTIVITIES: CPT

## 2019-10-13 PROCEDURE — 97535 SELF CARE MNGMENT TRAINING: CPT

## 2019-10-13 PROCEDURE — 71045 X-RAY EXAM CHEST 1 VIEW: CPT

## 2019-10-13 PROCEDURE — 97116 GAIT TRAINING THERAPY: CPT

## 2019-10-13 PROCEDURE — 71046 X-RAY EXAM CHEST 2 VIEWS: CPT

## 2019-10-13 PROCEDURE — 97167 OT EVAL HIGH COMPLEX 60 MIN: CPT

## 2019-10-13 PROCEDURE — 36415 COLL VENOUS BLD VENIPUNCTURE: CPT

## 2019-10-13 PROCEDURE — 97110 THERAPEUTIC EXERCISES: CPT

## 2019-10-13 PROCEDURE — 80048 BASIC METABOLIC PNL TOTAL CA: CPT

## 2019-10-13 PROCEDURE — 97163 PT EVAL HIGH COMPLEX 45 MIN: CPT

## 2019-10-13 PROCEDURE — 83735 ASSAY OF MAGNESIUM: CPT

## 2019-10-13 PROCEDURE — 85610 PROTHROMBIN TIME: CPT

## 2019-10-13 PROCEDURE — 94640 AIRWAY INHALATION TREATMENT: CPT

## 2019-10-13 PROCEDURE — 76775 US EXAM ABDO BACK WALL LIM: CPT

## 2019-10-13 PROCEDURE — 84100 ASSAY OF PHOSPHORUS: CPT

## 2019-10-13 PROCEDURE — 82962 GLUCOSE BLOOD TEST: CPT

## 2019-10-13 PROCEDURE — 85027 COMPLETE CBC AUTOMATED: CPT

## 2019-10-13 PROCEDURE — 80053 COMPREHEN METABOLIC PANEL: CPT

## 2019-10-13 PROCEDURE — 93005 ELECTROCARDIOGRAM TRACING: CPT

## 2019-12-05 ENCOUNTER — APPOINTMENT (OUTPATIENT)
Dept: CARDIOLOGY | Facility: CLINIC | Age: 84
End: 2019-12-05

## 2020-04-30 NOTE — PHYSICAL THERAPY INITIAL EVALUATION ADULT - SIT-TO-STAND BALANCE
Detail Level: Simple
Additional Notes: limited to FaceTime because of office closure RE:Covid19
fair balance

## 2020-09-20 NOTE — CONSULT NOTE ADULT - ASSESSMENT
- FWB: reassuring by US today  - Genetic screening: discussed today, will readdress at next visit  - Anatomy scan: plan at 20 weeks  - Flu shot: still needed  - Tdap: plan at 28wks  - PNL: sent today
Short interval between surgery and recent pregnancy.  Plan to monitor closely and will trend HCG after delivery
Will need rhogam at 28wks and PP
1) MILAGRO; ?prerenal  2) HTN  3) CAD sp CABG  4) AS    1.5L net neg over 24 hours;  sp fibroelastoma removal and CABG by Dr Alvarado 8/7  SCr uptrending  [facility]  Renal sono;   Would gently hydrate NS @ 75cc/hr for 12 hours; recheck BMP  d/w Robert Whyte NP
Imp--Pt with some wheezing.  According to our office records, he wheezes in the face of normal PFT's, prob due to early airway closure from obesity and small airways disease.  Severe GIGI     Plan--noct CPAP 11  Inc spirometry, mobilize pt  Nebs PRN.  Does not need symbicort.  lower O2 as donna.

## 2020-10-22 ENCOUNTER — APPOINTMENT (OUTPATIENT)
Dept: CARDIOLOGY | Facility: CLINIC | Age: 85
End: 2020-10-22
Payer: MEDICARE

## 2020-10-22 ENCOUNTER — NON-APPOINTMENT (OUTPATIENT)
Age: 85
End: 2020-10-22

## 2020-10-22 VITALS
WEIGHT: 278 LBS | OXYGEN SATURATION: 95 % | SYSTOLIC BLOOD PRESSURE: 132 MMHG | DIASTOLIC BLOOD PRESSURE: 73 MMHG | BODY MASS INDEX: 37.7 KG/M2 | HEART RATE: 100 BPM | TEMPERATURE: 97.6 F

## 2020-10-22 DIAGNOSIS — Z95.1 PRESENCE OF AORTOCORONARY BYPASS GRAFT: ICD-10-CM

## 2020-10-22 PROCEDURE — 93000 ELECTROCARDIOGRAM COMPLETE: CPT

## 2020-10-22 PROCEDURE — 99214 OFFICE O/P EST MOD 30 MIN: CPT

## 2020-10-22 RX ORDER — AMIODARONE HYDROCHLORIDE 200 MG/1
200 TABLET ORAL
Qty: 90 | Refills: 1 | Status: COMPLETED | COMMUNITY
Start: 2019-09-30 | End: 2020-10-22

## 2020-10-22 NOTE — HISTORY OF PRESENT ILLNESS
[FreeTextEntry1] : 86-year-old gentleman who is here today with factor V Leiden on warfarin history of A. fib, history of PE, and prior stroke.  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  He underwent surgery on 8/7/2019 CABG x2 with LIMA to LAD and reverse SVG to PLB removal of aortic valve filamentous lesion.\par He is doing very well.  He is able to walk and his breathing has improved significantly.  He has no chest pain or leg edema.  He lives alone.  Is compliant with medications and his INR has been checked by primary care doctor.  He is on warfarin.  No bleeding issues.\par \par EKG with possible A. fib and left bundle branch block QRS morphology.

## 2020-10-22 NOTE — REVIEW OF SYSTEMS
[Recent Weight Gain (___ Lbs)] : no recent weight gain [Feeling Fatigued] : feeling fatigued [Recent Weight Loss (___ Lbs)] : recent [unfilled] ~Ulb weight loss [Blurry Vision] : no blurred vision [Eyeglasses] : not currently wearing eyeglasses [Earache] : no earache [Mouth Sores] : no mouth sores [Sore Throat] : no sore throat [Dyspnea on exertion] : dyspnea during exertion [Chest  Pressure] : no chest pressure [Chest Pain] : no chest pain [Lower Ext Edema] : no extremity edema [Leg Claudication] : no intermittent leg claudication [Palpitations] : no palpitations [Cough] : no cough [Wheezing] : no wheezing [Abdominal Pain] : no abdominal pain [Nausea] : no nausea [Heartburn] : no heartburn [Urinary Frequency] : urinary frequency [Hematuria] : no hematuria [Joint Pain] : no joint pain [Skin: A Rash] : no rash: [Skin Lesions] : no skin lesions [Dizziness] : no dizziness [Tremor] : no tremor was seen [Convulsions] : no convulsions [Confusion] : no confusion was observed [Memory Lapses Or Loss] : no memory lapses or loss [Anxiety] : no anxiety [Under Stress] : not under stress [Excessive Thirst] : no polydipsia [Easy Bleeding] : no tendency for easy bleeding [Easy Bruising] : no tendency for easy bruising

## 2020-10-22 NOTE — ASSESSMENT
[FreeTextEntry1] : Patient with mother with aortic stenosis status post removal fibroblastoma.\par Continue with aspirin.\par No chest pain, EKG with left bundle branch block.\par On anticoagulation with warfarin.\par He has lost weight and wants to lose more weight.\par Actually is feeling really well with no significant shortness of breath.\par Repeat echocardiogram.  Follow-up in 6 months.\par

## 2020-10-22 NOTE — PHYSICAL EXAM
[Well Groomed] : well groomed [No Deformities] : no deformities [General Appearance - In No Acute Distress] : no acute distress [Normal Conjunctiva] : the conjunctiva exhibited no abnormalities [Eyelids - No Xanthelasma] : the eyelids demonstrated no xanthelasmas [No Oral Pallor] : no oral pallor [Normal Oropharynx] : normal oropharynx [Normal Jugular Venous V Waves Present] : normal jugular venous V waves present [Respiration, Rhythm And Depth] : normal respiratory rhythm and effort [Auscultation Breath Sounds / Voice Sounds] : lungs were clear to auscultation bilaterally [Chest Palpation] : palpation of the chest revealed no abnormalities [Heart Rate And Rhythm] : heart rate and rhythm were normal [Heart Sounds] : normal S1 and S2 [Arterial Pulses Normal] : the arterial pulses were normal [FreeTextEntry1] : 3/6 sm lsb and right second ics  [Bowel Sounds] : normal bowel sounds [Abdomen Soft] : soft [Abdomen Tenderness] : non-tender [Abnormal Walk] : normal gait [Nail Clubbing] : no clubbing of the fingernails [Cyanosis, Localized] : no localized cyanosis [Skin Color & Pigmentation] : normal skin color and pigmentation [Skin Turgor] : normal skin turgor [] : no rash [Oriented To Time, Place, And Person] : oriented to person, place, and time [Impaired Insight] : insight and judgment were intact

## 2020-12-02 ENCOUNTER — APPOINTMENT (OUTPATIENT)
Dept: CARDIOLOGY | Facility: CLINIC | Age: 85
End: 2020-12-02
Payer: MEDICARE

## 2020-12-02 PROCEDURE — 93306 TTE W/DOPPLER COMPLETE: CPT

## 2020-12-23 ENCOUNTER — OUTPATIENT (OUTPATIENT)
Dept: OUTPATIENT SERVICES | Facility: HOSPITAL | Age: 85
LOS: 1 days | End: 2020-12-23
Payer: MEDICARE

## 2020-12-23 DIAGNOSIS — Z98.49 CATARACT EXTRACTION STATUS, UNSPECIFIED EYE: Chronic | ICD-10-CM

## 2020-12-23 DIAGNOSIS — I25.10 ATHEROSCLEROTIC HEART DISEASE OF NATIVE CORONARY ARTERY WITHOUT ANGINA PECTORIS: ICD-10-CM

## 2020-12-23 DIAGNOSIS — I48.91 UNSPECIFIED ATRIAL FIBRILLATION: ICD-10-CM

## 2020-12-23 DIAGNOSIS — Z98.89 OTHER SPECIFIED POSTPROCEDURAL STATES: Chronic | ICD-10-CM

## 2020-12-23 PROCEDURE — 78452 HT MUSCLE IMAGE SPECT MULT: CPT | Mod: 26

## 2020-12-23 PROCEDURE — 78452 HT MUSCLE IMAGE SPECT MULT: CPT

## 2020-12-23 PROCEDURE — 93016 CV STRESS TEST SUPVJ ONLY: CPT

## 2020-12-23 PROCEDURE — 93017 CV STRESS TEST TRACING ONLY: CPT

## 2020-12-23 PROCEDURE — A9500: CPT

## 2020-12-23 PROCEDURE — 93018 CV STRESS TEST I&R ONLY: CPT

## 2020-12-24 LAB
INR PPP: 2.24 RATIO
PT BLD: 25.6 SEC

## 2021-05-03 ENCOUNTER — APPOINTMENT (OUTPATIENT)
Dept: CARDIOLOGY | Facility: CLINIC | Age: 86
End: 2021-05-03

## 2021-05-17 ENCOUNTER — APPOINTMENT (OUTPATIENT)
Dept: CARDIOLOGY | Facility: CLINIC | Age: 86
End: 2021-05-17
Payer: MEDICARE

## 2021-05-17 ENCOUNTER — NON-APPOINTMENT (OUTPATIENT)
Age: 86
End: 2021-05-17

## 2021-05-17 VITALS
WEIGHT: 275 LBS | TEMPERATURE: 98.7 F | HEART RATE: 57 BPM | SYSTOLIC BLOOD PRESSURE: 130 MMHG | DIASTOLIC BLOOD PRESSURE: 64 MMHG | HEIGHT: 72 IN | OXYGEN SATURATION: 97 % | BODY MASS INDEX: 37.25 KG/M2

## 2021-05-17 DIAGNOSIS — I51.9 HEART DISEASE, UNSPECIFIED: ICD-10-CM

## 2021-05-17 PROCEDURE — 93000 ELECTROCARDIOGRAM COMPLETE: CPT

## 2021-05-17 PROCEDURE — 99214 OFFICE O/P EST MOD 30 MIN: CPT

## 2021-05-17 RX ORDER — PANTOPRAZOLE 40 MG/1
40 TABLET, DELAYED RELEASE ORAL
Refills: 0 | Status: DISCONTINUED | COMMUNITY
Start: 2019-09-30 | End: 2021-05-17

## 2021-05-17 RX ORDER — AMLODIPINE BESYLATE 5 MG/1
5 TABLET ORAL
Qty: 90 | Refills: 3 | Status: ACTIVE | COMMUNITY

## 2021-05-17 RX ORDER — ATORVASTATIN CALCIUM 40 MG/1
40 TABLET, FILM COATED ORAL
Qty: 90 | Refills: 0 | Status: DISCONTINUED | COMMUNITY
End: 2021-05-17

## 2021-05-17 RX ORDER — OMEPRAZOLE 40 MG/1
40 CAPSULE, DELAYED RELEASE ORAL
Qty: 30 | Refills: 5 | Status: ACTIVE | COMMUNITY

## 2021-05-21 ENCOUNTER — LABORATORY RESULT (OUTPATIENT)
Age: 86
End: 2021-05-21

## 2021-05-26 NOTE — HISTORY OF PRESENT ILLNESS
[FreeTextEntry1] : 86-year-old gentleman who is here today for cardiology f/u with history of factor V Leiden on warfarin, A. fib, LBBB, history of PE, and prior stroke. 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 He underwent surgery on 8/7/2019 CABG x2 with LIMA to LAD and reverse SVG to PLB removal of aortic valve filamentous lesion.\par \par Echo performed 12/2/20 revealed EF 35-40%, multiple wall motion abnormalities, mild LVH, moderate mitral annular calcification, mild MVR, Aortic valve is cacified and trileaflet with reduced leaflet excursion, mild AS, borderline pulmonary HTN, dilation of the aortic root at the sinuses of valsalva (4.3 CM) and the ascending aorta (4.4CM), normal arch. NST performed 12/23/20 revealed medium sized, severe defects in inferior and inferolateral walls that are predominantly fixed, suggestive of infarction with minimal danisha-infarct ischemia. Post stress EF 55%. \par \par Since last follow up he reports he has been feeling well. Denies chest pain, shortness of breath, lightheadedness, dizziness, near syncope, syncope. Does feel fatigued at times, but does not affect his quality of life. Has not been exercising scheduled to start PT wednesday, as he has been having gait instability where he finds himself veering  to the side. Maintained on warfarin without complaints of easy bruising, bleeding, or falls. Recently had b/w with Dr. Murillo and reports PSA 13. Denies orthopnea, weight gain, edema. \par

## 2021-05-26 NOTE — REASON FOR VISIT
[FreeTextEntry1] : Patient was seen and examined along with the nurse practitioner.  Is here due to dilated aortic root, history of CABG x2, history of prior stroke and A. fib.  Patient is on anticoagulation with warfarin managed by PMD.\par He has no new symptoms and appears euvolemic on a statin.  Repeat echocardiogram to assess LV function is ordered.  We will also see aortic root and ascending aorta for progression of aortic root dilation.\par Patient has reported elevated PSA and is seen and managed by PMD\par .  Fasting lipid panel is ordered patient is on low intensity statin therapy at this time.\par He has no new cardiac symptoms.\par Follow-up in 6 months or earlier as need be.\par

## 2021-05-26 NOTE — REVIEW OF SYSTEMS
[Feeling Fatigued] : feeling fatigued [Headache] : no headache [SOB] : no shortness of breath [Chest Discomfort] : no chest discomfort [Lower Ext Edema] : no extremity edema [Palpitations] : no palpitations [Orthopnea] : no orthopnea [Syncope] : no syncope [Dizziness] : no dizziness [Easy Bleeding] : no tendency for easy bleeding

## 2021-05-26 NOTE — ADDENDUM
[FreeTextEntry1] : Update 5/26/21. Lipid panel reveals LDL 89 mg/dl. As d/w Dr. Kahn to increase pravastatin to 40mg QHS. REpeat in 3 months. Spoke with patient who verbalized understanding of need for medication change and f/u. Hector CERVANTES-C

## 2021-05-26 NOTE — PHYSICAL EXAM
[Well Developed] : well developed [Normal S1, S2] : normal S1, S2 [No Murmur] : no murmur [Clear Lung Fields] : clear lung fields [Moves all extremities] : moves all extremities [Alert and Oriented] : alert and oriented [de-identified] : Trace b/l LE edema

## 2021-05-26 NOTE — DISCUSSION/SUMMARY
[FreeTextEntry1] : 86-year-old gentleman who is here today for cardiology f/u with history of factor V Leiden on warfarin, A. fib, LBBB, history of PE, and prior stroke. 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 He underwent surgery on 8/7/2019 CABG x2 with LIMA to LAD and reverse SVG to PLB removal of aortic valve filamentous lesion.\par \par Echo performed 12/2/20 revealed EF 35-40%, multiple wall motion abnormalities, mild LVH, moderate mitral annular calcification, mild MVR, Aortic valve is cacified and trileaflet with reduced leaflet excursion, mild AS, borderline pulmonary HTN, dilation of the aortic root at the sinuses of valsalva (4.3 CM) and the ascending aorta (4.4CM), normal arch. NST performed 12/23/20 revealed medium sized, severe defects in inferior and inferolateral walls that are predominantly fixed, suggestive of infarction with minimal danisha-infarct ischemia. Post stress EF 55%. \par \par Since last follow up he reports he has been feeling well. Denies chest pain, shortness of breath, lightheadedness, dizziness, near syncope, syncope. Does feel fatigued at times, but does not affect his quality of life. Has not been exercising scheduled to start PT wednesday, as he has been having gait instability where he finds himself veering  to the side. Maintained on warfarin without complaints of easy bruising, bleeding, or falls. Recently had b/w with Dr. Murillo and reports PSA 13. Denies orthopnea, weight gain, edema. \par \par Recommendation:\par \par -Mr. Grier has been feeling well and continues to lose weight now down 30 lbs. Scheduled to start exercise program with PT this week.\par -EF 35-40% on last echocardiogram with dilation of the aortic root and ascending aorta. Euvolemia on exam. To repeat echocardiogram to reassess EF.\par -Hypertension- controlled on amlodipine, valsartan\par -CAD s/p CABG x 2 with NST 12/23/20 revealing predominantly fixed defects, minimal danisha- infarct ischemia- Denies chest pain, continue aspirin 81 mg daily , on warfarin for PAF and factor V \par -Hyperlipidemia- On Pravastatin 20- repeat Lipid panel \par \par Follow up with Dr. Ta in 6-7 months or sooner PRN.\par \par Karla Aguilera ANP-C

## 2021-06-08 ENCOUNTER — APPOINTMENT (OUTPATIENT)
Dept: CARDIOLOGY | Facility: CLINIC | Age: 86
End: 2021-06-08
Payer: MEDICARE

## 2021-06-08 ENCOUNTER — NON-APPOINTMENT (OUTPATIENT)
Age: 86
End: 2021-06-08

## 2021-06-08 PROCEDURE — 93306 TTE W/DOPPLER COMPLETE: CPT

## 2021-06-08 RX ORDER — PERFLUTREN 6.52 MG/ML
6.52 INJECTION, SUSPENSION INTRAVENOUS
Qty: 1 | Refills: 0 | Status: COMPLETED | OUTPATIENT
Start: 2021-06-08

## 2021-06-08 RX ADMIN — PERFLUTREN MG/ML: 6.52 INJECTION, SUSPENSION INTRAVENOUS at 00:00

## 2021-06-16 ENCOUNTER — TRANSCRIPTION ENCOUNTER (OUTPATIENT)
Age: 86
End: 2021-06-16

## 2021-08-06 ENCOUNTER — LABORATORY RESULT (OUTPATIENT)
Age: 86
End: 2021-08-06

## 2021-09-21 NOTE — ED ADULT TRIAGE NOTE - RESPIRATORY RATE (BREATHS/MIN)
To Dr. Kobi Lowry made for 4:30 today. Pt will bring in copy of negative Covid test from Friday, 9/17/21 - is this OK? 18

## 2021-10-21 NOTE — ED PROVIDER NOTE - PROGRESS NOTE DETAILS
Family now at bedside - I explained plan for admission, neurology to see, CT angio head/neck (family will go to patient's house and see if they can find cards for stents, but do not believe they are MRI compatible).
[facility]

## 2022-01-03 ENCOUNTER — APPOINTMENT (OUTPATIENT)
Dept: CARDIOLOGY | Facility: CLINIC | Age: 87
End: 2022-01-03

## 2022-04-04 ENCOUNTER — NON-APPOINTMENT (OUTPATIENT)
Age: 87
End: 2022-04-04

## 2022-04-04 ENCOUNTER — APPOINTMENT (OUTPATIENT)
Dept: CARDIOLOGY | Facility: CLINIC | Age: 87
End: 2022-04-04
Payer: MEDICARE

## 2022-04-04 VITALS
OXYGEN SATURATION: 97 % | HEIGHT: 72 IN | WEIGHT: 280 LBS | DIASTOLIC BLOOD PRESSURE: 74 MMHG | HEART RATE: 100 BPM | TEMPERATURE: 98.1 F | BODY MASS INDEX: 37.93 KG/M2 | SYSTOLIC BLOOD PRESSURE: 115 MMHG

## 2022-04-04 DIAGNOSIS — I25.10 ATHEROSCLEROTIC HEART DISEASE OF NATIVE CORONARY ARTERY W/OUT ANGINA PECTORIS: ICD-10-CM

## 2022-04-04 DIAGNOSIS — I35.0 NONRHEUMATIC AORTIC (VALVE) STENOSIS: ICD-10-CM

## 2022-04-04 DIAGNOSIS — I10 ESSENTIAL (PRIMARY) HYPERTENSION: ICD-10-CM

## 2022-04-04 DIAGNOSIS — E78.5 HYPERLIPIDEMIA, UNSPECIFIED: ICD-10-CM

## 2022-04-04 DIAGNOSIS — I77.810 THORACIC AORTIC ECTASIA: ICD-10-CM

## 2022-04-04 PROCEDURE — 93000 ELECTROCARDIOGRAM COMPLETE: CPT

## 2022-04-04 PROCEDURE — 99214 OFFICE O/P EST MOD 30 MIN: CPT

## 2022-04-04 RX ORDER — OXYBUTYNIN CHLORIDE 10 MG/1
10 TABLET, EXTENDED RELEASE ORAL DAILY
Refills: 0 | Status: DISCONTINUED | COMMUNITY
End: 2022-04-04

## 2022-04-04 RX ORDER — LEVETIRACETAM 500 MG/1
500 TABLET, FILM COATED ORAL TWICE DAILY
Refills: 0 | Status: DISCONTINUED | COMMUNITY
End: 2022-04-04

## 2022-04-04 RX ORDER — WARFARIN 2.5 MG/1
2.5 TABLET ORAL
Refills: 0 | Status: ACTIVE | COMMUNITY

## 2022-04-04 RX ORDER — ATORVASTATIN CALCIUM 40 MG/1
40 TABLET, FILM COATED ORAL
Refills: 0 | Status: ACTIVE | COMMUNITY

## 2022-04-04 RX ORDER — WARFARIN SODIUM 2.5 MG/1
2.5 TABLET ORAL DAILY
Refills: 0 | Status: DISCONTINUED | COMMUNITY
End: 2022-04-04

## 2022-04-04 RX ORDER — PRAVASTATIN SODIUM 40 MG/1
40 TABLET ORAL
Qty: 90 | Refills: 2 | Status: DISCONTINUED | COMMUNITY
End: 2022-04-04

## 2022-04-04 RX ORDER — VALSARTAN 160 MG/1
160 TABLET, COATED ORAL DAILY
Refills: 0 | Status: DISCONTINUED | COMMUNITY
End: 2022-04-04

## 2022-04-04 RX ORDER — METOPROLOL TARTRATE 50 MG/1
50 TABLET, FILM COATED ORAL
Refills: 0 | Status: ACTIVE | COMMUNITY

## 2022-05-09 NOTE — ED PROVIDER NOTE - PMH
yes
CAD (coronary artery disease)    [place] filter in place    Hyperlipemia    Hypertension    PE (pulmonary embolism)

## 2022-05-09 NOTE — PROGRESS NOTE ADULT - SUBJECTIVE AND OBJECTIVE BOX
SUBJECTIVE    REVIEW OF SYSTEMS    General: Not in any pain	    Skin/Breast: No rash  	  ENMT: No visual problems, no sore throat	    Respiratory and Thorax: No cough, No CP, No SOB  	  Cardiovascular: No CP, No palpitations    Gastrointestinal: No Abd pain, No N/V/D    Musculoskeletal: No Joint pain, No back pain	    Neurological: No headache    Psychiatric: No anxiety      OBJECTIVE    Vital Signs Last 24 Hrs  T(C): 36.7 (01-14-19 @ 19:53), Max: 36.7 (01-14-19 @ 10:57)  T(F): 98 (01-14-19 @ 19:53), Max: 98.1 (01-14-19 @ 10:57)  HR: 75 (01-14-19 @ 19:53) (64 - 77)  BP: 134/81 (01-14-19 @ 19:53) (134/81 - 158/87)  BP(mean): --  RR: 18 (01-14-19 @ 19:53) (18 - 19)  SpO2: 95% (01-14-19 @ 19:53) (95% - 98%)    PHYSICAL EXAM:    Constitutional: Not in any distress    Eyes: No conjunctival injection    ENMT: No oral lesions    Neck: No nodes, no adenopathy    Back: Straight, no defects    Respiratory: clear b/l    Cardiovascular: RRR, no murmur    Gastrointestinal: soft, NT, ND    Extremities: No edema, no erythema    Neurological: no focal deficit    Skin: No rash      MEDICATIONS  (STANDING):  amLODIPine   Tablet 5 milliGRAM(s) Oral daily  atorvastatin 10 milliGRAM(s) Oral at bedtime  metoprolol tartrate 50 milliGRAM(s) Oral two times a day  oxybutynin 10 milliGRAM(s) Oral at bedtime  pantoprazole    Tablet 40 milliGRAM(s) Oral before breakfast  sertraline 50 milliGRAM(s) Oral daily  tamsulosin 0.4 milliGRAM(s) Oral at bedtime  valsartan 160 milliGRAM(s) Oral daily    MEDICATIONS  (PRN):    CARDIAC MARKERS ( 13 Jan 2019 17:59 )  x     / <0.01 ng/mL / x     / x     / x                                13.5   8.6   )-----------( 229      ( 13 Jan 2019 17:59 )             41.6     14 Jan 2019 06:42    138    |  107    |  20.0   ----------------------------<  106    3.8     |  21.0   |  1.19     Ca    8.7        14 Jan 2019 06:42    TPro  6.9    /  Alb  3.7    /  TBili  0.3    /  DBili  x      /  AST  13     /  ALT  12     /  AlkPhos  52     13 Jan 2019 17:59    Allergies    No Known Allergies    Intolerances PROVIDER:[TOKEN:[[id number]]]

## 2022-06-11 NOTE — ED CDU PROVIDER INITIAL DAY NOTE - RELIEVING FACTORS
none 66 yo M with a PMH HTN, HLD, SLE on Cellcept (MMF), class V lupus nephritis, CKD stage 2, DM2, diabetic neuropathy, TIA (2019) on plavix, BPH, HUNG, with multiple hospitalizations for c diff, acute panc, septic shock 22 buttock abscess status post drainage transferred for recurrent fevers status post core biopsy and paracentesis suspected lymphoma v less likely peritoneal tb intubated and sedated due to upper airway stridor and altered mental status and extubated 6/8 now transferred to medicine service

## 2022-09-23 ENCOUNTER — APPOINTMENT (OUTPATIENT)
Dept: CARDIOLOGY | Facility: CLINIC | Age: 87
End: 2022-09-23

## 2022-09-23 PROCEDURE — 93306 TTE W/DOPPLER COMPLETE: CPT

## 2022-09-23 RX ORDER — PERFLUTREN 6.52 MG/ML
6.52 INJECTION, SUSPENSION INTRAVENOUS
Qty: 2 | Refills: 0 | Status: COMPLETED | OUTPATIENT
Start: 2022-09-23

## 2022-09-23 RX ADMIN — PERFLUTREN MG/ML: 6.52 INJECTION, SUSPENSION INTRAVENOUS at 00:00

## 2022-10-13 ENCOUNTER — TRANSCRIPTION ENCOUNTER (OUTPATIENT)
Age: 87
End: 2022-10-13

## 2022-12-06 ENCOUNTER — APPOINTMENT (OUTPATIENT)
Dept: CARDIOLOGY | Facility: CLINIC | Age: 87
End: 2022-12-06

## 2022-12-06 NOTE — HISTORY OF PRESENT ILLNESS
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 Since last office visit, he was diagnosed with prostate cancer and is undergoing injectable hormone therapy. Reports CASTILLO when walking up stairs, unchanged from baseline. States that he will likely need radiation in the near future. Denies chest pain, SOB at rest, palpitations, lightheadedness, dizziness, syncope, near syncope and LE edema. Admits to noncompliance with taking Aspirin. He does chair exercises frequently.

## 2022-12-06 NOTE — DISCUSSION/SUMMARY
[FreeTextEntry1] : 87-year-old gentleman with history of factor V Leiden, A. 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 Reports feeling well today. Since last office visit, he was diagnosed with prostate cancer and is undergoing injectable hormone therapy. Reports CASTILLO when walking up stairs, unchanged from baseline. States that he will likely need radiation in the near future. Denies chest pain, SOB at rest, palpitations, lightheadedness, dizziness, syncope, near syncope and LE edema. Admits to noncompliance with taking Aspirin. He does chair exercises frequently. Discussed with and patient seen by Dr. Ta. \par \par Assessment/ Plan:\par 1. TTE last June with LVEF 35% (unchanged from 2020). Valsartan was stopped by PCP, unsure why. Will obtain labs from PCP. If renal function allows, will start Entresto. \par 2. CAD: noncompliant with Aspirin. Continue Statin and BB. \par 3. History of Afib: Continue Warfarin. INR managed by PCP. \par 4. Repeat TTE in June 2022 for surveillance of EF and dilated aortic root. \par 5. Follow up in 6 months or sooner if needed. \par Patient was advised to contact the office or seek emergency medical care for any new, worsening or concerning symptoms. Patient verbalized understanding and is in agreement with the above plan.\par \par Soheila WOOD

## 2022-12-20 NOTE — PHYSICAL THERAPY INITIAL EVALUATION ADULT - PERTINENT HX OF CURRENT PROBLEM, REHAB EVAL
Outgoing call to [facility] to check status of patients assistance application for Imbruvica. Rep confirmed missing information has been received and application is currently being processed. Rep provided ETAT of 24 to 48 business hours for final determination. Will continue to follow up until final determination is made.      weakness

## 2022-12-26 ENCOUNTER — EMERGENCY (EMERGENCY)
Facility: HOSPITAL | Age: 87
LOS: 1 days | Discharge: HOME | End: 2022-12-26
Attending: EMERGENCY MEDICINE
Payer: MEDICARE

## 2022-12-26 VITALS
DIASTOLIC BLOOD PRESSURE: 69 MMHG | SYSTOLIC BLOOD PRESSURE: 97 MMHG | OXYGEN SATURATION: 96 % | WEIGHT: 287.48 LBS | HEART RATE: 119 BPM | TEMPERATURE: 97 F | RESPIRATION RATE: 17 BRPM

## 2022-12-26 VITALS
HEART RATE: 118 BPM | TEMPERATURE: 98 F | RESPIRATION RATE: 20 BRPM | SYSTOLIC BLOOD PRESSURE: 127 MMHG | OXYGEN SATURATION: 95 % | DIASTOLIC BLOOD PRESSURE: 78 MMHG

## 2022-12-26 DIAGNOSIS — Z98.49 CATARACT EXTRACTION STATUS, UNSPECIFIED EYE: Chronic | ICD-10-CM

## 2022-12-26 DIAGNOSIS — Z98.89 OTHER SPECIFIED POSTPROCEDURAL STATES: Chronic | ICD-10-CM

## 2022-12-26 LAB
ALBUMIN SERPL ELPH-MCNC: 3.4 G/DL — SIGNIFICANT CHANGE UP (ref 3.3–5.2)
ALP SERPL-CCNC: 66 U/L — SIGNIFICANT CHANGE UP (ref 40–120)
ALT FLD-CCNC: 15 U/L — SIGNIFICANT CHANGE UP
ANION GAP SERPL CALC-SCNC: 10 MMOL/L — SIGNIFICANT CHANGE UP (ref 5–17)
APTT BLD: 45.2 SEC — HIGH (ref 27.5–35.5)
AST SERPL-CCNC: 20 U/L — SIGNIFICANT CHANGE UP
BASOPHILS # BLD AUTO: 0.04 K/UL — SIGNIFICANT CHANGE UP (ref 0–0.2)
BASOPHILS NFR BLD AUTO: 0.7 % — SIGNIFICANT CHANGE UP (ref 0–2)
BILIRUB SERPL-MCNC: 0.5 MG/DL — SIGNIFICANT CHANGE UP (ref 0.4–2)
BUN SERPL-MCNC: 26.3 MG/DL — HIGH (ref 8–20)
CALCIUM SERPL-MCNC: 9 MG/DL — SIGNIFICANT CHANGE UP (ref 8.4–10.5)
CHLORIDE SERPL-SCNC: 105 MMOL/L — SIGNIFICANT CHANGE UP (ref 96–108)
CO2 SERPL-SCNC: 21 MMOL/L — LOW (ref 22–29)
CREAT SERPL-MCNC: 1.27 MG/DL — SIGNIFICANT CHANGE UP (ref 0.5–1.3)
EGFR: 54 ML/MIN/1.73M2 — LOW
EOSINOPHIL # BLD AUTO: 0.13 K/UL — SIGNIFICANT CHANGE UP (ref 0–0.5)
EOSINOPHIL NFR BLD AUTO: 2.2 % — SIGNIFICANT CHANGE UP (ref 0–6)
GLUCOSE SERPL-MCNC: 133 MG/DL — HIGH (ref 70–99)
HCT VFR BLD CALC: 37.8 % — LOW (ref 39–50)
HGB BLD-MCNC: 12.1 G/DL — LOW (ref 13–17)
IMM GRANULOCYTES NFR BLD AUTO: 0.5 % — SIGNIFICANT CHANGE UP (ref 0–0.9)
INR BLD: 5.11 RATIO — CRITICAL HIGH (ref 0.88–1.16)
LYMPHOCYTES # BLD AUTO: 0.56 K/UL — LOW (ref 1–3.3)
LYMPHOCYTES # BLD AUTO: 9.4 % — LOW (ref 13–44)
MCHC RBC-ENTMCNC: 28.7 PG — SIGNIFICANT CHANGE UP (ref 27–34)
MCHC RBC-ENTMCNC: 32 GM/DL — SIGNIFICANT CHANGE UP (ref 32–36)
MCV RBC AUTO: 89.6 FL — SIGNIFICANT CHANGE UP (ref 80–100)
MONOCYTES # BLD AUTO: 0.66 K/UL — SIGNIFICANT CHANGE UP (ref 0–0.9)
MONOCYTES NFR BLD AUTO: 11 % — SIGNIFICANT CHANGE UP (ref 2–14)
NEUTROPHILS # BLD AUTO: 4.56 K/UL — SIGNIFICANT CHANGE UP (ref 1.8–7.4)
NEUTROPHILS NFR BLD AUTO: 76.2 % — SIGNIFICANT CHANGE UP (ref 43–77)
PLATELET # BLD AUTO: 201 K/UL — SIGNIFICANT CHANGE UP (ref 150–400)
POTASSIUM SERPL-MCNC: 4.1 MMOL/L — SIGNIFICANT CHANGE UP (ref 3.5–5.3)
POTASSIUM SERPL-SCNC: 4.1 MMOL/L — SIGNIFICANT CHANGE UP (ref 3.5–5.3)
PROT SERPL-MCNC: 6.9 G/DL — SIGNIFICANT CHANGE UP (ref 6.6–8.7)
PROTHROM AB SERPL-ACNC: 60.3 SEC — HIGH (ref 10.5–13.4)
RBC # BLD: 4.22 M/UL — SIGNIFICANT CHANGE UP (ref 4.2–5.8)
RBC # FLD: 14.1 % — SIGNIFICANT CHANGE UP (ref 10.3–14.5)
SODIUM SERPL-SCNC: 136 MMOL/L — SIGNIFICANT CHANGE UP (ref 135–145)
WBC # BLD: 5.98 K/UL — SIGNIFICANT CHANGE UP (ref 3.8–10.5)
WBC # FLD AUTO: 5.98 K/UL — SIGNIFICANT CHANGE UP (ref 3.8–10.5)

## 2022-12-26 PROCEDURE — 99285 EMERGENCY DEPT VISIT HI MDM: CPT | Mod: 25

## 2022-12-26 PROCEDURE — 80053 COMPREHEN METABOLIC PANEL: CPT

## 2022-12-26 PROCEDURE — 85730 THROMBOPLASTIN TIME PARTIAL: CPT

## 2022-12-26 PROCEDURE — 71045 X-RAY EXAM CHEST 1 VIEW: CPT

## 2022-12-26 PROCEDURE — 36415 COLL VENOUS BLD VENIPUNCTURE: CPT

## 2022-12-26 PROCEDURE — 72125 CT NECK SPINE W/O DYE: CPT | Mod: MA

## 2022-12-26 PROCEDURE — 70450 CT HEAD/BRAIN W/O DYE: CPT | Mod: MA

## 2022-12-26 PROCEDURE — 85610 PROTHROMBIN TIME: CPT

## 2022-12-26 PROCEDURE — 93010 ELECTROCARDIOGRAM REPORT: CPT

## 2022-12-26 PROCEDURE — 71045 X-RAY EXAM CHEST 1 VIEW: CPT | Mod: 26

## 2022-12-26 PROCEDURE — 70450 CT HEAD/BRAIN W/O DYE: CPT | Mod: 26,MA

## 2022-12-26 PROCEDURE — 85025 COMPLETE CBC W/AUTO DIFF WBC: CPT

## 2022-12-26 PROCEDURE — U0005: CPT

## 2022-12-26 PROCEDURE — 96360 HYDRATION IV INFUSION INIT: CPT

## 2022-12-26 PROCEDURE — 99284 EMERGENCY DEPT VISIT MOD MDM: CPT | Mod: GC

## 2022-12-26 PROCEDURE — 93005 ELECTROCARDIOGRAM TRACING: CPT

## 2022-12-26 PROCEDURE — U0003: CPT

## 2022-12-26 PROCEDURE — 72125 CT NECK SPINE W/O DYE: CPT | Mod: 26,MA

## 2022-12-26 RX ORDER — SODIUM CHLORIDE 9 MG/ML
1000 INJECTION INTRAMUSCULAR; INTRAVENOUS; SUBCUTANEOUS ONCE
Refills: 0 | Status: COMPLETED | OUTPATIENT
Start: 2022-12-26 | End: 2022-12-26

## 2022-12-26 RX ADMIN — SODIUM CHLORIDE 1000 MILLILITER(S): 9 INJECTION INTRAMUSCULAR; INTRAVENOUS; SUBCUTANEOUS at 16:50

## 2022-12-26 NOTE — ED PROVIDER NOTE - PATIENT PORTAL LINK FT
head inj You can access the FollowMyHealth Patient Portal offered by [facility] by registering at the following website: [email or web address]. By joining [facility]’s FollowMyHealth portal, you will also be able to view your health information using other applications (apps) compatible with our system.

## 2022-12-26 NOTE — ED PROVIDER NOTE - PROGRESS NOTE DETAILS
Casey: No acute bleed found on CT Head or Cervical Spine. Pt slightly tachycardic and irregular, consistent with his atrial fibrillation controlled with coumadin and metoprolol, and with small wheezes on exam, consistent with his COPD controlled with his Breo Ellipta and albuterol nebulizers. INR is elevated at 5.11. At this time pt is otherwise asymptomatic, and as part of shared care plan is comfortable with discharge and close f/u with PCP this week for INR check. Agrees to skip next Coumadin dose. Return precautions given.

## 2022-12-26 NOTE — ED ADULT NURSE NOTE - CHIEF COMPLAINT QUOTE
trip/ fall 1 week ago; hit right side of head on cement. on coumadin. denies LOC. pt also reports chronic gait disturbance and difficulty ambulating.

## 2022-12-26 NOTE — ED PROVIDER NOTE - OBJECTIVE STATEMENT
87 y/o male w/PMHx of CAD, Factor V Leiden on Coumadin, PE on coumadin w/IVC filter, CVA, GIGI, and COPD presents 1 wk after a fall. Pt fell on his driveway face first. Did not want to come into the ED after the fall due to the hollidays. Denies pain but wanted to get checked out due to his coumadin use. Pt lives at home by himself, normally uses a cane or walker to ambulate. Has multiple abrasions with dried blood on his arms, scalp, and below his R ear. Denies CP, sob, abd pain, leg pain/swelling.

## 2022-12-26 NOTE — ED PROVIDER NOTE - ATTENDING CONTRIBUTION TO CARE
I personally saw the patient with the resident, and completed the key components of the history and physical exam. I then discussed the management plan with the resident.    87 y/o M with PMH CAD, Factor V Leiden on Coumadin, PE on Coumadin with IVC filter, COPD presents for fall 1 week ago in his driveway, multiple abrasions to right side of his body, no LOC. Ambulates with walker, lives alone. Denies HA, chest pain, SOB, abdominal pain, back pain. Cannot recall his last tetanus shot.    PE - NAD, well appearing, CN II-XII symmetrically intact, neuro-vascularly intact x 4 extremities, tachycardic, no respiratory distress, abd soft NT/ND, multiple abrasions with scabs on posterior of right ear, right forehead and face, right wrist.    Pain control, patient persistently tachycardic - will obtain labs, imaging, update tetanus and reassess.

## 2022-12-26 NOTE — ED ADULT NURSE NOTE - OBJECTIVE STATEMENT
Patient 87 y/o male presenting to the ED from home complaining of a trip and fall to the curb outside his home on Monday. Patient poor historian, states his neighbors saw the fall and unable to recall if he lost consciousness. Patient states he is on coumadin for A-fib. Denies chest pain, shortness of breath, dizziness, pain/discomfort, fever, cough. Alert and oriented x4 but confused at times. Gross neuro intact. Respirations equal and unlabored. +2 Pulses present bilaterally to upper and lower extremities.. Able to move all extremities without difficulty. Scabbed over abrasions noted to right side of head above right eye brow and right wrist. No active bleeding. Pending MD orders. Patient 87 y/o male presenting to the ED from home complaining of a trip and fall to the curb outside his home on Monday. Patient poor historian, states his neighbors saw the fall and unable to recall if he lost consciousness. Patient states he is on coumadin for A-fib. Denies chest pain, shortness of breath, dizziness, pain/discomfort, fever, cough. Alert and oriented x4 but confused at times. Gross neuro intact. Respirations equal, audible wheezing noted. +2 Pulses present bilaterally to upper and lower extremities.. Able to move all extremities without difficulty. Scabbed over abrasions noted to right side of head above right eye brow and right wrist. No active bleeding. Pending MD orders.

## 2022-12-26 NOTE — ED PROVIDER NOTE - PHYSICAL EXAMINATION
General: well appearing, NAD  Head: NC, AT, multiple spots of purpurra on the forehead and scalp  EENT: EOMI, no scleral icterus  Cardiac: Slightly tachycardic but regular, no apparent murmurs, no lower extremity edema  Respiratory: CTABL, no respiratory distress   Abdomen: soft, ND, NT, nonperitonitic  MSK/Vascular: full ROM, soft compartments, warm extremities  Neuro: AAOx3, CNII-XII grossly intact, sensation to light touch intact  Psych: calm, cooperative General: well appearing, NAD  Head: NC, AT, multiple spots of purpura on the forehead and scalp  EENT: EOMI, no scleral icterus  Cardiac: Slightly tachycardic but regular, no apparent murmurs, no lower extremity edema  Respiratory: CTABL, no respiratory distress   Abdomen: soft, ND, NT, nonperitonitic  MSK/Vascular: full ROM, abrasions w/dried blood R upper scalp and inferior to right earlobe, soft compartments, warm extremities, 2+ DP/PT pulses b/l  Neuro: AAOx3, CNII-XII grossly intact, sensation to light touch intact  Psych: calm, cooperative

## 2022-12-26 NOTE — ED ADULT NURSE NOTE - NSICDXPASTMEDICALHX_GEN_ALL_CORE_FT
PAST MEDICAL HISTORY:  Aortic stenosis     Atheroma     Atrial fibrillation, unspecified type     CAD S/P percutaneous coronary angioplasty     Cerebrovascular accident (CVA)     [place] filter in place     History of COPD     Hyperlipemia     Hypertension     PE (pulmonary embolism)

## 2022-12-26 NOTE — ED ADULT NURSE REASSESSMENT NOTE - NS ED NURSE REASSESS COMMENT FT1
Patient resting comfortably on stretcher. No complaints at this time. In no acute distress. Bed in lowest position.

## 2022-12-26 NOTE — ED PROVIDER NOTE - CLINICAL SUMMARY MEDICAL DECISION MAKING FREE TEXT BOX
89 y/o male w/fall and multiple R sided purpura secondary to Coumadin use. Pt is tachy and in afib w/faint wheezing, known Afib and COPD hx controlled with medications. Will get CT Head and Neck due to AC use. Check basic labs, give IVF, and reassess.

## 2022-12-26 NOTE — ED PROVIDER NOTE - NSICDXPASTMEDICALHX_GEN_ALL_CORE_FT
PAST MEDICAL HISTORY:  Aortic stenosis     Atheroma     Atrial fibrillation, unspecified type     CAD S/P percutaneous coronary angioplasty     Cerebrovascular accident (CVA)     [place] filter in place     History of COPD     Hyperlipemia     Hypertension     PE (pulmonary embolism)

## 2022-12-26 NOTE — ED ADULT TRIAGE NOTE - CHIEF COMPLAINT QUOTE
trip/ fall 1 week ago; hit right side of head on cement. on coumadin. denies LOC. trip/ fall 1 week ago; hit right side of head on cement. on coumadin. denies LOC. pt also reports chronic gait disturbance and difficulty ambulating.

## 2022-12-26 NOTE — ED ADULT NURSE NOTE - NSIMPLEMENTINTERV_GEN_ALL_ED
Implemented All Fall with Harm Risk Interventions:  [place] to call system. Call bell, personal items and telephone within reach. Instruct patient to call for assistance. Room bathroom lighting operational. Non-slip footwear when patient is off stretcher. Physically safe environment: no spills, clutter or unnecessary equipment. Stretcher in lowest position, wheels locked, appropriate side rails in place. Provide visual cue, wrist band, yellow gown, etc. Monitor gait and stability. Monitor for mental status changes and reorient to person, place, and time. Review medications for side effects contributing to fall risk. Reinforce activity limits and safety measures with patient and family. Provide visual clues: red socks.

## 2022-12-26 NOTE — ED PROVIDER NOTE - NSFOLLOWUPINSTRUCTIONS_ED_ALL_ED_FT
Fall Prevention in the Home, Adult      Falls can cause injuries and can happen to people of all ages. There are many things you can do to make your home safe and to help prevent falls. Ask for help when making these changes.      What actions can I take to prevent falls?    General Instructions     •Use good lighting in all rooms. Replace any light bulbs that burn out.  •Turn on the lights in dark areas. Use night-lights.  •Keep items that you use often in easy-to-reach places. Lower the shelves around your home if needed.  •Set up your furniture so you have a clear path. Avoid moving your furniture around.  • Do not have throw rugs or other things on the floor that can make you trip.  •Avoid walking on wet floors.  •If any of your floors are uneven, fix them.  •Add color or contrast paint or tape to clearly almas and help you see:  •Grab bars or handrails.  •First and last steps of staircases.  •Where the edge of each step is.  •If you use a stepladder:  •Make sure that it is fully opened. Do not climb a closed stepladder.  •Make sure the sides of the stepladder are locked in place.  •Ask someone to hold the stepladder while you use it.  •Know where your pets are when moving through your home.      What can I do in the bathroom?     •Keep the floor dry. Clean up any water on the floor right away.  •Remove soap buildup in the tub or shower.  •Use nonskid mats or decals on the floor of the tub or shower.  •Attach bath mats securely with double-sided, nonslip rug tape.  •If you need to sit down in the shower, use a plastic, nonslip stool.  •Install grab bars by the toilet and in the tub and shower. Do not use towel bars as grab bars.      What can I do in the bedroom?     •Make sure that you have a light by your bed that is easy to reach.  • Do not use any sheets or blankets for your bed that hang to the floor.  •Have a firm chair with side arms that you can use for support when you get dressed.      What can I do in the kitchen?     •Clean up any spills right away.  •If you need to reach something above you, use a step stool with a grab bar.  •Keep electrical cords out of the way.  • Do not use floor polish or wax that makes floors slippery.      What can I do with my stairs?     • Do not leave any items on the stairs.  •Make sure that you have a light switch at the top and the bottom of the stairs.  •Make sure that there are handrails on both sides of the stairs. Fix handrails that are broken or loose.  •Install nonslip stair treads on all your stairs.  •Avoid having throw rugs at the top or bottom of the stairs.  •Choose a carpet that does not hide the edge of the steps on the stairs.  •Check carpeting to make sure that it is firmly attached to the stairs. Fix carpet that is loose or worn.    What can I do on the outside of my home?     •Use bright outdoor lighting.  •Fix the edges of walkways and driveways and fix any cracks.  •Remove anything that might make you trip as you walk through a door, such as a raised step or threshold.  •Trim any bushes or trees on paths to your home.  •Check to see if handrails are loose or broken and that both sides of all steps have handrails.  •Install guardrails along the edges of any raised decks and porches.  •Clear paths of anything that can make you trip, such as tools or rocks.  •Have leaves, snow, or ice cleared regularly.  •Use sand or salt on paths during winter.  •Clean up any spills in your garage right away. This includes grease or oil spills.    What other actions can I take?   •Wear shoes that:  •Have a low heel. Do not wear high heels.  •Have rubber bottoms.  •Feel good on your feet and fit well.  •Are closed at the toe. Do not wear open-toe sandals.  •Use tools that help you move around if needed. These include:  •Canes.  •Walkers.  •Scooters.  •Crutches.  •Review your medicines with your doctor. Some medicines can make you feel dizzy. This can increase your chance of falling.      Ask your doctor what else you can do to help prevent falls. Your INR level was found to be 5.11. Ideally your INR should be between 2-3. Skip your next Warfarin dose on Wednesday. Follow up with your Primary Care Doctor in the next 2-3 days.     Fall Prevention in the Home, Adult      Falls can cause injuries and can happen to people of all ages. There are many things you can do to make your home safe and to help prevent falls. Ask for help when making these changes.      What actions can I take to prevent falls?    General Instructions     •Use good lighting in all rooms. Replace any light bulbs that burn out.  •Turn on the lights in dark areas. Use night-lights.  •Keep items that you use often in easy-to-reach places. Lower the shelves around your home if needed.  •Set up your furniture so you have a clear path. Avoid moving your furniture around.  • Do not have throw rugs or other things on the floor that can make you trip.  •Avoid walking on wet floors.  •If any of your floors are uneven, fix them.  •Add color or contrast paint or tape to clearly almas and help you see:  •Grab bars or handrails.  •First and last steps of staircases.  •Where the edge of each step is.  •If you use a stepladder:  •Make sure that it is fully opened. Do not climb a closed stepladder.  •Make sure the sides of the stepladder are locked in place.  •Ask someone to hold the stepladder while you use it.  •Know where your pets are when moving through your home.      What can I do in the bathroom?     •Keep the floor dry. Clean up any water on the floor right away.  •Remove soap buildup in the tub or shower.  •Use nonskid mats or decals on the floor of the tub or shower.  •Attach bath mats securely with double-sided, nonslip rug tape.  •If you need to sit down in the shower, use a plastic, nonslip stool.  •Install grab bars by the toilet and in the tub and shower. Do not use towel bars as grab bars.      What can I do in the bedroom?     •Make sure that you have a light by your bed that is easy to reach.  • Do not use any sheets or blankets for your bed that hang to the floor.  •Have a firm chair with side arms that you can use for support when you get dressed.      What can I do in the kitchen?     •Clean up any spills right away.  •If you need to reach something above you, use a step stool with a grab bar.  •Keep electrical cords out of the way.  • Do not use floor polish or wax that makes floors slippery.      What can I do with my stairs?     • Do not leave any items on the stairs.  •Make sure that you have a light switch at the top and the bottom of the stairs.  •Make sure that there are handrails on both sides of the stairs. Fix handrails that are broken or loose.  •Install nonslip stair treads on all your stairs.  •Avoid having throw rugs at the top or bottom of the stairs.  •Choose a carpet that does not hide the edge of the steps on the stairs.  •Check carpeting to make sure that it is firmly attached to the stairs. Fix carpet that is loose or worn.    What can I do on the outside of my home?     •Use bright outdoor lighting.  •Fix the edges of walkways and driveways and fix any cracks.  •Remove anything that might make you trip as you walk through a door, such as a raised step or threshold.  •Trim any bushes or trees on paths to your home.  •Check to see if handrails are loose or broken and that both sides of all steps have handrails.  •Install guardrails along the edges of any raised decks and porches.  •Clear paths of anything that can make you trip, such as tools or rocks.  •Have leaves, snow, or ice cleared regularly.  •Use sand or salt on paths during winter.  •Clean up any spills in your garage right away. This includes grease or oil spills.    What other actions can I take?   •Wear shoes that:  •Have a low heel. Do not wear high heels.  •Have rubber bottoms.  •Feel good on your feet and fit well.  •Are closed at the toe. Do not wear open-toe sandals.  •Use tools that help you move around if needed. These include:  •Canes.  •Walkers.  •Scooters.  •Crutches.  •Review your medicines with your doctor. Some medicines can make you feel dizzy. This can increase your chance of falling.      Ask your doctor what else you can do to help prevent falls.

## 2022-12-26 NOTE — ED ADULT NURSE REASSESSMENT NOTE - NS ED NURSE REASSESS COMMENT FT1
Pt with increased confusion, trying to get out of bed and stating he cant drive at night. MD Trinidad made aware.

## 2023-01-04 ENCOUNTER — EMERGENCY (EMERGENCY)
Facility: HOSPITAL | Age: 88
LOS: 1 days | Discharge: HOME | End: 2023-01-04
Attending: EMERGENCY MEDICINE
Payer: MEDICARE

## 2023-01-04 VITALS
TEMPERATURE: 98 F | DIASTOLIC BLOOD PRESSURE: 88 MMHG | HEIGHT: 73 IN | SYSTOLIC BLOOD PRESSURE: 131 MMHG | WEIGHT: 285.06 LBS | HEART RATE: 80 BPM | RESPIRATION RATE: 20 BRPM | OXYGEN SATURATION: 96 %

## 2023-01-04 VITALS
DIASTOLIC BLOOD PRESSURE: 81 MMHG | OXYGEN SATURATION: 96 % | SYSTOLIC BLOOD PRESSURE: 120 MMHG | TEMPERATURE: 98 F | HEART RATE: 79 BPM

## 2023-01-04 DIAGNOSIS — Z98.89 OTHER SPECIFIED POSTPROCEDURAL STATES: Chronic | ICD-10-CM

## 2023-01-04 DIAGNOSIS — Z98.49 CATARACT EXTRACTION STATUS, UNSPECIFIED EYE: Chronic | ICD-10-CM

## 2023-01-04 LAB
ALBUMIN SERPL ELPH-MCNC: 3.2 G/DL — LOW (ref 3.3–5.2)
ALP SERPL-CCNC: 78 U/L — SIGNIFICANT CHANGE UP (ref 40–120)
ALT FLD-CCNC: 10 U/L — SIGNIFICANT CHANGE UP
ANION GAP SERPL CALC-SCNC: 8 MMOL/L — SIGNIFICANT CHANGE UP (ref 5–17)
APPEARANCE UR: CLEAR — SIGNIFICANT CHANGE UP
AST SERPL-CCNC: 11 U/L — SIGNIFICANT CHANGE UP
BACTERIA # UR AUTO: ABNORMAL
BASOPHILS # BLD AUTO: 0.05 K/UL — SIGNIFICANT CHANGE UP (ref 0–0.2)
BASOPHILS NFR BLD AUTO: 0.9 % — SIGNIFICANT CHANGE UP (ref 0–2)
BILIRUB SERPL-MCNC: 0.3 MG/DL — LOW (ref 0.4–2)
BILIRUB UR-MCNC: NEGATIVE — SIGNIFICANT CHANGE UP
BUN SERPL-MCNC: 17.5 MG/DL — SIGNIFICANT CHANGE UP (ref 8–20)
CALCIUM SERPL-MCNC: 8.5 MG/DL — SIGNIFICANT CHANGE UP (ref 8.4–10.5)
CHLORIDE SERPL-SCNC: 110 MMOL/L — HIGH (ref 96–108)
CO2 SERPL-SCNC: 24 MMOL/L — SIGNIFICANT CHANGE UP (ref 22–29)
COLOR SPEC: YELLOW — SIGNIFICANT CHANGE UP
CREAT SERPL-MCNC: 1.35 MG/DL — HIGH (ref 0.5–1.3)
DIFF PNL FLD: ABNORMAL
EGFR: 50 ML/MIN/1.73M2 — LOW
EOSINOPHIL # BLD AUTO: 0.16 K/UL — SIGNIFICANT CHANGE UP (ref 0–0.5)
EOSINOPHIL NFR BLD AUTO: 2.8 % — SIGNIFICANT CHANGE UP (ref 0–6)
EPI CELLS # UR: SIGNIFICANT CHANGE UP
FLUAV AG NPH QL: SIGNIFICANT CHANGE UP
FLUBV AG NPH QL: SIGNIFICANT CHANGE UP
GLUCOSE SERPL-MCNC: 104 MG/DL — HIGH (ref 70–99)
GLUCOSE UR QL: NEGATIVE MG/DL — SIGNIFICANT CHANGE UP
HCT VFR BLD CALC: 34.2 % — LOW (ref 39–50)
HGB BLD-MCNC: 10.7 G/DL — LOW (ref 13–17)
IMM GRANULOCYTES NFR BLD AUTO: 0.3 % — SIGNIFICANT CHANGE UP (ref 0–0.9)
KETONES UR-MCNC: ABNORMAL
LEUKOCYTE ESTERASE UR-ACNC: ABNORMAL
LYMPHOCYTES # BLD AUTO: 0.72 K/UL — LOW (ref 1–3.3)
LYMPHOCYTES # BLD AUTO: 12.5 % — LOW (ref 13–44)
MAGNESIUM SERPL-MCNC: 2 MG/DL — SIGNIFICANT CHANGE UP (ref 1.6–2.6)
MCHC RBC-ENTMCNC: 28 PG — SIGNIFICANT CHANGE UP (ref 27–34)
MCHC RBC-ENTMCNC: 31.3 GM/DL — LOW (ref 32–36)
MCV RBC AUTO: 89.5 FL — SIGNIFICANT CHANGE UP (ref 80–100)
MONOCYTES # BLD AUTO: 0.65 K/UL — SIGNIFICANT CHANGE UP (ref 0–0.9)
MONOCYTES NFR BLD AUTO: 11.3 % — SIGNIFICANT CHANGE UP (ref 2–14)
NEUTROPHILS # BLD AUTO: 4.14 K/UL — SIGNIFICANT CHANGE UP (ref 1.8–7.4)
NEUTROPHILS NFR BLD AUTO: 72.2 % — SIGNIFICANT CHANGE UP (ref 43–77)
NITRITE UR-MCNC: NEGATIVE — SIGNIFICANT CHANGE UP
PH UR: 5 — SIGNIFICANT CHANGE UP (ref 5–8)
PLATELET # BLD AUTO: 258 K/UL — SIGNIFICANT CHANGE UP (ref 150–400)
POTASSIUM SERPL-MCNC: 4.3 MMOL/L — SIGNIFICANT CHANGE UP (ref 3.5–5.3)
POTASSIUM SERPL-SCNC: 4.3 MMOL/L — SIGNIFICANT CHANGE UP (ref 3.5–5.3)
PROT SERPL-MCNC: 6.5 G/DL — LOW (ref 6.6–8.7)
PROT UR-MCNC: 15
RBC # BLD: 3.82 M/UL — LOW (ref 4.2–5.8)
RBC # FLD: 14 % — SIGNIFICANT CHANGE UP (ref 10.3–14.5)
RBC CASTS # UR COMP ASSIST: SIGNIFICANT CHANGE UP /HPF (ref 0–4)
RSV RNA NPH QL NAA+NON-PROBE: SIGNIFICANT CHANGE UP
SARS-COV-2 RNA SPEC QL NAA+PROBE: SIGNIFICANT CHANGE UP
SODIUM SERPL-SCNC: 141 MMOL/L — SIGNIFICANT CHANGE UP (ref 135–145)
SP GR SPEC: 1.02 — SIGNIFICANT CHANGE UP (ref 1.01–1.02)
UROBILINOGEN FLD QL: NEGATIVE MG/DL — SIGNIFICANT CHANGE UP
WBC # BLD: 5.74 K/UL — SIGNIFICANT CHANGE UP (ref 3.8–10.5)
WBC # FLD AUTO: 5.74 K/UL — SIGNIFICANT CHANGE UP (ref 3.8–10.5)
WBC UR QL: SIGNIFICANT CHANGE UP /HPF (ref 0–5)

## 2023-01-04 PROCEDURE — G1004: CPT

## 2023-01-04 PROCEDURE — 87086 URINE CULTURE/COLONY COUNT: CPT

## 2023-01-04 PROCEDURE — 74177 CT ABD & PELVIS W/CONTRAST: CPT | Mod: 26,ME

## 2023-01-04 PROCEDURE — 99284 EMERGENCY DEPT VISIT MOD MDM: CPT

## 2023-01-04 PROCEDURE — 80053 COMPREHEN METABOLIC PANEL: CPT

## 2023-01-04 PROCEDURE — 99284 EMERGENCY DEPT VISIT MOD MDM: CPT | Mod: 25

## 2023-01-04 PROCEDURE — 74177 CT ABD & PELVIS W/CONTRAST: CPT | Mod: ME

## 2023-01-04 PROCEDURE — 36415 COLL VENOUS BLD VENIPUNCTURE: CPT

## 2023-01-04 PROCEDURE — 83735 ASSAY OF MAGNESIUM: CPT

## 2023-01-04 PROCEDURE — 81001 URINALYSIS AUTO W/SCOPE: CPT

## 2023-01-04 PROCEDURE — 87637 SARSCOV2&INF A&B&RSV AMP PRB: CPT

## 2023-01-04 PROCEDURE — 85025 COMPLETE CBC W/AUTO DIFF WBC: CPT

## 2023-01-04 RX ORDER — SODIUM CHLORIDE 9 MG/ML
1000 INJECTION INTRAMUSCULAR; INTRAVENOUS; SUBCUTANEOUS ONCE
Refills: 0 | Status: DISCONTINUED | OUTPATIENT
Start: 2023-01-04 | End: 2023-01-12

## 2023-01-04 NOTE — ED PROVIDER NOTE - CLINICAL SUMMARY MEDICAL DECISION MAKING FREE TEXT BOX
labs and diagnostic imaging results reviewed with patient; symptoms likely abdominal wall muscle strain; analgesics, PMD or clinic follow up recommended for reassessment. Patient is aware of signs/symptoms to return to the emergency department.

## 2023-01-04 NOTE — ED PROVIDER NOTE - GASTROINTESTINAL, MLM
Abdomen soft, non-tender, no guarding. Abdomen soft, no guarding, large pannus, + mid abdominal elicited pain upon deep inspiration and palpation

## 2023-01-04 NOTE — ED ADULT NURSE NOTE - NSICDXPASTMEDICALHX_GEN_ALL_CORE_FT
PAST MEDICAL HISTORY:  Aortic stenosis     Atheroma     Atrial fibrillation, unspecified type     CAD S/P percutaneous coronary angioplasty     Cerebrovascular accident (CVA)     Hayti filter in place     History of COPD     Hyperlipemia     Hypertension     PE (pulmonary embolism)

## 2023-01-04 NOTE — ED ADULT NURSE NOTE - OBJECTIVE STATEMENT
assumed care of pt at 13:40. pt sent in by  as per pt. pt denies medical complaints. pt states " I have no idea what im here for. last time I was here I was here for a fall". pt denies fall. on coumadin. rr even and unlabored. denies chest pain, dizziness or sob. anox4. unable to obtain further information. pt educated on plan of care, pt able to successfully teach back plan of care to RN, RN will continue to reeducate pt during hospital stay.

## 2023-01-04 NOTE — ED PROVIDER NOTE - PATIENT PORTAL LINK FT
You can access the FollowMyHealth Patient Portal offered by [facility] by registering at the following website: [email or web address]. By joining [facility]’s FollowMyHealth portal, you will also be able to view your health information using other applications (apps) compatible with our system.

## 2023-01-04 NOTE — ED ADULT NURSE NOTE - NSIMPLEMENTINTERV_GEN_ALL_ED
95 Implemented All Fall with Harm Risk Interventions:  [place] to call system. Call bell, personal items and telephone within reach. Instruct patient to call for assistance. Room bathroom lighting operational. Non-slip footwear when patient is off stretcher. Physically safe environment: no spills, clutter or unnecessary equipment. Stretcher in lowest position, wheels locked, appropriate side rails in place. Provide visual cue, wrist band, yellow gown, etc. Monitor gait and stability. Monitor for mental status changes and reorient to person, place, and time. Review medications for side effects contributing to fall risk. Reinforce activity limits and safety measures with patient and family. Provide visual clues: red socks.

## 2023-01-04 NOTE — ED PROVIDER NOTE - DIFFERENTIAL DIAGNOSIS
gastritis, pancreatitis, appendicitis, cholecystitis, colitis Differential Diagnosis gastritis, pancreatitis, appendicitis, cholecystitis, colitis, muscle strain

## 2023-01-04 NOTE — ED ADULT TRIAGE NOTE - CHIEF COMPLAINT QUOTE
Pt BIBA from home, c/o weakness x 2 weeks after a fall, was seen in ED after fall, also c/o dyspnea upon exertion, states "I just down have the power to get out of bed in the morning"

## 2023-01-04 NOTE — ED PROVIDER NOTE - NSICDXPASTMEDICALHX_GEN_ALL_CORE_FT
PAST MEDICAL HISTORY:  Aortic stenosis     Atheroma     Atrial fibrillation, unspecified type     CAD S/P percutaneous coronary angioplasty     Cerebrovascular accident (CVA)     Kittrell filter in place     History of COPD     Hyperlipemia     Hypertension     PE (pulmonary embolism)

## 2023-01-05 LAB
CULTURE RESULTS: NO GROWTH — SIGNIFICANT CHANGE UP
SPECIMEN SOURCE: SIGNIFICANT CHANGE UP

## 2023-01-30 ENCOUNTER — NON-APPOINTMENT (OUTPATIENT)
Age: 88
End: 2023-01-30

## 2023-02-19 ENCOUNTER — INPATIENT (INPATIENT)
Facility: HOSPITAL | Age: 88
LOS: 11 days | Discharge: NURSING, REHAB OR LONG-TERM CARE | DRG: 291 | End: 2023-03-03
Admitting: HOSPITALIST
Payer: MEDICARE

## 2023-02-19 VITALS
SYSTOLIC BLOOD PRESSURE: 117 MMHG | DIASTOLIC BLOOD PRESSURE: 79 MMHG | HEIGHT: 73 IN | OXYGEN SATURATION: 96 % | RESPIRATION RATE: 18 BRPM | HEART RATE: 92 BPM | TEMPERATURE: 98 F

## 2023-02-19 DIAGNOSIS — R79.1 ABNORMAL COAGULATION PROFILE: ICD-10-CM

## 2023-02-19 DIAGNOSIS — J96.01 ACUTE RESPIRATORY FAILURE WITH HYPOXIA: ICD-10-CM

## 2023-02-19 DIAGNOSIS — R06.02 SHORTNESS OF BREATH: ICD-10-CM

## 2023-02-19 DIAGNOSIS — I50.23 ACUTE ON CHRONIC SYSTOLIC (CONGESTIVE) HEART FAILURE: ICD-10-CM

## 2023-02-19 DIAGNOSIS — I48.91 UNSPECIFIED ATRIAL FIBRILLATION: ICD-10-CM

## 2023-02-19 DIAGNOSIS — I48.20 CHRONIC ATRIAL FIBRILLATION, UNSPECIFIED: ICD-10-CM

## 2023-02-19 DIAGNOSIS — I50.9 HEART FAILURE, UNSPECIFIED: ICD-10-CM

## 2023-02-19 DIAGNOSIS — Z98.89 OTHER SPECIFIED POSTPROCEDURAL STATES: Chronic | ICD-10-CM

## 2023-02-19 DIAGNOSIS — I25.10 ATHEROSCLEROTIC HEART DISEASE OF NATIVE CORONARY ARTERY WITHOUT ANGINA PECTORIS: ICD-10-CM

## 2023-02-19 DIAGNOSIS — G95.20 UNSPECIFIED CORD COMPRESSION: ICD-10-CM

## 2023-02-19 DIAGNOSIS — Z98.49 CATARACT EXTRACTION STATUS, UNSPECIFIED EYE: Chronic | ICD-10-CM

## 2023-02-19 DIAGNOSIS — M48.50XA COLLAPSED VERTEBRA, NOT ELSEWHERE CLASSIFIED, SITE UNSPECIFIED, INITIAL ENCOUNTER FOR FRACTURE: ICD-10-CM

## 2023-02-19 DIAGNOSIS — J90 PLEURAL EFFUSION, NOT ELSEWHERE CLASSIFIED: ICD-10-CM

## 2023-02-19 DIAGNOSIS — I71.40 ABDOMINAL AORTIC ANEURYSM, WITHOUT RUPTURE, UNSPECIFIED: ICD-10-CM

## 2023-02-19 DIAGNOSIS — Z95.1 PRESENCE OF AORTOCORONARY BYPASS GRAFT: Chronic | ICD-10-CM

## 2023-02-19 DIAGNOSIS — Z96.651 PRESENCE OF RIGHT ARTIFICIAL KNEE JOINT: Chronic | ICD-10-CM

## 2023-02-19 LAB
ALBUMIN SERPL ELPH-MCNC: 3.4 G/DL — SIGNIFICANT CHANGE UP (ref 3.3–5.2)
ALP SERPL-CCNC: 73 U/L — SIGNIFICANT CHANGE UP (ref 40–120)
ALT FLD-CCNC: 7 U/L — SIGNIFICANT CHANGE UP
ANION GAP SERPL CALC-SCNC: 11 MMOL/L — SIGNIFICANT CHANGE UP (ref 5–17)
APTT BLD: 43.1 SEC — HIGH (ref 27.5–35.5)
AST SERPL-CCNC: 12 U/L — SIGNIFICANT CHANGE UP
BASOPHILS # BLD AUTO: 0.06 K/UL — SIGNIFICANT CHANGE UP (ref 0–0.2)
BASOPHILS NFR BLD AUTO: 0.9 % — SIGNIFICANT CHANGE UP (ref 0–2)
BILIRUB SERPL-MCNC: 0.8 MG/DL — SIGNIFICANT CHANGE UP (ref 0.4–2)
BLD GP AB SCN SERPL QL: SIGNIFICANT CHANGE UP
BUN SERPL-MCNC: 23.7 MG/DL — HIGH (ref 8–20)
CALCIUM SERPL-MCNC: 9 MG/DL — SIGNIFICANT CHANGE UP (ref 8.4–10.5)
CHLORIDE SERPL-SCNC: 106 MMOL/L — SIGNIFICANT CHANGE UP (ref 96–108)
CK SERPL-CCNC: 70 U/L — SIGNIFICANT CHANGE UP (ref 30–200)
CO2 SERPL-SCNC: 23 MMOL/L — SIGNIFICANT CHANGE UP (ref 22–29)
CREAT SERPL-MCNC: 1.26 MG/DL — SIGNIFICANT CHANGE UP (ref 0.5–1.3)
EGFR: 55 ML/MIN/1.73M2 — LOW
EOSINOPHIL # BLD AUTO: 0.18 K/UL — SIGNIFICANT CHANGE UP (ref 0–0.5)
EOSINOPHIL NFR BLD AUTO: 2.6 % — SIGNIFICANT CHANGE UP (ref 0–6)
GLUCOSE SERPL-MCNC: 104 MG/DL — HIGH (ref 70–99)
HCT VFR BLD CALC: 36.7 % — LOW (ref 39–50)
HGB BLD-MCNC: 11.1 G/DL — LOW (ref 13–17)
INR BLD: 6.03 RATIO — CRITICAL HIGH (ref 0.88–1.16)
LYMPHOCYTES # BLD AUTO: 0.31 K/UL — LOW (ref 1–3.3)
LYMPHOCYTES # BLD AUTO: 4.3 % — LOW (ref 13–44)
MAGNESIUM SERPL-MCNC: 2 MG/DL — SIGNIFICANT CHANGE UP (ref 1.6–2.6)
MCHC RBC-ENTMCNC: 26.7 PG — LOW (ref 27–34)
MCHC RBC-ENTMCNC: 30.2 GM/DL — LOW (ref 32–36)
MCV RBC AUTO: 88.2 FL — SIGNIFICANT CHANGE UP (ref 80–100)
MONOCYTES # BLD AUTO: 0.5 K/UL — SIGNIFICANT CHANGE UP (ref 0–0.9)
MONOCYTES NFR BLD AUTO: 7 % — SIGNIFICANT CHANGE UP (ref 2–14)
NEUTROPHILS # BLD AUTO: 5.99 K/UL — SIGNIFICANT CHANGE UP (ref 1.8–7.4)
NEUTROPHILS NFR BLD AUTO: 84.3 % — HIGH (ref 43–77)
NT-PROBNP SERPL-SCNC: 5460 PG/ML — HIGH (ref 0–300)
PLATELET # BLD AUTO: 211 K/UL — SIGNIFICANT CHANGE UP (ref 150–400)
POTASSIUM SERPL-MCNC: 4 MMOL/L — SIGNIFICANT CHANGE UP (ref 3.5–5.3)
POTASSIUM SERPL-SCNC: 4 MMOL/L — SIGNIFICANT CHANGE UP (ref 3.5–5.3)
PROT SERPL-MCNC: 6.3 G/DL — LOW (ref 6.6–8.7)
PROTHROM AB SERPL-ACNC: 71.2 SEC — HIGH (ref 10.5–13.4)
RBC # BLD: 4.16 M/UL — LOW (ref 4.2–5.8)
RBC # FLD: 15.5 % — HIGH (ref 10.3–14.5)
SARS-COV-2 RNA SPEC QL NAA+PROBE: SIGNIFICANT CHANGE UP
SODIUM SERPL-SCNC: 140 MMOL/L — SIGNIFICANT CHANGE UP (ref 135–145)
TROPONIN T SERPL-MCNC: <0.01 NG/ML — SIGNIFICANT CHANGE UP (ref 0–0.06)
WBC # BLD: 7.1 K/UL — SIGNIFICANT CHANGE UP (ref 3.8–10.5)
WBC # FLD AUTO: 7.1 K/UL — SIGNIFICANT CHANGE UP (ref 3.8–10.5)

## 2023-02-19 PROCEDURE — 71045 X-RAY EXAM CHEST 1 VIEW: CPT | Mod: 26

## 2023-02-19 PROCEDURE — 93010 ELECTROCARDIOGRAM REPORT: CPT

## 2023-02-19 PROCEDURE — G1004: CPT

## 2023-02-19 PROCEDURE — 72125 CT NECK SPINE W/O DYE: CPT | Mod: 26,MG

## 2023-02-19 PROCEDURE — 99223 1ST HOSP IP/OBS HIGH 75: CPT

## 2023-02-19 PROCEDURE — 70450 CT HEAD/BRAIN W/O DYE: CPT | Mod: 26,MG

## 2023-02-19 PROCEDURE — 99285 EMERGENCY DEPT VISIT HI MDM: CPT

## 2023-02-19 PROCEDURE — 74174 CTA ABD&PLVS W/CONTRAST: CPT | Mod: 26,ME

## 2023-02-19 PROCEDURE — 72170 X-RAY EXAM OF PELVIS: CPT | Mod: 26

## 2023-02-19 PROCEDURE — 71275 CT ANGIOGRAPHY CHEST: CPT | Mod: 26,MG

## 2023-02-19 PROCEDURE — 99282 EMERGENCY DEPT VISIT SF MDM: CPT | Mod: GC

## 2023-02-19 RX ORDER — ACETAMINOPHEN 500 MG
975 TABLET ORAL EVERY 8 HOURS
Refills: 0 | Status: DISCONTINUED | OUTPATIENT
Start: 2023-02-19 | End: 2023-03-03

## 2023-02-19 RX ORDER — BUDESONIDE AND FORMOTEROL FUMARATE DIHYDRATE 160; 4.5 UG/1; UG/1
2 AEROSOL RESPIRATORY (INHALATION)
Refills: 0 | Status: DISCONTINUED | OUTPATIENT
Start: 2023-02-19 | End: 2023-03-03

## 2023-02-19 RX ORDER — AMLODIPINE BESYLATE 2.5 MG/1
5 TABLET ORAL DAILY
Refills: 0 | Status: DISCONTINUED | OUTPATIENT
Start: 2023-02-19 | End: 2023-02-22

## 2023-02-19 RX ORDER — TAMSULOSIN HYDROCHLORIDE 0.4 MG/1
0.4 CAPSULE ORAL AT BEDTIME
Refills: 0 | Status: DISCONTINUED | OUTPATIENT
Start: 2023-02-19 | End: 2023-03-03

## 2023-02-19 RX ORDER — CLOTRIMAZOLE AND BETAMETHASONE DIPROPIONATE 10; .5 MG/G; MG/G
1 CREAM TOPICAL
Refills: 0 | Status: DISCONTINUED | OUTPATIENT
Start: 2023-02-19 | End: 2023-03-03

## 2023-02-19 RX ORDER — METOPROLOL TARTRATE 50 MG
50 TABLET ORAL DAILY
Refills: 0 | Status: DISCONTINUED | OUTPATIENT
Start: 2023-02-19 | End: 2023-02-21

## 2023-02-19 RX ORDER — IPRATROPIUM/ALBUTEROL SULFATE 18-103MCG
3 AEROSOL WITH ADAPTER (GRAM) INHALATION EVERY 6 HOURS
Refills: 0 | Status: DISCONTINUED | OUTPATIENT
Start: 2023-02-19 | End: 2023-02-20

## 2023-02-19 RX ORDER — ATORVASTATIN CALCIUM 80 MG/1
40 TABLET, FILM COATED ORAL AT BEDTIME
Refills: 0 | Status: DISCONTINUED | OUTPATIENT
Start: 2023-02-19 | End: 2023-03-03

## 2023-02-19 RX ORDER — FUROSEMIDE 40 MG
40 TABLET ORAL ONCE
Refills: 0 | Status: COMPLETED | OUTPATIENT
Start: 2023-02-19 | End: 2023-02-19

## 2023-02-19 RX ORDER — SERTRALINE 25 MG/1
50 TABLET, FILM COATED ORAL DAILY
Refills: 0 | Status: DISCONTINUED | OUTPATIENT
Start: 2023-02-19 | End: 2023-03-03

## 2023-02-19 RX ADMIN — Medication 975 MILLIGRAM(S): at 22:12

## 2023-02-19 RX ADMIN — Medication 50 MILLIGRAM(S): at 18:43

## 2023-02-19 RX ADMIN — SERTRALINE 50 MILLIGRAM(S): 25 TABLET, FILM COATED ORAL at 18:43

## 2023-02-19 RX ADMIN — TAMSULOSIN HYDROCHLORIDE 0.4 MILLIGRAM(S): 0.4 CAPSULE ORAL at 22:08

## 2023-02-19 RX ADMIN — Medication 40 MILLIGRAM(S): at 11:47

## 2023-02-19 RX ADMIN — AMLODIPINE BESYLATE 5 MILLIGRAM(S): 2.5 TABLET ORAL at 18:43

## 2023-02-19 RX ADMIN — ATORVASTATIN CALCIUM 40 MILLIGRAM(S): 80 TABLET, FILM COATED ORAL at 22:08

## 2023-02-19 RX ADMIN — BUDESONIDE AND FORMOTEROL FUMARATE DIHYDRATE 2 PUFF(S): 160; 4.5 AEROSOL RESPIRATORY (INHALATION) at 19:37

## 2023-02-19 RX ADMIN — Medication 975 MILLIGRAM(S): at 22:08

## 2023-02-19 RX ADMIN — CLOTRIMAZOLE AND BETAMETHASONE DIPROPIONATE 1 APPLICATION(S): 10; .5 CREAM TOPICAL at 20:03

## 2023-02-19 RX ADMIN — Medication 3 MILLILITER(S): at 19:38

## 2023-02-19 NOTE — CONSULT NOTE ADULT - NS ATTEND AMEND GEN_ALL_CORE FT
NSGY Attg:    see above    patient seen and examined 2/20 pm; denies significant back pain or LE symptoms    LE 5/5  sensation to LT intact over LE bilaterally    CT reviewed    agree with exam and plan as above  MRI pending
seen with above,    88M history significant for former chronic smoker, HTN, F. 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 Ta in 4/2022 since no routine office follow up), AAA, prostate cancer s/p recent radiation, recurrent falls with prior ER visits on 12/2022 with supratherpuetic INR 5.1 and 1/4/23 for weakness, now presents with recurrent falls unclear if syncopize, INR again elevated 6.0 and pBNP 5K, CT chest with bilateral pleural effusions and again noted 6.1 cm AAA (stable from 1/2023), subacute L1 compression fracture   -start IV Lasix 40mg BID, repeat TTE; gradul addition of GDMT  -hold warfarin given recurrent supratherapeutic level, when INR ~2 then consider switch to Eliquis or Xarelto   -need vascular surgery eval. for enlarging AAA since 2017; if candidate for EVAR would then obtain pharm nuclear stress for preop  -not safe for living at home alone with recurrent falls, patient reports pending HHA and declining facility placement   -gross fungal rash? in the R-groin region        Mook Bustamante DO, [facility]  Faculty Non-Invasive Cardiologist  [phone]

## 2023-02-19 NOTE — CONSULT NOTE ADULT - PROBLEM SELECTOR RECOMMENDATION 9
Patient presents s/p fall after felling sob  Ct with pulmonary edema noted.    + LE edema, bnp > 5000 and air hungry no exam, with severely diminished bases and expiatory wheezing.    Ef 35-40% from 9/22  Given Lasix 40 IVP  Strict I and O's  Continue Lasix 40mg IV bid  Continue to monitor renal function  Continue to trend trops, ekg and bnp  Continuos pulse ox monitoring  Keep potassium > 4 and magnesum > 2 Patient presents s/p fall after felling sob  Ct with pulmonary edema noted vs infection    + LE edema, bnp > 5000 and air hungry on exam, with severely diminished bases and expiatory wheezing.    Ef 35-40% from 9/22  Given Lasix 40 IVP  Strict I and O's  Continue Lasix 40mg IV bid  Continue to monitor renal function  Continue to trend trops, ekg and bnp  Continuos pulse ox monitoring  Keep potassium > 4 and magnesum > 2  TTE  Continue home CV medications

## 2023-02-19 NOTE — CONSULT NOTE ADULT - ATTENDING COMMENTS
Agree with above assessment.  The patient was seen and examined by myself with the surgical resident.  The patient is an 88 year old male s/p fall at home unwitnessed.  The patient reports he thinks he passed out while walking to the bathroom then woke up on floor. He has no head or neck pain, no chest or abdominal pain. He reports mild mid back pain. HEENT NC/AT PERRL EOMI no raccoon eyes, no maier signs, no lacerations to the scalp, no cervical tenderness, trachea midline, chest non tender, b/l air entry. abdomen is obese soft and non tender, no pelvic tenderness, no gross deformity to the extremities.  CT chest reveals -Infrarenal abdominal aorta: 6.1 x 4.7 cm, stable since 1/4/2023, previously 4.7 x 3.7 cm on 6/20/2015. Large peripheral mural thrombus. This is considered high risk for rupture based on size. No clear evidence of rupture of the abdominal aortic aneurysm on this study.   Left common iliac artery: 2.4 cm.   Severe mid SMA stenosis, image 174 series 6, new since 2015.  Transversely oriented L1 vertebral body fracture with increased displacement of the dominant fracture components since1/4/2023 CT. Posterior element involvement cannot be excluded on this study. Transversely oriented T8 vertebral body fracture also noted, which appears at least subacute in timeframe. Nondisplaced left L1 transverse process also re-demonstrated. Recommend dedicated spinal CT and/or MRI imaging for further evaluation.  The patient is trauma  stable with no intervention required from a trauma standpoint.  Recommend spine consult and vascular consult for aneurysm.  Will no longer actively follow, please recall as needed.

## 2023-02-19 NOTE — ED PROVIDER NOTE - CARE PLAN
1 Principal Discharge DX:	Acute CHF  Secondary Diagnosis:	Fracture of thoracic vertebra  Secondary Diagnosis:	Fracture of lumbar vertebra

## 2023-02-19 NOTE — H&P ADULT - NSHPSOCIALHISTORY_GEN_ALL_CORE
, has 2 children that are out fo state.  Lives alone, has HHA for 5 hours x 5 days  Remote smoking, alcohol or drug use , has 2 children that are out fo state.  Lives alone, has HHA for 5 hours x 5 days  Remote smoking, Denies alcohol or drug use

## 2023-02-19 NOTE — CONSULT NOTE ADULT - PROBLEM SELECTOR RECOMMENDATION 4
Aortic aneurysms as follows:  -Mid ascending aorta: 4.2 cm stable since 2017  -Distal descending thoracic aorta: 4.7 cm, previously 3.9 cm in 2017.   Prominent pedunculated noncalcified plaque, considered to be high risk   for embolic event.  -Infrarenal abdominal aorta: 6.1 x 4.7 cm, stable since 1/4/2023,   previously 4.7 x 3.7 cm on 6/20/2015. Large peripheral mural thrombus.   This is considered high risk for rupture based on size. No clear evidence   of rupture of the abdominal aortic aneurysm on this study.  Vascular consult

## 2023-02-19 NOTE — CONSULT NOTE ADULT - PROBLEM SELECTOR RECOMMENDATION 2
Probably multiple causes.    Hx of Using s Trelegy at home for COPD at home  Continue neb treatment  Pulmonary consult   Continuos pulse ox monitoring Probably multiple causes.   CHF, probable pn and plural effusion  Hx of Using s Trelegy at home for COPD at home  Continue neb treatment  Pulmonary consult   Continuos pulse ox monitoring

## 2023-02-19 NOTE — CONSULT NOTE ADULT - ASSESSMENT
ASSESSMENT: Patient is a 88ym presenting s/p syncopal fall with subacute L1 compression fracture. No other injuries identified on examination.    PLAN:    - No further trauma work up necessary  - Medicine to manage CHF and other medical comorbidities  - Patient seen/examined with attending.  - Plan discussed with Attending, Dr. Mora

## 2023-02-19 NOTE — CONSULT NOTE ADULT - ASSESSMENT
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    PLAN:    - No emergent surgical intervention at this time  - Would recommend arterial duplex of BLE to r/o popliteal aneurysms  - Would hold coumadin for now, if AC required, would start heparin gtt  - Will follow along with you  - Plan discussed with Attending, Dr. Miranda

## 2023-02-19 NOTE — ED PROVIDER NOTE - PHYSICAL EXAMINATION
General: Well appearing male in no acute distress, mildly tachypneic, on 4L nasal cannula    HEENT: Normocephalic, atraumatic. Moist mucous membranes. Oropharynx clear. No lymphadenopathy.  Eyes: No scleral icterus. EOMI. ANNA.  Neck:. Soft and supple. Full ROM without pain. No midline tenderness  Cardiac: Regular rate and regular rhythm. No murmurs, rubs, gallops. Peripheral pulses 2+ and symmetric. +b/l LE edema.  Resp: decreased breath sounds toward bases. Speaking in full sentences. No wheezes, rales or rhonchi.  Abd: Soft, non-tender, non-distended. No guarding or rebound. No scars, masses, or lesions.  Back: Spine midline and non-tender. No CVA tenderness.    Skin: No rashes, abrasions, or lacerations.  Neuro: AO x 3. Moves all extremities symmetrically. Motor strength and sensation grossly intact.

## 2023-02-19 NOTE — ED ADULT TRIAGE NOTE - NS ED TRIAGE AVPU SCALE
Alert-The patient is alert, awake and responds to voice. The patient is oriented to time, place, and person. The triage nurse is able to obtain subjective information. dyspnea upon exertion/labored

## 2023-02-19 NOTE — ED PROVIDER NOTE - PROGRESS NOTE DETAILS
Given the significant and immediate threats to this patient based on initial presentation, the benefits of emergency contrast-enhanced CT imaging without obtaining GFR/creatinine serum level results greatly outweigh the potential risk of harm due to contrast-induced nephropathy. neurosurgery covering spine, consulted for spine fractures. cardiology consulted for acute CHF exacerbation, recommendations pending. trauma consulted, recommendations pending as well

## 2023-02-19 NOTE — CONSULT NOTE ADULT - ASSESSMENT
89 y/o M hx of Moderate Aortic stenosis, factor V leidein on coumadin, afib, PE w/ IVC filter, former smoker (1PPD x20 years remote hx), HTN, CAD with PCI x2 [place] [address]), PE x2, CVA x 2 presents for fall s/p syncope.   Patient states he felt lightheaded prior to fall, possible LOC, no sure if passed out or not.  Patient states endorsing shortness of breath for past 3 weeks.  Patient states today he felt worse and states he is  "low on air" and thats why he fell, +sob, +PND, and + orthopnea   Patient states he has followed with Dr. Ta and Dr. Bustamante.  Denies - chest pain, palpitations, headache, dizziness, cough, n/v/d/c/ melena, weakness, cramps, numbing or tingling, or any other discomfort.   89 y/o M hx of Moderate Aortic stenosis, factor V leidein on coumadin, AVR, afib, PE w/ IVC filter, former smoker (1PPD x20 years remote hx), HTN, CAD with PCI x2 [place] [address]), PE x2, CVA x 2 presents for fall s/p syncope.   Patient states he felt lightheaded prior to fall, possible LOC, no sure if passed out or not.  Patient states endorsing shortness of breath for past 3 weeks.  Patient states today he felt worse and states he is  "low on air" and thats why he fell, +sob, +PND, and + orthopnea   Patient states he has followed with Dr. Ta and Dr. Bustamante.  Denies - chest pain, palpitations, headache, dizziness, cough, n/v/d/c/ melena, weakness, cramps, numbing or tingling, or any other discomfort.

## 2023-02-19 NOTE — ED ADULT TRIAGE NOTE - CHIEF COMPLAINT QUOTE
PT BIBA from home s/p unwitnessed fall at home. PT was found on floor for unknown time. PT denies hitting head but unaware of situation.  PT on coumadin. Per EMS pt 80's on RA. placed on NC by EMS. MD called for eval
You can access the FollowMyHealth Patient Portal offered by [facility] by registering at the following website: [email or web address]. By joining [facility]’s FollowMyHealth portal, you will also be able to view your health information using other applications (apps) compatible with our system.

## 2023-02-19 NOTE — PATIENT PROFILE ADULT - FALL HARM RISK - UNIVERSAL INTERVENTIONS
Bed in lowest position, wheels locked, appropriate side rails in place/Call bell, personal items and telephone in reach/Instruct patient to call for assistance before getting out of bed or chair/Non-slip footwear when patient is out of bed/[place] to call system/Physically safe environment - no spills, clutter or unnecessary equipment/Purposeful Proactive Rounding/Room/bathroom lighting operational, light cord in reach

## 2023-02-19 NOTE — H&P ADULT - ASSESSMENT
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 As per patient he woke up this morning and fell trying to get to the bathroom - Denies any dizziness, LOC, chest pain, palpitations, fever or chills. Does admit to dyspnea as well as cough, which is productive. 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 As per patient he woke up this morning and fell trying to get to the bathroom.  Hypoxic in ER with mild Anemia. INR 6. BNP 5460. EKG AF, rate controlled. CTA with moderate bilateral pleural effusion and atelectasis and interval increase in ascites. L1 and T8 fractures. Multiple aortic aneurysms      Acute Respiratory Failure, Hypoxic due to    Bilateral Pleural effusion, Moderate due to  CHFrEF, acute on chronic  Patient not on any diuretics at home !?  - Admit to telemetry  - Daily weight, I/O, Salt  Fluid restriction  - Supplemental O2, wean as tolerated  - Furosemide 40mg IV q12  - Metoprolol ER 50mg daily  - Add ARB and Spironolactone  - Monitor SCr  - Repeat Chest X-Ray    L1, T8 Vertebral Fracture  - Analgesics  - TLSO brace  - Neuro surgery evaluation appreciated  - PT evaluation once brace available    Aortic Aneurysm  - Vascular Consults    CAD  - ASA, Statin, BB    AF  rate controlled  Supratherapeutic INR  - Metoprolol  - INR in am    FVL, PE, DVT  Supratherapeutic INR  - Monitor INR    COPD  Mild Wheezing  - Supplemental O2  - Pulmicort  - DuoNebs    Fungal Rash  Likely due to incontinence  - Topical antifungal    VTE Prophylaxis  - Supratherapeutic INR     88 year old male with PMH HTN, AF, 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 As per patient he woke up this morning and fell trying to get to the bathroom.  Hypoxic in ER with mild Anemia. INR 6. BNP 5460. EKG AF, rate controlled. CTA with moderate bilateral pleural effusion and atelectasis and interval increase in ascites. L1 and T8 fractures. Multiple aortic aneurysms      Acute Respiratory Failure, Hypoxic due to    Bilateral Pleural effusion, Moderate due to  CHFrEF, acute on chronic  Patient not on any diuretics at home !?  - Admit to telemetry  - Daily weight, I/O, Salt  Fluid restriction  - Supplemental O2, wean as tolerated  - Furosemide 40mg IV q12  - Metoprolol ER 50mg daily  - TTE  - Monitor SCr; GDMT eventually   - Repeat Chest X-Ray  - Cardiology consultation    L1, T8 Vertebral Fracture  - Analgesics  - TLSO brace  - Neuro surgery evaluation appreciated  - PT evaluation once brace available    Aortic Aneurysm  - Vascular Consults    CAD  - ASA, Statin, BB    AF  rate controlled  Supratherapeutic INR  - Metoprolol  - INR in am    FVL, PE, DVT  Supratherapeutic INR  - Monitor INR    COPD  Mild Wheezing  - Supplemental O2  - Pulmicort  - DuoNebs    Fungal Rash  Likely due to incontinence  - Topical antifungal    VTE Prophylaxis  - Supratherapeutic INR

## 2023-02-19 NOTE — ED PROVIDER NOTE - CLINICAL SUMMARY MEDICAL DECISION MAKING FREE TEXT BOX
89 y/o M hx of Aortic stenosis, factor V leidein on coumadin, afib, PE w/ IVC filter, CVA presents for fall s/p syncope.  priority CT called on arrival given possible head struck on coumadin. patient endorsing sob, appears mildly tachypneic. saturating 88% on room air at home. endorsing lightheadedness prior to syncope. endorsing back pain. will get CTA given AAA hx as well in setting of syncope. benign abdomen. pitting edema b/l lower extremities. placed on 4L nasal cannula.

## 2023-02-19 NOTE — CONSULT NOTE ADULT - SUBJECTIVE AND OBJECTIVE BOX
[facility]                                             CARDIOLOGY AT [address], [place] [address]                                            Telephone: [phone]. 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   Patient states he felt lightheaded prior to fall, possible LOC, no sure if passed out or not.  Patient states endorsing shortness of breath for past 3 weeks.  Patient states today he felt worse and states he is  "low on air" and thats why he fell, +sob, +PND, and + orthopnea   Patient states he has followed with Dr. Ta and Dr. Bustamante.  Denies - chest pain, palpitations, headache, dizziness, cough, n/v/d/c/ melena, weakness, cramps, numbing or tingling, or any other discomfort.   CARDIAC TESTING  ECHO: Summary:  1. Technically difficult study.  2. Endocardial visualization was enhanced with intravenous echo contrast.  3. Moderately decreased global left ventricular systolic function.  4. Left ventricular ejection fraction, by visual estimation, is 35 to 40%.  5.LV EF by Biplane MOD= 37%.  6. Spectral Doppler shows pseudonormal pattern of left ventricular myocardial filling (Grade II  diastolic dysfunction).  7. Basal inferolateral segment and basal and mid anterolateral wall are abnormal as described  above.  8. Elevated mean left atrial pressure.  9. There is mild concentric left ventricular hypertrophy.  10.Thickening and calcification of the anterior and posterior mitral valve leaflets.  11. Dilated aorta at the sinuses of valsalva, 4.4cm. Ascending aorta measures 4.2cm.  12. Mildly enlarged left atrium.  13. The aortic valve mean gradient is 20.0 mmHg consistent with moderate aortic stenosis.  14. The Dimesionless Index value is 0.24. Aortic stenosis severity maybe overestimated due to  low lvef.  15. Abnormal septal motion consistent with post  -operative status.  16. Mild mitral valve regurgitation.  17. There is no evidence of pericardial effusion.  [id number] Lul Ta MD  , Electronically signed on  2022 at  6:15:04 PM  ***  LV Wall Scoring:  The basal inferolateral segment is akinetic. The basal  and mid anterolateral wall is hypokinetic.  Final  SANDI SANTOS  [id number]  2022  Page 2 of 2 STRESS PATIENT: SANDI SANTOS : 1934   AGE: 86 (M)   MR#: [id number] STUDY DATE: 2020 LOCATION: O/P REF. PHYSICIAN(S):  IESHA FALCON MD /  TIFFANY GRAY MD /  ERIC DUBOSE MD FELLOW: Hayden Pereira FNP-C ------------------------------------------------------------------------ TYPE OF TEST: Rest/Stress Pharmacologic INDICATION: Atherosclerotic heart disease of native coronary artery without angina pectoris (I25.10), Unspecified atrial fibrillation (I48.91) ------------------------------------------------------------------------ HISTORY:  CARDIAC HISTORY: 85y/o male former smoker with hisotry of CAD, CABG (LIMA to LAD, SVG to RPDA), AF, HTN, HLD, EF on recent echo 35-40% OTHER HISTORY: Factor V leiden mutation, PE, fibroelastoma RISK FACTORS: Past smoker, Elevated Cholesterol, Hypertension MEDICATIONS: ASA, Warfarin, Amlodipine, Metoprolol, Lipitor, Breo, Jantoven, Protonix, sertraline, tamsulosin ------------------------------------------------------------------------  BASELINE ELECTROCARDIOGRAM: Rhythm: Normal Sinus Rhythm - 60 BPM ST: LBBB, RAD, anterior infarct  ------------------------------------------------------------------------  HEMODYNAMIC PARAMETERS:                                     HR      BP Baseline  Pre-Injection             59  153/90 00:00     Inject Regadenoson        58         00:30     Post Injection            64  137/89 01:00     Post Injection            71  134/83 02:00     Post Injection            71  147/80 03:00     Post Injection            69  143/80 04:00     Post Injection            68  143/80 05:00     Recovery                  68  142/80 ------------------------------------------------------------------------ Agent: Regadenoson 0.4 mg/5 ml NS. injected over 10 sec. HR: Baseline HR: 59 bpm   Peak HR: 71 bpm (53% of MPHR)   MPHR: 134 bpm   85% of MPHR: 114 bpm BP: Baseline BP: 153/90 mmHg   Peak BP: 153/90 mmHg   Peak RPP: [id number] (Rate Pressure Product) Last Caffeine intake: 24 hrs Terminated: Completion of protocol ------------------------------------------------------------------------ SYMPTOMS/FINDINGS:  Symptoms: Shortness of breath Chest pain: No chest pain with administration of Regadenoson ------------------------------------------------------------------------ ECG ABNORMALITIES DURING/AFTER STRESS:   Abnormalities: ECG changes could not be interpreted due to bundle branch block. ------------------------------------------------------------------------ NEW ARRHYTHMIAS DEVELOPED DURING/AFTER STRESS:  None ------------------------------------------------------------------------ STRESS TEST IMPRESSIONS: Chest Pain: No chest pain with administration of Regadenoson. Symptom: Shortness of breath. HR Response: Appropriate. BP Response: Appropriate. Heart Rhythm: Normal Sinus Rhythm - 60 BPM. Baseline ECG: LBBB, RAD, anterior infarct. ECG Abnormalities: ECG changes could not be interpreted due to bundle branch block. Arrhythmia: None. ------------------------------------------------------------------------  PROCEDURE: 10.9 mCi of Tc99m Sestamibi were injected intravenously at rest. Approximately 45 minutes later, tomographic images were obtained in a 180 degree arc from right anterior oblique to left anterior oblique with 64 stops. At a separate time, 32.5 mCi of Tc99m Sestamibi were injected intravenously during stress protocol. Approximately 45 minute(s) later, tomographic images were obtained in a 180 degree arc from right anterior oblique to left anterior oblique with 64 stops. The tomographic slices were reconstructed in 3 orthogonal planes (short axis, horizontal long axis and vertical long axis).  Interpretation was performed both by visual and quantitative analysis. ------------------------------------------------------------------------  NUCLEAR FINDINGS: There are medium sized, severe defects in inferior and inferolateral walls that are predominantly fixed, suggestive of infarction with minimal danisha-infarct ischemia. ------------------------------------------------------------------------ GATED ANALYSIS: Gated wall motion analysis is performed, and shows hypokinesis of inferior and ionferolateral walls with post stress LVEF of 55 %. ------------------------------------------------------------------------ IMPRESSIONS: * Chest Pain: No chest pain with administration of Regadenoson. * Symptom: Shortness of breath. * HR Response: Appropriate. * BP Response: Appropriate. * Heart Rhythm: Normal Sinus Rhythm - 60 BPM. * Baseline ECG: LBBB, RAD, anterior infarct. * ECG Abnormalities: ECG changes could not be interpreted due to bundle branch block. * Arrhythmia: None. * There are medium sized, severe defects in inferior and inferolateral walls that are predominantly fixed, suggestive of infarction with minimal danisha-infarct ischemia. * Gated wall motion analysis is performed, and shows hypokinesis of inferior and ionferolateral walls with post stress LVEF of 55 %. Conclusion: There are medium sized, severe defects in inferior and inferolateral walls that are predominantly fixed, suggestive of infarction with minimal danisha-infarct ischemia. ------------------------------------------------------------------------ ------------------------------------------------------------------------  Confirmed on  2020 - 15:12:27 by Ozzy Causey MD   Cardiology Fellow: Hayden Pereira FNP-C ------------------------------------------------------------------------: CATH:   ELECTROPHYSIOLOGY:   PAST MEDICAL HISTORY Hypertension Hyperlipemia PE (pulmonary embolism) [place] filter in place CAD (coronary artery disease) Atrial fibrillation, unspecified type CAD S/P percutaneous coronary angioplasty History of COPD Cerebrovascular accident (CVA) Atheroma Aortic stenosis  PAST SURGICAL HISTORY S/P IVC filter S/P cataract surgery  SOCIAL HISTORY:  Denies smoking/alcohol/drugs CIGARETTES:    ALCOHOL: DRUGS:  FAMILY HISTORY: Family history of diabetes mellitus (Child)  Family History of Cardiovascular Disease:  Yes [  ] No [x  ] Coronary Artery Disease in first degree relative: Yes [  ] No [ x ] Sudden Cardiac Death in First degree relative: Yes [  ] No [x  ]  Current medications acetaminophen 325 mg oral tablet: 2 tab(s) orally every 6 hours, As needed, Moderate Pain (4 - 6) (26 Aug 2019 20:08) aspirin 81 mg oral tablet, chewable: 1 tab(s) orally once a day (26 Aug 2019 20:08) ipratropium-albuterol 0.5 mg-2.5 mg/3 mLinhalation solution: 3 milliliter(s) inhaled every 6 hours, As needed, Shortness of Breath and/or Wheezing (29 Aug 2019 09:06)  Home medications Norvasc 5mg po daily Tamsulosin 0.4mg daily Sertraline 50mg po daily metoprololo tartrate 50mg po bid Omperazole 40mg po daily Aspirin 81mg po daily Lipitor 40mg po qhs Warfarin.   CURRENT OTHER MEDICATIONS:  ALLERGIES:  No Known Allergies OHS (Unknown)  REVIEW OF SYMPTOMS:  CONSTITUTIONAL: No fever, no chills, no weight loss, no weight gain, no fatigue  ENMT:  No vertigo; No sinus or throat pain NECK: No pain or stiffness CARDIOVASCULAR: No chest pain, no dyspnea, no syncope/presyncope, no palpitations, no dizziness, no Orthopnea, no Paroxsymal nocturnal dyspnea RESPIRATORY: no Shortness of breath, no cough, no wheezing : No dysuria, no hematuria  GI: No dark color stool, no nausea, no diarrhea, no constipation, no abdominal pain  NEURO: No headache, no slurred speech  MUSCULOSKELETAL: No joint pain or swelling; No muscle, back, or extremity pain PSYCH: No agitation, no anxiety.   ALL OTHER REVIEW OF SYSTEMS ARE NEGATIVE.  VITAL SIGNS: T(C): 36.8 (23 @ 10:42), Max: 36.8 (23 @ 10:42) T(F): 98.2 (23 @ 10:42), Max: 98.2 (23 @ 10:42) HR: 90 (23 @ 10:42) (90 - 92) BP: 126/85 (23 @ 10:42) (117/79 - 126/85) RR: 18 (23 @ 10:42) (18 - 18) SpO2: 96% (23 @ 10:42) (96% - 96%)  INTAKE AND OUTPUT:   PHYSICAL EXAM: Constitutional: Comfortable . No acute distress.  obese HEENT: Atraumatic and normocephalic , neck is supple . no JVD. No carotid bruit. CNS: A&Ox3. No focal deficits.  Respiratory: posterior no breath sounds half way down back, with expiratory wheezes in upper lobes.   Cardiovascular: IRR normal s1 s2. No murmur. No rubs or gallop. Gastrointestinal: Soft, non-tender. +Bowel sounds.  Extremities: + Peripheral Pulses, No clubbing, cyanosis, + 1 non pitting edema Psychiatric: Calm . no agitation.  Skin: Warm and dry, no ulcers on extremities   LABS: ( 2023 09:05 ) Troponin T  <0.01,  CPK  70   , CKMB  X    , BNP 5460<H>                      11.1  7.10  )-----------( 211      ( 2023 09:05 )            36.7   02  140  |  106  |  23.7<H> ----------------------------<  104<H> 4.0   |  23.0  |  1.26  Ca    9.0      2023 09:05 Mg     2.0       TPro  6.3<L>  /  Alb  3.4  /  TBili  0.8  /  DBili  x   /  AST  12  /  ALT  7   /  AlkPhos  73    PT/INR - ( 2023 09:05 )   PT: 71.2 sec;   INR: 6.03 ratio     PTT - ( 2023 09:05 )  PTT:43.1 sec  INTERPRETATION OF TELEMETRY:  Afib  ECG:  Afib with no acute change noted.   Prior ECG: Yes [ x ] No [  ] 95 BPM Final, 94 BPM Final Q - t interval 394 ms Final,  q- t correct 495 ms   Atrial fibrillation Left axis deviation Non-specific intra-ventricular conduction block Inferior infarct , age undetermined Abnormal ECG                                                [facility]                                             CARDIOLOGY AT [address], [place] [address]                                            Telephone: [phone]. 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   Patient states he felt lightheaded prior to fall, possible LOC, no sure if passed out or not.  Patient states endorsing shortness of breath for past 3 weeks.  Patient states today he felt worse and states he is  "low on air" and thats why he fell, +sob, +PND, and + orthopnea   Patient states he has followed with Dr. Ta and Dr. Bustamante.  Denies - chest pain, palpitations, headache, dizziness, cough, n/v/d/c/ melena, weakness, cramps, numbing or tingling, or any other discomfort.   CARDIAC TESTING  ECHO: Summary:  1. Technically difficult study.  2. Endocardial visualization was enhanced with intravenous echo contrast.  3. Moderately decreased global left ventricular systolic function.  4. Left ventricular ejection fraction, by visual estimation, is 35 to 40%.  5.LV EF by Biplane MOD= 37%.  6. Spectral Doppler shows pseudonormal pattern of left ventricular myocardial filling (Grade II  diastolic dysfunction).  7. Basal inferolateral segment and basal and mid anterolateral wall are abnormal as described  above.  8. Elevated mean left atrial pressure.  9. There is mild concentric left ventricular hypertrophy.  10.Thickening and calcification of the anterior and posterior mitral valve leaflets.  11. Dilated aorta at the sinuses of valsalva, 4.4cm. Ascending aorta measures 4.2cm.  12. Mildly enlarged left atrium.  13. The aortic valve mean gradient is 20.0 mmHg consistent with moderate aortic stenosis.  14. The Dimesionless Index value is 0.24. Aortic stenosis severity maybe overestimated due to  low lvef.  15. Abnormal septal motion consistent with post  -operative status.  16. Mild mitral valve regurgitation.  17. There is no evidence of pericardial effusion.  [id number] Lul Ta MD  , Electronically signed on  2022 at  6:15:04 PM  ***  LV Wall Scoring:  The basal inferolateral segment is akinetic. The basal  and mid anterolateral wall is hypokinetic.  Final  SANDI SANTOS  [id number]  2022  Page 2 of 2 STRESS PATIENT: SANDI SANTOS : 1934   AGE: 86 (M)   MR#: [id number] STUDY DATE: 2020 LOCATION: O/P REF. PHYSICIAN(S):  IESHA FALCON MD /  TIFFANY GRAY MD /  ERIC DUBOSE MD FELLOW: Hayden Pereira FNP-C ------------------------------------------------------------------------ TYPE OF TEST: Rest/Stress Pharmacologic INDICATION: Atherosclerotic heart disease of native coronary artery without angina pectoris (I25.10), Unspecified atrial fibrillation (I48.91) ------------------------------------------------------------------------ HISTORY:  CARDIAC HISTORY: 87y/o male former smoker with hisotry of CAD, CABG (LIMA to LAD, SVG to RPDA), AF, HTN, HLD, EF on recent echo 35-40% OTHER HISTORY: Factor V leiden mutation, PE, fibroelastoma RISK FACTORS: Past smoker, Elevated Cholesterol, Hypertension MEDICATIONS: ASA, Warfarin, Amlodipine, Metoprolol, Lipitor, Breo, Jantoven, Protonix, sertraline, tamsulosin ------------------------------------------------------------------------  BASELINE ELECTROCARDIOGRAM: Rhythm: Normal Sinus Rhythm - 60 BPM ST: LBBB, RAD, anterior infarct  ------------------------------------------------------------------------  HEMODYNAMIC PARAMETERS:                                     HR      BP Baseline  Pre-Injection             59  153/90 00:00     Inject Regadenoson        58         00:30     Post Injection            64  137/89 01:00     Post Injection            71  134/83 02:00     Post Injection            71  147/80 03:00     Post Injection            69  143/80 04:00     Post Injection            68  143/80 05:00     Recovery                  68  142/80 ------------------------------------------------------------------------ Agent: Regadenoson 0.4 mg/5 ml NS. injected over 10 sec. HR: Baseline HR: 59 bpm   Peak HR: 71 bpm (53% of MPHR)   MPHR: 134 bpm   85% of MPHR: 114 bpm BP: Baseline BP: 153/90 mmHg   Peak BP: 153/90 mmHg   Peak RPP: [id number] (Rate Pressure Product) Last Caffeine intake: 24 hrs Terminated: Completion of protocol ------------------------------------------------------------------------ SYMPTOMS/FINDINGS:  Symptoms: Shortness of breath Chest pain: No chest pain with administration of Regadenoson ------------------------------------------------------------------------ ECG ABNORMALITIES DURING/AFTER STRESS:   Abnormalities: ECG changes could not be interpreted due to bundle branch block. ------------------------------------------------------------------------ NEW ARRHYTHMIAS DEVELOPED DURING/AFTER STRESS:  None ------------------------------------------------------------------------ STRESS TEST IMPRESSIONS: Chest Pain: No chest pain with administration of Regadenoson. Symptom: Shortness of breath. HR Response: Appropriate. BP Response: Appropriate. Heart Rhythm: Normal Sinus Rhythm - 60 BPM. Baseline ECG: LBBB, RAD, anterior infarct. ECG Abnormalities: ECG changes could not be interpreted due to bundle branch block. Arrhythmia: None. ------------------------------------------------------------------------  PROCEDURE: 10.9 mCi of Tc99m Sestamibi were injected intravenously at rest. Approximately 45 minutes later, tomographic images were obtained in a 180 degree arc from right anterior oblique to left anterior oblique with 64 stops. At a separate time, 32.5 mCi of Tc99m Sestamibi were injected intravenously during stress protocol. Approximately 45 minute(s) later, tomographic images were obtained in a 180 degree arc from right anterior oblique to left anterior oblique with 64 stops. The tomographic slices were reconstructed in 3 orthogonal planes (short axis, horizontal long axis and vertical long axis).  Interpretation was performed both by visual and quantitative analysis. ------------------------------------------------------------------------  NUCLEAR FINDINGS: There are medium sized, severe defects in inferior and inferolateral walls that are predominantly fixed, suggestive of infarction with minimal danisha-infarct ischemia. ------------------------------------------------------------------------ GATED ANALYSIS: Gated wall motion analysis is performed, and shows hypokinesis of inferior and ionferolateral walls with post stress LVEF of 55 %. ------------------------------------------------------------------------ IMPRESSIONS: * Chest Pain: No chest pain with administration of Regadenoson. * Symptom: Shortness of breath. * HR Response: Appropriate. * BP Response: Appropriate. * Heart Rhythm: Normal Sinus Rhythm - 60 BPM. * Baseline ECG: LBBB, RAD, anterior infarct. * ECG Abnormalities: ECG changes could not be interpreted due to bundle branch block. * Arrhythmia: None. * There are medium sized, severe defects in inferior and inferolateral walls that are predominantly fixed, suggestive of infarction with minimal danisha-infarct ischemia. * Gated wall motion analysis is performed, and shows hypokinesis of inferior and ionferolateral walls with post stress LVEF of 55 %. Conclusion: There are medium sized, severe defects in inferior and inferolateral walls that are predominantly fixed, suggestive of infarction with minimal danisha-infarct ischemia. ------------------------------------------------------------------------ ------------------------------------------------------------------------  Confirmed on  2020 - 15:12:27 by Ozzy Causey MD   Cardiology Fellow: Hayden Pereira FNP-C ------------------------------------------------------------------------: CATH:   ELECTROPHYSIOLOGY:   PAST MEDICAL HISTORY Hypertension Hyperlipemia PE (pulmonary embolism) [place] filter in place CAD (coronary artery disease) Atrial fibrillation, unspecified type CAD S/P percutaneous coronary angioplasty History of COPD Cerebrovascular accident (CVA) Atheroma Aortic stenosis  PAST SURGICAL HISTORY S/P IVC filter S/P cataract surgery  SOCIAL HISTORY:  Denies smoking/alcohol/drugs CIGARETTES:    ALCOHOL: DRUGS:  FAMILY HISTORY: Family history of diabetes mellitus (Child)  Family History of Cardiovascular Disease:  Yes [  ] No [x  ] Coronary Artery Disease in first degree relative: Yes [  ] No [ x ] Sudden Cardiac Death in First degree relative: Yes [  ] No [x  ]  Current medications acetaminophen 325 mg oral tablet: 2 tab(s) orally every 6 hours, As needed, Moderate Pain (4 - 6) (26 Aug 2019 20:08) aspirin 81 mg oral tablet, chewable: 1 tab(s) orally once a day (26 Aug 2019 20:08) ipratropium-albuterol 0.5 mg-2.5 mg/3 mLinhalation solution: 3 milliliter(s) inhaled every 6 hours, As needed, Shortness of Breath and/or Wheezing (29 Aug 2019 09:06)  Home medications Norvasc 5mg po daily Tamsulosin 0.4mg daily Sertraline 50mg po daily metoprololo tartrate 50mg po bid Omperazole 40mg po daily Aspirin 81mg po daily Lipitor 40mg po qhs Warfarin.   CURRENT OTHER MEDICATIONS:  ALLERGIES:  No Known Allergies OHS (Unknown)  REVIEW OF SYMPTOMS:  CONSTITUTIONAL: No fever, no chills, no weight loss, no weight gain, no fatigue  ENMT:  No vertigo; No sinus or throat pain NECK: No pain or stiffness CARDIOVASCULAR: No chest pain, no dyspnea, no syncope/presyncope, no palpitations, no dizziness, no Orthopnea, no Paroxsymal nocturnal dyspnea RESPIRATORY: no Shortness of breath, no cough, no wheezing : No dysuria, no hematuria  GI: No dark color stool, no nausea, no diarrhea, no constipation, no abdominal pain  NEURO: No headache, no slurred speech  MUSCULOSKELETAL: No joint pain or swelling; No muscle, back, or extremity pain PSYCH: No agitation, no anxiety.   ALL OTHER REVIEW OF SYSTEMS ARE NEGATIVE.  VITAL SIGNS: T(C): 36.8 (23 @ 10:42), Max: 36.8 (23 @ 10:42) T(F): 98.2 (23 @ 10:42), Max: 98.2 (23 @ 10:42) HR: 90 (23 @ 10:42) (90 - 92) BP: 126/85 (23 @ 10:42) (117/79 - 126/85) RR: 18 (23 @ 10:42) (18 - 18) SpO2: 96% (23 @ 10:42) (96% - 96%)  INTAKE AND OUTPUT:   PHYSICAL EXAM: Constitutional: Comfortable . No acute distress.  obese HEENT: Atraumatic and normocephalic , neck is supple . no JVD. No carotid bruit. CNS: A&Ox3. No focal deficits.  Respiratory: posterior no breath sounds half way down back, with expiratory wheezes in upper lobes.   Cardiovascular: IRR normal s1 s2. No murmur. No rubs or gallop. Gastrointestinal: Soft, non-tender. +Bowel sounds.  Extremities: + Peripheral Pulses, No clubbing, cyanosis, + 1 non pitting edema Psychiatric: Calm . no agitation.  Skin: Warm and dry, no ulcers on extremities   LABS: ( 2023 09:05 ) Troponin T  <0.01,  CPK  70   , CKMB  X    , BNP 5460<H>                      11.1  7.10  )-----------( 211      ( 2023 09:05 )            36.7   02  140  |  106  |  23.7<H> ----------------------------<  104<H> 4.0   |  23.0  |  1.26  Ca    9.0      2023 09:05 Mg     2.0       TPro  6.3<L>  /  Alb  3.4  /  TBili  0.8  /  DBili  x   /  AST  12  /  ALT  7   /  AlkPhos  73    PT/INR - ( 2023 09:05 )   PT: 71.2 sec;   INR: 6.03 ratio     PTT - ( 2023 09:05 )  PTT:43.1 sec  INTERPRETATION OF TELEMETRY:  Afib  ECG:  Afib with no acute change noted.   Prior ECG: Yes [ x ] No [  ]  ACC: [id number] EXAM:  CT ANGIO ABD PELV (W)AW IC   ORDERED BY: BERTHA ORNELAS   ACC: [id number] EXAM:  CT ANGIO CHEST AORTA WAWIC   ORDERED BY: BERTHA ORNELAS   PROCEDURE DATE:  2023      INTERPRETATION:  CLINICAL INFORMATION: AAA, syncope, on anticoagulation.  Fall  COMPARISON: CT abdomen pelvis 2023. CT chest 10/5/2017. CT chest  abdomen pelvis 2015.  CONTRAST/COMPLICATIONS: IV Contrast: Omnipaque 350 (accession [id number]), IV contrast documented  in unlinked concurrent exam (accession [id number])  90 cc administered   10  cc discarded Oral Contrast: NONE Complications: None reported at time of study completion  PROCEDURE: CT Angiography of the Chest, Abdomen and Pelvis. Precontrast imaging was performed through the chest followed by arterial  phase imaging of the chest, abdomen and pelvis. Sagittal and coronal reformats were performed as well as 3D (MIP)  reconstructions.  FINDINGS:  CHEST: LUNGS AND LARGE AIRWAYS: Central airways are patent. Mild upper and lower  thorax groundglass. Compressive atelectasis of the lower lobes adjacent  to pleural effusions. PLEURA: Moderate pleural effusions. No pneumothorax. VESSELS: No intramural hematoma of the thoracic aorta on precontrast  phase. Mid ascending thoracic aorta is aneurysmal, measuring 4.2 cm,  stable since 2017. Distal descending thoracic aorta is aneurysmal  measuring 4.7 cm, previously 3.9 cm on 10/5/2017. Prominent noncalcified  ulcerated plaque identified along the descending thoracic aorta. In  particular, at the distal descending segment, there is a pedunculated  noncalcified plaque projecting into the aortic lumen, images 100-120 of  series 6. This may be higher risk for distal atheroembolic event. HEART: Mildly enlarged post CABG heart. Aortic valve calcifications.  Incomplete filling of the left atrial appendage. No pericardial effusion. MEDIASTINUM AND HERMELINDO: Small volume nodes of the thorax. Esophagus is  nondistended. CHEST WALL AND LOWER NECK: Partially imaged enlarged right thyroid lobe  with apparent 14 mm nodule, previously 10 mm in 2017. Recommend thyroid  ultrasound.  ABDOMEN AND PELVIS: LIVER: Few similar hepatic hypodensities at the dome measuring up to 2.5  cm similar to 2017, likely small cysts. Additional hypodensities are too  small to characterize. BILE DUCTS: No distention GALLBLADDER: Tiny layering calculi better seen on prior CT imaging. SPLEEN: Spleen size within normal limits PANCREAS: Fatty atrophy. No acute peripancreatic inflammation. ADRENALS: Unremarkable KIDNEYS/URETERS: No hydronephrosis. Left renal cysts. 4 mm nonobstructing  left renal calculus.  BLADDER: Underdistended urinary bladder REPRODUCTIVE ORGANS: Fiducial markers of the prostate.  BOWEL: Stomach is underdistended. No small bowel distention. Nondistended  appendix. Mild stool burden of the colon limits evaluation of the colonic  mucosa. Colonic diverticulosis, without diverticulitis. PERITONEUM: Nonspecific slightly increased trace abdominopelvic ascites  since 2023. Correlate clinically. VESSELS: Infrarenal abdominal aortic aneurysm with large peripheral mural  thrombus measuring 6.1 x 4.7 cm, stable since 2023 when remeasured at  similar fashion and previously 4.7 x 3.7 cm on 2015. 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 Severe stenosis of the mid  SMA on image 174 series 6, new since . IVC filter. RETROPERITONEUM/LYMPH NODES: Small volume nodes. Retroperitoneal  stranding adjacent to the upper abdominal aorta is slightly more  pronounced in comparison with 2023. ABDOMINAL WALL: Diffuse soft tissue edema increased. Tiny fat-containing  inguinal hernias. BONES: Median sternotomy. Extensive multilevel degenerative changes.  Transversely oriented L1 vertebral body fracture is more pronounced in  appearance as compared with 2023, with increased displacement of the  dominant fracture components. Posterior element involvement cannot be  excluded on this study. Transversely oriented T8 vertebral body fracture  also noted, which appears at least subacute in timeframe. Nondisplaced  left L1 transverse process also redemonstrated. Recommend dedicated  spinal CT and/or MRI imaging for further evaluation. Central canal and  neural foramina are not adequately assessed the study.  IMPRESSION:  Moderate pleural effusions with findings of pulmonary edema. Superimposed  infection cannot be excluded. Follow to resolution. Post-CABG changes.  Aortic valve calcification and incomplete filling of the left atrial  appendage. Correlate with echocardiogram. Nonspecific slightly increased  trace abdominopelvic ascites since 2023 and increased abdominal wall  edema may be due to third spacing. Correlate clinically.  Aortic aneurysms as follows: -Mid ascending aorta: 4.2 cm stable since 2017 -Distal descending thoracic aorta: 4.7 cm, previously 3.9 cm in 2017.  Prominent pedunculated noncalcified plaque, considered to be high risk  for embolic event. -Infrarenal abdominal aorta: 6.1 x 4.7 cm, stable since 2023,  previously 4.7 x 3.7 cm on 2015. Large peripheral mural thrombus.  This is considered high risk for rupture based on size. No clear evidence  of rupture of the abdominal aortic aneurysm on this study. -Left common iliac artery: 2.4 cm.  Severe mid SMA stenosis, image 174 series 6, new since .  Transversely oriented L1 vertebral body fracture with increased  displacement of the dominant fracture components since 2023 CT.  Posterior element involvement cannot be excluded on this study.  Transversely oriented T8 vertebral body fracture also noted, which  appears at least subacute in timeframe. Nondisplaced left L1 transverse  process also redemonstrated. Recommend dedicated spinal CT and/or MRI  imaging for further evaluation.  Dr. Zuniga discussed these above findings with Dr. Bertha Ornelas on  2023 at 11:26 AM, with read back.  Slightly enlarged right thyroid nodule measuring 14 mm. Correlate with  thyroid ultrasound on nonemergent basis.  --- End of Report ---      SADIE ZUNIGA M.D., ATTENDING RADIOLOGIST   95 BPM Final, 94 BPM Final Q - t interval 394 ms Final,  q- t correct 495 ms   Atrial fibrillation Left axis deviation Non-specific intra-ventricular conduction block Inferior infarct , age undetermined Abnormal ECG

## 2023-02-19 NOTE — ED ADULT NURSE NOTE - OBJECTIVE STATEMENT
88 YOM S/P TRIP and fall on coumadin denies loc, pt states he lives alone, unknown how long on floor. poor personal hygiene. pt reports he has difficulty with adl's at baseline and recieves help at home for four hours/ day. pt appears pale and slightly tachypnic placed on 2lnc by md lantigua. pt was found to be hypoxic by ems.

## 2023-02-19 NOTE — CONSULT NOTE ADULT - SUBJECTIVE AND OBJECTIVE BOX
TRAUMA SURGERY CONSULT NOTE  --------------------------------------------------------------------------------------------    HPI: Patient is a 88y old  Male who presents s/p syncopal fall at home. Patient states he stood up and was walking to bathroom when he suddenly lost consciousness and awoke on the floor. Does not believe he struck his head. Believes he fell on his back. No other complaints at this time.         PAST MEDICAL & SURGICAL HISTORY:  Hypertension  Hyperlipemia  PE (pulmonary embolism)  [place] filter in place  Atrial fibrillation, unspecified type  CAD S/P percutaneous coronary angioplasty  History of COPD  Cerebrovascular accident (CVA)  Atheroma  Aortic stenosis      S/P IVC filter  S/P cataract surgery        FAMILY HISTORY:  Family history of diabetes mellitus (Child)    [] Family history not pertinent as reviewed with the patient and family    SOCIAL HISTORY:      ALLERGIES: No Known Allergies  OHS (Unknown)      HOME MEDICATIONS:     CURRENT MEDICATIONS  MEDICATIONS (STANDING):   MEDICATIONS (PRN):  --------------------------------------------------------------------------------------------    Vitals:   T(C): 36.5 (02-19-23 @ 14:59), Max: 36.8 (02-19-23 @ 10:42)  HR: 90 (02-19-23 @ 14:59) (90 - 92)  BP: 125/89 (02-19-23 @ 14:59) (117/79 - 126/85)  RR: 18 (02-19-23 @ 14:59) (18 - 18)  SpO2: 95% (02-19-23 @ 14:59) (95% - 96%)  CAPILLARY BLOOD GLUCOSE      POCT Blood Glucose.: 104 mg/dL (19 Feb 2023 08:34)      02-19 @ 07:01  -  02-19 @ 15:15  --------------------------------------------------------  IN:  Total IN: 0 mL    OUT:    Voided (mL): 600 mL    Total NET: -600 mL    Height (cm): 185.4 (02-19 @ 08:29)    Constitutional: Well-developed well nourished Male in no acute distress  HEENT: Head is normocephalic and atraumatic, maxillofacial structures stable, no blood or discharge from nares or oral cavity, no maier sign / racoon eyes, EOMI b/l, pupils [ ]mm round and reactive to light b/l, no active drainage or redness  Neck: trachea midline  Respiratory: respirations are moderately labored, no accessory muscle use, conversational dyspnea present  Cardiovascular: Regular rate & rhythm, +S1, S1, Chest wall is non-tender to palpation, no subQ emphysema or crepitus palpated  Gastrointestinal: Abdomen soft, non-tender, non-distended, no rebound tenderness / guarding, no ecchymosis or external signs of abdominal trauma  Musculoskeletal: moving all extremities spontaneously, 5/5 motor strength of b/l Upper and lower extremities. no point tenderness or deformity noted to upper or lower extremities b/l  Pelvis: stable  Vascular: 2+ radial, femoral, and DP pulses b/l  Neurological: GCS: 15 (4/5/6). A&O x 3; no gross sensory / motor / coordination deficits  Neurospinal: no C/T/L/S spine tenderness to palpation, no step-offs or signs of external trauma to the backTotal OUT: 600 mL    --------------------------------------------------------------------------------------------    LABS  CBC (02-19 @ 09:05)                              11.1<L>                         7.10    )----------------(  211        84.3<H>% Neutrophils, 4.3<L>% Lymphocytes, ANC: 5.99                                36.7<L>    BMP (02-19 @ 09:05)             140     |  106     |  23.7<H>		Ca++ --      Ca 9.0                ---------------------------------( 104<H>		Mg 2.0                4.0     |  23.0    |  1.26  			Ph --        LFTs (02-19 @ 09:05)      TPro 6.3<L> / Alb 3.4 / TBili 0.8 / DBili -- / AST 12 / ALT 7 / AlkPhos 73    Coags (02-19 @ 09:05)  aPTT 43.1<H> / INR 6.03<HH> / PT 71.2<H>    Cardiac Markers (02-19 @ 09:05)     HSTrop: -- / CKMB: -- / CK: 70      --------------------------------------------------------------------------------------------    IMAGING  < from: CT Cervical Spine No Cont (02.19.23 @ 08:50) >    IMPRESSION:    CT HEAD: Mild periventricular white matter ischemia. Encephalomalacia and   gliosis is seen in the posterior LEFT frontal cortex at the convexity.      Global atrophy most prominent in the frontal, parietal and temporal lobes.    CT cervical spine:   No vertebral fracture is recognized.  Multilevel   degenerative disc disease and spondylosis at C4-5 through C7-T1 with   narrowing of the RIGHT C2-3, RIGHT C3-4, LEFT C4-5 and BILATERAL C5-6,   C6-7 and 7-T1 neural foramina due to uncovertebral spurring and facet   osteophytic hypertrophy. Degenerative cord impingement is seen at C5-C6,   C6-7 and C7-T1 due to posterior osteophytic ridge/disc complexes.    < end of copied text >  < from: CT Angio Chest Aorta w/wo IV Cont (02.19.23 @ 10:02) >  IMPRESSION:    Moderate pleural effusions with findings of pulmonary edema. Superimposed   infection cannot be excluded. Follow to resolution. Post-CABG changes.   Aortic valve calcification and incomplete filling of the left atrial   appendage. Correlate with echocardiogram. Nonspecific slightly increased   trace abdominopelvic ascites since 1/4/2023 and increased abdominal wall   edema may be due to third spacing. Correlate clinically.    Aortic aneurysms as follows:  -Mid ascending aorta: 4.2 cm stable since 2017  -Distal descending thoracic aorta: 4.7 cm, previously 3.9 cm in 2017.   Prominent pedunculated noncalcified plaque, considered to be high risk   for embolic event.  -Infrarenal abdominal aorta: 6.1 x 4.7 cm, stable since 1/4/2023,   previously 4.7 x 3.7 cm on 6/20/2015. Large peripheral mural thrombus.   This is considered high risk for rupture based on size. No clear evidence   of rupture of the abdominal aortic aneurysm on this study.  -Left common iliac artery: 2.4 cm.    Severe mid SMA stenosis, image 174 series 6, new since 2015.    Transversely oriented L1 vertebral body fracture with increased   displacement of the dominant fracture components since1/4/2023 CT.   Posterior element involvement cannot be excluded on this study.   Transversely oriented T8 vertebral body fracture also noted, which   appears at least subacute in timeframe. Nondisplaced left L1 transverse   process also redemonstrated. Recommend dedicated spinal CT and/or MRI   imaging for further evaluation.    < end of copied text >      --------------------------------------------------------------------------------------------

## 2023-02-19 NOTE — ED PROVIDER NOTE - ATTENDING CONTRIBUTION TO CARE
elderly male s/p fall at home, possible near syncope; on anticoagulation; c/o mid back pain and SOB; on exam, mildly dyspneic, requiring nasal canula, +crackles and poor air movement b/l bases; abd soft, non tender, non distended; +pitting edema b/l low ext; +neurovasc intact throughout; labs and ct imaging reviewed, were done to eval for injuries of trauma, and also to eval for possible complication of aneurysm that could be related to today's event; spine/trauma consults appreciated; cards consult appreciated; plan to diurese and admit for further management.    I personally saw the patient with the resident, and completed the key components of the history and physical exam. I then discussed the management plan with the resident.

## 2023-02-19 NOTE — CONSULT NOTE ADULT - SUBJECTIVE AND OBJECTIVE BOX
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 per patient states he felt lightheaded prior to fall, possible LOC. priority CT called on arrival. endorsing shortness of breath for past 3 weeks, follows w/ dr. hannah per patient for cardiology. denies chest pain. endorsing back pain s/p fall. denies abdominal pain. denies nausea/vomiting.    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 Pt reports he trip and fell while trying to get to his walker (per ED note, pt had syncopal episode, pt denies this to me). On CT abdomen, pt was found to have L1 vertebral body fx with increased displacement compared to CT from 1 /4. Also has T8 vertebral body fx (at least subacute) and unchanged nondisplaced left L1 TP tx. Pt reports that he has a known hx of previous spinal fractures but is unsure how long he has had them (guesses about a month but states before the holidays) and does not know where he was seen for this. Believes it was over a month ago. Reports he was given a brace for this but has been noncompliant since he did not like the way it felt. He reports he has since thrown it out. Reports that he usually walks with assistance of a RW. Lives alone.     PAST MEDICAL & SURGICAL HISTORY:  Hypertension      Hyperlipemia      PE (pulmonary embolism)      [place] filter in place      Atrial fibrillation, unspecified type      CAD S/P percutaneous coronary angioplasty      History of COPD      Cerebrovascular accident (CVA)      Atheroma      Aortic stenosis      S/P IVC filter      S/P cataract surgery        FAMILY HISTORY:  Family history of diabetes mellitus (Child)        SOCIAL HISTORY:  Tobacco Use:  EtOH use:   Substance:    Allergies    No Known Allergies    Intolerances    OHS (Unknown)      REVIEW OF SYSTEMS  Negative except as noted in HPI      HOME MEDICATIONS:  Home Medications:  acetaminophen 325 mg oral tablet: 2 tab(s) orally every 6 hours, As needed, Moderate Pain (4 - 6) (26 Aug 2019 20:08)  aspirin 81 mg oral tablet, chewable: 1 tab(s) orally once a day (26 Aug 2019 20:08)  ipratropium-albuterol 0.5 mg-2.5 mg/3 mLinhalation solution: 3 milliliter(s) inhaled every 6 hours, As needed, Shortness of Breath and/or Wheezing (29 Aug 2019 09:06)      MEDICATIONS:  Antibiotics:    Neuro:    Anticoagulation:    OTHER:    IVF:      Vital Signs Last 24 Hrs  T(C): 36.5 (19 Feb 2023 14:59), Max: 36.8 (19 Feb 2023 10:42)  T(F): 97.7 (19 Feb 2023 14:59), Max: 98.2 (19 Feb 2023 10:42)  HR: 90 (19 Feb 2023 14:59) (90 - 92)  BP: 125/89 (19 Feb 2023 14:59) (117/79 - 126/85)  BP(mean): --  RR: 18 (19 Feb 2023 14:59) (18 - 18)  SpO2: 95% (19 Feb 2023 14:59) (95% - 96%)    Parameters below as of 19 Feb 2023 14:59  Patient On (Oxygen Delivery Method): nasal cannula  O2 Flow (L/min): 3        PHYSICAL EXAM:  GENERAL: NAD, well-groomed  HEAD:  Atraumatic, normocephalic  EYES: Conjunctiva and sclera clear, cataracts b/l  ENMT: Good dentition  NECK: Nontender to palpation  SERGO COMA SCORE: E- V- M- = 15       E: 4= opens eyes spontaneously 3= to voice 2= to noxious 1= no opening       V: 5= oriented 4= confused 3= inappropriate words 2= incomprehensible sounds 1= nonverbal 1T= intubated       M: 6= follows commands 5= localizes 4= withdraws 3= flexor posturing 2= extensor posturing 1= no movement  MENTAL STATUS: AAO x3; Awake; Opens eyes spontaneously; Appropriately conversant without aphasia; following simple commands  CRANIAL NERVES: Visual acuity normal for age, visual fields full to confrontation, PERRL. EOMI without nystagmus. Facial sensation intact V1-3 distribution b/l. Face symmetric w/ normal eye closure and smile, tongue midline. Hearing grossly intact. Speech clear. Head turning and shoulder shrug intact.   REFLEXES: PERRL.   MOTOR: strength 5/5 b/l upper and lower extremities  SENSATION: grossly intact to light touch all extremities  COORDINATION: Gait testing deferred  SKIN: Warm, dry; Significant scaling of b/l feet, toenails discolored     LABS:                        11.1   7.10  )-----------( 211      ( 19 Feb 2023 09:05 )             36.7     02-19    140  |  106  |  23.7<H>  ----------------------------<  104<H>  4.0   |  23.0  |  1.26    Ca    9.0      19 Feb 2023 09:05  Mg     2.0     02-19    TPro  6.3<L>  /  Alb  3.4  /  TBili  0.8  /  DBili  x   /  AST  12  /  ALT  7   /  AlkPhos  73  02-19    PT/INR - ( 19 Feb 2023 09:05 )   PT: 71.2 sec;   INR: 6.03 ratio         PTT - ( 19 Feb 2023 09:05 )  PTT:43.1 sec      CULTURES:      RADIOLOGY & ADDITIONAL STUDIES:    CT Angio Abdomen and Pelvis w/ IV Cont (02.19.23 @ 10:02)     IMPRESSION:    Transversely oriented L1 vertebral body fracture with increased   displacement of the dominant fracture components since1/4/2023 CT.   Posterior element involvement cannot be excluded on this study.   Transversely oriented T8 vertebral body fracture also noted, which   appears at least subacute in timeframe. Nondisplaced left L1 transverse   process also redemonstrated. Recommend dedicated spinal CT and/or MRI   imaging for further evaluation.    SADIE ALVARADO M.D., ATTENDING RADIOLOGIST  This document has been electronically signed. Feb 19 2023 11:29AM

## 2023-02-19 NOTE — H&P ADULT - NSICDXPASTMEDICALHX_GEN_ALL_CORE_FT
PAST MEDICAL HISTORY:  Aortic stenosis     Atheroma     Atrial fibrillation, unspecified type     CAD S/P percutaneous coronary angioplasty     Cerebrovascular accident (CVA)     Crane filter in place     History of COPD     Hyperlipemia     Hypertension     PE (pulmonary embolism)

## 2023-02-19 NOTE — ED PROVIDER NOTE - OBJECTIVE STATEMENT
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 per patient states he felt lightheaded prior to fall, possible LOC. priority CT called on arrival. endorsing shortness of breath for past 3 weeks, follows w/ dr. hannah per patient for cardiology. denies chest pain. endorsing back pain s/p fall. denies abdominal pain. denies nausea/vomiting.

## 2023-02-19 NOTE — H&P ADULT - NSICDXPASTSURGICALHX_GEN_ALL_CORE_FT
PAST SURGICAL HISTORY:  S/P CABG (coronary artery bypass graft)     S/P cataract surgery     S/P IVC filter     Status post right knee replacement

## 2023-02-19 NOTE — H&P ADULT - HISTORY OF PRESENT ILLNESS
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 As per patient he woke up this morning and fell trying to get to the bathroom - Denies any dizziness, LOC, chest pain, palpitations, fever or chills. Does admit to dyspnea as well as cough, which is productive.

## 2023-02-19 NOTE — ED PROVIDER NOTE - NSCAREINITIATED _GEN_ER
Per Dr Morgan Truong, patient will need to complete Cardiac CT, additional labs and chest xray prior to procedure. Patient scheduled 12/9/2019 as well as 1/16/2020 for Cardiac CT and did not show. Patient states he was not aware he was scheduled for this and states if he had known about the appointment he would have been there. Patient also states he did complete labs ordered but upon reviewing chart, labs patient completed were ordered by Dr Wagner Laureano. So patient will still need to complete additional labs ordered by Dr Morgan Truong. Advised patient he will receive phone call to reschedule Cardiac CT as well as Afib ablation. Patient very upset procedure needing to be rescheduled and states he will reschedule if it fits in to his schedule. Will forward this to Johny Kovacs to reschedule procedure and C[facility] to reschedule Cardiac CT. Chet Aceves(Attending)

## 2023-02-19 NOTE — ED ADULT NURSE NOTE - NSIMPLEMENTINTERV_GEN_ALL_ED
Implemented All Fall with Harm Risk Interventions:  [place] to call system. Call bell, personal items and telephone within reach. Instruct patient to call for assistance. Room bathroom lighting operational. Non-slip footwear when patient is off stretcher. Physically safe environment: no spills, clutter or unnecessary equipment. Stretcher in lowest position, wheels locked, appropriate side rails in place. Provide visual cue, wrist band, yellow gown, etc. Monitor gait and stability. Monitor for mental status changes and reorient to person, place, and time. Review medications for side effects contributing to fall risk. Reinforce activity limits and safety measures with patient and family. Provide visual clues: red socks.

## 2023-02-19 NOTE — ED ADULT NURSE NOTE - CHIEF COMPLAINT QUOTE
PT BIBA from home s/p unwitnessed fall at home. PT was found on floor for unknown time. PT denies hitting head but unaware of situation.  PT on coumadin. Per EMS pt 80's on RA. placed on NC by EMS. MD called for eval

## 2023-02-19 NOTE — CONSULT NOTE ADULT - SUBJECTIVE AND OBJECTIVE BOX
VASCULAR SURGERY CONSULT NOTE  --------------------------------------------------------------------------------------------    Vascular HPI: Patient is a 88y old Male with extensive medical history including AF, factor V Leiden, DVT/PE with IVC filter and on coumadin presented s/p syncopal fall at home, admitted for CHF. 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 Patient has never been told of these aneurysms in the past, despite previous evidence on imaging. He has a remote history of tobacco use but quit a long while ago. Denies any abdominal pain, leg pain, or cold toes/extremities.     Admission HPI: 88 year old male with PMH HTN, AF, 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 As per patient he woke up this morning and fell trying to get to the bathroom - Denies any dizziness, LOC, chest pain, palpitations, fever or chills. Does admit to dyspnea as well as cough, which is productive. (19 Feb 2023 15:49)        PAST MEDICAL & SURGICAL HISTORY:  Hypertension  Hyperlipemia  PE (pulmonary embolism)  [place] filter in place  Atrial fibrillation, unspecified type  CAD S/P percutaneous coronary angioplasty  History of COPD  Cerebrovascular accident (CVA)  Atheroma  Aortic stenosis      S/P IVC filter  S/P cataract surgery  S/P CABG (coronary artery bypass graft)  Status post right knee replacement        FAMILY HISTORY:  Family history of diabetes mellitus (Child)    [] Family history not pertinent as reviewed with the patient and family    SOCIAL HISTORY:      ALLERGIES: No Known Allergies  OHS (Unknown)      HOME MEDICATIONS:     CURRENT MEDICATIONS  MEDICATIONS (STANDING): acetaminophen     Tablet .. 975 milliGRAM(s) Oral every 8 hours  albuterol/ipratropium for Nebulization 3 milliLiter(s) Nebulizer every 6 hours  amLODIPine   Tablet 5 milliGRAM(s) Oral daily  atorvastatin 40 milliGRAM(s) Oral at bedtime  budesonide  80 MICROgram(s)/formoterol 4.5 MICROgram(s) Inhaler 2 Puff(s) Inhalation two times a day  metoprolol succinate ER 50 milliGRAM(s) Oral daily  sertraline 50 milliGRAM(s) Oral daily  tamsulosin 0.4 milliGRAM(s) Oral at bedtime    MEDICATIONS (PRN):  --------------------------------------------------------------------------------------------    Vitals:   T(C): 36.5 (02-19-23 @ 15:16), Max: 36.8 (02-19-23 @ 10:42)  HR: 91 (02-19-23 @ 15:16) (90 - 92)  BP: 133/81 (02-19-23 @ 15:16) (117/79 - 133/81)  RR: 18 (02-19-23 @ 15:16) (18 - 18)  SpO2: 96% (02-19-23 @ 15:16) (95% - 96%)  CAPILLARY BLOOD GLUCOSE      POCT Blood Glucose.: 104 mg/dL (19 Feb 2023 08:34)      02-19 @ 07:01  -  02-19 @ 18:00  --------------------------------------------------------  IN:  Total IN: 0 mL    OUT:    Voided (mL): 600 mL  Total OUT: 600 mL    Total NET: -600 mL        Height (cm): 185.4 (02-19 @ 08:29)      PHYSICAL EXAM:   Constitutional: Well-developed well nourished Male in no acute distress  Respiratory: respirations are moderately labored, no accessory muscle use, conversational dyspnea present  Gastrointestinal: Abdomen soft, non-tender, non-distended, no rebound tenderness / guarding, no palpable masses  Extremities: Warm, well perfused, edema in BLE  Vascular: palpable B/L DP/PT, b/l popliteal, and b/l femoral pulses    --------------------------------------------------------------------------------------------    LABS  CBC (02-19 @ 09:05)                              11.1<L>                         7.10    )----------------(  211        84.3<H>% Neutrophils, 4.3<L>% Lymphocytes, ANC: 5.99                                36.7<L>    BMP (02-19 @ 09:05)             140     |  106     |  23.7<H>		Ca++ --      Ca 9.0                ---------------------------------( 104<H>		Mg 2.0                4.0     |  23.0    |  1.26  			Ph --        LFTs (02-19 @ 09:05)      TPro 6.3<L> / Alb 3.4 / TBili 0.8 / DBili -- / AST 12 / ALT 7 / AlkPhos 73    Coags (02-19 @ 09:05)  aPTT 43.1<H> / INR 6.03<HH> / PT 71.2<H>    Cardiac Markers (02-19 @ 09:05)     HSTrop: -- / CKMB: -- / CK: 70    --------------------------------------------------------------------------------------------    IMAGING  < from: CT Angio Abdomen and Pelvis w/ IV Cont (02.19.23 @ 10:02) >  ABDOMEN AND PELVIS:  LIVER: Few similar hepatic hypodensities at the dome measuring up to 2.5   cm similar to 2017, likely small cysts. Additional hypodensities are too   small to characterize.  BILE DUCTS: No distention  GALLBLADDER: Tiny layering calculi better seen on prior CT imaging.  SPLEEN: Spleen size within normal limits  PANCREAS: Fatty atrophy. No acute peripancreatic inflammation.  ADRENALS: Unremarkable  KIDNEYS/URETERS: No hydronephrosis. Left renal cysts. 4 mm nonobstructing   left renal calculus.    BLADDER: Underdistended urinary bladder  REPRODUCTIVE ORGANS: Fiducial markers of the prostate.    BOWEL: Stomach is underdistended. No small bowel distention. Nondistended   appendix. Mild stool burden of the colon limits evaluation of the colonic   mucosa. Colonic diverticulosis, without diverticulitis.  PERITONEUM: Nonspecific slightly increased trace abdominopelvic ascites   since 1/4/2023. Correlate clinically.  VESSELS: Infrarenal abdominal aortic aneurysm with large peripheral mural   thrombus measuring 6.1 x 4.7 cm, stable since 1/4/2023 when remeasured at   similar fashion and previously 4.7 x 3.7 cm on 6/20/2015. Left common   iliac artery is aneurysmal measuring 2.4 cm. Severe stenosis of the mid   SMA on image 174 series 6, new since 2015. IVC filter.  RETROPERITONEUM/LYMPH NODES: Small volume nodes. Retroperitoneal   stranding adjacent tothe upper abdominal aorta is slightly more   pronounced in comparison with 1/4/2023.  ABDOMINAL WALL: Diffuse soft tissue edema increased. Tiny fat-containing   inguinal hernias.  BONES: Median sternotomy. Extensive multilevel degenerative changes.   Transversely oriented L1 vertebral body fracture is more pronounced in   appearance as compared with 1/4/2023, with increased displacement of the   dominant fracture components. Posterior element involvement cannot be   excluded on this study. Transversely oriented T8 vertebral body fracture   also noted, which appears at least subacute in timeframe. Nondisplaced   left L1 transverse process also redemonstrated. Recommend dedicated   spinal CT and/or MRI imaging for further evaluation. Central canal and   neural foramina are not adequately assessed the study.    IMPRESSION:    Moderate pleural effusions with findings of pulmonary edema. Superimposed   infection cannot be excluded. Follow to resolution. Post-CABG changes.   Aortic valve calcification and incomplete filling of the left atrial   appendage. Correlate with echocardiogram. Nonspecific slightly increased   trace abdominopelvic ascites since 1/4/2023 and increased abdominal wall   edema may be due to third spacing. Correlate clinically.    Aortic aneurysms as follows:  -Mid ascending aorta: 4.2 cm stable since 2017  -Distal descending thoracic aorta: 4.7 cm, previously 3.9 cm in 2017.   Prominent pedunculated noncalcified plaque, considered to be high risk   for embolic event.  -Infrarenal abdominal aorta: 6.1 x 4.7 cm, stable since 1/4/2023,   previously 4.7 x 3.7 cm on 6/20/2015. Large peripheral mural thrombus.   This is considered high risk for rupture based on size. No clear evidence   of rupture of the abdominal aortic aneurysm on this study.  -Left common iliac artery: 2.4 cm.    Severe mid SMA stenosis, image 174 series 6, new since 2015.    Transversely oriented L1 vertebral body fracture with increased   displacement of the dominant fracture components since1/4/2023 CT.   Posterior element involvement cannot be excluded on this study.   Transversely oriented T8 vertebral body fracture also noted, which   appears at least subacute in timeframe. Nondisplaced left L1 transverse   process also redemonstrated. Recommend dedicated spinal CT and/or MRI   imaging for further evaluation.    < end of copied text >    --------------------------------------------------------------------------------------------

## 2023-02-19 NOTE — CONSULT NOTE ADULT - ASSESSMENT
ASSESSMENT:   88yM PMHx aortic stenosis, factor V leidein on coumadin, afib, PE w/ IVC filter, CAD w/ PCI x2, CVA x 2, HTN, hx of spinal fxs presented to ED s/p fall found to have L1 vertebral body fx with increased displacement compared to CT from 1 /4. Also has T8 vertebral body fx (at least subacute) and unchanged nondisplaced left L1 TP tx.     PLAN:    -Reviewed imaging  -Q4h neuro checks  -Recommend dedicated CT T/L spine  -Recommend MR T/L spine  -Bedrest until MR completed  -TLSO brace to be delivered to pt  -Pain control prn  -Will further discuss case with Dr. Blake

## 2023-02-19 NOTE — ED ADULT NURSE NOTE - CHIEF COMPLAINT
Conjuntivae and eyelids appear normal , Sclerae : White without injection The patient is a 88y Male complaining of fall.

## 2023-02-20 LAB
ALBUMIN SERPL ELPH-MCNC: 3.2 G/DL — LOW (ref 3.3–5.2)
ALP SERPL-CCNC: 72 U/L — SIGNIFICANT CHANGE UP (ref 40–120)
ALT FLD-CCNC: 8 U/L — SIGNIFICANT CHANGE UP
ANION GAP SERPL CALC-SCNC: 10 MMOL/L — SIGNIFICANT CHANGE UP (ref 5–17)
ANION GAP SERPL CALC-SCNC: 11 MMOL/L — SIGNIFICANT CHANGE UP (ref 5–17)
APTT BLD: 48.4 SEC — HIGH (ref 27.5–35.5)
AST SERPL-CCNC: 12 U/L — SIGNIFICANT CHANGE UP
BILIRUB SERPL-MCNC: 0.6 MG/DL — SIGNIFICANT CHANGE UP (ref 0.4–2)
BUN SERPL-MCNC: 20.8 MG/DL — HIGH (ref 8–20)
BUN SERPL-MCNC: 21 MG/DL — HIGH (ref 8–20)
CALCIUM SERPL-MCNC: 8.9 MG/DL — SIGNIFICANT CHANGE UP (ref 8.4–10.5)
CALCIUM SERPL-MCNC: 9 MG/DL — SIGNIFICANT CHANGE UP (ref 8.4–10.5)
CHLORIDE SERPL-SCNC: 104 MMOL/L — SIGNIFICANT CHANGE UP (ref 96–108)
CHLORIDE SERPL-SCNC: 104 MMOL/L — SIGNIFICANT CHANGE UP (ref 96–108)
CO2 SERPL-SCNC: 25 MMOL/L — SIGNIFICANT CHANGE UP (ref 22–29)
CO2 SERPL-SCNC: 25 MMOL/L — SIGNIFICANT CHANGE UP (ref 22–29)
CREAT SERPL-MCNC: 1.13 MG/DL — SIGNIFICANT CHANGE UP (ref 0.5–1.3)
CREAT SERPL-MCNC: 1.18 MG/DL — SIGNIFICANT CHANGE UP (ref 0.5–1.3)
EGFR: 59 ML/MIN/1.73M2 — LOW
EGFR: 63 ML/MIN/1.73M2 — SIGNIFICANT CHANGE UP
GLUCOSE SERPL-MCNC: 105 MG/DL — HIGH (ref 70–99)
GLUCOSE SERPL-MCNC: 124 MG/DL — HIGH (ref 70–99)
HCT VFR BLD CALC: 34.5 % — LOW (ref 39–50)
HGB BLD-MCNC: 10.5 G/DL — LOW (ref 13–17)
INR BLD: 6.13 RATIO — CRITICAL HIGH (ref 0.88–1.16)
INR BLD: 7.15 RATIO — CRITICAL HIGH (ref 0.88–1.16)
MAGNESIUM SERPL-MCNC: 1.9 MG/DL — SIGNIFICANT CHANGE UP (ref 1.6–2.6)
MAGNESIUM SERPL-MCNC: 1.9 MG/DL — SIGNIFICANT CHANGE UP (ref 1.8–2.6)
MCHC RBC-ENTMCNC: 27.1 PG — SIGNIFICANT CHANGE UP (ref 27–34)
MCHC RBC-ENTMCNC: 30.4 GM/DL — LOW (ref 32–36)
MCV RBC AUTO: 88.9 FL — SIGNIFICANT CHANGE UP (ref 80–100)
PHOSPHATE SERPL-MCNC: 3.3 MG/DL — SIGNIFICANT CHANGE UP (ref 2.4–4.7)
PLATELET # BLD AUTO: 215 K/UL — SIGNIFICANT CHANGE UP (ref 150–400)
POTASSIUM SERPL-MCNC: 3.5 MMOL/L — SIGNIFICANT CHANGE UP (ref 3.5–5.3)
POTASSIUM SERPL-MCNC: 3.8 MMOL/L — SIGNIFICANT CHANGE UP (ref 3.5–5.3)
POTASSIUM SERPL-SCNC: 3.5 MMOL/L — SIGNIFICANT CHANGE UP (ref 3.5–5.3)
POTASSIUM SERPL-SCNC: 3.8 MMOL/L — SIGNIFICANT CHANGE UP (ref 3.5–5.3)
PROT SERPL-MCNC: 6.3 G/DL — LOW (ref 6.6–8.7)
PROTHROM AB SERPL-ACNC: 72.4 SEC — HIGH (ref 10.5–13.4)
PROTHROM AB SERPL-ACNC: 84.6 SEC — HIGH (ref 10.5–13.4)
RBC # BLD: 3.88 M/UL — LOW (ref 4.2–5.8)
RBC # FLD: 15.5 % — HIGH (ref 10.3–14.5)
SODIUM SERPL-SCNC: 138 MMOL/L — SIGNIFICANT CHANGE UP (ref 135–145)
SODIUM SERPL-SCNC: 140 MMOL/L — SIGNIFICANT CHANGE UP (ref 135–145)
WBC # BLD: 7.07 K/UL — SIGNIFICANT CHANGE UP (ref 3.8–10.5)
WBC # FLD AUTO: 7.07 K/UL — SIGNIFICANT CHANGE UP (ref 3.8–10.5)

## 2023-02-20 PROCEDURE — 72131 CT LUMBAR SPINE W/O DYE: CPT | Mod: 26

## 2023-02-20 PROCEDURE — 99232 SBSQ HOSP IP/OBS MODERATE 35: CPT

## 2023-02-20 PROCEDURE — 99233 SBSQ HOSP IP/OBS HIGH 50: CPT

## 2023-02-20 PROCEDURE — 72128 CT CHEST SPINE W/O DYE: CPT | Mod: 26

## 2023-02-20 PROCEDURE — 99221 1ST HOSP IP/OBS SF/LOW 40: CPT

## 2023-02-20 PROCEDURE — 93925 LOWER EXTREMITY STUDY: CPT | Mod: 26

## 2023-02-20 PROCEDURE — 99222 1ST HOSP IP/OBS MODERATE 55: CPT

## 2023-02-20 PROCEDURE — 93010 ELECTROCARDIOGRAM REPORT: CPT

## 2023-02-20 PROCEDURE — 99232 SBSQ HOSP IP/OBS MODERATE 35: CPT | Mod: GC

## 2023-02-20 RX ORDER — OLANZAPINE 15 MG/1
5 TABLET, FILM COATED ORAL ONCE
Refills: 0 | Status: COMPLETED | OUTPATIENT
Start: 2023-02-20 | End: 2023-02-20

## 2023-02-20 RX ORDER — POTASSIUM CHLORIDE 20 MEQ
40 PACKET (EA) ORAL ONCE
Refills: 0 | Status: COMPLETED | OUTPATIENT
Start: 2023-02-20 | End: 2023-02-20

## 2023-02-20 RX ORDER — OLANZAPINE 15 MG/1
5 TABLET, FILM COATED ORAL EVERY 6 HOURS
Refills: 0 | Status: DISCONTINUED | OUTPATIENT
Start: 2023-02-20 | End: 2023-03-03

## 2023-02-20 RX ORDER — LEVALBUTEROL 1.25 MG/.5ML
1.25 SOLUTION, CONCENTRATE RESPIRATORY (INHALATION) EVERY 6 HOURS
Refills: 0 | Status: DISCONTINUED | OUTPATIENT
Start: 2023-02-20 | End: 2023-03-03

## 2023-02-20 RX ORDER — FUROSEMIDE 40 MG
40 TABLET ORAL
Refills: 0 | Status: DISCONTINUED | OUTPATIENT
Start: 2023-02-20 | End: 2023-02-22

## 2023-02-20 RX ORDER — METOPROLOL TARTRATE 50 MG
5 TABLET ORAL EVERY 6 HOURS
Refills: 0 | Status: DISCONTINUED | OUTPATIENT
Start: 2023-02-20 | End: 2023-03-03

## 2023-02-20 RX ORDER — IPRATROPIUM BROMIDE 0.2 MG/ML
500 SOLUTION, NON-ORAL INHALATION EVERY 6 HOURS
Refills: 0 | Status: DISCONTINUED | OUTPATIENT
Start: 2023-02-20 | End: 2023-03-03

## 2023-02-20 RX ADMIN — BUDESONIDE AND FORMOTEROL FUMARATE DIHYDRATE 2 PUFF(S): 160; 4.5 AEROSOL RESPIRATORY (INHALATION) at 08:41

## 2023-02-20 RX ADMIN — AMLODIPINE BESYLATE 5 MILLIGRAM(S): 2.5 TABLET ORAL at 06:06

## 2023-02-20 RX ADMIN — CLOTRIMAZOLE AND BETAMETHASONE DIPROPIONATE 1 APPLICATION(S): 10; .5 CREAM TOPICAL at 06:07

## 2023-02-20 RX ADMIN — OLANZAPINE 5 MILLIGRAM(S): 15 TABLET, FILM COATED ORAL at 19:07

## 2023-02-20 RX ADMIN — Medication 3 MILLILITER(S): at 08:41

## 2023-02-20 RX ADMIN — Medication 50 MILLIGRAM(S): at 06:06

## 2023-02-20 RX ADMIN — Medication 975 MILLIGRAM(S): at 17:57

## 2023-02-20 RX ADMIN — CLOTRIMAZOLE AND BETAMETHASONE DIPROPIONATE 1 APPLICATION(S): 10; .5 CREAM TOPICAL at 17:28

## 2023-02-20 RX ADMIN — Medication 40 MILLIEQUIVALENT(S): at 06:06

## 2023-02-20 RX ADMIN — Medication 975 MILLIGRAM(S): at 06:06

## 2023-02-20 RX ADMIN — Medication 5 MILLIGRAM(S): at 18:57

## 2023-02-20 RX ADMIN — Medication 3 MILLILITER(S): at 15:08

## 2023-02-20 RX ADMIN — Medication 40 MILLIGRAM(S): at 17:28

## 2023-02-20 RX ADMIN — Medication 975 MILLIGRAM(S): at 06:39

## 2023-02-20 RX ADMIN — OLANZAPINE 5 MILLIGRAM(S): 15 TABLET, FILM COATED ORAL at 12:51

## 2023-02-20 RX ADMIN — Medication 40 MILLIGRAM(S): at 17:27

## 2023-02-20 RX ADMIN — Medication 975 MILLIGRAM(S): at 17:27

## 2023-02-20 RX ADMIN — BUDESONIDE AND FORMOTEROL FUMARATE DIHYDRATE 2 PUFF(S): 160; 4.5 AEROSOL RESPIRATORY (INHALATION) at 20:25

## 2023-02-20 RX ADMIN — SERTRALINE 50 MILLIGRAM(S): 25 TABLET, FILM COATED ORAL at 17:27

## 2023-02-20 RX ADMIN — OLANZAPINE 5 MILLIGRAM(S): 15 TABLET, FILM COATED ORAL at 06:25

## 2023-02-20 NOTE — PROGRESS NOTE ADULT - ASSESSMENT
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    PLAN:    - No emergent surgical intervention at this time  - Would recommend arterial duplex of BLE to r/o popliteal aneurysms  - Would hold coumadin for now, if AC required, would start heparin gtt  - Will follow along with you

## 2023-02-20 NOTE — PROGRESS NOTE ADULT - ATTENDING COMMENTS
Seen and examined; patient with multiple traumatic issues and comorbidities; will hold off any plan for surgical intervention    For AAA:  -strict BP control SBP <140  -ASA  -if patient clinically improves will reassess  -if acute onset back pain, abdominal pain, hypotension please called us emergently

## 2023-02-20 NOTE — PROGRESS NOTE ADULT - SUBJECTIVE AND OBJECTIVE BOX
INTERVAL HPI/OVERNIGHT EVENTS:  87 y/o male w/ Hx of HTN, AF, FVL/PE/DVT s/p IVCF on Coumadin, CAD s/p CABG, CVA without any residual, COPD, GIGI on CPAP admitted s/p admitted s/p fall. CT abd/pelv read as showing transversely oriented L1 vertebral body fracture with increased displacement of the dominant fracture components since1/4/2023. Neurosurgery called to evaluate.       Vital Signs Last 24 Hrs  T(C): 36.9 (20 Feb 2023 09:00), Max: 36.9 (20 Feb 2023 09:00)  T(F): 98.5 (20 Feb 2023 09:00), Max: 98.5 (20 Feb 2023 09:00)  HR: 98 (20 Feb 2023 09:00) (90 - 98)  BP: 109/69 (20 Feb 2023 09:00) (106/68 - 138/75)  BP(mean): --  RR: 19 (20 Feb 2023 09:00) (18 - 20)  SpO2: 92% (20 Feb 2023 09:00) (92% - 96%)    Parameters below as of 20 Feb 2023 09:00  Patient On (Oxygen Delivery Method): room air      PHYSICAL EXAM:  GENERAL: NAD, well-groomed  HEAD:  Atraumatic, normocephalic  MENTAL STATUS: AAO x3; Awake, periods of confusion; Opens eyes spontaneously; Appropriately conversant without aphasia; following simple commands  CRANIAL NERVES: JASON; EOMI; no facial asymmetry; facial sensation grossly intact to light touch b/l;  tongue midline  MOTOR: strength 5/5 B/L upper and lower extremities; sensation grossly intact all extremities  ABDOMEN: Soft, nontender, nondistended; bowel sounds present   EXTREMITIES:  2+ peripheral pulses, no clubbing, cyanosis, +1 edema B/L LE  SKIN: Warm, dry      LABS:                        10.5   7.07  )-----------( 215      ( 20 Feb 2023 04:35 )             34.5     02-20    140  |  104  |  21.0<H>  ----------------------------<  105<H>  3.5   |  25.0  |  1.18    Ca    9.0      20 Feb 2023 04:35  Phos  3.3     02-20  Mg     1.9     02-20    TPro  6.3<L>  /  Alb  3.2<L>  /  TBili  0.6  /  DBili  x   /  AST  12  /  ALT  8   /  AlkPhos  72  02-20    PT/INR - ( 20 Feb 2023 04:35 )   PT: 84.6 sec;   INR: 7.15 ratio         PTT - ( 19 Feb 2023 09:05 )  PTT:43.1 sec      02-19 @ 07:01  -  02-20 @ 07:00  --------------------------------------------------------  IN: 0 mL / OUT: 600 mL / NET: -600 mL      RADIOLOGY & ADDITIONAL TESTS:    ACC: [id number] EXAM:  CT ANGIO ABD PELV (W)AW      ACC: [id number] EXAM:  CT ANGIO CHEST AORTA [facility]     PROCEDURE DATE:  02/19/2023    Transversely oriented L1 vertebral body fracture with increased   displacement of the dominant fracture components since1/4/2023 CT.   Posterior element involvement cannot be excluded on this study.   Transversely oriented T8 vertebral body fracture also noted, which   appears at least subacute in timeframe. Nondisplaced left L1 transverse   process also redemonstrated. Recommend dedicated spinal CT and/or MRI   imaging for further evaluation.

## 2023-02-20 NOTE — PROGRESS NOTE ADULT - ASSESSMENT
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 In ER, pt hypoxic with mild anemia and supratherapeutic INR; CTA with moderate b/l pleural effusions and atelectasis, L1, T8 fractures, aortic aneurysms.     Acute hypoxic respiratory failure 2/2 moderate b/l pleural effusions likely due to acute on chronic HFrEF  with some degree copd exacerbation   -cont tele monitoring   -daily weights   -strict I/Os   -fluid restriction 1500mL   -cont lasix 40mg IV BID   -metoprolol   -f/u TTE   -cardio recs appreciated   -known EF 35%  -will start steroids   -titrate supplemental O2 as tolerated   -albuterol --> xopenex     Vertebral fractures s/p fall   -CTA a/p: transversely oriented L1 vertebral body fx with increased displacement of dominant fx components since 1/4/23; transversely oriented T8 vertebral body fx (subacute appearing)   -CT t/l spine fx stable compared to ct a/p  -trauma sx recs appreciated   -neurosx recs appreciated   -f/u MRI t/l spine, pending; MRI safety screening complete   -TLSO at bedside   -bedrest until MR done   -pain control    Aortic aneurysm   -6.1cm AAA and 2.4cm L common iliac aneurysm   -vascular sx recs appreciated   -no surgical intervention at this time   -f/u arterial duplex BLE r/o popliteal aneurysms     CAD s/p CABG   -asa, statin, bb    chronic afib   -cont bb, holding coumadin due to supratherapeutic INR     Supratherapeutic INR   -trend, no active bleeding cont to monitor  -holding coumadin     Hx PE/DVT  Factor V Leiden   -s/p IVC filter   -AC as above     vte ppx: holding coumadin for supratherapeutic inr

## 2023-02-20 NOTE — PROGRESS NOTE ADULT - ASSESSMENT
89 y/o male w/ Hx of HTN, AF, FVL/PE/DVT s/p IVCF on Coumadin, CAD s/p CABG, CVA without any residual, COPD, GIGI on CPAP admitted s/p admitted s/p fall. CT abd/pelv read as showing transversely oriented L1 vertebral body fracture with increased displacement of the dominant fracture components since1/4/2023. Neurosurgery called to evaluate.     -recommend MRI T/L spine, ordered, pending  -pain control as needed, avoid over sedation  -TLSO at bedside, Bedrest until MR completed    -continue to coordinate care w/ primary team

## 2023-02-20 NOTE — PROGRESS NOTE ADULT - SUBJECTIVE AND OBJECTIVE BOX
[facility]                                                         CARDIOLOGY AT [facility]                                                                  [address], [place] [address]                                                         Telephone: [phone]. Fax:[phone]                                                                             PROGRESS NOTE    Reason for follow up: CHF  Update: still need diuresis     Review of symptoms:   Cardiac:  No chest pain. No dyspnea. No palpitations.  Respiratory: no cough. No dyspnea  Gastrointestinal: No diarrhea. No abdominal pain. No bleeding.   Neuro: No focal neuro complaints.    Vitals:  T(C): 36.9 (02-20-23 @ 09:00), Max: 36.9 (02-20-23 @ 09:00)  HR: 98 (02-20-23 @ 09:00) (90 - 98)  BP: 109/69 (02-20-23 @ 09:00) (106/68 - 138/75)  RR: 19 (02-20-23 @ 09:00) (18 - 20)  SpO2: 92% (02-20-23 @ 09:00) (92% - 96%)  Wt(kg): --  I&O's Summary    19 Feb 2023 07:01  -  20 Feb 2023 07:00  --------------------------------------------------------  IN: 0 mL / OUT: 600 mL / NET: -600 mL    PHYSICAL EXAM:  Appearance: Comfortable. No acute distress  HEENT:  Atraumatic. Normocephalic.  Normal oral mucosa  Neurologic: A & O x 3, no gross focal deficits.  Cardiovascular: RRR S1 S2, No murmur, no rubs/gallops. No JVD  Respiratory: Lungs clear to auscultation, unlabored   Gastrointestinal:  Soft, Non-tender, + BS  Lower Extremities: 2+ Peripheral Pulses, No clubbing, cyanosis, or edema  Psychiatry: Patient is calm. No agitation.   Skin: warm and dry.    CURRENT CARDIAC MEDICATIONS:  amLODIPine   Tablet 5 milliGRAM(s) Oral daily  metoprolol succinate ER 50 milliGRAM(s) Oral daily    CURRENT OTHER MEDICATIONS:  albuterol/ipratropium for Nebulization 3 milliLiter(s) Nebulizer every 6 hours  budesonide  80 MICROgram(s)/formoterol 4.5 MICROgram(s) Inhaler 2 Puff(s) Inhalation two times a day  acetaminophen     Tablet .. 975 milliGRAM(s) Oral every 8 hours  sertraline 50 milliGRAM(s) Oral daily  atorvastatin 40 milliGRAM(s) Oral at bedtime  clotrimazole/betamethasone Cream 1 Application(s) Topical two times a day  tamsulosin 0.4 milliGRAM(s) Oral at bedtime    LABS:	 	  ( 19 Feb 2023 09:05 )  Troponin T  <0.01,  CPK  70   , CKMB  X    , BNP 5460<H>               10.5   7.07  )-----------( 215      ( 20 Feb 2023 04:35 )             34.5     02-20    140  |  104  |  21.0<H>  ----------------------------<  105<H>  3.5   |  25.0  |  1.18    Ca    9.0      20 Feb 2023 04:35  Phos  3.3     02-20  Mg     1.9     02-20    TPro  6.3<L>  /  Alb  3.2<L>  /  TBili  0.6  /  DBili  x   /  AST  12  /  ALT  8   /  AlkPhos  72  02-20    PT/INR/PTT ( 20 Feb 2023 04:35 )                       :                       :      84.6         :       X                     .        .                   .              .           .       7.15        .

## 2023-02-20 NOTE — PROGRESS NOTE ADULT - SUBJECTIVE AND OBJECTIVE BOX
Rickey was awake and alert in bed as I measured and sized an [place] TLSO with shoulder straps for OOB use. He attempted to sit up to be fit with the brace after I demonstrated donning and adjustment of it to him, but it required too much effort so he decided to wait. Printed instructions and contact info were provided. Brace was labeled and placed on window sill.     [facility]  [phone]

## 2023-02-20 NOTE — PROGRESS NOTE ADULT - SUBJECTIVE AND OBJECTIVE BOX
SANDI PALUMBOHI    [id number]    88y      Male    CC: s/p fall    INTERVAL HPI/OVERNIGHT EVENTS: pt seen and examined in am. aide at bedside.   report of 8 beats of vtach and 11 beats in early am     REVIEW OF SYSTEMS:    CONSTITUTIONAL: No fever, weight loss  RESPIRATORY: No wheezing, hemoptysis  CARDIOVASCULAR: No chest pain, palpitations  GASTROINTESTINAL: No abdominal or epigastric pain. No nausea, vomiting  NEUROLOGICAL: No headaches    Vital Signs Last 24 Hrs  T(C): 36.8 (20 Feb 2023 17:25), Max: 36.9 (20 Feb 2023 09:00)  T(F): 98.2 (20 Feb 2023 17:25), Max: 98.5 (20 Feb 2023 09:00)  HR: 112 (20 Feb 2023 17:25) (90 - 112)  BP: 121/80 (20 Feb 2023 17:25) (106/68 - 138/75)  BP(mean): --  RR: 20 (20 Feb 2023 17:25) (18 - 20)  SpO2: 99% (20 Feb 2023 17:25) (92% - 99%)    Parameters below as of 20 Feb 2023 17:25  Patient On (Oxygen Delivery Method): nasal cannula        PHYSICAL EXAM:    GENERAL: NAD  HEENT: PERRL, +EOMI  NECK: soft, supple  CHEST/LUNG: decreased air movement at bases; respirations unlabored on 3LNC   HEART: S1S2+, Regular rate and rhythm  ABDOMEN: Soft, Nontender, Nondistended; Bowel sounds present  SKIN: warm, dry  NEURO: AAOX3 at time of exam; grossly non-focal  PSYCH: calm, cooperative     LABS:                        10.5   7.07  )-----------( 215      ( 20 Feb 2023 04:35 )             34.5     02-20    138  |  104  |  20.8<H>  ----------------------------<  124<H>  3.8   |  25.0  |  1.13    Ca    8.9      20 Feb 2023 11:31  Phos  3.3     02-20  Mg     1.9     02-20    TPro  6.3<L>  /  Alb  3.2<L>  /  TBili  0.6  /  DBili  x   /  AST  12  /  ALT  8   /  AlkPhos  72  02-20    PT/INR - ( 20 Feb 2023 17:50 )   PT: 72.4 sec;   INR: 6.13 ratio         PTT - ( 20 Feb 2023 17:50 )  PTT:48.4 sec        MEDICATIONS  (STANDING):  acetaminophen     Tablet .. 975 milliGRAM(s) Oral every 8 hours  amLODIPine   Tablet 5 milliGRAM(s) Oral daily  atorvastatin 40 milliGRAM(s) Oral at bedtime  budesonide  80 MICROgram(s)/formoterol 4.5 MICROgram(s) Inhaler 2 Puff(s) Inhalation two times a day  clotrimazole/betamethasone Cream 1 Application(s) Topical two times a day  furosemide   Injectable 40 milliGRAM(s) IV Push two times a day  metoprolol succinate ER 50 milliGRAM(s) Oral daily  predniSONE   Tablet 40 milliGRAM(s) Oral daily  sertraline 50 milliGRAM(s) Oral daily  tamsulosin 0.4 milliGRAM(s) Oral at bedtime    MEDICATIONS  (PRN):  ipratropium    for Nebulization 500 MICROGram(s) Nebulizer every 6 hours PRN Shortness of Breath and/or Wheezing  levalbuterol Inhalation 1.25 milliGRAM(s) Inhalation every 6 hours PRN shortness of breath  metoprolol tartrate Injectable 5 milliGRAM(s) IV Push every 6 hours PRN sustained HR >130  OLANZapine Injectable 5 milliGRAM(s) IntraMuscular every 6 hours PRN agitation      RADIOLOGY & ADDITIONAL TESTS:  < from: CT Lumbar Spine No Cont (02.20.23 @ 01:14) >  Impression:    The previously noted thoracic and lumbar spine fractures are stable   compared to the 02/19/2023 chest abdomen pelvic CT study. No new   additional fractures are noted over the time interval.    Consider follow-up thoracic and lumbar spine MRI studies for further   workup, if there are no MRI contraindications.    < end of copied text >

## 2023-02-20 NOTE — PROGRESS NOTE ADULT - PROBLEM SELECTOR PLAN 1
- pt presents s/p mechanical fall   - appears w/ volume overload, pBNP >5000 and extremely short of breath on exam   - is very diminished w/ wheezes, appears to have respiratory component as well   - needs steroids/nebs on top of IV lasix  - continue diuresis  - pt is chronic smoker, there is degree of COPD. recommend pulmonary consult   - bilateral pleural effusions, will consult CTS for eval for thoracentesis   - repeat pBNP in AM   - 1500mL fluid restriction   - known HFrEF 35%, ICM s/p CABG, continue GDMT for HFrEF: toprol, lipitor   - will need ACE/ARB/ARNI, farxiga, spironolactone, ASA before DC - pt presents s/p mechanical fall   - appears w/ volume overload, pBNP >5000 and extremely short of breath on exam   - is very diminished w/ wheezes, appears to have respiratory component as well   - needs steroids/nebs on top of IV lasix  - continue diuresis  - pt is chronic smoker, there is degree of COPD. recommend pulmonary consult   - bilateral pleural effusions, will consult CTS for eval for thoracentesis if no resolution w/ diuresis   - repeat pBNP in AM   - 1500mL fluid restriction   - known HFrEF 35%, ICM s/p CABG, continue GDMT for HFrEF: toprol, lipitor   - will need ACE/ARB/ARNI, farxiga, spironolactone, ASA before DC

## 2023-02-20 NOTE — PROGRESS NOTE ADULT - SUBJECTIVE AND OBJECTIVE BOX
Tertiary Trauma Survey (TTS)    Date of TTS: 02-20-23 @ 15:49                             Admit Date: 02-19-23 @ 12:33      Trauma Activation:    List Injuries Identified to Date:  L1 vertebral body, L TP frx, T8 vertebral body fracture      List Operative and Interventional Radiological Procedures: none          Physical Exam:    Neuro: aaox3, b/l ue and le 5/5 strenght, cn 2-12 grossly intact, no neuro deficits, sensations intact    Pulm/Chest: non labored on NC    Cardiac: rrr    GI / Abdomen: soft, ntnd    Musculoskeletal / Extremities:  b/l ue and le 5/5 strenght      RADIOLOGICAL FINDINGS REVIEW: < from: CT Lumbar Spine No Cont (02.20.23 @ 01:14) >    The previously noted thoracic and lumbar spine fractures are stable   compared to the 02/19/2023 chest abdomen pelvic CT study. No new   additional fractures are noted over the time interval.    Consider follow-up thoracic and lumbar spine MRI studies for further   workup, if there are no MRI contraindications.    < end of copied text >    < from: CT Thoracic Spine No Cont (02.20.23 @ 01:10) >    The previously noted thoracic and lumbar spine fractures are stable   compared to the 02/19/2023 chest abdomen pelvic CT study. No new   additional fractures are noted over the time interval.    Consider follow-up thoracic and lumbar spine MRI studies for further   workup, if there are no MRI contraindications.    < end of copied text >    < from: Xray Pelvis AP only (02.19.23 @ 10:15) >    IMPRESSION: No fracture.    There are new bilateral scattered perihilar atelectatic infiltrates.    < end of copied text >    < from: Xray Chest 1 View- PORTABLE-Urgent (Xray Chest 1 View- PORTABLE-Urgent .) (02.19.23 @ 10:16) >    IMPRESSION: No fracture.    There are new bilateral scattered perihilar atelectatic infiltrates.    < end of copied text >      INCIDENTAL FINDINGS:    [x ] No    [ ] Yes, Findings are:        [x ] Tertiary exam complete, there are no new injuries identified    [ ] Tertiary exam done, new injuries identified are:                [ ] Imaging ordered:

## 2023-02-20 NOTE — PROGRESS NOTE ADULT - ASSESSMENT
88M history significant for former chronic smoker, HTN, F. 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 Ta in 4/2022 since no routine office follow up), AAA, prostate cancer s/p recent radiation, recurrent falls with prior ER visits on 12/2022 with supratherpuetic INR 5.1 and 1/4/23 for weakness, now presents with recurrent falls unclear if syncopize, INR again elevated 6.0 and pBNP 5K, CT chest with bilateral pleural effusions and again noted 6.1 cm AAA (stable from 1/2023), subacute L1 compression fracture

## 2023-02-20 NOTE — PROGRESS NOTE ADULT - PROBLEM SELECTOR PLAN 3
-Mid ascending aorta: 4.2 cm stable since 2017  -Distal descending thoracic aorta: 4.7 cm, previously 3.9 cm in 2017.   Prominent pedunculated noncalcified plaque, considered to be high risk   for embolic event.  -Infrarenal abdominal aorta: 6.1 x 4.7 cm, stable since 1/4/2023,   previously 4.7 x 3.7 cm on 6/20/2015. Large peripheral mural thrombus.   - This is considered high risk for rupture based on size. No clear evidence   of rupture of the abdominal aortic aneurysm on this study.  - Vascular surgery says there is no need for urgent surgery at this time, will continue to follow and monitor  - if surgery is planned will need NST for pre-op clearance

## 2023-02-21 LAB
ANION GAP SERPL CALC-SCNC: 12 MMOL/L — SIGNIFICANT CHANGE UP (ref 5–17)
APTT BLD: 44.1 SEC — HIGH (ref 27.5–35.5)
BUN SERPL-MCNC: 20.6 MG/DL — HIGH (ref 8–20)
CALCIUM SERPL-MCNC: 8.9 MG/DL — SIGNIFICANT CHANGE UP (ref 8.4–10.5)
CHLORIDE SERPL-SCNC: 103 MMOL/L — SIGNIFICANT CHANGE UP (ref 96–108)
CO2 SERPL-SCNC: 25 MMOL/L — SIGNIFICANT CHANGE UP (ref 22–29)
CREAT SERPL-MCNC: 1.28 MG/DL — SIGNIFICANT CHANGE UP (ref 0.5–1.3)
EGFR: 54 ML/MIN/1.73M2 — LOW
GLUCOSE SERPL-MCNC: 134 MG/DL — HIGH (ref 70–99)
HCT VFR BLD CALC: 34.6 % — LOW (ref 39–50)
HGB BLD-MCNC: 10.2 G/DL — LOW (ref 13–17)
INR BLD: 5.78 RATIO — CRITICAL HIGH (ref 0.88–1.16)
MAGNESIUM SERPL-MCNC: 1.9 MG/DL — SIGNIFICANT CHANGE UP (ref 1.6–2.6)
MCHC RBC-ENTMCNC: 26.9 PG — LOW (ref 27–34)
MCHC RBC-ENTMCNC: 29.5 GM/DL — LOW (ref 32–36)
MCV RBC AUTO: 91.3 FL — SIGNIFICANT CHANGE UP (ref 80–100)
NT-PROBNP SERPL-SCNC: 4701 PG/ML — HIGH (ref 0–300)
PLATELET # BLD AUTO: 201 K/UL — SIGNIFICANT CHANGE UP (ref 150–400)
POTASSIUM SERPL-MCNC: 4.3 MMOL/L — SIGNIFICANT CHANGE UP (ref 3.5–5.3)
POTASSIUM SERPL-SCNC: 4.3 MMOL/L — SIGNIFICANT CHANGE UP (ref 3.5–5.3)
PROTHROM AB SERPL-ACNC: 68.3 SEC — HIGH (ref 10.5–13.4)
RBC # BLD: 3.79 M/UL — LOW (ref 4.2–5.8)
RBC # FLD: 15.5 % — HIGH (ref 10.3–14.5)
SODIUM SERPL-SCNC: 140 MMOL/L — SIGNIFICANT CHANGE UP (ref 135–145)
WBC # BLD: 5.78 K/UL — SIGNIFICANT CHANGE UP (ref 3.8–10.5)
WBC # FLD AUTO: 5.78 K/UL — SIGNIFICANT CHANGE UP (ref 3.8–10.5)

## 2023-02-21 PROCEDURE — 99233 SBSQ HOSP IP/OBS HIGH 50: CPT

## 2023-02-21 PROCEDURE — 99232 SBSQ HOSP IP/OBS MODERATE 35: CPT

## 2023-02-21 PROCEDURE — 99222 1ST HOSP IP/OBS MODERATE 55: CPT

## 2023-02-21 PROCEDURE — 93306 TTE W/DOPPLER COMPLETE: CPT | Mod: 26

## 2023-02-21 RX ORDER — METOPROLOL TARTRATE 50 MG
25 TABLET ORAL
Refills: 0 | Status: DISCONTINUED | OUTPATIENT
Start: 2023-02-21 | End: 2023-02-25

## 2023-02-21 RX ADMIN — Medication 975 MILLIGRAM(S): at 22:16

## 2023-02-21 RX ADMIN — Medication 975 MILLIGRAM(S): at 07:00

## 2023-02-21 RX ADMIN — Medication 975 MILLIGRAM(S): at 14:10

## 2023-02-21 RX ADMIN — AMLODIPINE BESYLATE 5 MILLIGRAM(S): 2.5 TABLET ORAL at 06:25

## 2023-02-21 RX ADMIN — ATORVASTATIN CALCIUM 40 MILLIGRAM(S): 80 TABLET, FILM COATED ORAL at 21:27

## 2023-02-21 RX ADMIN — Medication 50 MILLIGRAM(S): at 06:25

## 2023-02-21 RX ADMIN — Medication 975 MILLIGRAM(S): at 06:25

## 2023-02-21 RX ADMIN — Medication 40 MILLIGRAM(S): at 13:19

## 2023-02-21 RX ADMIN — CLOTRIMAZOLE AND BETAMETHASONE DIPROPIONATE 1 APPLICATION(S): 10; .5 CREAM TOPICAL at 06:31

## 2023-02-21 RX ADMIN — CLOTRIMAZOLE AND BETAMETHASONE DIPROPIONATE 1 APPLICATION(S): 10; .5 CREAM TOPICAL at 17:30

## 2023-02-21 RX ADMIN — SERTRALINE 50 MILLIGRAM(S): 25 TABLET, FILM COATED ORAL at 13:18

## 2023-02-21 RX ADMIN — Medication 40 MILLIGRAM(S): at 06:25

## 2023-02-21 RX ADMIN — Medication 975 MILLIGRAM(S): at 21:27

## 2023-02-21 RX ADMIN — TAMSULOSIN HYDROCHLORIDE 0.4 MILLIGRAM(S): 0.4 CAPSULE ORAL at 21:27

## 2023-02-21 RX ADMIN — Medication 40 MILLIGRAM(S): at 06:27

## 2023-02-21 RX ADMIN — Medication 975 MILLIGRAM(S): at 13:19

## 2023-02-21 RX ADMIN — Medication 25 MILLIGRAM(S): at 17:30

## 2023-02-21 NOTE — CONSULT NOTE ADULT - ASSESSMENT
87yo M w/ PMH of AF and Factor V Leiden with PE/DVT s/p IVCF and on coumadin, CAD s/p CABG, CVA, HFrEF (35-40%), moderate AS, COPD, AAA, ?prostate CA w/ recent XRT and GIGI on CPAP who was admitted after a fall at home (lives alone).  He has had recurrent falls and admitted for same - found with INR of 6 and BNP ~5000.  CT chest with B/L effusions and stable 6.1 cm AAA and subacute L1 fx.  He is being treated for both COPD and CHF exacerbation - steroids and IV lasix.  CTS consulted for consideration of thoracentesis if diuresis ineffective. We are consulted for assistance with goals of care.  #Goals of care  - daughter Ivon lives in [place] ([phone]) - niece Melanie lives locally ([phone]).  Spouse Frannie is also listed ? [phone]   87yo M w/ PMH of AF and Factor V Leiden with PE/DVT s/p IVCF and on coumadin, CAD s/p CABG, CVA, HFrEF (35-40%), moderate AS, COPD, AAA, ?prostate CA w/ recent XRT and GIGI on CPAP who was admitted after a fall at home (lives alone).  He has had recurrent falls and admitted for same - found with INR of 6 and BNP ~5000.  CT chest with B/L effusions and stable 6.1 cm AAA and subacute L1 fx.  He is being treated for both COPD and CHF exacerbation - steroids and IV lasix.  CTS consulted for consideration of thoracentesis if diuresis ineffective. We are consulted for assistance with goals of care.  #Goals of care  - daughter Ivon lives in [place] ([phone]) - niece Melanie lives locally ([phone]).  Spouse Frannie is also listed - per pt, she  5 yrs ago    89yo M w/ PMH of AF and Factor V Leiden with PE/DVT s/p IVCF and on coumadin, CAD s/p CABG, CVA, HFrEF (35-40%), moderate AS, COPD, AAA, ?prostate CA w/ recent XRT and GIGI on CPAP who was admitted after a fall at home (lives alone).  He has had recurrent falls and admitted for same - found with INR of 6 and BNP ~5000.  CT chest with B/L effusions and stable 6.1 cm AAA and subacute L1 fx.  He is being treated for both COPD and CHF exacerbation - steroids and IV lasix.  CTS consulted for consideration of thoracentesis if diuresis ineffective. We are consulted for assistance with goals of care.  #Goals of care  - daughter Ivon lives in [place] ([phone]) - niece Melanie lives locally ([phone]).  Spouse Frannie is also listed - per pt, she  5 yrs ago   - I tried to call daughter but call went to unidentified voicemail - will try again later  - I d/w Dr Myers (hospitalist) and reviewed case - she spoke with dtr who said she would send over HCP form  - pt told me had 4 children (Ivon, Umer, Delano, and Juma) - will need to receive HCP form or else clarify surrogacy  - full code for now    #AMS - pt with visual hallucinations  - agree with behavioral health c/s  - Pt is at risk of delirium.  Would avoid/minimize known deliriogenic drugs such as benzodiazepines and anticholinergics.  Encourage staff and family to reorient frequently.  Keep shades open during day in effort to maintain day/night cycle.    #Pain - pt with L1 fx  - would consider ATC APAP 1000mg TID  - NSGY seeing - recommended MRI - ?kyphoplasty

## 2023-02-21 NOTE — PROGRESS NOTE ADULT - PROBLEM SELECTOR PLAN 1
- pt presents s/p mechanical fall   - appears w/ volume overload, pBNP >5000 and extremely short of breath on exam   - is very diminished w/ wheezes, appears to have respiratory component as well   - consider steroids/nebs on top of IV lasix  - continue diuresis  - pt is chronic smoker, there is degree of COPD  - consider pulmonary consult   - bilateral pleural effusions, will consult CTS for eval for thoracentesis if no resolution w/ diuresis   - repeat pBNP is downtrending   - 1500mL fluid restriction   - known HFrEF 35%, ICM s/p CABG, continue GDMT for HFrEF: toprol, lipitor   - will need ACE/ARB/ARNI, farxiga, spironolactone, ASA before DC

## 2023-02-21 NOTE — CONSULT NOTE ADULT - SUBJECTIVE AND OBJECTIVE BOX
HPI:  87yo M w/ PMH of AF and Factor V Leiden with PE/DVT s/p IVCF and on coumadin, CAD s/p CABG, CVA, HFrEF (35-40%), moderate AS, COPD, AAA, prostate CA (recent XRT) and GIGI on CPAP who was admitted after a fall at home (lives alone).  He has had recurrent falls and admitted for same - found with INR of 6 and BNP ~5000.  CT chest with B/L effusions and stable 6.1 cm AAA and subacute L1 fx.  He is being treated for both COPD and CHF exacerbation - steroids and IV lasix.  CTS consulted for consideration of thoracentesis if diuresis ineffective. We are consulted for assistance with goals of care.          PERTINENT PMH REVIEWED: Yes    PAST MEDICAL & SURGICAL HISTORY:  as above    SOCIAL HISTORY:                                            Surrogate/HCP/Guardian: Phone#: daughter Ivon lives in [facility] ([phone]) - niece Melanie lives locally ([phone]).  Spouse Frannie is also listed ? [phone]    FAMILY HISTORY:  Family history of diabetes mellitus (Child)    Allergies  No Known Allergies    ADVANCE DIRECTIVES/TREATMENT PREFERENCES:  Full code, all aggressive measures desired   DNR/DNI - MOLST, continue all other medical treatments  DNR/DNI - MOLST, comfort measures only     Baseline ADLs (prior to admission):  Independent  Dependent      Karnofsky/Palliative Performance Status Version 2:  %  http://npcrc.org/files/news/palliative_performance_scale_ppsv2.pdf    PRESENT SYMPTOMS:     Dyspnea: Yes No  Nausea/Vomiting: Yes No  Anxiety:  Yes No  Depression: Yes No  Fatigue: Yes No  Loss of appetite: Yes No  Constipation: Yes No    PAIN:              Character-            Duration-            Effect-            Factors-            Frequency-            Location-            Severity-    PAIN AD SCORE:  [email or web address] (press ctrl + left click to view)    REVIEW OF SYSTEMS:   Full review of systems performed and was otherwise negative except as noted above.  Due to altered mental status, unable to obtain subjective information from the patient. History was obtained, to the extent possible, from review of the chart and collateral sources of information.    MEDICATIONS  (STANDING):  acetaminophen     Tablet .. 975 milliGRAM(s) Oral every 8 hours  amLODIPine   Tablet 5 milliGRAM(s) Oral daily  atorvastatin 40 milliGRAM(s) Oral at bedtime  budesonide  80 MICROgram(s)/formoterol 4.5 MICROgram(s) Inhaler 2 Puff(s) Inhalation two times a day  clotrimazole/betamethasone Cream 1 Application(s) Topical two times a day  furosemide   Injectable 40 milliGRAM(s) IV Push two times a day  metoprolol succinate ER 50 milliGRAM(s) Oral daily  predniSONE   Tablet 40 milliGRAM(s) Oral daily  sertraline 50 milliGRAM(s) Oral daily  tamsulosin 0.4 milliGRAM(s) Oral at bedtime    MEDICATIONS  (PRN):  ipratropium    for Nebulization 500 MICROGram(s) Nebulizer every 6 hours PRN Shortness of Breath and/or Wheezing  levalbuterol Inhalation 1.25 milliGRAM(s) Inhalation every 6 hours PRN shortness of breath  metoprolol tartrate Injectable 5 milliGRAM(s) IV Push every 6 hours PRN sustained HR >130  OLANZapine Injectable 5 milliGRAM(s) IntraMuscular every 6 hours PRN agitation      PHYSICAL EXAM:    Vital Signs Last 24 Hrs  T(C): 36.9 (21 Feb 2023 10:04), Max: 36.9 (21 Feb 2023 10:04)  T(F): 98.4 (21 Feb 2023 10:04), Max: 98.4 (21 Feb 2023 10:04)  HR: 88 (21 Feb 2023 10:04) (68 - 116)  BP: 105/75 (21 Feb 2023 10:04) (105/75 - 147/91)  BP(mean): --  RR: 18 (21 Feb 2023 10:04) (18 - 20)  SpO2: 94% (21 Feb 2023 10:04) (91% - 100%)    Parameters below as of 21 Feb 2023 10:04  Patient On (Oxygen Delivery Method): nasal cannula    General: pt lying in bed in NAD  HEENT: NCAT; MMM  Lungs: breathing unlabored; symmetric chest expansion  GI: soft; NTND  MSK: No apparent deformities  Neuro: fluent speech; awake and conversant; oriented x 3  Skin: no rash  Psych: calm; appropriate speech and affect    LABS:                        10.2   5.78  )-----------( 201      ( 21 Feb 2023 04:26 )             34.6     02-21    140  |  103  |  20.6<H>  ----------------------------<  134<H>  4.3   |  25.0  |  1.28    Ca    8.9      21 Feb 2023 04:26  Phos  3.3     02-20  Mg     1.9     02-21    TPro  6.3<L>  /  Alb  3.2<L>  /  TBili  0.6  /  DBili  x   /  AST  12  /  ALT  8   /  AlkPhos  72  02-20    PT/INR - ( 21 Feb 2023 04:26 )   PT: 68.3 sec;   INR: 5.78 ratio         PTT - ( 21 Feb 2023 04:26 )  PTT:44.1 sec    RADIOLOGY & ADDITIONAL STUDIES:  CT:    Impression:    The previously noted thoracic and lumbar spine fractures are stable   compared to the 02/19/2023 chest abdomen pelvic CT study. No new   additional fractures are noted over the time interval.    Consider follow-up thoracic and lumbar spine MRI studies for further   workup, if there are no MRI contraindications.    --- End of Report ---    VIOLETA NIXON MD; Attending Radiologist  This document has been electronically signed. Feb 20 2023  2:08PM      NEUROLOGIC MEDICATIONS/OPIOIDS/BENZODIAZEPINES IN PAST 24HRS  acetaminophen     Tablet ..   975 milliGRAM(s) Oral (02-21-23 @ 06:25)   975 milliGRAM(s) Oral (02-20-23 @ 17:27)    OLANZapine Injectable   5 milliGRAM(s) IntraMuscular (02-20-23 @ 19:07)   5 milliGRAM(s) IntraMuscular (02-20-23 @ 12:51)    sertraline   50 milliGRAM(s) Oral (02-20-23 @ 17:27)       HPI:  87yo M w/ PMH of AF and Factor V Leiden with PE/DVT s/p IVCF and on coumadin, CAD s/p CABG, CVA, HFrEF (35-40%), moderate AS, COPD, AAA, prostate CA (recent XRT) and GIGI on CPAP who was admitted after a fall at home (lives alone).  He has had recurrent falls and admitted for same - found with INR of 6 and BNP ~5000.  CT chest with B/L effusions and stable 6.1 cm AAA and subacute L1 fx.  He is being treated for both COPD and CHF exacerbation - steroids and IV lasix.  CTS consulted for consideration of thoracentesis if diuresis ineffective. We are consulted for assistance with goals of care.    Pt seen lying in bed eating lunch.  He denies pain or SOB or nausea.  He is oriented x3 but endorses confusion which became apparent during our discussion.  His speech was tangential and seemed to endorse occasional visual hallucinations.    PERTINENT PMH REVIEWED: Yes    PAST MEDICAL & SURGICAL HISTORY:  as above    SOCIAL HISTORY:                      Unable to obtain reliable hx as pt couldn't remember how many children he had or their names                      Surrogate/HCP/Guardian: Phone#: daughter Ivon lives in [facility] ([phone]) - niece Melanie lives locally ([phone]).  Spouse Frannie is also listed ? [phone]    FAMILY HISTORY:  Family history of diabetes mellitus (Child)    Allergies  No Known Allergies    ADVANCE DIRECTIVES/TREATMENT PREFERENCES:  Full code, all aggressive measures desired     Baseline ADLs (prior to admission):  Unable to obtain 2/2 AMS    Karnofsky/Palliative Performance Status Version 2:  60%  http://npcrc.org/files/news/palliative_performance_scale_ppsv2.pdf    PRESENT SYMPTOMS:     Dyspnea: No  Nausea/Vomiting: No  Anxiety:  No  Depression: No  Fatigue: No  Loss of appetite: No  Constipation: No    PAIN:  denies            Character-            Duration-            Effect-            Factors-            Frequency-            Location-            Severity-    REVIEW OF SYSTEMS:   As above - review of systems otherwise limited 2/2 AMS    MEDICATIONS  (STANDING):  acetaminophen     Tablet .. 975 milliGRAM(s) Oral every 8 hours  amLODIPine   Tablet 5 milliGRAM(s) Oral daily  atorvastatin 40 milliGRAM(s) Oral at bedtime  budesonide  80 MICROgram(s)/formoterol 4.5 MICROgram(s) Inhaler 2 Puff(s) Inhalation two times a day  clotrimazole/betamethasone Cream 1 Application(s) Topical two times a day  furosemide   Injectable 40 milliGRAM(s) IV Push two times a day  metoprolol succinate ER 50 milliGRAM(s) Oral daily  predniSONE   Tablet 40 milliGRAM(s) Oral daily  sertraline 50 milliGRAM(s) Oral daily  tamsulosin 0.4 milliGRAM(s) Oral at bedtime    MEDICATIONS  (PRN):  ipratropium    for Nebulization 500 MICROGram(s) Nebulizer every 6 hours PRN Shortness of Breath and/or Wheezing  levalbuterol Inhalation 1.25 milliGRAM(s) Inhalation every 6 hours PRN shortness of breath  metoprolol tartrate Injectable 5 milliGRAM(s) IV Push every 6 hours PRN sustained HR >130  OLANZapine Injectable 5 milliGRAM(s) IntraMuscular every 6 hours PRN agitation      PHYSICAL EXAM:    Vital Signs Last 24 Hrs  T(C): 36.9 (21 Feb 2023 10:04), Max: 36.9 (21 Feb 2023 10:04)  T(F): 98.4 (21 Feb 2023 10:04), Max: 98.4 (21 Feb 2023 10:04)  HR: 88 (21 Feb 2023 10:04) (68 - 116)  BP: 105/75 (21 Feb 2023 10:04) (105/75 - 147/91)  BP(mean): --  RR: 18 (21 Feb 2023 10:04) (18 - 20)  SpO2: 94% (21 Feb 2023 10:04) (91% - 100%)    Parameters below as of 21 Feb 2023 10:04  Patient On (Oxygen Delivery Method): nasal cannula    General: pt lying in bed in NAD  HEENT: NCAT; MMM  Lungs: breathing unlabored; symmetric chest expansion  GI: soft; NTND  MSK: No apparent deformities; trace LISANDRA  Neuro: fluent speech; awake and conversant; oriented x 3  Skin: no rash  Psych: calm; flat affect; tangential speech; inattentive    LABS:                        10.2   5.78  )-----------( 201      ( 21 Feb 2023 04:26 )             34.6     02-21    140  |  103  |  20.6<H>  ----------------------------<  134<H>  4.3   |  25.0  |  1.28    Ca    8.9      21 Feb 2023 04:26  Phos  3.3     02-20  Mg     1.9     02-21    TPro  6.3<L>  /  Alb  3.2<L>  /  TBili  0.6  /  DBili  x   /  AST  12  /  ALT  8   /  AlkPhos  72  02-20    PT/INR - ( 21 Feb 2023 04:26 )   PT: 68.3 sec;   INR: 5.78 ratio         PTT - ( 21 Feb 2023 04:26 )  PTT:44.1 sec    RADIOLOGY & ADDITIONAL STUDIES:  CT:    Impression:    The previously noted thoracic and lumbar spine fractures are stable   compared to the 02/19/2023 chest abdomen pelvic CT study. No new   additional fractures are noted over the time interval.    Consider follow-up thoracic and lumbar spine MRI studies for further   workup, if there are no MRI contraindications.    --- End of Report ---    VIOLETA NIXON MD; Attending Radiologist  This document has been electronically signed. Feb 20 2023  2:08PM      NEUROLOGIC MEDICATIONS/OPIOIDS/BENZODIAZEPINES IN PAST 24HRS  acetaminophen     Tablet ..   975 milliGRAM(s) Oral (02-21-23 @ 06:25)   975 milliGRAM(s) Oral (02-20-23 @ 17:27)    OLANZapine Injectable   5 milliGRAM(s) IntraMuscular (02-20-23 @ 19:07)   5 milliGRAM(s) IntraMuscular (02-20-23 @ 12:51)    sertraline   50 milliGRAM(s) Oral (02-20-23 @ 17:27)     HPI:  89yo M w/ PMH of AF and Factor V Leiden with PE/DVT s/p IVCF and on coumadin, CAD s/p CABG, CVA, HFrEF (35-40%), moderate AS, COPD, AAA, prostate CA (recent XRT) and GIGI on CPAP who was admitted after a fall at home (lives alone).  He has had recurrent falls and admitted for same - found with INR of 6 and BNP ~5000.  CT chest with B/L effusions and stable 6.1 cm AAA and subacute L1 fx.  He is being treated for both COPD and CHF exacerbation - steroids and IV lasix.  CTS consulted for consideration of thoracentesis if diuresis ineffective. We are consulted for assistance with goals of care.    Pt seen lying in bed eating lunch.  He denies pain or SOB or nausea.  He is oriented x3 but endorses confusion which became apparent during our discussion.  His speech was tangential and seemed to endorse occasional visual hallucinations.    PERTINENT PMH REVIEWED: Yes    PAST MEDICAL & SURGICAL HISTORY:  as above    SOCIAL HISTORY:                      Unable to obtain reliable hx as pt couldn't remember how many children he had or their names                      Surrogate/HCP/Guardian: Phone#: daughter Ivon lives in [facility] ([phone]) - niece Melanie lives locally ([phone]).  Spouse Frannie is also listed ? [phone]    FAMILY HISTORY:  Family history of diabetes mellitus (Child)    Allergies  No Known Allergies    ADVANCE DIRECTIVES/TREATMENT PREFERENCES:  Full code, all aggressive measures desired     Baseline ADLs (prior to admission):  Unable to obtain 2/2 AMS    Karnofsky/Palliative Performance Status Version 2:  60%  http://npcrc.org/files/news/palliative_performance_scale_ppsv2.pdf    PRESENT SYMPTOMS:     Dyspnea: No  Nausea/Vomiting: No  Anxiety:  No  Depression: No  Fatigue: No  Loss of appetite: No  Constipation: No    PAIN:  denies            Character-            Duration-            Effect-            Factors-            Frequency-            Location-            Severity-    REVIEW OF SYSTEMS:   As above - review of systems otherwise limited 2/2 AMS    MEDICATIONS  (STANDING):  acetaminophen     Tablet .. 975 milliGRAM(s) Oral every 8 hours  amLODIPine   Tablet 5 milliGRAM(s) Oral daily  atorvastatin 40 milliGRAM(s) Oral at bedtime  budesonide  80 MICROgram(s)/formoterol 4.5 MICROgram(s) Inhaler 2 Puff(s) Inhalation two times a day  clotrimazole/betamethasone Cream 1 Application(s) Topical two times a day  furosemide   Injectable 40 milliGRAM(s) IV Push two times a day  metoprolol succinate ER 50 milliGRAM(s) Oral daily  predniSONE   Tablet 40 milliGRAM(s) Oral daily  sertraline 50 milliGRAM(s) Oral daily  tamsulosin 0.4 milliGRAM(s) Oral at bedtime    MEDICATIONS  (PRN):  ipratropium    for Nebulization 500 MICROGram(s) Nebulizer every 6 hours PRN Shortness of Breath and/or Wheezing  levalbuterol Inhalation 1.25 milliGRAM(s) Inhalation every 6 hours PRN shortness of breath  metoprolol tartrate Injectable 5 milliGRAM(s) IV Push every 6 hours PRN sustained HR >130  OLANZapine Injectable 5 milliGRAM(s) IntraMuscular every 6 hours PRN agitation      PHYSICAL EXAM:    Vital Signs Last 24 Hrs  T(C): 36.9 (21 Feb 2023 10:04), Max: 36.9 (21 Feb 2023 10:04)  T(F): 98.4 (21 Feb 2023 10:04), Max: 98.4 (21 Feb 2023 10:04)  HR: 88 (21 Feb 2023 10:04) (68 - 116)  BP: 105/75 (21 Feb 2023 10:04) (105/75 - 147/91)  BP(mean): --  RR: 18 (21 Feb 2023 10:04) (18 - 20)  SpO2: 94% (21 Feb 2023 10:04) (91% - 100%)    Parameters below as of 21 Feb 2023 10:04  Patient On (Oxygen Delivery Method): nasal cannula    General: pt lying in bed in NAD  HEENT: NCAT; MMM  Lungs: breathing unlabored; symmetric chest expansion  GI: soft; NTND  MSK: No apparent deformities; trace LISANDRA  Neuro: fluent speech; awake and conversant; oriented x 3  Skin: no rash  Psych: calm; flat affect; tangential speech; inattentive    LABS:                        10.2   5.78  )-----------( 201      ( 21 Feb 2023 04:26 )             34.6     02-21    140  |  103  |  20.6<H>  ----------------------------<  134<H>  4.3   |  25.0  |  1.28    Ca    8.9      21 Feb 2023 04:26  Phos  3.3     02-20  Mg     1.9     02-21    TPro  6.3<L>  /  Alb  3.2<L>  /  TBili  0.6  /  DBili  x   /  AST  12  /  ALT  8   /  AlkPhos  72  02-20    PT/INR - ( 21 Feb 2023 04:26 )   PT: 68.3 sec;   INR: 5.78 ratio         PTT - ( 21 Feb 2023 04:26 )  PTT:44.1 sec    RADIOLOGY & ADDITIONAL STUDIES:  CT:    Impression:    The previously noted thoracic and lumbar spine fractures are stable   compared to the 02/19/2023 chest abdomen pelvic CT study. No new   additional fractures are noted over the time interval.    Consider follow-up thoracic and lumbar spine MRI studies for further   workup, if there are no MRI contraindications.    --- End of Report ---    VIOLETA NIXON MD; Attending Radiologist  This document has been electronically signed. Feb 20 2023  2:08PM      NEUROLOGIC MEDICATIONS/OPIOIDS/BENZODIAZEPINES IN PAST 24HRS  acetaminophen     Tablet ..   975 milliGRAM(s) Oral (02-21-23 @ 06:25)   975 milliGRAM(s) Oral (02-20-23 @ 17:27)    OLANZapine Injectable   5 milliGRAM(s) IntraMuscular (02-20-23 @ 19:07)   5 milliGRAM(s) IntraMuscular (02-20-23 @ 12:51)    sertraline   50 milliGRAM(s) Oral (02-20-23 @ 17:27)     HPI:  87yo M w/ PMH of AF and Factor V Leiden with PE/DVT s/p IVCF and on coumadin, CAD s/p CABG, CVA, HFrEF (35-40%), moderate AS, COPD, AAA, prostate CA (recent XRT) and GIGI on CPAP who was admitted after a fall at home (lives alone).  He has had recurrent falls and admitted for same - found with INR of 6 and BNP ~5000.  CT chest with B/L effusions and stable 6.1 cm AAA and subacute L1 fx.  He is being treated for both COPD and CHF exacerbation - steroids and IV lasix.  CTS consulted for consideration of thoracentesis if diuresis ineffective. We are consulted for assistance with goals of care.    Pt seen lying in bed eating lunch.  He denies pain or SOB or nausea.  He is oriented x3 but endorses confusion which became apparent during our discussion.  His speech was tangential and seemed to endorse occasional visual hallucinations.    PERTINENT PMH REVIEWED: Yes    PAST MEDICAL & SURGICAL HISTORY:  as above    SOCIAL HISTORY:                      Unable to obtain reliable hx as pt couldn't remember how many children he had or their names                      Surrogate/HCP/Guardian: Phone#: daughter Ivon lives in [facility] ([phone]) - niece Melanie lives locally ([phone]).  Spouse Frannie is also listed ? [phone]    FAMILY HISTORY:  Family history of diabetes mellitus (Child)    Allergies  No Known Allergies    ADVANCE DIRECTIVES/TREATMENT PREFERENCES:  Full code, all aggressive measures desired     Baseline ADLs (prior to admission):  Unable to obtain 2/2 AMS    Karnofsky/Palliative Performance Status Version 2:  60%  http://npcrc.org/files/news/palliative_performance_scale_ppsv2.pdf    PRESENT SYMPTOMS:     Dyspnea: No  Nausea/Vomiting: No  Anxiety:  No  Depression: No  Fatigue: No  Loss of appetite: No  Constipation: No    PAIN:  denies            Character-            Duration-            Effect-            Factors-            Frequency-            Location-            Severity-    REVIEW OF SYSTEMS:   As above - review of systems otherwise limited 2/2 AMS    MEDICATIONS  (STANDING):  acetaminophen     Tablet .. 975 milliGRAM(s) Oral every 8 hours  amLODIPine   Tablet 5 milliGRAM(s) Oral daily  atorvastatin 40 milliGRAM(s) Oral at bedtime  budesonide  80 MICROgram(s)/formoterol 4.5 MICROgram(s) Inhaler 2 Puff(s) Inhalation two times a day  clotrimazole/betamethasone Cream 1 Application(s) Topical two times a day  furosemide   Injectable 40 milliGRAM(s) IV Push two times a day  metoprolol succinate ER 50 milliGRAM(s) Oral daily  predniSONE   Tablet 40 milliGRAM(s) Oral daily  sertraline 50 milliGRAM(s) Oral daily  tamsulosin 0.4 milliGRAM(s) Oral at bedtime    MEDICATIONS  (PRN):  ipratropium    for Nebulization 500 MICROGram(s) Nebulizer every 6 hours PRN Shortness of Breath and/or Wheezing  levalbuterol Inhalation 1.25 milliGRAM(s) Inhalation every 6 hours PRN shortness of breath  metoprolol tartrate Injectable 5 milliGRAM(s) IV Push every 6 hours PRN sustained HR >130  OLANZapine Injectable 5 milliGRAM(s) IntraMuscular every 6 hours PRN agitation    PHYSICAL EXAM:    Vital Signs Last 24 Hrs  T(C): 36.9 (21 Feb 2023 10:04), Max: 36.9 (21 Feb 2023 10:04)  T(F): 98.4 (21 Feb 2023 10:04), Max: 98.4 (21 Feb 2023 10:04)  HR: 88 (21 Feb 2023 10:04) (68 - 116)  BP: 105/75 (21 Feb 2023 10:04) (105/75 - 147/91)  BP(mean): --  RR: 18 (21 Feb 2023 10:04) (18 - 20)  SpO2: 94% (21 Feb 2023 10:04) (91% - 100%)    Parameters below as of 21 Feb 2023 10:04  Patient On (Oxygen Delivery Method): nasal cannula    General: pt lying in bed in NAD  HEENT: NCAT; MMM  Lungs: breathing unlabored; symmetric chest expansion  GI: soft; NTND  MSK: No apparent deformities; trace LISANDRA  Neuro: fluent speech; awake and conversant; oriented x 3  Skin: no rash  Psych: calm; flat affect; tangential speech; inattentive    LABS:                        10.2   5.78  )-----------( 201      ( 21 Feb 2023 04:26 )             34.6     02-21    140  |  103  |  20.6<H>  ----------------------------<  134<H>  4.3   |  25.0  |  1.28    Ca    8.9      21 Feb 2023 04:26  Phos  3.3     02-20  Mg     1.9     02-21    TPro  6.3<L>  /  Alb  3.2<L>  /  TBili  0.6  /  DBili  x   /  AST  12  /  ALT  8   /  AlkPhos  72  02-20    PT/INR - ( 21 Feb 2023 04:26 )   PT: 68.3 sec;   INR: 5.78 ratio         PTT - ( 21 Feb 2023 04:26 )  PTT:44.1 sec    RADIOLOGY & ADDITIONAL STUDIES:  CT:    Impression:    The previously noted thoracic and lumbar spine fractures are stable   compared to the 02/19/2023 chest abdomen pelvic CT study. No new   additional fractures are noted over the time interval.    Consider follow-up thoracic and lumbar spine MRI studies for further   workup, if there are no MRI contraindications.    --- End of Report ---    VIOLETA NIXON MD; Attending Radiologist  This document has been electronically signed. Feb 20 2023  2:08PM    NEUROLOGIC MEDICATIONS/OPIOIDS/BENZODIAZEPINES IN PAST 24HRS  acetaminophen     Tablet ..   975 milliGRAM(s) Oral (02-21-23 @ 06:25)   975 milliGRAM(s) Oral (02-20-23 @ 17:27)    OLANZapine Injectable   5 milliGRAM(s) IntraMuscular (02-20-23 @ 19:07)   5 milliGRAM(s) IntraMuscular (02-20-23 @ 12:51)    sertraline   50 milliGRAM(s) Oral (02-20-23 @ 17:27)

## 2023-02-21 NOTE — PROGRESS NOTE ADULT - SUBJECTIVE AND OBJECTIVE BOX
[facility]                                                         CARDIOLOGY AT [facility]                                                                  [address], [place] [address]                                                         Telephone: [phone]. Fax:[phone]                                                                             PROGRESS NOTE    Reason for follow up: CHF  Update: still need diuresis     Review of symptoms:   Cardiac:  No chest pain. No dyspnea. No palpitations.  Respiratory: no cough. No dyspnea  Gastrointestinal: No diarrhea. No abdominal pain. No bleeding.   Neuro: No focal neuro complaints.    Vitals:  T(C): 36.4 (02-21-23 @ 05:00), Max: 36.8 (02-20-23 @ 13:05)  HR: 68 (02-21-23 @ 05:00) (68 - 116)  BP: 147/91 (02-21-23 @ 05:00) (110/81 - 147/91)  RR: 18 (02-21-23 @ 05:00) (18 - 20)  SpO2: 100% (02-21-23 @ 08:00) (91% - 100%)  Wt(kg): --  I&O's Summary    20 Feb 2023 07:01  -  21 Feb 2023 07:00  --------------------------------------------------------  IN: 0 mL / OUT: 950 mL / NET: -950 mL          PHYSICAL EXAM:  Appearance: Comfortable. No acute distress  HEENT:  Atraumatic. Normocephalic.  Normal oral mucosa  Neurologic: A & O x 3, no gross focal deficits.  Cardiovascular: RRR S1 S2, No murmur, no rubs/gallops. No JVD  Respiratory: Lungs clear to auscultation, unlabored   Gastrointestinal:  Soft, Non-tender, + BS  Lower Extremities: 2+ Peripheral Pulses, No clubbing, cyanosis, or edema  Psychiatry: Patient is calm. No agitation.   Skin: warm and dry.    CURRENT CARDIAC MEDICATIONS:  amLODIPine   Tablet 5 milliGRAM(s) Oral daily  furosemide   Injectable 40 milliGRAM(s) IV Push two times a day  metoprolol succinate ER 50 milliGRAM(s) Oral daily  metoprolol tartrate Injectable 5 milliGRAM(s) IV Push every 6 hours PRN      CURRENT OTHER MEDICATIONS:  budesonide  80 MICROgram(s)/formoterol 4.5 MICROgram(s) Inhaler 2 Puff(s) Inhalation two times a day  ipratropium    for Nebulization 500 MICROGram(s) Nebulizer every 6 hours PRN Shortness of Breath and/or Wheezing  levalbuterol Inhalation 1.25 milliGRAM(s) Inhalation every 6 hours PRN shortness of breath  acetaminophen     Tablet .. 975 milliGRAM(s) Oral every 8 hours  OLANZapine Injectable 5 milliGRAM(s) IntraMuscular every 6 hours PRN agitation  sertraline 50 milliGRAM(s) Oral daily  atorvastatin 40 milliGRAM(s) Oral at bedtime  predniSONE   Tablet 40 milliGRAM(s) Oral daily, Stop order after: 3 Days  clotrimazole/betamethasone Cream 1 Application(s) Topical two times a day  tamsulosin 0.4 milliGRAM(s) Oral at bedtime      LABS:	 	  ( 21 Feb 2023 04:26 )  Troponin T  X    ,  CPK  X    , CKMB  X    , BNP 4701<H>    , ( 19 Feb 2023 09:05 )  Troponin T  <0.01,  CPK  70   , CKMB  X    , BNP 5460<H>                              10.2   5.78  )-----------( 201      ( 21 Feb 2023 04:26 )             34.6     02-21    140  |  103  |  20.6<H>  ----------------------------<  134<H>  4.3   |  25.0  |  1.28    Ca    8.9      21 Feb 2023 04:26  Phos  3.3     02-20  Mg     1.9     02-21    TPro  6.3<L>  /  Alb  3.2<L>  /  TBili  0.6  /  DBili  x   /  AST  12  /  ALT  8   /  AlkPhos  72  02-20    PT/INR/PTT ( 21 Feb 2023 04:26 )                       :                       :      68.3         :       44.1                  .        .                   .              .           .       5.78        .                                         DIAGNOSTIC TESTING:  [ ] Echocardiogram:   < from: TTE Echo Limited or F/U (08.12.19 @ 15:20) >  Summary:   1. Technically limited study. Limited information is available. Unable   to visualize valves and right heart chambers.   2. Endocardial visualization was enhanced with intravenous echo contrast.   3. Left ventricular ejection fraction, by visual estimation, is 50 to   55%.   4. Basal inferior segment and basal inferolateral segment are abnormal   as described above.   5. Trivial pericardial effusion.    MD Brunilda Electronically signed on 8/12/2019 at 6:20:07 PM    < end of copied text >    [ ]  Catheterization:  < from: Cardiac Cath Lab - Adult (08.06.19 @ 15:20) >  VENTRICLES: No left ventriculogram was performed.  CORONARY VESSELS: The coronary circulation is right dominant.  LM:   --  LM: The vessel was large sized and mildly calcified. Angiography  showed mild atherosclerosis with no flow limiting lesions.  LAD:   --  Proximal LAD: There was a tubular 50 % stenosis. The lesion was  moderately calcified.  --  Mid LAD: There was a tubular 80 % stenosis.  --  Distal LAD: This is a good target for bypass.  --  D1: The vessel was medium to large sized. The artery was supplied by  collaterals from the left system There was a stenosis at the ostium of the  vessel segment. This lesion is a chronic total occlusion.  CX:   --  Mid circumflex: Moderate to severe disease.  --  OM1: The vessel was large sized. Angiography showed mild  atherosclerosis with no flow limiting lesions.  RCA:   --  Proximal RCA: There was a stenosis. This lesion is a chronic  total occlusion.  --  RPDA: The vessel was medium sized. Angiography showed mild  atherosclerosis with no flow limiting lesions. The artery was supplied by  collaterals. This is a good target for bypass.  COMPLICATIONS: No complications occurred during the cath lab visit.  DIAGNOSTIC IMPRESSIONS: Severe LAD disease  Moderate to severe CX disease   of the RCA  DIAGNOSTIC RECOMMENDATIONS: Evaluation by CT surgery for AVR ,  fibroelastoma removal and bypass to LAD and RCA PDA  Prepared and signed by  Wagner Santoyo MD  Signed 08/08/2019 12:12:28    < end of copied text >

## 2023-02-21 NOTE — PROGRESS NOTE ADULT - ASSESSMENT
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 In ER, pt hypoxic with mild anemia and supratherapeutic INR; CTA with moderate b/l pleural effusions and atelectasis, L1, T8 fractures, aortic aneurysms.     Acute hypoxic respiratory failure 2/2 moderate b/l pleural effusions likely due to acute on chronic HFrEF  and copd exacerbation   -cont tele monitoring   -daily weights   -strict I/Os   -fluid restriction 1500mL   -cont lasix 40mg IV BID   -metoprolol ER-->metoprolol tartrate bid   -f/u TTE performed, pending read   -cardio recs appreciated   -prior known EF 35%  -short course prednisone   -titrate supplemental O2 as tolerated   -albuterol --> xopenex     Vertebral fractures s/p fall   -CTA a/p: transversely oriented L1 vertebral body fx with increased displacement of dominant fx components since 1/4/23; transversely oriented T8 vertebral body fx (subacute appearing)   -CT t/l spine fx stable compared to ct a/p  -trauma sx recs appreciated   -neurosx recs appreciated   -f/u MRI t/l spine, pending; MRI safety screening complete   -TLSO at bedside   -bedrest until MR done   -pain control    Aortic aneurysm   -6.1cm AAA and 2.4cm L common iliac aneurysm   -vascular sx recs appreciated   -no surgical intervention at this time   -arterial duplex BLE neg for popliteal aneurysms     CAD s/p CABG   -statin, bb    chronic afib   -cont bb, holding coumadin due to supratherapeutic INR     Supratherapeutic INR   -trend, no active bleeding cont to monitor  -holding coumadin     Hx PE/DVT  Factor V Leiden   -s/p IVC filter   -AC as above     vte ppx: holding coumadin for supratherapeutic inr

## 2023-02-21 NOTE — PROGRESS NOTE ADULT - SUBJECTIVE AND OBJECTIVE BOX
SANDI SANTOS    [id number]    88y      Male    CC: s/p fall     INTERVAL HPI/OVERNIGHT EVENTS: pt seen and examined.     REVIEW OF SYSTEMS:    CONSTITUTIONAL: No fever, weight loss  RESPIRATORY: No cough, wheezing, hemoptysis  CARDIOVASCULAR: No chest pain, palpitations  GASTROINTESTINAL: No abdominal or epigastric pain. No nausea, vomiting  NEUROLOGICAL: No headaches    Vital Signs Last 24 Hrs  T(C): 36.9 (21 Feb 2023 10:04), Max: 36.9 (21 Feb 2023 10:04)  T(F): 98.4 (21 Feb 2023 10:04), Max: 98.4 (21 Feb 2023 10:04)  HR: 88 (21 Feb 2023 10:04) (68 - 116)  BP: 105/75 (21 Feb 2023 10:04) (105/75 - 147/91)  BP(mean): --  RR: 18 (21 Feb 2023 10:04) (18 - 20)  SpO2: 94% (21 Feb 2023 10:04) (91% - 100%)    Parameters below as of 21 Feb 2023 10:04  Patient On (Oxygen Delivery Method): nasal cannula        PHYSICAL EXAM:    GENERAL: NAD  HEENT: PERRL, +EOMI  NECK: soft, supple  CHEST/LUNG: Clear to auscultation bilaterally; No wheezing  HEART: S1S2+, Regular rate and rhythm; No murmurs, rubs, or gallops  ABDOMEN: Soft, Nontender, Nondistended; Bowel sounds present  SKIN: No rashes or lesions  NEURO: AAOX3, no focal deficits, no motor or sensory loss  PSYCH: normal mood    LABS:                        10.2   5.78  )-----------( 201      ( 21 Feb 2023 04:26 )             34.6     02-21    140  |  103  |  20.6<H>  ----------------------------<  134<H>  4.3   |  25.0  |  1.28    Ca    8.9      21 Feb 2023 04:26  Phos  3.3     02-20  Mg     1.9     02-21    TPro  6.3<L>  /  Alb  3.2<L>  /  TBili  0.6  /  DBili  x   /  AST  12  /  ALT  8   /  AlkPhos  72  02-20    PT/INR - ( 21 Feb 2023 04:26 )   PT: 68.3 sec;   INR: 5.78 ratio         PTT - ( 21 Feb 2023 04:26 )  PTT:44.1 sec        MEDICATIONS  (STANDING):  acetaminophen     Tablet .. 975 milliGRAM(s) Oral every 8 hours  amLODIPine   Tablet 5 milliGRAM(s) Oral daily  atorvastatin 40 milliGRAM(s) Oral at bedtime  budesonide  80 MICROgram(s)/formoterol 4.5 MICROgram(s) Inhaler 2 Puff(s) Inhalation two times a day  clotrimazole/betamethasone Cream 1 Application(s) Topical two times a day  furosemide   Injectable 40 milliGRAM(s) IV Push two times a day  metoprolol succinate ER 50 milliGRAM(s) Oral daily  predniSONE   Tablet 40 milliGRAM(s) Oral daily  sertraline 50 milliGRAM(s) Oral daily  tamsulosin 0.4 milliGRAM(s) Oral at bedtime    MEDICATIONS  (PRN):  ipratropium    for Nebulization 500 MICROGram(s) Nebulizer every 6 hours PRN Shortness of Breath and/or Wheezing  levalbuterol Inhalation 1.25 milliGRAM(s) Inhalation every 6 hours PRN shortness of breath  metoprolol tartrate Injectable 5 milliGRAM(s) IV Push every 6 hours PRN sustained HR >130  OLANZapine Injectable 5 milliGRAM(s) IntraMuscular every 6 hours PRN agitation      RADIOLOGY & ADDITIONAL TESTS:   SANDI SANTOS    [id number]    88y      Male    CC: s/p fall     INTERVAL HPI/OVERNIGHT EVENTS: pt seen and examined. aide at bedside     REVIEW OF SYSTEMS:    CONSTITUTIONAL: No fever, weight loss  RESPIRATORY: No hemoptysis  CARDIOVASCULAR: No chest pain, palpitations  GASTROINTESTINAL: No abdominal or epigastric pain. No nausea, vomiting  NEUROLOGICAL: No headaches    Vital Signs Last 24 Hrs  T(C): 36.9 (21 Feb 2023 10:04), Max: 36.9 (21 Feb 2023 10:04)  T(F): 98.4 (21 Feb 2023 10:04), Max: 98.4 (21 Feb 2023 10:04)  HR: 88 (21 Feb 2023 10:04) (68 - 116)  BP: 105/75 (21 Feb 2023 10:04) (105/75 - 147/91)  BP(mean): --  RR: 18 (21 Feb 2023 10:04) (18 - 20)  SpO2: 94% (21 Feb 2023 10:04) (91% - 100%)    Parameters below as of 21 Feb 2023 10:04  Patient On (Oxygen Delivery Method): nasal cannula        PHYSICAL EXAM:    GENERAL: NAD  HEENT: PERRL, +EOMI  NECK: soft, supple  CHEST/LUNG: decreased air movement at bases; occasional expiratory wheeze; respirations unlabored on 3LNC   HEART: S1S2+, irregular   ABDOMEN: Soft, Nontender, Nondistended; Bowel sounds present  SKIN: warm, dry  NEURO: AAOX3 at time of exam; grossly non-focal  PSYCH: calm, cooperative     LABS:                        10.2   5.78  )-----------( 201      ( 21 Feb 2023 04:26 )             34.6     02-21    140  |  103  |  20.6<H>  ----------------------------<  134<H>  4.3   |  25.0  |  1.28    Ca    8.9      21 Feb 2023 04:26  Phos  3.3     02-20  Mg     1.9     02-21    TPro  6.3<L>  /  Alb  3.2<L>  /  TBili  0.6  /  DBili  x   /  AST  12  /  ALT  8   /  AlkPhos  72  02-20    PT/INR - ( 21 Feb 2023 04:26 )   PT: 68.3 sec;   INR: 5.78 ratio         PTT - ( 21 Feb 2023 04:26 )  PTT:44.1 sec        MEDICATIONS  (STANDING):  acetaminophen     Tablet .. 975 milliGRAM(s) Oral every 8 hours  amLODIPine   Tablet 5 milliGRAM(s) Oral daily  atorvastatin 40 milliGRAM(s) Oral at bedtime  budesonide  80 MICROgram(s)/formoterol 4.5 MICROgram(s) Inhaler 2 Puff(s) Inhalation two times a day  clotrimazole/betamethasone Cream 1 Application(s) Topical two times a day  furosemide   Injectable 40 milliGRAM(s) IV Push two times a day  metoprolol succinate ER 50 milliGRAM(s) Oral daily  predniSONE   Tablet 40 milliGRAM(s) Oral daily  sertraline 50 milliGRAM(s) Oral daily  tamsulosin 0.4 milliGRAM(s) Oral at bedtime    MEDICATIONS  (PRN):  ipratropium    for Nebulization 500 MICROGram(s) Nebulizer every 6 hours PRN Shortness of Breath and/or Wheezing  levalbuterol Inhalation 1.25 milliGRAM(s) Inhalation every 6 hours PRN shortness of breath  metoprolol tartrate Injectable 5 milliGRAM(s) IV Push every 6 hours PRN sustained HR >130  OLANZapine Injectable 5 milliGRAM(s) IntraMuscular every 6 hours PRN agitation      RADIOLOGY & ADDITIONAL TESTS:

## 2023-02-22 ENCOUNTER — TRANSCRIPTION ENCOUNTER (OUTPATIENT)
Age: 88
End: 2023-02-22

## 2023-02-22 DIAGNOSIS — I35.0 NONRHEUMATIC AORTIC (VALVE) STENOSIS: ICD-10-CM

## 2023-02-22 LAB
ALBUMIN SERPL ELPH-MCNC: 3.2 G/DL — LOW (ref 3.3–5.2)
ALP SERPL-CCNC: 69 U/L — SIGNIFICANT CHANGE UP (ref 40–120)
ALT FLD-CCNC: 10 U/L — SIGNIFICANT CHANGE UP
ANION GAP SERPL CALC-SCNC: 14 MMOL/L — SIGNIFICANT CHANGE UP (ref 5–17)
AST SERPL-CCNC: 12 U/L — SIGNIFICANT CHANGE UP
BASE EXCESS BLDA CALC-SCNC: 8.2 MMOL/L — HIGH (ref -2–3)
BILIRUB DIRECT SERPL-MCNC: 0.2 MG/DL — SIGNIFICANT CHANGE UP (ref 0–0.3)
BILIRUB INDIRECT FLD-MCNC: 0.3 MG/DL — SIGNIFICANT CHANGE UP (ref 0.2–1)
BILIRUB SERPL-MCNC: 0.5 MG/DL — SIGNIFICANT CHANGE UP (ref 0.4–2)
BLOOD GAS COMMENTS ARTERIAL: SIGNIFICANT CHANGE UP
BUN SERPL-MCNC: 34.6 MG/DL — HIGH (ref 8–20)
CALCIUM SERPL-MCNC: 9 MG/DL — SIGNIFICANT CHANGE UP (ref 8.4–10.5)
CHLORIDE SERPL-SCNC: 103 MMOL/L — SIGNIFICANT CHANGE UP (ref 96–108)
CO2 SERPL-SCNC: 24 MMOL/L — SIGNIFICANT CHANGE UP (ref 22–29)
CREAT SERPL-MCNC: 1.33 MG/DL — HIGH (ref 0.5–1.3)
EGFR: 51 ML/MIN/1.73M2 — LOW
GLUCOSE SERPL-MCNC: 112 MG/DL — HIGH (ref 70–99)
HCO3 BLDA-SCNC: 32 MMOL/L — HIGH (ref 21–28)
HCT VFR BLD CALC: 34.9 % — LOW (ref 39–50)
HGB BLD-MCNC: 10.6 G/DL — LOW (ref 13–17)
HOROWITZ INDEX BLDA+IHG-RTO: 40 — SIGNIFICANT CHANGE UP
INR BLD: 4.4 RATIO — HIGH (ref 0.88–1.16)
MAGNESIUM SERPL-MCNC: 1.9 MG/DL — SIGNIFICANT CHANGE UP (ref 1.6–2.6)
MCHC RBC-ENTMCNC: 27.2 PG — SIGNIFICANT CHANGE UP (ref 27–34)
MCHC RBC-ENTMCNC: 30.4 GM/DL — LOW (ref 32–36)
MCV RBC AUTO: 89.7 FL — SIGNIFICANT CHANGE UP (ref 80–100)
NT-PROBNP SERPL-SCNC: 6286 PG/ML — HIGH (ref 0–300)
PCO2 BLDA: 45 MMHG — SIGNIFICANT CHANGE UP (ref 35–48)
PH BLDA: 7.46 — HIGH (ref 7.35–7.45)
PLATELET # BLD AUTO: 248 K/UL — SIGNIFICANT CHANGE UP (ref 150–400)
PO2 BLDA: 85 MMHG — SIGNIFICANT CHANGE UP (ref 83–108)
POTASSIUM SERPL-MCNC: 4 MMOL/L — SIGNIFICANT CHANGE UP (ref 3.5–5.3)
POTASSIUM SERPL-SCNC: 4 MMOL/L — SIGNIFICANT CHANGE UP (ref 3.5–5.3)
PROT SERPL-MCNC: 6.6 G/DL — SIGNIFICANT CHANGE UP (ref 6.6–8.7)
PROTHROM AB SERPL-ACNC: 51.8 SEC — HIGH (ref 10.5–13.4)
RBC # BLD: 3.89 M/UL — LOW (ref 4.2–5.8)
RBC # FLD: 15.7 % — HIGH (ref 10.3–14.5)
SAO2 % BLDA: 98.5 % — HIGH (ref 94–98)
SODIUM SERPL-SCNC: 141 MMOL/L — SIGNIFICANT CHANGE UP (ref 135–145)
WBC # BLD: 9.63 K/UL — SIGNIFICANT CHANGE UP (ref 3.8–10.5)
WBC # FLD AUTO: 9.63 K/UL — SIGNIFICANT CHANGE UP (ref 3.8–10.5)

## 2023-02-22 PROCEDURE — 99497 ADVNCD CARE PLAN 30 MIN: CPT | Mod: 25

## 2023-02-22 PROCEDURE — 99223 1ST HOSP IP/OBS HIGH 75: CPT

## 2023-02-22 PROCEDURE — 99232 SBSQ HOSP IP/OBS MODERATE 35: CPT

## 2023-02-22 PROCEDURE — 99233 SBSQ HOSP IP/OBS HIGH 50: CPT

## 2023-02-22 PROCEDURE — 71045 X-RAY EXAM CHEST 1 VIEW: CPT | Mod: 26

## 2023-02-22 PROCEDURE — 99498 ADVNCD CARE PLAN ADDL 30 MIN: CPT | Mod: 25

## 2023-02-22 RX ORDER — FUROSEMIDE 40 MG
40 TABLET ORAL
Refills: 0 | Status: DISCONTINUED | OUTPATIENT
Start: 2023-02-22 | End: 2023-03-01

## 2023-02-22 RX ORDER — ASPIRIN/CALCIUM CARB/MAGNESIUM 324 MG
81 TABLET ORAL DAILY
Refills: 0 | Status: DISCONTINUED | OUTPATIENT
Start: 2023-02-22 | End: 2023-03-03

## 2023-02-22 RX ADMIN — Medication 81 MILLIGRAM(S): at 18:40

## 2023-02-22 RX ADMIN — OLANZAPINE 5 MILLIGRAM(S): 15 TABLET, FILM COATED ORAL at 23:50

## 2023-02-22 RX ADMIN — Medication 25 MILLIGRAM(S): at 06:17

## 2023-02-22 RX ADMIN — Medication 975 MILLIGRAM(S): at 23:20

## 2023-02-22 RX ADMIN — Medication 975 MILLIGRAM(S): at 06:17

## 2023-02-22 RX ADMIN — SERTRALINE 50 MILLIGRAM(S): 25 TABLET, FILM COATED ORAL at 13:27

## 2023-02-22 RX ADMIN — Medication 25 MILLIGRAM(S): at 18:36

## 2023-02-22 RX ADMIN — BUDESONIDE AND FORMOTEROL FUMARATE DIHYDRATE 2 PUFF(S): 160; 4.5 AEROSOL RESPIRATORY (INHALATION) at 10:33

## 2023-02-22 RX ADMIN — Medication 40 MILLIGRAM(S): at 13:28

## 2023-02-22 RX ADMIN — Medication 975 MILLIGRAM(S): at 13:28

## 2023-02-22 RX ADMIN — Medication 975 MILLIGRAM(S): at 13:58

## 2023-02-22 RX ADMIN — TAMSULOSIN HYDROCHLORIDE 0.4 MILLIGRAM(S): 0.4 CAPSULE ORAL at 22:40

## 2023-02-22 RX ADMIN — CLOTRIMAZOLE AND BETAMETHASONE DIPROPIONATE 1 APPLICATION(S): 10; .5 CREAM TOPICAL at 18:36

## 2023-02-22 RX ADMIN — Medication 40 MILLIGRAM(S): at 06:18

## 2023-02-22 RX ADMIN — Medication 40 MILLIGRAM(S): at 06:17

## 2023-02-22 RX ADMIN — ATORVASTATIN CALCIUM 40 MILLIGRAM(S): 80 TABLET, FILM COATED ORAL at 22:41

## 2023-02-22 RX ADMIN — AMLODIPINE BESYLATE 5 MILLIGRAM(S): 2.5 TABLET ORAL at 06:17

## 2023-02-22 RX ADMIN — Medication 975 MILLIGRAM(S): at 22:40

## 2023-02-22 RX ADMIN — CLOTRIMAZOLE AND BETAMETHASONE DIPROPIONATE 1 APPLICATION(S): 10; .5 CREAM TOPICAL at 06:18

## 2023-02-22 RX ADMIN — BUDESONIDE AND FORMOTEROL FUMARATE DIHYDRATE 2 PUFF(S): 160; 4.5 AEROSOL RESPIRATORY (INHALATION) at 21:58

## 2023-02-22 NOTE — CONSULT NOTE ADULT - CONSULT REQUESTED DATE/TIME
19-Feb-2023 13:00
21-Feb-2023 12:02
22-Feb-2023
19-Feb-2023 15:15
19-Feb-2023 17:57
22-Feb-2023
19-Feb-2023 12:08

## 2023-02-22 NOTE — PROGRESS NOTE ADULT - PROBLEM SELECTOR PLAN 1
- pt presents s/p mechanical fall   - Lungs are very diminished w/ wheezes, appear more pulmonary in nature   - consider steroids/nebs   - pt kidney function is worsening and clinically patient does not appear overloaded   - will hold off on diuresis, switch to maintenance PO lasix   - pt is chronic smoker, there is degree of COPD  - consider pulmonary consult   - bilateral pleural effusions, when INR is <2 will consult CTS for eval for thoracentesis evaluation. lungs have not significantly improved with diuresis   - repeat CXR to eval effusions   - 1500mL fluid restriction   - known low flow low gradient AS, compared to previous echo current echo shows severe AS  - will consult CTS for severe AS   - also EF is slightly worse 37% ---> 25-30%   - will likely require cath for worsening EF and severe AS, but INR is too high, will revisit when INR <2  - known HFrEF 35%, ICM s/p CABG, continue GDMT for HFrEF: toprol, lipitor   - resume ASA, resume entresto, farxiga, spironolactone when MILAGRO resolves  - stop amlodipine to give room for entresto

## 2023-02-22 NOTE — PROGRESS NOTE ADULT - ASSESSMENT
89yo M w/ PMH of AF and Factor V Leiden with PE/DVT s/p IVCF and on coumadin, CAD s/p CABG, CVA, HFrEF (35-40%), moderate AS, COPD, AAA, ?prostate CA w/ recent XRT and GIGI on CPAP who was admitted after a fall at home (lives alone).  He has had recurrent falls and admitted for same - found with INR of 6 and BNP ~5000.  CT chest with B/L effusions and stable 6.1 cm AAA and subacute L1 fx.  He is being treated for both COPD and CHF exacerbation - steroids and IV lasix.  CTS consulted for consideration of thoracentesis if diuresis ineffective. We are consulted for assistance with goals of care.  #Goals of care  - speaking to pt today, he exhibited an inability to maintain a linear/rational discussion and, as such, lacks capacity to understand and make his own complex medical decisions such as code status preferences and broader goals of care  - daughter Jasmina lives in [facility] ([phone]) - nijasmyne Navarro lives locally ([phone])  - daughter Jasmina is HCP - personally reviewed form in chart  - Called daughter as detailed in GOC note above  - DNR/DNI  - noted that cardiology has recommended CT surgery c/s - await their input re: possible thoracentesis and ?intervention for severe AS    #AMS   - behavioral health c/s  - pt with visual hallucinations and tangential non-linear speech and thought process - discussed with psych - likely cognitive impairment and would benefit from outpt neurocognitive testing  - Would avoid/minimize known deliriogenic drugs such as benzodiazepines and anticholinergics.  Encourage staff and family to reorient frequently.  Keep shades open during day in effort to maintain day/night cycle.    #Pain - pt with L1 fx  - cont ATC APAP 1000mg TID  - NSGY seeing - recommended MRI - ?kyphoplasty

## 2023-02-22 NOTE — CONSULT NOTE ADULT - SUBJECTIVE AND OBJECTIVE BOX
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 Pt also had redemonstrated AAA with large mural thrombus seen by vascular with no plan for emergent surgical intervention. Pt was noted to be hypoxic on presentation and endorses dyspnea over the last month. CT chest with GGO and moderate bilateral effusions with associated atelectasis. Pt being diuresed but remains on O2 supplementation. Echo showed EF 25-30% with normal RV and PASP 42 with severe aortic stenosis with low gradient. Pt previously followed by Dr. Aelgria but not seen since 2019 and prior had no spirometric evidence of COPD. Pt planned for eventual LHC and CT surgery consulted for evaluation of the AS.       PAST MEDICAL & SURGICAL HISTORY:  Hypertension  Hyperlipemia  PE (pulmonary embolism)  Marisol filter in place  Atrial fibrillation, unspecified type  CAD S/P percutaneous coronary angioplasty  History of COPD  Cerebrovascular accident (CVA)  Atheroma  Aortic stenosis  S/P IVC filter  S/P cataract surgery  S/P CABG (coronary artery bypass graft)   Status post right knee replacement    Medications:  furosemide    Tablet 40 milliGRAM(s) Oral two times a day  metoprolol tartrate 25 milliGRAM(s) Oral two times a day  metoprolol tartrate Injectable 5 milliGRAM(s) IV Push every 6 hours PRN  budesonide  80 MICROgram(s)/formoterol 4.5 MICROgram(s) Inhaler 2 Puff(s) Inhalation two times a day  ipratropium    for Nebulization 500 MICROGram(s) Nebulizer every 6 hours PRN  levalbuterol Inhalation 1.25 milliGRAM(s) Inhalation every 6 hours PRN  acetaminophen     Tablet .. 975 milliGRAM(s) Oral every 8 hours  OLANZapine Injectable 5 milliGRAM(s) IntraMuscular every 6 hours PRN  sertraline 50 milliGRAM(s) Oral daily  aspirin  chewable 81 milliGRAM(s) Oral daily  tamsulosin 0.4 milliGRAM(s) Oral at bedtime  atorvastatin 40 milliGRAM(s) Oral at bedtime  predniSONE   Tablet 40 milliGRAM(s) Oral daily  clotrimazole/betamethasone Cream 1 Application(s) Topical two times a day    Allergies: NKDA     Social: former smoker 22+ pack years quit at 42, denies EtOH abuse, denies illicit drug use     FH:  Family history of diabetes mellitus (Child)    ICU Vital Signs Last 24 Hrs  T(C): 36.6 (22 Feb 2023 13:00), Max: 36.7 (21 Feb 2023 20:58)  T(F): 97.8 (22 Feb 2023 13:00), Max: 98.1 (21 Feb 2023 20:58)  HR: 98 (22 Feb 2023 13:00) (86 - 98)  BP: 139/90 (22 Feb 2023 13:00) (104/74 - 139/90)  BP(mean): --  ABP: --  ABP(mean): --  RR: 18 (22 Feb 2023 13:00) (16 - 20)  SpO2: 95% (22 Feb 2023 13:00) (94% - 97%)    O2 Parameters below as of 22 Feb 2023 13:00  Patient On (Oxygen Delivery Method): nasal cannula    I&O's Detail    21 Feb 2023 07:01  -  22 Feb 2023 07:00  --------------------------------------------------------  IN:    Oral Fluid: 430 mL  Total IN: 430 mL    OUT:    Voided (mL): 1400 mL  Total OUT: 1400 mL    Total NET: -970 mL      22 Feb 2023 07:01  -  22 Feb 2023 15:34  --------------------------------------------------------  IN:    Oral Fluid: 413 mL  Total IN: 413 mL    OUT:    Voided (mL): 905 mL  Total OUT: 905 mL    Total NET: -492 mL    LABS:                        10.6   9.63  )-----------( 248      ( 22 Feb 2023 06:50 )             34.9     02-22    141  |  103  |  34.6<H>  ----------------------------<  112<H>  4.0   |  24.0  |  1.33<H>    Ca    9.0      22 Feb 2023 06:50  Mg     1.9     02-22    TPro  6.6  /  Alb  3.2<L>  /  TBili  0.5  /  DBili  0.2  /  AST  12  /  ALT  10  /  AlkPhos  69  02-22      PT/INR - ( 22 Feb 2023 06:50 )   PT: 51.8 sec;   INR: 4.40 ratio         PTT - ( 21 Feb 2023 04:26 )  PTT:44.1 sec    CULTURES:      Physical Examination:    General: alert, in NAD      HEENT: NC/AT, anicteric, MMM    PULM: diminished breath sounds at bilateral bases, no increased work of breathing     NECK: Supple,  trachea midline    CVS: S1S2, RRR    ABD: Soft, nondistended, nontender, normoactive bowel sounds, obese     EXT: 1+ edema, nontender    SKIN: Warm and well perfused, no rashes noted    NEURO: Alert, oriented, interactive, nonfocal      RADIOLOGY:   CT chest aorta:  IMPRESSION:    Moderate pleural effusions with findings of pulmonary edema. Superimposed infection cannot be excluded. Follow to resolution. Post-CABG changes. Aortic valve calcification and incomplete filling of the left atrial appendage. Correlate with echocardiogram. Nonspecific slightly increased trace abdominopelvic ascites since 1/4/2023 and increased abdominal wall edema may be due to third spacing. Correlate clinically.    Aortic aneurysms as follows:  -Mid ascending aorta: 4.2 cm stable since 2017  -Distal descending thoracic aorta: 4.7 cm, previously 3.9 cm in 2017. Prominent pedunculated noncalcified plaque, considered to be high risk for embolic event.  -Infrarenal abdominal aorta: 6.1 x 4.7 cm, stable since 1/4/2023, previously 4.7 x 3.7 cm on 6/20/2015. Large peripheral mural thrombus. This is considered high risk for rupture based on size. No clear evidence of rupture of the abdominal aortic aneurysm on this study.  -Left common iliac artery: 2.4 cm.    Severe mid SMA stenosis, image 174 series 6, new since 2015.    Transversely oriented L1 vertebral body fracture with increased displacement of the dominant fracture components since 1/4/2023 CT. Posterior element involvement cannot be excluded on this study. Transversely oriented T8 vertebral body fracture also noted, which appears at least subacute in timeframe. Nondisplaced left L1 transverse process also redemonstrated. Recommend dedicated spinal CT and/or MRI imaging for further evaluation.

## 2023-02-22 NOTE — PROGRESS NOTE ADULT - SUBJECTIVE AND OBJECTIVE BOX
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 CT abd/pelv read as showing transversely oriented L1 vertebral body fracture with increased displacement of the dominant fracture components since 1/4/2023. Pt seen resting comfortably in bed. Reports some back pain.       Vital Signs Last 24 Hrs  T(C): 36.4 (02-22-23 @ 05:13), Max: 36.7 (02-21-23 @ 20:58)  T(F): 97.5 (02-22-23 @ 05:13), Max: 98.1 (02-21-23 @ 20:58)  HR: 86 (02-22-23 @ 05:13) (65 - 96)  BP: 121/70 (02-22-23 @ 05:13) (104/74 - 121/70)  BP(mean): --  RR: 20 (02-22-23 @ 05:13) (16 - 20)  SpO2: 94% (02-22-23 @ 05:13) (94% - 97%)    PHYSICAL EXAM:  GENERAL: NAD, well-groomed  HEAD:  Atraumatic, normocephalic  MENTAL STATUS: AAO x3; Awake, periods of confusion; Opens eyes spontaneously; Appropriately conversant without aphasia; following simple commands  CRANIAL NERVES: PERRL; EOMI; no facial asymmetry; facial sensation grossly intact to light touch b/l;  tongue midline  MOTOR: strength 5/5 B/L upper and lower extremities; sensation grossly intact all extremities  SKIN: Warm, dry      LABS:                                   10.6   9.63  )-----------( 248      ( 22 Feb 2023 06:50 )             34.9   02-22    141  |  103  |  34.6<H>  ----------------------------<  112<H>  4.0   |  24.0  |  1.33<H>    Ca    9.0      22 Feb 2023 06:50  Mg     1.9     02-22    TPro  6.6  /  Alb  3.2<L>  /  TBili  0.5  /  DBili  0.2  /  AST  12  /  ALT  10  /  AlkPhos  69  02-22        RADIOLOGY & ADDITIONAL TESTS:    ACC: [id number] EXAM:  CT ANGIO ABD PELV (W)AW IC     ACC: [id number] EXAM:  CT ANGIO CHEST AORTA WAWI     PROCEDURE DATE:  02/19/2023    Transversely oriented L1 vertebral body fracture with increased   displacement of the dominant fracture components since1/4/2023 CT.   Posterior element involvement cannot be excluded on this study.   Transversely oriented T8 vertebral body fracture also noted, which   appears at least subacute in timeframe. Nondisplaced left L1 transverse   process also redemonstrated. Recommend dedicated spinal CT and/or MRI   imaging for further evaluation.    CT Lumbar Spine No Cont (02.20.23 @ 01:14)   A horizontally oriented and distracted fracture is again noted involving   the L1 vertebral body, stable compared to the 02/19/2023 CT study. Mild   retropulsion of bone is also stable. The associated degree of spinal   canal stenosis is not well demonstrated with CT technique.    A horizontally oriented and minimally distracted fracture is again noted   involving the inferior aspect of the T8 vertebral body, also stable   compared to 02/19/2023 spine CT study.    Minimal compression deformities in thelumbar spine are unchanged.    Chronic appearing well-corticated fractures at the tips of the spinous   processes at the C7, T4, T7 levels is noted unchanged. A nondisplaced   left L1 transverse process fracture is again noted.    Multilevel endplatedegenerative changes are noted throughout the   thoracic and lumbar spine, grossly stable.    In the lumbar spine, disc bulges, facet degenerative changes, and   ligamentum flavum hypertrophy result in multilevel spinal canal stenosis   and neural foraminal narrowing, the overall degree of which is not well   demonstrated with CT technique. The degree of thoracic spinal canal   stenosis is also not well demonstrated with CT technique. Consider   correlation with spine MRI imaging follow-up if clinically warranted,   provided there are no MRI contraindications.      Impression:    The previously noted thoracic and lumbar spine fractures are stable   compared to the 02/19/2023 chest abdomen pelvic CT study. No new   additional fractures are noted over the time interval.      VIOLETA NIXON MD; Attending Radiologist  This document has been electronically signed. Feb 20 2023  2:08PM

## 2023-02-22 NOTE — BH CONSULTATION LIAISON ASSESSMENT NOTE - CURRENT MEDICATION
MEDICATIONS  (STANDING):  acetaminophen     Tablet .. 975 milliGRAM(s) Oral every 8 hours  aspirin  chewable 81 milliGRAM(s) Oral daily  atorvastatin 40 milliGRAM(s) Oral at bedtime  budesonide  80 MICROgram(s)/formoterol 4.5 MICROgram(s) Inhaler 2 Puff(s) Inhalation two times a day  clotrimazole/betamethasone Cream 1 Application(s) Topical two times a day  furosemide    Tablet 40 milliGRAM(s) Oral two times a day  metoprolol tartrate 25 milliGRAM(s) Oral two times a day  predniSONE   Tablet 40 milliGRAM(s) Oral daily  sertraline 50 milliGRAM(s) Oral daily  tamsulosin 0.4 milliGRAM(s) Oral at bedtime    MEDICATIONS  (PRN):  ipratropium    for Nebulization 500 MICROGram(s) Nebulizer every 6 hours PRN Shortness of Breath and/or Wheezing  levalbuterol Inhalation 1.25 milliGRAM(s) Inhalation every 6 hours PRN shortness of breath  metoprolol tartrate Injectable 5 milliGRAM(s) IV Push every 6 hours PRN sustained HR >130  OLANZapine Injectable 5 milliGRAM(s) IntraMuscular every 6 hours PRN agitation

## 2023-02-22 NOTE — BH CONSULTATION LIAISON ASSESSMENT NOTE - NSBHCHARTREVIEWVS_PSY_A_CORE FT
Vital Signs Last 24 Hrs  T(C): 36.6 (22 Feb 2023 13:00), Max: 36.7 (21 Feb 2023 20:58)  T(F): 97.8 (22 Feb 2023 13:00), Max: 98.1 (21 Feb 2023 20:58)  HR: 98 (22 Feb 2023 13:00) (86 - 98)  BP: 139/90 (22 Feb 2023 13:00) (104/74 - 139/90)  BP(mean): --  RR: 18 (22 Feb 2023 13:00) (16 - 20)  SpO2: 95% (22 Feb 2023 13:00) (94% - 97%)    Parameters below as of 22 Feb 2023 13:00  Patient On (Oxygen Delivery Method): nasal cannula

## 2023-02-22 NOTE — CHART NOTE - NSCHARTNOTEFT_GEN_A_CORE
MRI screening form obtained. Questionnaire was completed by patients daughter Alpesh via phone at [phone]

## 2023-02-22 NOTE — PROGRESS NOTE ADULT - ASSESSMENT
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 CT abd/pelv read as showing transversely oriented L1 vertebral body fracture with increased displacement of the dominant fracture components since1/4/2023. Neurosurgery called to evaluate.     PLAN:    -MRI T/L spine, ordered, pending  -Pain control as needed, avoid over sedation  -TLSO at bedside, Bedrest until MR completed    -Further medical management per primary team   -Discussed case with Dr. Blake

## 2023-02-22 NOTE — PROGRESS NOTE ADULT - SUBJECTIVE AND OBJECTIVE BOX
no events  denies chest pain/sob   back pain ok  paranoia noted   laying flat in bed w/o o2  ros negative     MEDICATIONS  (STANDING):  acetaminophen     Tablet .. 975 milliGRAM(s) Oral every 8 hours  aspirin  chewable 81 milliGRAM(s) Oral daily  atorvastatin 40 milliGRAM(s) Oral at bedtime  budesonide  80 MICROgram(s)/formoterol 4.5 MICROgram(s) Inhaler 2 Puff(s) Inhalation two times a day  clotrimazole/betamethasone Cream 1 Application(s) Topical two times a day  furosemide    Tablet 40 milliGRAM(s) Oral two times a day  metoprolol tartrate 25 milliGRAM(s) Oral two times a day  predniSONE   Tablet 40 milliGRAM(s) Oral daily  sertraline 50 milliGRAM(s) Oral daily  tamsulosin 0.4 milliGRAM(s) Oral at bedtime    MEDICATIONS  (PRN):  ipratropium    for Nebulization 500 MICROGram(s) Nebulizer every 6 hours PRN Shortness of Breath and/or Wheezing  levalbuterol Inhalation 1.25 milliGRAM(s) Inhalation every 6 hours PRN shortness of breath  metoprolol tartrate Injectable 5 milliGRAM(s) IV Push every 6 hours PRN sustained HR >130  OLANZapine Injectable 5 milliGRAM(s) IntraMuscular every 6 hours PRN agitation      Allergies    No Known Allergies    Intolerances    OHS (Unknown)        Vital Signs Last 24 Hrs  T(C): 36.6 (22 Feb 2023 13:00), Max: 36.7 (21 Feb 2023 20:58)  T(F): 97.8 (22 Feb 2023 13:00), Max: 98.1 (21 Feb 2023 20:58)  HR: 98 (22 Feb 2023 13:00) (86 - 98)  BP: 139/90 (22 Feb 2023 13:00) (104/74 - 139/90)  BP(mean): --  RR: 18 (22 Feb 2023 13:00) (16 - 20)  SpO2: 95% (22 Feb 2023 13:00) (94% - 97%)    Parameters below as of 22 Feb 2023 13:00  Patient On (Oxygen Delivery Method): nasal cannula        PHYSICAL EXAM:    GENERAL: NAD  CHEST/LUNG: dec bs at bases no wheeze   HEART: Regular rate and rhythm; S1 S2  ABDOMEN: Soft, Nontender, Bowel sounds present  EXTREMITIES no edema   NERVOUS SYSTEM: awake, alert, follows simple commands, nonfocal neuro    LABS:                        10.6   9.63  )-----------( 248      ( 22 Feb 2023 06:50 )             34.9     02-22    141  |  103  |  34.6<H>  ----------------------------<  112<H>  4.0   |  24.0  |  1.33<H>    Ca    9.0      22 Feb 2023 06:50  Mg     1.9     02-22    TPro  6.6  /  Alb  3.2<L>  /  TBili  0.5  /  DBili  0.2  /  AST  12  /  ALT  10  /  AlkPhos  69  02-22    PT/INR - ( 22 Feb 2023 06:50 )   PT: 51.8 sec;   INR: 4.40 ratio         PTT - ( 21 Feb 2023 04:26 )  PTT:44.1 sec      CAPILLARY BLOOD GLUCOSE            RADIOLOGY & ADDITIONAL TESTS:

## 2023-02-22 NOTE — PROGRESS NOTE ADULT - SUBJECTIVE AND OBJECTIVE BOX
INTERVAL HISTORY:      PRESENT SYMPTOMS:     Dyspnea: Yes No  Nausea/Vomiting: Yes No  Anxiety:  Yes No  Depression: Yes No  Fatigue: Yes No  Loss of appetite: Yes No  Constipation: Yes No    PAIN:             Character-            Duration-            Effect-            Factors-            Frequency-            Location-            Severity-    PAIN AD SCORE:  [email or web address] (press ctrl + left click to view)    REVIEW OF SYSTEMS:   Full review of systems performed and was otherwise negative except as noted above.  Due to altered mental status, unable to obtain subjective information from the patient. History was obtained, to the extent possible, from review of the chart and collateral sources of information.    MEDICATIONS  (STANDING):  acetaminophen     Tablet .. 975 milliGRAM(s) Oral every 8 hours  aspirin  chewable 81 milliGRAM(s) Oral daily  atorvastatin 40 milliGRAM(s) Oral at bedtime  budesonide  80 MICROgram(s)/formoterol 4.5 MICROgram(s) Inhaler 2 Puff(s) Inhalation two times a day  clotrimazole/betamethasone Cream 1 Application(s) Topical two times a day  furosemide    Tablet 40 milliGRAM(s) Oral two times a day  metoprolol tartrate 25 milliGRAM(s) Oral two times a day  predniSONE   Tablet 40 milliGRAM(s) Oral daily  sertraline 50 milliGRAM(s) Oral daily  tamsulosin 0.4 milliGRAM(s) Oral at bedtime    MEDICATIONS  (PRN):  ipratropium    for Nebulization 500 MICROGram(s) Nebulizer every 6 hours PRN Shortness of Breath and/or Wheezing  levalbuterol Inhalation 1.25 milliGRAM(s) Inhalation every 6 hours PRN shortness of breath  metoprolol tartrate Injectable 5 milliGRAM(s) IV Push every 6 hours PRN sustained HR >130  OLANZapine Injectable 5 milliGRAM(s) IntraMuscular every 6 hours PRN agitation      PHYSICAL EXAM:  Vital Signs Last 24 Hrs  T(C): 36.4 (22 Feb 2023 05:13), Max: 36.7 (21 Feb 2023 20:58)  T(F): 97.5 (22 Feb 2023 05:13), Max: 98.1 (21 Feb 2023 20:58)  HR: 86 (22 Feb 2023 05:13) (65 - 96)  BP: 121/70 (22 Feb 2023 05:13) (104/74 - 121/70)  BP(mean): --  RR: 20 (22 Feb 2023 05:13) (16 - 20)  SpO2: 94% (22 Feb 2023 05:13) (94% - 97%)    Parameters below as of 22 Feb 2023 10:38  Patient On (Oxygen Delivery Method): nasal cannula      General: Pt lying in bed in NAD  HEENT: NCAT; MMM  Resp: breathing unlabored; symmetric chest expansion B/L  MSK: no contractures/deformities  Skin: no rash  Neuro: awake and oriented x 3; no myoclonus  Psych: calm; appropriate speech and affect    LABS:                        10.6   9.63  )-----------( 248      ( 22 Feb 2023 06:50 )             34.9     02-22    141  |  103  |  34.6<H>  ----------------------------<  112<H>  4.0   |  24.0  |  1.33<H>    Ca    9.0      22 Feb 2023 06:50  Mg     1.9     02-22    TPro  6.6  /  Alb  3.2<L>  /  TBili  0.5  /  DBili  0.2  /  AST  12  /  ALT  10  /  AlkPhos  69  02-22    PT/INR - ( 22 Feb 2023 06:50 )   PT: 51.8 sec;   INR: 4.40 ratio         PTT - ( 21 Feb 2023 04:26 )  PTT:44.1 sec    RADIOLOGY & ADDITIONAL STUDIES:    NEUROLOGIC MEDICATIONS/OPIOIDS/BENZODIAZEPINES IN PAST 24HRS:  acetaminophen     Tablet ..   975 milliGRAM(s) Oral (02-22-23 @ 06:17)   975 milliGRAM(s) Oral (02-21-23 @ 21:27)   975 milliGRAM(s) Oral (02-21-23 @ 13:19)    sertraline   50 milliGRAM(s) Oral (02-21-23 @ 13:18)    ADVANCE DIRECTIVES/TREATMENT PREFERENCES:  Code Status:   MOLST (if not Full Code):  HCP/Surrogate:  INTERVAL HISTORY:  Pt seen lying in bed in NAD  Oriented x 3 but his speech and thought pattern became more scattered and seemed to demonstrate some paranoid delusions.    PRESENT SYMPTOMS:     Dyspnea: No  Nausea/Vomiting: No  Anxiety:  No  Depression: No  Fatigue: No  Loss of appetite: No  Constipation: No    PAIN: denies            Character-            Duration-            Effect-            Factors-            Frequency-            Location-            Severity-    REVIEW OF SYSTEMS:   Full review of systems performed and was otherwise negative except as noted above.    MEDICATIONS  (STANDING):  acetaminophen     Tablet .. 975 milliGRAM(s) Oral every 8 hours  aspirin  chewable 81 milliGRAM(s) Oral daily  atorvastatin 40 milliGRAM(s) Oral at bedtime  budesonide  80 MICROgram(s)/formoterol 4.5 MICROgram(s) Inhaler 2 Puff(s) Inhalation two times a day  clotrimazole/betamethasone Cream 1 Application(s) Topical two times a day  furosemide    Tablet 40 milliGRAM(s) Oral two times a day  metoprolol tartrate 25 milliGRAM(s) Oral two times a day  predniSONE   Tablet 40 milliGRAM(s) Oral daily  sertraline 50 milliGRAM(s) Oral daily  tamsulosin 0.4 milliGRAM(s) Oral at bedtime    MEDICATIONS  (PRN):  ipratropium    for Nebulization 500 MICROGram(s) Nebulizer every 6 hours PRN Shortness of Breath and/or Wheezing  levalbuterol Inhalation 1.25 milliGRAM(s) Inhalation every 6 hours PRN shortness of breath  metoprolol tartrate Injectable 5 milliGRAM(s) IV Push every 6 hours PRN sustained HR >130  OLANZapine Injectable 5 milliGRAM(s) IntraMuscular every 6 hours PRN agitation      PHYSICAL EXAM:  Vital Signs Last 24 Hrs  T(C): 36.4 (22 Feb 2023 05:13), Max: 36.7 (21 Feb 2023 20:58)  T(F): 97.5 (22 Feb 2023 05:13), Max: 98.1 (21 Feb 2023 20:58)  HR: 86 (22 Feb 2023 05:13) (65 - 96)  BP: 121/70 (22 Feb 2023 05:13) (104/74 - 121/70)  BP(mean): --  RR: 20 (22 Feb 2023 05:13) (16 - 20)  SpO2: 94% (22 Feb 2023 05:13) (94% - 97%)    Parameters below as of 22 Feb 2023 10:38  Patient On (Oxygen Delivery Method): nasal cannula      General: Pt lying in bed in NAD  HEENT: NCAT; MMM  Resp: breathing unlabored; symmetric chest expansion B/L  MSK: no contractures/deformities  Skin: no rash  Neuro: awake and oriented x 3; no myoclonus  Psych: calm; non-linear speech and thought process; inattentive    LABS:                        10.6   9.63  )-----------( 248      ( 22 Feb 2023 06:50 )             34.9     02-22    141  |  103  |  34.6<H>  ----------------------------<  112<H>  4.0   |  24.0  |  1.33<H>    Ca    9.0      22 Feb 2023 06:50  Mg     1.9     02-22    TPro  6.6  /  Alb  3.2<L>  /  TBili  0.5  /  DBili  0.2  /  AST  12  /  ALT  10  /  AlkPhos  69  02-22    PT/INR - ( 22 Feb 2023 06:50 )   PT: 51.8 sec;   INR: 4.40 ratio         PTT - ( 21 Feb 2023 04:26 )  PTT:44.1 sec    RADIOLOGY & ADDITIONAL STUDIES:    NEUROLOGIC MEDICATIONS/OPIOIDS/BENZODIAZEPINES IN PAST 24HRS:  acetaminophen     Tablet ..   975 milliGRAM(s) Oral (02-22-23 @ 06:17)   975 milliGRAM(s) Oral (02-21-23 @ 21:27)   975 milliGRAM(s) Oral (02-21-23 @ 13:19)    sertraline   50 milliGRAM(s) Oral (02-21-23 @ 13:18)    ADVANCE DIRECTIVES/TREATMENT PREFERENCES:  Code Status: DNR/DNI  MOLST (if not Full Code): no  HCP/Surrogate: Jasmina

## 2023-02-22 NOTE — PROGRESS NOTE ADULT - CONVERSATION DETAILS
Called and spoke to pt's daughter Jasmina who states that pt believes that he won millions of dollars and that he lost it all through bad decisions (none of which is true) and that the longer you talk to him the more he slips into this alternative reality. He at one point he decided to have all of his teeth pulled and replaced with caps but after all were extracted and replaced with temporary posts but didn't have money to have permanent caps in.  He has also fallen prey to multiple scams (dtr noted pt was getting letters from his "friends" which when she read were scams).  These cognitive +/- psychiatric issues all started or got much worse since his wife's death 5 yrs ago.    Given pt's non-linear speech/thought process and inability to participate in a discussion of any complexity, I believe he lacks capacity to make complex medical decisions.  I shared that with Jasmina who agreed - I also told her that I had personally reviewed the HCP form she had sent and was in his chart naming her as HCP.  We reviewed his multiple medical problems (CHF, COPD, AAA, severe AS, etc) and I asked her if they had ever discussed his preferences re: CPR/intubation if he were to get much sicker.  She said they had never discussed but did not feel that he would benefit from either CPR or intubation and would instead prefer DNR/DNI orders be in place and allow natural death in the event he decompensates.  - DNR/DNI  - [facility] completed

## 2023-02-22 NOTE — CONSULT NOTE ADULT - ASSESSMENT
88M former smoker with hx of HTN, HLD, PE/DVT s/p IVC filter and hx of factor V Leiden on coumadin, Afib, prostate ca s/p xrt, CAD s/p CABG, GIGI on CPAP, CVA, HTN, aortic stenosis, AV fibroelastoma excision presented 2/19 after sustaining a fall with increased displacement of an L1 vertebral fracture and found to be hypoxic likely in the setting of CHF exacerbation     Hypoxic respiratory failure   likely in the setting of HFrEF exacerbation/bilateral effusions w/ atelectasis    pt previously followed with pulmonary but no evidence of obstruction on prior testing although last seen 2019  c/w diuresis as tolerated and may need albumin assisted diuresis as may be intravascularly depleted   could consider thoracentesis if MILAGRO precludes further diuresis but currently in no distress and INR remains supratherapeutic   CT surgery consulted for evaluation of the severe AS   if planned for cath, can pursue L/RHC   DuoNeb q6h   can complete short course of steroids but wheezing may be cardiac as well   aspiration precautions   caution with sedating medications   will check ABG   LE duplex neg and INR supratherapeutic on presentation    wean Fio2 as tolerated for goal sat >90%  incentive spirometry   OOB/ambulate   will need outpatient pulmonary f/u and repeat PFTs      88M former smoker with hx of HTN, HLD, PE/DVT s/p IVC filter and hx of factor V Leiden on coumadin, Afib, prostate ca s/p xrt, CAD s/p CABG, GIGI on CPAP, CVA, HTN, aortic stenosis, AV fibroelastoma excision presented 2/19 after sustaining a fall with increased displacement of an L1 vertebral fracture and found to be hypoxic likely in the setting of CHF exacerbation     Hypoxic respiratory failure   likely in the setting of HFrEF exacerbation/bilateral effusions w/ atelectasis    pt previously followed with pulmonary but no evidence of obstruction on prior testing although last seen 2019  c/w diuresis as tolerated and may need albumin assisted diuresis as may be intravascularly depleted   could consider thoracentesis if MILAGRO precludes further diuresis but currently in no distress and INR remains supratherapeutic   CT surgery consulted for evaluation of the severe AS   if planned for cath, can pursue L/RHC   DuoNeb q6h   can complete short course of steroids but wheezing may be cardiac as well   aspiration precautions   caution with sedating medications   will check ABG   LE duplex neg and INR supratherapeutic on presentation    wean Fio2 as tolerated for goal sat >90%  incentive spirometry   OOB/ambulate   nocturnal CPAP  will need outpatient pulmonary f/u and repeat PFTs

## 2023-02-22 NOTE — BH CONSULTATION LIAISON ASSESSMENT NOTE - SUMMARY
Patient is a 88 year old  male who is retired, lives alone and receives A services, with no past psychiatric history and with a PMH of AF, factor 5 leiden def, PE/DVT, CAD, CVA, HF, COPD, AAA, prostate CA, GIGI, who was admitted s/p fall. Psychiatry consulted for confusion     Patient seen and evaluated and found to be calm and cooperative with no reported s/h ideation, no symptoms of rafaela or AVH.   patient inattentive and with moments of confusion with no formal diagnosis of dementia and unclear what patients cognitive baseline is     Dx.   Delirium   R/o Neurocognitive disorder     Recs   -Maintain delirium precautions   -Avoid anticholinergic agents, benzos, opioid as they can further perpetuate confusion   -Frequent re orientation, Hydration, try to avoid restraints and if possible, mobilize patient and PT involvement  -Consider checking TSH, B12, folate  -no current agitation or psychosis elicited, will refrain from starting antipsychotic   -would benefit from outpatient neurology f/u and neurocognitive testing

## 2023-02-22 NOTE — CONSULT NOTE ADULT - PROBLEM SELECTOR RECOMMENDATION 9
will order preop work up for TAVR  frailty   Cr mildly elevated, will eventually need cath and TAVR CTA  cont optimization by cardiology/primary team   CT surgery to follow  d/w Dr. Gomez

## 2023-02-22 NOTE — PROGRESS NOTE ADULT - SUBJECTIVE AND OBJECTIVE BOX
[facility]                                                         CARDIOLOGY AT [place] [address]                                                         Telephone: [phone]. Fax:[phone]                                                                             PROGRESS NOTE    Reason for follow up:   Update:       Review of symptoms:   Cardiac:  No chest pain. No dyspnea. No palpitations.  Respiratory: no cough. No dyspnea  Gastrointestinal: No diarrhea. No abdominal pain. No bleeding.   Neuro: No focal neuro complaints.    Vitals:  T(C): 36.4 (02-22-23 @ 05:13), Max: 36.7 (02-21-23 @ 20:58)  HR: 86 (02-22-23 @ 05:13) (65 - 96)  BP: 121/70 (02-22-23 @ 05:13) (104/74 - 121/70)  RR: 20 (02-22-23 @ 05:13) (16 - 20)  SpO2: 94% (02-22-23 @ 05:13) (94% - 97%)  Wt(kg): --  I&O's Summary    21 Feb 2023 07:01  -  22 Feb 2023 07:00  --------------------------------------------------------  IN: 430 mL / OUT: 1400 mL / NET: -970 mL    22 Feb 2023 07:01  -  22 Feb 2023 11:53  --------------------------------------------------------  IN: 177 mL / OUT: 605 mL / NET: -428 mL      Weight (kg): 115.2 (02-22 @ 04:38)    PHYSICAL EXAM:  Appearance: Comfortable. No acute distress  HEENT:  Atraumatic. Normocephalic.  Normal oral mucosa  Neurologic: A & O x 3, no gross focal deficits.  Cardiovascular: RRR S1 S2, No murmur, no rubs/gallops. No JVD  Respiratory: Lungs clear to auscultation, unlabored   Gastrointestinal:  Soft, Non-tender, + BS  Lower Extremities: 2+ Peripheral Pulses, No clubbing, cyanosis, or edema  Psychiatry: Patient is calm. No agitation.   Skin: warm and dry.    CURRENT CARDIAC MEDICATIONS:  amLODIPine   Tablet 5 milliGRAM(s) Oral daily  furosemide   Injectable 40 milliGRAM(s) IV Push two times a day  metoprolol tartrate 25 milliGRAM(s) Oral two times a day  metoprolol tartrate Injectable 5 milliGRAM(s) IV Push every 6 hours PRN      CURRENT OTHER MEDICATIONS:  budesonide  80 MICROgram(s)/formoterol 4.5 MICROgram(s) Inhaler 2 Puff(s) Inhalation two times a day  ipratropium    for Nebulization 500 MICROGram(s) Nebulizer every 6 hours PRN Shortness of Breath and/or Wheezing  levalbuterol Inhalation 1.25 milliGRAM(s) Inhalation every 6 hours PRN shortness of breath  acetaminophen     Tablet .. 975 milliGRAM(s) Oral every 8 hours  OLANZapine Injectable 5 milliGRAM(s) IntraMuscular every 6 hours PRN agitation  sertraline 50 milliGRAM(s) Oral daily  atorvastatin 40 milliGRAM(s) Oral at bedtime  predniSONE   Tablet 40 milliGRAM(s) Oral daily, Stop order after: 3 Days  clotrimazole/betamethasone Cream 1 Application(s) Topical two times a day  tamsulosin 0.4 milliGRAM(s) Oral at bedtime      LABS:	 	  ( 22 Feb 2023 06:50 )  Troponin T  X    ,  CPK  X    , CKMB  X    , BNP 6286<H>    , ( 21 Feb 2023 04:26 )  Troponin T  X    ,  CPK  X    , CKMB  X    , BNP 4701<H>    , ( 19 Feb 2023 09:05 )  Troponin T  <0.01,  CPK  70   , CKMB  X    , BNP 5460<H>                              10.6   9.63  )-----------( 248      ( 22 Feb 2023 06:50 )             34.9     02-22    141  |  103  |  34.6<H>  ----------------------------<  112<H>  4.0   |  24.0  |  1.33<H>    Ca    9.0      22 Feb 2023 06:50  Mg     1.9     02-22    TPro  6.6  /  Alb  3.2<L>  /  TBili  0.5  /  DBili  0.2  /  AST  12  /  ALT  10  /  AlkPhos  69  02-22    PT/INR/PTT ( 22 Feb 2023 06:50 )                       :                       :      51.8         :       X                     .        .                   .              .           .       4.40        .                                         < from: TTE Echo Complete w/ Contrast w/ Doppler (02.21.23 @ 11:34) >  Summary:   1. Endocardial visualization was enhanced with intravenous echo   contrast. No LV thrombus.   2. Left ventricular ejection fraction, by visual estimation, is 25 to   30%.   3. Severely decreased global left ventricular systolic function.   4. Abnormal septal motion consistent with post-operative status.   5. There is mild eccentric left ventricular hypertrophy.   6. The mitralin-flow pattern reveals no discernable A-wave, therefore   no comment on diastolic function can be made.   7. Normal right ventricular size and function, estimated PASP least 42   mmHg.   8. Severely enlarged left atrium.   9. Right atrial enlargement.  10. Moderate mitral annular calcification.  11. Mild-moderate tricuspid regurgitation.  12. Severe calcific aortic valve stenosis with low gradient,   dimensionless index 0.23.  13. There is moderate aortic root calcification.  14. Borderline dilatation of the aortic root, sinuses of Valsalva and   ascending aorta 3.9 cm, index to BSA 1.6 cm/m2 within normal.  15. Compared to the outpatient TTE study from 9/2022 prevoiusly LV EF 37%   and known low gradient aortic valve stenosis, but visually on current   study appears severely stenotic.    Mook Bangura DO Electronically signed on 2/21/2023 at 1:35:50 PM    < end of copied text >

## 2023-02-22 NOTE — PROGRESS NOTE ADULT - PROBLEM SELECTOR PLAN 3
-Mid ascending aorta: 4.2 cm stable since 2017  -Distal descending thoracic aorta: 4.7 cm, previously 3.9 cm in 2017.   Prominent pedunculated noncalcified plaque, considered to be high risk   for embolic event.  -Infrarenal abdominal aorta: 6.1 x 4.7 cm, stable since 1/4/2023,   previously 4.7 x 3.7 cm on 6/20/2015. Large peripheral mural thrombus.   - This is considered high risk for rupture based on size. No clear evidence   of rupture of the abdominal aortic aneurysm on this study.  - Vascular surgery says there is no need for urgent surgery at this time, will continue to follow and monitor

## 2023-02-22 NOTE — CONSULT NOTE ADULT - ASSESSMENT
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

## 2023-02-22 NOTE — PROGRESS NOTE ADULT - ASSESSMENT
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 In ER, pt hypoxic with mild anemia and supratherapeutic INR; CTA with moderate b/l pleural effusions and atelectasis, L1, T8 fractures, aortic aneurysms.     Acute hypoxic respiratory failure 2/2 moderate b/l pleural effusions likely due to acute on chronic HFrEF and copd exacerbation   severe AS     s/p IV lasix, now on PO    CT surgery/cardiology following    xopenex     short course of steroids, pulm eval    metoprolol ER-->metoprolol tartrate bid     prior known EF 35%    Vertebral fractures s/p fall   -CTA a/p: transversely oriented L1 vertebral body fx with increased displacement of dominant fx components since 1/4/23; transversely oriented T8 vertebral body fx (subacute appearing)   -CT t/l spine fx stable compared to ct a/p  -trauma sx recs appreciated   -neurosx recs appreciated   -f/u MRI t/l spine, pending; MRI safety screening complete   -TLSO at bedside   -pain control    Aortic aneurysm   -6.1cm AAA and 2.4cm L common iliac aneurysm   -vascular sx recs appreciated   -no surgical intervention at this time   -arterial duplex BLE neg for popliteal aneurysms     CAD s/p CABG   -statin, bb    chronic afib   -cont bb, holding coumadin due to supratherapeutic INR     Supratherapeutic INR   -trend, no active bleeding cont to monitor  -holding coumadin     Hx PE/DVT  Factor V Leiden   -s/p IVC filter   -AC as above     paranoia, confusion, delirium?    psych and palliative following

## 2023-02-22 NOTE — BH CONSULTATION LIAISON ASSESSMENT NOTE - HPI (INCLUDE ILLNESS QUALITY, SEVERITY, DURATION, TIMING, CONTEXT, MODIFYING FACTORS, ASSOCIATED SIGNS AND SYMPTOMS)
Patient is a 88 year old  male who is retired, lives alone and receives [facility] services, with no past psychiatric history and with a PMH of AF, factor 5 leiden def, PE/DVT, CAD, CVA, HF, COPD, AAA, prostate CA, GIGI, who was admitted s/p fall. Psychiatry consulted for confusion     Patient seen and evaluated and found to be calm and cooperative. Patient oriented x3 and linear but with intermittent periods of confusion. Patient talks of living at home and states he gets 4 hours of aids a day but wants to try and get more. Patient does admit to memory problems stating he can be forgetful and at recently stopped driving due to getting lost but has never been diagnosed with dementia. Patient denies any depression or anxiety and reports fair appetite with some sleep disruption and denies any s/h ideation, denies symptoms of rafaela an denies AVH     Cognition   Oriented x3   3 word recall: 3/3 --> 2/3   Days of week backwards : 7/7

## 2023-02-22 NOTE — DISCHARGE NOTE NURSING/CASE MANAGEMENT/SOCIAL WORK - NSDCPEPTCOWADIET_GEN_ALL_CORE
no weight-bearing restrictions Keep your intake of vitamin K regular. The highest amount of vitamin K is found in green and leafy vegetables like broccoli, lettuces, cabbage, and spinach. You can eat these foods but keep the portion size the same. Changes in the amount you eat can affect your PT/INR blood test. Contact your doctor before making any major changes in your diet. Limit your alcohol intake.

## 2023-02-22 NOTE — CONSULT NOTE ADULT - CONSULT REASON
L1 VB fx, L1 L TP fx   T8 VB fx
trauma evaluation
AAA
severe AS
Hypoxic respiratory failure
STAR list
SOB

## 2023-02-22 NOTE — CONSULT NOTE ADULT - SUBJECTIVE AND OBJECTIVE BOX
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, h/o multiple falls/syncope, presented with fall 2/19/23, noted to have INR 6, BNP 5000, CT chest b/l effusions, stable  6.1 cm AAA and subacute L1 fx, currently being managed for COPD and acute on chronic systolic HF exacerbation, TTE             PAST MEDICAL & SURGICAL HISTORY:  Hypertension  Hyperlipemia  PE (pulmonary embolism)  [place] filter in place  Atrial fibrillation, unspecified type  CAD S/P percutaneous coronary angioplasty  History of COPD  Cerebrovascular accident (CVA)  Atheroma  Aortic stenosis  S/P IVC filter  S/P cataract surgery  S/P CABG (coronary artery bypass graft)  Status post right knee replacement    MEDICATIONS  (STANDING):  acetaminophen     Tablet .. 975 milliGRAM(s) Oral every 8 hours  aspirin  chewable 81 milliGRAM(s) Oral daily  atorvastatin 40 milliGRAM(s) Oral at bedtime  budesonide  80 MICROgram(s)/formoterol 4.5 MICROgram(s) Inhaler 2 Puff(s) Inhalation two times a day  clotrimazole/betamethasone Cream 1 Application(s) Topical two times a day  furosemide    Tablet 40 milliGRAM(s) Oral two times a day  metoprolol tartrate 25 milliGRAM(s) Oral two times a day  predniSONE   Tablet 40 milliGRAM(s) Oral daily  sertraline 50 milliGRAM(s) Oral daily  tamsulosin 0.4 milliGRAM(s) Oral at bedtime    MEDICATIONS  (PRN):  ipratropium    for Nebulization 500 MICROGram(s) Nebulizer every 6 hours PRN Shortness of Breath and/or Wheezing  levalbuterol Inhalation 1.25 milliGRAM(s) Inhalation every 6 hours PRN shortness of breath  metoprolol tartrate Injectable 5 milliGRAM(s) IV Push every 6 hours PRN sustained HR >130  OLANZapine Injectable 5 milliGRAM(s) IntraMuscular every 6 hours PRN agitation    Allergies: No Known Allergies      SOCIAL HISTORY:      FAMILY HISTORY:  Family history of diabetes mellitus (Child)    Review of Systems  CONSTITUTIONAL:  Fevers[ ] chills[ ] sweats[ ] fatigue[ ] weight loss[ ] weight gain [ ]                                     NEGATIVE [ ]   NEURO:  parathesias[ ] seizures [ ]  syncope [ ]  confusion [ ]                                                                                NEGATIVE[ ]   EYES: glasses[ ]  blurry vision[ ]  discharge[ ] pain[ ] glaucoma [ ]                                                                          NEGATIVE[ ]   ENMT:  difficulty hearing [ ]  vertigo[ ]  dysphagia[ ] epistaxis[ ] recent dental work [ ]                                    NEGATIVE[ ]   CV:  chest pain[ ] palpitations[ ] CASTILLO [ ] diaphoresis [ ]                                                                                           NEGATIVE[ ]   RESPIRATORY:  wheezing[ ] SOB[ ] cough [ ] sputum[ ] hemoptysis[ ]                                                                  NEGATIVE[ ]   GI:  nausea[ ]  vommiting [ ]  diarrhea[ ] constipation [ ] melena [ ]                                                                      NEGATIVE[ ]   : hematuria[ ]  dysuria[ ] urgency[ ] incontinence[ ]                                                                                            NEGATIVE[ ]   MUSKULOSKELETAL:  arthritis[ ]  joint swelling [ ] muscle weakness [ ]                                                                NEGATIVE[ ]   SKIN/BREAST:  rash[ ] itching [ ]  hair loss[ ] masses[ ]                                                                                              NEGATIVE[ ]   PSYCH:  dementia [ ] depresion [ ] anxiety[ ]                                                                                                               NEGATIVE[ ]   HEME/LYMPH:  bruises easily[ ] enlarged lymph nodes[ ] tender lymph nodes[ ]                                               NEGATIVE[ ]   ENDOCRINE:  cold intolerance[ ] heat intolerance[ ] polydipsia[ ]                                                                          NEGATIVE[ ]     PHYSICAL EXAM  Vital Signs Last 24 Hrs  T(C): 36.4 (22 Feb 2023 05:13), Max: 36.7 (21 Feb 2023 20:58)  T(F): 97.5 (22 Feb 2023 05:13), Max: 98.1 (21 Feb 2023 20:58)  HR: 86 (22 Feb 2023 05:13) (65 - 96)  BP: 121/70 (22 Feb 2023 05:13) (104/74 - 121/70)  BP(mean): --  RR: 20 (22 Feb 2023 05:13) (16 - 20)  SpO2: 94% (22 Feb 2023 05:13) (94% - 97%)    Parameters below as of 22 Feb 2023 10:38  Patient On (Oxygen Delivery Method): nasal cannula    CONSTITUTIONAL:                                                                          WNL[ ]   Neuro: WNL[ ] Normal exam oriented to person/place & time with no focal motor or sensory  deficits. Other __________                    Eyes: WNL[ ]   Normal exam of conjunctiva & lids, pupils equally reactive. Other______________________________     ENT: WNL[ ]    Normal exam of nasal/oral mucosa with absence of cyanosis. Other_____________________________  Neck: WNL[ ]  Normal exam of jugular veins, trachea & thyroid. Other_________________________________________  Chest: WNL[ ] Normal lung exam with good air movement absence of wheezes, rales, or rhonchi: Other_________________________________________                                                                                CV:  Auscultation: normal [ ] S3[ ] S4[ ] Irregular [ ] Rub[ ] Clicks[ ]    Murmurs none:[ ]systolic [ ]  diastolic [ ] holosystolic [ ]  Carotids: No Bruits[ ] Other____________ Abdominal Aorta: normal [ ] nonpalpable[ ]Other___________                                                                                      GI: WNL[ ] Normal exam of abdomen, liver & spleen with no noted masses or tenderness. Other______________________                                                                                                        Extremities: WNL[ ] Normal no evidence of cyanosis or deformity Edema: none[ ]trace[ ]1+[ ]2+[ ]3+[ ]4+[ ]  Lower Extremity Pulses: Right[ ] Left[ ]Varicosities[ ]  SKIN :WNL[ ] Normal exam to inspection & palation. Other:____________________                                                          LABS:                        10.6   9.63  )-----------( 248      ( 22 Feb 2023 06:50 )             34.9     02-22    141  |  103  |  34.6<H>  ----------------------------<  112<H>  4.0   |  24.0  |  1.33<H>    Ca    9.0      22 Feb 2023 06:50  Mg     1.9     02-22    TPro  6.6  /  Alb  3.2<L>  /  TBili  0.5  /  DBili  0.2  /  AST  12  /  ALT  10  /  AlkPhos  69  02-22    PT/INR - ( 22 Feb 2023 06:50 )   PT: 51.8 sec;   INR: 4.40 ratio    PTT- ( 21 Feb 2023 04:26 )  PTT:44.1 sec  Serum Pro-Brain Natriuretic Peptide: 6286 pg/mL (02-22-23 @ 06:50)                       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              PAST MEDICAL & SURGICAL HISTORY:  Hypertension  Hyperlipemia  PE (pulmonary embolism)  [place] filter in place  Atrial fibrillation, unspecified type  CAD S/P percutaneous coronary angioplasty  History of COPD  Cerebrovascular accident (CVA)  Atheroma  Aortic stenosis  S/P IVC filter  S/P cataract surgery  S/P CABG (coronary artery bypass graft)  Status post right knee replacement    MEDICATIONS  (STANDING):  acetaminophen     Tablet .. 975 milliGRAM(s) Oral every 8 hours  aspirin  chewable 81 milliGRAM(s) Oral daily  atorvastatin 40 milliGRAM(s) Oral at bedtime  budesonide  80 MICROgram(s)/formoterol 4.5 MICROgram(s) Inhaler 2 Puff(s) Inhalation two times a day  clotrimazole/betamethasone Cream 1 Application(s) Topical two times a day  furosemide    Tablet 40 milliGRAM(s) Oral two times a day  metoprolol tartrate 25 milliGRAM(s) Oral two times a day  predniSONE   Tablet 40 milliGRAM(s) Oral daily  sertraline 50 milliGRAM(s) Oral daily  tamsulosin 0.4 milliGRAM(s) Oral at bedtime    MEDICATIONS  (PRN):  ipratropium    for Nebulization 500 MICROGram(s) Nebulizer every 6 hours PRN Shortness of Breath and/or Wheezing  levalbuterol Inhalation 1.25 milliGRAM(s) Inhalation every 6 hours PRN shortness of breath  metoprolol tartrate Injectable 5 milliGRAM(s) IV Push every 6 hours PRN sustained HR >130  OLANZapine Injectable 5 milliGRAM(s) IntraMuscular every 6 hours PRN agitation    Allergies: No Known Allergies      SOCIAL HISTORY:      FAMILY HISTORY:  Family history of diabetes mellitus (Child)    Review of Systems  CONSTITUTIONAL:  Fevers[ ] chills[ ] sweats[ ] fatigue[ ] weight loss[ ] weight gain [ ]                                     NEGATIVE [ ]   NEURO:  parathesias[ ] seizures [ ]  syncope [ ]  confusion [ ]                                                                                NEGATIVE[ ]   EYES: glasses[ ]  blurry vision[ ]  discharge[ ] pain[ ] glaucoma [ ]                                                                          NEGATIVE[ ]   ENMT:  difficulty hearing [ ]  vertigo[ ]  dysphagia[ ] epistaxis[ ] recent dental work [ ]                                    NEGATIVE[ ]   CV:  chest pain[ ] palpitations[ ] CASTILLO [ ] diaphoresis [ ]                                                                                           NEGATIVE[ ]   RESPIRATORY:  wheezing[ ] SOB[ ] cough [ ] sputum[ ] hemoptysis[ ]                                                                  NEGATIVE[ ]   GI:  nausea[ ]  vommiting [ ]  diarrhea[ ] constipation [ ] melena [ ]                                                                      NEGATIVE[ ]   : hematuria[ ]  dysuria[ ] urgency[ ] incontinence[ ]                                                                                            NEGATIVE[ ]   MUSKULOSKELETAL:  arthritis[ ]  joint swelling [ ] muscle weakness [ ]                                                                NEGATIVE[ ]   SKIN/BREAST:  rash[ ] itching [ ]  hair loss[ ] masses[ ]                                                                                              NEGATIVE[ ]   PSYCH:  dementia [ ] depresion [ ] anxiety[ ]                                                                                                               NEGATIVE[ ]   HEME/LYMPH:  bruises easily[ ] enlarged lymph nodes[ ] tender lymph nodes[ ]                                               NEGATIVE[ ]   ENDOCRINE:  cold intolerance[ ] heat intolerance[ ] polydipsia[ ]                                                                          NEGATIVE[ ]     PHYSICAL EXAM  Vital Signs Last 24 Hrs  T(C): 36.4 (22 Feb 2023 05:13), Max: 36.7 (21 Feb 2023 20:58)  T(F): 97.5 (22 Feb 2023 05:13), Max: 98.1 (21 Feb 2023 20:58)  HR: 86 (22 Feb 2023 05:13) (65 - 96)  BP: 121/70 (22 Feb 2023 05:13) (104/74 - 121/70)  BP(mean): --  RR: 20 (22 Feb 2023 05:13) (16 - 20)  SpO2: 94% (22 Feb 2023 05:13) (94% - 97%)    Parameters below as of 22 Feb 2023 10:38  Patient On (Oxygen Delivery Method): nasal cannula    CONSTITUTIONAL:                                                                          WNL[ ]   Neuro: WNL[ ] Normal exam oriented to person/place & time with no focal motor or sensory  deficits. Other __________                    Eyes: WNL[ ]   Normal exam of conjunctiva & lids, pupils equally reactive. Other______________________________     ENT: WNL[ ]    Normal exam of nasal/oral mucosa with absence of cyanosis. Other_____________________________  Neck: WNL[ ]  Normal exam of jugular veins, trachea & thyroid. Other_________________________________________  Chest: WNL[ ] Normal lung exam with good air movement absence of wheezes, rales, or rhonchi: Other_________________________________________                                                                                CV:  Auscultation: normal [ ] S3[ ] S4[ ] Irregular [ ] Rub[ ] Clicks[ ]    Murmurs none:[ ]systolic [ ]  diastolic [ ] holosystolic [ ]  Carotids: No Bruits[ ] Other____________ Abdominal Aorta: normal [ ] nonpalpable[ ]Other___________                                                                                      GI: WNL[ ] Normal exam of abdomen, liver & spleen with no noted masses or tenderness. Other______________________                                                                                                        Extremities: WNL[ ] Normal no evidence of cyanosis or deformity Edema: none[ ]trace[ ]1+[ ]2+[ ]3+[ ]4+[ ]  Lower Extremity Pulses: Right[ ] Left[ ]Varicosities[ ]  SKIN :WNL[ ] Normal exam to inspection & palation. Other:____________________                                                          LABS:                        10.6   9.63  )-----------( 248      ( 22 Feb 2023 06:50 )             34.9     02-22    141  |  103  |  34.6<H>  ----------------------------<  112<H>  4.0   |  24.0  |  1.33<H>    Ca    9.0      22 Feb 2023 06:50  Mg     1.9     02-22    TPro  6.6  /  Alb  3.2<L>  /  TBili  0.5  /  DBili  0.2  /  AST  12  /  ALT  10  /  AlkPhos  69  02-22    PT/INR - ( 22 Feb 2023 06:50 )   PT: 51.8 sec;   INR: 4.40 ratio    PTT- ( 21 Feb 2023 04:26 )  PTT:44.1 sec  Serum Pro-Brain Natriuretic Peptide: 6286 pg/mL (02-22-23 @ 06:50)                       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 At time of evaluation, pt confused, unable to answer questions appropriately, unclear if pt has been having symptoms of CP/SOB/LE edema at home, answers questions with "i don't know, do i?". Pt denies current CP, palpitations, SOB, cough, fever, chills, itchiness/rash, diaphoresis, vision changes, HA, dizziness/lightheadedness, numbness/tingling, abd pain, N/V      PAST MEDICAL & SURGICAL HISTORY:  Hypertension  Hyperlipemia  PE (pulmonary embolism)  [place] filter in place  Atrial fibrillation, unspecified type  CAD S/P percutaneous coronary angioplasty  History of COPD  Cerebrovascular accident (CVA)  Atheroma  Aortic stenosis  S/P IVC filter  S/P cataract surgery  S/P CABG (coronary artery bypass graft)  Status post right knee replacement    MEDICATIONS  (STANDING):  acetaminophen     Tablet .. 975 milliGRAM(s) Oral every 8 hours  aspirin  chewable 81 milliGRAM(s) Oral daily  atorvastatin 40 milliGRAM(s) Oral at bedtime  budesonide  80 MICROgram(s)/formoterol 4.5 MICROgram(s) Inhaler 2 Puff(s) Inhalation two times a day  clotrimazole/betamethasone Cream 1 Application(s) Topical two times a day  furosemide    Tablet 40 milliGRAM(s) Oral two times a day  metoprolol tartrate 25 milliGRAM(s) Oral two times a day  predniSONE   Tablet 40 milliGRAM(s) Oral daily  sertraline 50 milliGRAM(s) Oral daily  tamsulosin 0.4 milliGRAM(s) Oral at bedtime    MEDICATIONS  (PRN):  ipratropium    for Nebulization 500 MICROGram(s) Nebulizer every 6 hours PRN Shortness of Breath and/or Wheezing  levalbuterol Inhalation 1.25 milliGRAM(s) Inhalation every 6 hours PRN shortness of breath  metoprolol tartrate Injectable 5 milliGRAM(s) IV Push every 6 hours PRN sustained HR >130  OLANZapine Injectable 5 milliGRAM(s) IntraMuscular every 6 hours PRN agitation    Allergies: No Known Allergies    SOCIAL HISTORY:  former smoker  lives alone, daughter in [place] other children in [place]?  per chart has niece locally but pt denies that to this interviewer   reports using walker and cane  takes access a ride to get around  has aids that come to his home to help him, reports sometimes they help him shower/bathe    FAMILY HISTORY:  Family history of diabetes mellitus (Child)    Review of Systems: negative x 10 systems except as noted    PHYSICAL EXAM  Vital Signs Last 24 Hrs  T(C): 36.4 (22 Feb 2023 05:13), Max: 36.7 (21 Feb 2023 20:58)  T(F): 97.5 (22 Feb 2023 05:13), Max: 98.1 (21 Feb 2023 20:58)  HR: 86 (22 Feb 2023 05:13) (65 - 96)  BP: 121/70 (22 Feb 2023 05:13) (104/74 - 121/70)  BP(mean): --  RR: 20 (22 Feb 2023 05:13) (16 - 20)  SpO2: 94% (22 Feb 2023 05:13) (94% - 97%)    Parameters below as of 22 Feb 2023 10:38  Patient On (Oxygen Delivery Method): nasal cannula    Gen: NAD  Neuro: alert, oriented to self, year 2023, president sparkle, unaware he is in the hospital or what the current situation is  HEENT: PERRL, poor dentition  neck: supple no JVD, no bruits  Pulm: diminished BS b/l no wheezing  CV: S1S2  BG, prior midline sternotomy incision, well healed  abd + BS soft NT ND, obese  ext: no edema, no cyanosis WWP, NOE, mild erythema of groins b/l R>L  skin: no rashes,   psych: normal affect, pleasantly confused    LABS:                        10.6   9.63  )-----------( 248      ( 22 Feb 2023 06:50 )             34.9     02-22    141  |  103  |  34.6<H>  ----------------------------<  112<H>  4.0   |  24.0  |  1.33<H>    Ca    9.0      22 Feb 2023 06:50  Mg     1.9     02-22    TPro  6.6  /  Alb  3.2<L>  /  TBili  0.5  /  DBili  0.2  /  AST  12  /  ALT  10  /  AlkPhos  69  02-22    PT/INR - ( 22 Feb 2023 06:50 )   PT: 51.8 sec;   INR: 4.40 ratio    PTT- ( 21 Feb 2023 04:26 )  PTT:44.1 sec  Serum Pro-Brain Natriuretic Peptide: 6286 pg/mL (02-22-23 @ 06:50)

## 2023-02-22 NOTE — BH CONSULTATION LIAISON ASSESSMENT NOTE - NSBHCHARTREVIEWLAB_PSY_A_CORE FT
Basic Metabolic Panel (02.22.23 @ 06:50)    Sodium, Serum: 141 mmol/L    Potassium, Serum: 4.0 mmol/L    Chloride, Serum: 103: Chloride reference range changed from ..10/26/2022 mmol/L    Carbon Dioxide, Serum: 24.0 mmol/L    Anion Gap, Serum: 14 mmol/L    Blood Urea Nitrogen, Serum: 34.6 mg/dL    Creatinine, Serum: 1.33 mg/dL    Glucose, Serum: 112 mg/dL    Calcium, Total Serum: 9.0: Prior Reference Range of 8.6 – 10.2 was amended as of 7/26/2022 to 8.4 –  10.5. mg/dL    eGFR: 51: The estimated glomerular filtration rate (eGFR) is calculated using the  2021 CKD-EPI creatinine equation, which does not have a coefficient for  race and is validated in individuals 18 years of age and older (N Engl J  Med 2021; 385:8573-9774). Creatinine-based eGFR may be inaccurate in  various situations including but not limited to extremes of muscle mass,  altered dietary protein intake, or medications that affect renal tubular  creatinine secretion. mL/min/1.73m2

## 2023-02-22 NOTE — DISCHARGE NOTE NURSING/CASE MANAGEMENT/SOCIAL WORK - NSDCPEPTCOWAFU_GEN_ALL_CORE
Yes... Go for blood tests as directed. Because your dose is based on the PT/INR blood test, it is very important that you get your blood tested on the scheduled date and time and to keep your health care provider appointments.   Please follow up with your doctor within 3 days of discharge to schedule your next blood test.

## 2023-02-22 NOTE — DISCHARGE NOTE NURSING/CASE MANAGEMENT/SOCIAL WORK - NSDCPEFALRISK_GEN_ALL_CORE
For information on Fall & Injury Prevention, visit: [email or web address] OR  [email or web address] OR  https://www.cdc.gov/steadi/patient.html

## 2023-02-22 NOTE — BH CONSULTATION LIAISON ASSESSMENT NOTE - NSICDXPASTMEDICALHX_GEN_ALL_CORE_FT
PAST MEDICAL HISTORY:  Aortic stenosis     Atheroma     Atrial fibrillation, unspecified type     CAD S/P percutaneous coronary angioplasty     Cerebrovascular accident (CVA)     [place] filter in place     History of COPD     Hyperlipemia     Hypertension     PE (pulmonary embolism)

## 2023-02-22 NOTE — DISCHARGE NOTE NURSING/CASE MANAGEMENT/SOCIAL WORK - NSDCFUADDAPPT_GEN_ALL_CORE_FT
STAR pt-will need followup b4 DC    will need cardio appt   No need for med review, no meds to bed from [facility]  Yellow STAR folder given to pt  [facility] if home but may need KELSE[facility] STAR pt-will need followup b4 DC    Appointment with [facility] 3/13/23 @ 10.00am ([phone])  no need for med review, no meds to bed from [facility]  Yellow STAR folder given to pt   pt transfer to [facility] STAR pt-will need followup b4 DC    Appointment with [facility] 3/13/23 @ 10.00am ([phone])  no need for med review, no meds to bed from [facility] STAR folder given to pt   pt transfer to [facility]    Patient and patient's daughter, Jasmina ([phone]), are in agreement for the patient to attend [facility] ([phone]), for subacute rehab today at 2pm.  made [facility] employee, Babita, aware of patient's follow up STAR appointment with [facility] on 3/13/23 at 10am.

## 2023-02-22 NOTE — DISCHARGE NOTE NURSING/CASE MANAGEMENT/SOCIAL WORK - PATIENT PORTAL LINK FT
You can access the FollowMyHealth Patient Portal offered by [facility] by registering at the following website: [email or web address]. By joining "[employer]"’s FollowMyHealth portal, you will also be able to view your health information using other applications (apps) compatible with our system.

## 2023-02-22 NOTE — BH CONSULTATION LIAISON ASSESSMENT NOTE - NSBHCHARTREVIEWINVESTIGATE_PSY_A_CORE FT
< from: 12 Lead ECG (02.20.23 @ 12:33) >      Ventricular Rate 104 BPM    Atrial Rate 48 BPM    QRS Duration 122 ms    Q-T Interval 368 ms    QTC Calculation(Bazett) 483 ms    R Axis -38 degrees    T Axis 134 degrees    Diagnosis Line Atrial fibrillation with rapid ventricular response  Left axis deviation  Left bundle branch block    Confirmed by NETO GAMBLE ([id number]) on 2/20/2023 6:34:10 PM    < end of copied text >

## 2023-02-23 LAB
A1C WITH ESTIMATED AVERAGE GLUCOSE RESULT: 6 % — HIGH (ref 4–5.6)
ALBUMIN SERPL ELPH-MCNC: 3.2 G/DL — LOW (ref 3.3–5.2)
ALP SERPL-CCNC: 68 U/L — SIGNIFICANT CHANGE UP (ref 40–120)
ALT FLD-CCNC: 13 U/L — SIGNIFICANT CHANGE UP
ANION GAP SERPL CALC-SCNC: 14 MMOL/L — SIGNIFICANT CHANGE UP (ref 5–17)
AST SERPL-CCNC: 18 U/L — SIGNIFICANT CHANGE UP
BASOPHILS # BLD AUTO: 0.02 K/UL — SIGNIFICANT CHANGE UP (ref 0–0.2)
BASOPHILS NFR BLD AUTO: 0.2 % — SIGNIFICANT CHANGE UP (ref 0–2)
BILIRUB SERPL-MCNC: 0.4 MG/DL — SIGNIFICANT CHANGE UP (ref 0.4–2)
BLD GP AB SCN SERPL QL: SIGNIFICANT CHANGE UP
BUN SERPL-MCNC: 40.7 MG/DL — HIGH (ref 8–20)
CALCIUM SERPL-MCNC: 9.1 MG/DL — SIGNIFICANT CHANGE UP (ref 8.4–10.5)
CHLORIDE SERPL-SCNC: 101 MMOL/L — SIGNIFICANT CHANGE UP (ref 96–108)
CO2 SERPL-SCNC: 26 MMOL/L — SIGNIFICANT CHANGE UP (ref 22–29)
CREAT SERPL-MCNC: 1.36 MG/DL — HIGH (ref 0.5–1.3)
EGFR: 50 ML/MIN/1.73M2 — LOW
EOSINOPHIL # BLD AUTO: 0.06 K/UL — SIGNIFICANT CHANGE UP (ref 0–0.5)
EOSINOPHIL NFR BLD AUTO: 0.7 % — SIGNIFICANT CHANGE UP (ref 0–6)
ESTIMATED AVERAGE GLUCOSE: 126 MG/DL — HIGH (ref 68–114)
GLUCOSE SERPL-MCNC: 104 MG/DL — HIGH (ref 70–99)
HCT VFR BLD CALC: 36.2 % — LOW (ref 39–50)
HGB BLD-MCNC: 10.6 G/DL — LOW (ref 13–17)
IMM GRANULOCYTES NFR BLD AUTO: 0.7 % — SIGNIFICANT CHANGE UP (ref 0–0.9)
INR BLD: 3.15 RATIO — HIGH (ref 0.88–1.16)
LYMPHOCYTES # BLD AUTO: 0.6 K/UL — LOW (ref 1–3.3)
LYMPHOCYTES # BLD AUTO: 7.2 % — LOW (ref 13–44)
MAGNESIUM SERPL-MCNC: 1.9 MG/DL — SIGNIFICANT CHANGE UP (ref 1.8–2.6)
MCHC RBC-ENTMCNC: 26.7 PG — LOW (ref 27–34)
MCHC RBC-ENTMCNC: 29.3 GM/DL — LOW (ref 32–36)
MCV RBC AUTO: 91.2 FL — SIGNIFICANT CHANGE UP (ref 80–100)
MONOCYTES # BLD AUTO: 0.85 K/UL — SIGNIFICANT CHANGE UP (ref 0–0.9)
MONOCYTES NFR BLD AUTO: 10.2 % — SIGNIFICANT CHANGE UP (ref 2–14)
NEUTROPHILS # BLD AUTO: 6.75 K/UL — SIGNIFICANT CHANGE UP (ref 1.8–7.4)
NEUTROPHILS NFR BLD AUTO: 81 % — HIGH (ref 43–77)
NT-PROBNP SERPL-SCNC: 6558 PG/ML — HIGH (ref 0–300)
PLATELET # BLD AUTO: 246 K/UL — SIGNIFICANT CHANGE UP (ref 150–400)
POTASSIUM SERPL-MCNC: 4.1 MMOL/L — SIGNIFICANT CHANGE UP (ref 3.5–5.3)
POTASSIUM SERPL-SCNC: 4.1 MMOL/L — SIGNIFICANT CHANGE UP (ref 3.5–5.3)
PREALB SERPL-MCNC: 15 MG/DL — LOW (ref 18–38)
PROT SERPL-MCNC: 6.3 G/DL — LOW (ref 6.6–8.7)
PROTHROM AB SERPL-ACNC: 37 SEC — HIGH (ref 10.5–13.4)
RBC # BLD: 3.97 M/UL — LOW (ref 4.2–5.8)
RBC # FLD: 15.4 % — HIGH (ref 10.3–14.5)
SODIUM SERPL-SCNC: 141 MMOL/L — SIGNIFICANT CHANGE UP (ref 135–145)
T3 SERPL-MCNC: 55 NG/DL — LOW (ref 80–200)
T4 AB SER-ACNC: 4.7 UG/DL — SIGNIFICANT CHANGE UP (ref 4.5–12)
TSH SERPL-MCNC: 0.62 UIU/ML — SIGNIFICANT CHANGE UP (ref 0.27–4.2)
WBC # BLD: 8.34 K/UL — SIGNIFICANT CHANGE UP (ref 3.8–10.5)
WBC # FLD AUTO: 8.34 K/UL — SIGNIFICANT CHANGE UP (ref 3.8–10.5)

## 2023-02-23 PROCEDURE — 99232 SBSQ HOSP IP/OBS MODERATE 35: CPT

## 2023-02-23 PROCEDURE — 99231 SBSQ HOSP IP/OBS SF/LOW 25: CPT

## 2023-02-23 PROCEDURE — 99233 SBSQ HOSP IP/OBS HIGH 50: CPT

## 2023-02-23 PROCEDURE — 93880 EXTRACRANIAL BILAT STUDY: CPT | Mod: 26

## 2023-02-23 RX ORDER — OLANZAPINE 15 MG/1
5 TABLET, FILM COATED ORAL ONCE
Refills: 0 | Status: COMPLETED | OUTPATIENT
Start: 2023-02-23 | End: 2023-02-23

## 2023-02-23 RX ORDER — RISPERIDONE 4 MG/1
0.5 TABLET ORAL AT BEDTIME
Refills: 0 | Status: DISCONTINUED | OUTPATIENT
Start: 2023-02-23 | End: 2023-02-27

## 2023-02-23 RX ADMIN — Medication 975 MILLIGRAM(S): at 23:00

## 2023-02-23 RX ADMIN — OLANZAPINE 5 MILLIGRAM(S): 15 TABLET, FILM COATED ORAL at 00:15

## 2023-02-23 RX ADMIN — Medication 40 MILLIGRAM(S): at 05:59

## 2023-02-23 RX ADMIN — SERTRALINE 50 MILLIGRAM(S): 25 TABLET, FILM COATED ORAL at 12:41

## 2023-02-23 RX ADMIN — Medication 975 MILLIGRAM(S): at 06:30

## 2023-02-23 RX ADMIN — TAMSULOSIN HYDROCHLORIDE 0.4 MILLIGRAM(S): 0.4 CAPSULE ORAL at 23:00

## 2023-02-23 RX ADMIN — OLANZAPINE 5 MILLIGRAM(S): 15 TABLET, FILM COATED ORAL at 23:29

## 2023-02-23 RX ADMIN — Medication 975 MILLIGRAM(S): at 06:00

## 2023-02-23 RX ADMIN — Medication 25 MILLIGRAM(S): at 17:56

## 2023-02-23 RX ADMIN — RISPERIDONE 0.5 MILLIGRAM(S): 4 TABLET ORAL at 23:00

## 2023-02-23 RX ADMIN — Medication 975 MILLIGRAM(S): at 14:37

## 2023-02-23 RX ADMIN — CLOTRIMAZOLE AND BETAMETHASONE DIPROPIONATE 1 APPLICATION(S): 10; .5 CREAM TOPICAL at 05:59

## 2023-02-23 RX ADMIN — Medication 25 MILLIGRAM(S): at 06:00

## 2023-02-23 RX ADMIN — Medication 975 MILLIGRAM(S): at 14:07

## 2023-02-23 RX ADMIN — Medication 81 MILLIGRAM(S): at 12:40

## 2023-02-23 RX ADMIN — Medication 40 MILLIGRAM(S): at 06:00

## 2023-02-23 RX ADMIN — ATORVASTATIN CALCIUM 40 MILLIGRAM(S): 80 TABLET, FILM COATED ORAL at 23:00

## 2023-02-23 RX ADMIN — Medication 40 MILLIGRAM(S): at 14:06

## 2023-02-23 NOTE — PROGRESS NOTE ADULT - PROBLEM SELECTOR PLAN 1
.  - pt presents s/p mechanical fall   - Lungs are very diminished  however clinically patient does not appear overloaded   - pt is chronic smoker, there is degree of COPD, consider pulmonary consult   - bilateral pleural effusions, when INR is <2 will consult CTS for eval for thoracentesis evaluation. lungs have not significantly improved with diuresis. INR 3.15  - 1500mL fluid restriction   - known low flow low gradient AS, compared to previous echo current echo shows severe AS, CTSx consulted for severe AS   - also EF is slightly worse 37% ---> 25-30%   - will likely require cath for worsening EF and severe AS, but INR is too high, will revisit when INR <2  GDMT       Beta Blocker: metoprolol 25mg PO BID with hold parameters       RAAS Inhibitor: N/A        MRA: spironolactone on hold due to renal function       Diuretic: lasix 40mg PO daily       ARNi/SGLT2i: entrest/farxiga deferred due to renal function will re-add when Cr imrpoves  - Strict I&O's  - Daily standing weights (if able).  - Keep K > 4, Mg > 2.    - Monitor renal function with ongoing diuresis.

## 2023-02-23 NOTE — PROGRESS NOTE ADULT - SUBJECTIVE AND OBJECTIVE BOX
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 As per patient he woke up this morning and fell trying to get to the bathroom - Denies any dizziness, LOC, chest pain, palpitations, fever or chills. Does admit to dyspnea as well as cough, which is productive. (19 Feb 2023 15:49)      INTERVAL HPI/OVERNIGHT EVENTS:  88y Male seen lying comfortably in bed. Pending MR T/L spine.      Vital Signs Last 24 Hrs  T(C): 36.5 (23 Feb 2023 05:27), Max: 36.6 (22 Feb 2023 13:00)  T(F): 97.7 (23 Feb 2023 05:27), Max: 97.8 (22 Feb 2023 13:00)  HR: 91 (23 Feb 2023 05:27) (76 - 98)  BP: 135/83 (23 Feb 2023 05:27) (130/72 - 139/90)  BP(mean): --  RR: 19 (23 Feb 2023 05:27) (18 - 19)  SpO2: 95% (23 Feb 2023 05:27) (94% - 96%)    Parameters below as of 23 Feb 2023 05:27  Patient On (Oxygen Delivery Method): nasal cannula        PHYSICAL EXAM:  GENERAL: NAD, well-groomed  HEAD:  Atraumatic, normocephalic  MENTAL STATUS: AAO x3; Awake. Opens eyes spontaneously. Appropriately conversant, following simple commands.  CRANIAL NERVES: PERRL. EOMI without nystagmus. Facial sensation intact V1-3 distribution b/l. Face symmetric w/ normal eye closure and smile, tongue midline. Hearing grossly intact. Speech clear.  MOTOR: strength 5/5 b/l upper and lower extremities  SENSATION: grossly intact to light touch all extremities      LABS:                        10.6   8.34  )-----------( 246      ( 23 Feb 2023 05:30 )             36.2     02-23    141  |  101  |  40.7<H>  ----------------------------<  104<H>  4.1   |  26.0  |  1.36<H>    Ca    9.1      23 Feb 2023 05:30  Mg     1.9     02-23    TPro  6.3<L>  /  Alb  3.2<L>  /  TBili  0.4  /  DBili  x   /  AST  18  /  ALT  13  /  AlkPhos  68  02-23    PT/INR - ( 23 Feb 2023 05:30 )   PT: 37.0 sec;   INR: 3.15 ratio               02-22 @ 07:01  -  02-23 @ 07:00  --------------------------------------------------------  IN: 785 mL / OUT: 1855 mL / NET: -1070 mL        RADIOLOGY & ADDITIONAL TESTS:  < from: CT Lumbar Spine No Cont (02.20.23 @ 01:14) >  Impression:    The previously noted thoracic and lumbar spine fractures are stable   compared to the 02/19/2023 chest abdomen pelvic CT study. No new   additional fractures are noted over the time interval.    Consider follow-up thoracic and lumbar spine MRI studies for further   workup, if there are no MRI contraindications.    < end of copied text >    < from: CT Thoracic Spine No Cont (02.20.23 @ 01:10) >  Impression:    The previously noted thoracic and lumbar spine fractures are stable   compared to the 02/19/2023 chest abdomen pelvic CT study. No new   additional fractures are noted over the time interval.    Consider follow-up thoracic and lumbar spine MRI studies for further   workup, if there are no MRI contraindications.      < end of copied text >        CAPRINI SCORE [CLOT]:  Patient has an estimated Caprini score of greater than 5.  However, the patient's unique clinical situation will be addressed in an individual manner to determine appropriate anticoagulation treatment, if any.

## 2023-02-23 NOTE — PROGRESS NOTE ADULT - SUBJECTIVE AND OBJECTIVE BOX
[facility]                                                         CARDIOLOGY AT [place] [address]                                                         Telephone: [phone]. Fax:[phone]                                                                             PROGRESS NOTE    Reason for follow up: chronic HFrEF  Update: INR still supratherapeutic       Review of symptoms:   Patient sleeping. received zyprexa overnight for agitation    Vitals:  T(C): 36.5 (02-23-23 @ 05:27), Max: 36.6 (02-22-23 @ 13:00)  HR: 91 (02-23-23 @ 05:27) (76 - 98)  BP: 135/83 (02-23-23 @ 05:27) (130/72 - 139/90)  RR: 19 (02-23-23 @ 05:27) (18 - 19)  SpO2: 95% (02-23-23 @ 05:27) (94% - 96%)  Wt(kg): --  I&O's Summary    22 Feb 2023 07:01  -  23 Feb 2023 07:00  --------------------------------------------------------  IN: 785 mL / OUT: 1855 mL / NET: -1070 mL      Weight (kg): 115.2 (02-22 @ 04:38)    PHYSICAL EXAM:  Appearance: Comfortable. No acute distress  HEENT:  Atraumatic. Normocephalic.  Normal oral mucosa  Neurologic: A & O x 3, no gross focal deficits.  Cardiovascular: RRR S1 S2, No murmur, no rubs/gallops. No JVD  Respiratory: Lungs clear to auscultation, unlabored   Gastrointestinal:  Soft, Non-tender, + BS  Lower Extremities: 2+ Peripheral Pulses, No clubbing, cyanosis, or edema  Psychiatry: Patient is calm. No agitation.   Skin: warm and dry.    CURRENT CARDIAC MEDICATIONS:  furosemide    Tablet 40 milliGRAM(s) Oral two times a day  metoprolol tartrate 25 milliGRAM(s) Oral two times a day  metoprolol tartrate Injectable 5 milliGRAM(s) IV Push every 6 hours PRN      CURRENT OTHER MEDICATIONS:  budesonide  80 MICROgram(s)/formoterol 4.5 MICROgram(s) Inhaler 2 Puff(s) Inhalation two times a day  ipratropium    for Nebulization 500 MICROGram(s) Nebulizer every 6 hours PRN Shortness of Breath and/or Wheezing  levalbuterol Inhalation 1.25 milliGRAM(s) Inhalation every 6 hours PRN shortness of breath  acetaminophen     Tablet .. 975 milliGRAM(s) Oral every 8 hours  OLANZapine Injectable 5 milliGRAM(s) IntraMuscular every 6 hours PRN agitation  sertraline 50 milliGRAM(s) Oral daily  atorvastatin 40 milliGRAM(s) Oral at bedtime  aspirin  chewable 81 milliGRAM(s) Oral daily  clotrimazole/betamethasone Cream 1 Application(s) Topical two times a day  tamsulosin 0.4 milliGRAM(s) Oral at bedtime      LABS:	 	  ( 23 Feb 2023 05:30 )  Troponin T  X    ,  CPK  X    , CKMB  X    , BNP 6558<H>    , ( 22 Feb 2023 06:50 )  Troponin T  X    ,  CPK  X    , CKMB  X    , BNP 6286<H>    , ( 21 Feb 2023 04:26 )  Troponin T  X    ,  CPK  X    , CKMB  X    , BNP 4701<H>                              10.6   8.34  )-----------( 246      ( 23 Feb 2023 05:30 )             36.2     02-23    141  |  101  |  40.7<H>  ----------------------------<  104<H>  4.1   |  26.0  |  1.36<H>    Ca    9.1      23 Feb 2023 05:30  Mg     1.9     02-23    TPro  6.3<L>  /  Alb  3.2<L>  /  TBili  0.4  /  DBili  x   /  AST  18  /  ALT  13  /  AlkPhos  68  02-23    PT/INR/PTT ( 23 Feb 2023 05:30 )                       :                       :      37.0         :       X                     .        .                   .              .           .       3.15        .                                       Lipid Profile:   HgA1c:   TSH: Thyroid Stimulating Hormone, Serum: 0.62 uIU/mL      TELEMETRY: Afib rate controlled occasionally elevated when agitated    DIAGNOSTIC TESTING:  [ ] Echocardiogram: < from: TTE Echo Complete w/ Contrast w/ Doppler (02.21.23 @ 11:34) >  PHYSICIAN INTERPRETATION:  Left Ventricle: Endocardial visualization was enhanced with intravenous   echo contrast. The left ventricular internal cavity size is normal.  Global LV systolic function was severely decreased. Left ventricular   ejection fraction, by visual estimation, is 25 to 30%. Abnormal   (paradoxical) septal motion consistent with post-operative status. The   mitral in-flow pattern reveals no discernable A-wave, therefore no   comment on diastolic function can be made.  Right Ventricle: Normal right ventricular size and function.  Left Atrium: Severely enlarged left atrium.  Right Atrium: Right atrial enlargement.  Pericardium: There is no evidence of pericardial effusion.  Mitral Valve: There is moderate mitral annular calcification.  Tricuspid Valve: Structurally normal tricuspid valve, with normal leaflet   excursion. Mild-moderate tricuspid regurgitation is visualized. Estimated   pulmonary artery systolic pressure is 41.7 mmHg assuming a right atrial   pressure of 3 mmHg, which is consistent with mild pulmonary hypertension.  Aortic Valve: Severe aortic stenosis is present. No evidence of aortic   valve regurgitation is seen.  Pulmonic Valve: Structurally normal pulmonic valve, with normal leaflet   excursion. Trace pulmonic valve regurgitation.  Aorta: There is dilatation of the aortic root, sinuses of Valsalva and   ascending aorta. There is moderate aortic root calcification.  Pulmonary Artery: The main pulmonary artery is normal in size.  Venous: The inferior vena cava is not well visualized.  In comparison to the previous echocardiogram(s): Prior examinations are   available and were reviewed for comparison purposes. Compared to the   outpatient TTE study from 9/2022 prevoiusly LV EF 37% and known low   gradient aortic valve stenosis, but visually on current study appears   severely stenotic.      Summary:   1. Endocardial visualization was enhanced with intravenous echo   contrast. No LV thrombus.   2. Left ventricular ejection fraction, by visual estimation, is 25 to   30%.   3. Severely decreased global left ventricular systolic function.   4. Abnormal septal motion consistent with post-operative status.   5. There is mild eccentric left ventricular hypertrophy.   6. The mitralin-flow pattern reveals no discernable A-wave, therefore   no comment on diastolic function can be made.   7. Normal right ventricular size and function, estimated PASP least 42   mmHg.   8. Severely enlarged left atrium.   9. Right atrial enlargement.  10. Moderate mitral annular calcification.  11. Mild-moderate tricuspid regurgitation.  12. Severe calcific aortic valve stenosis with low gradient,   dimensionless index 0.23.  13. There is moderate aortic root calcification.  14. Borderline dilatation of the aortic root, sinuses of Valsalva and   ascending aorta 3.9 cm, index to BSA 1.6 cm/m2 within normal.  15. Compared to the outpatient TTE study from 9/2022 prevoiusly LV EF 37%   and known low gradient aortic valve stenosis, but visually on current   study appears severely stenotic.    Mook Bangura DO Electronically signed on 2/21/2023 at 1:35:50 PM    < end of copied text >    [ ]  Catheterization:  [ ] Stress Test:    OTHER:

## 2023-02-23 NOTE — PROGRESS NOTE ADULT - ASSESSMENT
88M former smoker with hx of HTN, HLD, PE/DVT s/p IVC filter and hx of factor V Leiden on coumadin, Afib, prostate ca s/p xrt, CAD s/p CABG, GIGI on CPAP, CVA, HTN, aortic stenosis, AV fibroelastoma excision presented 2/19 after sustaining a fall with increased displacement of an L1 vertebral fracture and found to be hypoxic likely in the setting of CHF exacerbation     Hypoxic respiratory failure   likely in the setting of HFrEF exacerbation/bilateral effusions w/ atelectasis    pt previously followed with pulmonary but no evidence of obstruction on prior testing although last seen 2019  c/w diuresis as tolerated w/ albumin support as needed   could consider thoracentesis if MILAGRO precludes further diuresis but will need to wait for INR to downtrend  CT surgery consulted for evaluation of the severe AS; no current plan to pursue procedures given mental status and unclear if true AS    if planned for cath, can pursue L/RHC   DuoNeb q6h   can complete short course of steroids but wheezing may be cardiac as well   aspiration precautions   caution with sedating medications   ABG without evidence of hypercapnia  LE duplex neg and INR supratherapeutic on presentation    wean Fio2 as tolerated for goal sat >90%  incentive spirometry   OOB/ambulate   will order for nocturnal CPAP at last available settings   will need outpatient pulmonary f/u and repeat PFTs

## 2023-02-23 NOTE — PROGRESS NOTE ADULT - ASSESSMENT
87yo M w/ PMH of AF and Factor V Leiden with PE/DVT s/p IVCF and on coumadin, CAD s/p CABG, CVA, HFrEF (35-40%), moderate AS, COPD, AAA, ?prostate CA w/ recent XRT and GIGI on CPAP who was admitted after a fall at home (lives alone).  He has had recurrent falls and admitted for same - found with INR of 6 and BNP ~5000.  CT chest with B/L effusions and stable 6.1 cm AAA and subacute L1 fx.  He is being treated for both COPD and CHF exacerbation - steroids and IV lasix.  CTS consulted for consideration of thoracentesis if diuresis ineffective. We are consulted for assistance with goals of care.  #Goals of care  - speaking to pt today, he exhibited an inability to maintain a linear/rational discussion and, as such, lacks capacity to understand and make his own complex medical decisions such as code status preferences and broader goals of care  - daughter Jasmina lives in [facility] ([phone]) - nijasmyne Navarro lives locally ([phone])  - daughter Jasmina is HCP - personally reviewed form in chart  - prior GOC discussion detailed in GOC note dated 2/22  - DNR/DNI  - Reviewed CT surgery c/s - consideration of TAVR?    #AMS   - behavioral health following  - pt with combative agitation and is very confused today on my visit  - pt with visual hallucinations and tangential non-linear speech and thought process - discussed with psych - likely cognitive impairment and would benefit from outpt neurocognitive testing  - Would avoid/minimize known deliriogenic drugs such as benzodiazepines and anticholinergics.  Encourage staff and family to reorient frequently.  Keep shades open during day in effort to maintain day/night cycle.    #Pain - pt with L1 fx  - cont ATC APAP 1000mg TID  - NSGY seeing - recommended MRI - ?kyphoplasty    Case d/w Dr Chaudhari (hospitalist) and bedside aide

## 2023-02-23 NOTE — PROGRESS NOTE ADULT - ASSESSMENT
ASSESSMENT  88M PMH DVT, A fib (on Coumadin), Factor V Leiden, CAD s/p CABG, CVA, COPD, GIGI, presented s/p fall, found to have T8, L1 vertebral body fx, L1 TP fx.  -Pending MR T/L spine      PLAN  - case d/w team, imaging reviewed  - no acute neurosurgical intervention indicated at this time  - neuro checks q4  - pain control as needed, avoid over sedation  - con't bedrest  - TLSO ordered for when activity restrictions lifted  - recc MRI T/L spine  - normotension  - SCDs b/l for dvt ppx, coumadin held for supra-therapeutic INR  - further medical care per primary team  - will continue to follow

## 2023-02-23 NOTE — PROGRESS NOTE ADULT - SUBJECTIVE AND OBJECTIVE BOX
seen for delirium, fluid overload    paranoid overnight, agitated, required sedatives  thought people came to his house and were trying to hurt him. unaware hes at the hospital  ros negative     MEDICATIONS  (STANDING):  acetaminophen     Tablet .. 975 milliGRAM(s) Oral every 8 hours  aspirin  chewable 81 milliGRAM(s) Oral daily  atorvastatin 40 milliGRAM(s) Oral at bedtime  budesonide  80 MICROgram(s)/formoterol 4.5 MICROgram(s) Inhaler 2 Puff(s) Inhalation two times a day  clotrimazole/betamethasone Cream 1 Application(s) Topical two times a day  furosemide    Tablet 40 milliGRAM(s) Oral two times a day  metoprolol tartrate 25 milliGRAM(s) Oral two times a day  risperiDONE   Tablet 0.5 milliGRAM(s) Oral at bedtime  sertraline 50 milliGRAM(s) Oral daily  tamsulosin 0.4 milliGRAM(s) Oral at bedtime    MEDICATIONS  (PRN):  ipratropium    for Nebulization 500 MICROGram(s) Nebulizer every 6 hours PRN Shortness of Breath and/or Wheezing  levalbuterol Inhalation 1.25 milliGRAM(s) Inhalation every 6 hours PRN shortness of breath  metoprolol tartrate Injectable 5 milliGRAM(s) IV Push every 6 hours PRN sustained HR >130  OLANZapine Injectable 5 milliGRAM(s) IntraMuscular every 6 hours PRN agitation      Allergies    No Known Allergies    Intolerances    OHS (Unknown)      Vital Signs Last 24 Hrs  T(C): 37.1 (23 Feb 2023 11:00), Max: 37.1 (23 Feb 2023 11:00)  T(F): 98.7 (23 Feb 2023 11:00), Max: 98.7 (23 Feb 2023 11:00)  HR: 80 (23 Feb 2023 11:00) (76 - 98)  BP: 135/82 (23 Feb 2023 11:00) (130/72 - 139/90)  BP(mean): --  RR: 18 (23 Feb 2023 11:00) (18 - 19)  SpO2: 96% (23 Feb 2023 11:00) (94% - 96%)    Parameters below as of 23 Feb 2023 11:00  Patient On (Oxygen Delivery Method): nasal cannula  O2 Flow (L/min): 4      PHYSICAL EXAM:    GENERAL: NAD  CHEST/LUNG: clear anteriorly  dec bs bases   HEART: Regular rate and rhythm; S1 S2  ABDOMEN: Soft,  Bowel sounds present  EXTREMITIES: no edema  NERVOUS SYSTEM:  Alert & Oriented X1 confused, nonfocal neuro     LABS:                        10.6   8.34  )-----------( 246      ( 23 Feb 2023 05:30 )             36.2     02-23    141  |  101  |  40.7<H>  ----------------------------<  104<H>  4.1   |  26.0  |  1.36<H>    Ca    9.1      23 Feb 2023 05:30  Mg     1.9     02-23    TPro  6.3<L>  /  Alb  3.2<L>  /  TBili  0.4  /  DBili  x   /  AST  18  /  ALT  13  /  AlkPhos  68  02-23    PT/INR - ( 23 Feb 2023 05:30 )   PT: 37.0 sec;   INR: 3.15 ratio               CAPILLARY BLOOD GLUCOSE            RADIOLOGY & ADDITIONAL TESTS:

## 2023-02-23 NOTE — CHART NOTE - NSCHARTNOTEFT_GEN_A_CORE
spoke to pt's daughter Jasmina [phone] (HCP) to obtain additional information given pt's mental status and inability to answer questions appropriately.  pt lives alone, and has been having waxing/waning mental status with periods of confusion and hallucinations for several years (at least since 2017 when wife passed away).  last year daughter visited assisted living facilities with pt with goal of transition him to one, however pt refused. pt has had multiple mechanical falls due to not wearing proper shoes or using his walker/cane. pt lives in 2 story home alone with bedroom upstairs. he has an aide for 4 hours a day x 5 days a week which daughter is in process of having him re-evaluated to increase services.  daughter is aware of pt's current medical condition and worsening mental status in hospital which is making it difficult for medical team to obtain additional tests including MRI of spine. she is realistic that at this time, pt would not be candidate for any invasive procedures.  if his mental status were to improve and pt is cooperative and able to complete testing, she would want to consider TAVR as option at that time.  pt unable yesterday to speak on if he has any symptoms. daughter reports that though he does not complain, there are times he seems breathless on the phone.     Case and echo reviewed with Dr Gomez. Pt has low flow low gradient AS in setting of reduced ejection fraction and he is not convinced this is true severe AS. pt would need dobutamine stress echo to confirm.    CT surgery to sign off at this time.  please reconsult as needed    d/w Dr. Gomez.

## 2023-02-23 NOTE — PROGRESS NOTE ADULT - SUBJECTIVE AND OBJECTIVE BOX
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 Pt also had redemonstrated AAA with large mural thrombus seen by vascular with no plan for emergent surgical intervention. Pt was noted to be hypoxic on presentation and endorses dyspnea over the last month. CT chest with GGO and moderate bilateral effusions with associated atelectasis. Pt being diuresed but remains on O2 supplementation. Echo showed EF 25-30% with normal RV and PASP 42 with severe aortic stenosis with low gradient. Pt previously followed by Dr. Alegria but not seen since 2019 and prior had no spirometric evidence of COPD. Pt planned for eventual LHC and CT surgery consulted for evaluation of the AS.       Interval:  pt with episode of delirium overnight with agitation. Denies shortness of breath. Endorses dry cough. Denies any chest pain. Afebrile, remains on 4L NC. ABG 7.46/45/85.     PAST MEDICAL & SURGICAL HISTORY:  Hypertension  Hyperlipemia  PE (pulmonary embolism)  [place] filter in place  Atrial fibrillation, unspecified type  CAD S/P percutaneous coronary angioplasty  History of COPD  Cerebrovascular accident (CVA)  Atheroma  Aortic stenosis  S/P IVC filter  S/P cataract surgery  S/P CABG (coronary artery bypass graft)   Status post right knee replacement    MEDICATIONS  (STANDING):  acetaminophen     Tablet .. 975 milliGRAM(s) Oral every 8 hours  aspirin  chewable 81 milliGRAM(s) Oral daily  atorvastatin 40 milliGRAM(s) Oral at bedtime  budesonide  80 MICROgram(s)/formoterol 4.5 MICROgram(s) Inhaler 2 Puff(s) Inhalation two times a day  clotrimazole/betamethasone Cream 1 Application(s) Topical two times a day  furosemide    Tablet 40 milliGRAM(s) Oral two times a day  metoprolol tartrate 25 milliGRAM(s) Oral two times a day  risperiDONE   Tablet 0.5 milliGRAM(s) Oral at bedtime  sertraline 50 milliGRAM(s) Oral daily  tamsulosin 0.4 milliGRAM(s) Oral at bedtime    MEDICATIONS  (PRN):  ipratropium    for Nebulization 500 MICROGram(s) Nebulizer every 6 hours PRN Shortness of Breath and/or Wheezing  levalbuterol Inhalation 1.25 milliGRAM(s) Inhalation every 6 hours PRN shortness of breath  metoprolol tartrate Injectable 5 milliGRAM(s) IV Push every 6 hours PRN sustained HR >130  OLANZapine Injectable 5 milliGRAM(s) IntraMuscular every 6 hours PRN agitation      Allergies: NKDA     Social: former smoker 22+ pack years quit at 42, denies EtOH abuse, denies illicit drug use     FH:  Family history of diabetes mellitus (Child)    ICU Vital Signs Last 24 Hrs  T(C): 37.1 (23 Feb 2023 11:00), Max: 37.1 (23 Feb 2023 11:00)  T(F): 98.7 (23 Feb 2023 11:00), Max: 98.7 (23 Feb 2023 11:00)  HR: 80 (23 Feb 2023 11:00) (76 - 91)  BP: 135/82 (23 Feb 2023 11:00) (130/72 - 135/83)  BP(mean): --  ABP: --  ABP(mean): --  RR: 18 (23 Feb 2023 11:00) (18 - 19)  SpO2: 96% (23 Feb 2023 11:00) (94% - 96%)    O2 Parameters below as of 23 Feb 2023 11:00  Patient On (Oxygen Delivery Method): nasal cannula  O2 Flow (L/min): 4    I&O's Detail    22 Feb 2023 07:01  -  23 Feb 2023 07:00  --------------------------------------------------------  IN:    Oral Fluid: 785 mL  Total IN: 785 mL    OUT:    Voided (mL): 1855 mL  Total OUT: 1855 mL    Total NET: -1070 mL      23 Feb 2023 07:01  -  23 Feb 2023 22:36  --------------------------------------------------------  IN:    Oral Fluid: 240 mL  Total IN: 240 mL    OUT:    Voided (mL): 1300 mL  Total OUT: 1300 mL    Total NET: -1060 mL          LABS:                                   10.6   8.34  )-----------( 246      ( 23 Feb 2023 05:30 )             36.2       02-23    141  |  101  |  40.7<H>  ----------------------------<  104<H>  4.1   |  26.0  |  1.36<H>    Ca    9.1      23 Feb 2023 05:30  Mg     1.9     02-23    TPro  6.3<L>  /  Alb  3.2<L>  /  TBili  0.4  /  DBili  x   /  AST  18  /  ALT  13  /  AlkPhos  68  02-23        Physical Examination:    General: alert, in NAD      HEENT: NC/AT, anicteric, MMM    PULM: diminished breath sounds at bilateral bases, no increased work of breathing     NECK: Supple,  trachea midline    CVS: S1S2, RRR    ABD: Soft, nondistended, nontender, normoactive bowel sounds, obese     EXT: 1+ edema, nontender    SKIN: Warm and well perfused, no rashes noted    NEURO: Alert, oriented, interactive, nonfocal      RADIOLOGY:   CT chest aorta:  IMPRESSION:    Moderate pleural effusions with findings of pulmonary edema. Superimposed infection cannot be excluded. Follow to resolution. Post-CABG changes. Aortic valve calcification and incomplete filling of the left atrial appendage. Correlate with echocardiogram. Nonspecific slightly increased trace abdominopelvic ascites since 1/4/2023 and increased abdominal wall edema may be due to third spacing. Correlate clinically.    Aortic aneurysms as follows:  -Mid ascending aorta: 4.2 cm stable since 2017  -Distal descending thoracic aorta: 4.7 cm, previously 3.9 cm in 2017. Prominent pedunculated noncalcified plaque, considered to be high risk for embolic event.  -Infrarenal abdominal aorta: 6.1 x 4.7 cm, stable since 1/4/2023, previously 4.7 x 3.7 cm on 6/20/2015. Large peripheral mural thrombus. This is considered high risk for rupture based on size. No clear evidence of rupture of the abdominal aortic aneurysm on this study.  -Left common iliac artery: 2.4 cm.    Severe mid SMA stenosis, image 174 series 6, new since 2015.    Transversely oriented L1 vertebral body fracture with increased displacement of the dominant fracture components since 1/4/2023 CT. Posterior element involvement cannot be excluded on this study. Transversely oriented T8 vertebral body fracture also noted, which appears at least subacute in timeframe. Nondisplaced left L1 transverse process also redemonstrated. Recommend dedicated spinal CT and/or MRI imaging for further evaluation.
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
## 2023-02-23 NOTE — CHART NOTE - NSCHARTNOTEFT_GEN_A_CORE
Pt extremely combative overnight and aggressive toward team.  IM zyprexa given without improvement and security was called to bedside.  Additional 5 mg IM zyprexa given

## 2023-02-23 NOTE — PROGRESS NOTE ADULT - ASSESSMENT
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 In ER, pt hypoxic with mild anemia and supratherapeutic INR; CTA with moderate b/l pleural effusions and atelectasis, L1, T8 fractures, aortic aneurysms.     paranoia, delirium?    psych and palliative following     start risperidone 0.5mg qhs and monitor    c/w prn zyprexa    Acute hypoxic respiratory failure 2/2 moderate b/l pleural effusions likely due to acute on chronic HFrEF and copd exacerbation   severe AS     s/p IV lasix, now on PO    CT surgery/cardiology following    xopenex     s/p short course of steroids, pulm following     metoprolol ER-->metoprolol tartrate bid     prior known EF 35%    Vertebral fractures s/p fall   -CTA a/p: transversely oriented L1 vertebral body fx with increased displacement of dominant fx components since 1/4/23; transversely oriented T8 vertebral body fx (subacute appearing)   -CT t/l spine fx stable compared to ct a/p  -trauma sx recs appreciated   -neurosx recs appreciated   -f/u MRI t/l spine, pending; MRI safety screening complete   -TLSO at bedside   -pain control    Aortic aneurysm   -6.1cm AAA and 2.4cm L common iliac aneurysm   -vascular sx recs appreciated   -no surgical intervention at this time   -arterial duplex BLE neg for popliteal aneurysms     CAD s/p CABG   -statin, bb    chronic afib   -cont bb, holding coumadin due to supratherapeutic INR     Supratherapeutic INR   -trend, no active bleeding cont to monitor  -holding coumadin     Hx PE/DVT  Factor V Leiden   -s/p IVC filter   -AC as above       updated daughter at [phone] who currently resides in [facility].    mental status limiting performance of further tests/interventions.

## 2023-02-24 LAB
ANION GAP SERPL CALC-SCNC: 10 MMOL/L — SIGNIFICANT CHANGE UP (ref 5–17)
BUN SERPL-MCNC: 36.6 MG/DL — HIGH (ref 8–20)
CALCIUM SERPL-MCNC: 8.9 MG/DL — SIGNIFICANT CHANGE UP (ref 8.4–10.5)
CHLORIDE SERPL-SCNC: 102 MMOL/L — SIGNIFICANT CHANGE UP (ref 96–108)
CO2 SERPL-SCNC: 30 MMOL/L — HIGH (ref 22–29)
CREAT SERPL-MCNC: 1.17 MG/DL — SIGNIFICANT CHANGE UP (ref 0.5–1.3)
EGFR: 60 ML/MIN/1.73M2 — SIGNIFICANT CHANGE UP
GLUCOSE SERPL-MCNC: 97 MG/DL — SIGNIFICANT CHANGE UP (ref 70–99)
HCT VFR BLD CALC: 36.9 % — LOW (ref 39–50)
HGB BLD-MCNC: 10.8 G/DL — LOW (ref 13–17)
INR BLD: 2.27 RATIO — HIGH (ref 0.88–1.16)
MCHC RBC-ENTMCNC: 26.6 PG — LOW (ref 27–34)
MCHC RBC-ENTMCNC: 29.3 GM/DL — LOW (ref 32–36)
MCV RBC AUTO: 90.9 FL — SIGNIFICANT CHANGE UP (ref 80–100)
PLATELET # BLD AUTO: 235 K/UL — SIGNIFICANT CHANGE UP (ref 150–400)
POTASSIUM SERPL-MCNC: 3.9 MMOL/L — SIGNIFICANT CHANGE UP (ref 3.5–5.3)
POTASSIUM SERPL-SCNC: 3.9 MMOL/L — SIGNIFICANT CHANGE UP (ref 3.5–5.3)
PROTHROM AB SERPL-ACNC: 26.5 SEC — HIGH (ref 10.5–13.4)
RBC # BLD: 4.06 M/UL — LOW (ref 4.2–5.8)
RBC # FLD: 15.2 % — HIGH (ref 10.3–14.5)
SODIUM SERPL-SCNC: 142 MMOL/L — SIGNIFICANT CHANGE UP (ref 135–145)
VIT B12 SERPL-MCNC: 414 PG/ML — SIGNIFICANT CHANGE UP (ref 232–1245)
WBC # BLD: 8.22 K/UL — SIGNIFICANT CHANGE UP (ref 3.8–10.5)
WBC # FLD AUTO: 8.22 K/UL — SIGNIFICANT CHANGE UP (ref 3.8–10.5)

## 2023-02-24 PROCEDURE — 99231 SBSQ HOSP IP/OBS SF/LOW 25: CPT

## 2023-02-24 PROCEDURE — 99233 SBSQ HOSP IP/OBS HIGH 50: CPT

## 2023-02-24 PROCEDURE — 99232 SBSQ HOSP IP/OBS MODERATE 35: CPT

## 2023-02-24 RX ORDER — WARFARIN SODIUM 2.5 MG/1
4 TABLET ORAL ONCE
Refills: 0 | Status: COMPLETED | OUTPATIENT
Start: 2023-02-24 | End: 2023-02-24

## 2023-02-24 RX ADMIN — Medication 975 MILLIGRAM(S): at 22:29

## 2023-02-24 RX ADMIN — TAMSULOSIN HYDROCHLORIDE 0.4 MILLIGRAM(S): 0.4 CAPSULE ORAL at 22:29

## 2023-02-24 RX ADMIN — WARFARIN SODIUM 4 MILLIGRAM(S): 2.5 TABLET ORAL at 22:30

## 2023-02-24 RX ADMIN — Medication 81 MILLIGRAM(S): at 11:01

## 2023-02-24 RX ADMIN — Medication 25 MILLIGRAM(S): at 06:33

## 2023-02-24 RX ADMIN — BUDESONIDE AND FORMOTEROL FUMARATE DIHYDRATE 2 PUFF(S): 160; 4.5 AEROSOL RESPIRATORY (INHALATION) at 11:02

## 2023-02-24 RX ADMIN — RISPERIDONE 0.5 MILLIGRAM(S): 4 TABLET ORAL at 22:30

## 2023-02-24 RX ADMIN — ATORVASTATIN CALCIUM 40 MILLIGRAM(S): 80 TABLET, FILM COATED ORAL at 22:29

## 2023-02-24 RX ADMIN — Medication 975 MILLIGRAM(S): at 00:00

## 2023-02-24 RX ADMIN — Medication 975 MILLIGRAM(S): at 06:33

## 2023-02-24 RX ADMIN — Medication 40 MILLIGRAM(S): at 13:58

## 2023-02-24 RX ADMIN — SERTRALINE 50 MILLIGRAM(S): 25 TABLET, FILM COATED ORAL at 11:01

## 2023-02-24 RX ADMIN — Medication 975 MILLIGRAM(S): at 22:59

## 2023-02-24 RX ADMIN — Medication 975 MILLIGRAM(S): at 13:58

## 2023-02-24 RX ADMIN — Medication 40 MILLIGRAM(S): at 06:32

## 2023-02-24 RX ADMIN — Medication 25 MILLIGRAM(S): at 17:33

## 2023-02-24 RX ADMIN — CLOTRIMAZOLE AND BETAMETHASONE DIPROPIONATE 1 APPLICATION(S): 10; .5 CREAM TOPICAL at 17:33

## 2023-02-24 NOTE — PROGRESS NOTE ADULT - ASSESSMENT
88M former smoker with hx of HTN, HLD, PE/DVT s/p IVC filter and hx of factor V Leiden on coumadin, Afib, prostate ca s/p xrt, CAD s/p CABG, GIGI on CPAP, CVA, HTN, aortic stenosis, AV fibroelastoma excision presented 2/19 after sustaining a fall with increased displacement of an L1 vertebral fracture and found to be hypoxic likely in the setting of CHF exacerbation     Hypoxic respiratory failure   likely in the setting of HFrEF exacerbation/bilateral effusions w/ atelectasis    pt previously followed with pulmonary but no evidence of obstruction on prior testing although last seen 2019  c/w diuresis as tolerated w/ albumin support as needed   could consider thoracentesis if MILAGRO precludes further diuresis  CT surgery consulted for evaluation of the severe AS; no current plan to pursue procedures given mental status and unclear if true AS    if planned for cath, can pursue L/RHC   DuoNeb q6h   completed 4 day course prednisone   aspiration precautions   caution with sedating medications   ABG without evidence of hypercapnia  LE duplex neg and INR supratherapeutic on presentation    wean Fio2 as tolerated for goal sat >90%  incentive spirometry   OOB/ambulate   nocturnal CPAP and prn w/ naps as tolerated  will need outpatient pulmonary f/u and repeat PFTs

## 2023-02-24 NOTE — DIETITIAN INITIAL EVALUATION ADULT - NSICDXPASTMEDICALHX_GEN_ALL_CORE_FT
PAST MEDICAL HISTORY:  Aortic stenosis     Atheroma     Atrial fibrillation, unspecified type     CAD S/P percutaneous coronary angioplasty     Cerebrovascular accident (CVA)     [place] filter in place     History of COPD     Hyperlipemia     Hypertension     PE (pulmonary embolism)

## 2023-02-24 NOTE — DIETITIAN INITIAL EVALUATION ADULT - NS FNS DIET ORDER
Diet, Regular:   DASH/TLC {Sodium & Cholesterol Restricted} (DASH)  1500mL Fluid Restriction ([id number]) (02-19-23 @ 17:00)

## 2023-02-24 NOTE — DIETITIAN INITIAL EVALUATION ADULT - ORAL INTAKE PTA/DIET HISTORY
Aware waxing/waning mentation, question information obtained. Pt reports UBW ~280lbs but unsure when that weight was taken. Current admission weight 254lbs, RD bedscale weight 268lbs noted. Pt states having good appetite, per plate waste completed >75% of lunch meal.

## 2023-02-24 NOTE — DIETITIAN INITIAL EVALUATION ADULT - PERTINENT LABORATORY DATA
02-24    142  |  102  |  36.6<H>  ----------------------------<  97  3.9   |  30.0<H>  |  1.17    Ca    8.9      24 Feb 2023 04:40  Mg     1.9     02-23    TPro  6.3<L>  /  Alb  3.2<L>  /  TBili  0.4  /  DBili  x   /  AST  18  /  ALT  13  /  AlkPhos  68  02-23  A1C with Estimated Average Glucose Result: 6.0 % (02-23-23 @ 05:30)

## 2023-02-24 NOTE — PROGRESS NOTE ADULT - SUBJECTIVE AND OBJECTIVE BOX
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 Pt also had redemonstrated AAA with large mural thrombus seen by vascular with no plan for emergent surgical intervention. Pt was noted to be hypoxic on presentation and endorses dyspnea over the last month. CT chest with GGO and moderate bilateral effusions with associated atelectasis. Pt being diuresed but remains on O2 supplementation. Echo showed EF 25-30% with normal RV and PASP 42 with severe aortic stenosis with low gradient. Pt previously followed by Dr. Alegria but not seen since 2019 and prior had no spirometric evidence of COPD. Pt planned for eventual LHC and CT surgery consulted for evaluation of the AS.       Interval:  Pt calm and cooperative when seen but has had episodes of delirium and agitation. Does not appear he wore CPAP overnight. Pt denies any shortness of breath but has not been active much. Pt reports intermittent cough. Remains afebrile, on 4L NC.      PAST MEDICAL & SURGICAL HISTORY:  Hypertension  Hyperlipemia  PE (pulmonary embolism)  [place] filter in place  Atrial fibrillation, unspecified type  CAD S/P percutaneous coronary angioplasty  History of COPD  Cerebrovascular accident (CVA)  Atheroma  Aortic stenosis  S/P IVC filter  S/P cataract surgery  S/P CABG (coronary artery bypass graft)   Status post right knee replacement    MEDICATIONS  (STANDING):  acetaminophen     Tablet .. 975 milliGRAM(s) Oral every 8 hours  aspirin  chewable 81 milliGRAM(s) Oral daily  atorvastatin 40 milliGRAM(s) Oral at bedtime  budesonide  80 MICROgram(s)/formoterol 4.5 MICROgram(s) Inhaler 2 Puff(s) Inhalation two times a day  clotrimazole/betamethasone Cream 1 Application(s) Topical two times a day  furosemide    Tablet 40 milliGRAM(s) Oral two times a day  metoprolol tartrate 25 milliGRAM(s) Oral two times a day  risperiDONE   Tablet 0.5 milliGRAM(s) Oral at bedtime  sertraline 50 milliGRAM(s) Oral daily  tamsulosin 0.4 milliGRAM(s) Oral at bedtime  warfarin 4 milliGRAM(s) Oral once    MEDICATIONS  (PRN):  ipratropium    for Nebulization 500 MICROGram(s) Nebulizer every 6 hours PRN Shortness of Breath and/or Wheezing  levalbuterol Inhalation 1.25 milliGRAM(s) Inhalation every 6 hours PRN shortness of breath  metoprolol tartrate Injectable 5 milliGRAM(s) IV Push every 6 hours PRN sustained HR >130  OLANZapine Injectable 5 milliGRAM(s) IntraMuscular every 6 hours PRN agitation    Allergies: NKDA     Social: former smoker 22+ pack years quit at 42, denies EtOH abuse, denies illicit drug use     FH:  Family history of diabetes mellitus (Child)    ICU Vital Signs Last 24 Hrs  T(C): 36.4 (24 Feb 2023 16:15), Max: 36.6 (24 Feb 2023 05:06)  T(F): 97.5 (24 Feb 2023 16:15), Max: 97.8 (24 Feb 2023 05:06)  HR: 82 (24 Feb 2023 16:15) (82 - 98)  BP: 122/75 (24 Feb 2023 16:15) (107/69 - 138/82)  BP(mean): --  ABP: --  ABP(mean): --  RR: 18 (24 Feb 2023 16:15) (18 - 18)  SpO2: 96% (24 Feb 2023 16:15) (95% - 96%)    O2 Parameters below as of 24 Feb 2023 16:15  Patient On (Oxygen Delivery Method): nasal cannula  O2 Flow (L/min): 4    I&O's Detail    23 Feb 2023 07:01  -  24 Feb 2023 07:00  --------------------------------------------------------  IN:    Oral Fluid: 340 mL  Total IN: 340 mL    OUT:    Voided (mL): 1300 mL  Total OUT: 1300 mL    Total NET: -960 mL      24 Feb 2023 07:01  -  24 Feb 2023 20:15  --------------------------------------------------------  IN:    Oral Fluid: 558 mL  Total IN: 558 mL    OUT:    Voided (mL): 1565 mL  Total OUT: 1565 mL    Total NET: -1007 mL      LABS:                                            10.8   8.22  )-----------( 235      ( 24 Feb 2023 04:40 )             36.9   02-24    142  |  102  |  36.6<H>  ----------------------------<  97  3.9   |  30.0<H>  |  1.17    Ca    8.9      24 Feb 2023 04:40  Mg     1.9     02-23    TPro  6.3<L>  /  Alb  3.2<L>  /  TBili  0.4  /  DBili  x   /  AST  18  /  ALT  13  /  AlkPhos  68  02-23      Physical Examination:    General: alert, in NAD      HEENT: NC/AT, anicteric, MMM    PULM: diminished breath sounds at bilateral bases, no increased work of breathing     NECK: Supple,  trachea midline    CVS: S1S2, RRR    ABD: Soft, nondistended, nontender, normoactive bowel sounds, obese     EXT: 1+ edema, nontender    SKIN: Warm and well perfused, no rashes noted    NEURO: Alert, oriented, interactive, nonfocal      RADIOLOGY:   CT chest aorta:  IMPRESSION:    Moderate pleural effusions with findings of pulmonary edema. Superimposed infection cannot be excluded. Follow to resolution. Post-CABG changes. Aortic valve calcification and incomplete filling of the left atrial appendage. Correlate with echocardiogram. Nonspecific slightly increased trace abdominopelvic ascites since 1/4/2023 and increased abdominal wall edema may be due to third spacing. Correlate clinically.    Aortic aneurysms as follows:  -Mid ascending aorta: 4.2 cm stable since 2017  -Distal descending thoracic aorta: 4.7 cm, previously 3.9 cm in 2017. Prominent pedunculated noncalcified plaque, considered to be high risk for embolic event.  -Infrarenal abdominal aorta: 6.1 x 4.7 cm, stable since 1/4/2023, previously 4.7 x 3.7 cm on 6/20/2015. Large peripheral mural thrombus. This is considered high risk for rupture based on size. No clear evidence of rupture of the abdominal aortic aneurysm on this study.  -Left common iliac artery: 2.4 cm.    Severe mid SMA stenosis, image 174 series 6, new since 2015.    Transversely oriented L1 vertebral body fracture with increased displacement of the dominant fracture components since 1/4/2023 CT. Posterior element involvement cannot be excluded on this study. Transversely oriented T8 vertebral body fracture also noted, which appears at least subacute in timeframe. Nondisplaced left L1 transverse process also redemonstrated. Recommend dedicated spinal CT and/or MRI imaging for further evaluation.

## 2023-02-24 NOTE — DIETITIAN INITIAL EVALUATION ADULT - PERTINENT MEDS FT
MEDICATIONS  (STANDING):  acetaminophen     Tablet .. 975 milliGRAM(s) Oral every 8 hours  aspirin  chewable 81 milliGRAM(s) Oral daily  atorvastatin 40 milliGRAM(s) Oral at bedtime  budesonide  80 MICROgram(s)/formoterol 4.5 MICROgram(s) Inhaler 2 Puff(s) Inhalation two times a day  clotrimazole/betamethasone Cream 1 Application(s) Topical two times a day  furosemide    Tablet 40 milliGRAM(s) Oral two times a day  metoprolol tartrate 25 milliGRAM(s) Oral two times a day  risperiDONE   Tablet 0.5 milliGRAM(s) Oral at bedtime  sertraline 50 milliGRAM(s) Oral daily  tamsulosin 0.4 milliGRAM(s) Oral at bedtime  warfarin 4 milliGRAM(s) Oral once    MEDICATIONS  (PRN):  ipratropium    for Nebulization 500 MICROGram(s) Nebulizer every 6 hours PRN Shortness of Breath and/or Wheezing  levalbuterol Inhalation 1.25 milliGRAM(s) Inhalation every 6 hours PRN shortness of breath  metoprolol tartrate Injectable 5 milliGRAM(s) IV Push every 6 hours PRN sustained HR >130  OLANZapine Injectable 5 milliGRAM(s) IntraMuscular every 6 hours PRN agitation

## 2023-02-24 NOTE — PROGRESS NOTE ADULT - SUBJECTIVE AND OBJECTIVE BOX
seen for delirium    episodes of confusion/agitation overnight  ros negative this am      MEDICATIONS  (STANDING):  acetaminophen     Tablet .. 975 milliGRAM(s) Oral every 8 hours  aspirin  chewable 81 milliGRAM(s) Oral daily  atorvastatin 40 milliGRAM(s) Oral at bedtime  budesonide  80 MICROgram(s)/formoterol 4.5 MICROgram(s) Inhaler 2 Puff(s) Inhalation two times a day  clotrimazole/betamethasone Cream 1 Application(s) Topical two times a day  furosemide    Tablet 40 milliGRAM(s) Oral two times a day  metoprolol tartrate 25 milliGRAM(s) Oral two times a day  risperiDONE   Tablet 0.5 milliGRAM(s) Oral at bedtime  sertraline 50 milliGRAM(s) Oral daily  tamsulosin 0.4 milliGRAM(s) Oral at bedtime  warfarin 4 milliGRAM(s) Oral once    MEDICATIONS  (PRN):  ipratropium    for Nebulization 500 MICROGram(s) Nebulizer every 6 hours PRN Shortness of Breath and/or Wheezing  levalbuterol Inhalation 1.25 milliGRAM(s) Inhalation every 6 hours PRN shortness of breath  metoprolol tartrate Injectable 5 milliGRAM(s) IV Push every 6 hours PRN sustained HR >130  OLANZapine Injectable 5 milliGRAM(s) IntraMuscular every 6 hours PRN agitation      Allergies    No Known Allergies    Intolerances    OHS (Unknown)      Vital Signs Last 24 Hrs  T(C): 36.1 (24 Feb 2023 09:05), Max: 36.6 (24 Feb 2023 05:06)  T(F): 97 (24 Feb 2023 09:05), Max: 97.8 (24 Feb 2023 05:06)  HR: 98 (24 Feb 2023 09:05) (83 - 98)  BP: 138/82 (24 Feb 2023 09:05) (108/71 - 138/82)  BP(mean): --  RR: 18 (24 Feb 2023 09:05) (18 - 18)  SpO2: 95% (24 Feb 2023 09:05) (95% - 95%)    Parameters below as of 24 Feb 2023 09:05  Patient On (Oxygen Delivery Method): nasal cannula  O2 Flow (L/min): 4      PHYSICAL EXAM:    GENERAL: NAD  CHEST/LUNG: Clear to ausculation bilaterally  HEART: Regular rate and rhythm; S1 S2  ABDOMEN: Soft, Nondistended; Bowel sounds present  EXTREMITIES:  no edema   NERVOUS SYSTEM:  Alert & Oriented X2 responds appropriately follows directions     LABS:                        10.8   8.22  )-----------( 235      ( 24 Feb 2023 04:40 )             36.9     02-24    142  |  102  |  36.6<H>  ----------------------------<  97  3.9   |  30.0<H>  |  1.17    Ca    8.9      24 Feb 2023 04:40  Mg     1.9     02-23    TPro  6.3<L>  /  Alb  3.2<L>  /  TBili  0.4  /  DBili  x   /  AST  18  /  ALT  13  /  AlkPhos  68  02-23    PT/INR - ( 24 Feb 2023 04:40 )   PT: 26.5 sec;   INR: 2.27 ratio               CAPILLARY BLOOD GLUCOSE            RADIOLOGY & ADDITIONAL TESTS:

## 2023-02-24 NOTE — PROGRESS NOTE ADULT - ASSESSMENT
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, CAD s/p CABG, CVA without residual, COPD, GIGI on CPAP presented to ED s/p fall while trying to go to bathroom, CT with L1 vertebral body fracturewith mild retropulsion, T8 vertebral body fracture, chronic spinous process fractures C7 T4 T7, nondisplaced left L1 transverse process fracture    PLAN:  - Will d/w attending  - Q4 neuro checks  - MR T/L spine without contrast pending  - Bedrest until MRI completed  - Pain control PRN, avoid oversedation to ensure accurate neurologic exams  - Supportive care/further medical management per primary team  - Further recommendations pending imaging results

## 2023-02-24 NOTE — DIETITIAN INITIAL EVALUATION ADULT - OTHER INFO
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 As per patient he woke up this morning and fell trying to get to the bathroom. Pt admitted with acute resp failure 2/2 B/L pleural effusions, L1 and T8 vertebral fractures.

## 2023-02-24 NOTE — PROGRESS NOTE ADULT - SUBJECTIVE AND OBJECTIVE BOX
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 Patient seen earlier this AM, no complaints. Exam stable    Vital Signs Last 24 Hrs  T(C): 36.4 (24 Feb 2023 16:15), Max: 36.6 (24 Feb 2023 05:06)  T(F): 97.5 (24 Feb 2023 16:15), Max: 97.8 (24 Feb 2023 05:06)  HR: 82 (24 Feb 2023 16:15) (82 - 98)  BP: 122/75 (24 Feb 2023 16:15) (107/69 - 138/82)  BP(mean): --  RR: 18 (24 Feb 2023 16:15) (18 - 18)  SpO2: 96% (24 Feb 2023 16:15) (95% - 96%)    Parameters below as of 24 Feb 2023 16:15  Patient On (Oxygen Delivery Method): nasal cannula  O2 Flow (L/min): 4    PHYSICAL EXAM:  GENERAL: NAD, well-groomed  HEAD: Atraumatic, normocephalic  SERGO COMA SCORE: E- 4 V- 5 M- 6=15  MENTAL STATUS: AAO x3; Appropriately conversant without aphasia; following commands  CRANIAL NERVES: PERRL. EOMI without nystagmus. Face symmetric w/ normal eye closure and smile, tongue midline. Hearing grossly intact. Speech clear  MOTOR: strength 5/5 b/l upper and lower extremities  SENSATION: grossly intact to light touch all extremities    LABS:                        10.8   8.22  )-----------( 235      ( 24 Feb 2023 04:40 )             36.9     02-24    142  |  102  |  36.6<H>  ----------------------------<  97  3.9   |  30.0<H>  |  1.17    Ca    8.9      24 Feb 2023 04:40  Mg     1.9     02-23    TPro  6.3<L>  /  Alb  3.2<L>  /  TBili  0.4  /  DBili  x   /  AST  18  /  ALT  13  /  AlkPhos  68  02-23    PT/INR - ( 24 Feb 2023 04:40 )   PT: 26.5 sec;   INR: 2.27 ratio      02-23 @ 07:01  -  02-24 @ 07:00  --------------------------------------------------------  IN: 340 mL / OUT: 1300 mL / NET: -960 mL    02-24 @ 07:01  -  02-24 @ 18:41  --------------------------------------------------------  IN: 558 mL / OUT: 1365 mL / NET: -807 mL    RADIOLOGY & ADDITIONAL TESTS:  CT Lumbar Spine No Cont (02.20.23 @ 01:14)  Please see prior chest abdomen pelvic CT study for description of the   extraspinal findings which are not fully covered on this spine CT study.  A horizontally oriented and distracted fracture is again noted involving   the L1 vertebral body, stable compared to the 02/19/2023 CT study. Mild   retropulsion of bone is also stable. The associated degree of spinal   canal stenosis is not well demonstrated with CT technique.  A horizontally oriented and minimally distracted fracture is again noted   involving the inferior aspect of the T8 vertebral body, also stable   compared to 02/19/2023 spine CT study.  Minimal compression deformities in thelumbar spine are unchanged.  Chronic appearing well-corticated fractures at the tips of the spinous   processes at the C7, T4, T7 levels is noted unchanged. A nondisplaced   left L1 transverse process fracture is again noted.  Multilevel endplatedegenerative changes are noted throughout the   thoracic and lumbar spine, grossly stable.  In the lumbar spine, disc bulges, facet degenerative changes, and   ligamentum flavum hypertrophy result in multilevel spinal canal stenosis   and neural foraminal narrowing, the overall degree of which is not well   demonstrated with CT technique. The degree of thoracic spinal canal   stenosis is also not well demonstrated with CT technique. Consider   correlation with spine MRI imaging follow-up if clinically warranted,   provided there are no MRI contraindications.  Generalized osteopenia is present. Consider correlation with bone   densitometry for better quantification if clinically warranted.  Heterogeneous enlargement the right lobe of thethyroid is noted.   Bilateral pleural effusions are noted. An aortic aneurysm is noted.   Please see prior chest abdomen pelvic CT report from 02/19/2023. Serial   imaging follow-up over time is recommended to monitor for stability.  Impression:    The previously noted thoracic and lumbar spine fractures are stable   compared to the 02/19/2023 chest abdomen pelvic CT study. No new   additional fractures are noted over the time interval.  Consider follow-up thoracic and lumbar spine MRI studies for further   workup, if there are no MRI contraindications.

## 2023-02-24 NOTE — PROGRESS NOTE ADULT - ASSESSMENT
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 In ER, pt hypoxic with mild anemia and supratherapeutic INR; CTA with moderate b/l pleural effusions and atelectasis, L1, T8 fractures, aortic aneurysms.     paranoia, delirium    psych and palliative following     started risperidone 0.5mg qhs and monitor    c/w prn zyprexa    Acute hypoxic respiratory failure 2/2 moderate b/l pleural effusions likely due to acute on chronic HFrEF and copd exacerbation   severe AS     s/p IV lasix, now on PO    CT surgery/cardiology following    xopenex     s/p short course of steroids, pulm following     metoprolol ER-->metoprolol tartrate bid     prior known EF 35%    not candidate for invasive procedures given mental status    Vertebral fractures s/p fall   -CTA a/p: transversely oriented L1 vertebral body fx with increased displacement of dominant fx components since 1/4/23; transversely oriented T8 vertebral body fx (subacute appearing)   -CT t/l spine fx stable compared to ct a/p  -trauma sx recs appreciated   -neurosx recs appreciated   -f/u MRI t/l spine, pending; MRI safety screening complete   -TLSO at bedside   -pain control    Aortic aneurysm   -6.1cm AAA and 2.4cm L common iliac aneurysm   -vascular sx recs appreciated   -no surgical intervention at this time   -arterial duplex BLE neg for popliteal aneurysms     CAD s/p CABG   -statin, bb    chronic afib   -cont bb, holding coumadin due to supratherapeutic INR   restart coumadin today      Hx PE/DVT  Factor V Leiden   -s/p IVC filter   -AC as above       2/23 updated daughter at [phone] who currently resides in [place].    mental status limiting performance of further tests/interventions.

## 2023-02-25 DIAGNOSIS — I25.10 ATHEROSCLEROTIC HEART DISEASE OF NATIVE CORONARY ARTERY WITHOUT ANGINA PECTORIS: ICD-10-CM

## 2023-02-25 LAB
INR BLD: 1.99 RATIO — HIGH (ref 0.88–1.16)
PROTHROM AB SERPL-ACNC: 23.3 SEC — HIGH (ref 10.5–13.4)

## 2023-02-25 PROCEDURE — 99233 SBSQ HOSP IP/OBS HIGH 50: CPT

## 2023-02-25 PROCEDURE — 99232 SBSQ HOSP IP/OBS MODERATE 35: CPT

## 2023-02-25 RX ORDER — METOPROLOL TARTRATE 50 MG
50 TABLET ORAL EVERY 12 HOURS
Refills: 0 | Status: DISCONTINUED | OUTPATIENT
Start: 2023-02-25 | End: 2023-03-03

## 2023-02-25 RX ORDER — WARFARIN SODIUM 2.5 MG/1
4 TABLET ORAL ONCE
Refills: 0 | Status: COMPLETED | OUTPATIENT
Start: 2023-02-25 | End: 2023-02-25

## 2023-02-25 RX ORDER — METOPROLOL TARTRATE 50 MG
12.5 TABLET ORAL ONCE
Refills: 0 | Status: COMPLETED | OUTPATIENT
Start: 2023-02-25 | End: 2023-02-25

## 2023-02-25 RX ADMIN — BUDESONIDE AND FORMOTEROL FUMARATE DIHYDRATE 2 PUFF(S): 160; 4.5 AEROSOL RESPIRATORY (INHALATION) at 23:15

## 2023-02-25 RX ADMIN — Medication 975 MILLIGRAM(S): at 23:32

## 2023-02-25 RX ADMIN — Medication 975 MILLIGRAM(S): at 06:11

## 2023-02-25 RX ADMIN — CLOTRIMAZOLE AND BETAMETHASONE DIPROPIONATE 1 APPLICATION(S): 10; .5 CREAM TOPICAL at 17:52

## 2023-02-25 RX ADMIN — Medication 975 MILLIGRAM(S): at 15:45

## 2023-02-25 RX ADMIN — WARFARIN SODIUM 4 MILLIGRAM(S): 2.5 TABLET ORAL at 23:02

## 2023-02-25 RX ADMIN — Medication 40 MILLIGRAM(S): at 06:12

## 2023-02-25 RX ADMIN — Medication 81 MILLIGRAM(S): at 11:23

## 2023-02-25 RX ADMIN — Medication 50 MILLIGRAM(S): at 17:49

## 2023-02-25 RX ADMIN — Medication 12.5 MILLIGRAM(S): at 11:26

## 2023-02-25 RX ADMIN — OLANZAPINE 5 MILLIGRAM(S): 15 TABLET, FILM COATED ORAL at 15:39

## 2023-02-25 RX ADMIN — Medication 975 MILLIGRAM(S): at 07:00

## 2023-02-25 RX ADMIN — Medication 25 MILLIGRAM(S): at 06:11

## 2023-02-25 RX ADMIN — Medication 975 MILLIGRAM(S): at 23:02

## 2023-02-25 RX ADMIN — ATORVASTATIN CALCIUM 40 MILLIGRAM(S): 80 TABLET, FILM COATED ORAL at 23:02

## 2023-02-25 RX ADMIN — Medication 975 MILLIGRAM(S): at 14:48

## 2023-02-25 RX ADMIN — TAMSULOSIN HYDROCHLORIDE 0.4 MILLIGRAM(S): 0.4 CAPSULE ORAL at 23:02

## 2023-02-25 RX ADMIN — RISPERIDONE 0.5 MILLIGRAM(S): 4 TABLET ORAL at 23:01

## 2023-02-25 RX ADMIN — BUDESONIDE AND FORMOTEROL FUMARATE DIHYDRATE 2 PUFF(S): 160; 4.5 AEROSOL RESPIRATORY (INHALATION) at 11:23

## 2023-02-25 RX ADMIN — Medication 40 MILLIGRAM(S): at 14:48

## 2023-02-25 RX ADMIN — CLOTRIMAZOLE AND BETAMETHASONE DIPROPIONATE 1 APPLICATION(S): 10; .5 CREAM TOPICAL at 06:16

## 2023-02-25 RX ADMIN — SERTRALINE 50 MILLIGRAM(S): 25 TABLET, FILM COATED ORAL at 11:23

## 2023-02-25 NOTE — PROGRESS NOTE ADULT - ASSESSMENT
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 In ER, pt hypoxic with mild anemia and supratherapeutic INR; CTA with moderate b/l pleural effusions and atelectasis, L1, T8 fractures, aortic aneurysms.     paranoia, delirium   psych and palliative following   continue risperidone 0.5mg qhs and monitor   prn zyprexa    Acute hypoxic respiratory failure 2/2 moderate b/l pleural effusions likely due to acute on chronic HFrEF and copd exacerbation   severe AS   on PO Lasix, monitor lytes  s/p steroids  continue Metoprolol  not candidate for invasive procedures given mental status    Vertebral fractures s/p fall   -CTA a/p: transversely oriented L1 vertebral body fx with increased displacement of dominant fx components since 1/4/23; transversely oriented T8 vertebral body fx (subacute appearing)   -CT t/l spine fx stable compared to ct a/p  -trauma sx recs appreciated   -neurosx recs appreciated   -f/u MRI t/l spine, pending; MRI safety screening complete   -TLSO at bedside   -pain control    Aortic aneurysm   -6.1cm AAA and 2.4cm L common iliac aneurysm   -vascular sx recs appreciated   -no surgical intervention at this time   -arterial duplex BLE neg for popliteal aneurysms     CAD s/p CABG   -statin, bb    chronic afib   rate control with beta blockers  continue coumadin  monitor INR      Hx PE/DVT  Factor V Leiden   -s/p IVC filter   -AC as above       Discussed with RN.

## 2023-02-25 NOTE — PROGRESS NOTE ADULT - SUBJECTIVE AND OBJECTIVE BOX
[facility]                                                         CARDIOLOGY AT [place] [address]                                                         Telephone: [phone]. Fax:[phone]                                                                             PROGRESS NOTE    Reason for follow up:  Chg  Update: Doing  better  INR 1.9 today  patient with less agiation      Review of symptoms:   Cardiac:  No chest pain. No dyspnea. No palpitations.  Respiratory: no cough. No dyspnea  Gastrointestinal: No diarrhea. No abdominal pain. No bleeding.   Neuro: No focal neuro complaints.      Vitals:  T(C): 36.6 (02-25-23 @ 07:41), Max: 36.7 (02-25-23 @ 04:10)  HR: 66 (02-25-23 @ 07:41) (66 - 98)  BP: 137/63 (02-25-23 @ 07:41) (107/69 - 145/92)  RR: 18 (02-25-23 @ 07:41) (18 - 18)  SpO2: 94% (02-25-23 @ 07:41) (94% - 99%)  Wt(kg): --  I&O's Summary    24 Feb 2023 07:01  -  25 Feb 2023 07:00  --------------------------------------------------------  IN: 658 mL / OUT: 1665 mL / NET: -1007 mL    25 Feb 2023 07:01  -  25 Feb 2023 10:56  --------------------------------------------------------  IN: 0 mL / OUT: 550 mL / NET: -550 mL  Weight (kg): 115.2 (02-22 @ 04:38)      PHYSICAL EXAM:  Appearance: Comfortable. No acute distress  HEENT:  Atraumatic. Normocephalic.  Normal oral mucosa  Neurologic: A & O x 3, no gross focal deficits.  Cardiovascular: RRR S1 S2,2/6 roseanna lsb  Respiratory: Lungs clear to auscultation, unlabored   Gastrointestinal:  Soft, Non-tender, + BS  Lower Extremities: No edema  Psychiatry: Patient is calm. No agitation.   Skin: warm and dry.      CURRENT MEDICATIONS:  MEDICATIONS  (STANDING):  acetaminophen     Tablet .. 975 milliGRAM(s) Oral every 8 hours  aspirin  chewable 81 milliGRAM(s) Oral daily  atorvastatin 40 milliGRAM(s) Oral at bedtime  budesonide  80 MICROgram(s)/formoterol 4.5 MICROgram(s) Inhaler 2 Puff(s) Inhalation two times a day  clotrimazole/betamethasone Cream 1 Application(s) Topical two times a day  furosemide    Tablet 40 milliGRAM(s) Oral two times a day  metoprolol tartrate 25 milliGRAM(s) Oral two times a day  risperiDONE   Tablet 0.5 milliGRAM(s) Oral at bedtime  sertraline 50 milliGRAM(s) Oral daily  tamsulosin 0.4 milliGRAM(s) Oral at bedtime      LABS:	 	  CARDIAC MARKERS ( 23 Feb 2023 05:30 )  x     / x     / x     / x     / x      p-BNP 23 Feb 2023 05:30: 6558 pg/mL                          10.8   8.22  )-----------( 235      ( 24 Feb 2023 04:40 )             36.9     02-24    142  |  102  |  36.6<H>  ----------------------------<  97  3.9   |  30.0<H>  |  1.17    Ca    8.9      24 Feb 2023 04:40      proBNP: Serum Pro-Brain Natriuretic Peptide: 6558 pg/mL (02-23 @ 05:30)  Serum Pro-Brain Natriuretic Peptide: 6286 pg/mL (02-22 @ 06:50)  Serum Pro-Brain Natriuretic Peptide: 4701 pg/mL (02-21 @ 04:26)    Lipid Profile:   HgA1c:   TSH: Thyroid Stimulating Hormone, Serum: 0.62 uIU/mL  TELEMETRY: Atrial fib rate 120  ECG:  	      DIAGNOSTIC TESTING:  [ ] Echocardiogram: < from: TTE Echo Complete w/ Contrast w/ Doppler (02.21.23 @ 11:34) >    Summary:   1. Endocardial visualization was enhanced with intravenous echo   contrast. No LV thrombus.   2. Left ventricular ejection fraction, by visual estimation, is 25 to   30%.   3. Severely decreased global left ventricular systolic function.   4. Abnormal septal motion consistent with post-operative status.   5. There is mild eccentric left ventricular hypertrophy.   6. The mitralin-flow pattern reveals no discernable A-wave, therefore   no comment on diastolic function can be made.   7. Normal right ventricular size and function, estimated PASP least 42   mmHg.   8. Severely enlarged left atrium.   9. Right atrial enlargement.  10. Moderate mitral annular calcification.  11. Mild-moderate tricuspid regurgitation.  12. Severe calcific aortic valve stenosis with low gradient,   dimensionless index 0.23.  13. There is moderate aortic root calcification.  14. Borderline dilatation of the aortic root, sinuses of Valsalva and   ascending aorta 3.9 cm, index to BSA 1.6 cm/m2 within normal.  15. Compared to the outpatient TTE study from 9/2022 prevoiusly LV EF 37%   and known low gradient aortic valve stenosis, but visually on current   study appears severely stenotic.        < from: Cardiac Cath Lab - Adult (08.06.19 @ 15:20) >  CORONARY VESSELS: The coronary circulation is right dominant.  LM:   --  LM: The vessel was large sized and mildly calcified. Angiography  showed mild atherosclerosis with no flow limiting lesions.  LAD:   --  Proximal LAD: There was a tubular 50 % stenosis. The lesion was  moderately calcified.  --  Mid LAD: There was a tubular 80 % stenosis.  --  Distal LAD: This is a good target for bypass.  --  D1: The vessel was medium to large sized. The artery was supplied by  collaterals from the left system There was a stenosis at the ostium of the  vessel segment. This lesion is a chronic total occlusion.  CX:   --  Mid circumflex: Moderate to severe disease.  --  OM1: The vessel was large sized. Angiography showed mild  atherosclerosis with no flow limiting lesions.  RCA:   --  Proximal RCA: There was a stenosis. This lesion is a chronic  total occlusion.  --  RPDA: The vessel was medium sized. Angiography showed mild  atherosclerosis with no flow limiting lesions. The artery was supplied by  collaterals. This is a good target for bypass.  COMPLICATIONS: No complications occurred during the cath lab visit.  DIAGNOSTIC IMPRESSIONS: Severe LAD disease  Moderate to severe CX disease   of the RCA  DIAGNOSTIC RECOMMENDATIONS: Evaluation by CT surgery for AVR ,  fibroelastoma removal and bypass to LAD and RCA PDA  Prepared and signed by  Wagner Santoyo MD    < end of copied text >  [ ]  Catheterization:  [ ] Stress Test:    OTHER:

## 2023-02-25 NOTE — PROGRESS NOTE ADULT - SUBJECTIVE AND OBJECTIVE BOX
INTERVAL HPI/OVERNIGHT EVENTS:    CC: delirium, chf, a fib, PE    Chart and course reviewed.  Seen earlier today  denies complaints  confused  not wanting to eat  aide at bedside    Vital Signs Last 24 Hrs  T(C): 36.6 (25 Feb 2023 16:11), Max: 36.7 (25 Feb 2023 04:10)  T(F): 97.9 (25 Feb 2023 16:11), Max: 98.1 (25 Feb 2023 04:10)  HR: 106 (25 Feb 2023 16:11) (66 - 106)  BP: 115/69 (25 Feb 2023 16:11) (98/64 - 145/92)  BP(mean): --  RR: 18 (25 Feb 2023 16:11) (18 - 18)  SpO2: 94% (25 Feb 2023 16:11) (94% - 99%)    Parameters below as of 25 Feb 2023 16:11  Patient On (Oxygen Delivery Method): nasal cannula        PHYSICAL EXAM:    GENERAL: alert, not in distress, oriented x 2  CHEST/LUNG: b/l air entry  HEART: reg  ABDOMEN: soft, bs+  EXTREMITIES:  no edema, tenderness    MEDICATIONS  (STANDING):  acetaminophen     Tablet .. 975 milliGRAM(s) Oral every 8 hours  aspirin  chewable 81 milliGRAM(s) Oral daily  atorvastatin 40 milliGRAM(s) Oral at bedtime  budesonide  80 MICROgram(s)/formoterol 4.5 MICROgram(s) Inhaler 2 Puff(s) Inhalation two times a day  clotrimazole/betamethasone Cream 1 Application(s) Topical two times a day  furosemide    Tablet 40 milliGRAM(s) Oral two times a day  metoprolol tartrate 50 milliGRAM(s) Oral every 12 hours  risperiDONE   Tablet 0.5 milliGRAM(s) Oral at bedtime  sertraline 50 milliGRAM(s) Oral daily  tamsulosin 0.4 milliGRAM(s) Oral at bedtime  warfarin 4 milliGRAM(s) Oral once    MEDICATIONS  (PRN):  ipratropium    for Nebulization 500 MICROGram(s) Nebulizer every 6 hours PRN Shortness of Breath and/or Wheezing  levalbuterol Inhalation 1.25 milliGRAM(s) Inhalation every 6 hours PRN shortness of breath  metoprolol tartrate Injectable 5 milliGRAM(s) IV Push every 6 hours PRN sustained HR >130  OLANZapine Injectable 5 milliGRAM(s) IntraMuscular every 6 hours PRN agitation      Allergies    No Known Allergies    Intolerances    OHS (Unknown)        LABS:                          10.8   8.22  )-----------( 235      ( 24 Feb 2023 04:40 )             36.9     02-24    142  |  102  |  36.6<H>  ----------------------------<  97  3.9   |  30.0<H>  |  1.17    Ca    8.9      24 Feb 2023 04:40      PT/INR - ( 25 Feb 2023 04:45 )   PT: 23.3 sec;   INR: 1.99 ratio               RADIOLOGY & ADDITIONAL TESTS:

## 2023-02-25 NOTE — PROGRESS NOTE ADULT - PROBLEM SELECTOR PLAN 1
on lasix 40mg po bid  Echo 25%  Aortic stenosis but with low gradient  continue intake and out put  N: 658 mL / OUT: 1665 mL / NET: -1007 mL  Monitor electrolytes  will add to heart failure meds with metoprolol is optimized to provide control of atrial fib

## 2023-02-26 LAB
ANION GAP SERPL CALC-SCNC: 13 MMOL/L — SIGNIFICANT CHANGE UP (ref 5–17)
BASOPHILS # BLD AUTO: 0.04 K/UL — SIGNIFICANT CHANGE UP (ref 0–0.2)
BASOPHILS NFR BLD AUTO: 0.5 % — SIGNIFICANT CHANGE UP (ref 0–2)
BUN SERPL-MCNC: 34.8 MG/DL — HIGH (ref 8–20)
CALCIUM SERPL-MCNC: 9.2 MG/DL — SIGNIFICANT CHANGE UP (ref 8.4–10.5)
CHLORIDE SERPL-SCNC: 101 MMOL/L — SIGNIFICANT CHANGE UP (ref 96–108)
CO2 SERPL-SCNC: 29 MMOL/L — SIGNIFICANT CHANGE UP (ref 22–29)
CREAT SERPL-MCNC: 1.13 MG/DL — SIGNIFICANT CHANGE UP (ref 0.5–1.3)
EGFR: 63 ML/MIN/1.73M2 — SIGNIFICANT CHANGE UP
EOSINOPHIL # BLD AUTO: 0.47 K/UL — SIGNIFICANT CHANGE UP (ref 0–0.5)
EOSINOPHIL NFR BLD AUTO: 6.2 % — HIGH (ref 0–6)
GLUCOSE SERPL-MCNC: 104 MG/DL — HIGH (ref 70–99)
HCT VFR BLD CALC: 41.6 % — SIGNIFICANT CHANGE UP (ref 39–50)
HGB BLD-MCNC: 12.5 G/DL — LOW (ref 13–17)
IMM GRANULOCYTES NFR BLD AUTO: 0.5 % — SIGNIFICANT CHANGE UP (ref 0–0.9)
INR BLD: 1.82 RATIO — HIGH (ref 0.88–1.16)
LYMPHOCYTES # BLD AUTO: 0.72 K/UL — LOW (ref 1–3.3)
LYMPHOCYTES # BLD AUTO: 9.4 % — LOW (ref 13–44)
MAGNESIUM SERPL-MCNC: 2 MG/DL — SIGNIFICANT CHANGE UP (ref 1.6–2.6)
MCHC RBC-ENTMCNC: 26.9 PG — LOW (ref 27–34)
MCHC RBC-ENTMCNC: 30 GM/DL — LOW (ref 32–36)
MCV RBC AUTO: 89.5 FL — SIGNIFICANT CHANGE UP (ref 80–100)
MONOCYTES # BLD AUTO: 0.76 K/UL — SIGNIFICANT CHANGE UP (ref 0–0.9)
MONOCYTES NFR BLD AUTO: 10 % — SIGNIFICANT CHANGE UP (ref 2–14)
NEUTROPHILS # BLD AUTO: 5.6 K/UL — SIGNIFICANT CHANGE UP (ref 1.8–7.4)
NEUTROPHILS NFR BLD AUTO: 73.4 % — SIGNIFICANT CHANGE UP (ref 43–77)
PHOSPHATE SERPL-MCNC: 4.1 MG/DL — SIGNIFICANT CHANGE UP (ref 2.4–4.7)
PLATELET # BLD AUTO: 231 K/UL — SIGNIFICANT CHANGE UP (ref 150–400)
POTASSIUM SERPL-MCNC: 4.4 MMOL/L — SIGNIFICANT CHANGE UP (ref 3.5–5.3)
POTASSIUM SERPL-SCNC: 4.4 MMOL/L — SIGNIFICANT CHANGE UP (ref 3.5–5.3)
PROTHROM AB SERPL-ACNC: 21.2 SEC — HIGH (ref 10.5–13.4)
RBC # BLD: 4.65 M/UL — SIGNIFICANT CHANGE UP (ref 4.2–5.8)
RBC # FLD: 15.1 % — HIGH (ref 10.3–14.5)
SODIUM SERPL-SCNC: 143 MMOL/L — SIGNIFICANT CHANGE UP (ref 135–145)
WBC # BLD: 7.63 K/UL — SIGNIFICANT CHANGE UP (ref 3.8–10.5)
WBC # FLD AUTO: 7.63 K/UL — SIGNIFICANT CHANGE UP (ref 3.8–10.5)

## 2023-02-26 PROCEDURE — 72146 MRI CHEST SPINE W/O DYE: CPT | Mod: 26

## 2023-02-26 PROCEDURE — 99233 SBSQ HOSP IP/OBS HIGH 50: CPT

## 2023-02-26 PROCEDURE — 72148 MRI LUMBAR SPINE W/O DYE: CPT | Mod: 26

## 2023-02-26 RX ORDER — WARFARIN SODIUM 2.5 MG/1
5 TABLET ORAL ONCE
Refills: 0 | Status: COMPLETED | OUTPATIENT
Start: 2023-02-26 | End: 2023-02-26

## 2023-02-26 RX ADMIN — Medication 975 MILLIGRAM(S): at 13:08

## 2023-02-26 RX ADMIN — RISPERIDONE 0.5 MILLIGRAM(S): 4 TABLET ORAL at 22:15

## 2023-02-26 RX ADMIN — Medication 40 MILLIGRAM(S): at 05:31

## 2023-02-26 RX ADMIN — Medication 81 MILLIGRAM(S): at 13:08

## 2023-02-26 RX ADMIN — CLOTRIMAZOLE AND BETAMETHASONE DIPROPIONATE 1 APPLICATION(S): 10; .5 CREAM TOPICAL at 18:14

## 2023-02-26 RX ADMIN — Medication 50 MILLIGRAM(S): at 05:32

## 2023-02-26 RX ADMIN — Medication 975 MILLIGRAM(S): at 22:15

## 2023-02-26 RX ADMIN — SERTRALINE 50 MILLIGRAM(S): 25 TABLET, FILM COATED ORAL at 13:08

## 2023-02-26 RX ADMIN — ATORVASTATIN CALCIUM 40 MILLIGRAM(S): 80 TABLET, FILM COATED ORAL at 22:15

## 2023-02-26 RX ADMIN — CLOTRIMAZOLE AND BETAMETHASONE DIPROPIONATE 1 APPLICATION(S): 10; .5 CREAM TOPICAL at 05:30

## 2023-02-26 RX ADMIN — Medication 975 MILLIGRAM(S): at 05:33

## 2023-02-26 RX ADMIN — TAMSULOSIN HYDROCHLORIDE 0.4 MILLIGRAM(S): 0.4 CAPSULE ORAL at 22:15

## 2023-02-26 RX ADMIN — Medication 975 MILLIGRAM(S): at 06:03

## 2023-02-26 RX ADMIN — Medication 975 MILLIGRAM(S): at 14:05

## 2023-02-26 RX ADMIN — WARFARIN SODIUM 5 MILLIGRAM(S): 2.5 TABLET ORAL at 22:15

## 2023-02-26 RX ADMIN — Medication 40 MILLIGRAM(S): at 13:08

## 2023-02-26 RX ADMIN — Medication 975 MILLIGRAM(S): at 23:00

## 2023-02-26 NOTE — PROGRESS NOTE ADULT - ASSESSMENT
88 yr old male with hypertension, atrial fibrillation, PE/DVT s/p IVC filter, Factor V Leiden on coumadin CAD s/p CABG, CVA, COPD, GIGI presented after a syncopal episode and fall at home. Given fall, he was evaluated by trauma service on admission. Clinically noted to be in fluid overload, CHF exacerbation with elevated BNP, supratherapeutic INR. Coumadin was held. IV Lasix was given. Cardiology consulted, ECHO was ordered. CT 88 yr old male with hypertension, atrial fibrillation, PE/DVT s/p IVC filter, Factor V Leiden on coumadin CAD s/p CABG, CVA, COPD, GIGI presented after a syncopal episode and fall at home. Given fall, he was evaluated by trauma service on admission. Clinically noted to be in fluid overload, CHF exacerbation with elevated BNP, supratherapeutic INR. Coumadin was held. IV Lasix was given. Cardiology consulted, ECHO was ordered. CT head without acute stroke. CT abdomen revealed a 6.1 infrarenal AAA, mural thrombus, L1 and T8 fractures. Vascular and neurosurgery consults were requested. No acute intervention per vascular. MR spine and TLSO brace was recommended by NSG. His ECHO revealed an EF 25-30% with severe aortic stenosis. IV Lasix was continued. Pulmonary consulted as well for acute hypoxic respiratory failure, steroids were initiated. His course was complicated by delirium, hence psychiatry consulted, advised Risperdal. Palliative care was consulted, goals of care and HCP established, patient was made DNR/DNI by daughter/HCP. Given severe AS, CT surgery was consulted by cardiology, advised dobutamine stress test to better evaluate aortic stenosis in setting of CHF. His coumadin was held until INR improved, this was subsequently resumed. Rate control medications adjusted by cardiology.    1. Acute hypoxic respiratory failure sec decompensated CHF and aortic stenosis:  Improved  continue PO Lasix, Metoprolol  aspirin, statin  Dobutamine stress when feasible to evaluate aortic stenosis    2. Atrial fibrillation, PE/DVT, Factor V Leiden:  Coumadin resumed  monitor INR.    3. Metabolic encephalopathy:  Slow improvement  maintain CO for safety  Risperdal per psych    4. COPD exacerbation:  Improved  s/p short course of Prednisone  inhaled steroids  Duoneb    5. L1/T8 fracture:  MR noted  NSG follow up  pain control  TLSO brace to be used when OOB once cleared by NSG.    6. DVT PPX:  on coumadin    Discussed with RN.

## 2023-02-26 NOTE — PROGRESS NOTE ADULT - SUBJECTIVE AND OBJECTIVE BOX
INTERVAL HPI/OVERNIGHT EVENTS:    CC: delirium, metabolic encephalopathy, acute HF, PE, cva, aortic stenosis    No overnight events  more calm  intermittent episodes of confusion.  denies complaints    Vital Signs Last 24 Hrs  T(C): 36.7 (26 Feb 2023 13:30), Max: 36.7 (26 Feb 2023 05:24)  T(F): 98.1 (26 Feb 2023 13:30), Max: 98.1 (26 Feb 2023 05:24)  HR: 107 (26 Feb 2023 13:30) (87 - 107)  BP: 100/54 (26 Feb 2023 13:30) (100/54 - 144/86)  BP(mean): --  RR: 18 (26 Feb 2023 13:30) (17 - 18)  SpO2: 96% (26 Feb 2023 13:30) (93% - 98%)    Parameters below as of 26 Feb 2023 13:30  Patient On (Oxygen Delivery Method): nasal cannula        PHYSICAL EXAM:    GENERAL: alert, not in distress, oriented x 1-2  CHEST/LUNG: b/l air entry, decreased in bases  HEART: reg  ABDOMEN: soft, bs+  EXTREMITIES:  no edema, tenderness    MEDICATIONS  (STANDING):  acetaminophen     Tablet .. 975 milliGRAM(s) Oral every 8 hours  aspirin  chewable 81 milliGRAM(s) Oral daily  atorvastatin 40 milliGRAM(s) Oral at bedtime  budesonide  80 MICROgram(s)/formoterol 4.5 MICROgram(s) Inhaler 2 Puff(s) Inhalation two times a day  clotrimazole/betamethasone Cream 1 Application(s) Topical two times a day  furosemide    Tablet 40 milliGRAM(s) Oral two times a day  metoprolol tartrate 50 milliGRAM(s) Oral every 12 hours  risperiDONE   Tablet 0.5 milliGRAM(s) Oral at bedtime  sertraline 50 milliGRAM(s) Oral daily  tamsulosin 0.4 milliGRAM(s) Oral at bedtime    MEDICATIONS  (PRN):  ipratropium    for Nebulization 500 MICROGram(s) Nebulizer every 6 hours PRN Shortness of Breath and/or Wheezing  levalbuterol Inhalation 1.25 milliGRAM(s) Inhalation every 6 hours PRN shortness of breath  metoprolol tartrate Injectable 5 milliGRAM(s) IV Push every 6 hours PRN sustained HR >130  OLANZapine Injectable 5 milliGRAM(s) IntraMuscular every 6 hours PRN agitation      Allergies    No Known Allergies    Intolerances    OHS (Unknown)        LABS:                          12.5   7.63  )-----------( 231      ( 26 Feb 2023 05:10 )             41.6     02-26    143  |  101  |  34.8<H>  ----------------------------<  104<H>  4.4   |  29.0  |  1.13    Ca    9.2      26 Feb 2023 05:10  Phos  4.1     02-26  Mg     2.0     02-26      PT/INR - ( 26 Feb 2023 05:10 )   PT: 21.2 sec;   INR: 1.82 ratio               RADIOLOGY & ADDITIONAL TESTS:

## 2023-02-27 LAB
ANION GAP SERPL CALC-SCNC: 15 MMOL/L — SIGNIFICANT CHANGE UP (ref 5–17)
BASOPHILS # BLD AUTO: 0.03 K/UL — SIGNIFICANT CHANGE UP (ref 0–0.2)
BASOPHILS NFR BLD AUTO: 0.4 % — SIGNIFICANT CHANGE UP (ref 0–2)
BUN SERPL-MCNC: 41.3 MG/DL — HIGH (ref 8–20)
CALCIUM SERPL-MCNC: 9.1 MG/DL — SIGNIFICANT CHANGE UP (ref 8.4–10.5)
CHLORIDE SERPL-SCNC: 102 MMOL/L — SIGNIFICANT CHANGE UP (ref 96–108)
CO2 SERPL-SCNC: 28 MMOL/L — SIGNIFICANT CHANGE UP (ref 22–29)
CREAT SERPL-MCNC: 1.32 MG/DL — HIGH (ref 0.5–1.3)
EGFR: 52 ML/MIN/1.73M2 — LOW
EOSINOPHIL # BLD AUTO: 0.27 K/UL — SIGNIFICANT CHANGE UP (ref 0–0.5)
EOSINOPHIL NFR BLD AUTO: 3.7 % — SIGNIFICANT CHANGE UP (ref 0–6)
GLUCOSE SERPL-MCNC: 109 MG/DL — HIGH (ref 70–99)
HCT VFR BLD CALC: 40.3 % — SIGNIFICANT CHANGE UP (ref 39–50)
HGB BLD-MCNC: 12 G/DL — LOW (ref 13–17)
IMM GRANULOCYTES NFR BLD AUTO: 0.4 % — SIGNIFICANT CHANGE UP (ref 0–0.9)
INR BLD: 2.08 RATIO — HIGH (ref 0.88–1.16)
LYMPHOCYTES # BLD AUTO: 0.53 K/UL — LOW (ref 1–3.3)
LYMPHOCYTES # BLD AUTO: 7.2 % — LOW (ref 13–44)
MAGNESIUM SERPL-MCNC: 2 MG/DL — SIGNIFICANT CHANGE UP (ref 1.6–2.6)
MCHC RBC-ENTMCNC: 26.4 PG — LOW (ref 27–34)
MCHC RBC-ENTMCNC: 29.8 GM/DL — LOW (ref 32–36)
MCV RBC AUTO: 88.8 FL — SIGNIFICANT CHANGE UP (ref 80–100)
MONOCYTES # BLD AUTO: 0.68 K/UL — SIGNIFICANT CHANGE UP (ref 0–0.9)
MONOCYTES NFR BLD AUTO: 9.2 % — SIGNIFICANT CHANGE UP (ref 2–14)
NEUTROPHILS # BLD AUTO: 5.83 K/UL — SIGNIFICANT CHANGE UP (ref 1.8–7.4)
NEUTROPHILS NFR BLD AUTO: 79.1 % — HIGH (ref 43–77)
PHOSPHATE SERPL-MCNC: 3.5 MG/DL — SIGNIFICANT CHANGE UP (ref 2.4–4.7)
PLATELET # BLD AUTO: 227 K/UL — SIGNIFICANT CHANGE UP (ref 150–400)
POTASSIUM SERPL-MCNC: 3.6 MMOL/L — SIGNIFICANT CHANGE UP (ref 3.5–5.3)
POTASSIUM SERPL-SCNC: 3.6 MMOL/L — SIGNIFICANT CHANGE UP (ref 3.5–5.3)
PROTHROM AB SERPL-ACNC: 24.3 SEC — HIGH (ref 10.5–13.4)
RBC # BLD: 4.54 M/UL — SIGNIFICANT CHANGE UP (ref 4.2–5.8)
RBC # FLD: 15 % — HIGH (ref 10.3–14.5)
SODIUM SERPL-SCNC: 145 MMOL/L — SIGNIFICANT CHANGE UP (ref 135–145)
WBC # BLD: 7.37 K/UL — SIGNIFICANT CHANGE UP (ref 3.8–10.5)
WBC # FLD AUTO: 7.37 K/UL — SIGNIFICANT CHANGE UP (ref 3.8–10.5)

## 2023-02-27 PROCEDURE — 99232 SBSQ HOSP IP/OBS MODERATE 35: CPT

## 2023-02-27 PROCEDURE — 99233 SBSQ HOSP IP/OBS HIGH 50: CPT

## 2023-02-27 RX ORDER — WARFARIN SODIUM 2.5 MG/1
4 TABLET ORAL ONCE
Refills: 0 | Status: COMPLETED | OUTPATIENT
Start: 2023-02-27 | End: 2023-02-27

## 2023-02-27 RX ORDER — OLANZAPINE 15 MG/1
5 TABLET, FILM COATED ORAL AT BEDTIME
Refills: 0 | Status: DISCONTINUED | OUTPATIENT
Start: 2023-02-27 | End: 2023-03-02

## 2023-02-27 RX ADMIN — SERTRALINE 50 MILLIGRAM(S): 25 TABLET, FILM COATED ORAL at 11:09

## 2023-02-27 RX ADMIN — Medication 40 MILLIGRAM(S): at 14:51

## 2023-02-27 RX ADMIN — ATORVASTATIN CALCIUM 40 MILLIGRAM(S): 80 TABLET, FILM COATED ORAL at 21:22

## 2023-02-27 RX ADMIN — Medication 40 MILLIGRAM(S): at 05:56

## 2023-02-27 RX ADMIN — Medication 975 MILLIGRAM(S): at 14:51

## 2023-02-27 RX ADMIN — Medication 975 MILLIGRAM(S): at 21:22

## 2023-02-27 RX ADMIN — Medication 975 MILLIGRAM(S): at 05:56

## 2023-02-27 RX ADMIN — BUDESONIDE AND FORMOTEROL FUMARATE DIHYDRATE 2 PUFF(S): 160; 4.5 AEROSOL RESPIRATORY (INHALATION) at 21:48

## 2023-02-27 RX ADMIN — OLANZAPINE 5 MILLIGRAM(S): 15 TABLET, FILM COATED ORAL at 21:23

## 2023-02-27 RX ADMIN — CLOTRIMAZOLE AND BETAMETHASONE DIPROPIONATE 1 APPLICATION(S): 10; .5 CREAM TOPICAL at 17:47

## 2023-02-27 RX ADMIN — BUDESONIDE AND FORMOTEROL FUMARATE DIHYDRATE 2 PUFF(S): 160; 4.5 AEROSOL RESPIRATORY (INHALATION) at 11:13

## 2023-02-27 RX ADMIN — CLOTRIMAZOLE AND BETAMETHASONE DIPROPIONATE 1 APPLICATION(S): 10; .5 CREAM TOPICAL at 05:57

## 2023-02-27 RX ADMIN — Medication 975 MILLIGRAM(S): at 23:34

## 2023-02-27 RX ADMIN — Medication 975 MILLIGRAM(S): at 07:02

## 2023-02-27 RX ADMIN — Medication 81 MILLIGRAM(S): at 11:10

## 2023-02-27 RX ADMIN — WARFARIN SODIUM 4 MILLIGRAM(S): 2.5 TABLET ORAL at 21:23

## 2023-02-27 RX ADMIN — Medication 975 MILLIGRAM(S): at 15:21

## 2023-02-27 RX ADMIN — OLANZAPINE 5 MILLIGRAM(S): 15 TABLET, FILM COATED ORAL at 02:45

## 2023-02-27 RX ADMIN — Medication 50 MILLIGRAM(S): at 05:56

## 2023-02-27 RX ADMIN — Medication 50 MILLIGRAM(S): at 17:46

## 2023-02-27 RX ADMIN — Medication 975 MILLIGRAM(S): at 23:15

## 2023-02-27 RX ADMIN — TAMSULOSIN HYDROCHLORIDE 0.4 MILLIGRAM(S): 0.4 CAPSULE ORAL at 21:23

## 2023-02-27 NOTE — PROGRESS NOTE ADULT - SUBJECTIVE AND OBJECTIVE BOX
[facility]                                                         CARDIOLOGY AT [address], [place] [address]                                                         Telephone: [phone]. Fax:[phone]                                                                             PROGRESS NOTE    Reason for follow up:   Update:       Review of symptoms:   Cardiac:  No chest pain. No dyspnea. No palpitations.  Respiratory: no cough. No dyspnea  Gastrointestinal: No diarrhea. No abdominal pain. No bleeding.   Neuro: No focal neuro complaints.    Vitals:  T(C): 36.9 (02-27-23 @ 11:25), Max: 37.1 (02-27-23 @ 10:42)  HR: 86 (02-27-23 @ 11:25) (86 - 110)  BP: 145/78 (02-27-23 @ 11:25) (104/66 - 145/78)  RR: 18 (02-27-23 @ 11:25) (18 - 18)  SpO2: 98% (02-27-23 @ 11:25) (95% - 98%)  Wt(kg): --  I&O's Summary    26 Feb 2023 07:01  -  27 Feb 2023 07:00  --------------------------------------------------------  IN: 750 mL / OUT: 1325 mL / NET: -575 mL    27 Feb 2023 07:01  -  27 Feb 2023 15:51  --------------------------------------------------------  IN: 1100 mL / OUT: 550 mL / NET: 550 mL          PHYSICAL EXAM:  Appearance: Comfortable. No acute distress  HEENT:  Atraumatic. Normocephalic.  Normal oral mucosa  Neurologic: A & O x 3, no gross focal deficits.  Cardiovascular: RRR S1 S2, No murmur, no rubs/gallops. No JVD  Respiratory: Lungs clear to auscultation, unlabored   Gastrointestinal:  Soft, Non-tender, + BS  Radial:  Groin:  Lower Extremities: 2+ Peripheral Pulses, No clubbing, cyanosis, or edema  Psychiatry: Patient is calm. No agitation.   Skin: warm and dry.    CURRENT CARDIAC MEDICATIONS:  furosemide    Tablet 40 milliGRAM(s) Oral two times a day  metoprolol tartrate 50 milliGRAM(s) Oral every 12 hours  metoprolol tartrate Injectable 5 milliGRAM(s) IV Push every 6 hours PRN      CURRENT OTHER MEDICATIONS:  budesonide  80 MICROgram(s)/formoterol 4.5 MICROgram(s) Inhaler 2 Puff(s) Inhalation two times a day  ipratropium    for Nebulization 500 MICROGram(s) Nebulizer every 6 hours PRN Shortness of Breath and/or Wheezing  levalbuterol Inhalation 1.25 milliGRAM(s) Inhalation every 6 hours PRN shortness of breath  acetaminophen     Tablet .. 975 milliGRAM(s) Oral every 8 hours  OLANZapine 5 milliGRAM(s) Oral at bedtime  OLANZapine Injectable 5 milliGRAM(s) IntraMuscular every 6 hours PRN agitation  sertraline 50 milliGRAM(s) Oral daily  atorvastatin 40 milliGRAM(s) Oral at bedtime  aspirin  chewable 81 milliGRAM(s) Oral daily  clotrimazole/betamethasone Cream 1 Application(s) Topical two times a day  tamsulosin 0.4 milliGRAM(s) Oral at bedtime  warfarin 4 milliGRAM(s) Oral once, Stop order after: 1 Doses      LABS:	 	  ( 23 Feb 2023 05:30 )  Troponin T  X    ,  CPK  X    , CKMB  X    , BNP 6558<H>    , ( 22 Feb 2023 06:50 )  Troponin T  X    ,  CPK  X    , CKMB  X    , BNP 6286<H>    , ( 21 Feb 2023 04:26 )  Troponin T  X    ,  CPK  X    , CKMB  X    , BNP 4701<H>                              12.0   7.37  )-----------( 227      ( 27 Feb 2023 05:55 )             40.3     02-27    145  |  102  |  41.3<H>  ----------------------------<  109<H>  3.6   |  28.0  |  1.32<H>    Ca    9.1      27 Feb 2023 05:55  Phos  3.5     02-27  Mg     2.0     02-27      PT/INR/PTT ( 27 Feb 2023 05:55 )                       :                       :      24.3         :       X                     .        .                   .              .           .       2.08        .                                       Lipid Profile:   HgA1c:   TSH: Thyroid Stimulating Hormone, Serum: 0.62 uIU/mL      TELEMETRY:   ECG:    DIAGNOSTIC TESTING:  [ ] Echocardiogram:   [ ]  Catheterization:  [ ] Stress Test:    OTHER: 	                                                                [facility]                                                         CARDIOLOGY AT [place] [address]                                                         Telephone: [phone]. Fax:[phone]                                                                             PROGRESS NOTE    Reason for follow up: sp fall   Update: "feels ok " confused   denies complaints of chest pain/sob/dizziness/palps       Review of symptoms:   Cardiac:  No chest pain. No dyspnea. No palpitations.  Respiratory: no cough. No dyspnea  Gastrointestinal: No diarrhea. No abdominal pain. No bleeding.   Neuro: No focal neuro complaints.    Vitals:  T(C): 36.9 (02-27-23 @ 11:25), Max: 37.1 (02-27-23 @ 10:42)  HR: 86 (02-27-23 @ 11:25) (86 - 110)  BP: 145/78 (02-27-23 @ 11:25) (104/66 - 145/78)  RR: 18 (02-27-23 @ 11:25) (18 - 18)  SpO2: 98% (02-27-23 @ 11:25) (95% - 98%)  Wt(kg): --  I&O's Summary    26 Feb 2023 07:01  -  27 Feb 2023 07:00  --------------------------------------------------------  IN: 750 mL / OUT: 1325 mL / NET: -575 mL    27 Feb 2023 07:01  -  27 Feb 2023 15:51  --------------------------------------------------------  IN: 1100 mL / OUT: 550 mL / NET: 550 mL          PHYSICAL EXAM:  Appearance: Comfortable. No acute distress  HEENT:  Atraumatic. Normocephalic.  Normal oral mucosa  Neurologic: A & O x 3, no gross focal deficits.  Cardiovascular: RRR S1 S2 AF 95, 2/6 SEMmurmur, no rubs/gallops. No JVD  Respiratory: Lungs clear to auscultation, unlabored   Gastrointestinal:  Soft, Non-tender, + BS  Lower Extremities: 2+ Peripheral Pulses, No clubbing, cyanosis, or edema  Psychiatry: Patient is calm. No agitation.   Skin: warm and dry.    CURRENT CARDIAC MEDICATIONS:  furosemide    Tablet 40 milliGRAM(s) Oral two times a day  metoprolol tartrate 50 milliGRAM(s) Oral every 12 hours  metoprolol tartrate Injectable 5 milliGRAM(s) IV Push every 6 hours PRN      CURRENT OTHER MEDICATIONS:  budesonide  80 MICROgram(s)/formoterol 4.5 MICROgram(s) Inhaler 2 Puff(s) Inhalation two times a day  ipratropium    for Nebulization 500 MICROGram(s) Nebulizer every 6 hours PRN Shortness of Breath and/or Wheezing  levalbuterol Inhalation 1.25 milliGRAM(s) Inhalation every 6 hours PRN shortness of breath  acetaminophen     Tablet .. 975 milliGRAM(s) Oral every 8 hours  OLANZapine 5 milliGRAM(s) Oral at bedtime  OLANZapine Injectable 5 milliGRAM(s) IntraMuscular every 6 hours PRN agitation  sertraline 50 milliGRAM(s) Oral daily  atorvastatin 40 milliGRAM(s) Oral at bedtime  aspirin  chewable 81 milliGRAM(s) Oral daily  clotrimazole/betamethasone Cream 1 Application(s) Topical two times a day  tamsulosin 0.4 milliGRAM(s) Oral at bedtime  warfarin 4 milliGRAM(s) Oral once, Stop order after: 1 Doses      LABS:	 	  ( 23 Feb 2023 05:30 )  Troponin T  X    ,  CPK  X    , CKMB  X    , BNP 6558<H>    , ( 22 Feb 2023 06:50 )  Troponin T  X    ,  CPK  X    , CKMB  X    , BNP 6286<H>    , ( 21 Feb 2023 04:26 )  Troponin T  X    ,  CPK  X    , CKMB  X    , BNP 4701<H>                              12.0   7.37  )-----------( 227      ( 27 Feb 2023 05:55 )             40.3     02-27    145  |  102  |  41.3<H>  ----------------------------<  109<H>  3.6   |  28.0  |  1.32<H>    Ca    9.1      27 Feb 2023 05:55  Phos  3.5     02-27  Mg     2.0     02-27      PT/INR/PTT ( 27 Feb 2023 05:55 )                       :                       :      24.3         :       X                     .        .                   .              .           .       2.08        .                                       Lipid Profile:   HgA1c:   TSH: Thyroid Stimulating Hormone, Serum: 0.62 uIU/mL      TELEMETRY: AF 95   DIAGNOSTIC TESTING:  < from: TTE Echo Complete w/ Contrast w/ Doppler (02.21.23 @ 11:34) >  . Endocardial visualization was enhanced with intravenous echo   contrast. No LV thrombus.   2. Left ventricular ejection fraction, by visual estimation, is 25 to   30%.   3. Severely decreased global left ventricular systolic function.   4. Abnormal septal motion consistent with post-operative status.   5. There is mild eccentric left ventricular hypertrophy.   6. The mitralin-flow pattern reveals no discernable A-wave, therefore   no comment on diastolic function can be made.   7. Normal right ventricular size and function, estimated PASP least 42   mmHg.   8. Severely enlarged left atrium.   9. Right atrial enlargement.  10. Moderate mitral annular calcification.  11. Mild-moderate tricuspid regurgitation.  12. Severe calcific aortic valve stenosis with low gradient,   dimensionless index 0.23.  13. There is moderate aortic root calcification.  14. Borderline dilatation of the aortic root, sinuses of Valsalva and   ascending aorta 3.9 cm, index to BSA 1.6 cm/m2 within normal.  15. Compared to the outpatient TTE study from 9/2022 prevoiusly LV EF 37%   and known low gradient aortic valve stenosis, but visually on current   study appears severely stenotic.    from: Cardiac Cath Lab - Adult (08.06.19 @ 15:20) >  CORONARY VESSELS: The coronary circulation is right dominant.  LM:   --  LM: The vessel was large sized and mildly calcified. Angiography  showed mild atherosclerosis with no flow limiting lesions.  LAD:   --  Proximal LAD: There was a tubular 50 % stenosis. The lesion was  moderately calcified.  --  Mid LAD: There was a tubular 80 % stenosis.  --  Distal LAD: This is a good target for bypass.  --  D1: The vessel was medium to large sized. The artery was supplied by  collaterals from the left system There was a stenosis at the ostium of the  vessel segment. This lesion is a chronic total occlusion.  CX:   --  Mid circumflex: Moderate to severe disease.  --  OM1: The vessel was large sized. Angiography showed mild  atherosclerosis with no flow limiting lesions.  RCA:   --  Proximal RCA: There was a stenosis. This lesion is a chronic  total occlusion.  --  RPDA: The vessel was medium sized. Angiography showed mild  atherosclerosis with no flow limiting lesions. The artery was supplied by  collaterals. This is a good target for bypass.  COMPLICATIONS: No complications occurred during the cath lab visit.  DIAGNOSTIC IMPRESSIONS: Severe LAD disease  Moderate to severe CX disease   of the RCA  DIAGNOSTIC RECOMMENDATIONS: Evaluation by CT surgery for AVR ,  fibroelastoma removal and bypass to LAD and RCA PDA  Prepared and signed by  Wagner Santoyo MD

## 2023-02-27 NOTE — PROGRESS NOTE ADULT - ASSESSMENT
88 yr old male with hypertension, atrial fibrillation, PE/DVT s/p IVC filter, Factor V Leiden on coumadin CAD s/p CABG, CVA, COPD, GIGI presented after a syncopal episode and fall at home. Given fall, he was evaluated by trauma service on admission. Clinically noted to be in fluid overload, CHF exacerbation with elevated BNP, supratherapeutic INR. Coumadin was held. IV Lasix was given. Cardiology consulted, ECHO was ordered. CT head without acute stroke. CT abdomen revealed a 6.1 infrarenal AAA, mural thrombus, L1 and T8 fractures. Vascular and neurosurgery consults were requested. No acute intervention per vascular. MR spine and TLSO brace was recommended by NSG. His ECHO revealed an EF 25-30% with severe aortic stenosis. IV Lasix was continued. Pulmonary consulted as well for acute hypoxic respiratory failure, steroids were initiated. His course was complicated by delirium, hence psychiatry consulted, advised Risperdal. Palliative care was consulted, goals of care and HCP established, patient was made DNR/DNI by daughter/HCP. Given severe AS, CT surgery was consulted by cardiology, advised dobutamine stress test to better evaluate aortic stenosis in setting of CHF. His coumadin was held until INR improved, this was subsequently resumed. Rate control medications adjusted by cardiology.    1. Acute hypoxic respiratory failure sec decompensated CHF and aortic stenosis:  Improved  continue PO Lasix, Metoprolol  aspirin, statin  Dobutamine stress when feasible to evaluate aortic stenosis, cardiology follow up today to decide timing.    2. Atrial fibrillation, PE/DVT, Factor V Leiden:  Coumadin resumed  monitor INR.    3. Metabolic encephalopathy:  Slow improvement  maintain CO for safety  changed to Zyprexa as per psych.    4. COPD exacerbation:  Improved  s/p short course of Prednisone  inhaled steroids  Duoneb    5. L1/T8 fracture:  MR noted  NSG follow up noted.  pain control  mobilize with PT and brace.    6. DVT PPX:  on coumadin    Discussed with RN.  updated daughter Jasmina.  explained he will need KELSEY upon discharge.

## 2023-02-27 NOTE — PROGRESS NOTE ADULT - SUBJECTIVE AND OBJECTIVE BOX
INTERVAL HISTORY:  Pt seen lying in bed in NAD  He was oriented x 2 (thought it was 2003)  Denies pain or SOB or nausea    PRESENT SYMPTOMS:     Dyspnea: No  Nausea/Vomiting: No  Anxiety:  No  Depression: No  Fatigue: No  Loss of appetite: No  Constipation: No    PAIN: denies            Character-            Duration-            Effect-            Factors-            Frequency-            Location-            Severity-    REVIEW OF SYSTEMS:   Full review of systems performed and was otherwise negative except as noted above.    MEDICATIONS  (STANDING):  acetaminophen     Tablet .. 975 milliGRAM(s) Oral every 8 hours  aspirin  chewable 81 milliGRAM(s) Oral daily  atorvastatin 40 milliGRAM(s) Oral at bedtime  budesonide  80 MICROgram(s)/formoterol 4.5 MICROgram(s) Inhaler 2 Puff(s) Inhalation two times a day  clotrimazole/betamethasone Cream 1 Application(s) Topical two times a day  furosemide    Tablet 40 milliGRAM(s) Oral two times a day  metoprolol tartrate 50 milliGRAM(s) Oral every 12 hours  risperiDONE   Tablet 0.5 milliGRAM(s) Oral at bedtime  sertraline 50 milliGRAM(s) Oral daily  tamsulosin 0.4 milliGRAM(s) Oral at bedtime    MEDICATIONS  (PRN):  ipratropium    for Nebulization 500 MICROGram(s) Nebulizer every 6 hours PRN Shortness of Breath and/or Wheezing  levalbuterol Inhalation 1.25 milliGRAM(s) Inhalation every 6 hours PRN shortness of breath  metoprolol tartrate Injectable 5 milliGRAM(s) IV Push every 6 hours PRN sustained HR >130  OLANZapine Injectable 5 milliGRAM(s) IntraMuscular every 6 hours PRN agitation    PHYSICAL EXAM:  Vital Signs Last 24 Hrs  T(C): 36.9 (27 Feb 2023 11:25), Max: 37.1 (27 Feb 2023 10:42)  T(F): 98.4 (27 Feb 2023 11:25), Max: 98.7 (27 Feb 2023 10:42)  HR: 86 (27 Feb 2023 11:25) (86 - 110)  BP: 145/78 (27 Feb 2023 11:25) (100/54 - 145/78)  BP(mean): --  RR: 18 (27 Feb 2023 11:25) (18 - 18)  SpO2: 98% (27 Feb 2023 11:25) (95% - 98%)    Parameters below as of 27 Feb 2023 10:42  Patient On (Oxygen Delivery Method): nasal cannula    General: Pt lying in bed in NAD  HEENT: NCAT; MM dry  Resp: breathing unlabored; symmetric chest expansion B/L  MSK: no contractures/deformities  Skin: no rash  Neuro: awake and oriented x 2; no myoclonus  Psych: calm; appropriate speech and affect    LABS:                        12.0   7.37  )-----------( 227      ( 27 Feb 2023 05:55 )             40.3     02-27    145  |  102  |  41.3<H>  ----------------------------<  109<H>  3.6   |  28.0  |  1.32<H>    Ca    9.1      27 Feb 2023 05:55  Phos  3.5     02-27  Mg     2.0     02-27      PT/INR - ( 27 Feb 2023 05:55 )   PT: 24.3 sec;   INR: 2.08 ratio      RADIOLOGY & ADDITIONAL STUDIES:    NEUROLOGIC MEDICATIONS/OPIOIDS/BENZODIAZEPINES IN PAST 24HRS:  acetaminophen     Tablet ..   975 milliGRAM(s) Oral (02-27-23 @ 05:56)   975 milliGRAM(s) Oral (02-26-23 @ 22:15)   975 milliGRAM(s) Oral (02-26-23 @ 13:08)    OLANZapine Injectable   5 milliGRAM(s) IntraMuscular (02-27-23 @ 02:45)    risperiDONE   Tablet   0.5 milliGRAM(s) Oral (02-26-23 @ 22:15)    sertraline   50 milliGRAM(s) Oral (02-27-23 @ 11:09)   50 milliGRAM(s) Oral (02-26-23 @ 13:08)      ADVANCE DIRECTIVES/TREATMENT PREFERENCES:  Code Status: DNR/DNI  MOLST (if not Full Code): yes  HCP/Surrogate: ryan Freedman

## 2023-02-27 NOTE — PROGRESS NOTE ADULT - ASSESSMENT
88M history significant for former chronic smoker, HTN, F. 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 Ta in 4/2022 since no routine office follow up), AAA, prostate cancer s/p recent radiation, recurrent falls with prior ER visits on 12/2022 with supratherpuetic INR 5.1 and 1/4/23 for weakness, now presents with recurrent falls unclear if syncopize, INR again elevated 6.0 and pBNP 5K, CT chest with bilateral pleural effusions and again noted 6.1 cm AAA (stable from 1/2023), subacute L1 compression fracture     Problem/Plan - 1:  ·  Problem: Acute on chronic systolic congestive heart failure.  HFrEF 25%   ·  : on lasix 40mg po bid  Aortic stenosis on echo  but with low gradient  continue intake and out put/ continue tele monitor  Monitor electrolytes  continue metoprolol, lasix ASA/ statin   -further GDMT pending clinical course    Severe AS   -per CTS TAVR work up  / discussed with pts family  -will get Dobutamine stress echo in am     Problem/Plan -   ·  Problem: Afib.   ·   restart coumadin for PAF and HX factor 5 leiden  hx pe/dvt  IVC filter  increase metoprolol 50 bid.  INR 2.0-3.0     Problem/Plan -  ·  Problem: Abdominal aortic aneurysm (AAA).   ·  Plan: 6.1   per vascular no inhouse intervention planned.    Problem/Plan -  ·  Problem: CAD (coronary atherosclerotic disease).   ·s/p cabg 2019  c/w asa metoprolol and lipitor  d/w DR Li

## 2023-02-27 NOTE — PROGRESS NOTE ADULT - ASSESSMENT
89yo M w/ PMH of AF and Factor V Leiden with PE/DVT s/p IVCF and on coumadin, CAD s/p CABG, CVA, HFrEF (35-40%), moderate AS, COPD, AAA, ?prostate CA w/ recent XRT and GIGI on CPAP who was admitted after a fall at home (lives alone).  He has had recurrent falls and admitted for same - found with INR of 6 and BNP ~5000.  CT chest with B/L effusions and stable 6.1 cm AAA and subacute L1 fx.  He is being treated for both COPD and CHF exacerbation - steroids and IV lasix.  CTS consulted for consideration of thoracentesis if diuresis ineffective. We are consulted for assistance with goals of care.  #Goals of care  - daughter Jasmina lives in [facility] ([phone]) - nijasmyne Navarro lives locally ([phone])  - daughter Jasmina is HCP - personally reviewed form in chart  - prior GOC discussion detailed in GOC note dated 2/22  - DNR/DNI  - Ongoing discussion - CT surgery consideration of TAVR? - pt to go for dobutamine stress test as part of pre-op eval    #AMS   - behavioral health following  - per psych - likely cognitive impairment and would benefit from outpt neurocognitive testing  - Would avoid/minimize known deliriogenic drugs such as benzodiazepines and anticholinergics.  Encourage staff and family to reorient frequently.  Keep shades open during day in effort to maintain day/night cycle.    #Pain - pt with L1 fx  - cont ATC APAP 1000mg TID  - NSGY seeing - recommended MRI - they have signed off    Case d/w Dr Palacios (hospitalist)

## 2023-02-27 NOTE — PROGRESS NOTE ADULT - SUBJECTIVE AND OBJECTIVE BOX
INTERVAL HPI/OVERNIGHT EVENTS:    CC: delirium, metabolic encephalopathy, acute HF, PE, cva, aortic stenosis    No overnight events  states he feels fine  thinks he is at home.    Vital Signs Last 24 Hrs  T(C): 36.9 (27 Feb 2023 11:25), Max: 37.1 (27 Feb 2023 10:42)  T(F): 98.4 (27 Feb 2023 11:25), Max: 98.7 (27 Feb 2023 10:42)  HR: 86 (27 Feb 2023 11:25) (86 - 110)  BP: 145/78 (27 Feb 2023 11:25) (104/66 - 145/78)  BP(mean): --  RR: 18 (27 Feb 2023 11:25) (18 - 18)  SpO2: 98% (27 Feb 2023 11:25) (95% - 98%)    Parameters below as of 27 Feb 2023 10:42  Patient On (Oxygen Delivery Method): nasal cannula        PHYSICAL EXAM:    GENERAL: alert, oriented x 2, not in distress  CHEST/LUNG: b/l air entry  HEART: reg  ABDOMEN: soft, bs+  EXTREMITIES:  no edema, tenderness    MEDICATIONS  (STANDING):  acetaminophen     Tablet .. 975 milliGRAM(s) Oral every 8 hours  aspirin  chewable 81 milliGRAM(s) Oral daily  atorvastatin 40 milliGRAM(s) Oral at bedtime  budesonide  80 MICROgram(s)/formoterol 4.5 MICROgram(s) Inhaler 2 Puff(s) Inhalation two times a day  clotrimazole/betamethasone Cream 1 Application(s) Topical two times a day  furosemide    Tablet 40 milliGRAM(s) Oral two times a day  metoprolol tartrate 50 milliGRAM(s) Oral every 12 hours  OLANZapine 5 milliGRAM(s) Oral at bedtime  sertraline 50 milliGRAM(s) Oral daily  tamsulosin 0.4 milliGRAM(s) Oral at bedtime  warfarin 4 milliGRAM(s) Oral once    MEDICATIONS  (PRN):  ipratropium    for Nebulization 500 MICROGram(s) Nebulizer every 6 hours PRN Shortness of Breath and/or Wheezing  levalbuterol Inhalation 1.25 milliGRAM(s) Inhalation every 6 hours PRN shortness of breath  metoprolol tartrate Injectable 5 milliGRAM(s) IV Push every 6 hours PRN sustained HR >130  OLANZapine Injectable 5 milliGRAM(s) IntraMuscular every 6 hours PRN agitation      Allergies    No Known Allergies    Intolerances    OHS (Unknown)        LABS:                          12.0   7.37  )-----------( 227      ( 27 Feb 2023 05:55 )             40.3     02-27    145  |  102  |  41.3<H>  ----------------------------<  109<H>  3.6   |  28.0  |  1.32<H>    Ca    9.1      27 Feb 2023 05:55  Phos  3.5     02-27  Mg     2.0     02-27      PT/INR - ( 27 Feb 2023 05:55 )   PT: 24.3 sec;   INR: 2.08 ratio               RADIOLOGY & ADDITIONAL TESTS:

## 2023-02-27 NOTE — CHART NOTE - NSCHARTNOTEFT_GEN_A_CORE
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         MR Thoracic Spine No Cont (02.26.23 @ 12:30)     1. THORACIC SPINE:   T8 inferior endplate compression fracture is   associated with mild exaggeration of the mid thoracic kyphosis with no   canal compromise    2. LUMBAR SPINE:   L1 superior endplate compression fracture is   associated with low-grade deformity of the ventral thecal sac      Plan  1. exam stable  2. TLSO brace at bedside  3. MRI's are now complete  4. Ok to mobilize with brace  5. Will need standing Thoracic/lumbar xrays with brace on prior to discharge.   6. Please recall once xrays are complete    Signing off for now

## 2023-02-28 LAB
ANION GAP SERPL CALC-SCNC: 12 MMOL/L — SIGNIFICANT CHANGE UP (ref 5–17)
BASOPHILS # BLD AUTO: 0.03 K/UL — SIGNIFICANT CHANGE UP (ref 0–0.2)
BASOPHILS NFR BLD AUTO: 0.4 % — SIGNIFICANT CHANGE UP (ref 0–2)
BUN SERPL-MCNC: 45.2 MG/DL — HIGH (ref 8–20)
CALCIUM SERPL-MCNC: 9.1 MG/DL — SIGNIFICANT CHANGE UP (ref 8.4–10.5)
CHLORIDE SERPL-SCNC: 103 MMOL/L — SIGNIFICANT CHANGE UP (ref 96–108)
CO2 SERPL-SCNC: 30 MMOL/L — HIGH (ref 22–29)
CREAT SERPL-MCNC: 1.2 MG/DL — SIGNIFICANT CHANGE UP (ref 0.5–1.3)
EGFR: 58 ML/MIN/1.73M2 — LOW
EOSINOPHIL # BLD AUTO: 0.37 K/UL — SIGNIFICANT CHANGE UP (ref 0–0.5)
EOSINOPHIL NFR BLD AUTO: 4.8 % — SIGNIFICANT CHANGE UP (ref 0–6)
GLUCOSE SERPL-MCNC: 108 MG/DL — HIGH (ref 70–99)
HCT VFR BLD CALC: 42.1 % — SIGNIFICANT CHANGE UP (ref 39–50)
HGB BLD-MCNC: 12.6 G/DL — LOW (ref 13–17)
IMM GRANULOCYTES NFR BLD AUTO: 0.5 % — SIGNIFICANT CHANGE UP (ref 0–0.9)
INR BLD: 3.1 RATIO — HIGH (ref 0.88–1.16)
LYMPHOCYTES # BLD AUTO: 0.63 K/UL — LOW (ref 1–3.3)
LYMPHOCYTES # BLD AUTO: 8.2 % — LOW (ref 13–44)
MCHC RBC-ENTMCNC: 26.8 PG — LOW (ref 27–34)
MCHC RBC-ENTMCNC: 29.9 GM/DL — LOW (ref 32–36)
MCV RBC AUTO: 89.4 FL — SIGNIFICANT CHANGE UP (ref 80–100)
MONOCYTES # BLD AUTO: 0.75 K/UL — SIGNIFICANT CHANGE UP (ref 0–0.9)
MONOCYTES NFR BLD AUTO: 9.7 % — SIGNIFICANT CHANGE UP (ref 2–14)
NEUTROPHILS # BLD AUTO: 5.89 K/UL — SIGNIFICANT CHANGE UP (ref 1.8–7.4)
NEUTROPHILS NFR BLD AUTO: 76.4 % — SIGNIFICANT CHANGE UP (ref 43–77)
PLATELET # BLD AUTO: 220 K/UL — SIGNIFICANT CHANGE UP (ref 150–400)
POTASSIUM SERPL-MCNC: 3.7 MMOL/L — SIGNIFICANT CHANGE UP (ref 3.5–5.3)
POTASSIUM SERPL-SCNC: 3.7 MMOL/L — SIGNIFICANT CHANGE UP (ref 3.5–5.3)
PROTHROM AB SERPL-ACNC: 36.4 SEC — HIGH (ref 10.5–13.4)
RBC # BLD: 4.71 M/UL — SIGNIFICANT CHANGE UP (ref 4.2–5.8)
RBC # FLD: 15 % — HIGH (ref 10.3–14.5)
SODIUM SERPL-SCNC: 145 MMOL/L — SIGNIFICANT CHANGE UP (ref 135–145)
WBC # BLD: 7.71 K/UL — SIGNIFICANT CHANGE UP (ref 3.8–10.5)
WBC # FLD AUTO: 7.71 K/UL — SIGNIFICANT CHANGE UP (ref 3.8–10.5)

## 2023-02-28 PROCEDURE — 93351 STRESS TTE COMPLETE: CPT | Mod: 26

## 2023-02-28 PROCEDURE — 99232 SBSQ HOSP IP/OBS MODERATE 35: CPT

## 2023-02-28 PROCEDURE — 99233 SBSQ HOSP IP/OBS HIGH 50: CPT

## 2023-02-28 RX ORDER — OLANZAPINE 15 MG/1
2.5 TABLET, FILM COATED ORAL ONCE
Refills: 0 | Status: COMPLETED | OUTPATIENT
Start: 2023-02-28 | End: 2023-02-28

## 2023-02-28 RX ADMIN — CLOTRIMAZOLE AND BETAMETHASONE DIPROPIONATE 1 APPLICATION(S): 10; .5 CREAM TOPICAL at 17:30

## 2023-02-28 RX ADMIN — Medication 975 MILLIGRAM(S): at 05:31

## 2023-02-28 RX ADMIN — OLANZAPINE 5 MILLIGRAM(S): 15 TABLET, FILM COATED ORAL at 22:03

## 2023-02-28 RX ADMIN — OLANZAPINE 5 MILLIGRAM(S): 15 TABLET, FILM COATED ORAL at 01:53

## 2023-02-28 RX ADMIN — Medication 975 MILLIGRAM(S): at 22:04

## 2023-02-28 RX ADMIN — Medication 975 MILLIGRAM(S): at 15:04

## 2023-02-28 RX ADMIN — OLANZAPINE 2.5 MILLIGRAM(S): 15 TABLET, FILM COATED ORAL at 03:00

## 2023-02-28 RX ADMIN — Medication 50 MILLIGRAM(S): at 17:30

## 2023-02-28 RX ADMIN — ATORVASTATIN CALCIUM 40 MILLIGRAM(S): 80 TABLET, FILM COATED ORAL at 22:04

## 2023-02-28 RX ADMIN — TAMSULOSIN HYDROCHLORIDE 0.4 MILLIGRAM(S): 0.4 CAPSULE ORAL at 22:03

## 2023-02-28 RX ADMIN — BUDESONIDE AND FORMOTEROL FUMARATE DIHYDRATE 2 PUFF(S): 160; 4.5 AEROSOL RESPIRATORY (INHALATION) at 08:59

## 2023-02-28 RX ADMIN — Medication 50 MILLIGRAM(S): at 05:31

## 2023-02-28 RX ADMIN — Medication 40 MILLIGRAM(S): at 05:31

## 2023-02-28 RX ADMIN — BUDESONIDE AND FORMOTEROL FUMARATE DIHYDRATE 2 PUFF(S): 160; 4.5 AEROSOL RESPIRATORY (INHALATION) at 22:04

## 2023-02-28 RX ADMIN — Medication 975 MILLIGRAM(S): at 15:31

## 2023-02-28 RX ADMIN — Medication 40 MILLIGRAM(S): at 15:04

## 2023-02-28 RX ADMIN — CLOTRIMAZOLE AND BETAMETHASONE DIPROPIONATE 1 APPLICATION(S): 10; .5 CREAM TOPICAL at 05:33

## 2023-02-28 RX ADMIN — Medication 81 MILLIGRAM(S): at 11:59

## 2023-02-28 RX ADMIN — SERTRALINE 50 MILLIGRAM(S): 25 TABLET, FILM COATED ORAL at 12:09

## 2023-02-28 NOTE — PHYSICAL THERAPY INITIAL EVALUATION ADULT - PERTINENT HX OF CURRENT PROBLEM, REHAB EVAL
88 yr old male with hypertension, atrial fibrillation, PE/DVT s/p IVC filter, Factor V Leiden on coumadin CAD s/p CABG, CVA, COPD, GIGI presented after a syncopal episode and fall at home. Given fall, he was evaluated by trauma service on admission. Clinically noted to be in fluid overload, CHF exacerbation with elevated BNP, supratherapeutic INR. Coumadin was held. IV Lasix was given. Cardiology consulted, ECHO was ordered. CT head without acute stroke. CT abdomen revealed a 6.1 infrarenal AAA, mural thrombus, L1 and T8 fractures. Vascular and neurosurgery consults were requested. No acute intervention per vascular. MR spine and TLSO brace was recommended by NSG. His ECHO revealed an EF 25-30% with severe aortic stenosis. IV Lasix was continued. Pulmonary consulted as well for acute hypoxic respiratory failure, steroids were initiated. His course was complicated by delirium, hence psychiatry consulted, advised Risperdal. Palliative care was consulted, goals of care and HCP established, patient was made DNR/DNI by daughter/HCP. Given severe AS, CT surgery was consulted by cardiology, advised dobutamine stress test to better evaluate aortic stenosis in setting of CHF. His coumadin was held until INR improved, this was subsequently resumed. Rate control medications adjusted by cardiology. Dobutamine stress test was ordered to assess AS.

## 2023-02-28 NOTE — PROGRESS NOTE ADULT - PROBLEM SELECTOR PLAN 1
.  - HFrEF 25%   - more euvolemic on exam  - Aortic stenosis on echo  but with low gradient  - GDMT to be added as tolerated       Beta Blocker:  metoprolol 50mg PO q12h with hold parameters       Diuretic: lasix 40mg PO BID, transition to daily likely tomorrow  - Strict I&O's  - Daily standing weights (if able).  - Keep K > 4, Mg > 2.    - Monitor renal function with ongoing diuresis.  - continue ASA, statin

## 2023-02-28 NOTE — PHYSICAL THERAPY INITIAL EVALUATION ADULT - ADDITIONAL COMMENTS
[place] with 7 +7 steps to enter(+rail); has a cane and RW. has aides 4 hours/day, 5 days/week.

## 2023-02-28 NOTE — PROGRESS NOTE ADULT - PROBLEM SELECTOR PLAN 3
.  - restart coumadin when able for PAF and HX factor 5 leiden if not planned for any further invasive procedures  - INR currently 3.1  - hx pe/dvt  IVC filter  - increase metoprolol 50 bid.  - INR 2.0-3.0

## 2023-02-28 NOTE — PROGRESS NOTE ADULT - ASSESSMENT
88 yr old male with hypertension, atrial fibrillation, PE/DVT s/p IVC filter, Factor V Leiden on coumadin CAD s/p CABG, CVA, COPD, GIGI presented after a syncopal episode and fall at home. Given fall, he was evaluated by trauma service on admission. Clinically noted to be in fluid overload, CHF exacerbation with elevated BNP, supratherapeutic INR. Coumadin was held. IV Lasix was given. Cardiology consulted, ECHO was ordered. CT head without acute stroke. CT abdomen revealed a 6.1 infrarenal AAA, mural thrombus, L1 and T8 fractures. Vascular and neurosurgery consults were requested. No acute intervention per vascular. MR spine and TLSO brace was recommended by NSG. His ECHO revealed an EF 25-30% with severe aortic stenosis. IV Lasix was continued. Pulmonary consulted as well for acute hypoxic respiratory failure, steroids were initiated. His course was complicated by delirium, hence psychiatry consulted, advised Risperdal. Palliative care was consulted, goals of care and HCP established, patient was made DNR/DNI by daughter/HCP. Given severe AS, CT surgery was consulted by cardiology, advised dobutamine stress test to better evaluate aortic stenosis in setting of CHF. His coumadin was held until INR improved, this was subsequently resumed. Rate control medications adjusted by cardiology. Dobutamine stress test was ordered to assess AS    1. Acute hypoxic respiratory failure sec decompensated CHF and aortic stenosis:  Improved  continue PO Lasix, Metoprolol  aspirin, statin  Dobutamine stress pending    2. Atrial fibrillation, PE/DVT, Factor V Leiden:  on coumadin.  monitor INR.    3. Metabolic encephalopathy:  Slow improvement  CO for safety  Zyprexa qhs    4. COPD exacerbation:  Improved  s/p short course of Prednisone  inhaled steroids  Duoneb    5. L1/T8 fracture:  MR noted  NSG follow up noted.  pain control  mobilize with PT and brace.    6. DVT PPX:  on coumadin    Discussed with patient and RN.  Dispo pending stress results.

## 2023-02-28 NOTE — PROGRESS NOTE ADULT - SUBJECTIVE AND OBJECTIVE BOX
[facility]                                                         CARDIOLOGY AT [facility]                                                                  [address] [address]                                                         Telephone: [phone]. Fax:[phone]                                                                             PROGRESS NOTE    Reason for follow up: HFrEF, severe AS  Update: pending stress echo today      Review of symptoms:   Cardiac:  No chest pain. No dyspnea. No palpitations.  Respiratory: no cough. No dyspnea  Gastrointestinal: No diarrhea. No abdominal pain. No bleeding.   Neuro: No focal neuro complaints.    Vitals:  T(C): 36.6 (02-28-23 @ 05:00), Max: 36.9 (02-27-23 @ 11:25)  HR: 92 (02-28-23 @ 05:17) (86 - 105)  BP: 114/78 (02-28-23 @ 05:00) (101/65 - 145/78)  RR: 18 (02-28-23 @ 05:00) (18 - 18)  SpO2: 93% (02-28-23 @ 05:17) (93% - 98%)  Wt(kg): --  I&O's Summary    27 Feb 2023 07:01  -  28 Feb 2023 07:00  --------------------------------------------------------  IN: 1340 mL / OUT: 950 mL / NET: 390 mL          PHYSICAL EXAM:  Appearance: Comfortable. No acute distress  HEENT:  Atraumatic. Normocephalic.  Normal oral mucosa  Neurologic: A & O x 1, no gross focal deficits.  Cardiovascular: irreg irregular S1 S2, No murmur, no rubs/gallops. No JVD  Respiratory: Lungs diminished  Gastrointestinal:  Soft, Non-tender, + BS  Lower Extremities: 2+ Peripheral Pulses, No clubbing, cyanosis, or edema  Psychiatry: Patient is calm. No agitation.   Skin: warm and dry.    CURRENT CARDIAC MEDICATIONS:  furosemide    Tablet 40 milliGRAM(s) Oral two times a day  metoprolol tartrate 50 milliGRAM(s) Oral every 12 hours  metoprolol tartrate Injectable 5 milliGRAM(s) IV Push every 6 hours PRN      CURRENT OTHER MEDICATIONS:  budesonide  80 MICROgram(s)/formoterol 4.5 MICROgram(s) Inhaler 2 Puff(s) Inhalation two times a day  ipratropium    for Nebulization 500 MICROGram(s) Nebulizer every 6 hours PRN Shortness of Breath and/or Wheezing  levalbuterol Inhalation 1.25 milliGRAM(s) Inhalation every 6 hours PRN shortness of breath  acetaminophen     Tablet .. 975 milliGRAM(s) Oral every 8 hours  OLANZapine 5 milliGRAM(s) Oral at bedtime  OLANZapine Injectable 5 milliGRAM(s) IntraMuscular every 6 hours PRN agitation  sertraline 50 milliGRAM(s) Oral daily  atorvastatin 40 milliGRAM(s) Oral at bedtime  aspirin  chewable 81 milliGRAM(s) Oral daily  clotrimazole/betamethasone Cream 1 Application(s) Topical two times a day  tamsulosin 0.4 milliGRAM(s) Oral at bedtime      LABS:	 	  ( 23 Feb 2023 05:30 )  Troponin T  X    ,  CPK  X    , CKMB  X    , BNP 6558<H>    , ( 22 Feb 2023 06:50 )  Troponin T  X    ,  CPK  X    , CKMB  X    , BNP 6286<H>    , ( 21 Feb 2023 04:26 )  Troponin T  X    ,  CPK  X    , CKMB  X    , BNP 4701<H>                              12.6   7.71  )-----------( 220      ( 28 Feb 2023 05:33 )             42.1     02-28    145  |  103  |  45.2<H>  ----------------------------<  108<H>  3.7   |  30.0<H>  |  1.20    Ca    9.1      28 Feb 2023 05:33  Phos  3.5     02-27  Mg     2.0     02-27      PT/INR/PTT ( 28 Feb 2023 05:33 )                       :                       :      36.4         :       X                     .        .                   .              .           .       3.10        .                                       Lipid Profile:   HgA1c:   TSH: Thyroid Stimulating Hormone, Serum: 0.62 uIU/mL      TELEMETRY: Afib rate controlled, some AIVR on tele  ECG:    DIAGNOSTIC TESTING:  [ ] Echocardiogram:   < from: TTE Echo Complete w/ Contrast w/ Doppler (02.21.23 @ 11:34) >  PHYSICIAN INTERPRETATION:  Left Ventricle: Endocardial visualization was enhanced with intravenous   echo contrast. The left ventricular internal cavity size is normal.  Global LV systolic function was severely decreased. Left ventricular   ejection fraction, by visual estimation, is 25 to 30%. Abnormal   (paradoxical) septal motion consistent with post-operative status. The   mitral in-flow pattern reveals no discernable A-wave, therefore no   comment on diastolic function can be made.  Right Ventricle: Normal right ventricular size and function.  Left Atrium: Severely enlarged left atrium.  Right Atrium: Right atrial enlargement.  Pericardium: There is no evidence of pericardial effusion.  Mitral Valve: There is moderate mitral annular calcification.  Tricuspid Valve: Structurally normal tricuspid valve, with normal leaflet   excursion. Mild-moderate tricuspid regurgitation is visualized. Estimated   pulmonary artery systolic pressure is 41.7 mmHg assuming a right atrial   pressure of 3 mmHg, which is consistent with mild pulmonary hypertension.  Aortic Valve: Severe aortic stenosis is present. No evidence of aortic   valve regurgitation is seen.  Pulmonic Valve: Structurally normal pulmonic valve, with normal leaflet   excursion. Trace pulmonic valve regurgitation.  Aorta: There is dilatation of the aortic root, sinuses of Valsalva and   ascending aorta. There is moderate aortic root calcification.  Pulmonary Artery: The main pulmonary artery is normal in size.  Venous: The inferior vena cava is not well visualized.  In comparison to the previous echocardiogram(s): Prior examinations are   available and were reviewed for comparison purposes. Compared to the   outpatient TTE study from 9/2022 prevoiusly LV EF 37% and known low   gradient aortic valve stenosis, but visually on current study appears   severely stenotic.      Summary:   1. Endocardial visualization was enhanced with intravenous echo   contrast. No LV thrombus.   2. Left ventricular ejection fraction, by visual estimation, is 25 to   30%.   3. Severely decreased global left ventricular systolic function.   4. Abnormal septal motion consistent with post-operative status.   5. There is mild eccentric left ventricular hypertrophy.   6. The mitralin-flow pattern reveals no discernable A-wave, therefore   no comment on diastolic function can be made.   7. Normal right ventricular size and function, estimated PASP least 42   mmHg.   8. Severely enlarged left atrium.   9. Right atrial enlargement.  10. Moderate mitral annular calcification.  11. Mild-moderate tricuspid regurgitation.  12. Severe calcific aortic valve stenosis with low gradient,   dimensionless index 0.23.  13. There is moderate aortic root calcification.  14. Borderline dilatation of the aortic root, sinuses of Valsalva and   ascending aorta 3.9 cm, index to BSA 1.6 cm/m2 within normal.  15. Compared to the outpatient TTE study from 9/2022 prevoiusly LV EF 37%   and known low gradient aortic valve stenosis, but visually on current   study appears severely stenotic.    Mook Bangura DO Electronically signed on 2/21/2023 at 1:35:50 PM      < end of copied text >  [ ]  Catheterization: < from: Cardiac Cath Lab - Adult (08.06.19 @ 15:20) >  VENTRICLES: No left ventriculogram was performed.  CORONARY VESSELS: The coronary circulation is right dominant.  LM:   --  LM: The vessel was large sized and mildly calcified. Angiography  showed mild atherosclerosis with no flow limiting lesions.  LAD:   --  Proximal LAD: There was a tubular 50 % stenosis. The lesion was  moderately calcified.  --  Mid LAD: There was a tubular 80 % stenosis.  --  Distal LAD: This is a good target for bypass.  --  D1: The vessel was medium to large sized. The artery was supplied by  collaterals from the left system There was a stenosis at the ostium of the  vessel segment. This lesion is a chronic total occlusion.  CX:   --  Mid circumflex: Moderate to severe disease.  --  OM1: The vessel was large sized. Angiography showed mild  atherosclerosis with no flow limiting lesions.  RCA:   --  Proximal RCA: There was a stenosis. This lesion is a chronic  total occlusion.  --  RPDA: The vessel was medium sized. Angiography showed mild  atherosclerosis with no flow limiting lesions. The artery was supplied by  collaterals. This is a good target for bypass.  COMPLICATIONS: No complications occurred during the cath lab visit.  DIAGNOSTIC IMPRESSIONS: Severe LAD disease  Moderate to severe CX disease   of the RCA  DIAGNOSTIC RECOMMENDATIONS: Evaluation by CT surgery for AVR ,  fibroelastoma removal and bypass to LAD and RCA PDA  Prepared and signed by  Wagner Santoyo MD    < end of copied text >    [ ] Stress Test:    OTHER:

## 2023-02-28 NOTE — PROGRESS NOTE ADULT - ASSESSMENT
87yo M w/ PMH of AF and Factor V Leiden with PE/DVT s/p IVCF and on coumadin, CAD s/p CABG, CVA, HFrEF (35-40%), moderate AS, COPD, AAA, ?prostate CA w/ recent XRT and GIGI on CPAP who was admitted after a fall at home (lives alone).  He has had recurrent falls and admitted for same - found with INR of 6 and BNP ~5000.  CT chest with B/L effusions and stable 6.1 cm AAA and subacute L1 fx.  He is being treated for both COPD and CHF exacerbation - steroids and IV lasix.  CTS consulted for consideration of thoracentesis if diuresis ineffective. We are consulted for assistance with goals of care.  #Goals of care  - daughter Jasmina lives in [facility] ([phone]) - nijasmyne Navarro lives locally ([phone])  - daughter Jasmina is HCP - personally reviewed form in chart - pt lacks capacity for complex medical decisions   - prior GOC discussion detailed in GOC note dated 2/22   - DNR/DNI  - Ongoing discussion - CT surgery consideration of TAVR? - pt to go for dobutamine stress test as part of pre-op eval    #AMS   - behavioral health following  - per psych - likely cognitive impairment and would benefit from outpt neurocognitive testing  - Would avoid/minimize known deliriogenic drugs such as benzodiazepines and anticholinergics.  Encourage staff and family to reorient frequently.  Keep shades open during day in effort to maintain day/night cycle.    #Pain - pt with L1 fx  - cont ATC APAP 1000mg TID  - NSGY seeing - recommended MRI - they have signed off    Case d/w Dr Palacios (hospitalist)

## 2023-02-28 NOTE — PROGRESS NOTE ADULT - SUBJECTIVE AND OBJECTIVE BOX
INTERVAL HPI/OVERNIGHT EVENTS:    CC: delirium, metabolic encephalopathy, acute HF, PE, cva, aortic stenosis    Agitated overnight as per staff  more calm this am  he understands that he is being evaluated for a valve issue and is scheduled for a stress test.    Vital Signs Last 24 Hrs  T(C): 36.6 (28 Feb 2023 05:00), Max: 36.8 (27 Feb 2023 17:51)  T(F): 97.9 (28 Feb 2023 05:00), Max: 98.2 (27 Feb 2023 17:51)  HR: 92 (28 Feb 2023 05:17) (89 - 105)  BP: 114/78 (28 Feb 2023 05:00) (101/65 - 114/78)  BP(mean): --  RR: 18 (28 Feb 2023 05:00) (18 - 18)  SpO2: 93% (28 Feb 2023 05:17) (93% - 97%)    Parameters below as of 28 Feb 2023 05:00  Patient On (Oxygen Delivery Method): nasal cannula        PHYSICAL EXAM:    GENERAL: alert, not in distress. oriented x 3  CHEST/LUNG: b/l air entry  HEART: reg  ABDOMEN: soft, bs+  EXTREMITIES:  no edema, tenderness    MEDICATIONS  (STANDING):  acetaminophen     Tablet .. 975 milliGRAM(s) Oral every 8 hours  aspirin  chewable 81 milliGRAM(s) Oral daily  atorvastatin 40 milliGRAM(s) Oral at bedtime  budesonide  80 MICROgram(s)/formoterol 4.5 MICROgram(s) Inhaler 2 Puff(s) Inhalation two times a day  clotrimazole/betamethasone Cream 1 Application(s) Topical two times a day  furosemide    Tablet 40 milliGRAM(s) Oral two times a day  metoprolol tartrate 50 milliGRAM(s) Oral every 12 hours  OLANZapine 5 milliGRAM(s) Oral at bedtime  sertraline 50 milliGRAM(s) Oral daily  tamsulosin 0.4 milliGRAM(s) Oral at bedtime    MEDICATIONS  (PRN):  ipratropium    for Nebulization 500 MICROGram(s) Nebulizer every 6 hours PRN Shortness of Breath and/or Wheezing  levalbuterol Inhalation 1.25 milliGRAM(s) Inhalation every 6 hours PRN shortness of breath  metoprolol tartrate Injectable 5 milliGRAM(s) IV Push every 6 hours PRN sustained HR >130  OLANZapine Injectable 5 milliGRAM(s) IntraMuscular every 6 hours PRN agitation      Allergies    No Known Allergies    Intolerances    OHS (Unknown)        LABS:                          12.6   7.71  )-----------( 220      ( 28 Feb 2023 05:33 )             42.1     02-28    145  |  103  |  45.2<H>  ----------------------------<  108<H>  3.7   |  30.0<H>  |  1.20    Ca    9.1      28 Feb 2023 05:33  Phos  3.5     02-27  Mg     2.0     02-27      PT/INR - ( 28 Feb 2023 05:33 )   PT: 36.4 sec;   INR: 3.10 ratio               RADIOLOGY & ADDITIONAL TESTS:

## 2023-02-28 NOTE — PROGRESS NOTE ADULT - SUBJECTIVE AND OBJECTIVE BOX
INTERVAL HISTORY:  Pt seen lying in bed in NAD  Denies pain or SOB or nausea  Eager to go for stress test    PRESENT SYMPTOMS:     Dyspnea: No  Nausea/Vomiting: No  Anxiety:  No  Depression: No  Fatigue: No  Loss of appetite: No  Constipation: No    PAIN: denies            Character-            Duration-            Effect-            Factors-            Frequency-            Location-            Severity-    REVIEW OF SYSTEMS:   Full review of systems performed and was otherwise negative except as noted above.    MEDICATIONS  (STANDING):  acetaminophen     Tablet .. 975 milliGRAM(s) Oral every 8 hours  aspirin  chewable 81 milliGRAM(s) Oral daily  atorvastatin 40 milliGRAM(s) Oral at bedtime  budesonide  80 MICROgram(s)/formoterol 4.5 MICROgram(s) Inhaler 2 Puff(s) Inhalation two times a day  clotrimazole/betamethasone Cream 1 Application(s) Topical two times a day  furosemide    Tablet 40 milliGRAM(s) Oral two times a day  metoprolol tartrate 50 milliGRAM(s) Oral every 12 hours  OLANZapine 5 milliGRAM(s) Oral at bedtime  sertraline 50 milliGRAM(s) Oral daily  tamsulosin 0.4 milliGRAM(s) Oral at bedtime    MEDICATIONS  (PRN):  ipratropium    for Nebulization 500 MICROGram(s) Nebulizer every 6 hours PRN Shortness of Breath and/or Wheezing  levalbuterol Inhalation 1.25 milliGRAM(s) Inhalation every 6 hours PRN shortness of breath  metoprolol tartrate Injectable 5 milliGRAM(s) IV Push every 6 hours PRN sustained HR >130  OLANZapine Injectable 5 milliGRAM(s) IntraMuscular every 6 hours PRN agitation    PHYSICAL EXAM:  Vital Signs Last 24 Hrs  T(C): 36.8 (28 Feb 2023 12:10), Max: 36.8 (27 Feb 2023 17:51)  T(F): 98.3 (28 Feb 2023 12:10), Max: 98.3 (28 Feb 2023 12:10)  HR: 82 (28 Feb 2023 12:10) (82 - 105)  BP: 102/70 (28 Feb 2023 12:10) (101/65 - 114/78)  BP(mean): --  RR: 18 (28 Feb 2023 12:10) (18 - 18)  SpO2: 95% (28 Feb 2023 12:10) (93% - 97%)    Parameters below as of 28 Feb 2023 05:00  Patient On (Oxygen Delivery Method): nasal cannula    General: Pt lying in bed in NAD  HEENT: NCAT; MMM  Resp: breathing unlabored; symmetric chest expansion B/L  MSK: no contractures/deformities  Skin: no rash  Neuro: awake and oriented x 2; no myoclonus  Psych: calm; distractible    LABS:                        12.6   7.71  )-----------( 220      ( 28 Feb 2023 05:33 )             42.1     02-28    145  |  103  |  45.2<H>  ----------------------------<  108<H>  3.7   |  30.0<H>  |  1.20    Ca    9.1      28 Feb 2023 05:33  Phos  3.5     02-27  Mg     2.0     02-27      PT/INR - ( 28 Feb 2023 05:33 )   PT: 36.4 sec;   INR: 3.10 ratio      RADIOLOGY & ADDITIONAL STUDIES:      NEUROLOGIC MEDICATIONS/OPIOIDS/BENZODIAZEPINES IN PAST 24HRS:  acetaminophen     Tablet ..   975 milliGRAM(s) Oral (02-28-23 @ 05:31)   975 milliGRAM(s) Oral (02-27-23 @ 21:22)   975 milliGRAM(s) Oral (02-27-23 @ 14:51)    OLANZapine   5 milliGRAM(s) Oral (02-27-23 @ 21:23)    OLANZapine Injectable   2.5 milliGRAM(s) IntraMuscular (02-28-23 @ 03:00)    OLANZapine Injectable   5 milliGRAM(s) IntraMuscular (02-28-23 @ 01:53)    sertraline   50 milliGRAM(s) Oral (02-28-23 @ 12:09)      ADVANCE DIRECTIVES/TREATMENT PREFERENCES:  Code Status: DNR/DNI  MOLST (if not Full Code): y  HCP/Surrogate: daughter Jasmina

## 2023-03-01 LAB
ANION GAP SERPL CALC-SCNC: 11 MMOL/L — SIGNIFICANT CHANGE UP (ref 5–17)
BASOPHILS # BLD AUTO: 0.06 K/UL — SIGNIFICANT CHANGE UP (ref 0–0.2)
BASOPHILS NFR BLD AUTO: 0.9 % — SIGNIFICANT CHANGE UP (ref 0–2)
BUN SERPL-MCNC: 36 MG/DL — HIGH (ref 8–20)
CALCIUM SERPL-MCNC: 9.1 MG/DL — SIGNIFICANT CHANGE UP (ref 8.4–10.5)
CHLORIDE SERPL-SCNC: 102 MMOL/L — SIGNIFICANT CHANGE UP (ref 96–108)
CO2 SERPL-SCNC: 29 MMOL/L — SIGNIFICANT CHANGE UP (ref 22–29)
CREAT SERPL-MCNC: 1.29 MG/DL — SIGNIFICANT CHANGE UP (ref 0.5–1.3)
EGFR: 53 ML/MIN/1.73M2 — LOW
EOSINOPHIL # BLD AUTO: 0.41 K/UL — SIGNIFICANT CHANGE UP (ref 0–0.5)
EOSINOPHIL NFR BLD AUTO: 6.2 % — HIGH (ref 0–6)
GLUCOSE SERPL-MCNC: 100 MG/DL — HIGH (ref 70–99)
HCT VFR BLD CALC: 39.7 % — SIGNIFICANT CHANGE UP (ref 39–50)
HGB BLD-MCNC: 12.3 G/DL — LOW (ref 13–17)
IMM GRANULOCYTES NFR BLD AUTO: 0.3 % — SIGNIFICANT CHANGE UP (ref 0–0.9)
INR BLD: 3.73 RATIO — HIGH (ref 0.88–1.16)
LYMPHOCYTES # BLD AUTO: 0.78 K/UL — LOW (ref 1–3.3)
LYMPHOCYTES # BLD AUTO: 11.7 % — LOW (ref 13–44)
MAGNESIUM SERPL-MCNC: 2.3 MG/DL — SIGNIFICANT CHANGE UP (ref 1.8–2.6)
MCHC RBC-ENTMCNC: 27.4 PG — SIGNIFICANT CHANGE UP (ref 27–34)
MCHC RBC-ENTMCNC: 31 GM/DL — LOW (ref 32–36)
MCV RBC AUTO: 88.4 FL — SIGNIFICANT CHANGE UP (ref 80–100)
MONOCYTES # BLD AUTO: 0.8 K/UL — SIGNIFICANT CHANGE UP (ref 0–0.9)
MONOCYTES NFR BLD AUTO: 12 % — SIGNIFICANT CHANGE UP (ref 2–14)
NEUTROPHILS # BLD AUTO: 4.58 K/UL — SIGNIFICANT CHANGE UP (ref 1.8–7.4)
NEUTROPHILS NFR BLD AUTO: 68.9 % — SIGNIFICANT CHANGE UP (ref 43–77)
PHOSPHATE SERPL-MCNC: 2.9 MG/DL — SIGNIFICANT CHANGE UP (ref 2.4–4.7)
PLATELET # BLD AUTO: 233 K/UL — SIGNIFICANT CHANGE UP (ref 150–400)
POTASSIUM SERPL-MCNC: 3.4 MMOL/L — LOW (ref 3.5–5.3)
POTASSIUM SERPL-SCNC: 3.4 MMOL/L — LOW (ref 3.5–5.3)
PROTHROM AB SERPL-ACNC: 43.8 SEC — HIGH (ref 10.5–13.4)
RBC # BLD: 4.49 M/UL — SIGNIFICANT CHANGE UP (ref 4.2–5.8)
RBC # FLD: 15 % — HIGH (ref 10.3–14.5)
SODIUM SERPL-SCNC: 142 MMOL/L — SIGNIFICANT CHANGE UP (ref 135–145)
WBC # BLD: 6.65 K/UL — SIGNIFICANT CHANGE UP (ref 3.8–10.5)
WBC # FLD AUTO: 6.65 K/UL — SIGNIFICANT CHANGE UP (ref 3.8–10.5)

## 2023-03-01 PROCEDURE — 99232 SBSQ HOSP IP/OBS MODERATE 35: CPT

## 2023-03-01 PROCEDURE — 99233 SBSQ HOSP IP/OBS HIGH 50: CPT

## 2023-03-01 PROCEDURE — 71045 X-RAY EXAM CHEST 1 VIEW: CPT | Mod: 26

## 2023-03-01 RX ORDER — POTASSIUM CHLORIDE 20 MEQ
40 PACKET (EA) ORAL ONCE
Refills: 0 | Status: DISCONTINUED | OUTPATIENT
Start: 2023-03-01 | End: 2023-03-01

## 2023-03-01 RX ORDER — FUROSEMIDE 40 MG
40 TABLET ORAL DAILY
Refills: 0 | Status: DISCONTINUED | OUTPATIENT
Start: 2023-03-02 | End: 2023-03-03

## 2023-03-01 RX ORDER — LOSARTAN POTASSIUM 100 MG/1
12.5 TABLET, FILM COATED ORAL DAILY
Refills: 0 | Status: DISCONTINUED | OUTPATIENT
Start: 2023-03-01 | End: 2023-03-03

## 2023-03-01 RX ORDER — POTASSIUM CHLORIDE 20 MEQ
40 PACKET (EA) ORAL ONCE
Refills: 0 | Status: COMPLETED | OUTPATIENT
Start: 2023-03-01 | End: 2023-03-01

## 2023-03-01 RX ORDER — OLANZAPINE 15 MG/1
5 TABLET, FILM COATED ORAL ONCE
Refills: 0 | Status: DISCONTINUED | OUTPATIENT
Start: 2023-03-01 | End: 2023-03-01

## 2023-03-01 RX ADMIN — Medication 975 MILLIGRAM(S): at 17:00

## 2023-03-01 RX ADMIN — TAMSULOSIN HYDROCHLORIDE 0.4 MILLIGRAM(S): 0.4 CAPSULE ORAL at 22:11

## 2023-03-01 RX ADMIN — Medication 50 MILLIGRAM(S): at 06:01

## 2023-03-01 RX ADMIN — Medication 975 MILLIGRAM(S): at 06:00

## 2023-03-01 RX ADMIN — CLOTRIMAZOLE AND BETAMETHASONE DIPROPIONATE 1 APPLICATION(S): 10; .5 CREAM TOPICAL at 06:00

## 2023-03-01 RX ADMIN — Medication 975 MILLIGRAM(S): at 22:12

## 2023-03-01 RX ADMIN — SERTRALINE 50 MILLIGRAM(S): 25 TABLET, FILM COATED ORAL at 09:44

## 2023-03-01 RX ADMIN — OLANZAPINE 5 MILLIGRAM(S): 15 TABLET, FILM COATED ORAL at 22:12

## 2023-03-01 RX ADMIN — Medication 40 MILLIEQUIVALENT(S): at 16:54

## 2023-03-01 RX ADMIN — CLOTRIMAZOLE AND BETAMETHASONE DIPROPIONATE 1 APPLICATION(S): 10; .5 CREAM TOPICAL at 18:35

## 2023-03-01 RX ADMIN — OLANZAPINE 5 MILLIGRAM(S): 15 TABLET, FILM COATED ORAL at 02:00

## 2023-03-01 RX ADMIN — Medication 40 MILLIGRAM(S): at 06:01

## 2023-03-01 RX ADMIN — ATORVASTATIN CALCIUM 40 MILLIGRAM(S): 80 TABLET, FILM COATED ORAL at 22:12

## 2023-03-01 RX ADMIN — Medication 50 MILLIGRAM(S): at 18:34

## 2023-03-01 RX ADMIN — Medication 975 MILLIGRAM(S): at 16:52

## 2023-03-01 RX ADMIN — Medication 81 MILLIGRAM(S): at 09:44

## 2023-03-01 RX ADMIN — OLANZAPINE 5 MILLIGRAM(S): 15 TABLET, FILM COATED ORAL at 16:54

## 2023-03-01 RX ADMIN — Medication 40 MILLIEQUIVALENT(S): at 09:47

## 2023-03-01 RX ADMIN — BUDESONIDE AND FORMOTEROL FUMARATE DIHYDRATE 2 PUFF(S): 160; 4.5 AEROSOL RESPIRATORY (INHALATION) at 21:32

## 2023-03-01 NOTE — PROGRESS NOTE ADULT - SUBJECTIVE AND OBJECTIVE BOX
INTERVAL HPI/OVERNIGHT EVENTS:    CC:  delirium, metabolic encephalopathy, acute HF, PE, cva, aortic stenosis    Agitated overnight  more confused this am  initially reported shortness of breath, denies to me    Vital Signs Last 24 Hrs  T(C): 36.3 (01 Mar 2023 11:46), Max: 36.8 (28 Feb 2023 20:10)  T(F): 97.4 (01 Mar 2023 11:46), Max: 98.2 (28 Feb 2023 20:10)  HR: 88 (01 Mar 2023 11:46) (68 - 104)  BP: 124/81 (01 Mar 2023 11:46) (97/63 - 124/81)  BP(mean): --  RR: 18 (01 Mar 2023 11:46) (18 - 18)  SpO2: 94% (01 Mar 2023 11:46) (93% - 95%)    Parameters below as of 28 Feb 2023 20:10  Patient On (Oxygen Delivery Method): room air        PHYSICAL EXAM:    GENERAL: alert, not in distress, oriented x 2  CHEST/LUNG: b/l air entry, decreased in bases  HEART: reg  ABDOMEN: soft, bs+  EXTREMITIES:  no edema, tenderness    MEDICATIONS  (STANDING):  acetaminophen     Tablet .. 975 milliGRAM(s) Oral every 8 hours  aspirin  chewable 81 milliGRAM(s) Oral daily  atorvastatin 40 milliGRAM(s) Oral at bedtime  budesonide  80 MICROgram(s)/formoterol 4.5 MICROgram(s) Inhaler 2 Puff(s) Inhalation two times a day  clotrimazole/betamethasone Cream 1 Application(s) Topical two times a day  losartan 12.5 milliGRAM(s) Oral daily  metoprolol tartrate 50 milliGRAM(s) Oral every 12 hours  OLANZapine 5 milliGRAM(s) Oral at bedtime  potassium chloride    Tablet ER 40 milliEquivalent(s) Oral once  sertraline 50 milliGRAM(s) Oral daily  tamsulosin 0.4 milliGRAM(s) Oral at bedtime    MEDICATIONS  (PRN):  ipratropium    for Nebulization 500 MICROGram(s) Nebulizer every 6 hours PRN Shortness of Breath and/or Wheezing  levalbuterol Inhalation 1.25 milliGRAM(s) Inhalation every 6 hours PRN shortness of breath  metoprolol tartrate Injectable 5 milliGRAM(s) IV Push every 6 hours PRN sustained HR >130  OLANZapine Injectable 5 milliGRAM(s) IntraMuscular every 6 hours PRN agitation      Allergies    No Known Allergies    Intolerances    OHS (Unknown)        LABS:                          12.3   6.65  )-----------( 233      ( 01 Mar 2023 05:50 )             39.7     03-01    142  |  102  |  36.0<H>  ----------------------------<  100<H>  3.4<L>   |  29.0  |  1.29    Ca    9.1      01 Mar 2023 05:50  Phos  2.9     03-01  Mg     2.3     03-01      PT/INR - ( 01 Mar 2023 05:50 )   PT: 43.8 sec;   INR: 3.73 ratio               RADIOLOGY & ADDITIONAL TESTS:

## 2023-03-01 NOTE — PROGRESS NOTE ADULT - NS ATTEND OPT1 GEN_ALL_CORE

## 2023-03-01 NOTE — PROGRESS NOTE ADULT - PROBLEM SELECTOR PLAN 4
.  - 6.1cm, per vascular no inhouse intervention planned
.  - 6.1cm, per vascular no inhouse intervention planned
s/p cabg 2019  c/w asa metoprolol and lipitor      Cardiac meds  aspirin  chewable 81 milliGRAM(s) Oral daily  atorvastatin 40 milliGRAM(s) Oral at bedtime  furosemide    Tablet 40 milliGRAM(s) Oral two times a day  metoprolol tartrate 25 milliGRAM(s) Oral two times a day

## 2023-03-01 NOTE — BH CONSULTATION LIAISON PROGRESS NOTE - NSBHCHARTREVIEWVS_PSY_A_CORE FT
Vital Signs Last 24 Hrs  T(C): 36.9 (27 Feb 2023 11:25), Max: 37.1 (27 Feb 2023 10:42)  T(F): 98.4 (27 Feb 2023 11:25), Max: 98.7 (27 Feb 2023 10:42)  HR: 86 (27 Feb 2023 11:25) (86 - 110)  BP: 145/78 (27 Feb 2023 11:25) (100/54 - 145/78)  BP(mean): --  RR: 18 (27 Feb 2023 11:25) (18 - 18)  SpO2: 98% (27 Feb 2023 11:25) (95% - 98%)    Parameters below as of 27 Feb 2023 10:42  Patient On (Oxygen Delivery Method): nasal cannula    
Vital Signs Last 24 Hrs  T(C): 36.4 (24 Feb 2023 16:15), Max: 36.6 (24 Feb 2023 05:06)  T(F): 97.5 (24 Feb 2023 16:15), Max: 97.8 (24 Feb 2023 05:06)  HR: 82 (24 Feb 2023 16:15) (82 - 98)  BP: 122/75 (24 Feb 2023 16:15) (107/69 - 138/82)  BP(mean): --  RR: 18 (24 Feb 2023 16:15) (18 - 18)  SpO2: 96% (24 Feb 2023 16:15) (95% - 96%)    Parameters below as of 24 Feb 2023 16:15  Patient On (Oxygen Delivery Method): nasal cannula  O2 Flow (L/min): 4  
Vital Signs Last 24 Hrs  T(C): 37.1 (23 Feb 2023 11:00), Max: 37.1 (23 Feb 2023 11:00)  T(F): 98.7 (23 Feb 2023 11:00), Max: 98.7 (23 Feb 2023 11:00)  HR: 80 (23 Feb 2023 11:00) (76 - 91)  BP: 135/82 (23 Feb 2023 11:00) (130/72 - 138/75)  BP(mean): --  RR: 18 (23 Feb 2023 11:00) (18 - 19)  SpO2: 96% (23 Feb 2023 11:00) (94% - 96%)    Parameters below as of 23 Feb 2023 11:00  Patient On (Oxygen Delivery Method): nasal cannula  O2 Flow (L/min): 4  
Vital Signs Last 24 Hrs  T(C): 36.7 (01 Mar 2023 15:58), Max: 36.8 (28 Feb 2023 20:10)  T(F): 98.1 (01 Mar 2023 15:58), Max: 98.2 (28 Feb 2023 20:10)  HR: 107 (01 Mar 2023 15:58) (68 - 107)  BP: 117/72 (01 Mar 2023 15:58) (101/62 - 124/81)  BP(mean): --  RR: 18 (01 Mar 2023 15:58) (18 - 18)  SpO2: 96% (01 Mar 2023 15:58) (93% - 96%)    Parameters below as of 28 Feb 2023 20:10  Patient On (Oxygen Delivery Method): room air

## 2023-03-01 NOTE — PROGRESS NOTE ADULT - PROBLEM SELECTOR PLAN 1
.  - HFrEF 25%   - euvolemic on exam, no longer on oxygen  - Aortic stenosis on echo  but with low gradient  - GDMT to be added as tolerated       Beta Blocker:  metoprolol 50mg PO q12h with hold parameters       Diuretic: Lasix 40mg PO daily   - Strict I&O's  - Daily standing weights (if able).  - Keep K > 4, Mg > 2.    - Monitor renal function with ongoing diuresis.  - continue ASA, statin .  - HFrEF 25%   - euvolemic on exam, no longer on oxygen  - Aortic stenosis on echo  but with low gradient  - GDMT to be added as tolerated       Beta Blocker: change to Toprol 50mg PO Daily       ARB: Losartan 12.5mg PO daily       Diuretic: Lasix 40mg PO daily   - Strict I&O's  - Daily standing weights (if able).  - Keep K > 4, Mg > 2.    - Monitor renal function with ongoing diuresis.  - continue ASA, statin

## 2023-03-01 NOTE — BH CONSULTATION LIAISON PROGRESS NOTE - NSBHCHARTREVIEWLAB_PSY_A_CORE FT
Basic Metabolic Panel (02.22.23 @ 06:50)    Sodium, Serum: 141 mmol/L    Potassium, Serum: 4.0 mmol/L    Chloride, Serum: 103: Chloride reference range changed from ..10/26/2022 mmol/L    Carbon Dioxide, Serum: 24.0 mmol/L    Anion Gap, Serum: 14 mmol/L    Blood Urea Nitrogen, Serum: 34.6 mg/dL    Creatinine, Serum: 1.33 mg/dL    Glucose, Serum: 112 mg/dL    Calcium, Total Serum: 9.0: Prior Reference Range of 8.6 – 10.2 was amended as of 7/26/2022 to 8.4 –  10.5. mg/dL    eGFR: 51: The estimated glomerular filtration rate (eGFR) is calculated using the  2021 CKD-EPI creatinine equation, which does not have a coefficient for  race and is validated in individuals 18 years of age and older (N Engl J  Med 2021; 385:0867-8034). Creatinine-based eGFR may be inaccurate in  various situations including but not limited to extremes of muscle mass,  altered dietary protein intake, or medications that affect renal tubular  creatinine secretion. mL/min/1.73m2  
Basic Metabolic Panel (02.22.23 @ 06:50)    Sodium, Serum: 141 mmol/L    Potassium, Serum: 4.0 mmol/L    Chloride, Serum: 103: Chloride reference range changed from ..10/26/2022 mmol/L    Carbon Dioxide, Serum: 24.0 mmol/L    Anion Gap, Serum: 14 mmol/L    Blood Urea Nitrogen, Serum: 34.6 mg/dL    Creatinine, Serum: 1.33 mg/dL    Glucose, Serum: 112 mg/dL    Calcium, Total Serum: 9.0: Prior Reference Range of 8.6 – 10.2 was amended as of 7/26/2022 to 8.4 –  10.5. mg/dL    eGFR: 51: The estimated glomerular filtration rate (eGFR) is calculated using the  2021 CKD-EPI creatinine equation, which does not have a coefficient for  race and is validated in individuals 18 years of age and older (N Engl J  Med 2021; 385:5554-8211). Creatinine-based eGFR may be inaccurate in  various situations including but not limited to extremes of muscle mass,  altered dietary protein intake, or medications that affect renal tubular  creatinine secretion. mL/min/1.73m2  
Basic Metabolic Panel (02.22.23 @ 06:50)    Sodium, Serum: 141 mmol/L    Potassium, Serum: 4.0 mmol/L    Chloride, Serum: 103: Chloride reference range changed from ..10/26/2022 mmol/L    Carbon Dioxide, Serum: 24.0 mmol/L    Anion Gap, Serum: 14 mmol/L    Blood Urea Nitrogen, Serum: 34.6 mg/dL    Creatinine, Serum: 1.33 mg/dL    Glucose, Serum: 112 mg/dL    Calcium, Total Serum: 9.0: Prior Reference Range of 8.6 – 10.2 was amended as of 7/26/2022 to 8.4 –  10.5. mg/dL    eGFR: 51: The estimated glomerular filtration rate (eGFR) is calculated using the  2021 CKD-EPI creatinine equation, which does not have a coefficient for  race and is validated in individuals 18 years of age and older (N Engl J  Med 2021; 385:2602-8212). Creatinine-based eGFR may be inaccurate in  various situations including but not limited to extremes of muscle mass,  altered dietary protein intake, or medications that affect renal tubular  creatinine secretion. mL/min/1.73m2  
Basic Metabolic Panel (02.22.23 @ 06:50)    Sodium, Serum: 141 mmol/L    Potassium, Serum: 4.0 mmol/L    Chloride, Serum: 103: Chloride reference range changed from ..10/26/2022 mmol/L    Carbon Dioxide, Serum: 24.0 mmol/L    Anion Gap, Serum: 14 mmol/L    Blood Urea Nitrogen, Serum: 34.6 mg/dL    Creatinine, Serum: 1.33 mg/dL    Glucose, Serum: 112 mg/dL    Calcium, Total Serum: 9.0: Prior Reference Range of 8.6 – 10.2 was amended as of 7/26/2022 to 8.4 –  10.5. mg/dL    eGFR: 51: The estimated glomerular filtration rate (eGFR) is calculated using the  2021 CKD-EPI creatinine equation, which does not have a coefficient for  race and is validated in individuals 18 years of age and older (N Engl J  Med 2021; 385:3671-0676). Creatinine-based eGFR may be inaccurate in  various situations including but not limited to extremes of muscle mass,  altered dietary protein intake, or medications that affect renal tubular  creatinine secretion. mL/min/1.73m2

## 2023-03-01 NOTE — BH CONSULTATION LIAISON PROGRESS NOTE - NSBHFUPINTERVALHXFT_PSY_A_CORE
Patient seen for follow up , chart reviewed and case discussed with team.   Patient seen and oriented x3 but inattentive with moments of confusion. Patient calm and intermittently confused but pleasant . Currently calm and denying any s/h ideation or AVH 
Speaking Coherently
Patient seen for follow up , chart reviewed and case discussed with team.   Patient seen and oriented x3 but inattentive and cognitively slow. Patient calm and still with intermittently confusion but pleasant . Currently calm and denying any s/h ideation or AVH   Patient with no memory of any events overnight but as per staff, became confused, and agitated requiring PRN medication 
Patient seen for follow up , chart reviewed and case discussed with team.   Patient seen for f/u and today found to be more somnolent and confused while oriented to person and place only needing frequent redirection but pleasant. Patient with no memory of last nights events (agitated and received Zyprexa 5 IM ) and denying any s/h ideation or AVH 
Patient seen for follow up , chart reviewed and case discussed with team.   Patient seen and oriented x3 but inattentive with moments of confusion. Patient giving a tangential disorganized account of events overnight that he was brought to a different room and doesn't know what happened (agitated overnight requiring agitation). Currently calm and denying any s/h ideation or AVH

## 2023-03-01 NOTE — BH CONSULTATION LIAISON PROGRESS NOTE - NSBHASSESSMENTFT_PSY_ALL_CORE
Patient is a 88 year old  male who is retired, lives alone and receives A services, with no past psychiatric history and with a PMH of AF, factor 5 leiden def, PE/DVT, CAD, CVA, HF, COPD, AAA, prostate CA, GIGI, who was admitted s/p fall. Psychiatry consulted for confusion     Patient seen and evaluated and found to be calm and cooperative with no reported s/h ideation, no symptoms of rafaela or AVH.   patient inattentive and with moments of confusion with no formal diagnosis of dementia and unclear what patients cognitive baseline is     Dx.   Delirium   R/o Neurocognitive disorder     Recs   -Maintain delirium precautions   -Avoid anticholinergic agents, benzos, opioid as they can further perpetuate confusion   -Frequent re orientation, Hydration, try to avoid restraints and if possible, mobilize patient and PT involvement  -Recommend to increase ZYprexa to 10mg PO QHS   -For acute agitation, recommend Zyprexa 5mg PO/IM Q6 hours PRN   -would benefit from outpatient neurology f/u and neurocognitive testing
Patient is a 88 year old  male who is retired, lives alone and receives A services, with no past psychiatric history and with a PMH of AF, factor 5 leiden def, PE/DVT, CAD, CVA, HF, COPD, AAA, prostate CA, GIGI, who was admitted s/p fall. Psychiatry consulted for confusion     Patient seen and evaluated and found to be calm and cooperative with no reported s/h ideation, no symptoms of rafaela or AVH.   patient inattentive and with moments of confusion with no formal diagnosis of dementia and unclear what patients cognitive baseline is     Dx.   Delirium   R/o Neurocognitive disorder     Recs   -Maintain delirium precautions   -Avoid anticholinergic agents, benzos, opioid as they can further perpetuate confusion   -Frequent re orientation, Hydration, try to avoid restraints and if possible, mobilize patient and PT involvement  -Recommend to change Risperdal to ZYprexa 5mg PO QHS   -For acute agitation, recommend Zyprexa 5mg PO/IM Q6 hours PRN   -would benefit from outpatient neurology f/u and neurocognitive testing
Patient is a 88 year old  male who is retired, lives alone and receives A services, with no past psychiatric history and with a PMH of AF, factor 5 leiden def, PE/DVT, CAD, CVA, HF, COPD, AAA, prostate CA, GIGI, who was admitted s/p fall. Psychiatry consulted for confusion     Patient seen and evaluated and found to be calm and cooperative with no reported s/h ideation, no symptoms of rafaela or AVH.   patient inattentive and with moments of confusion with no formal diagnosis of dementia and unclear what patients cognitive baseline is     Dx.   Delirium   R/o Neurocognitive disorder     Recs   -Maintain delirium precautions   -Avoid anticholinergic agents, benzos, opioid as they can further perpetuate confusion   -Frequent re orientation, Hydration, try to avoid restraints and if possible, mobilize patient and PT involvement  -Risperdal 0.5mg QHS started for delirium related agitation   -If agitation persist, recommend change risperdal to Zyprexa 2.5 QHS   -For acute agitation, recommend Zyprexa 5mg PO/IM Q6 hours PRN   -would benefit from outpatient neurology f/u and neurocognitive testing
Patient is a 88 year old  male who is retired, lives alone and receives A services, with no past psychiatric history and with a PMH of AF, factor 5 leiden def, PE/DVT, CAD, CVA, HF, COPD, AAA, prostate CA, GIGI, who was admitted s/p fall. Psychiatry consulted for confusion     Patient seen and evaluated and found to be calm and cooperative with no reported s/h ideation, no symptoms of rafaela or AVH.   patient inattentive and with moments of confusion with no formal diagnosis of dementia and unclear what patients cognitive baseline is     Dx.   Delirium   R/o Neurocognitive disorder     Recs   -Maintain delirium precautions   -Avoid anticholinergic agents, benzos, opioid as they can further perpetuate confusion   -Frequent re orientation, Hydration, try to avoid restraints and if possible, mobilize patient and PT involvement  -Risperdal 0.5mg QHS started for delirium related agitation   -For acute agitation, recommend Zyprexa 5mg PO/IM Q6 hours PRN   -would benefit from outpatient neurology f/u and neurocognitive testing

## 2023-03-01 NOTE — PROGRESS NOTE ADULT - PROBLEM SELECTOR PROBLEM 3
Abdominal aortic aneurysm (AAA)
Afib
Afib

## 2023-03-01 NOTE — BH CONSULTATION LIAISON PROGRESS NOTE - NSICDXBHPRIMARYDX_PSY_ALL_CORE
Delirium due to another medical condition   F05  

## 2023-03-01 NOTE — PROGRESS NOTE ADULT - NSPROGADDITIONALINFOA_GEN_ALL_CORE
- overall plan is for diuresis   - needs pulm consult for COPD evaluation   - vascular is not planning on surgery, if this opinion changes we can do NST for clearance.
Assessment and recommendations are final when note is signed by the attending.

## 2023-03-01 NOTE — PROGRESS NOTE ADULT - NS ATTEND AMEND GEN_ALL_CORE FT
NSGY Attg:    see above    patient seen and examined    agree with exam and plan as above
see my addendum to progress note
NSGY Attg:    see above    patient seen and examined    agree with exam and plan as above
NSGY Attg:    see above    patient seen and examined    agree with exam as above  LE 5/5  LT grossly intact    agree with plan as above  MRI pending
88M history significant for former chronic smoker, HTN, F. 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 Ta in 4/2022 since no routine office follow up), AAA, prostate cancer s/p recent radiation, recurrent falls with prior ER visits on 12/2022 with supratherpuetic INR 5.1 and 1/4/23 for weakness, now presents with recurrent falls unclear if syncopize, INR again elevated 6.0 and pBNP 5K, CT chest with bilateral pleural effusions and again noted 6.1 cm AAA (stable from 1/2023), subacute L1 compression fracture    1) Acute on chronic systolic congestive heart failure, HFrEF 25%   - on lasix 40mg po bid  Aortic stenosis on echo  but with low gradient will refer fro dobutamine stress echo to assess contractile reserve and helps to differentiate between true severe AS vs pesudo AS   continue intake and out put/ continue tele monitor  Monitor electrolytes  continue metoprolol, lasix ASA/ statin    2) Severe AS   -per CTS TAVR work up  / discussed with pts family  -will get Dobutamine stress echo in am    3) Afib.   -restart coumadin for PAF and HX factor 5 Leiden  hx pe/dvt  IVC filter  - increase metoprolol 50 bid.  - INR 2.0-3.0       Felicia Li D.O. [facility]  Cardiology/Vascular Cardiology -[facility] Cardiology   Telephone # [phone]
Acute on chronic systolic congestive heart failure: Presents s/p mechanical fall. appears w/ volume overload, pBNP >5000  and SOB.  continue diuresis  pt is chronic smoker, there is degree of COPD. recommend pulmonary consult. Bilateral pleural effusions, will consult CTS for eval for thoracentesis   1500mL fluid restriction. Known HFrEF 35%, ICM s/p CABG, continue GDMT for HFrEF: toprol, lipitor  will need ACE/ARB/ARNI, farxiga, spironolactone, ASA before DC.  AF:  rate controlled.  hold coumadin, INR 7 and uptrending.  Abdominal aortic aneurysm:  Distal descending thoracic aorta: 4.7 cm, previously 3.9 cm in 2017. Prominent pedunculated noncalcified plaque, considered to be high risk for embolic event. -Infrarenal abdominal aorta: 6.1 x 4.7 cm, stable since 1/4/2023,   previously 4.7 x 3.7 cm on 6/20/2015. Large peripheral mural thrombus. - This is considered high risk for rupture based on size. No clear evidence   of rupture of the abdominal aortic aneurysm on this study.  Vascular surgery says there is no need for urgent surgery at this time, will continue to follow and monitor   if surgery is planned will need NST for pre-op clearance.
Acute on chronic systolic congestive heart failure: renal function is worsening and clinically patient does not appear overloaded   will hold off on diuresis, switch to maintenance PO lasix. consider pulmonary consult    bilateral pleural effusions, when INR is <2 will consult CTS for eval for thoracentesis evaluation. lungs have not significantly improved with diuresis   repeat CXR to eval effusions   known low flow low gradient AS, compared to previous echo current echo shows severe AS. will consult CTS for severe AS   will likely require cath for worsening EF and severe AS, but INR is too high, will revisit when INR <2  Chronic HFrEF 35%, ICM s/p CABG, continue GDMT as tolerated for HFrEF: toprol, lipitor   AF: Rate controlled.  hold coumadin, INR 7 and downtrending. - start heparin infusion when INR <1.9.  Abdominal aortic aneurysm:  Vascular surgery says there is no need for urgent surgery at this time, will continue to follow and monitor.
Acute on chronic systolic congestive heart failure.   bilateral pleural effusions, when INR is <2 will consult CTS for eval for thoracentesis evaluation. lungs have not significantly improved with diuresis. INR 3.15  known low flow low gradient AS, compared to previous echo current echo shows severe AS, CTSx consulted for severe AS   also EF is slightly worse 37% ---> 25-30%.  will likely require cath for worsening EF and severe AS, but INR is too high, will revisit when INR <2  GDMT
Acute on chronic systolic congestive heart failure: continue diuresis  bilateral pleural effusions, will consult CTS for eval for thoracentesis if no resolution w/ diuresis   known HFrEF 35%, ICM s/p CABG, continue GDMT for HFrEF: toprol, lipitor   will need ACE/ARB/ARNI, farxiga, spironolactone, ASA before DC.  Afib: Rate controlled.  Daily INR.
88M history significant for former chronic smoker, HTN, F. 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 Ta in 4/2022 since no routine office follow up), AAA, prostate cancer s/p recent radiation, recurrent falls with prior ER visits on 12/2022 with supratherpuetic INR 5.1 and 1/4/23 for weakness, now presents with recurrent falls unclear if syncopize, INR again elevated 6.0 and pBNP 5K, CT chest with bilateral pleural effusions and again noted 6.1 cm AAA (stable from 1/2023), subacute L1 compression fracture    1) Acute on chronic systolic congestive heart failure, HFrEF 25%   - on lasix 40mg po bid  Aortic stenosis on echo  but with low gradient will refer fro dobutamine stress echo to assess contractile reserve and helps to differentiate between true severe AS vs pesudo AS   continue intake and out put/ continue tele monitor  Monitor electrolytes  continue metoprolol, lasix ASA/ statin    2) Severe AS   -per CTS TAVR work up  / discussed with pts family  -will get Dobutamine stress echo in am    3) Afib.   -restart coumadin for PAF and HX factor 5 Leiden  hx pe/dvt  IVC filter  - increase metoprolol 50 bid.  - INR 2.0-3.0       Felicia Li D.O. [facility]  Cardiology/Vascular Cardiology -[facility] Cardiology   Telephone # [phone].
Acute on chronic systolic congestive heart failure:  on lasix 40mg po bid. Echo 25%. Aortic stenosis but with low gradient  GDMT as tolerated  AF:  restart coumadin for PAF and HX factor 5 leiden  hx pe/dvt  IVC filter. increase metoprolol 50 bid.
Patient was seen and examined at bedside and notes to be doing with no complaints of chest pain or shortness of breath   Telemetry shows no events     88M history significant for former chronic smoker, HTN, F. 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 Ta in 4/2022 since no routine office follow up), AAA, prostate cancer s/p recent radiation, recurrent falls with prior ER visits on 12/2022 with supratherpuetic INR 5.1 and 1/4/23 for weakness, now presents with recurrent falls unclear if syncopize, INR again elevated 6.0 and pBNP 5K, CT chest with bilateral pleural effusions and again noted 6.1 cm AAA (stable from 1/2023), subacute L1 compression fracture    1) Acute on chronic systolic congestive heart failure, HFrEF 25%   - on Lasix 40mg po q daily and tolerating with no increase in Cr and no acute complaints of chest pain or shortness of breath and appears euvolemic   - continue intake and out put/ continue tele monitor  - Monitor electrolytes  - continue metoprolol, Lasix ASA/ statin    2) Severe AS, low flow low gradient   - s/p dobutamine stress echocardiogram which shows pseudo- AS with no contractile reserve, based on this the valve is severe due to low flow or reduced LV function   -per CTSx based on the findings of the Dobutamine stress echocardiogram no indication for TAVR or surgical valvular intervention   -recommend to monitor in the outpatient setting     3) Afib.   -On coumadin for PAF and HX factor 5 Leiden  hx pe/dvt  IVC filter  - on Lopressor 50mg PO BID on Coumadin   - INR 2.0-3.0     Results and cardiac findings were conveyed to family member   Felicia Li D.O. [facility]  Cardiology/Vascular Cardiology -[facility] Cardiology   Telephone # [phone].

## 2023-03-01 NOTE — BH CONSULTATION LIAISON PROGRESS NOTE - CURRENT MEDICATION
MEDICATIONS  (STANDING):  acetaminophen     Tablet .. 975 milliGRAM(s) Oral every 8 hours  aspirin  chewable 81 milliGRAM(s) Oral daily  atorvastatin 40 milliGRAM(s) Oral at bedtime  budesonide  80 MICROgram(s)/formoterol 4.5 MICROgram(s) Inhaler 2 Puff(s) Inhalation two times a day  clotrimazole/betamethasone Cream 1 Application(s) Topical two times a day  furosemide    Tablet 40 milliGRAM(s) Oral two times a day  metoprolol tartrate 50 milliGRAM(s) Oral every 12 hours  risperiDONE   Tablet 0.5 milliGRAM(s) Oral at bedtime  sertraline 50 milliGRAM(s) Oral daily  tamsulosin 0.4 milliGRAM(s) Oral at bedtime    MEDICATIONS  (PRN):  ipratropium    for Nebulization 500 MICROGram(s) Nebulizer every 6 hours PRN Shortness of Breath and/or Wheezing  levalbuterol Inhalation 1.25 milliGRAM(s) Inhalation every 6 hours PRN shortness of breath  metoprolol tartrate Injectable 5 milliGRAM(s) IV Push every 6 hours PRN sustained HR >130  OLANZapine Injectable 5 milliGRAM(s) IntraMuscular every 6 hours PRN agitation  
MEDICATIONS  (STANDING):  acetaminophen     Tablet .. 975 milliGRAM(s) Oral every 8 hours  aspirin  chewable 81 milliGRAM(s) Oral daily  atorvastatin 40 milliGRAM(s) Oral at bedtime  budesonide  80 MICROgram(s)/formoterol 4.5 MICROgram(s) Inhaler 2 Puff(s) Inhalation two times a day  clotrimazole/betamethasone Cream 1 Application(s) Topical two times a day  losartan 12.5 milliGRAM(s) Oral daily  metoprolol tartrate 50 milliGRAM(s) Oral every 12 hours  OLANZapine 5 milliGRAM(s) Oral at bedtime  sertraline 50 milliGRAM(s) Oral daily  tamsulosin 0.4 milliGRAM(s) Oral at bedtime    MEDICATIONS  (PRN):  ipratropium    for Nebulization 500 MICROGram(s) Nebulizer every 6 hours PRN Shortness of Breath and/or Wheezing  levalbuterol Inhalation 1.25 milliGRAM(s) Inhalation every 6 hours PRN shortness of breath  metoprolol tartrate Injectable 5 milliGRAM(s) IV Push every 6 hours PRN sustained HR >130  OLANZapine Injectable 5 milliGRAM(s) IntraMuscular every 6 hours PRN agitation  
MEDICATIONS  (STANDING):  acetaminophen     Tablet .. 975 milliGRAM(s) Oral every 8 hours  aspirin  chewable 81 milliGRAM(s) Oral daily  atorvastatin 40 milliGRAM(s) Oral at bedtime  budesonide  80 MICROgram(s)/formoterol 4.5 MICROgram(s) Inhaler 2 Puff(s) Inhalation two times a day  clotrimazole/betamethasone Cream 1 Application(s) Topical two times a day  furosemide    Tablet 40 milliGRAM(s) Oral two times a day  metoprolol tartrate 25 milliGRAM(s) Oral two times a day  risperiDONE   Tablet 0.5 milliGRAM(s) Oral at bedtime  sertraline 50 milliGRAM(s) Oral daily  tamsulosin 0.4 milliGRAM(s) Oral at bedtime  warfarin 4 milliGRAM(s) Oral once    MEDICATIONS  (PRN):  ipratropium    for Nebulization 500 MICROGram(s) Nebulizer every 6 hours PRN Shortness of Breath and/or Wheezing  levalbuterol Inhalation 1.25 milliGRAM(s) Inhalation every 6 hours PRN shortness of breath  metoprolol tartrate Injectable 5 milliGRAM(s) IV Push every 6 hours PRN sustained HR >130  OLANZapine Injectable 5 milliGRAM(s) IntraMuscular every 6 hours PRN agitation  
MEDICATIONS  (STANDING):  acetaminophen     Tablet .. 975 milliGRAM(s) Oral every 8 hours  aspirin  chewable 81 milliGRAM(s) Oral daily  atorvastatin 40 milliGRAM(s) Oral at bedtime  budesonide  80 MICROgram(s)/formoterol 4.5 MICROgram(s) Inhaler 2 Puff(s) Inhalation two times a day  clotrimazole/betamethasone Cream 1 Application(s) Topical two times a day  furosemide    Tablet 40 milliGRAM(s) Oral two times a day  metoprolol tartrate 25 milliGRAM(s) Oral two times a day  risperiDONE   Tablet 0.5 milliGRAM(s) Oral at bedtime  sertraline 50 milliGRAM(s) Oral daily  tamsulosin 0.4 milliGRAM(s) Oral at bedtime    MEDICATIONS  (PRN):  ipratropium    for Nebulization 500 MICROGram(s) Nebulizer every 6 hours PRN Shortness of Breath and/or Wheezing  levalbuterol Inhalation 1.25 milliGRAM(s) Inhalation every 6 hours PRN shortness of breath  metoprolol tartrate Injectable 5 milliGRAM(s) IV Push every 6 hours PRN sustained HR >130  OLANZapine Injectable 5 milliGRAM(s) IntraMuscular every 6 hours PRN agitation

## 2023-03-01 NOTE — PROGRESS NOTE ADULT - SUBJECTIVE AND OBJECTIVE BOX
[facility]                                                         CARDIOLOGY AT [facility]                                                                  [address], [place] [address]                                                         Telephone: [phone]. Fax:[phone]                                                                             PROGRESS NOTE    Reason for follow up: HFrEF, severe AS  Update: s/p Dobutamine stress echo. pending CTSx eval. euvolemic on exam, on room air, lucid today. Change lasix to 40mg PO daily      Review of symptoms:   Cardiac:  No chest pain. No dyspnea. No palpitations.  Respiratory: no cough. No dyspnea  Gastrointestinal: No diarrhea. No abdominal pain. No bleeding.   Neuro: No focal neuro complaints.    Vitals:  T(C): 36.6 (03-01-23 @ 04:00), Max: 36.8 (02-28-23 @ 12:10)  HR: 104 (03-01-23 @ 04:00) (68 - 104)  BP: 111/79 (03-01-23 @ 04:00) (97/63 - 111/79)  RR: 18 (03-01-23 @ 04:00) (18 - 18)  SpO2: 94% (03-01-23 @ 04:00) (93% - 95%)  Wt(kg): --  I&O's Summary    28 Feb 2023 07:01  -  01 Mar 2023 07:00  --------------------------------------------------------  IN: 0 mL / OUT: 500 mL / NET: -500 mL          PHYSICAL EXAM:  Appearance: Comfortable. No acute distress  HEENT:  Atraumatic. Normocephalic.  Normal oral mucosa  Neurologic: A & O x 2, no gross focal deficits.  Cardiovascular: irreg irregular, No murmur, no rubs/gallops. No JVD  Respiratory: Lungs clear to auscultation, unlabored   Gastrointestinal:  Soft, Non-tender, + BS  Lower Extremities: 2+ Peripheral Pulses, No clubbing, cyanosis, trace edema  Psychiatry: Patient is calm. No agitation.   Skin: warm and dry.    CURRENT CARDIAC MEDICATIONS:  metoprolol tartrate 50 milliGRAM(s) Oral every 12 hours  metoprolol tartrate Injectable 5 milliGRAM(s) IV Push every 6 hours PRN      CURRENT OTHER MEDICATIONS:  budesonide  80 MICROgram(s)/formoterol 4.5 MICROgram(s) Inhaler 2 Puff(s) Inhalation two times a day  ipratropium    for Nebulization 500 MICROGram(s) Nebulizer every 6 hours PRN Shortness of Breath and/or Wheezing  levalbuterol Inhalation 1.25 milliGRAM(s) Inhalation every 6 hours PRN shortness of breath  acetaminophen     Tablet .. 975 milliGRAM(s) Oral every 8 hours  OLANZapine 5 milliGRAM(s) Oral at bedtime  OLANZapine Injectable 5 milliGRAM(s) IntraMuscular every 6 hours PRN agitation  sertraline 50 milliGRAM(s) Oral daily  atorvastatin 40 milliGRAM(s) Oral at bedtime  aspirin  chewable 81 milliGRAM(s) Oral daily  clotrimazole/betamethasone Cream 1 Application(s) Topical two times a day  tamsulosin 0.4 milliGRAM(s) Oral at bedtime      LABS:	 	  ( 23 Feb 2023 05:30 )  Troponin T  X    ,  CPK  X    , CKMB  X    , BNP 6558<H>    , ( 22 Feb 2023 06:50 )  Troponin T  X    ,  CPK  X    , CKMB  X    , BNP 6286<H>    , ( 21 Feb 2023 04:26 )  Troponin T  X    ,  CPK  X    , CKMB  X    , BNP 4701<H>                              12.3   6.65  )-----------( 233      ( 01 Mar 2023 05:50 )             39.7     03-01    142  |  102  |  36.0<H>  ----------------------------<  100<H>  3.4<L>   |  29.0  |  1.29    Ca    9.1      01 Mar 2023 05:50  Phos  2.9     03-01  Mg     2.3     03-01      PT/INR/PTT ( 01 Mar 2023 05:50 )                       :                       :      43.8         :       X                     .        .                   .              .           .       3.73        .                                       Lipid Profile:   HgA1c:   TSH: Thyroid Stimulating Hormone, Serum: 0.62 uIU/mL      TELEMETRY: Afib, BBB PVCs  ECG:    DIAGNOSTIC TESTING:  [ ] Echocardiogram:   < from: TTE Echo Complete w/ Contrast w/ Doppler (02.21.23 @ 11:34) >  PHYSICIAN INTERPRETATION:  Left Ventricle: Endocardial visualization was enhanced with intravenous   echo contrast. The left ventricular internal cavity size is normal.  Global LV systolic function was severely decreased. Left ventricular   ejection fraction, by visual estimation, is 25 to 30%. Abnormal   (paradoxical) septal motion consistent with post-operative status. The   mitral in-flow pattern reveals no discernable A-wave, therefore no   comment on diastolic function can be made.  Right Ventricle: Normal right ventricular size and function.  Left Atrium: Severely enlarged left atrium.  Right Atrium: Right atrial enlargement.  Pericardium: There is no evidence of pericardial effusion.  Mitral Valve: There is moderate mitral annular calcification.  Tricuspid Valve: Structurally normal tricuspid valve, with normal leaflet   excursion. Mild-moderate tricuspid regurgitation is visualized. Estimated   pulmonary artery systolic pressure is 41.7 mmHg assuming a right atrial   pressure of 3 mmHg, which is consistent with mild pulmonary hypertension.  Aortic Valve: Severe aortic stenosis is present. No evidence of aortic   valve regurgitation is seen.  Pulmonic Valve: Structurally normal pulmonic valve, with normal leaflet   excursion. Trace pulmonic valve regurgitation.  Aorta: There is dilatation of the aortic root, sinuses of Valsalva and   ascending aorta. There is moderate aortic root calcification.  Pulmonary Artery: The main pulmonary artery is normal in size.  Venous: The inferior vena cava is not well visualized.  In comparison to the previous echocardiogram(s): Prior examinations are   available and were reviewed for comparison purposes. Compared to the   outpatient TTE study from 9/2022 prevoiusly LV EF 37% and known low   gradient aortic valve stenosis, but visually on current study appears   severely stenotic.      Summary:   1. Endocardial visualization was enhanced with intravenous echo   contrast. No LV thrombus.   2. Left ventricular ejection fraction, by visual estimation, is 25 to   30%.   3. Severely decreased global left ventricular systolic function.   4. Abnormal septal motion consistent with post-operative status.   5. There is mild eccentric left ventricular hypertrophy.   6. The mitralin-flow pattern reveals no discernable A-wave, therefore   no comment on diastolic function can be made.   7. Normal right ventricular size and function, estimated PASP least 42   mmHg.   8. Severely enlarged left atrium.   9. Right atrial enlargement.  10. Moderate mitral annular calcification.  11. Mild-moderate tricuspid regurgitation.  12. Severe calcific aortic valve stenosis with low gradient,   dimensionless index 0.23.  13. There is moderate aortic root calcification.  14. Borderline dilatation of the aortic root, sinuses of Valsalva and   ascending aorta 3.9 cm, index to BSA 1.6 cm/m2 within normal.  15. Compared to the outpatient TTE study from 9/2022 prevoiusly LV EF 37%   and known low gradient aortic valve stenosis, but visually on current   study appears severely stenotic.    Mook Bangura DO Electronically signed on 2/21/2023 at 1:35:50 PM  < end of copied text >  [ ]  Catheterization:  [ ] Stress Test:    OTHER:

## 2023-03-01 NOTE — BH CONSULTATION LIAISON PROGRESS NOTE - NSBHCHARTREVIEWINVESTIGATE_PSY_A_CORE FT
< from: 12 Lead ECG (02.20.23 @ 12:33) >      Ventricular Rate 104 BPM    Atrial Rate 48 BPM    QRS Duration 122 ms    Q-T Interval 368 ms    QTC Calculation(Bazett) 483 ms    R Axis -38 degrees    T Axis 134 degrees    Diagnosis Line Atrial fibrillation with rapid ventricular response  Left axis deviation  Left bundle branch block    Confirmed by NETO GAMBLE ([id number]) on 2/20/2023 6:34:10 PM    < end of copied text >    
< from: 12 Lead ECG (02.20.23 @ 12:33) >      Ventricular Rate 104 BPM    Atrial Rate 48 BPM    QRS Duration 122 ms    Q-T Interval 368 ms    QTC Calculation(Bazett) 483 ms    R Axis -38 degrees    T Axis 134 degrees    Diagnosis Line Atrial fibrillation with rapid ventricular response  Left axis deviation  Left bundle branch block    Confirmed by NETO GAMLBE ([id number]) on 2/20/2023 6:34:10 PM    < end of copied text >

## 2023-03-01 NOTE — CHART NOTE - NSCHARTNOTEFT_GEN_A_CORE
Called to reevaluate for TAVR.   stress echo done yesterday> read as no cardiac reserve and pseudo severe AS.  Reviewed by Dr. Gomez.  No AS.  No plan for intervention from a CTS standpoint.  Will sign off.

## 2023-03-01 NOTE — PROGRESS NOTE ADULT - PROBLEM SELECTOR PLAN 3
.  -  coumadin when able for PAF and HX factor 5 leiden if not planned for any further invasive procedures  - INR currently 3.7  - hx pe/dvt  IVC filter  - increase metoprolol 50 bid, eventual transition to Toprol  - INR 2.0-3.0 .  -  coumadin when able for PAF and HX factor 5 Leiden if not planned for any further invasive procedures  - INR currently 3.7  - hx pe/dvt  IVC filter  - increase metoprolol 50 bid, eventual transition to Toprol  - INR 2.0-3.0

## 2023-03-01 NOTE — PROGRESS NOTE ADULT - PROBLEM SELECTOR PLAN 2
- rate controlled  - hold coumadin, INR 7 and downtrending   - start heparin infusion when INR <1.9
- rate controlled  - hold coumadin, INR 7 and uptrending
.  - rate controlled, occasionally to 130s when agitated  - hold coumadin, INR 7 and downtrending   - start heparin infusion when INR <1.9  - continue ASA
- rate controlled  - hold coumadin, INR 7 and uptrending
.  - per CTS TAVR work up / discussed with pts family  - pending Dobutamine stress echo today
.  - severe AS on ECHO  - s/p Dobutamine stress echo today  - d/w with CTSx, will evaluate candidacy for TAVR
restart coumadin for PAF and HX factor 5 leiden  hx pe/dvt  IVC filter  increase metoprolol 50 bid

## 2023-03-01 NOTE — PROGRESS NOTE ADULT - ASSESSMENT
88 yr old male with hypertension, atrial fibrillation, PE/DVT s/p IVC filter, Factor V Leiden on coumadin CAD s/p CABG, CVA, COPD, GIGI presented after a syncopal episode and fall at home. Given fall, he was evaluated by trauma service on admission. Clinically noted to be in fluid overload, CHF exacerbation with elevated BNP, supratherapeutic INR. Coumadin was held. IV Lasix was given. Cardiology consulted, ECHO was ordered. CT head without acute stroke. CT abdomen revealed a 6.1 infrarenal AAA, mural thrombus, L1 and T8 fractures. Vascular and neurosurgery consults were requested. No acute intervention per vascular. MR spine and TLSO brace was recommended by NSG. His ECHO revealed an EF 25-30% with severe aortic stenosis. IV Lasix was continued. Pulmonary consulted as well for acute hypoxic respiratory failure, steroids were initiated. His course was complicated by delirium, hence psychiatry consulted, advised Risperdal. Palliative care was consulted, goals of care and HCP established, patient was made DNR/DNI by daughter/HCP. Given severe AS, CT surgery was consulted by cardiology, advised dobutamine stress test to better evaluate aortic stenosis in setting of CHF. His coumadin was held until INR improved, this was subsequently resumed. Rate control medications adjusted by cardiology. Dobutamine stress test was ordered to assess AS, which was done, consistent with pseudo aortic stenosis. Coumadin held in view of elevated INR.    1. Acute hypoxic respiratory failure sec decompensated CHF:  Improved  continue PO Lasix, Metoprolol  aspirin, statin  Dobutamine stress done, pseudoaortic stenosis.    2. Atrial fibrillation, PE/DVT, Factor V Leiden:  elevated INR today  hold coumadin.    3. Metabolic encephalopathy:  Slow improvement  CO for safety  Zyprexa qhs, dose adjustment per psychiatry.    4. COPD exacerbation:  Improved  s/p short course of Prednisone  inhaled steroids  Duoneb    5. L1/T8 fracture:  MR noted  NSG follow up noted.  pain control  mobilize with PT and brace.    6. DVT PPX:  on coumadin, held yesterday and today    Discussed with patient and RN.

## 2023-03-02 LAB
ANION GAP SERPL CALC-SCNC: 12 MMOL/L — SIGNIFICANT CHANGE UP (ref 5–17)
BASOPHILS # BLD AUTO: 0.06 K/UL — SIGNIFICANT CHANGE UP (ref 0–0.2)
BASOPHILS NFR BLD AUTO: 1 % — SIGNIFICANT CHANGE UP (ref 0–2)
BUN SERPL-MCNC: 39.1 MG/DL — HIGH (ref 8–20)
CALCIUM SERPL-MCNC: 9.2 MG/DL — SIGNIFICANT CHANGE UP (ref 8.4–10.5)
CHLORIDE SERPL-SCNC: 105 MMOL/L — SIGNIFICANT CHANGE UP (ref 96–108)
CO2 SERPL-SCNC: 27 MMOL/L — SIGNIFICANT CHANGE UP (ref 22–29)
CREAT SERPL-MCNC: 1.24 MG/DL — SIGNIFICANT CHANGE UP (ref 0.5–1.3)
EGFR: 56 ML/MIN/1.73M2 — LOW
EOSINOPHIL # BLD AUTO: 0.41 K/UL — SIGNIFICANT CHANGE UP (ref 0–0.5)
EOSINOPHIL NFR BLD AUTO: 7.1 % — HIGH (ref 0–6)
GLUCOSE SERPL-MCNC: 102 MG/DL — HIGH (ref 70–99)
HCT VFR BLD CALC: 42.3 % — SIGNIFICANT CHANGE UP (ref 39–50)
HGB BLD-MCNC: 12.6 G/DL — LOW (ref 13–17)
IMM GRANULOCYTES NFR BLD AUTO: 0.5 % — SIGNIFICANT CHANGE UP (ref 0–0.9)
INR BLD: 3.2 RATIO — HIGH (ref 0.88–1.16)
LYMPHOCYTES # BLD AUTO: 0.66 K/UL — LOW (ref 1–3.3)
LYMPHOCYTES # BLD AUTO: 11.4 % — LOW (ref 13–44)
MAGNESIUM SERPL-MCNC: 2.4 MG/DL — SIGNIFICANT CHANGE UP (ref 1.6–2.6)
MCHC RBC-ENTMCNC: 26.8 PG — LOW (ref 27–34)
MCHC RBC-ENTMCNC: 29.8 GM/DL — LOW (ref 32–36)
MCV RBC AUTO: 90 FL — SIGNIFICANT CHANGE UP (ref 80–100)
MONOCYTES # BLD AUTO: 0.65 K/UL — SIGNIFICANT CHANGE UP (ref 0–0.9)
MONOCYTES NFR BLD AUTO: 11.2 % — SIGNIFICANT CHANGE UP (ref 2–14)
NEUTROPHILS # BLD AUTO: 3.97 K/UL — SIGNIFICANT CHANGE UP (ref 1.8–7.4)
NEUTROPHILS NFR BLD AUTO: 68.8 % — SIGNIFICANT CHANGE UP (ref 43–77)
PHOSPHATE SERPL-MCNC: 4 MG/DL — SIGNIFICANT CHANGE UP (ref 2.4–4.7)
PLATELET # BLD AUTO: 235 K/UL — SIGNIFICANT CHANGE UP (ref 150–400)
POTASSIUM SERPL-MCNC: 3.9 MMOL/L — SIGNIFICANT CHANGE UP (ref 3.5–5.3)
POTASSIUM SERPL-SCNC: 3.9 MMOL/L — SIGNIFICANT CHANGE UP (ref 3.5–5.3)
PROTHROM AB SERPL-ACNC: 37.5 SEC — HIGH (ref 10.5–13.4)
RBC # BLD: 4.7 M/UL — SIGNIFICANT CHANGE UP (ref 4.2–5.8)
RBC # FLD: 15.1 % — HIGH (ref 10.3–14.5)
SARS-COV-2 RNA SPEC QL NAA+PROBE: SIGNIFICANT CHANGE UP
SODIUM SERPL-SCNC: 144 MMOL/L — SIGNIFICANT CHANGE UP (ref 135–145)
WBC # BLD: 5.78 K/UL — SIGNIFICANT CHANGE UP (ref 3.8–10.5)
WBC # FLD AUTO: 5.78 K/UL — SIGNIFICANT CHANGE UP (ref 3.8–10.5)

## 2023-03-02 PROCEDURE — 99233 SBSQ HOSP IP/OBS HIGH 50: CPT

## 2023-03-02 RX ORDER — OLANZAPINE 15 MG/1
10 TABLET, FILM COATED ORAL AT BEDTIME
Refills: 0 | Status: DISCONTINUED | OUTPATIENT
Start: 2023-03-02 | End: 2023-03-03

## 2023-03-02 RX ADMIN — Medication 40 MILLIGRAM(S): at 05:51

## 2023-03-02 RX ADMIN — TAMSULOSIN HYDROCHLORIDE 0.4 MILLIGRAM(S): 0.4 CAPSULE ORAL at 21:03

## 2023-03-02 RX ADMIN — CLOTRIMAZOLE AND BETAMETHASONE DIPROPIONATE 1 APPLICATION(S): 10; .5 CREAM TOPICAL at 05:56

## 2023-03-02 RX ADMIN — Medication 81 MILLIGRAM(S): at 12:57

## 2023-03-02 RX ADMIN — Medication 975 MILLIGRAM(S): at 13:00

## 2023-03-02 RX ADMIN — BUDESONIDE AND FORMOTEROL FUMARATE DIHYDRATE 2 PUFF(S): 160; 4.5 AEROSOL RESPIRATORY (INHALATION) at 21:04

## 2023-03-02 RX ADMIN — Medication 975 MILLIGRAM(S): at 12:58

## 2023-03-02 RX ADMIN — Medication 975 MILLIGRAM(S): at 21:03

## 2023-03-02 RX ADMIN — Medication 50 MILLIGRAM(S): at 17:46

## 2023-03-02 RX ADMIN — ATORVASTATIN CALCIUM 40 MILLIGRAM(S): 80 TABLET, FILM COATED ORAL at 21:03

## 2023-03-02 RX ADMIN — Medication 975 MILLIGRAM(S): at 22:00

## 2023-03-02 RX ADMIN — Medication 975 MILLIGRAM(S): at 05:51

## 2023-03-02 RX ADMIN — LOSARTAN POTASSIUM 12.5 MILLIGRAM(S): 100 TABLET, FILM COATED ORAL at 05:52

## 2023-03-02 RX ADMIN — OLANZAPINE 10 MILLIGRAM(S): 15 TABLET, FILM COATED ORAL at 21:04

## 2023-03-02 RX ADMIN — BUDESONIDE AND FORMOTEROL FUMARATE DIHYDRATE 2 PUFF(S): 160; 4.5 AEROSOL RESPIRATORY (INHALATION) at 13:01

## 2023-03-02 RX ADMIN — CLOTRIMAZOLE AND BETAMETHASONE DIPROPIONATE 1 APPLICATION(S): 10; .5 CREAM TOPICAL at 17:46

## 2023-03-02 RX ADMIN — SERTRALINE 50 MILLIGRAM(S): 25 TABLET, FILM COATED ORAL at 12:57

## 2023-03-02 NOTE — PROGRESS NOTE ADULT - ASSESSMENT
88 yr old male with hypertension, atrial fibrillation, PE/DVT s/p IVC filter, Factor V Leiden on coumadin CAD s/p CABG, CVA, COPD, GIGI presented after a syncopal episode and fall at home. Given fall, he was evaluated by trauma service on admission. Clinically noted to be in fluid overload, CHF exacerbation with elevated BNP, supratherapeutic INR. Coumadin was held. IV Lasix was given. Cardiology consulted, ECHO was ordered. CT head without acute stroke. CT abdomen revealed a 6.1 infrarenal AAA, mural thrombus, L1 and T8 fractures. Vascular and neurosurgery consults were requested. No acute intervention per vascular. MR spine and TLSO brace was recommended by NSG. His ECHO revealed an EF 25-30% with severe aortic stenosis. IV Lasix was continued. Pulmonary consulted as well for acute hypoxic respiratory failure, steroids were initiated. His course was complicated by delirium, hence psychiatry consulted, advised Risperdal. Palliative care was consulted, goals of care and HCP established, patient was made DNR/DNI by daughter/HCP. Given severe AS, CT surgery was consulted by cardiology, advised dobutamine stress test to better evaluate aortic stenosis in setting of CHF. His coumadin was held until INR improved, this was subsequently resumed. Rate control medications adjusted by cardiology. Dobutamine stress test was ordered to assess AS, which was done, consistent with pseudo aortic stenosis. Coumadin held in view of elevated INR. Zyprexa dose was adjusted.     1. Acute hypoxic respiratory failure sec decompensated CHF:  Improved  continue PO Lasix, Metoprolol  aspirin, statin  Dobutamine stress done, pseudoaortic stenosis.    2. Atrial fibrillation, PE/DVT, Factor V Leiden:  elevated INR today  hold coumadin.    3. Metabolic encephalopathy:  Slow improvement  CO for safety  Zyprexa qhs, dose adjustment per psychiatry.    4. COPD exacerbation:  Improved  s/p short course of Prednisone  inhaled steroids  Duoneb    5. L1/T8 fracture:  MR noted  NSG follow up noted.  pain control  mobilize with PT and brace.    6. DVT PPX:  on coumadin, held yesterday and today    Discussed with patient and RN.  Discharge planning.

## 2023-03-02 NOTE — PROGRESS NOTE ADULT - SUBJECTIVE AND OBJECTIVE BOX
INTERVAL HPI/OVERNIGHT EVENTS:    CC: delirium, metabolic encephalopathy, acute HF, PE, cva, aortic stenosis    No overnight events  denies complaints.    Vital Signs Last 24 Hrs  T(C): 37.3 (02 Mar 2023 12:53), Max: 37.3 (02 Mar 2023 12:53)  T(F): 99.1 (02 Mar 2023 12:53), Max: 99.1 (02 Mar 2023 12:53)  HR: 78 (02 Mar 2023 12:53) (78 - 107)  BP: 148/84 (02 Mar 2023 12:53) (104/79 - 150/68)  BP(mean): --  RR: 18 (02 Mar 2023 12:53) (18 - 18)  SpO2: 93% (02 Mar 2023 12:53) (93% - 97%)    Parameters below as of 02 Mar 2023 12:53  Patient On (Oxygen Delivery Method): room air        PHYSICAL EXAM:    GENERAL: alert, not in distress  CHEST/LUNG: b/l air entry  HEART: reg  ABDOMEN: soft, bs+  EXTREMITIES:  no edema, tenderness    MEDICATIONS  (STANDING):  acetaminophen     Tablet .. 975 milliGRAM(s) Oral every 8 hours  aspirin  chewable 81 milliGRAM(s) Oral daily  atorvastatin 40 milliGRAM(s) Oral at bedtime  budesonide  80 MICROgram(s)/formoterol 4.5 MICROgram(s) Inhaler 2 Puff(s) Inhalation two times a day  clotrimazole/betamethasone Cream 1 Application(s) Topical two times a day  furosemide    Tablet 40 milliGRAM(s) Oral daily  losartan 12.5 milliGRAM(s) Oral daily  metoprolol tartrate 50 milliGRAM(s) Oral every 12 hours  OLANZapine 10 milliGRAM(s) Oral at bedtime  sertraline 50 milliGRAM(s) Oral daily  tamsulosin 0.4 milliGRAM(s) Oral at bedtime    MEDICATIONS  (PRN):  ipratropium    for Nebulization 500 MICROGram(s) Nebulizer every 6 hours PRN Shortness of Breath and/or Wheezing  levalbuterol Inhalation 1.25 milliGRAM(s) Inhalation every 6 hours PRN shortness of breath  metoprolol tartrate Injectable 5 milliGRAM(s) IV Push every 6 hours PRN sustained HR >130  OLANZapine Injectable 5 milliGRAM(s) IntraMuscular every 6 hours PRN agitation      Allergies    No Known Allergies    Intolerances    OHS (Unknown)        LABS:                          12.6   5.78  )-----------( 235      ( 02 Mar 2023 05:52 )             42.3     03-02    144  |  105  |  39.1<H>  ----------------------------<  102<H>  3.9   |  27.0  |  1.24    Ca    9.2      02 Mar 2023 05:52  Phos  4.0     03-02  Mg     2.4     03-02      PT/INR - ( 02 Mar 2023 05:52 )   PT: 37.5 sec;   INR: 3.20 ratio               RADIOLOGY & ADDITIONAL TESTS:

## 2023-03-02 NOTE — CHART NOTE - NSCHARTNOTEFT_GEN_A_CORE
PALLIATIVE ATTENDING NOTE    I discussed with primary team attending Dr Palacios.  Pt's goals are clear and no symptoms being actively managed at this time.  We will sign off.  Please contact us if we can be of assistance with this pt's care in the future.

## 2023-03-03 ENCOUNTER — TRANSCRIPTION ENCOUNTER (OUTPATIENT)
Age: 88
End: 2023-03-03

## 2023-03-03 VITALS
OXYGEN SATURATION: 97 % | DIASTOLIC BLOOD PRESSURE: 69 MMHG | TEMPERATURE: 98 F | RESPIRATION RATE: 18 BRPM | SYSTOLIC BLOOD PRESSURE: 100 MMHG | HEART RATE: 89 BPM

## 2023-03-03 LAB
APPEARANCE UR: CLEAR — SIGNIFICANT CHANGE UP
BACTERIA # UR AUTO: ABNORMAL
BILIRUB UR-MCNC: NEGATIVE — SIGNIFICANT CHANGE UP
COLOR SPEC: YELLOW — SIGNIFICANT CHANGE UP
DIFF PNL FLD: NEGATIVE — SIGNIFICANT CHANGE UP
EPI CELLS # UR: SIGNIFICANT CHANGE UP
GLUCOSE UR QL: NEGATIVE MG/DL — SIGNIFICANT CHANGE UP
INR BLD: 2.71 RATIO — HIGH (ref 0.88–1.16)
KETONES UR-MCNC: NEGATIVE — SIGNIFICANT CHANGE UP
LEUKOCYTE ESTERASE UR-ACNC: ABNORMAL
NITRITE UR-MCNC: NEGATIVE — SIGNIFICANT CHANGE UP
PH UR: 5 — SIGNIFICANT CHANGE UP (ref 5–8)
PROT UR-MCNC: NEGATIVE — SIGNIFICANT CHANGE UP
PROTHROM AB SERPL-ACNC: 31.8 SEC — HIGH (ref 10.5–13.4)
RBC CASTS # UR COMP ASSIST: NEGATIVE /HPF — SIGNIFICANT CHANGE UP (ref 0–4)
SP GR SPEC: 1.02 — SIGNIFICANT CHANGE UP (ref 1.01–1.02)
UROBILINOGEN FLD QL: 1 MG/DL
WBC UR QL: SIGNIFICANT CHANGE UP /HPF (ref 0–5)

## 2023-03-03 PROCEDURE — 84134 ASSAY OF PREALBUMIN: CPT

## 2023-03-03 PROCEDURE — C8929: CPT

## 2023-03-03 PROCEDURE — 72128 CT CHEST SPINE W/O DYE: CPT

## 2023-03-03 PROCEDURE — U0003: CPT

## 2023-03-03 PROCEDURE — 84100 ASSAY OF PHOSPHORUS: CPT

## 2023-03-03 PROCEDURE — U0005: CPT

## 2023-03-03 PROCEDURE — 85730 THROMBOPLASTIN TIME PARTIAL: CPT

## 2023-03-03 PROCEDURE — 86900 BLOOD TYPING SEROLOGIC ABO: CPT

## 2023-03-03 PROCEDURE — 97163 PT EVAL HIGH COMPLEX 45 MIN: CPT

## 2023-03-03 PROCEDURE — 74174 CTA ABD&PLVS W/CONTRAST: CPT | Mod: ME

## 2023-03-03 PROCEDURE — 70450 CT HEAD/BRAIN W/O DYE: CPT | Mod: MG

## 2023-03-03 PROCEDURE — 94010 BREATHING CAPACITY TEST: CPT

## 2023-03-03 PROCEDURE — 93005 ELECTROCARDIOGRAM TRACING: CPT

## 2023-03-03 PROCEDURE — 97530 THERAPEUTIC ACTIVITIES: CPT

## 2023-03-03 PROCEDURE — 82607 VITAMIN B-12: CPT

## 2023-03-03 PROCEDURE — 82962 GLUCOSE BLOOD TEST: CPT

## 2023-03-03 PROCEDURE — 99239 HOSP IP/OBS DSCHRG MGMT >30: CPT

## 2023-03-03 PROCEDURE — 85025 COMPLETE CBC W/AUTO DIFF WBC: CPT

## 2023-03-03 PROCEDURE — 83735 ASSAY OF MAGNESIUM: CPT

## 2023-03-03 PROCEDURE — 93880 EXTRACRANIAL BILAT STUDY: CPT

## 2023-03-03 PROCEDURE — 83880 ASSAY OF NATRIURETIC PEPTIDE: CPT

## 2023-03-03 PROCEDURE — 71045 X-RAY EXAM CHEST 1 VIEW: CPT

## 2023-03-03 PROCEDURE — 93351 STRESS TTE COMPLETE: CPT

## 2023-03-03 PROCEDURE — 82803 BLOOD GASES ANY COMBINATION: CPT

## 2023-03-03 PROCEDURE — 85027 COMPLETE CBC AUTOMATED: CPT

## 2023-03-03 PROCEDURE — 94640 AIRWAY INHALATION TREATMENT: CPT

## 2023-03-03 PROCEDURE — 84443 ASSAY THYROID STIM HORMONE: CPT

## 2023-03-03 PROCEDURE — 83036 HEMOGLOBIN GLYCOSYLATED A1C: CPT

## 2023-03-03 PROCEDURE — 94660 CPAP INITIATION&MGMT: CPT

## 2023-03-03 PROCEDURE — 80053 COMPREHEN METABOLIC PANEL: CPT

## 2023-03-03 PROCEDURE — 80048 BASIC METABOLIC PNL TOTAL CA: CPT

## 2023-03-03 PROCEDURE — G1004: CPT

## 2023-03-03 PROCEDURE — 84480 ASSAY TRIIODOTHYRONINE (T3): CPT

## 2023-03-03 PROCEDURE — 82550 ASSAY OF CK (CPK): CPT

## 2023-03-03 PROCEDURE — 72148 MRI LUMBAR SPINE W/O DYE: CPT

## 2023-03-03 PROCEDURE — 72131 CT LUMBAR SPINE W/O DYE: CPT

## 2023-03-03 PROCEDURE — 84436 ASSAY OF TOTAL THYROXINE: CPT

## 2023-03-03 PROCEDURE — 99285 EMERGENCY DEPT VISIT HI MDM: CPT

## 2023-03-03 PROCEDURE — 81001 URINALYSIS AUTO W/SCOPE: CPT

## 2023-03-03 PROCEDURE — 85610 PROTHROMBIN TIME: CPT

## 2023-03-03 PROCEDURE — 93925 LOWER EXTREMITY STUDY: CPT

## 2023-03-03 PROCEDURE — 36415 COLL VENOUS BLD VENIPUNCTURE: CPT

## 2023-03-03 PROCEDURE — 86850 RBC ANTIBODY SCREEN: CPT

## 2023-03-03 PROCEDURE — 72146 MRI CHEST SPINE W/O DYE: CPT

## 2023-03-03 PROCEDURE — 86901 BLOOD TYPING SEROLOGIC RH(D): CPT

## 2023-03-03 PROCEDURE — 97116 GAIT TRAINING THERAPY: CPT

## 2023-03-03 PROCEDURE — 71275 CT ANGIOGRAPHY CHEST: CPT | Mod: MG

## 2023-03-03 PROCEDURE — 72170 X-RAY EXAM OF PELVIS: CPT

## 2023-03-03 PROCEDURE — 96374 THER/PROPH/DIAG INJ IV PUSH: CPT

## 2023-03-03 PROCEDURE — 72125 CT NECK SPINE W/O DYE: CPT | Mod: MG

## 2023-03-03 PROCEDURE — 80076 HEPATIC FUNCTION PANEL: CPT

## 2023-03-03 PROCEDURE — 84484 ASSAY OF TROPONIN QUANT: CPT

## 2023-03-03 RX ORDER — LEVALBUTEROL 1.25 MG/.5ML
3 SOLUTION, CONCENTRATE RESPIRATORY (INHALATION)
Qty: 0 | Refills: 0 | DISCHARGE
Start: 2023-03-03

## 2023-03-03 RX ORDER — CLOTRIMAZOLE AND BETAMETHASONE DIPROPIONATE 10; .5 MG/G; MG/G
1 CREAM TOPICAL
Qty: 0 | Refills: 0 | DISCHARGE
Start: 2023-03-03

## 2023-03-03 RX ORDER — IPRATROPIUM BROMIDE 0.2 MG/ML
2.5 SOLUTION, NON-ORAL INHALATION
Qty: 0 | Refills: 0 | DISCHARGE
Start: 2023-03-03

## 2023-03-03 RX ORDER — WARFARIN SODIUM 2.5 MG/1
4 TABLET ORAL ONCE
Refills: 0 | Status: DISCONTINUED | OUTPATIENT
Start: 2023-03-03 | End: 2023-03-03

## 2023-03-03 RX ORDER — FUROSEMIDE 40 MG
1 TABLET ORAL
Qty: 0 | Refills: 0 | DISCHARGE
Start: 2023-03-03

## 2023-03-03 RX ORDER — TAMSULOSIN HYDROCHLORIDE 0.4 MG/1
1 CAPSULE ORAL
Qty: 0 | Refills: 0 | DISCHARGE
Start: 2023-03-03

## 2023-03-03 RX ORDER — ASPIRIN/CALCIUM CARB/MAGNESIUM 324 MG
1 TABLET ORAL
Qty: 0 | Refills: 0 | DISCHARGE
Start: 2023-03-03

## 2023-03-03 RX ORDER — ATORVASTATIN CALCIUM 80 MG/1
1 TABLET, FILM COATED ORAL
Qty: 0 | Refills: 0 | DISCHARGE
Start: 2023-03-03

## 2023-03-03 RX ORDER — METOPROLOL TARTRATE 50 MG
1 TABLET ORAL
Qty: 0 | Refills: 0 | DISCHARGE
Start: 2023-03-03

## 2023-03-03 RX ORDER — SERTRALINE 25 MG/1
1 TABLET, FILM COATED ORAL
Qty: 0 | Refills: 0 | DISCHARGE
Start: 2023-03-03

## 2023-03-03 RX ORDER — OLANZAPINE 15 MG/1
1 TABLET, FILM COATED ORAL
Qty: 0 | Refills: 0 | DISCHARGE
Start: 2023-03-03

## 2023-03-03 RX ORDER — AMLODIPINE BESYLATE 2.5 MG/1
1 TABLET ORAL
Qty: 0 | Refills: 0 | DISCHARGE

## 2023-03-03 RX ORDER — METOPROLOL TARTRATE 50 MG
1 TABLET ORAL
Qty: 0 | Refills: 0 | DISCHARGE

## 2023-03-03 RX ADMIN — Medication 975 MILLIGRAM(S): at 05:02

## 2023-03-03 RX ADMIN — CLOTRIMAZOLE AND BETAMETHASONE DIPROPIONATE 1 APPLICATION(S): 10; .5 CREAM TOPICAL at 05:02

## 2023-03-03 RX ADMIN — Medication 81 MILLIGRAM(S): at 13:23

## 2023-03-03 RX ADMIN — LOSARTAN POTASSIUM 12.5 MILLIGRAM(S): 100 TABLET, FILM COATED ORAL at 05:02

## 2023-03-03 RX ADMIN — Medication 975 MILLIGRAM(S): at 13:22

## 2023-03-03 RX ADMIN — BUDESONIDE AND FORMOTEROL FUMARATE DIHYDRATE 2 PUFF(S): 160; 4.5 AEROSOL RESPIRATORY (INHALATION) at 08:43

## 2023-03-03 RX ADMIN — Medication 975 MILLIGRAM(S): at 06:00

## 2023-03-03 RX ADMIN — Medication 40 MILLIGRAM(S): at 05:02

## 2023-03-03 RX ADMIN — SERTRALINE 50 MILLIGRAM(S): 25 TABLET, FILM COATED ORAL at 13:22

## 2023-03-03 NOTE — PROGRESS NOTE ADULT - PROVIDER SPECIALTY LIST ADULT
Cardiology
Hospitalist
Hospitalist
Neurosurgery
Cardiology
Hospitalist
Hospitalist
Internal Medicine
Neurosurgery
Orthopedics
Pulmonology
Cardiology
Hospitalist
Internal Medicine
Neurosurgery
Palliative Care
Pulmonology
Trauma Surgery
Internal Medicine
Palliative Care
Vascular Surgery
Neurosurgery
Cardiology

## 2023-03-03 NOTE — DISCHARGE NOTE PROVIDER - NSDCCPCAREPLAN_GEN_ALL_CORE_FT
PRINCIPAL DISCHARGE DIAGNOSIS  Diagnosis: Acute CHF  Assessment and Plan of Treatment: improved  continue Lasix  follow up with cardiology in 1 week      SECONDARY DISCHARGE DIAGNOSES  Diagnosis: Fracture of thoracic vertebra  Assessment and Plan of Treatment: use TLSO brace when OOB  continur PT    Diagnosis: Fracture of lumbar vertebra  Assessment and Plan of Treatment: as above    Diagnosis: Chronic atrial fibrillation  Assessment and Plan of Treatment: continue coumadin and rate control medications    Diagnosis: CAD (coronary artery disease)  Assessment and Plan of Treatment: follow up with cardiology    Diagnosis: Aortic aneurysm  Assessment and Plan of Treatment: outpatient follow up with vascular surgery  BP control

## 2023-03-03 NOTE — DISCHARGE NOTE PROVIDER - HOSPITAL COURSE
88 yr old male with hypertension, atrial fibrillation, PE/DVT s/p IVC filter, Factor V Leiden on coumadin CAD s/p CABG, CVA, COPD, GIGI presented after a syncopal episode and fall at home. Given fall, he was evaluated by trauma service on admission. Clinically noted to be in fluid overload, CHF exacerbation with elevated BNP, supratherapeutic INR. Coumadin was held. IV Lasix was given. Cardiology consulted, ECHO was ordered. CT head without acute stroke. CT abdomen revealed a 6.1 infrarenal AAA, mural thrombus, L1 and T8 fractures. Vascular and neurosurgery consults were requested. No acute intervention per vascular. MR spine and TLSO brace was recommended by NSG. His ECHO revealed an EF 25-30% with severe aortic stenosis. IV Lasix was continued. Pulmonary consulted as well for acute hypoxic respiratory failure, steroids were initiated. His course was complicated by delirium, hence psychiatry consulted, advised Risperdal. Palliative care was consulted, goals of care and HCP established, patient was made DNR/DNI by daughter/HCP. Given severe AS, CT surgery was consulted by cardiology, advised dobutamine stress test to better evaluate aortic stenosis in setting of CHF. His coumadin was held until INR improved, this was subsequently resumed. Rate control medications adjusted by cardiology. Dobutamine stress test was ordered to assess AS, which was done, consistent with pseudo aortic stenosis. Coumadin held in view of elevated INR. Zyprexa dose was adjusted. His course remained uneventful and he is medically stable for discharge to [facility], [place].

## 2023-03-03 NOTE — DISCHARGE NOTE PROVIDER - NSDCMRMEDTOKEN_GEN_ALL_CORE_FT
acetaminophen 325 mg oral tablet: 2 tab(s) orally every 6 hours, As needed, Moderate Pain (4 - 6)  amLODIPine 5 mg oral tablet: 1 tab(s) orally once a day  aspirin 81 mg oral tablet, chewable: 1 tab(s) orally once a day  atorvastatin 40 mg oral tablet: 1 tab(s) orally once a day (at bedtime)  Breo Ellipta 200 mcg-25 mcg/inh inhalation powder: 1 puff(s) inhaled once a day  Coumadin 2.5 mg oral tablet: 1 tab(s) orally once a day  ipratropium-albuterol 0.5 mg-2.5 mg/3 mLinhalation solution: 3 milliliter(s) inhaled every 6 hours, As needed, Shortness of Breath and/or Wheezing  metoprolol succinate 50 mg oral tablet, extended release: 1 tab(s) orally once a day  oxybutynin 10 mg/24 hr oral tablet, extended release: orally every other day (at bedtime)  sertraline 50 mg oral tablet: 1 tab(s) orally once a day  tamsulosin 0.4 mg oral capsule: 1 cap(s) orally once a day (at bedtime)

## 2023-03-03 NOTE — DISCHARGE NOTE PROVIDER - CARE PROVIDER_API CALL
Desi Miranda (MD)  Surgery Vascular  [address] [place] [address]  Phone: [phone]  Fax: [phone]  Follow Up Time:     Mook Bustamante (DO)  Cardiovascular Disease; Internal Medicine  [address] [place] [address]  Phone: [phone]  Fax: [phone]  Follow Up Time:     Patrick Canales; PhD)  Neurosurgery  [address] [place] [address]  Phone: [phone]  Fax: [phone]  Follow Up Time:

## 2023-03-03 NOTE — DISCHARGE NOTE PROVIDER - PROVIDER TOKENS
PROVIDER:[TOKEN:[[id number]]],PROVIDER:[TOKEN:[[id number]]],PROVIDER:[TOKEN:[[id number]]]

## 2023-03-03 NOTE — PROGRESS NOTE ADULT - SUBJECTIVE AND OBJECTIVE BOX
INTERVAL HPI/OVERNIGHT EVENTS:    CC: delirium, metabolic encephalopathy, acute HF, PE, cva, aortic stenosis    no overnight events  denies complaints    Vital Signs Last 24 Hrs  T(C): 36.5 (03 Mar 2023 11:20), Max: 36.7 (02 Mar 2023 16:18)  T(F): 97.7 (03 Mar 2023 11:20), Max: 98.1 (02 Mar 2023 16:18)  HR: 89 (03 Mar 2023 11:20) (82 - 89)  BP: 100/69 (03 Mar 2023 11:20) (100/69 - 110/68)  BP(mean): --  RR: 18 (03 Mar 2023 11:20) (18 - 18)  SpO2: 97% (03 Mar 2023 11:20) (94% - 97%)    Parameters below as of 03 Mar 2023 09:18  Patient On (Oxygen Delivery Method): nasal cannula        PHYSICAL EXAM:    GENERAL: alert, not in distress  CHEST/LUNG: b/l air entry  HEART: reg  ABDOMEN: soft, bs+  EXTREMITIES:  no edema, tenderness    MEDICATIONS  (STANDING):  acetaminophen     Tablet .. 975 milliGRAM(s) Oral every 8 hours  aspirin  chewable 81 milliGRAM(s) Oral daily  atorvastatin 40 milliGRAM(s) Oral at bedtime  budesonide  80 MICROgram(s)/formoterol 4.5 MICROgram(s) Inhaler 2 Puff(s) Inhalation two times a day  clotrimazole/betamethasone Cream 1 Application(s) Topical two times a day  furosemide    Tablet 40 milliGRAM(s) Oral daily  losartan 12.5 milliGRAM(s) Oral daily  metoprolol tartrate 50 milliGRAM(s) Oral every 12 hours  OLANZapine 10 milliGRAM(s) Oral at bedtime  sertraline 50 milliGRAM(s) Oral daily  tamsulosin 0.4 milliGRAM(s) Oral at bedtime  warfarin 4 milliGRAM(s) Oral once    MEDICATIONS  (PRN):  ipratropium    for Nebulization 500 MICROGram(s) Nebulizer every 6 hours PRN Shortness of Breath and/or Wheezing  levalbuterol Inhalation 1.25 milliGRAM(s) Inhalation every 6 hours PRN shortness of breath  metoprolol tartrate Injectable 5 milliGRAM(s) IV Push every 6 hours PRN sustained HR >130  OLANZapine Injectable 5 milliGRAM(s) IntraMuscular every 6 hours PRN agitation      Allergies    No Known Allergies    Intolerances    OHS (Unknown)        LABS:                          12.6   5.78  )-----------( 235      ( 02 Mar 2023 05:52 )             42.3     03-02    144  |  105  |  39.1<H>  ----------------------------<  102<H>  3.9   |  27.0  |  1.24    Ca    9.2      02 Mar 2023 05:52  Phos  4.0     03-02  Mg     2.4     03-02      PT/INR - ( 03 Mar 2023 05:40 )   PT: 31.8 sec;   INR: 2.71 ratio               RADIOLOGY & ADDITIONAL TESTS:

## 2023-03-03 NOTE — PROGRESS NOTE ADULT - REASON FOR ADMISSION
had a fall

## 2023-03-03 NOTE — PROGRESS NOTE ADULT - ASSESSMENT
88 yr old male with hypertension, atrial fibrillation, PE/DVT s/p IVC filter, Factor V Leiden on coumadin CAD s/p CABG, CVA, COPD, GIGI presented after a syncopal episode and fall at home. Given fall, he was evaluated by trauma service on admission. Clinically noted to be in fluid overload, CHF exacerbation with elevated BNP, supratherapeutic INR. Coumadin was held. IV Lasix was given. Cardiology consulted, ECHO was ordered. CT head without acute stroke. CT abdomen revealed a 6.1 infrarenal AAA, mural thrombus, L1 and T8 fractures. Vascular and neurosurgery consults were requested. No acute intervention per vascular. MR spine and TLSO brace was recommended by NSG. His ECHO revealed an EF 25-30% with severe aortic stenosis. IV Lasix was continued. Pulmonary consulted as well for acute hypoxic respiratory failure, steroids were initiated. His course was complicated by delirium, hence psychiatry consulted, advised Risperdal. Palliative care was consulted, goals of care and HCP established, patient was made DNR/DNI by daughter/HCP. Given severe AS, CT surgery was consulted by cardiology, advised dobutamine stress test to better evaluate aortic stenosis in setting of CHF. His coumadin was held until INR improved, this was subsequently resumed. Rate control medications adjusted by cardiology. Dobutamine stress test was ordered to assess AS, which was done, consistent with pseudo aortic stenosis. Coumadin held in view of elevated INR. Zyprexa dose was adjusted.     1. Acute hypoxic respiratory failure sec decompensated CHF:  Improved  continue PO Lasix, Metoprolol  aspirin, statin  Dobutamine stress done, pseudoaortic stenosis.    2. Atrial fibrillation, PE/DVT, Factor V Leiden:  coumadin today  monitor INR    3. Metabolic encephalopathy:  Improved.  Zyprexa qhs, dose adjustment per psychiatry.    4. COPD exacerbation:  Improved  s/p short course of Prednisone  inhaled steroids  Duoneb    5. L1/T8 fracture:  MR noted  NSG follow up noted.  pain control  mobilize with PT and brace.    6. DVT PPX:  on coumadin.    7. Aortic aneurysm:   outpatient vascular surgery follow up.    Discussed with patient and RN.  Discharge planning.  updated daughter.

## 2023-03-03 NOTE — DISCHARGE NOTE PROVIDER - NSDCFUSCHEDAPPT_GEN_ALL_CORE_FT
Soheila Reno Physician [facility]  CARDIOLOGY [address] [facility]  Scheduled Appointment: 03/13/2023

## 2023-03-03 NOTE — DISCHARGE NOTE PROVIDER - NSDCFUADDAPPT_GEN_ALL_CORE_FT
STAR pt-will need followup b4 DC    Appointment with [facility] 3/13/23 @ 10.00am ([phone])  no need for med review, no meds to bed from [facility] STAR folder given to pt   pt transfer to [facility]    Patient and patient's daughter, Jasmina ([phone]), are in agreement for the patient to attend [facility] ([phone]), for subacute rehab today at 2pm.  made [facility] employee, Babita, aware of patient's follow up STAR appointment with [facility] on 3/13/23 at 10am.

## 2023-03-04 ENCOUNTER — TRANSCRIPTION ENCOUNTER (OUTPATIENT)
Age: 88
End: 2023-03-04

## 2023-03-07 ENCOUNTER — NON-APPOINTMENT (OUTPATIENT)
Age: 88
End: 2023-03-07

## 2023-03-08 ENCOUNTER — TRANSCRIPTION ENCOUNTER (OUTPATIENT)
Age: 88
End: 2023-03-08

## 2023-03-08 ENCOUNTER — INPATIENT (INPATIENT)
Facility: HOSPITAL | Age: 88
LOS: 13 days | Discharge: HOME | DRG: 64 | End: 2023-03-22
Attending: INTERNAL MEDICINE | Admitting: FAMILY MEDICINE
Payer: MEDICARE

## 2023-03-08 VITALS
OXYGEN SATURATION: 98 % | RESPIRATION RATE: 18 BRPM | DIASTOLIC BLOOD PRESSURE: 75 MMHG | HEART RATE: 81 BPM | SYSTOLIC BLOOD PRESSURE: 105 MMHG | TEMPERATURE: 98 F | HEIGHT: 73 IN | WEIGHT: 279.99 LBS

## 2023-03-08 DIAGNOSIS — R53.1 WEAKNESS: ICD-10-CM

## 2023-03-08 DIAGNOSIS — E78.5 HYPERLIPIDEMIA, UNSPECIFIED: ICD-10-CM

## 2023-03-08 DIAGNOSIS — I70.208 UNSPECIFIED ATHEROSCLEROSIS OF NATIVE ARTERIES OF EXTREMITIES, OTHER EXTREMITY: ICD-10-CM

## 2023-03-08 DIAGNOSIS — I71.40 ABDOMINAL AORTIC ANEURYSM, WITHOUT RUPTURE, UNSPECIFIED: ICD-10-CM

## 2023-03-08 DIAGNOSIS — I48.20 CHRONIC ATRIAL FIBRILLATION, UNSPECIFIED: ICD-10-CM

## 2023-03-08 DIAGNOSIS — I35.0 NONRHEUMATIC AORTIC (VALVE) STENOSIS: ICD-10-CM

## 2023-03-08 DIAGNOSIS — Z98.49 CATARACT EXTRACTION STATUS, UNSPECIFIED EYE: Chronic | ICD-10-CM

## 2023-03-08 DIAGNOSIS — Z96.651 PRESENCE OF RIGHT ARTIFICIAL KNEE JOINT: Chronic | ICD-10-CM

## 2023-03-08 DIAGNOSIS — Z98.89 OTHER SPECIFIED POSTPROCEDURAL STATES: Chronic | ICD-10-CM

## 2023-03-08 DIAGNOSIS — G45.9 TRANSIENT CEREBRAL ISCHEMIC ATTACK, UNSPECIFIED: ICD-10-CM

## 2023-03-08 DIAGNOSIS — Z95.1 PRESENCE OF AORTOCORONARY BYPASS GRAFT: Chronic | ICD-10-CM

## 2023-03-08 LAB
ALBUMIN SERPL ELPH-MCNC: 3.4 G/DL — SIGNIFICANT CHANGE UP (ref 3.3–5.2)
ALP SERPL-CCNC: 89 U/L — SIGNIFICANT CHANGE UP (ref 40–120)
ALT FLD-CCNC: 20 U/L — SIGNIFICANT CHANGE UP
ANION GAP SERPL CALC-SCNC: 10 MMOL/L — SIGNIFICANT CHANGE UP (ref 5–17)
APPEARANCE UR: CLEAR — SIGNIFICANT CHANGE UP
APTT BLD: 31.6 SEC — SIGNIFICANT CHANGE UP (ref 27.5–35.5)
AST SERPL-CCNC: 18 U/L — SIGNIFICANT CHANGE UP
BASOPHILS # BLD AUTO: 0.03 K/UL — SIGNIFICANT CHANGE UP (ref 0–0.2)
BASOPHILS NFR BLD AUTO: 0.5 % — SIGNIFICANT CHANGE UP (ref 0–2)
BILIRUB SERPL-MCNC: 0.4 MG/DL — SIGNIFICANT CHANGE UP (ref 0.4–2)
BILIRUB UR-MCNC: NEGATIVE — SIGNIFICANT CHANGE UP
BLD GP AB SCN SERPL QL: SIGNIFICANT CHANGE UP
BUN SERPL-MCNC: 25.6 MG/DL — HIGH (ref 8–20)
CALCIUM SERPL-MCNC: 9.1 MG/DL — SIGNIFICANT CHANGE UP (ref 8.4–10.5)
CHLORIDE SERPL-SCNC: 101 MMOL/L — SIGNIFICANT CHANGE UP (ref 96–108)
CO2 SERPL-SCNC: 26 MMOL/L — SIGNIFICANT CHANGE UP (ref 22–29)
COLOR SPEC: YELLOW — SIGNIFICANT CHANGE UP
CREAT SERPL-MCNC: 1.37 MG/DL — HIGH (ref 0.5–1.3)
DIFF PNL FLD: NEGATIVE — SIGNIFICANT CHANGE UP
EGFR: 50 ML/MIN/1.73M2 — LOW
EOSINOPHIL # BLD AUTO: 0.31 K/UL — SIGNIFICANT CHANGE UP (ref 0–0.5)
EOSINOPHIL NFR BLD AUTO: 5.6 % — SIGNIFICANT CHANGE UP (ref 0–6)
GLUCOSE SERPL-MCNC: 96 MG/DL — SIGNIFICANT CHANGE UP (ref 70–99)
GLUCOSE UR QL: NEGATIVE MG/DL — SIGNIFICANT CHANGE UP
HCT VFR BLD CALC: 38.8 % — LOW (ref 39–50)
HGB BLD-MCNC: 11.8 G/DL — LOW (ref 13–17)
IMM GRANULOCYTES NFR BLD AUTO: 0.4 % — SIGNIFICANT CHANGE UP (ref 0–0.9)
INR BLD: 2.3 RATIO — HIGH (ref 0.88–1.16)
KETONES UR-MCNC: NEGATIVE — SIGNIFICANT CHANGE UP
LEUKOCYTE ESTERASE UR-ACNC: NEGATIVE — SIGNIFICANT CHANGE UP
LYMPHOCYTES # BLD AUTO: 0.73 K/UL — LOW (ref 1–3.3)
LYMPHOCYTES # BLD AUTO: 13.2 % — SIGNIFICANT CHANGE UP (ref 13–44)
MCHC RBC-ENTMCNC: 27.1 PG — SIGNIFICANT CHANGE UP (ref 27–34)
MCHC RBC-ENTMCNC: 30.4 GM/DL — LOW (ref 32–36)
MCV RBC AUTO: 89 FL — SIGNIFICANT CHANGE UP (ref 80–100)
MONOCYTES # BLD AUTO: 0.71 K/UL — SIGNIFICANT CHANGE UP (ref 0–0.9)
MONOCYTES NFR BLD AUTO: 12.8 % — SIGNIFICANT CHANGE UP (ref 2–14)
NEUTROPHILS # BLD AUTO: 3.73 K/UL — SIGNIFICANT CHANGE UP (ref 1.8–7.4)
NEUTROPHILS NFR BLD AUTO: 67.5 % — SIGNIFICANT CHANGE UP (ref 43–77)
NITRITE UR-MCNC: NEGATIVE — SIGNIFICANT CHANGE UP
PH UR: 5 — SIGNIFICANT CHANGE UP (ref 5–8)
PLATELET # BLD AUTO: 235 K/UL — SIGNIFICANT CHANGE UP (ref 150–400)
POTASSIUM SERPL-MCNC: 4 MMOL/L — SIGNIFICANT CHANGE UP (ref 3.5–5.3)
POTASSIUM SERPL-SCNC: 4 MMOL/L — SIGNIFICANT CHANGE UP (ref 3.5–5.3)
PROT SERPL-MCNC: 6.6 G/DL — SIGNIFICANT CHANGE UP (ref 6.6–8.7)
PROT UR-MCNC: NEGATIVE — SIGNIFICANT CHANGE UP
PROTHROM AB SERPL-ACNC: 26.9 SEC — HIGH (ref 10.5–13.4)
RBC # BLD: 4.36 M/UL — SIGNIFICANT CHANGE UP (ref 4.2–5.8)
RBC # FLD: 15.1 % — HIGH (ref 10.3–14.5)
SODIUM SERPL-SCNC: 137 MMOL/L — SIGNIFICANT CHANGE UP (ref 135–145)
SP GR SPEC: 1.01 — SIGNIFICANT CHANGE UP (ref 1.01–1.02)
TROPONIN T SERPL-MCNC: 0.01 NG/ML — SIGNIFICANT CHANGE UP (ref 0–0.06)
UROBILINOGEN FLD QL: NEGATIVE MG/DL — SIGNIFICANT CHANGE UP
WBC # BLD: 5.53 K/UL — SIGNIFICANT CHANGE UP (ref 3.8–10.5)
WBC # FLD AUTO: 5.53 K/UL — SIGNIFICANT CHANGE UP (ref 3.8–10.5)

## 2023-03-08 PROCEDURE — 99223 1ST HOSP IP/OBS HIGH 75: CPT

## 2023-03-08 PROCEDURE — 70498 CT ANGIOGRAPHY NECK: CPT | Mod: 26,MA

## 2023-03-08 PROCEDURE — 70496 CT ANGIOGRAPHY HEAD: CPT | Mod: 26,MA

## 2023-03-08 PROCEDURE — 71275 CT ANGIOGRAPHY CHEST: CPT | Mod: 26,MA

## 2023-03-08 PROCEDURE — 99223 1ST HOSP IP/OBS HIGH 75: CPT | Mod: FS

## 2023-03-08 PROCEDURE — 99291 CRITICAL CARE FIRST HOUR: CPT

## 2023-03-08 PROCEDURE — 71045 X-RAY EXAM CHEST 1 VIEW: CPT | Mod: 26

## 2023-03-08 PROCEDURE — 93931 UPPER EXTREMITY STUDY: CPT | Mod: 26,RT

## 2023-03-08 PROCEDURE — 0042T: CPT | Mod: MA

## 2023-03-08 PROCEDURE — 74174 CTA ABD&PLVS W/CONTRAST: CPT | Mod: 26,MA

## 2023-03-08 RX ORDER — OXYBUTYNIN CHLORIDE 5 MG
10 TABLET ORAL
Refills: 0 | Status: DISCONTINUED | OUTPATIENT
Start: 2023-03-09 | End: 2023-03-22

## 2023-03-08 RX ORDER — HEPARIN SODIUM 5000 [USP'U]/ML
4500 INJECTION INTRAVENOUS; SUBCUTANEOUS EVERY 6 HOURS
Refills: 0 | Status: DISCONTINUED | OUTPATIENT
Start: 2023-03-08 | End: 2023-03-09

## 2023-03-08 RX ORDER — METOPROLOL TARTRATE 50 MG
50 TABLET ORAL EVERY 12 HOURS
Refills: 0 | Status: DISCONTINUED | OUTPATIENT
Start: 2023-03-08 | End: 2023-03-10

## 2023-03-08 RX ORDER — FUROSEMIDE 40 MG
40 TABLET ORAL DAILY
Refills: 0 | Status: DISCONTINUED | OUTPATIENT
Start: 2023-03-08 | End: 2023-03-14

## 2023-03-08 RX ORDER — ATORVASTATIN CALCIUM 80 MG/1
40 TABLET, FILM COATED ORAL AT BEDTIME
Refills: 0 | Status: DISCONTINUED | OUTPATIENT
Start: 2023-03-08 | End: 2023-03-22

## 2023-03-08 RX ORDER — DILTIAZEM HCL 120 MG
10 CAPSULE, EXT RELEASE 24 HR ORAL EVERY 6 HOURS
Refills: 0 | Status: DISCONTINUED | OUTPATIENT
Start: 2023-03-08 | End: 2023-03-10

## 2023-03-08 RX ORDER — TAMSULOSIN HYDROCHLORIDE 0.4 MG/1
0.4 CAPSULE ORAL AT BEDTIME
Refills: 0 | Status: DISCONTINUED | OUTPATIENT
Start: 2023-03-08 | End: 2023-03-22

## 2023-03-08 RX ORDER — IPRATROPIUM/ALBUTEROL SULFATE 18-103MCG
3 AEROSOL WITH ADAPTER (GRAM) INHALATION EVERY 6 HOURS
Refills: 0 | Status: DISCONTINUED | OUTPATIENT
Start: 2023-03-08 | End: 2023-03-15

## 2023-03-08 RX ORDER — ASPIRIN/CALCIUM CARB/MAGNESIUM 324 MG
81 TABLET ORAL DAILY
Refills: 0 | Status: DISCONTINUED | OUTPATIENT
Start: 2023-03-08 | End: 2023-03-17

## 2023-03-08 RX ORDER — HEPARIN SODIUM 5000 [USP'U]/ML
INJECTION INTRAVENOUS; SUBCUTANEOUS
Qty: 25000 | Refills: 0 | Status: DISCONTINUED | OUTPATIENT
Start: 2023-03-08 | End: 2023-03-09

## 2023-03-08 RX ORDER — METOPROLOL TARTRATE 50 MG
50 TABLET ORAL
Refills: 0 | Status: DISCONTINUED | OUTPATIENT
Start: 2023-03-08 | End: 2023-03-08

## 2023-03-08 RX ORDER — OLANZAPINE 15 MG/1
10 TABLET, FILM COATED ORAL AT BEDTIME
Refills: 0 | Status: DISCONTINUED | OUTPATIENT
Start: 2023-03-08 | End: 2023-03-22

## 2023-03-08 RX ORDER — OXYBUTYNIN CHLORIDE 5 MG
10 TABLET ORAL DAILY
Refills: 0 | Status: DISCONTINUED | OUTPATIENT
Start: 2023-03-08 | End: 2023-03-08

## 2023-03-08 RX ORDER — METOPROLOL TARTRATE 50 MG
5 TABLET ORAL ONCE
Refills: 0 | Status: COMPLETED | OUTPATIENT
Start: 2023-03-08 | End: 2023-03-08

## 2023-03-08 RX ORDER — BUDESONIDE AND FORMOTEROL FUMARATE DIHYDRATE 160; 4.5 UG/1; UG/1
2 AEROSOL RESPIRATORY (INHALATION)
Refills: 0 | Status: DISCONTINUED | OUTPATIENT
Start: 2023-03-08 | End: 2023-03-22

## 2023-03-08 RX ORDER — HEPARIN SODIUM 5000 [USP'U]/ML
9500 INJECTION INTRAVENOUS; SUBCUTANEOUS EVERY 6 HOURS
Refills: 0 | Status: DISCONTINUED | OUTPATIENT
Start: 2023-03-08 | End: 2023-03-09

## 2023-03-08 RX ORDER — DILTIAZEM HCL 120 MG
10 CAPSULE, EXT RELEASE 24 HR ORAL ONCE
Refills: 0 | Status: COMPLETED | OUTPATIENT
Start: 2023-03-08 | End: 2023-03-08

## 2023-03-08 RX ORDER — SERTRALINE 25 MG/1
50 TABLET, FILM COATED ORAL DAILY
Refills: 0 | Status: DISCONTINUED | OUTPATIENT
Start: 2023-03-08 | End: 2023-03-16

## 2023-03-08 RX ADMIN — ATORVASTATIN CALCIUM 40 MILLIGRAM(S): 80 TABLET, FILM COATED ORAL at 22:46

## 2023-03-08 RX ADMIN — TAMSULOSIN HYDROCHLORIDE 0.4 MILLIGRAM(S): 0.4 CAPSULE ORAL at 22:47

## 2023-03-08 RX ADMIN — SERTRALINE 50 MILLIGRAM(S): 25 TABLET, FILM COATED ORAL at 22:47

## 2023-03-08 RX ADMIN — Medication 10 MILLIGRAM(S): at 21:28

## 2023-03-08 RX ADMIN — HEPARIN SODIUM 2100 UNIT(S)/HR: 5000 INJECTION INTRAVENOUS; SUBCUTANEOUS at 23:14

## 2023-03-08 RX ADMIN — OLANZAPINE 10 MILLIGRAM(S): 15 TABLET, FILM COATED ORAL at 22:46

## 2023-03-08 RX ADMIN — Medication 5 MILLIGRAM(S): at 20:01

## 2023-03-08 NOTE — ED PROVIDER NOTE - OBJECTIVE STATEMENT
88 yr old male with hypertension,  DBR/DNI atrial fibrillation, PE/DVT s/p IVC filter, Factor V Leiden on coumadin CAD s/p CABG, CVA, COPD, GIGI, AAA, infrarenal 6.1 cm  on prior ct pw right sided weakness started at 11am at [facility]. notes numbness/weakness to right arm and leg denies slurred speech. pt on coumadin thinks that took it today.  Denies f/c/n/v/cp/sob/palpitations/ cough/rash/headache/dizziness/abd.pain/d/c/dysuria/hematuria 88 yr old male with hypertension,  DBR/DNI atrial fibrillation, PE/DVT s/p IVC filter, Factor V Leiden on coumadin CAD s/p CABG, CVA, COPD, GIGI, AAA, infrarenal 6.1 cm  on prior ct pw right sided weakness started at 11am at [facility]. notes numbness/weakness to right arm and leg denies slurred speech. pt on coumadin thinks that took it today.  Denies f/c/n/v/cp/sob/palpitations/ cough/rash/headache/dizziness/abd.pain/d/c/dysuria/hematuria. notes symptoms are improving. also thinks that had an old stroke with some residual numbness/weakness on the same side in the past - notes sometimes gets worse.

## 2023-03-08 NOTE — ED PROVIDER NOTE - PHYSICAL EXAMINATION
Head: atraumatic, normacephalic  Face: atraumatic, no crepitus no orbiral/maxillary/mandibular ttp  throat: uvula midline no exudates  eyes: perrla eomi  heart: rrr s1s2  2+ radial pulse on left ; decreased radial pulse to right  2+ bl dorsalis pedis pulses  lungs: ctab  abd: soft, nt nd +bs no rebound/guarding no cva ttp  skin: warm  LE: no swelling, no calf ttp  back: no midline cervical/thoracic/lumbar ttp  neuro: aaox 3 cn iii-xii intact strength 5/5 bl no drift

## 2023-03-08 NOTE — ED ADULT NURSE NOTE - OBJECTIVE STATEMENT
[facility]; history obtained is per EMS and SNF transfer forms. EMS states staff reports pt sent for "r/o dissection, new onset right upper and lower extremity weakness 11am today". " LKW 8am today. baseline dementia.   hx infrarenal AAA 6cm ( on coumadin), hx STEMI/afib, CVA/cad/pci/DVT/ leticia filter  on arrival pt offers no complaints, a&Ox2, poor historian; denies pain/discomfort. + BLE pulses,+  decreased right radial pulse compared to left radial.

## 2023-03-08 NOTE — H&P ADULT - ASSESSMENT
pt. is an 89 y/o male was sent in from [facility] rehab as per documentation was noted to have new RUE/ RLE weakness and numbness and with diminished rt. sided radial and pedal pulses. pt. stated that he went to bed fine and in the morning staff at the facility noted rt. sided weakness. pt. has known infrarenal 6.1 cm AAA. pt. thinks that he is ok and reports no sig. weakness of RUE/ RLE. no speech/ swallow difficulty. no facial droop, no vision change. pt. found to have rt. radial artery occlusion on RUE arterial doppler.

## 2023-03-08 NOTE — H&P ADULT - NSICDXPASTMEDICALHX_GEN_ALL_CORE_FT
PAST MEDICAL HISTORY:  Aortic stenosis     Atheroma     Atrial fibrillation, unspecified type     CAD S/P percutaneous coronary angioplasty     Cerebrovascular accident (CVA)     [place] filter in place     History of COPD     Hyperlipemia     Hypertension     PE (pulmonary embolism)      PAST MEDICAL HISTORY:  Aortic stenosis     Atheroma     Atrial fibrillation, unspecified type     CAD S/P percutaneous coronary angioplasty     Cerebrovascular accident (CVA)     Factor V Leiden     [place] filter in place     History of COPD     Hyperlipemia     Hypertension     PE (pulmonary embolism)     Prostate CA

## 2023-03-08 NOTE — ED CLERICAL - NS ED CLERK NOTE PRE-ARRIVAL INFORMATION; ADDITIONAL PRE-ARRIVAL INFORMATION
This patient is enrolled in a readmission reduction program and has active care navigation. This patient can be followed up by the care navigation team within 24 hours. To arrange close follow-up or to obtain additional clinical information about this patient, please call the contact number above. Please speak with the [facility] ED Case Manager for assistance with discharge planning

## 2023-03-08 NOTE — H&P ADULT - PROBLEM SELECTOR PLAN 3
will keep on iv heparin as recommended by hematologist ,  pt's inr was therapeutic and concern for embolic event for rt. radial artery occlusion. h/o factor v kristine. will keep on iv heparin as recommended by hematologist ,  pt's inr was therapeutic and concern for embolic event for rt. radial artery occlusion. h/o factor v kristine.  cardio [place], spoke to dr. Justice

## 2023-03-08 NOTE — H&P ADULT - NSHPPHYSICALEXAM_GEN_ALL_CORE
Vital Signs Last 24 Hrs  T(C): 36.6 (08 Mar 2023 16:04), Max: 36.6 (08 Mar 2023 12:32)  T(F): 97.9 (08 Mar 2023 16:04), Max: 97.9 (08 Mar 2023 16:04)  HR: 150 (08 Mar 2023 19:48) (81 - 150)  BP: 120/87 (08 Mar 2023 19:48) (105/75 - 130/73)  BP(mean): 99 (08 Mar 2023 19:48) (99 - 99)  RR: 18 (08 Mar 2023 19:48) (18 - 18)  SpO2: 98% (08 Mar 2023 19:48) (92% - 98%)    Parameters below as of 08 Mar 2023 16:04  Patient On (Oxygen Delivery Method): nasal cannula  O2 Flow (L/min): 5    General: pt. in bed not in distress  eyes : PERRL. intact EOM.  HENT: AT, NC.  no throat erythema or exudate. no facial droop  Neck: supple. no JVD.   Chest: air entry fair bilaterally  Heart: S1,S2. RRR. no heart murmur. trace lower ext. edema  Abdomen: soft. non-tender. obese, + BS.   Ext: no calf tenderness. moves all ext. without restriction  vascular : rt. radial pulses difficult to palpate, lt. radial pulse 2 +, distal pulses over B/L LE 2 +  Neuro: AAO x3. no focal weakness. no speech disorder  Skin: rt. upper ext more over the rt. hand somewhat cold to touch compared to left UE. no cyanosis.  psych : a little anxious, wants to be discharged, no agitation at this point, no si/hi. Vital Signs Last 24 Hrs  T(C): 36.6 (08 Mar 2023 16:04), Max: 36.6 (08 Mar 2023 12:32)  T(F): 97.9 (08 Mar 2023 16:04), Max: 97.9 (08 Mar 2023 16:04)  HR: 150 (08 Mar 2023 19:48) (81 - 150)  BP: 120/87 (08 Mar 2023 19:48) (105/75 - 130/73)  BP(mean): 99 (08 Mar 2023 19:48) (99 - 99)  RR: 18 (08 Mar 2023 19:48) (18 - 18)  SpO2: 98% (08 Mar 2023 19:48) (92% - 98%)    Parameters below as of 08 Mar 2023 16:04  Patient On (Oxygen Delivery Method): nasal cannula  O2 Flow (L/min): 5    General: pt. in bed not in distress  eyes : PERRL. intact EOM.  HENT: AT, NC.  no throat erythema or exudate. no facial droop  Neck: supple. no JVD.   Chest: air entry fair bilaterally  Heart: S1,S2. RRR. no heart murmur. trace lower ext. edema  Abdomen: soft. non-tender. obese, + BS.   Ext: no calf tenderness. moves all ext. without restriction  vascular : rt. radial pulses difficult to palpate, lt. radial pulse 2 +, distal pulses over B/L LE 2 +  Neuro: pt. knows that he is in the hospital, knows current president name and current year. cns intact. motor RUE may be 4-5 /5 , all other motor 5/5.  right dysmetria, sensory mildly decreased over RUE compared to LUE, rest sensory intact B/L.   Skin: rt. upper ext more over the rt. hand somewhat cold to touch compared to left UE. no cyanosis.  psych : a little anxious, wants to be discharged, no agitation at this point, no si/hi.

## 2023-03-08 NOTE — CONSULT NOTE ADULT - SUBJECTIVE AND OBJECTIVE BOX
[facility] Stroke Team     Consult Note     CC: weakness   HPI:  88 yr old male with hypertension,  atrial fibrillation, PE/DVT s/p IVC filter, Factor V Leiden on coumadin CAD s/p CABG, stroke , COPD, GIGI, AAA, infrarenal 6.1 cm  on prior ct presented with right sided weakness started at 11am  3/8/23 at [facility]. Noted  numbness/weakness to right arm and leg denied slurred speech. Patient on coumadin thinks that took it day of presentation.  Noted symptoms  were improving on ER eval, NIH 0- INR 2.30 . Reported also thinks that had an old stroke with some residual numbness/weakness on the same side in the past - notes sometimes gets worse.      PAST MEDICAL & SURGICAL HISTORY:  Hypertension  Hyperlipemia  PE (pulmonary embolism)  [place] filter in place  Atrial fibrillation, unspecified type  CAD S/P percutaneous coronary angioplasty  History of COPD  Cerebrovascular accident (CVA) Ischemic Stroke  Atheroma  Aortic stenosis  S/P IVC filter  S/P cataract surgery  S/P CABG (coronary artery bypass graft)  Status post right knee replacement    MEDICATIONS  (STANDING): see medication (H/P)    Allergies  No Known Allergies  Intolerances  OHS (Unknown)    SOCIAL HISTORY:  no tob,   no alcohol   no drugs    FAMILY HISTORY:  Family history of diabetes mellitus (Child)    ROS: 14 point ROS negative other than what is present in HPI or below    Vital Signs Last 24 Hrs  T(C): 36.6 (08 Mar 2023 12:32), Max: 36.6 (08 Mar 2023 12:32)  T(F): 97.8 (08 Mar 2023 12:32), Max: 97.8 (08 Mar 2023 12:32)  HR: 96 (08 Mar 2023 13:25) (81 - 96)  BP: 130/73 (08 Mar 2023 13:25) (105/75 - 130/73)  BP(mean): --  RR: 18 (08 Mar 2023 13:25) (18 - 18)  SpO2: 97% (08 Mar 2023 13:25) (95% - 98%)    Parameters below as of 08 Mar 2023 13:25  Patient On (Oxygen Delivery Method): nasal cannula  O2 Flow (L/min): 3    EXAM PENDING     General: NAD    Detailed Neurologic Exam:    Mental status: The patient is awake and alert and has normal attention span.  The patient is fully oriented . The patient is oriented to current events. The patient is able to name objects, follow commands, repeat sentences.    Cranial nerves: Pupils equal and react symmetrically to light. There is no visual field deficit to confrontation. Extraocular motion is full with no nystagmus. There is no ptosis. Facial sensation is intact. Facial musculature is symmetric. Palate elevates symmetrically. Tongue is midline.    Motor: There is normal bulk and tone.  There is no tremor.  Strength is 5/5 in the right arm and leg.   Strength is 5/5 in the left arm and leg.    Sensation: Intact to light touch and pin in 4 extremities    Cerebellar: There is no dysmetria on finger to nose testing.    Gait : deferred    [facility] SS:  DATE: 3/8/23  TIME:  1A: Level of consciousness (0-3):   1B: Questions (0-2):   1C: Commands (0-2):   2: Gaze (0-2):   3: Visual fields (0-3):   4: Facial palsy (0-3):   MOTOR:  5A: Left arm motor drift (0-4):   5B: Right arm motor drift (0-4):   6A: Left leg motor drift (0-4):   6B: Right leg motor drift (0-4):   7: Limb ataxia (0-2):   SENSORY:  8: Sensation (0-2):   SPEECH:  9: Language (0-3):   10: Dysarthria (0-2):   EXTINCTION:  11: Extinction/inattention (0-2):     TOTAL SCORE:     prehospital mRS=      LABS:                         11.8   5.53  )-----------( 235      ( 08 Mar 2023 12:50 )             38.8       03-08    137  |  101  |  25.6<H>  ----------------------------<  96  4.0   |  26.0  |  1.37<H>    Ca    9.1      08 Mar 2023 12:50    TPro  6.6  /  Alb  3.4  /  TBili  0.4  /  DBili  x   /  AST  18  /  ALT  20  /  AlkPhos  89  03-08      PT/INR - ( 08 Mar 2023 12:50 )   PT: 26.9 sec;   INR: 2.30 ratio         PTT - ( 08 Mar 2023 12:50 )  PTT:31.6 sec      RADIOLOGY & ADDITIONAL STUDIES (independently reviewed unless otherwise noted):  CT Angio Head Neck Stroke Protocol w/ IV Cont (03.08.23 @ 13:22)   7 mL area of increased Tmax in the right parietal lobe suggesting brain   at risk versus artifact. No core infarct. No large vessel occlusion. 50%   stenosis in left carotid bulb/proximal internal carotid artery. Consider   MRI of the brain as clinically warranted. Additional findings above.    CT Brain Stroke Protocol (03.08.23 @ 12:40)    Moderate chronic microvascular changes without evidence of   an acute transcortical infarction or hemorrhage.                                    [facility] Stroke Team     Consult Note     CC: weakness   HPI:  88 yr old male with hypertension,  atrial fibrillation, PE/DVT s/p IVC filter, Factor V Leiden on coumadin CAD s/p CABG, stroke , COPD, GIGI, AAA, infrarenal 6.1 cm  on prior ct presented with right sided weakness started at 11am  3/8/23 at [facility] by ER report. Upon review patient notes  numbness  to right hand,  arm and leg denied slurred speech, changes in vision, weakness, vision. Patient on coumadin thinks that took it day of presentation.  Noted symptoms  were improving on ER eval, ER  NIH 0- INR 2.30, excluded from Tenecteplase . It was noted also thinks that had an old stroke with some residual numbness/weakness on the same side in the past - notes sometimes gets worse . Patient states at this time he feels back to normal.       PAST MEDICAL & SURGICAL HISTORY:  Hypertension  Hyperlipemia  PE (pulmonary embolism)  [place] filter in place  Atrial fibrillation, unspecified type  CAD S/P percutaneous coronary angioplasty  History of COPD  Cerebrovascular accident (CVA) Ischemic Stroke  Atheroma  Aortic stenosis  S/P IVC filter  S/P cataract surgery  S/P CABG (coronary artery bypass graft)  Status post right knee replacement    MEDICATIONS  (STANDING): see medication (H/P)    Allergies  No Known Allergies  Intolerances  OHS (Unknown)    SOCIAL HISTORY:  no tob,   no alcohol   no drugs    FAMILY HISTORY:  Family history of diabetes mellitus (Child)    ROS: 14 point ROS negative other than what is present in HPI or below    Vital Signs Last 24 Hrs  T(C): 36.6 (08 Mar 2023 12:32), Max: 36.6 (08 Mar 2023 12:32)  T(F): 97.8 (08 Mar 2023 12:32), Max: 97.8 (08 Mar 2023 12:32)  HR: 96 (08 Mar 2023 13:25) (81 - 96)  BP: 130/73 (08 Mar 2023 13:25) (105/75 - 130/73)  BP(mean): --  RR: 18 (08 Mar 2023 13:25) (18 - 18)  SpO2: 97% (08 Mar 2023 13:25) (95% - 98%)    Parameters below as of 08 Mar 2023 13:25  Patient On (Oxygen Delivery Method): nasal cannula  O2 Flow (L/min): 3      General: NAD, good affect , laughing     Detailed Neurologic Exam:    Mental status: The patient is awake and alert and has normal attention span.  The patient is oriented to self, place, month, year, disoriented to date . The patient is oriented to current events- notes he was at [facility] doing rehab. The patient is able to name objects, follow commands     Cranial nerves: Pupils equal and react symmetrically to light. There is no visual field deficit to confrontation. Extraocular motion is full with no nystagmus. There is no ptosis. Facial sensation is intact. Slight right facial palsy that corrects on smile,    Tongue is midline.    Motor: There is normal bulk and tone.  There is no tremor.  Strength is 5/5 in the right arm and leg.   Strength is 5/5 in the left arm and leg.    Coordination: Right dysmetria     Sensation: Intact to light touch and pin in 4 extremities, no extinction     Gait : deferred    [facility] SS:  DATE: 3/8/23  TIME: 0215  1A: Level of consciousness (0-3):   1B: Questions (0-2): 1  1C: Commands (0-2):   2: Gaze (0-2):   3: Visual fields (0-3):   4: Facial palsy (0-3): 1  MOTOR:  5A: Left arm motor drift (0-4):   5B: Right arm motor drift (0-4):   6A: Left leg motor drift (0-4):   6B: Right leg motor drift (0-4):   7: Limb ataxia (0-2): 1  SENSORY:  8: Sensation (0-2):   SPEECH:  9: Language (0-3):   10: Dysarthria (0-2): 1  EXTINCTION:  11: Extinction/inattention (0-2):     TOTAL SCORE: 4    prehospital mRS= 0      LABS:                         11.8   5.53  )-----------( 235      ( 08 Mar 2023 12:50 )             38.8       03-08    137  |  101  |  25.6<H>  ----------------------------<  96  4.0   |  26.0  |  1.37<H>    Ca    9.1      08 Mar 2023 12:50    TPro  6.6  /  Alb  3.4  /  TBili  0.4  /  DBili  x   /  AST  18  /  ALT  20  /  AlkPhos  89  03-08      PT/INR - ( 08 Mar 2023 12:50 )   PT: 26.9 sec;   INR: 2.30 ratio         PTT - ( 08 Mar 2023 12:50 )  PTT:31.6 sec      RADIOLOGY & ADDITIONAL STUDIES (independently reviewed unless otherwise noted):  CT Angio Head Neck Stroke Protocol w/ IV Cont (03.08.23 @ 13:22)   7 mL area of increased Tmax in the right parietal lobe suggesting brain   at risk versus artifact. No core infarct. No large vessel occlusion. 50%   stenosis in left carotid bulb/proximal internal carotid artery. Consider   MRI of the brain as clinically warranted. Additional findings above.    CT Brain Stroke Protocol (03.08.23 @ 12:40)    Moderate chronic microvascular changes without evidence of   an acute transcortical infarction or hemorrhage.

## 2023-03-08 NOTE — ED ADULT NURSE REASSESSMENT NOTE - NS ED NURSE REASSESS COMMENT FT1
Pt in no acute distress, noted to be in A-fib in 120's - 110's. Pt denies CP, reports he is sob, SpO2 98% on RA. Dr Rocha was made aware. Pt placed on O2 2L NC per Dr Rocha. Per Dr Rocha pt may go to ultrasound now off monitor and without RN.

## 2023-03-08 NOTE — H&P ADULT - PROBLEM SELECTOR PLAN 2
I have discussed findings of RUE arterial doppler with vascular team when results came back and I was told that their plan will be updated  spoke to hematologist Dr. Bowen and iv heparin will started and further plan for radial artery occlusion per vascular team I have discussed findings of RUE arterial doppler with vascular team when results came back and I was told that their plan will be updated  spoke to hematologist Dr. Bowen and iv heparin will be started without bolus, hold coumadin, and further plan for radial artery occlusion per vascular team I have discussed findings of RUE arterial doppler with vascular team when results came back and I was told that their plan will be updated  spoke to hematologist Dr. Bowen and iv heparin will be started without bolus, hold coumadin, and further plan for radial artery occlusion per vascular team  - PLS CONTACT VASCULAR TEAM In am if pt. not seen as I have spoken to them about the RUE doppler findings and I was told that vascular team will update their plan.

## 2023-03-08 NOTE — H&P ADULT - PROBLEM SELECTOR PLAN 5
no Tavr plan as per cardiology, recent echo :  Left ventricular ejection fraction, by visual estimation, is 25 to   30%. Severely decreased global left ventricular systolic function.

## 2023-03-08 NOTE — CONSULT NOTE ADULT - SUBJECTIVE AND OBJECTIVE BOX
Vascular Attending:  Ruth CARVALHO    Vascular Surgery HPI:  Admission HPi  patient known to our service from past consultation for AAA; patient declined any surgical intervention at that time  patient now presents from [facility] with reported RUE/RLE weakness and numbness, which resolved on ed presentation  patient with also perfusion deficts to the right radial/ulnar, confirmed by arterial duplex  denies weakness, pain, numbness       HPI:  89 y/o male was sent in from [facility] rehab as per documentation was noted to have new RUE/ RLE weakness and numbness and with diminished rt. sided radial and pedal pulses. pt. has known infrarenal 6.1 cm AAA.  (08 Mar 2023 18:13)      PAST MEDICAL & SURGICAL HISTORY:  Hypertension      Hyperlipemia      PE (pulmonary embolism)      Marisol filter in place      Atrial fibrillation, unspecified type      CAD S/P percutaneous coronary angioplasty      History of COPD      Cerebrovascular accident (CVA)      Atheroma      Aortic stenosis      S/P IVC filter      S/P cataract surgery      S/P CABG (coronary artery bypass graft)      Status post right knee replacement          REVIEW OF SYSTEMS:  see HPI       General:	    Skin/Breast:  	  Ophthalmologic:  	  ENMT:	    Respiratory and Thorax:  	  Cardiovascular:	    Gastrointestinal:	    Genitourinary:	    Musculoskeletal:	    Neurological:	    Psychiatric:	    Hematology/Lymphatics:	    Endocrine:	    Allergic/Immunologic:	    MEDICATIONS  (STANDING):    MEDICATIONS  (PRN):      Allergies    No Known Allergies    Intolerances    OHS (Unknown)      SOCIAL HISTORY:      Vital Signs Last 24 Hrs  T(C): 36.6 (08 Mar 2023 16:04), Max: 36.6 (08 Mar 2023 12:32)  T(F): 97.9 (08 Mar 2023 16:04), Max: 97.9 (08 Mar 2023 16:04)  HR: 111 (08 Mar 2023 16:04) (81 - 111)  BP: 124/81 (08 Mar 2023 16:04) (105/75 - 130/73)  BP(mean): --  RR: 18 (08 Mar 2023 16:04) (18 - 18)  SpO2: 92% (08 Mar 2023 16:04) (92% - 98%)    Parameters below as of 08 Mar 2023 16:04  Patient On (Oxygen Delivery Method): nasal cannula  O2 Flow (L/min): 5      PHYSICAL EXAM:      Constitutional: no distress, oriented to self/place     Eyes: no scleral icterus     ENMT: atraumatic     Neck: no access catheters, supple       Respiratory: non labored     Cardiovascular: sternal scar, s1/s2    Gastrointestinal: obese, soft, pulsatile mass left quadrant     Genitourinary: not examined     Rectal: not examined     Extremities: right upper ext slightly cooler that the left, no gross motor/sensory deficits,  bilateral le warm.    Vascular: left radial/ulnar palpable, right brachial palpable, no palpable right radial/ulnar, only signals     Neurological:  no gross motor/sensory deficits,    Skin: no ulcers     Psychiatric:       Pulses:   Right:                                                                          Left:  FEM [ ]2+ [ ]1+ [ ]doppler                                             FEM [ ]2+ [ ]1+ [ ]doppler    POP [ ]2+ [ ]1+ [ ]doppler                                             POP [ ]2+ [ ]1+ [ ]doppler    DP [ x]2+ [ ]1+ [ ]doppler                                                DP [x ]2+ [ ]1+ [ ]doppler  PTx]2+ [ ]1+ [ ]doppler                                                  PT [x ]2+ [ ]1+ [ ]doppler      LABS:                        11.8   5.53  )-----------( 235      ( 08 Mar 2023 12:50 )             38.8     03-    137  |  101  |  25.6<H>  ----------------------------<  96  4.0   |  26.0  |  1.37<H>    Ca    9.1      08 Mar 2023 12:50    TPro  6.6  /  Alb  3.4  /  TBili  0.4  /  DBili  x   /  AST  18  /  ALT  20  /  AlkPhos  89  03-08    PT/INR - ( 08 Mar 2023 12:50 )   PT: 26.9 sec;   INR: 2.30 ratio         PTT - ( 08 Mar 2023 12:50 )  PTT:31.6 sec  Urinalysis Basic - ( 08 Mar 2023 13:40 )    Color: Yellow / Appearance: Clear / S.015 / pH: x  Gluc: x / Ketone: Negative  / Bili: Negative / Urobili: Negative mg/dL   Blood: x / Protein: Negative / Nitrite: Negative   Leuk Esterase: Negative / RBC: x / WBC x   Sq Epi: x / Non Sq Epi: x / Bacteria: x        RADIOLOGY & ADDITIONAL STUDIES    < from: US Duplex Arterial Upper Ext Ltd, Right (23 @ 17:50) >    ACC: [id number] EXAM:  US DPLX UPR EXT ARTS LTD RT   ORDERED BY: DAYNE CRUZ     PROCEDURE DATE:  2023          INTERPRETATION:  EXAMINATION:  US UPPER EXTREMITY ARTERIAL DUPLEX LIMITED   RIGHT    HISTORY: Decreased radial pulses.    TECHNIQUE: Gray scale, color, and Doppler assessment of the right upper   extremity arteries was performed the proximal subclavian arteries down to   the distal radial and ulnar arteries.    FINDINGS:    Patent right subclavian, axillary, brachial arteries with no evidence of   a hemodynamically significant stenosis or occlusion.    Patent proximal right radial artery with a low peak systolic velocity   with echogenic material/thrombus along the periphery. Right radial artery   appears occluded in the mid and distal forearm at the level of the wrist   with question trickle flow in the mid forearm.    Ulnar artery appears occluded in the forearm and wrist with question   trickle flow in the mid and distal forearm.    IMPRESSION:    Right radial artery appears occluded in the mid and distal forearm and at   the level of the wrist and ulnar artery appears occluded throughout the   forearm and at the level of the wrist with question trickle flow in the   radial artery and mid forearm and ulnar arteries in the mid and distal   forearm; differential includes an embolic etiology.    The findings were discussed with Dr. Arriaga on 3/8/2023 6:35 PM    --- End of Report ---            ALEN HORN MD; Attending Radiologist  This document has been electronically signed. Mar  8 2023  6:41    < end of copied text >    < from: CT Angio Abdomen and Pelvis w/ IV Cont (23 @ 13:37) >    ACC: [id number] EXAM:  CT ANGIO ABD PELV (W)AW IC   ORDERED BY: DAYNE CRUZ     ACC: [id number] EXAM:  CT ANGIO CHEST AORTA WAWIC   ORDERED BY: DAYNE CRUZ     *** ADDENDUM # 1 ***    Images were obtained of the abdomen and pelvis as well    --- End of Report ---    *** END OF ADDENDUM # 1 ***      PROCEDURE DATE:  2023          INTERPRETATION:  INDICATION: Weakness on right, history of abdominal   aortic aneurysm    TECHNIQUE: CT angiogram of the chest was obtained after the intravenous   administration of 95 cc administered mL of Omnipaque 350 (accession   [id number]), IV contrast documented in concurrent exam (accession   [id number]), 5 cc discarded discarded. Three-dimensional maximum intensity   projection images were generated.    COMPARISON: 2023    FINDINGS:  Aorta angiogram: Again seen is thoracic aortic aneurysm measuring 4.2 cm   in the ascending aorta and 4.7 cm in the distal descending thoracic aorta   and pedunculated noncalcified plaque and 6.1 x 4.7 cmdilatation in the   infrarenal abdominal aorta with large peripheral mural thrombus. No   evidence of rupture. 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 An IVC filter is again seen in place.  Lymph nodes: No large nodes seen  Heart/vasculature: Postoperative changes, no pericardial effusion.  Airways/lungs/pleura: No evidence of effusion, consolidation thorax.    Again seen is enlargement of the right lobe of the thyroid gland    ABDOMEN AND PELVIS:  LIVER: Few hepatic hypodensities at the dome measuring up to 2.5 cm   unchanged, likely small cysts.  BILE DUCTS: No distention  GALLBLADDER: Tiny layering calculi unchanged.  SPLEEN: Spleen size within normal limits  PANCREAS: Fatty atrophy. No acute peripancreatic inflammation.  ADRENALS: Unremarkable  KIDNEYS/URETERS: No hydronephrosis. Left renal cysts. 4 mm nonobstructing   left renal calculus.    BLADDER: Normal urinary bladder  REPRODUCTIVE ORGANS: Fiducial markers of the prostate.    BOWEL:  No small boweldistention. Nondistended appendix. Colonic   diverticulosis, without diverticulitis.  PERITONEUM: No ascites    Bones/soft tissues: Chronic vertebral fractures as before, unchanged    IMPRESSION:  Essentially no change from prior study. Aortic aneurysms as above        --- End of Report ---    ***Please see the addendum at the top of this report. It may contain   additional important information or changes.****        EZEKIEL MELCHOR MD; Attending Radiologist  This document has been electronically signed. Mar  8 2023  5:24PM  1st Addendum: EZEKIEL MELCHOR MD; Attending Radiologist  The first addendum was electronically signed on: Mar  8 2023  6:15PM.    < end of copied text >  Impression and Plan:    Known thoracic and aortic aneurysms, which patient request no intervention   Perfusion deficits to the right upper ext, with no ischemic symptoms     - No indication for any vascular surgical intervention, due to patient's presentation, occlusions are likely chronic   - continue with therapeutic anticoagulation, which is apart of the patient medical regimen  - please recall us if needed

## 2023-03-08 NOTE — ED ADULT TRIAGE NOTE - NS ED NURSE BANDS TYPE
"Chief Complaint   Patient presents with     ER F/U       Initial /60 (BP Location: Left arm, Patient Position: Chair, Cuff Size: Adult Large)  Pulse 86  Temp 98.7  F (37.1  C) (Oral)  Resp 12  Ht 5' 4\" (1.626 m)  Wt 146 lb (66.2 kg)  SpO2 95%  BMI 25.06 kg/m2 Estimated body mass index is 25.06 kg/(m^2) as calculated from the following:    Height as of this encounter: 5' 4\" (1.626 m).    Weight as of this encounter: 146 lb (66.2 kg).  Medication Reconciliation: complete     JUSTYNA Hicks      " Name band;

## 2023-03-08 NOTE — ED PROVIDER NOTE - NIH STROKE SCALE: 5B. MOTOR ARM, RIGHT, QM
Pt on cardiac monitor and continuous Pulse OX      Salvador Sanchez RN  06/26/18 9210
Pt to CT via manpreet Michaels, BHANU  06/26/18 0289
Report called to [facility] in ICU Pt ready for transfer to unit      Jorje Segovia RN  06/26/18 9952
(0) No drift; limb holds 90 (or 45) degrees for full 10 secs

## 2023-03-08 NOTE — CONSULT NOTE ADULT - ASSESSMENT
ASSESSMENT:     NEURO: Continue close monitoring for neurologic deterioration, permissive HTN, titrate statin to LDL goal less than 70, MRI Brain w/o, MRA Head w/o and Neck w/contrast. Physical therapy/OT/Speech eval/treatment.     ANTITHROMBOTIC THERAPY:     PULMONARY: CXR clear, protecting airway, saturating well     CARDIOVASCULAR: check TTE, cardiac monitoring                              SBP goal:     GASTROINTESTINAL:  dysphagia screen       Diet:     RENAL: BUN/Cr stable, good urine output      Na Goal: Greater than 135     Sy:    HEMATOLOGY: H/H stable, Platelets normal      DVT ppx: Heparin s.c [] LMWH []     ID: afebrile, no leukocytosis     OTHER:      DISPOSITION: Rehab or home depending on PT eval once stable and workup is complete      CORE MEASURES:        Admission NIHSS:      Tenecteplase : [] YES [] NO      LDL/HDL/A1C:     Depression Screen:      Statin Therapy:     Dysphagia Screen: [] PASS [] FAIL     Smoking [] YES [] NO      Afib [] YES [] NO     Stroke Education [] YES [] NO    Obtain screening lower extremity venous ultrasound in patients who meet 1 or more of the following criteria as patient is high risk for DVT/PE on admission:   [x] History of DVT/PE  []Hypercoagulable states (Factor V Leiden, Cancer, OCP, etc. )  []Prolonged immobility (hemiplegia/hemiparesis/post operative or any other extended immobilization)  [] Transferred from outside facility (Rehab or Long term care)  [] Age </= to 50 ASSESSMENT: 88 yr old male with hypertension,  atrial fibrillation, PE/DVT s/p IVC filter, Factor V Leiden; on coumadin CAD s/p CABG, stroke , COPD, GIGI, AAA, infrarenal 6.1 cm  on prior ct presented with right sided numbness started at around 11am  3/8/23 at [facility] by ER report. CT head without acute infarction or hemorrhage. Excluded from Tenecteplasea as INR > 1.7 (currently 2.30).  CT angiogram without evidence of large vessel occlusion- not thrombectomy ca ndidate. Moderate left carotid stenosis.  Etiology of symptoms possibly TIA , rule out cerebral infarction and cervical disease.Would screen for infection/seizure as possible recrudescences of prior symptoms.     NEURO: Continue close monitoring for neurologic deterioration, permissive HTN overall < 160mmHg (anticoagulation use in setting of AAA), currently 110-140mmHg would avoid further hypotension and rapid fluctuations, titrate statin to LDL goal less than 70, MRI Brain w/o, can obtain carotid US to evaluate carotid stenosis if patient would be a candidate for intervention based on goals of care,  routine EEG, Physical therapy/OT/Speech eval/treatment.     ANTITHROMBOTIC THERAPY: continue therapeutic anticoagulation with warfarin as per home regimen.     PULMONARY:   protecting airway, saturating well     CARDIOVASCULAR: check TTE, cardiac monitoring                             GASTROINTESTINAL:  dysphagia screen      RENAL: BUN/Cr elevated, maintain adequate hydration as tolerated , good urine output      Na Goal: Greater than 135    HEMATOLOGY: H/H with mild anemia, monitor for lbeeding, Platelets 235     DVT ppx: therapeutically anticoagulated with Warfarin    ID: afebrile, no leukocytosis, screen for infection      DISPOSITION: Rehab or home depending on PT eval once stable and workup is complete      CORE MEASURES:        Admission NIHSS: 4     Tenecteplase : [] YES [x] NO      LDL/HDL/A1C: p     Depression Screen:p      Statin Therapy: as noted      Dysphagia Screen: [] PASS [] FAIL - pending      Smoking [] YES [x] NO      Afib [x] YES [] NO     Stroke Education [x] YES [] NO    Obtain screening lower extremity venous ultrasound in patients who meet 1 or more of the following criteria as patient is high risk for DVT/PE on admission:   [x] History of DVT/PE  []Hypercoagulable states (Factor V Leiden, Cancer, OCP, etc. )  []Prolonged immobility (hemiplegia/hemiparesis/post operative or any other extended immobilization)  [] Transferred from outside facility (Rehab or Long term care)  [] Age </= to 50

## 2023-03-08 NOTE — ED ADULT TRIAGE NOTE - CHIEF COMPLAINT QUOTE
c/o rt sided weakness that started at 1100 pt says he was going to PT and started having weakness MD called to evaluate and code stroke called. Glucose-91 on arrival

## 2023-03-08 NOTE — ED PROVIDER NOTE - CLINICAL SUMMARY MEDICAL DECISION MAKING FREE TEXT BOX
88 yr old male with hypertension,  DBR/DNI atrial fibrillation, PE/DVT s/p IVC filter, Factor V Leiden on coumadin CAD s/p CABG, CVA, COPD, GIGI, AAA, infrarenal 6.1 cm  on prior ct pw right sided weakness started at 11am at [facility]. notes numbness/weakness to right arm and leg denies slurred speech. pt on coumadin thinks that took it today.  nih 0 now non disabeling deficit also on coumadin and hx of aaa discussedwith stroke neuro today not a tpn candidate 88 yr old male with hypertension,  DBR/DNI atrial fibrillation, PE/DVT s/p IVC filter, Factor V Leiden on coumadin CAD s/p CABG, CVA, COPD, GIGI, AAA, infrarenal 6.1 cm  on prior ct pw right sided weakness started at 11am at [facility]. notes numbness/weakness to right arm and leg denies slurred speech. pt on coumadin thinks that took it today.  nih 0 now non disabeling deficit also on coumadin and hx of aaa discussedwith stroke neuro today not a tpn candidate    cta stable; neuro evaluated pt will admit for mri work up  right arm dopplerable pulse but not palpable cap refill <2 sec warm from - will add arterial sono consult vascular  admit

## 2023-03-08 NOTE — H&P ADULT - PROBLEM SELECTOR PLAN 1
admit to step down  neuro checks and VS q 2hrs  motor RUE may be 4-5 /5 , all other motor 5/5.  mri brain pending  asa, statin   pt. will be on iv heparin admit to step down  neuro checks and VS q 2hrs  motor RUE may be 4-5 /5 , all other motor 5/5.  mri brain pending  asa, statin   neurologist following  pt. will be on iv heparin

## 2023-03-08 NOTE — ED PROVIDER NOTE - ATTENDING CONTRIBUTION TO CARE
code stroke given right sided weakness  will also do cta dissection given hx of aaa and decreased pulse to right radial

## 2023-03-08 NOTE — H&P ADULT - HISTORY OF PRESENT ILLNESS
89 y/o male was sent in from [facility] as per documentation was noted to have new RUE/ RLE weakness and numbness and with diminished rt. sided radial and pedal pulses. pt. has known infrarenal 6.1 cm AAA.  pt. is an 87 y/o male was sent in from [facility] as per documentation was noted to have new RUE/ RLE weakness and numbness and with diminished rt. sided radial and pedal pulses. pt. stated that he went to bed fine and in the morning staff at the facility noted rt. sided weakness. pt. has known infrarenal 6.1 cm AAA. pt. thinks that he is ok and no sig. weakness of RUE/ RLE. no speech/ swallow difficulty. no facial droop, no dizziness reported. no cp, no abd. pain, no n/v/d. no hematemesis, no hemoptysis, no rectal blood reported. pt. knows that he is in the hospital, knows current president name and current year. pt. is an 89 y/o male was sent in from [facility] as per documentation was noted to have new RUE/ RLE weakness and numbness and with diminished rt. sided radial and pedal pulses. pt. stated that he went to bed fine and in the morning staff at the facility noted rt. sided weakness. pt. has known infrarenal 6.1 cm AAA. pt. thinks that he is ok and reports no sig. weakness of RUE/ RLE. no speech/ swallow difficulty. no facial droop, no vision change. no dizziness reported. no cp, no abd. pain, no n/v/d. no hematemesis, no hemoptysis, no rectal blood reported. pt. knows that he is in the hospital, knows current president name and current year. pt. passed bedside swallow eval per pt's nurse. pt's molst with dnr/dni reviewed, signed during his recent admission.

## 2023-03-08 NOTE — CHART NOTE - NSCHARTNOTEFT_GEN_A_CORE
pt's RUE arterial doppler findings were given to ER attending by radiologist who called me and I discussed the findings with on call vascular surgery team and I was told that vascular team is aware of the results and will update their recommendations. I have also called [facility] for hematology consult and call back is requested from on call hematologist. pt's RUE arterial doppler findings were given to ER attending by radiologist who called me and I discussed the findings with on call vascular surgery team and I was told that vascular team is aware of the results and will update their recommendations. I have also called [facility] for hematology consult and call back is requested from on call hematologist.    addendum : 8:00 pm, Dr. Goodrich hematology called and case discussed, she has recommended to start iv heparin without bolus. further recommendations about RUE arterial doppler findings per vascular team.

## 2023-03-09 DIAGNOSIS — E78.5 HYPERLIPIDEMIA, UNSPECIFIED: ICD-10-CM

## 2023-03-09 DIAGNOSIS — I63.9 CEREBRAL INFARCTION, UNSPECIFIED: ICD-10-CM

## 2023-03-09 DIAGNOSIS — I25.10 ATHEROSCLEROTIC HEART DISEASE OF NATIVE CORONARY ARTERY WITHOUT ANGINA PECTORIS: ICD-10-CM

## 2023-03-09 DIAGNOSIS — J44.9 CHRONIC OBSTRUCTIVE PULMONARY DISEASE, UNSPECIFIED: ICD-10-CM

## 2023-03-09 DIAGNOSIS — I50.22 CHRONIC SYSTOLIC (CONGESTIVE) HEART FAILURE: ICD-10-CM

## 2023-03-09 DIAGNOSIS — I10 ESSENTIAL (PRIMARY) HYPERTENSION: ICD-10-CM

## 2023-03-09 DIAGNOSIS — D68.51 ACTIVATED PROTEIN C RESISTANCE: ICD-10-CM

## 2023-03-09 DIAGNOSIS — I50.9 HEART FAILURE, UNSPECIFIED: ICD-10-CM

## 2023-03-09 DIAGNOSIS — I48.91 UNSPECIFIED ATRIAL FIBRILLATION: ICD-10-CM

## 2023-03-09 LAB
A1C WITH ESTIMATED AVERAGE GLUCOSE RESULT: 5.9 % — HIGH (ref 4–5.6)
ANION GAP SERPL CALC-SCNC: 11 MMOL/L — SIGNIFICANT CHANGE UP (ref 5–17)
APTT BLD: 105.5 SEC — HIGH (ref 27.5–35.5)
APTT BLD: 119.9 SEC — HIGH (ref 27.5–35.5)
APTT BLD: 125.9 SEC — CRITICAL HIGH (ref 27.5–35.5)
APTT BLD: 35.9 SEC — HIGH (ref 27.5–35.5)
BASOPHILS # BLD AUTO: 0.04 K/UL — SIGNIFICANT CHANGE UP (ref 0–0.2)
BASOPHILS NFR BLD AUTO: 0.7 % — SIGNIFICANT CHANGE UP (ref 0–2)
BUN SERPL-MCNC: 19.9 MG/DL — SIGNIFICANT CHANGE UP (ref 8–20)
CALCIUM SERPL-MCNC: 8.9 MG/DL — SIGNIFICANT CHANGE UP (ref 8.4–10.5)
CHLORIDE SERPL-SCNC: 101 MMOL/L — SIGNIFICANT CHANGE UP (ref 96–108)
CHOLEST SERPL-MCNC: 124 MG/DL — SIGNIFICANT CHANGE UP
CO2 SERPL-SCNC: 26 MMOL/L — SIGNIFICANT CHANGE UP (ref 22–29)
CREAT SERPL-MCNC: 1.09 MG/DL — SIGNIFICANT CHANGE UP (ref 0.5–1.3)
CULTURE RESULTS: SIGNIFICANT CHANGE UP
EGFR: 65 ML/MIN/1.73M2 — SIGNIFICANT CHANGE UP
EOSINOPHIL # BLD AUTO: 0.36 K/UL — SIGNIFICANT CHANGE UP (ref 0–0.5)
EOSINOPHIL NFR BLD AUTO: 6.5 % — HIGH (ref 0–6)
ESTIMATED AVERAGE GLUCOSE: 123 MG/DL — HIGH (ref 68–114)
GLUCOSE BLDC GLUCOMTR-MCNC: 98 MG/DL — SIGNIFICANT CHANGE UP (ref 70–99)
GLUCOSE SERPL-MCNC: 106 MG/DL — HIGH (ref 70–99)
HCT VFR BLD CALC: 38.9 % — LOW (ref 39–50)
HDLC SERPL-MCNC: 34 MG/DL — LOW
HGB BLD-MCNC: 11.6 G/DL — LOW (ref 13–17)
IMM GRANULOCYTES NFR BLD AUTO: 0.5 % — SIGNIFICANT CHANGE UP (ref 0–0.9)
INR BLD: 2.27 RATIO — HIGH (ref 0.88–1.16)
LIPID PNL WITH DIRECT LDL SERPL: 58 MG/DL — SIGNIFICANT CHANGE UP
LYMPHOCYTES # BLD AUTO: 0.88 K/UL — LOW (ref 1–3.3)
LYMPHOCYTES # BLD AUTO: 15.8 % — SIGNIFICANT CHANGE UP (ref 13–44)
MCHC RBC-ENTMCNC: 26.5 PG — LOW (ref 27–34)
MCHC RBC-ENTMCNC: 29.8 GM/DL — LOW (ref 32–36)
MCV RBC AUTO: 89 FL — SIGNIFICANT CHANGE UP (ref 80–100)
MONOCYTES # BLD AUTO: 0.76 K/UL — SIGNIFICANT CHANGE UP (ref 0–0.9)
MONOCYTES NFR BLD AUTO: 13.7 % — SIGNIFICANT CHANGE UP (ref 2–14)
NEUTROPHILS # BLD AUTO: 3.49 K/UL — SIGNIFICANT CHANGE UP (ref 1.8–7.4)
NEUTROPHILS NFR BLD AUTO: 62.8 % — SIGNIFICANT CHANGE UP (ref 43–77)
NON HDL CHOLESTEROL: 90 MG/DL — SIGNIFICANT CHANGE UP
PLATELET # BLD AUTO: 207 K/UL — SIGNIFICANT CHANGE UP (ref 150–400)
POTASSIUM SERPL-MCNC: 3.8 MMOL/L — SIGNIFICANT CHANGE UP (ref 3.5–5.3)
POTASSIUM SERPL-SCNC: 3.8 MMOL/L — SIGNIFICANT CHANGE UP (ref 3.5–5.3)
PROTHROM AB SERPL-ACNC: 26.5 SEC — HIGH (ref 10.5–13.4)
RBC # BLD: 4.37 M/UL — SIGNIFICANT CHANGE UP (ref 4.2–5.8)
RBC # FLD: 15.2 % — HIGH (ref 10.3–14.5)
SODIUM SERPL-SCNC: 138 MMOL/L — SIGNIFICANT CHANGE UP (ref 135–145)
SPECIMEN SOURCE: SIGNIFICANT CHANGE UP
TRIGL SERPL-MCNC: 159 MG/DL — HIGH
WBC # BLD: 5.56 K/UL — SIGNIFICANT CHANGE UP (ref 3.8–10.5)
WBC # FLD AUTO: 5.56 K/UL — SIGNIFICANT CHANGE UP (ref 3.8–10.5)

## 2023-03-09 PROCEDURE — 99233 SBSQ HOSP IP/OBS HIGH 50: CPT

## 2023-03-09 PROCEDURE — 99233 SBSQ HOSP IP/OBS HIGH 50: CPT | Mod: FS

## 2023-03-09 PROCEDURE — 99222 1ST HOSP IP/OBS MODERATE 55: CPT

## 2023-03-09 PROCEDURE — 99223 1ST HOSP IP/OBS HIGH 75: CPT

## 2023-03-09 RX ORDER — DIGOXIN 250 MCG
125 TABLET ORAL ONCE
Refills: 0 | Status: COMPLETED | OUTPATIENT
Start: 2023-03-09 | End: 2023-03-09

## 2023-03-09 RX ORDER — DILTIAZEM HCL 120 MG
5 CAPSULE, EXT RELEASE 24 HR ORAL ONCE
Refills: 0 | Status: COMPLETED | OUTPATIENT
Start: 2023-03-09 | End: 2023-03-09

## 2023-03-09 RX ORDER — SODIUM CHLORIDE 9 MG/ML
250 INJECTION INTRAMUSCULAR; INTRAVENOUS; SUBCUTANEOUS ONCE
Refills: 0 | Status: COMPLETED | OUTPATIENT
Start: 2023-03-09 | End: 2023-03-09

## 2023-03-09 RX ORDER — ACETAMINOPHEN 500 MG
650 TABLET ORAL EVERY 6 HOURS
Refills: 0 | Status: DISCONTINUED | OUTPATIENT
Start: 2023-03-09 | End: 2023-03-15

## 2023-03-09 RX ORDER — WARFARIN SODIUM 2.5 MG/1
3 TABLET ORAL ONCE
Refills: 0 | Status: COMPLETED | OUTPATIENT
Start: 2023-03-09 | End: 2023-03-09

## 2023-03-09 RX ADMIN — SERTRALINE 50 MILLIGRAM(S): 25 TABLET, FILM COATED ORAL at 12:28

## 2023-03-09 RX ADMIN — Medication 3 MILLILITER(S): at 08:30

## 2023-03-09 RX ADMIN — Medication 10 MILLIGRAM(S): at 22:08

## 2023-03-09 RX ADMIN — BUDESONIDE AND FORMOTEROL FUMARATE DIHYDRATE 2 PUFF(S): 160; 4.5 AEROSOL RESPIRATORY (INHALATION) at 08:31

## 2023-03-09 RX ADMIN — TAMSULOSIN HYDROCHLORIDE 0.4 MILLIGRAM(S): 0.4 CAPSULE ORAL at 21:41

## 2023-03-09 RX ADMIN — HEPARIN SODIUM 1800 UNIT(S)/HR: 5000 INJECTION INTRAVENOUS; SUBCUTANEOUS at 12:35

## 2023-03-09 RX ADMIN — Medication 125 MICROGRAM(S): at 18:54

## 2023-03-09 RX ADMIN — Medication 10 MILLIGRAM(S): at 16:33

## 2023-03-09 RX ADMIN — Medication 650 MILLIGRAM(S): at 12:33

## 2023-03-09 RX ADMIN — OLANZAPINE 10 MILLIGRAM(S): 15 TABLET, FILM COATED ORAL at 21:42

## 2023-03-09 RX ADMIN — BUDESONIDE AND FORMOTEROL FUMARATE DIHYDRATE 2 PUFF(S): 160; 4.5 AEROSOL RESPIRATORY (INHALATION) at 20:48

## 2023-03-09 RX ADMIN — Medication 10 MILLIGRAM(S): at 02:48

## 2023-03-09 RX ADMIN — Medication 3 MILLILITER(S): at 20:48

## 2023-03-09 RX ADMIN — Medication 10 MILLIGRAM(S): at 10:14

## 2023-03-09 RX ADMIN — Medication 40 MILLIGRAM(S): at 06:08

## 2023-03-09 RX ADMIN — SODIUM CHLORIDE 250 MILLILITER(S): 9 INJECTION INTRAMUSCULAR; INTRAVENOUS; SUBCUTANEOUS at 20:56

## 2023-03-09 RX ADMIN — Medication 3 MILLILITER(S): at 15:38

## 2023-03-09 RX ADMIN — ATORVASTATIN CALCIUM 40 MILLIGRAM(S): 80 TABLET, FILM COATED ORAL at 21:39

## 2023-03-09 RX ADMIN — Medication 81 MILLIGRAM(S): at 12:27

## 2023-03-09 RX ADMIN — HEPARIN SODIUM 1500 UNIT(S)/HR: 5000 INJECTION INTRAVENOUS; SUBCUTANEOUS at 14:29

## 2023-03-09 RX ADMIN — Medication 5 MILLIGRAM(S): at 20:56

## 2023-03-09 RX ADMIN — HEPARIN SODIUM 1800 UNIT(S)/HR: 5000 INJECTION INTRAVENOUS; SUBCUTANEOUS at 06:18

## 2023-03-09 RX ADMIN — Medication 10 MILLIGRAM(S): at 22:33

## 2023-03-09 NOTE — DISCHARGE NOTE NURSING/CASE MANAGEMENT/SOCIAL WORK - PATIENT PORTAL LINK FT
You can access the FollowMyHealth Patient Portal offered by [facility] by registering at the following website: [email or web address]. By joining [employer]’s FollowMyHealth portal, you will also be able to view your health information using other applications (apps) compatible with our system.

## 2023-03-09 NOTE — OCCUPATIONAL THERAPY INITIAL EVALUATION ADULT - ADDITIONAL COMMENTS
pt came to ED from [facility], as per pt required assist for all ADL's and transfers, was using rw

## 2023-03-09 NOTE — CONSULT NOTE ADULT - SUBJECTIVE AND OBJECTIVE BOX
88yM was admitted on       Patient is a 88y old  Male who presents with a chief complaint of right sided weakness / right radial artery occlusion. (09 Mar 2023 06:53)    HPI:  Mr. Grier is an 88 year old male was sent in from [facility] rehab as per documentation was noted to have new RUE/ RLE weakness and numbness and with diminished rt. sided radial and pedal pulses.  He was found to have right radial artery occlusion on RUE arterial doppler.         Imaging reviewed showed:    ACC: [id number] EXAM:  US DPLX UPR [employer] RT   ORDERED BY: DAYNE CRUZ     PROCEDURE DATE:  2023          INTERPRETATION:  EXAMINATION:  US UPPER EXTREMITY ARTERIAL DUPLEX LIMITED   RIGHT    HISTORY: Decreased radial pulses.    TECHNIQUE: Gray scale, color, and Doppler assessment of the right upper   extremity arteries was performed the proximal subclavian arteries down to   the distal radial and ulnar arteries.    FINDINGS:    Patent right subclavian, axillary, brachial arteries with no evidence of   a hemodynamically significant stenosis or occlusion.    Patent proximal right radial artery with a low peak systolic velocity   with echogenic material/thrombus along the periphery. Right radial artery   appears occluded in the mid and distal forearm at the level of the wrist   with question trickle flow in the mid forearm.    Ulnar artery appears occluded in the forearm and wrist with question   trickle flow in the mid and distal forearm.    IMPRESSION:    Right radial artery appears occluded in the mid and distal forearm and at   the level of the wrist and ulnar artery appears occluded throughout the   forearm and at the level of the wrist with question trickle flow in the   radial artery and mid forearm and ulnar arteries in the mid and distal   forearm; differential includes an embolic etiology.      ACC: [id number] EXAM:  CT BRAIN STROKE PROTOCOL   ORDERED BY: DAYNE CRUZ     PROCEDURE DATE:  2023          INTERPRETATION:  CLINICAL INDICATIONS:  Stroke Code right leg and right   arm weakness, resolving    COMPARISON: CT head dated 2023    TECHNIQUE: Noncontrast CT of the head. Multiplanar reformations are   submitted. RAPID artificial intelligence was utilized for the preliminary   evaluation of intracranial hemorrhage.    FINDINGS:  Chronic left thalamic lacunar infarction. Chronic left caudate head   lacunar infarction. Bilateral anterior frontal convexity subdural   hygromas versus enlarged subarachnoid spaces measuring 1.4 cm in   thickness in the right upper extremity millimeters in thickness on the   left. Chronic parenchymal posterior frontal cortical infarct on image 21   of series 7, unchanged.    There is periventricular and subcortical white matter hypodensity without   mass effect, nonspecific, likely representing moderate chronic   microvascular ischemic changes. There is no compelling evidence for an   acute transcortical infarction. There is no evidence of mass, mass   effect, midline shift or other extra-axial fluid collection. The lateral   ventricles and cortical sulci are age-appropriate in size and   configuration.    The patient is status post bilateral ocular lens replacement surgery.   Metallic density adjacent to the left ocular eyelid on image 23 of series   5. Correlate clinically. The orbits, mastoid air cells and visualized   paranasal sinuses are unremarkable. The calvarium is intact.    Consider MRI as clinically warranted.    IMPRESSION:  Moderate chronic microvascular changes without evidence of   an acute transcortical infarction or hemorrhage    REVIEW OF SYSTEMS  Constitutional - No fever, No weight loss, No fatigue  HEENT - No eye pain, No visual disturbances, No difficulty hearing, No tinnitus, No vertigo, No neck pain  Respiratory - No cough, No wheezing, No shortness of breath  Cardiovascular - No chest pain, No palpitations  Gastrointestinal - No abdominal pain, No nausea, No vomiting, No diarrhea, No constipation  Genitourinary - No dysuria, No frequency, No hematuria, No incontinence  Neurological - No headaches, No memory loss, + loss of strength, No numbness, No tremors  Skin - No itching, No rashes, No lesions   Endocrine - No temperature intolerance  Musculoskeletal - No joint pain, No joint swelling, No muscle pain  Psychiatric - No depression, No anxiety    VITALS  T(C): 36.6 (23 @ 06:00), Max: 36.6 (23 @ 12:32)  HR: 102 (23 @ 06:00) (81 - 150)  BP: 120/76 (23 @ 06:00) (105/75 - 141/95)  RR: 18 (23 @ 06:00) (18 - 20)  SpO2: 95% (23 @ 06:00) (92% - 98%)  Wt(kg): --    PAST MEDICAL & SURGICAL HISTORY  Hypertension    Hyperlipemia    PE (pulmonary embolism)    [place] filter in place    CAD (coronary artery disease)    Atrial fibrillation, unspecified type    CAD S/P percutaneous coronary angioplasty    History of COPD    Cerebrovascular accident (CVA)    Atheroma    Aortic stenosis    Factor V Leiden    Prostate CA    No significant past surgical history    S/P IVC filter    S/P cataract surgery    S/P CABG (coronary artery bypass graft)    Status post right knee replacement        SOCIAL HISTORY - as per documentation/history  Smoking - None  EtOH - None  Drugs - None    FUNCTIONAL HISTORY  Previously at [facility] rehab     CURRENT FUNCTIONAL STATUS  Pending evaluation    FAMILY HISTORY   No pertinent family history in first degree relatives    Family history of diabetes mellitus (Child)        RECENT LABS - Reviewed  CBC Full  -  ( 09 Mar 2023 06:06 )  WBC Count : 5.56 K/uL  RBC Count : 4.37 M/uL  Hemoglobin : 11.6 g/dL  Hematocrit : 38.9 %  Platelet Count - Automated : 207 K/uL  Mean Cell Volume : 89.0 fl  Mean Cell Hemoglobin : 26.5 pg  Mean Cell Hemoglobin Concentration : 29.8 gm/dL  Auto Neutrophil # : 3.49 K/uL  Auto Lymphocyte # : 0.88 K/uL  Auto Monocyte # : 0.76 K/uL  Auto Eosinophil # : 0.36 K/uL  Auto Basophil # : 0.04 K/uL  Auto Neutrophil % : 62.8 %  Auto Lymphocyte % : 15.8 %  Auto Monocyte % : 13.7 %  Auto Eosinophil % : 6.5 %  Auto Basophil % : 0.7 %        138  |  101  |  19.9  ----------------------------<  106<H>  3.8   |  26.0  |  1.09    Ca    8.9      09 Mar 2023 06:06    TPro  6.6  /  Alb  3.4  /  TBili  0.4  /  DBili  x   /  AST  18  /  ALT  20  /  AlkPhos  89  03-08    Urinalysis Basic - ( 08 Mar 2023 13:40 )    Color: Yellow / Appearance: Clear / S.015 / pH: x  Gluc: x / Ketone: Negative  / Bili: Negative / Urobili: Negative mg/dL   Blood: x / Protein: Negative / Nitrite: Negative   Leuk Esterase: Negative / RBC: x / WBC x   Sq Epi: x / Non Sq Epi: x / Bacteria: x        ALLERGIES  No Known Allergies  OHS (Unknown)      MEDICATIONS   acetaminophen     Tablet .. 650 milliGRAM(s) Oral every 6 hours PRN  albuterol/ipratropium for Nebulization 3 milliLiter(s) Nebulizer every 6 hours  aspirin enteric coated 81 milliGRAM(s) Oral daily  atorvastatin 40 milliGRAM(s) Oral at bedtime  budesonide 160 MICROgram(s)/formoterol 4.5 MICROgram(s) Inhaler 2 Puff(s) Inhalation two times a day  diltiazem Injectable 10 milliGRAM(s) IV Push every 6 hours PRN  furosemide    Tablet 40 milliGRAM(s) Oral daily  heparin   Injectable 9500 Unit(s) IV Push every 6 hours PRN  heparin   Injectable 4500 Unit(s) IV Push every 6 hours PRN  heparin  Infusion.  Unit(s)/Hr IV Continuous <Continuous>  metoprolol tartrate 50 milliGRAM(s) Oral every 12 hours  OLANZapine 10 milliGRAM(s) Oral at bedtime  oxybutynin XL 10 milliGRAM(s) Oral <User Schedule>  sertraline 50 milliGRAM(s) Oral daily  tamsulosin 0.4 milliGRAM(s) Oral at bedtime      ----------------------------------------------------------------------------------------  PHYSICAL EXAM  Constitutional - NAD, Comfortable  HEENT - NCAT, EOMI  Neck - Supple, No limited ROM  Chest - Breathing comfortably, No wheezing  Cardiovascular - No cyanosis    Abdomen - Soft   Extremities - No C/C/E, No calf tenderness   Neurologic Exam -                    Cognitive - Awake, Alert oriented to person, place, follows commands      Motor - 4+/5 strength throughout      Sensory - Intact to LT     Reflexes - No clonus   Psychiatric - Mood stable, Affect WNL  ----------------------------------------------------------------------------------------  ASSESSMENT/PLAN  88yMale with functional deficits after concern for CVA found to have a right radial artery occlusion  RUE weakness -  Neurology following, MRI brain pending for CVA evaluation, aspirin, atorvastatin   Radial artery occlusion -  heparin  Pain - Tylenol  DVT PPX - SCDs, heparin  Rehab/Impaired mobility - Consult PT/OT and SLP for evaluation and treatment.  Medically being optimized. Expect patient to achieve functional goals for return to [facility], pending evaluation.  Rehabilitation team will continue to follow as patient's condition progresses.      Will continue to follow. Functional progress will determine ongoing rehab dispo recommendations, which may change.    Continue bedside therapy as well as OOB throughout the day with mobilization by staff to maintain cardiopulmonary function and prevention of secondary complications related to debility.

## 2023-03-09 NOTE — CONSULT NOTE ADULT - PROBLEM SELECTOR RECOMMENDATION 3
- Pt with hx of  CAD/CABG x2 with LIMA-LAD and SVG-PDA with AV fibroelastoma resection in 2019, moderate   - Last cardiac cath was on 08.06.19 and showed Proximal LAD: There was a tubular 50 % stenosis.  Mid LAD: There was a tubular 80 % stenosis. There was a stenosis at the ostium of the  vessel segment. This lesion is a chronic total occlusion. Mid circumflex: Moderate to severe disease. Proximal RCA: There was a stenosis. This lesion is a chronic total occlusion. DIAGNOSTIC IMPRESSIONS: Severe LAD disease Moderate to severe CX disease  of the RCA  - Last echo from  02.21.23 EF of 25 to 30%. Abnormal septal motion consistent with post-operative status. Left ventricular hypertrophy. Severely enlarged left atrium. Right atrial enlargement. Mild-moderate tricuspid regurgitation. Severe calcific aortic valve stenosis with low gradient, dimensionless index 0.23. Borderline dilatation of the aortic root  - Continue home meds with strict parameters   - Maintain K+~4 and mag~2  - DASH diet

## 2023-03-09 NOTE — PROGRESS NOTE ADULT - SUBJECTIVE AND OBJECTIVE BOX
Preliminary note, offical recommendations pending attending review/signature   HPI:  88 yr old male with hypertension,  atrial fibrillation, PE/DVT s/p IVC filter, Factor V Leiden on coumadin CAD s/p CABG, stroke COPD, GIGI, AAA, infrarenal 6.1 cm  on prior ct presented with right sided weakness started at 11am  3/8/23 at [facility] by ER report. Upon review patient notes  numbness  to right hand,  arm and leg denied slurred speech, changes in vision, weakness, vision. Patient on coumadin thinks that took it day of presentation.  Noted symptoms  were improving on ER eval, ER  NIH 0- INR 2.30, excluded from Tenecteplase . It was noted also thinks that had an old stroke with some residual numbness/weakness on the same side in the past - notes sometimes gets worse . Patient states at this time he feels back to normal.     SUBJECTIVE: No events overnight.  No new neurologic complaints.  ROS reported negative unless otherwise noted.    acetaminophen     Tablet .. 650 milliGRAM(s) Oral every 6 hours PRN  albuterol/ipratropium for Nebulization 3 milliLiter(s) Nebulizer every 6 hours  aspirin enteric coated 81 milliGRAM(s) Oral daily  atorvastatin 40 milliGRAM(s) Oral at bedtime  budesonide 160 MICROgram(s)/formoterol 4.5 MICROgram(s) Inhaler 2 Puff(s) Inhalation two times a day  diltiazem Injectable 10 milliGRAM(s) IV Push every 6 hours PRN  furosemide    Tablet 40 milliGRAM(s) Oral daily  heparin   Injectable 9500 Unit(s) IV Push every 6 hours PRN  heparin   Injectable 4500 Unit(s) IV Push every 6 hours PRN  heparin  Infusion.  Unit(s)/Hr IV Continuous <Continuous>  metoprolol tartrate 50 milliGRAM(s) Oral every 12 hours  OLANZapine 10 milliGRAM(s) Oral at bedtime  oxybutynin XL 10 milliGRAM(s) Oral <User Schedule>  sertraline 50 milliGRAM(s) Oral daily  tamsulosin 0.4 milliGRAM(s) Oral at bedtime      PHYSICAL EXAM:   Vital Signs Last 24 Hrs  T(C): 36.6 (09 Mar 2023 06:00), Max: 36.6 (08 Mar 2023 12:32)  T(F): 97.8 (09 Mar 2023 06:00), Max: 97.9 (08 Mar 2023 16:04)  HR: 102 (09 Mar 2023 06:00) (81 - 150)  BP: 120/76 (09 Mar 2023 06:00) (105/75 - 141/95)  BP(mean): 99 (08 Mar 2023 19:48) (99 - 99)  RR: 18 (09 Mar 2023 06:00) (18 - 20)  SpO2: 95% (09 Mar 2023 06:00) (92% - 98%)    Parameters below as of 09 Mar 2023 06:00  Patient On (Oxygen Delivery Method): room air      INCOMPLETE  General: No acute distress    NEUROLOGICAL EXAM:  Mental status: The patient is awake and alert and has normal attention span.  The patient is oriented to self, place, month, year, disoriented to date . The patient is oriented to current events- notes he was at [facility] doing rehab. The patient is able to name objects, follow commands     Cranial nerves: Pupils equal and react symmetrically to light. There is no visual field deficit to confrontation. Extraocular motion is full with no nystagmus. There is no ptosis. Facial sensation is intact. Slight right facial palsy that corrects on smile,    Tongue is midline.    Motor: There is normal bulk and tone.  There is no tremor.  Strength is 5/5 in the right arm and leg.   Strength is 5/5 in the left arm and leg.    Coordination: Right dysmetria     LABS:                        11.6   5.56  )-----------( 207      ( 09 Mar 2023 06:06 )             38.9    03-09    138  |  101  |  19.9  ----------------------------<  106<H>  3.8   |  26.0  |  1.09    Ca    8.9      09 Mar 2023 06:06    TPro  6.6  /  Alb  3.4  /  TBili  0.4  /  DBili  x   /  AST  18  /  ALT  20  /  AlkPhos  89  03-08  PT/INR - ( 08 Mar 2023 12:50 )   PT: 26.9 sec;   INR: 2.30 ratio         PTT - ( 09 Mar 2023 05:20 )  PTT:105.5 sec      IMAGING: Reviewed by me.     CT Angio Head Neck Stroke Protocol w/ IV Cont (03.08.23 @ 13:22)   7 mL area of increased Tmax in the right parietal lobe suggesting brain   at risk versus artifact. No core infarct. No large vessel occlusion. 50%   stenosis in left carotid bulb/proximal internal carotid artery. Consider   MRI of the brain as clinically warranted. Additional findings above.    CT Brain Stroke Protocol (03.08.23 @ 12:40)    Moderate chronic microvascular changes without evidence of   an acute transcortical infarction or hemorrhage.   Preliminary note, offical recommendations pending attending review/signature   HPI:  88 yr old male with hypertension,  atrial fibrillation, PE/DVT s/p IVC filter, Factor V Leiden on coumadin CAD s/p CABG, stroke COPD, GIGI, AAA, infrarenal 6.1 cm  on prior ct presented with right sided weakness started at 11am  3/8/23 at [facility] by ER report. Upon review patient notes  numbness  to right hand,  arm and leg denied slurred speech, changes in vision, weakness, vision. Patient on coumadin thinks that took it day of presentation.  Noted symptoms  were improving on ER eval, ER  NIH 0- INR 2.30, excluded from Tenecteplase . It was noted also thinks that had an old stroke with some residual numbness/weakness on the same side in the past - notes sometimes gets worse . Patient states at this time he feels back to normal.     SUBJECTIVE: No events overnight.  No new neurologic complaints.  ROS reported negative unless otherwise noted.    acetaminophen     Tablet .. 650 milliGRAM(s) Oral every 6 hours PRN  albuterol/ipratropium for Nebulization 3 milliLiter(s) Nebulizer every 6 hours  aspirin enteric coated 81 milliGRAM(s) Oral daily  atorvastatin 40 milliGRAM(s) Oral at bedtime  budesonide 160 MICROgram(s)/formoterol 4.5 MICROgram(s) Inhaler 2 Puff(s) Inhalation two times a day  diltiazem Injectable 10 milliGRAM(s) IV Push every 6 hours PRN  furosemide    Tablet 40 milliGRAM(s) Oral daily  heparin   Injectable 9500 Unit(s) IV Push every 6 hours PRN  heparin   Injectable 4500 Unit(s) IV Push every 6 hours PRN  heparin  Infusion.  Unit(s)/Hr IV Continuous <Continuous>  metoprolol tartrate 50 milliGRAM(s) Oral every 12 hours  OLANZapine 10 milliGRAM(s) Oral at bedtime  oxybutynin XL 10 milliGRAM(s) Oral <User Schedule>  sertraline 50 milliGRAM(s) Oral daily  tamsulosin 0.4 milliGRAM(s) Oral at bedtime      PHYSICAL EXAM:   Vital Signs Last 24 Hrs  T(C): 36.6 (09 Mar 2023 06:00), Max: 36.6 (08 Mar 2023 12:32)  T(F): 97.8 (09 Mar 2023 06:00), Max: 97.9 (08 Mar 2023 16:04)  HR: 102 (09 Mar 2023 06:00) (81 - 150)  BP: 120/76 (09 Mar 2023 06:00) (105/75 - 141/95)  BP(mean): 99 (08 Mar 2023 19:48) (99 - 99)  RR: 18 (09 Mar 2023 06:00) (18 - 20)  SpO2: 95% (09 Mar 2023 06:00) (92% - 98%)    Parameters below as of 09 Mar 2023 06:00  Patient On (Oxygen Delivery Method): room air         General: No acute distress    NEUROLOGICAL EXAM:  Mental status: The patient is awake and alert and has normal attention span.  The patient is oriented to self, place, month, year, disoriented to date , perseverates to 24 .   The patient is able to name objects, follow commands     Cranial nerves: Pupils equal and react symmetrically to light. There is no visual field deficit to confrontation. Extraocular motion is full with no nystagmus. There is no ptosis. Facial sensation is intact. Slight right facial palsy that corrects on smile,    Tongue is midline.    Motor: There is normal bulk and tone.  There is no tremor.  Strength is 5/5 in the right arm and leg. Right hand subtle  drift   Strength is 5/5 in the left arm and leg.    gait not assessed, no ataxia appreciated     LABS:                        11.6   5.56  )-----------( 207      ( 09 Mar 2023 06:06 )             38.9    03-09    138  |  101  |  19.9  ----------------------------<  106<H>  3.8   |  26.0  |  1.09    Ca    8.9      09 Mar 2023 06:06    TPro  6.6  /  Alb  3.4  /  TBili  0.4  /  DBili  x   /  AST  18  /  ALT  20  /  AlkPhos  89  03-08  PT/INR - ( 08 Mar 2023 12:50 )   PT: 26.9 sec;   INR: 2.30 ratio         PTT - ( 09 Mar 2023 05:20 )  PTT:105.5 sec      IMAGING: Reviewed by me.     CT Angio Head Neck Stroke Protocol w/ IV Cont (03.08.23 @ 13:22)   7 mL area of increased Tmax in the right parietal lobe suggesting brain   at risk versus artifact. No core infarct. No large vessel occlusion. 50%   stenosis in left carotid bulb/proximal internal carotid artery. Consider   MRI of the brain as clinically warranted. Additional findings above.    CT Brain Stroke Protocol (03.08.23 @ 12:40)    Moderate chronic microvascular changes without evidence of   an acute transcortical infarction or hemorrhage.   88 yr old male with hypertension,  atrial fibrillation, PE/DVT s/p IVC filter, Factor V Leiden on coumadin CAD s/p CABG, stroke COPD, GIGI, AAA, infrarenal 6.1 cm  on prior ct presented with right sided weakness started at 11am  3/8/23 at [facility] by ER report. Upon review patient notes  numbness  to right hand,  arm and leg denied slurred speech, changes in vision, weakness, vision. Patient on coumadin thinks that took it day of presentation.  Noted symptoms  were improving on ER eval, ER  NIH 0- INR 2.30, excluded from Tenecteplase . It was noted also thinks that had an old stroke with some residual numbness/weakness on the same side in the past - notes sometimes gets worse . Patient states at this time he feels back to normal.     SUBJECTIVE: No events overnight.  No new neurologic complaints.  ROS reported negative unless otherwise noted.    acetaminophen     Tablet .. 650 milliGRAM(s) Oral every 6 hours PRN  albuterol/ipratropium for Nebulization 3 milliLiter(s) Nebulizer every 6 hours  aspirin enteric coated 81 milliGRAM(s) Oral daily  atorvastatin 40 milliGRAM(s) Oral at bedtime  budesonide 160 MICROgram(s)/formoterol 4.5 MICROgram(s) Inhaler 2 Puff(s) Inhalation two times a day  diltiazem Injectable 10 milliGRAM(s) IV Push every 6 hours PRN  furosemide    Tablet 40 milliGRAM(s) Oral daily  heparin   Injectable 9500 Unit(s) IV Push every 6 hours PRN  heparin   Injectable 4500 Unit(s) IV Push every 6 hours PRN  heparin  Infusion.  Unit(s)/Hr IV Continuous <Continuous>  metoprolol tartrate 50 milliGRAM(s) Oral every 12 hours  OLANZapine 10 milliGRAM(s) Oral at bedtime  oxybutynin XL 10 milliGRAM(s) Oral <User Schedule>  sertraline 50 milliGRAM(s) Oral daily  tamsulosin 0.4 milliGRAM(s) Oral at bedtime      PHYSICAL EXAM:   Vital Signs Last 24 Hrs  T(C): 36.6 (09 Mar 2023 06:00), Max: 36.6 (08 Mar 2023 12:32)  T(F): 97.8 (09 Mar 2023 06:00), Max: 97.9 (08 Mar 2023 16:04)  HR: 102 (09 Mar 2023 06:00) (81 - 150)  BP: 120/76 (09 Mar 2023 06:00) (105/75 - 141/95)  BP(mean): 99 (08 Mar 2023 19:48) (99 - 99)  RR: 18 (09 Mar 2023 06:00) (18 - 20)  SpO2: 95% (09 Mar 2023 06:00) (92% - 98%)    Parameters below as of 09 Mar 2023 06:00  Patient On (Oxygen Delivery Method): room air         General: No acute distress    NEUROLOGICAL EXAM:  Mental status: The patient is awake and alert and has normal attention span.  The patient is oriented to self, place, month, year, disoriented to date , perseverates to 24 .   The patient is able to name objects, follow commands     Cranial nerves: Pupils equal and react symmetrically to light. There is no visual field deficit to confrontation. Extraocular motion is full with no nystagmus. There is no ptosis. Facial sensation is intact. Slight right facial palsy that corrects on smile,    Tongue is midline.    Motor: There is normal bulk and tone.  There is no tremor.  Strength is 5/5 in the right arm and leg. Right hand subtle  drift   Strength is 5/5 in the left arm and leg.    gait not assessed, no ataxia appreciated     LABS:                        11.6   5.56  )-----------( 207      ( 09 Mar 2023 06:06 )             38.9    03-09    138  |  101  |  19.9  ----------------------------<  106<H>  3.8   |  26.0  |  1.09    Ca    8.9      09 Mar 2023 06:06    TPro  6.6  /  Alb  3.4  /  TBili  0.4  /  DBili  x   /  AST  18  /  ALT  20  /  AlkPhos  89  03-08  PT/INR - ( 08 Mar 2023 12:50 )   PT: 26.9 sec;   INR: 2.30 ratio         PTT - ( 09 Mar 2023 05:20 )  PTT:105.5 sec      IMAGING: Reviewed by me.     CT Angio Head Neck Stroke Protocol w/ IV Cont (03.08.23 @ 13:22)   7 mL area of increased Tmax in the right parietal lobe suggesting brain   at risk versus artifact. No core infarct. No large vessel occlusion. 50%   stenosis in left carotid bulb/proximal internal carotid artery. Consider   MRI of the brain as clinically warranted. Additional findings above.    CT Brain Stroke Protocol (03.08.23 @ 12:40)    Moderate chronic microvascular changes without evidence of   an acute transcortical infarction or hemorrhage.

## 2023-03-09 NOTE — OCCUPATIONAL THERAPY INITIAL EVALUATION ADULT - GENERAL OBSERVATIONS, REHAB EVAL
pt received in ED, in bed and left as found, 0/10 pain, A&O x4, +tele and  in place, desat to 82% on RA, and left on 2L NCO2 as per RN

## 2023-03-09 NOTE — PROGRESS NOTE ADULT - ASSESSMENT
Vascular Surgery HPI:  Patient known to our service from past consultation for AAA; patient declined any surgical intervention at that time  patient now presents from [facility] with reported RUE/RLE weakness and numbness, which resolved on ed presentation  patient with also perfusion deficts to the right radial/ulnar, confirmed by arterial duplex  denies weakness, pain, numbness     Known thoracic and aortic aneurysms, which patient request no intervention   Perfusion deficits to the right upper ext, with no ischemic symptoms likely chronic     Plan:   - No indication for any vascular surgical intervention, due to patient's presentation, occlusions are likely chronic   - Continue with therapeutic anticoagulation, which is apart of the patient medical regimen  - Vascular will sign off

## 2023-03-09 NOTE — CONSULT NOTE ADULT - SUBJECTIVE AND OBJECTIVE BOX
[facility]                                              CARDIOLOGY AT [address], [place] [address]                                             Telephone: [phone]. Fax:[phone]      CARDIOLOGY CONSULTATION NOTE                                                                                             History obtained by: Patient and medical record  Community Cardiologist: Dr Bustamante   obtained: No   Reason for Consultation: A-fib/ R radial artery occlusion     Chief complaint:   Patient is a 88y old  Male who presents with a chief complaint of right sided weakness / right radial artery occlusion. (08 Mar 2023 18:13)    HPI: pt. is an 87 y/o male with PMHx of HFrEF,  HTN, HLD, Factor V Leiden, PE (S/P IVC filter), A-fib, CAD/CABG x2 with LIMA-LAD and SVG-PDA with AV fibroelastoma resection in 2019, COPD, CVA, AS, and prostate CA who was sent in from [facility] rehab as per documentation was noted to have new RUE/ RLE weakness and numbness and with diminished rt. sided radial and pedal pulses. Pt. stated that he went to bed fine and in the morning staff at the facility noted rt. sided weakness. pt. has known infrarenal 6.1 cm AAA. pt. thinks that he is ok and reports no sig. weakness of RUE/ RLE. no speech/ swallow difficulty. no facial droop, no vision change. no dizziness reported. no cp, no abd. pain, no n/v/d. no hematemesis, no hemoptysis, no rectal blood reported. pt. knows that he is in the hospital, knows current president name and current year. pt. passed bedside swallow eval per pt's nurse. pt's molst with dnr/dni reviewed, signed during his recent admission.  (08 Mar 2023 18:13)    CARDIAC TESTING   TTE Echo Complete w/ Contrast w/ Doppler (23 @ 11:34) >  Summary:   1. Endocardial visualization was enhanced with intravenous echo   contrast. No LV thrombus.   2. Left ventricular ejection fraction, by visual estimation, is 25 to 30%.   3. Severely decreased global left ventricular systolic function.   4. Abnormal septal motion consistent with post-operative status.   5. There is mild eccentric left ventricular hypertrophy.   6. The mitralin-flow pattern reveals no discernable A-wave, therefore   no comment on diastolic function can be made.   7. Normal right ventricular size and function, estimated PASP least 42   mmHg.   8. Severely enlarged left atrium.   9. Right atrial enlargement.  10. Moderate mitral annular calcification.  11. Mild-moderate tricuspid regurgitation.  12. Severe calcific aortic valve stenosis with low gradient,   dimensionless index 0.23.  13. There is moderate aortic root calcification.  14. Borderline dilatation of the aortic root, sinuses of Valsalva and   ascending aorta 3.9 cm, index to BSA 1.6 cm/m2 within normal.  15. Compared to the outpatient TTE study from 2022 prevoiusly LV EF 37%   and known low gradient aortic valve stenosis, but visually on current   study appears severely stenotic.  Mook aBngura DO Electronically signed on 2023 at 1:35:50 PM  < end of copied text >    Stress Echocardiogram (23 @ 12:38) >  Summary:   1. Dobuamine stress echo for evaluation of Low flow Low gradient Aortic   Stenosis.   2. Max Dobutamine dose used is 20 mcg/min.   3. Resting heart rate 85/min, Resting /84.   4. Peak heart rate is 126/min, Peak BP i 138/93.   5. Resting images show severe global hypokinesis.   6. At Peak images there is no significant augmentation of LV   contractility.   7. Resting Stroke Volume is 40 ml, Resting AV mean gradient is 9.1 mm Hg.   8. Peak Stroke Volume is 43 ml, Peak AV mean gradient is 14 mm Hg.   9. Conclusion- No Cardiac reserve, SV did not increase by >20%.  10. AV mean gradient increased from 9 mm Hg to 14 mm Hg, suggestive   Pseudo Severe AS.  MD Monica Electronically signed on 3/1/2023 at 12:42:37 PM  < end of copied text >    Nuclear Stress Test-Pharmacologic (20 @ 12:01) >  Conclusion:  There are medium sized, severe defects in inferior and  inferolateral walls that are predominantly fixed,  suggestive of infarction with minimal danisha-infarct  ischemia.  < end of copied text >    Cardiac Cath Lab - Adult (19 @ 15:20) >  VENTRICLES: No left ventriculogram was performed.  CORONARY VESSELS: The coronary circulation is right dominant.  LM:   --  LM: The vessel was large sized and mildly calcified. Angiography  showed mild atherosclerosis with no flow limiting lesions.  LAD:   --  Proximal LAD: There was a tubular 50 % stenosis. The lesion was  moderately calcified.  --  Mid LAD: There was a tubular 80 % stenosis.  --  Distal LAD: This is a good target for bypass.  --  D1: The vessel was medium to large sized. The artery was supplied by  collaterals from the left system There was a stenosis at the ostium of the  vessel segment. This lesion is a chronic total occlusion.  CX:   --  Mid circumflex: Moderate to severe disease.  --  OM1: The vessel was large sized. Angiography showed mild  atherosclerosis with no flow limiting lesions.  RCA:   --  Proximal RCA: There was a stenosis. This lesion is a chronic  total occlusion.  --  RPDA: The vessel was medium sized. Angiography showed mild  atherosclerosis with no flow limiting lesions. The artery was supplied by  collaterals. This is a good target for bypass.  DIAGNOSTIC IMPRESSIONS: Severe LAD disease  Moderate to severe CX disease   of the RCA  DIAGNOSTIC RECOMMENDATIONS: Evaluation by CT surgery for AVR ,  fibroelastoma removal and bypass to LAD and RCA PDA  < end of copied text >    PAST MEDICAL HISTORY  Hypertension  Hyperlipemia  PE (pulmonary embolism)  Marisol filter in place  CAD (coronary artery disease)  Atrial fibrillation, unspecified type  CAD S/P percutaneous coronary angioplasty  History of COPD  Cerebrovascular accident (CVA)  Atheroma  Aortic stenosis  Factor V Leiden  Prostate CA    PAST SURGICAL HISTORY  S/P IVC filter  S/P cataract surgery  S/P CABG (coronary artery bypass graft)  Status post right knee replacement    SOCIAL HISTORY: +former smoker ( quit 47 years ago) Denies alcohol/drugs    FAMILY HISTORY:  Family history of diabetes mellitus (Child)    Family History of Cardiovascular Disease:  No   Coronary Artery Disease in first degree relative: No  Sudden Cardiac Death in First degree relative: No     HOME MEDICATIONS:   acetaminophen 325 mg oral tablet: 2 tab(s) orally every 6 hours, As needed, Moderate Pain (4 - 6) (26 Aug 2019 20:08)  Advair Diskus 250 mcg-50 mcg inhalation powder: 1 puff(s) inhaled 2 times a day (08 Mar 2023 18:28)  aspirin 81 mg oral tablet, chewable: 1 tab(s) orally once a day (03 Mar 2023 13:43)  atorvastatin 40 mg oral tablet: 1 tab(s) orally once a day (at bedtime) (03 Mar 2023 13:43)  clotrimazole-betamethasone dipropionate 1%-0.05% topical cream: 1 application topically 2 times a day (03 Mar 2023 13:43)  furosemide 40 mg oral tablet: 1 tab(s) orally once a day (03 Mar 2023 13:43)  ipratropium 500 mcg/2.5 mL inhalation solution: 2.5 milliliter(s) inhaled every 6 hours, As needed, Shortness of Breath and/or Wheezing (03 Mar 2023 13:43)  levalbuterol 1.25 mg/3 mL inhalation solution: 3 milliliter(s) inhaled every 6 hours, As needed, shortness of breath (03 Mar 2023 13:43)  losartan 25 mg oral tablet: 0.5 tab(s) orally once a day (03 Mar 2023 13:43)  metoprolol tartrate 50 mg oral tablet: 1 tab(s) orally every 12 hours (03 Mar 2023 13:43)  OLANZapine 10 mg oral tablet: 1 tab(s) orally once a day (at bedtime) (03 Mar 2023 13:43)  oxybutynin 10 mg/24 hr oral tablet, extended release: orally every other day (at bedtime) (2023 15:52)  sertraline 50 mg oral tablet: 1 tab(s) orally once a day (03 Mar 2023 13:43)  tamsulosin 0.4 mg oral capsule: 1 cap(s) orally once a day (at bedtime) (03 Mar 2023 13:43)  warfarin 3 mg oral tablet: 1 tab(s) orally once a day monitor INR daily goal 2-3 (03 Mar 2023 13:43)    CURRENT CARDIAC MEDICATIONS:   diltiazem Injectable 10 milliGRAM(s) IV Push every 6 hours PRN for sustained HR above 125  furosemide    Tablet 40 milliGRAM(s) Oral daily  metoprolol tartrate 50 milliGRAM(s) Oral every 12 hours    CURRENT OTHER MEDICATIONS:   albuterol/ipratropium for Nebulization 3 milliLiter(s) Nebulizer every 6 hours  budesonide 160 MICROgram(s)/formoterol 4.5 MICROgram(s) Inhaler 2 Puff(s) Inhalation two times a day  OLANZapine 10 milliGRAM(s) Oral at bedtime  sertraline 50 milliGRAM(s) Oral daily  aspirin enteric coated 81 milliGRAM(s) Oral daily  atorvastatin 40 milliGRAM(s) Oral at bedtime  heparin   Injectable 9500 Unit(s) IV Push every 6 hours PRN For aPTT less than 40  heparin   Injectable 4500 Unit(s) IV Push every 6 hours PRN For aPTT between 40 - 57  heparin  Infusion.  Unit(s)/Hr (21 mL/Hr) IV Continuous <Continuous>  oxybutynin XL 10 milliGRAM(s) Oral <User Schedule>  tamsulosin 0.4 milliGRAM(s) Oral at bedtime    ALLERGIES:   No Known Allergies  OHS (Unknown)    REVIEW OF SYMPTOMS:   CONSTITUTIONAL: No fever, no chills, no weight loss, no weight gain, no fatigue   ENMT:  No vertigo; No sinus or throat pain  NECK: No pain or stiffness  CARDIOVASCULAR: No chest pain, no dyspnea, no syncope/presyncope, no fatigue, no palpitations, no dizziness, no Orthopnea, no Paroxsymal nocturnal dyspnea  RESPIRATORY: No shortness of breath, no cough, no wheezing  : No dysuria, no hematuria   GI: no nausea, no diarrhea, no constipation, no abdominal pain,  NEURO: No headache, no slurred speech   MUSCULOSKELETAL: No joint pain or swelling; No muscle, back, or extremity pain  PSYCH: No agitation, no anxiety.    ALL OTHER REVIEW OF SYSTEMS ARE NEGATIVE.    VITAL SIGNS:   T(C): 36.6 (23 @ 23:00), Max: 36.6 (23 @ 12:32)  T(F): 97.9 (23 @ 23:00), Max: 97.9 (23 @ 16:04)  HR: 100 (23 @ 23:00) (81 - 150)  BP: 139/74 (23 @ 23:00) (105/75 - 139/74)  RR: 20 (23 @ 23:00) (18 - 20)  SpO2: 96% (23 @ 23:00) (92% - 98%)    PHYSICAL EXAM:   Constitutional: Comfortable . No acute distress.   HEENT: Atraumatic and normocephalic , neck is supple . no JVD.  CNS: A&Ox3. No focal deficits. + confused and forgetfully at times since arrival. Dementia ?  Respiratory: CTAB, unlabored. No wheezing, No crackles or rhonchi   Cardiovascular:+ Irregular, Irregular normal s1 s2. +  murmur 2nd ICS to R and L of sternum. No rubs or gallop.  Gastrointestinal: Soft, non-tender. +Bowel sounds.   Extremities: 2+ Peripheral Pulses, No edema  Psychiatric: Calm . no agitation.   Skin: Warm and dry    LABS:   ( 08 Mar 2023 12:50 )  Troponin T  0.01 ,  CPK  X    , CKMB  X    , BNP X                              11.8   5.53  )-----------( 235      ( 08 Mar 2023 12:50 )             38.8     03-08    137  |  101  |  25.6<H>  ----------------------------<  96  4.0   |  26.0  |  1.37<H>    Ca    9.1      08 Mar 2023 12:50    TPro  6.6  /  Alb  3.4  /  TBili  0.4  /  DBili  x   /  AST  18  /  ALT  20  /  AlkPhos  89  03-08    PT/INR - ( 08 Mar 2023 12:50 )   PT: 26.9 sec;   INR: 2.30 ratio       PTT - ( 08 Mar 2023 12:50 )  PTT:31.6 sec    Urinalysis Basic - ( 08 Mar 2023 13:40 )  Color: Yellow / Appearance: Clear / S.015 / pH: x  Gluc: x / Ketone: Negative  / Bili: Negative / Urobili: Negative mg/dL   Blood: x / Protein: Negative / Nitrite: Negative   Leuk Esterase: Negative / RBC: x / WBC x   Sq Epi: x / Non Sq Epi: x / Bacteria: x    EKG is A-fib with HR 96 BPM  Prior ECG: Yes     RADIOLOGY & ADDITIONAL STUDIES:     US Duplex Arterial Upper Ext Ltd, Right (23 @ 17:50) >  IMPRESSION:  Right radial artery appears occluded in the mid and distal forearm and at   the level of the wrist and ulnar artery appears occluded throughout the   forearm and at the level of the wrist with question trickle flow in the   radial artery and mid forearm and ulnar arteries in the mid and distal   forearm; differential includes an embolic etiology.  < end of copied text >    Xray Chest 1 View- PORTABLE-Urgent (Xray Chest 1 View- PORTABLE-Urgent .) (23 @ 17:12) >  INTERPRETATION:  AP chest on 2023 of 4:49 PM. Patient has weakness.  Heart magnified by technique. Sternotomy again noted.  Present film shows slight linear atelectasis in the left lower lung field   and a slight retrocardiac atelectatic infiltrate. These are increased   from .  IMPRESSION: Increasing small left base findings.  < end of copied text >    Preliminary evaluation, please await official recommendations     Assessment and recommendations are final when note is signed by the attending.                                      [facility]                                              CARDIOLOGY AT [address], [place] [address]                                             Telephone: [phone]. Fax:[phone]      CARDIOLOGY CONSULTATION NOTE                                                                                             History obtained by: Patient and medical record  Community Cardiologist: Dr Bustamante   obtained: No   Reason for Consultation: A-fib/ R radial artery occlusion     Chief complaint:   Patient is a 88y old  Male who presents with a chief complaint of right sided weakness / right radial artery occlusion. (08 Mar 2023 18:13)    HPI: pt. is an 87 y/o male with PMHx of HFrEF,  HTN, HLD, Factor V Leiden, PE (S/P IVC filter), A-fib, CAD/CABG x2 with LIMA-LAD and SVG-PDA with AV fibroelastoma resection in 2019, COPD, CVA, AS, and prostate CA who was sent in from [facility] rehab as per documentation was noted to have new RUE/ RLE weakness and numbness and with diminished rt. sided radial and pedal pulses. Pt. stated that he went to bed fine and in the morning staff at the facility noted rt. sided weakness. pt. has known infrarenal 6.1 cm AAA. pt. thinks that he is ok and reports no sig. weakness of RUE/ RLE. no speech/ swallow difficulty. no facial droop, no vision change. no dizziness reported. no cp, no abd. pain, no n/v/d. no hematemesis, no hemoptysis, no rectal blood reported. pt. knows that he is in the hospital, knows current president name and current year. pt. passed bedside swallow eval per pt's nurse. pt's molst with dnr/dni reviewed, signed during his recent admission.  (08 Mar 2023 18:13)    CARDIAC TESTING   TTE Echo Complete w/ Contrast w/ Doppler (23 @ 11:34) >  Summary:   1. Endocardial visualization was enhanced with intravenous echo   contrast. No LV thrombus.   2. Left ventricular ejection fraction, by visual estimation, is 25 to 30%.   3. Severely decreased global left ventricular systolic function.   4. Abnormal septal motion consistent with post-operative status.   5. There is mild eccentric left ventricular hypertrophy.   6. The mitralin-flow pattern reveals no discernable A-wave, therefore   no comment on diastolic function can be made.   7. Normal right ventricular size and function, estimated PASP least 42   mmHg.   8. Severely enlarged left atrium.   9. Right atrial enlargement.  10. Moderate mitral annular calcification.  11. Mild-moderate tricuspid regurgitation.  12. Severe calcific aortic valve stenosis with low gradient,   dimensionless index 0.23.  13. There is moderate aortic root calcification.  14. Borderline dilatation of the aortic root, sinuses of Valsalva and   ascending aorta 3.9 cm, index to BSA 1.6 cm/m2 within normal.  15. Compared to the outpatient TTE study from 2022 prevoiusly LV EF 37%   and known low gradient aortic valve stenosis, but visually on current   study appears severely stenotic.  Mook Bangura DO Electronically signed on 2023 at 1:35:50 PM  < end of copied text >    Stress Echocardiogram (23 @ 12:38) >  Summary:   1. Dobuamine stress echo for evaluation of Low flow Low gradient Aortic   Stenosis.   2. Max Dobutamine dose used is 20 mcg/min.   3. Resting heart rate 85/min, Resting /84.   4. Peak heart rate is 126/min, Peak BP i 138/93.   5. Resting images show severe global hypokinesis.   6. At Peak images there is no significant augmentation of LV   contractility.   7. Resting Stroke Volume is 40 ml, Resting AV mean gradient is 9.1 mm Hg.   8. Peak Stroke Volume is 43 ml, Peak AV mean gradient is 14 mm Hg.   9. Conclusion- No Cardiac reserve, SV did not increase by >20%.  10. AV mean gradient increased from 9 mm Hg to 14 mm Hg, suggestive   Pseudo Severe AS.  MD Monica Electronically signed on 3/1/2023 at 12:42:37 PM  < end of copied text >    Nuclear Stress Test-Pharmacologic (20 @ 12:01) >  Conclusion:  There are medium sized, severe defects in inferior and  inferolateral walls that are predominantly fixed,  suggestive of infarction with minimal danisha-infarct  ischemia.  < end of copied text >    Cardiac Cath Lab - Adult (19 @ 15:20) >  VENTRICLES: No left ventriculogram was performed.  CORONARY VESSELS: The coronary circulation is right dominant.  LM:   --  LM: The vessel was large sized and mildly calcified. Angiography  showed mild atherosclerosis with no flow limiting lesions.  LAD:   --  Proximal LAD: There was a tubular 50 % stenosis. The lesion was  moderately calcified.  --  Mid LAD: There was a tubular 80 % stenosis.  --  Distal LAD: This is a good target for bypass.  --  D1: The vessel was medium to large sized. The artery was supplied by  collaterals from the left system There was a stenosis at the ostium of the  vessel segment. This lesion is a chronic total occlusion.  CX:   --  Mid circumflex: Moderate to severe disease.  --  OM1: The vessel was large sized. Angiography showed mild  atherosclerosis with no flow limiting lesions.  RCA:   --  Proximal RCA: There was a stenosis. This lesion is a chronic  total occlusion.  --  RPDA: The vessel was medium sized. Angiography showed mild  atherosclerosis with no flow limiting lesions. The artery was supplied by  collaterals. This is a good target for bypass.  DIAGNOSTIC IMPRESSIONS: Severe LAD disease  Moderate to severe CX disease   of the RCA  DIAGNOSTIC RECOMMENDATIONS: Evaluation by CT surgery for AVR ,  fibroelastoma removal and bypass to LAD and RCA PDA  < end of copied text >    PAST MEDICAL HISTORY  Hypertension  Hyperlipemia  PE (pulmonary embolism)  Marisol filter in place  CAD (coronary artery disease)  Atrial fibrillation, unspecified type  CAD S/P percutaneous coronary angioplasty  History of COPD  Cerebrovascular accident (CVA)  Atheroma  Aortic stenosis  Factor V Leiden  Prostate CA    PAST SURGICAL HISTORY  S/P IVC filter  S/P cataract surgery  S/P CABG (coronary artery bypass graft)  Status post right knee replacement    SOCIAL HISTORY: +former smoker ( quit 47 years ago) Denies alcohol/drugs    FAMILY HISTORY:  Family history of diabetes mellitus (Child)    Family History of Cardiovascular Disease:  No   Coronary Artery Disease in first degree relative: No  Sudden Cardiac Death in First degree relative: No     HOME MEDICATIONS:   acetaminophen 325 mg oral tablet: 2 tab(s) orally every 6 hours, As needed, Moderate Pain (4 - 6) (26 Aug 2019 20:08)  Advair Diskus 250 mcg-50 mcg inhalation powder: 1 puff(s) inhaled 2 times a day (08 Mar 2023 18:28)  aspirin 81 mg oral tablet, chewable: 1 tab(s) orally once a day (03 Mar 2023 13:43)  atorvastatin 40 mg oral tablet: 1 tab(s) orally once a day (at bedtime) (03 Mar 2023 13:43)  clotrimazole-betamethasone dipropionate 1%-0.05% topical cream: 1 application topically 2 times a day (03 Mar 2023 13:43)  furosemide 40 mg oral tablet: 1 tab(s) orally once a day (03 Mar 2023 13:43)  ipratropium 500 mcg/2.5 mL inhalation solution: 2.5 milliliter(s) inhaled every 6 hours, As needed, Shortness of Breath and/or Wheezing (03 Mar 2023 13:43)  levalbuterol 1.25 mg/3 mL inhalation solution: 3 milliliter(s) inhaled every 6 hours, As needed, shortness of breath (03 Mar 2023 13:43)  losartan 25 mg oral tablet: 0.5 tab(s) orally once a day (03 Mar 2023 13:43)  metoprolol tartrate 50 mg oral tablet: 1 tab(s) orally every 12 hours (03 Mar 2023 13:43)  OLANZapine 10 mg oral tablet: 1 tab(s) orally once a day (at bedtime) (03 Mar 2023 13:43)  oxybutynin 10 mg/24 hr oral tablet, extended release: orally every other day (at bedtime) (2023 15:52)  sertraline 50 mg oral tablet: 1 tab(s) orally once a day (03 Mar 2023 13:43)  tamsulosin 0.4 mg oral capsule: 1 cap(s) orally once a day (at bedtime) (03 Mar 2023 13:43)  warfarin 3 mg oral tablet: 1 tab(s) orally once a day monitor INR daily goal 2-3 (03 Mar 2023 13:43)    CURRENT CARDIAC MEDICATIONS:   diltiazem Injectable 10 milliGRAM(s) IV Push every 6 hours PRN for sustained HR above 125  furosemide    Tablet 40 milliGRAM(s) Oral daily  metoprolol tartrate 50 milliGRAM(s) Oral every 12 hours    CURRENT OTHER MEDICATIONS:   albuterol/ipratropium for Nebulization 3 milliLiter(s) Nebulizer every 6 hours  budesonide 160 MICROgram(s)/formoterol 4.5 MICROgram(s) Inhaler 2 Puff(s) Inhalation two times a day  OLANZapine 10 milliGRAM(s) Oral at bedtime  sertraline 50 milliGRAM(s) Oral daily  aspirin enteric coated 81 milliGRAM(s) Oral daily  atorvastatin 40 milliGRAM(s) Oral at bedtime  heparin   Injectable 9500 Unit(s) IV Push every 6 hours PRN For aPTT less than 40  heparin   Injectable 4500 Unit(s) IV Push every 6 hours PRN For aPTT between 40 - 57  heparin  Infusion.  Unit(s)/Hr (21 mL/Hr) IV Continuous <Continuous>  oxybutynin XL 10 milliGRAM(s) Oral <User Schedule>  tamsulosin 0.4 milliGRAM(s) Oral at bedtime    ALLERGIES:   No Known Allergies  OHS (Unknown)    REVIEW OF SYMPTOMS:   CONSTITUTIONAL: No fever, no chills, no weight loss, no weight gain, no fatigue   ENMT:  No vertigo; No sinus or throat pain  NECK: No pain or stiffness  CARDIOVASCULAR: No chest pain, no dyspnea, no syncope/presyncope, no fatigue, no palpitations, no dizziness, no Orthopnea, no Paroxsymal nocturnal dyspnea  RESPIRATORY: No shortness of breath, no cough, no wheezing  : No dysuria, no hematuria   GI: no nausea, no diarrhea, no constipation, no abdominal pain,  NEURO: No headache, no slurred speech   MUSCULOSKELETAL: No joint pain or swelling; No muscle, back, or extremity pain  PSYCH: No agitation, no anxiety.    ALL OTHER REVIEW OF SYSTEMS ARE NEGATIVE.    VITAL SIGNS:   T(C): 36.6 (23 @ 23:00), Max: 36.6 (23 @ 12:32)  T(F): 97.9 (23 @ 23:00), Max: 97.9 (23 @ 16:04)  HR: 100 (23 @ 23:00) (81 - 150)  BP: 139/74 (23 @ 23:00) (105/75 - 139/74)  RR: 20 (23 @ 23:00) (18 - 20)  SpO2: 96% (23 @ 23:00) (92% - 98%)    PHYSICAL EXAM:   Constitutional: Comfortable . No acute distress.   HEENT: Atraumatic and normocephalic , neck is supple . no JVD.  CNS: A&Ox3. No focal deficits. + confused and forgetfully at times since arrival. Dementia ?  Respiratory: CTAB, unlabored. No wheezing, No crackles or rhonchi   Cardiovascular:+ Irregular, Irregular normal s1 s2. +  murmur 2nd ICS to R and L of sternum. No rubs or gallop.  Gastrointestinal: Soft, non-tender. +Bowel sounds.   Extremities: 2+ Peripheral Pulses, No edema  Psychiatric: Calm . no agitation.   Skin: Warm and dry    LABS:   ( 08 Mar 2023 12:50 )  Troponin T  0.01 ,  CPK  X    , CKMB  X    , BNP X                              11.8   5.53  )-----------( 235      ( 08 Mar 2023 12:50 )             38.8     03-08    137  |  101  |  25.6<H>  ----------------------------<  96  4.0   |  26.0  |  1.37<H>    Ca    9.1      08 Mar 2023 12:50    TPro  6.6  /  Alb  3.4  /  TBili  0.4  /  DBili  x   /  AST  18  /  ALT  20  /  AlkPhos  89  03-08    PT/INR - ( 08 Mar 2023 12:50 )   PT: 26.9 sec;   INR: 2.30 ratio       PTT - ( 08 Mar 2023 12:50 )  PTT:31.6 sec    Urinalysis Basic - ( 08 Mar 2023 13:40 )  Color: Yellow / Appearance: Clear / S.015 / pH: x  Gluc: x / Ketone: Negative  / Bili: Negative / Urobili: Negative mg/dL   Blood: x / Protein: Negative / Nitrite: Negative   Leuk Esterase: Negative / RBC: x / WBC x   Sq Epi: x / Non Sq Epi: x / Bacteria: x    EKG: A-fib with LBBB HR 98 BPM  Prior ECG: Yes     RADIOLOGY & ADDITIONAL STUDIES:     US Duplex Arterial Upper Ext Ltd, Right (23 @ 17:50) >  IMPRESSION:  Right radial artery appears occluded in the mid and distal forearm and at   the level of the wrist and ulnar artery appears occluded throughout the   forearm and at the level of the wrist with question trickle flow in the   radial artery and mid forearm and ulnar arteries in the mid and distal   forearm; differential includes an embolic etiology.  < end of copied text >    Xray Chest 1 View- PORTABLE-Urgent (Xray Chest 1 View- PORTABLE-Urgent .) (23 @ 17:12) >  INTERPRETATION:  AP chest on 2023 of 4:49 PM. Patient has weakness.  Heart magnified by technique. Sternotomy again noted.  Present film shows slight linear atelectasis in the left lower lung field   and a slight retrocardiac atelectatic infiltrate. These are increased   from .  IMPRESSION: Increasing small left base findings.  < end of copied text >    Preliminary evaluation, please await official recommendations     Assessment and recommendations are final when note is signed by the attending.

## 2023-03-09 NOTE — SWALLOW BEDSIDE ASSESSMENT ADULT - PHARYNGEAL PHASE
Needed moderate cues to initiate swallow/Decreased laryngeal elevation Within functional limits Posterior loss suspected/Decreased laryngeal elevation Posterior loss suspected/Decreased laryngeal elevation/Throat clear post oral intake

## 2023-03-09 NOTE — PROGRESS NOTE ADULT - SUBJECTIVE AND OBJECTIVE BOX
HOSPITALIST PROGRESS NOTE    SANDI SANTOS  [id number]  88yMale    Patient is a 88y old  Male who presents with a chief complaint of right sided weakness / right radial artery occlusion. (09 Mar 2023 08:00)      SUBJECTIVE:   Chart reviewed since admission.   Patient seen and examined at bedside in afternoon for Weakness, CVA, radial artery occlusion.  Sidestepping question, refusing to answer, stating he will go to his doctor. Appears paranoid      OBJECTIVE:  Vital Signs Last 24 Hrs  T(C): 36.1 (09 Mar 2023 17:42), Max: 36.7 (09 Mar 2023 11:04)  T(F): 97 (09 Mar 2023 17:42), Max: 98.1 (09 Mar 2023 11:04)  HR: 125 (09 Mar 2023 17:25) (60 - 130)  BP: 92/71 (09 Mar 2023 17:25) (92/71 - 141/95)  BP(mean): 74 (09 Mar 2023 17:25) (74 - 74)  RR: 17 (09 Mar 2023 17:25) (17 - 20)  SpO2: 100% (09 Mar 2023 17:25) (95% - 100%)    Parameters below as of 09 Mar 2023 17:25  Patient On (Oxygen Delivery Method): nasal cannula  O2 Flow (L/min): 2      PHYSICAL EXAMINATION - Limited by patient refusal  General: Elderly obese male lying comfortably in bed  HEENT:  No facial asymmetry, No O2  NECK:    CVS:   RESP:  No accessory muscle usage or work of breathing  GI:    :   MSK:    CNS:  Moves bilateral  upper extremities freely without any discomfort or limitation during speech - dismissive gestures  INTEG:    PSYCH:  Paranoid    MONITOR:  CAPILLARY BLOOD GLUCOSE      POCT Blood Glucose.: 98 mg/dL (09 Mar 2023 08:02)    A1C with Estimated Average Glucose Result: 5.9 % (23 @ 06:06)     I&O's Summary                          11.6   5.56  )-----------( 207      ( 09 Mar 2023 06:06 )             38.9     PT/INR - ( 09 Mar 2023 16:35 )   PT: 26.5 sec;   INR: 2.27 ratio         PTT - ( 09 Mar 2023 16:35 )  PTT:125.9 sec      138  |  101  |  19.9  ----------------------------<  106<H>  3.8   |  26.0  |  1.09    Ca    8.9      09 Mar 2023 06:06    TPro  6.6  /  Alb  3.4  /  TBili  0.4  /  DBili  x   /  AST  18  /  ALT  20  /  AlkPhos  89  03-08    CARDIAC MARKERS ( 08 Mar 2023 12:50 )  x     / 0.01 ng/mL / x     / x     / x          Urinalysis Basic - ( 08 Mar 2023 13:40 )    Color: Yellow / Appearance: Clear / S.015 / pH: x  Gluc: x / Ketone: Negative  / Bili: Negative / Urobili: Negative mg/dL   Blood: x / Protein: Negative / Nitrite: Negative   Leuk Esterase: Negative / RBC: x / WBC x   Sq Epi: x / Non Sq Epi: x / Bacteria: x        Culture:    TTE:  < from: TTE Echo Complete w/ Contrast w/ Doppler (23 @ 11:34) >    Summary:   1. Endocardial visualization was enhanced with intravenous echo   contrast. No LV thrombus.   2. Left ventricular ejection fraction, by visual estimation, is 25 to   30%.   3. Severely decreased global left ventricular systolic function.   4. Abnormal septal motion consistent with post-operative status.   5. There is mild eccentric left ventricular hypertrophy.   6. The mitralin-flow pattern reveals no discernable A-wave, therefore   no comment on diastolic function can be made.   7. Normal right ventricular size and function, estimated PASP least 42   mmHg.   8. Severely enlarged left atrium.   9. Right atrial enlargement.  10. Moderate mitral annular calcification.  11. Mild-moderate tricuspid regurgitation.  12. Severe calcific aortic valve stenosis with low gradient,   dimensionless index 0.23.  13. There is moderate aortic root calcification.  14. Borderline dilatation of the aortic root, sinuses of Valsalva and   ascending aorta 3.9 cm, index to BSA 1.6 cm/m2 within normal.  15. Compared to the outpatient TTE study from 2022 prevoiusly LV EF 37%   and known low gradient aortic valve stenosis, but visually on current   study appears severely stenotic.    < end of copied text >    RADIOLOGY  < from: CT Brain Stroke Protocol (23 @ 12:40) >  IMPRESSION:  Moderate chronic microvascular changes without evidence of   an acute transcortical infarction or hemorrhage.    --- End of Report ---    < end of copied text >    < from: CT Brain Perfusion Maps Stroke (23 @ 13:11) >  MPRESSION:    7 mL area of increased Tmax in the right parietal lobe suggesting brain   at risk versus artifact. No core infarct. No large vessel occlusion. 50%   stenosis in left carotid bulb/proximal internal carotid artery. Consider   MRI of the brain as clinically warranted. Additional findings above.    --- End of Report ---    < end of copied text >  < from: CT Angio Chest Aorta w/wo IV Cont (23 @ 13:36) >  FINDINGS:  Aorta angiogram: Again seen is thoracic aortic aneurysm measuring 4.2 cm   in the ascending aorta and 4.7 cm in the distal descending thoracic aorta   and pedunculated noncalcified plaque and 6.1 x 4.7 cmdilatation in the   infrarenal abdominal aorta with large peripheral mural thrombus. No   evidence of rupture. 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 An IVC filter is again seen in place.  Lymph nodes: No large nodes seen  Heart/vasculature: Postoperative changes, no pericardial effusion.  Airways/lungs/pleura: No evidence of effusion, consolidation thorax.    Again seen is enlargement of the right lobe of the thyroid gland    ABDOMEN AND PELVIS:  LIVER: Few hepatic hypodensities at the dome measuring up to 2.5 cm   unchanged, likely small cysts.  BILE DUCTS: No distention  GALLBLADDER: Tiny layering calculi unchanged.  SPLEEN: Spleen size within normal limits  PANCREAS: Fatty atrophy. No acute peripancreatic inflammation.  ADRENALS: Unremarkable  KIDNEYS/URETERS: No hydronephrosis. Left renal cysts. 4 mm nonobstructing   left renal calculus.    BLADDER: Normal urinary bladder  REPRODUCTIVE ORGANS: Fiducial markers of the prostate.    BOWEL:  No small boweldistention. Nondistended appendix. Colonic   diverticulosis, without diverticulitis.  PERITONEUM: No ascites    Bones/soft tissues: Chronic vertebral fractures as before, unchanged    IMPRESSION:  Essentially no change from prior study. Aortic aneurysms as above    < end of copied text >    < from: US Duplex Arterial Upper Ext Ltd, Right (23 @ 17:50) >    IMPRESSION:    Right radial artery appears occluded in the mid and distal forearm and at   the level of the wrist and ulnar artery appears occluded throughout the   forearm and at the level of the wrist with question trickle flow in the   radial artery and mid forearm and ulnar arteries in the mid and distal   forearm; differential includes an embolic etiology.    The findings were discussed with Dr. Arriaga on 3/8/2023 6:35 PM    --- End of Report ---    < end of copied text >    MEDICATIONS  (STANDING):  albuterol/ipratropium for Nebulization 3 milliLiter(s) Nebulizer every 6 hours  aspirin enteric coated 81 milliGRAM(s) Oral daily  atorvastatin 40 milliGRAM(s) Oral at bedtime  budesonide 160 MICROgram(s)/formoterol 4.5 MICROgram(s) Inhaler 2 Puff(s) Inhalation two times a day  furosemide    Tablet 40 milliGRAM(s) Oral daily  metoprolol tartrate 50 milliGRAM(s) Oral every 12 hours  OLANZapine 10 milliGRAM(s) Oral at bedtime  oxybutynin XL 10 milliGRAM(s) Oral <User Schedule>  sertraline 50 milliGRAM(s) Oral daily  tamsulosin 0.4 milliGRAM(s) Oral at bedtime  warfarin 3 milliGRAM(s) Oral once      MEDICATIONS  (PRN):  acetaminophen     Tablet .. 650 milliGRAM(s) Oral every 6 hours PRN Mild Pain (1 - 3)  diltiazem Injectable 10 milliGRAM(s) IV Push every 6 hours PRN for sustained HR above 125

## 2023-03-09 NOTE — CONSULT NOTE ADULT - PROBLEM SELECTOR RECOMMENDATION 2
Euvolemic on exam  Continue home meds with strict parameters  Maintain K+>4 and mag >2  DASH diet  Daily weight monitor  Strict I&Os Monitor

## 2023-03-09 NOTE — CONSULT NOTE ADULT - ASSESSMENT
Patient is a 88y old  Male who presents with a chief complaint of right sided weakness / right radial artery occlusion. (08 Mar 2023 18:13)

## 2023-03-09 NOTE — DISCHARGE NOTE NURSING/CASE MANAGEMENT/SOCIAL WORK - NSDCPEFALRISK_GEN_ALL_CORE
For information on Fall & Injury Prevention, visit: [email or web address] OR  [email or web address] OR  https://www.cdc.gov/steadi/patient.html

## 2023-03-09 NOTE — PATIENT PROFILE ADULT - FUNCTIONAL ASSESSMENT - BASIC MOBILITY 6.
2-calculated by average/Not able to assess (calculate score using [facility] averaging method)

## 2023-03-09 NOTE — CONSULT NOTE ADULT - PROBLEM SELECTOR RECOMMENDATION 9
Pt was sent in from [facility] rehab due to new RUE/ RLE weakness and numbness and with diminished rt. sided radial and pedal pulses.  - US Duplex Arterial Upper Ext Ltd, Right shows Right radial artery appears occluded in the mid and distal forearm and at the level of the wrist and ulnar artery appears occluded throughout the  forearm and at the level of the wrist with question trickle flow in the radial artery and mid forearm and ulnar arteries in the mid and distal forearm; differential includes an embolic etiology.  Pt being follow by hematology and vascular    Telemonitor  - EKG is A-fib with HR 96 BPM  - HR controlled  - May use Metoprolol 5 mg IVP for sustained HR >120   - Continue home meds with strict parameters  - Pt on Coumadin.  Pt was started on full AC with Heparin. Monitor coag panel and daily CBC  - Trop x 1 negative. Trend CE. Trend trops x 3 q6. Serial EKGs  - Replace all electrolytes. Maintain K+~4 and mag~2  - Obtain A1C, lipid panel fasting, TFTs, sed rate to ro comorbidities Pt was sent in from [facility] rehab due to new RUE/ RLE weakness and numbness and with diminished rt. sided radial and pedal pulses.  - US Duplex Arterial Upper Ext Ltd, Right shows Right radial artery appears occluded in the mid and distal forearm and at the level of the wrist and ulnar artery appears occluded throughout the  forearm and at the level of the wrist with question trickle flow in the radial artery and mid forearm and ulnar arteries in the mid and distal forearm; differential includes an embolic etiology.  Pt being follow by hematology and vascular    Telemonitor  - EKG is A-fib with LBBB HR 98 BPM  - HR controlled  - May use Metoprolol 5 mg IVP for sustained HR >120   - Continue home meds with strict parameters  - Pt on Coumadin.  Pt was started on full AC with Heparin. Monitor coag panel and daily CBC  - Trop x 1 negative. Trend CE. Trend trops x 3 q6. Serial EKGs  - Replace all electrolytes. Maintain K+~4 and mag~2  - Obtain A1C, lipid panel fasting, TFTs, sed rate to ro comorbidities

## 2023-03-09 NOTE — PATIENT PROFILE ADULT - FALL HARM RISK - HARM RISK INTERVENTIONS
Assistance with ambulation/Assistance OOB with selected safe patient handling equipment/Communicate Risk of Fall with Harm to all staff/Discuss with provider need for PT consult/Monitor for mental status changes/Monitor gait and stability/Move patient closer to nurses' station/Provide patient with walking aids - walker, cane, crutches/Reinforce activity limits and safety measures with patient and family/Reorient to person, place and time as needed/Tailored Fall Risk Interventions/Toileting schedule using arm’s reach rule for commode and bathroom/Use of alarms - bed, chair and/or voice tab/Visual Cue: Yellow wristband and red socks/Bed in lowest position, wheels locked, appropriate side rails in place/Call bell, personal items and telephone in reach/Instruct patient to call for assistance before getting out of bed or chair/Non-slip footwear when patient is out of bed/[place] to call system/Physically safe environment - no spills, clutter or unnecessary equipment/Purposeful Proactive Rounding/Room/bathroom lighting operational, light cord in reach

## 2023-03-09 NOTE — PROGRESS NOTE ADULT - SUBJECTIVE AND OBJECTIVE BOX
HPI/Overnight Events:  Patient seen and assessed this morning, patient is resting in bed comfortably, NAD noted. No acute events overnight.     HPI: 89 y/o male was sent in from [facility] rehab as per documentation was noted to have new RUE/ RLE weakness and numbness and with diminished rt. sided radial and pedal pulses. pt. has known infrarenal 6.1 cm AAA.        PAST MEDICAL HISTORY:  Hypertension  Hyperlipemia  PE (pulmonary embolism)  Moab filter in place  CAD (coronary artery disease)  Atrial fibrillation, unspecified type  CAD S/P percutaneous coronary angioplasty  History of COPD  Cerebrovascular accident (CVA)  Atheroma  Aortic stenosis  Factor V Leiden  Prostate CA        PAST SURGICAL HISTORY:  No significant past surgical history  S/P IVC filter  S/P cataract surgery  S/P CABG (coronary artery bypass graft)  Status post right knee replacement        MEDICATIONS:  acetaminophen     Tablet .. 650 milliGRAM(s) Oral every 6 hours PRN  albuterol/ipratropium for Nebulization 3 milliLiter(s) Nebulizer every 6 hours  aspirin enteric coated 81 milliGRAM(s) Oral daily  atorvastatin 40 milliGRAM(s) Oral at bedtime  budesonide 160 MICROgram(s)/formoterol 4.5 MICROgram(s) Inhaler 2 Puff(s) Inhalation two times a day  diltiazem Injectable 10 milliGRAM(s) IV Push every 6 hours PRN  furosemide    Tablet 40 milliGRAM(s) Oral daily  heparin   Injectable 9500 Unit(s) IV Push every 6 hours PRN  heparin   Injectable 4500 Unit(s) IV Push every 6 hours PRN  heparin  Infusion.  Unit(s)/Hr IV Continuous <Continuous>  metoprolol tartrate 50 milliGRAM(s) Oral every 12 hours  OLANZapine 10 milliGRAM(s) Oral at bedtime  oxybutynin XL 10 milliGRAM(s) Oral <User Schedule>  sertraline 50 milliGRAM(s) Oral daily  tamsulosin 0.4 milliGRAM(s) Oral at bedtime      ALLERGIES:  No Known Allergies  OHS (Unknown)        VASCULAR IMAGING:   CT:  < from: CT Angio Abdomen and Pelvis w/ IV Cont (23 @ 13:37) >  IMPRESSION:  Essentially no change from prior study. Aortic aneurysms as above    --- End of Report ---    ***Please see the addendum at the top of this report. It may contain   additional important information or changes.****        EZEKIEL MELCHOR MD; Attending Radiologist  This document has been electronically signed. Mar  8 2023  5:24PM  1st Addendum: EZEKIEL MELCHOR MD; Attending Radiologist  The first addendum was electronically signed on: Mar  8 2023  6:15PM.    < end of copied text >      VITALS & I/Os:  Vital Signs Last 24 Hrs  T(C): 36.6 (09 Mar 2023 06:00), Max: 36.6 (08 Mar 2023 12:32)  T(F): 97.8 (09 Mar 2023 06:00), Max: 97.9 (08 Mar 2023 16:04)  HR: 102 (09 Mar 2023 06:00) (81 - 150)  BP: 120/76 (09 Mar 2023 06:00) (105/75 - 141/95)  BP(mean): 99 (08 Mar 2023 19:48) (99 - 99)  RR: 18 (09 Mar 2023 06:00) (18 - 20)  SpO2: 95% (09 Mar 2023 06:00) (92% - 98%)    Parameters below as of 09 Mar 2023 06:00  Patient On (Oxygen Delivery Method): room air        I&O's Summary        PHYSICAL EXAM:  Constitutional: no acute distress  HEENT: EOMI, no active drainage or redness, no scleral icterus   Neck: Full ROM without pain  Respiratory: respirations are unlabored, no accessory muscle use, no conversational dyspnea  Cardiovascular: regular rate & rhythm  Gastrointestinal: Abdomen soft, non-tender, non-distended  Neurological: A&O x 3; no gross sensory / motor / coordination deficits  Musculoskeletal: NOE  Vascular: non palpable right radial or ulnar pulse.          LABS:                        11.6   5.56  )-----------( 207      ( 09 Mar 2023 06:06 )             38.9     03-08    137  |  101  |  25.6<H>  ----------------------------<  96  4.0   |  26.0  |  1.37<H>    Ca    9.1      08 Mar 2023 12:50    TPro  6.6  /  Alb  3.4  /  TBili  0.4  /  DBili  x   /  AST  18  /  ALT  20  /  AlkPhos  89  03-08    Lactate:    PT/INR - ( 08 Mar 2023 12:50 )   PT: 26.9 sec;   INR: 2.30 ratio         PTT - ( 09 Mar 2023 05:20 )  PTT:105.5 sec    CARDIAC MARKERS ( 08 Mar 2023 12:50 )  x     / 0.01 ng/mL / x     / x     / x            Urinalysis Basic - ( 08 Mar 2023 13:40 )    Color: Yellow / Appearance: Clear / S.015 / pH: x  Gluc: x / Ketone: Negative  / Bili: Negative / Urobili: Negative mg/dL   Blood: x / Protein: Negative / Nitrite: Negative   Leuk Esterase: Negative / RBC: x / WBC x   Sq Epi: x / Non Sq Epi: x / Bacteria: x

## 2023-03-09 NOTE — PROGRESS NOTE ADULT - ASSESSMENT
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 No LVO  No t-PA as INR 2.3, symptoms resolved  - Monitor closely, Q2 Neurocheck, VS  - ASA, Statin  - Discontinue Heparin; Coumadin resumed as INR therapeutic  - MRI pending  - TTE as noted above  - Neurology follow up  - PT, OT, SLP and PMR follow up    Right Radial Artery Occlusion  Patient without any ischemic signs or symptoms  INR therapeutic  Evaluated by Vascular Surgery - opine chronic occlusion without any ischemia  - Resume Coumadin. 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 No LVO  No t-PA as INR 2.3, symptoms resolved  - Monitor closely, Q2 Neurocheck, VS  - ASA, Statin  - Discontinue Heparin; Coumadin resumed as INR therapeutic  - MRI pending  - TTE as noted above  - Neurology follow up  - PT, OT, SLP and PMR follow up    Right Radial Artery Occlusion  Patient without any ischemic signs or symptoms  INR therapeutic  Evaluated by Vascular Surgery - opine chronic occlusion without any ischemia  - Resume Coumadin. Heparin discontinued    Paranoid Behaviour  - Sertraline  - Olanzapine at bedtime  - Delirium precautions  - Psych consult if no improvement    CHFrEF  PseudoAS  CAD S/P CABG  Chronic AF  Normoxic without any symptoms  Rate controlled  - I/O, daily weight, salt and fluid restriction  - Furosemide 40mg daily  - ASA, Statin, Metoprolol  - Coumadin    AAA  Peripheral mural Thrombus  - Control BP  - Coumadin  - No intervention as per prior hospitalization review    Hx PE, DVT, FVL  therapeutic INR  - Coumadin    COPD  GIGI  Normoxic  - Monitor SpO  - Budenoside/Formoterol  - DuoNebs PRN    BPH  - Tamsulosin  - Oxybutynin    VTE Prophylaxis  - Therapeutic INR  Mobilize  PT    Guarded Prognosis - at risk for decompensation, bleeding  At risk for readmission  DNR DNI    Palliative consultation

## 2023-03-09 NOTE — DISCHARGE NOTE NURSING/CASE MANAGEMENT/SOCIAL WORK - NSDCFUADDAPPT_GEN_ALL_CORE_FT
Patient has a prescheduled appt with Cardiology:  Appt w/ NP Soheila on 03/20/23 at 11:00 am. If you are unable to attend your pre-scheduled appointment, please contact the office directly at [phone] to reschedule. [address] [place] [address]  Patient's [facility] Cardiology appointment has been rescheduled with [facility] Cardiology with Nurse Practitioner Soheila on 4/10/23 at 4:30 PM   .   Address: [address] [place] [address]  If you are unable to attend your pre-scheduled appt, please contact the office directly at  [phone] to reschedule  STAR pt    Yellow STAR folder given to pt  No CHHA due to KESLEY  No need for Vivo meds to bed, no review needed    Patient's [facility] Cardiology appointment has been rescheduled with [facility] Cardiology with Nurse Practitioner Soheila on 4/10/23 at 4:30 PM   .   Address: [address] [place] [address]  If you are unable to attend your pre-scheduled appt, please contact the office directly at  [phone] to reschedule  STAR pt-will need follow up b4MI    Yellow STAR folder given to pt  No CHHA due to KELSEY  No need for Vivo meds to bed, no review needed    Patient's [facility] Cardiology appointment has been rescheduled with [facility] Cardiology with Nurse Practitioner Soheila on 4/10/23 at 4:30 PM   .   Address: [address] [place] [address]  If you are unable to attend your pre-scheduled appt, please contact the office directly at  [phone] to reschedule  STAR pt    Yellow STAR folder given to pt  No CHHA due to KELSEY  No need for Vivo meds to bed, no review needed    Patient's [facility] Cardiology appointment has been rescheduled with [facility] Cardiology with Nurse Practitioner Soheila on 4/10/23 at 4:30 PM   .   Address: [address] [place] [address]  If you are unable to attend your pre-scheduled appt, please contact the office directly at  [phone] to reschedule  STAR pt    Yellow STAR folder given to pt  No CHHA due to KELSEY  No need for Vivo meds to bed, no review needed    Patient's [place] [facility] appointment has been rescheduled with [facility] Cardiology with Nurse Practitioner Soheila on 4/10/23 at 4:30 PM   .   Address: [address] [place] [address]  If you are unable to attend your pre-scheduled appt, please contact the office directly at  [phone] to reschedule     Patient and patient's daughter, Jasmina ([phone]), are in agreement for patient to return to [facility] today at 4pm for subacute rehab. Babita, admissions coordinator for [facility] is aware of patient's follow up appointment with NP Soheila on 4/10/23 at 4:30pm- [address] [place] [address]

## 2023-03-09 NOTE — PROGRESS NOTE ADULT - ASSESSMENT
INCOMPLETE    Assessment and Recommendation:   ASSESSMENT: 88 yr old male with hypertension,  atrial fibrillation, PE/DVT s/p IVC filter, Factor V Leiden; on coumadin CAD s/p CABG, stroke , COPD, GIGI, AAA, infrarenal 6.1 cm  on prior ct presented with right sided numbness started at around 11am  3/8/23 at [facility] by ER report. CT head without acute infarction or hemorrhage. Excluded from [facility] as INR > 1.7 (currently 2.30).  CT angiogram without evidence of large vessel occlusion- not thrombectomy ca ndidate. Moderate left carotid stenosis.  Etiology of symptoms possibly TIA , rule out cerebral infarction and cervical disease.Would screen for infection/seizure as possible recrudescences of prior symptoms.     NEURO: Continue close monitoring for neurologic deterioration, permissive HTN overall < 160mmHg (anticoagulation use in setting of AAA), currently 110-140mmHg would avoid further hypotension and rapid fluctuations, titrate statin to LDL goal less than 70, MRI Brain w/o, can obtain carotid US to evaluate carotid stenosis if patient would be a candidate for intervention based on goals of care,  routine EEG, Physical therapy/OT/Speech eval/treatment.     ANTITHROMBOTIC THERAPY: ASA 81 mg, continue therapeutic anticoagulation with warfarin as per home regimen.     PULMONARY:   protecting airway, saturating well     CARDIOVASCULAR: check TTE, cardiac monitoring                             GASTROINTESTINAL:  dysphagia screen  complete/ pass  Diet: Puree, advance as tolerated    RENAL: BUN/Cr 19.9/1.09, maintain adequate hydration as tolerated , good urine output      Na Goal: Greater than 135    HEMATOLOGY: H/H 11.6/38.9, with mild anemia, monitor for bleeding, Platelets 207     DVT ppx: Heparin gtt with goal PTT at 40-57, therapeutically anticoagulated with Warfarin    ID: afebrile, no leukocytosis, screen for infection    DISPOSITION: Rehab, depending on PT eval once stable and workup is complete      CORE MEASURES:     Admission NIHSS: 4  Tenecteplase : [] YES [x] NO   LDL/HDL/A1C: p  Depression Screen:p   Statin Therapy: as noted   Dysphagia Screen: [] PASS [] FAIL - pending    Smoking [] YES [x] NO      Afib [x] YES [] NO     Stroke Education [x] YES [] NO       ASSESSMENT: 88 yr old male with hypertension,  atrial fibrillation, PE/DVT s/p IVC filter, Factor V Leiden; on coumadin CAD s/p CABG, stroke , COPD, GIGI, AAA, infrarenal 6.1 cm  on prior ct presented with right sided numbness started at around 11am  3/8/23 [facility] by ER report. CT head without acute infarction or hemorrhage. Excluded from [facility] as INR > 1.7 (currently 2.30).  CT angiogram without evidence of large vessel occlusion- not thrombectomy ca ndidate. Moderate left carotid stenosis.  Etiology of symptoms possibly TIA , rule out cerebral infarction and cervical disease.Would screen for infection/seizure as possible recrudescences of prior symptoms.     NEURO: Continue close monitoring for neurologic deterioration, permissive HTN overall < 160mmHg (anticoagulation use in setting of AAA), currently 110-140mmHg would avoid further hypotension and rapid fluctuations, titrate statin to LDL goal less than 70, MRI Brain w/o, can obtain carotid US to evaluate carotid stenosis if patient would be a candidate for intervention based on goals of care and workup as noted ,  routine EEG, Physical therapy/OT/Speech eval/treatment.     ANTITHROMBOTIC THERAPY: neurologically suggest to continue therapeutic anticoagulation with warfarin as per home regimen, confirm INR goal 2-3 with outpatient prescriber/heme/cardio.  If so, then patient is therapeutically anticoagulated monitor INR closely.      PULMONARY:   protecting airway, saturating well     CARDIOVASCULAR: check TTE, cardiac monitoring                             GASTROINTESTINAL:  dysphagia screen  complete/ pass  Diet: Puree, advance as tolerated    RENAL: BUN/Cr 19.9/1.09, maintain adequate hydration as tolerated , good urine output      Na Goal: Greater than 135    HEMATOLOGY: H/H 11.6/38.9, with mild anemia, monitor for bleeding, Platelets 207, hematology followup      DVT ppx:   therapeutically anticoagulated with Warfarin    ID: afebrile, no leukocytosis, screen for infection    DISPOSITION: Rehab, depending on PT eval once stable and workup is complete      CORE MEASURES:     Admission NIHSS: 4  Tenecteplase : [] YES [x] NO   LDL/HDL/A1C: 58/34/5.9  Depression Screen:p   Statin Therapy: as noted   Dysphagia Screen: [x] PASS [] FAIL -   Smoking [] YES [x] NO      Afib [x] YES [] NO     Stroke Education [x] YES [] NO

## 2023-03-09 NOTE — CONSULT NOTE ADULT - PROBLEM SELECTOR RECOMMENDATION 5
Lipid panel fasting  Continue Statins. Monitor LFTs  DASH diet    Further recommendations base on above findings Lipid panel fasting  Continue Statins. Monitor LFTs  DASH diet    Further recommendations base on above findings  Case discussed with Padilla

## 2023-03-09 NOTE — CONSULT NOTE ADULT - NS ATTEND AMEND GEN_ALL_CORE FT
Agree with PA's assessment and plan.  Pt is at his baseline now, denies any focal weakness/numbness/visual changes/speech or language abnormalities  - MRI brain pending  will follow up
-    88M hx HFrEF, severe AS (Dobutamin Stress Echo was done, not candidate for TAVR), infrarenal 6.1 cm AAA, AF, Factor V Leiden, PE (S/P IVC filter), s/p CABG x2 with LIMA-LAD and SVG-PDA with AV fibroelastoma resection in 2019, COPD, CVA, AS, and prostate CA who was sent in from [facility] rehab as per documentation was noted to have new RUE/ RLE weakness and numbness and with diminished R radial and R pedal pulses.     1) Atrial fibrillation:   Pt has Factor V Leiden deficiency,  pt was on coumadin with theurapethic lelev.   d/w the hospitalist to obtain hematology on the case to manage anticoagulation.  Currently on Iv heparin.     2) Occluded Right Radial Artery   US Duplex Arterial Upper Ext Ltd, Right shows Right radial artery appears occluded in the mid and distal forearm and at the level of the wrist and ulnar artery appears occluded throughout the  forearm and at the level of the wrist with question trickle flow in the radial artery and mid forearm and ulnar arteries in the mid and distal forearm; differential includes an embolic etiology.  Vascular signed off: "- No indication for any vascular surgical intervention, due to patient's presentation, occlusions are likely chronic   Continue with therapeutic anticoagulation, which is apart of the patient medical regimen"    3) Right Sided Weakness: Neurology evaluation is recommended.

## 2023-03-09 NOTE — PHYSICAL THERAPY INITIAL EVALUATION ADULT - ASSISTIVE DEVICE:SIT/SUPINE, REHAB EVAL
bed rails
pt was diagnosed with a dermoid ovarian cyst recently ,noticed a brownish discharge since yesterday with pelvic cramping ,not sure if it is her period ,feeling slightly nauseous also ,on BCP

## 2023-03-09 NOTE — SWALLOW BEDSIDE ASSESSMENT ADULT - ORAL PHASE
Oral swishing noted Within functional limits Decreased anterior-posterior movement of the bolus Oral swishing noted and posterior loss suspected

## 2023-03-09 NOTE — SWALLOW BEDSIDE ASSESSMENT ADULT - COMMENTS
As per MD note: "pt. is an 89 y/o male was sent in from [facility] rehab as per documentation was noted to have new RUE/ RLE weakness and numbness and with diminished rt. sided radial and pedal pulses. pt. stated that he went to bed fine and in the morning staff at the facility noted rt. sided weakness. pt. has known infrarenal 6.1 cm AAA. pt. thinks that he is ok and reports no sig. weakness of RUE/ RLE. no speech/ swallow difficulty. no facial droop, no vision change. no dizziness reported. no cp, no abd. pain, no n/v/d. no hematemesis, no hemoptysis, no rectal blood reported. pt. knows that he is in the hospital, knows current president name and current year. pt. passed bedside swallow eval per pt's nurse. pt's molst with dnr/dni reviewed, signed during his recent admission." Reduced orientation to bolus, impacted by cognition

## 2023-03-09 NOTE — SWALLOW BEDSIDE ASSESSMENT ADULT - SWALLOW EVAL: DIAGNOSIS
Overall evaluation impacted by reduced cognition. Mild oral dysphagia with easy to chew and regular solids as a delayed a-p transit was noted and all liquid trials as oral swishing was observed. Oral phase WFL for puree trials. Pharyngeal dysphagia suspected with soft and bite size, regular solids and thin liquids as decreased laryngeal elevation was noted and suspect posterior loss with thin liquids. Pharyngeal phase unremarkable for puree and mildly thick liquids as no s/s penetration/aspiration noted. Overall evaluation impacted by reduced cognition. Mild oral dysphagia observed with easy to chew and regular solids as reduced orientation to bolus, slowed mastication, and delayed a-p transit was noted. Oral swishing observed with liquid trials. Oral phase WFL for puree trials. Pharyngeal dysphagia suspected with soft and bite size, regular solids and thin liquids as decreased laryngeal elevation was noted. Suspect posterior loss with thin liquids and a immediate throat clear was noted post PO intake. Pharyngeal phase unremarkable for puree and mildly thick liquids as no s/s penetration/aspiration observed. Overall evaluation impacted by reduced cognition. Mild oral dysphagia observed with easy to chew and regular solids as reduced orientation to bolus, slowed mastication, and delayed a-p transit was noted. Oral swishing observed and posterior loss suspected with liquid trials. Oral phase WFL for puree and soft/bite trials. Pharyngeal dysphagia suspected as decreased laryngeal elevation was noted across all consistencies and trials. Immediate throat clear was noted post PO intake of thin liquids. Pharyngeal phase unremarkable for puree, soft/bite, and mildly thick liquids as no s/s penetration/aspiration observed.

## 2023-03-10 ENCOUNTER — TRANSCRIPTION ENCOUNTER (OUTPATIENT)
Age: 88
End: 2023-03-10

## 2023-03-10 DIAGNOSIS — I50.22 CHRONIC SYSTOLIC (CONGESTIVE) HEART FAILURE: ICD-10-CM

## 2023-03-10 LAB
ALBUMIN SERPL ELPH-MCNC: 3.4 G/DL — SIGNIFICANT CHANGE UP (ref 3.3–5.2)
ALP SERPL-CCNC: 88 U/L — SIGNIFICANT CHANGE UP (ref 40–120)
ALT FLD-CCNC: 14 U/L — SIGNIFICANT CHANGE UP
ANION GAP SERPL CALC-SCNC: 12 MMOL/L — SIGNIFICANT CHANGE UP (ref 5–17)
APTT BLD: 31.2 SEC — SIGNIFICANT CHANGE UP (ref 27.5–35.5)
AST SERPL-CCNC: 14 U/L — SIGNIFICANT CHANGE UP
BILIRUB SERPL-MCNC: 0.5 MG/DL — SIGNIFICANT CHANGE UP (ref 0.4–2)
BUN SERPL-MCNC: 20.6 MG/DL — HIGH (ref 8–20)
CALCIUM SERPL-MCNC: 9.2 MG/DL — SIGNIFICANT CHANGE UP (ref 8.4–10.5)
CHLORIDE SERPL-SCNC: 100 MMOL/L — SIGNIFICANT CHANGE UP (ref 96–108)
CO2 SERPL-SCNC: 25 MMOL/L — SIGNIFICANT CHANGE UP (ref 22–29)
CREAT SERPL-MCNC: 1.26 MG/DL — SIGNIFICANT CHANGE UP (ref 0.5–1.3)
EGFR: 55 ML/MIN/1.73M2 — LOW
GLUCOSE SERPL-MCNC: 117 MG/DL — HIGH (ref 70–99)
HCT VFR BLD CALC: 37.1 % — LOW (ref 39–50)
HGB BLD-MCNC: 11.3 G/DL — LOW (ref 13–17)
INR BLD: 1.94 RATIO — HIGH (ref 0.88–1.16)
MCHC RBC-ENTMCNC: 26.7 PG — LOW (ref 27–34)
MCHC RBC-ENTMCNC: 30.5 GM/DL — LOW (ref 32–36)
MCV RBC AUTO: 87.5 FL — SIGNIFICANT CHANGE UP (ref 80–100)
PLATELET # BLD AUTO: 220 K/UL — SIGNIFICANT CHANGE UP (ref 150–400)
POTASSIUM SERPL-MCNC: 3.9 MMOL/L — SIGNIFICANT CHANGE UP (ref 3.5–5.3)
POTASSIUM SERPL-SCNC: 3.9 MMOL/L — SIGNIFICANT CHANGE UP (ref 3.5–5.3)
PROT SERPL-MCNC: 6.5 G/DL — LOW (ref 6.6–8.7)
PROTHROM AB SERPL-ACNC: 22.6 SEC — HIGH (ref 10.5–13.4)
RBC # BLD: 4.24 M/UL — SIGNIFICANT CHANGE UP (ref 4.2–5.8)
RBC # FLD: 15.4 % — HIGH (ref 10.3–14.5)
SODIUM SERPL-SCNC: 137 MMOL/L — SIGNIFICANT CHANGE UP (ref 135–145)
WBC # BLD: 4.76 K/UL — SIGNIFICANT CHANGE UP (ref 3.8–10.5)
WBC # FLD AUTO: 4.76 K/UL — SIGNIFICANT CHANGE UP (ref 3.8–10.5)

## 2023-03-10 PROCEDURE — 99233 SBSQ HOSP IP/OBS HIGH 50: CPT

## 2023-03-10 PROCEDURE — 99232 SBSQ HOSP IP/OBS MODERATE 35: CPT

## 2023-03-10 PROCEDURE — 70551 MRI BRAIN STEM W/O DYE: CPT | Mod: 26

## 2023-03-10 PROCEDURE — 99291 CRITICAL CARE FIRST HOUR: CPT | Mod: FS

## 2023-03-10 RX ORDER — POLYETHYLENE GLYCOL 3350 17 G/17G
17 POWDER, FOR SOLUTION ORAL DAILY
Refills: 0 | Status: DISCONTINUED | OUTPATIENT
Start: 2023-03-10 | End: 2023-03-22

## 2023-03-10 RX ORDER — METOPROLOL TARTRATE 50 MG
50 TABLET ORAL EVERY 12 HOURS
Refills: 0 | Status: DISCONTINUED | OUTPATIENT
Start: 2023-03-10 | End: 2023-03-10

## 2023-03-10 RX ORDER — METOPROLOL TARTRATE 50 MG
5 TABLET ORAL ONCE
Refills: 0 | Status: COMPLETED | OUTPATIENT
Start: 2023-03-10 | End: 2023-03-10

## 2023-03-10 RX ORDER — LOSARTAN POTASSIUM 100 MG/1
25 TABLET, FILM COATED ORAL DAILY
Refills: 0 | Status: DISCONTINUED | OUTPATIENT
Start: 2023-03-10 | End: 2023-03-14

## 2023-03-10 RX ORDER — NYSTATIN CREAM 100000 [USP'U]/G
1 CREAM TOPICAL
Refills: 0 | Status: COMPLETED | OUTPATIENT
Start: 2023-03-10 | End: 2023-03-17

## 2023-03-10 RX ORDER — METOPROLOL TARTRATE 50 MG
50 TABLET ORAL EVERY 12 HOURS
Refills: 0 | Status: DISCONTINUED | OUTPATIENT
Start: 2023-03-10 | End: 2023-03-14

## 2023-03-10 RX ORDER — WARFARIN SODIUM 2.5 MG/1
3 TABLET ORAL ONCE
Refills: 0 | Status: COMPLETED | OUTPATIENT
Start: 2023-03-10 | End: 2023-03-10

## 2023-03-10 RX ADMIN — Medication 650 MILLIGRAM(S): at 18:36

## 2023-03-10 RX ADMIN — Medication 50 MILLIGRAM(S): at 17:56

## 2023-03-10 RX ADMIN — Medication 3 MILLILITER(S): at 09:17

## 2023-03-10 RX ADMIN — BUDESONIDE AND FORMOTEROL FUMARATE DIHYDRATE 2 PUFF(S): 160; 4.5 AEROSOL RESPIRATORY (INHALATION) at 09:17

## 2023-03-10 RX ADMIN — Medication 10 MILLIGRAM(S): at 08:18

## 2023-03-10 RX ADMIN — TAMSULOSIN HYDROCHLORIDE 0.4 MILLIGRAM(S): 0.4 CAPSULE ORAL at 21:19

## 2023-03-10 RX ADMIN — Medication 5 MILLIGRAM(S): at 00:25

## 2023-03-10 RX ADMIN — OLANZAPINE 10 MILLIGRAM(S): 15 TABLET, FILM COATED ORAL at 21:18

## 2023-03-10 RX ADMIN — NYSTATIN CREAM 1 APPLICATION(S): 100000 CREAM TOPICAL at 17:54

## 2023-03-10 RX ADMIN — WARFARIN SODIUM 3 MILLIGRAM(S): 2.5 TABLET ORAL at 21:19

## 2023-03-10 RX ADMIN — Medication 10 MILLIGRAM(S): at 18:52

## 2023-03-10 RX ADMIN — Medication 40 MILLIGRAM(S): at 05:27

## 2023-03-10 RX ADMIN — Medication 3 MILLILITER(S): at 21:03

## 2023-03-10 RX ADMIN — ATORVASTATIN CALCIUM 40 MILLIGRAM(S): 80 TABLET, FILM COATED ORAL at 21:19

## 2023-03-10 RX ADMIN — Medication 50 MILLIGRAM(S): at 10:18

## 2023-03-10 RX ADMIN — Medication 81 MILLIGRAM(S): at 11:52

## 2023-03-10 RX ADMIN — BUDESONIDE AND FORMOTEROL FUMARATE DIHYDRATE 2 PUFF(S): 160; 4.5 AEROSOL RESPIRATORY (INHALATION) at 21:04

## 2023-03-10 RX ADMIN — SERTRALINE 50 MILLIGRAM(S): 25 TABLET, FILM COATED ORAL at 11:52

## 2023-03-10 RX ADMIN — Medication 650 MILLIGRAM(S): at 17:54

## 2023-03-10 NOTE — PROGRESS NOTE ADULT - NS ATTEND AMEND GEN_ALL_CORE FT
89 y/o M with PMH AF, HFrEF (LVEF 25-30% 2/2023), AAA (declined vascular intervention), LFLG severe AS (s/p dobutamine stress echocardiogram which showed pseudo-AS with no contractile reserve, no indication for TAVR/intervention), CAD s/p CABG (LIMA-LAD, SVG-PDA), s/p AV fibroelastoma resection 2019, Factor V Leiden with prior DVT/PE s/p IVC filter, admitted with RRA occlusion.   -Evaluated by vascular surgery, occlusion is likely chronic, no surgical intervention planned   -AF uncontrolled; patient has not been receiving home doses of metoprolol due to hold parameters of SBP <140. D/w patient's RN, hold parameters adjusted and metoprolol will be administered today   -On AC with coumadin; per documentation, refused dose yesterday. INR 1.9 today   -Continue ASA, statin, lasix 40mg po daily   -Add ACE/ARB/ARNI, spironolactone, and farxiga for further GDMT. Add losartan 25mg as BP was borderline overnight and monitor.

## 2023-03-10 NOTE — PROGRESS NOTE ADULT - ASSESSMENT
89 y/o male with PMHx of HFrEF,  HTN, HLD, Factor V Leiden, PE (S/P IVC filter), A-fib, CAD/CABG x2 with LIMA-LAD and SVG-PDA with AV fibroelastoma resection in 2019, COPD, CVA, AS, and prostate CA who was sent in from [facility] rehab as per documentation was noted to have new RUE/ RLE weakness and numbness and with diminished rt. sided radial and pedal pulses. Now with uncontrolled afib

## 2023-03-10 NOTE — CHART NOTE - NSCHARTNOTEFT_GEN_A_CORE
Called Niece x2, number in chart, for MRI checklist completion. Left message, awaiting call back. Called Enrrique Navarro who is listed as emergency contact x2, number in chart, for MRI checklist completion; however no response. Left message. Upon reviewing nursing home paperwork, HCP form found and states that Patients Daughter Jasmina Tapia is the HCP and 1st to be called. She lives in [facility] but can be reached at number listed in chart, [phone]. Provides consent, checklist completed and faxed to MRI.

## 2023-03-10 NOTE — PROGRESS NOTE ADULT - SUBJECTIVE AND OBJECTIVE BOX
[facility]                                                         CARDIOLOGY AT [facility]                                                                  [address], [place] [address]                                                         Telephone: [phone]. Fax:[phone]                                                                             PROGRESS NOTE    Reason for follow up: afib RVR  Update: rates not controlled      Review of symptoms:   Cardiac:  No chest pain. No dyspnea. No palpitations.  Respiratory: no cough. No dyspnea  Gastrointestinal: No diarrhea. No abdominal pain. No bleeding.   Neuro: No focal neuro complaints.    Vitals:  T(C): 36.4 (03-10-23 @ 07:30), Max: 36.9 (03-09-23 @ 20:00)  HR: 101 (03-10-23 @ 09:19) (66 - 136)  BP: 117/86 (03-10-23 @ 08:00) (92/71 - 128/79)  RR: 15 (03-10-23 @ 09:19) (15 - 19)  SpO2: 99% (03-10-23 @ 09:19) (96% - 100%)  Wt(kg): --  I&O's Summary    09 Mar 2023 07:01  -  10 Mar 2023 07:00  --------------------------------------------------------  IN: 250 mL / OUT: 300 mL / NET: -50 mL      Weight (kg): 120 (03-08 @ 21:45)    PHYSICAL EXAM:  Appearance: Comfortable. No acute distress  HEENT:  Atraumatic. Normocephalic.  Normal oral mucosa  Neurologic: A & O x 3, no gross focal deficits.  Cardiovascular: irregular S1 S2, No murmur, no rubs/gallops. No JVD  Respiratory: Lungs clear to auscultation, unlabored   Gastrointestinal:  Soft, Non-tender, + BS  Lower Extremities: 2+ Peripheral Pulses, No clubbing, cyanosis, or edema  Psychiatry: Patient is calm. No agitation.   Skin: warm and dry.    CURRENT CARDIAC MEDICATIONS:  diltiazem Injectable 10 milliGRAM(s) IV Push every 6 hours PRN  furosemide    Tablet 40 milliGRAM(s) Oral daily  metoprolol tartrate 50 milliGRAM(s) Oral every 12 hours      CURRENT OTHER MEDICATIONS:  albuterol/ipratropium for Nebulization 3 milliLiter(s) Nebulizer every 6 hours  budesonide 160 MICROgram(s)/formoterol 4.5 MICROgram(s) Inhaler 2 Puff(s) Inhalation two times a day  acetaminophen     Tablet .. 650 milliGRAM(s) Oral every 6 hours PRN Mild Pain (1 - 3)  OLANZapine 10 milliGRAM(s) Oral at bedtime  sertraline 50 milliGRAM(s) Oral daily  atorvastatin 40 milliGRAM(s) Oral at bedtime  aspirin enteric coated 81 milliGRAM(s) Oral daily  oxybutynin XL 10 milliGRAM(s) Oral <User Schedule>  tamsulosin 0.4 milliGRAM(s) Oral at bedtime      LABS:	 	  ( 08 Mar 2023 12:50 )  Troponin T  0.01 ,  CPK  X    , CKMB  X    , BNP X                                  11.3   4.76  )-----------( 220      ( 10 Mar 2023 05:24 )             37.1     03-10    137  |  100  |  20.6<H>  ----------------------------<  117<H>  3.9   |  25.0  |  1.26    Ca    9.2      10 Mar 2023 05:24    TPro  6.5<L>  /  Alb  3.4  /  TBili  0.5  /  DBili  x   /  AST  14  /  ALT  14  /  AlkPhos  88  03-10    PT/INR/PTT ( 10 Mar 2023 05:24 )                       :                       :      22.6         :       31.2                  .        .                   .              .           .       1.94        .                                       Lipid Profile: Date: 03-09 @ 06:06  Total cholesterol 124; Direct LDL: --; HDL: 34; Triglycerides:159    HgA1c:   TSH:     TELEMETRY: afib not rate controlled      DIAGNOSTIC TESTING:  [ ] Echocardiogram:   < from: TTE Echo Complete w/ Contrast w/ Doppler (02.21.23 @ 11:34) >  PHYSICIAN INTERPRETATION:  Left Ventricle: Endocardial visualization was enhanced with intravenous   echo contrast. The left ventricular internal cavity size is normal.  Global LV systolic function was severely decreased. Left ventricular   ejection fraction, by visual estimation, is 25 to 30%. Abnormal   (paradoxical) septal motion consistent with post-operative status. The   mitral in-flow pattern reveals no discernable A-wave, therefore no   comment on diastolic function can be made.  Right Ventricle: Normal right ventricular size and function.  Left Atrium: Severely enlarged left atrium.  Right Atrium: Right atrial enlargement.  Pericardium: There is no evidence of pericardial effusion.  Mitral Valve: There is moderate mitral annular calcification.  Tricuspid Valve: Structurally normal tricuspid valve, with normal leaflet   excursion. Mild-moderate tricuspid regurgitation is visualized. Estimated   pulmonary artery systolic pressure is 41.7 mmHg assuming a right atrial   pressure of 3 mmHg, which is consistent with mild pulmonary hypertension.  Aortic Valve: Severe aortic stenosis is present. No evidence of aortic   valve regurgitation is seen.  Pulmonic Valve: Structurally normal pulmonic valve, with normal leaflet   excursion. Trace pulmonic valve regurgitation.  Aorta: There is dilatation of the aortic root, sinuses of Valsalva and   ascending aorta. There is moderate aortic root calcification.  Pulmonary Artery: The main pulmonary artery is normal in size.  Venous: The inferior vena cava is not well visualized.  In comparison to the previous echocardiogram(s): Prior examinations are   available and were reviewed for comparison purposes. Compared to the   outpatient TTE study from 9/2022 prevoiusly LV EF 37% and known low   gradient aortic valve stenosis, but visually on current study appears   severely stenotic.    < end of copied text >    [ ]  Catheterization:  < from: Cardiac Cath Lab - Adult (08.06.19 @ 15:20) >  CORONARY VESSELS: The coronary circulation is right dominant.  LM:   --  LM: The vessel was large sized and mildly calcified. Angiography  showed mild atherosclerosis with no flow limiting lesions.  LAD:   --  Proximal LAD: There was a tubular 50 % stenosis. The lesion was  moderately calcified.  --  Mid LAD: There was a tubular 80 % stenosis.  --  Distal LAD: This is a good target for bypass.  --  D1: The vessel was medium to large sized. The artery was supplied by  collaterals from the left system There was a stenosis at the ostium of the  vessel segment. This lesion is a chronic total occlusion.  CX:   --  Mid circumflex: Moderate to severe disease.  --  OM1: The vessel was large sized. Angiography showed mild  atherosclerosis with no flow limiting lesions.  RCA:   --  Proximal RCA: There was a stenosis. This lesion is a chronic  total occlusion.  --  RPDA: The vessel was medium sized. Angiography showed mild  atherosclerosis with no flow limiting lesions. The artery was supplied by  collaterals. This is a good target for bypass.    < end of copied text >

## 2023-03-10 NOTE — PROGRESS NOTE ADULT - SUBJECTIVE AND OBJECTIVE BOX
SANDI PALUMBOHI    [id number]    88y      Male    Patient is a 88y old  Male who presents with a chief complaint of right sided weakness / right radial artery occlusion. (10 Mar 2023 09:43)      INTERVAL HPI/OVERNIGHT EVENTS:    Patient is feeling ok, no worsening of the right sided weakness, denies fever, chills, chest pain, sob     REVIEW OF SYSTEMS:    CONSTITUTIONAL: No fever, fatigue  RESPIRATORY: No cough, No shortness of breath  CARDIOVASCULAR: No chest pain, palpitations  GASTROINTESTINAL: No abdominal, No nausea, vomiting  NEUROLOGICAL: No headaches,  right sided weakness, not worsening  MISCELLANEOUS: No joint swelling or pain       Vital Signs Last 24 Hrs  T(C): 36.7 (10 Mar 2023 12:00), Max: 36.9 (09 Mar 2023 20:00)  T(F): 98.1 (10 Mar 2023 12:00), Max: 98.4 (09 Mar 2023 20:00)  HR: 113 (10 Mar 2023 12:00) (66 - 136)  BP: 112/71 (10 Mar 2023 12:00) (92/71 - 128/79)  BP(mean): 81 (10 Mar 2023 12:00) (70 - 93)  RR: 16 (10 Mar 2023 12:00) (15 - 19)  SpO2: 92% (10 Mar 2023 12:00) (92% - 100%)    Parameters below as of 10 Mar 2023 12:00  Patient On (Oxygen Delivery Method): nasal cannula  O2 Flow (L/min): 2      PHYSICAL EXAM:    GENERAL: Elderly Obese male looking comfortable    HEENT: PERRL, +EOMI  NECK: soft, Supple, No JVD,   CHEST/LUNG: Decrease air entry bilaterally; No wheezing  HEART: S1S2+, Regular rate and rhythm; No murmurs  ABDOMEN: Soft, Nontender, Nondistended; Bowel sounds present  EXTREMITIES:  1+ Peripheral Pulses, No edema  SKIN: No rashes or lesions  NEURO: AAOX3, right sided weakness with 4/5 power, no facial droop   PSYCH: normal mood      LABS:                        11.3   4.76  )-----------( 220      ( 10 Mar 2023 05:24 )             37.1     03-10    137  |  100  |  20.6<H>  ----------------------------<  117<H>  3.9   |  25.0  |  1.26    Ca    9.2      10 Mar 2023 05:24    TPro  6.5<L>  /  Alb  3.4  /  TBili  0.5  /  DBili  x   /  AST  14  /  ALT  14  /  AlkPhos  88  03-10    PT/INR - ( 10 Mar 2023 05:24 )   PT: 22.6 sec;   INR: 1.94 ratio         PTT - ( 10 Mar 2023 05:24 )  PTT:31.2 sec        I&O's Summary    09 Mar 2023 07:01  -  10 Mar 2023 07:00  --------------------------------------------------------  IN: 250 mL / OUT: 300 mL / NET: -50 mL    10 Mar 2023 07:01  -  10 Mar 2023 15:38  --------------------------------------------------------  IN: 0 mL / OUT: 500 mL / NET: -500 mL        MEDICATIONS  (STANDING):  albuterol/ipratropium for Nebulization 3 milliLiter(s) Nebulizer every 6 hours  aspirin enteric coated 81 milliGRAM(s) Oral daily  atorvastatin 40 milliGRAM(s) Oral at bedtime  budesonide 160 MICROgram(s)/formoterol 4.5 MICROgram(s) Inhaler 2 Puff(s) Inhalation two times a day  furosemide    Tablet 40 milliGRAM(s) Oral daily  losartan 25 milliGRAM(s) Oral daily  metoprolol tartrate 50 milliGRAM(s) Oral every 12 hours  nystatin Powder 1 Application(s) Topical two times a day  OLANZapine 10 milliGRAM(s) Oral at bedtime  oxybutynin XL 10 milliGRAM(s) Oral <User Schedule>  sertraline 50 milliGRAM(s) Oral daily  tamsulosin 0.4 milliGRAM(s) Oral at bedtime    MEDICATIONS  (PRN):  acetaminophen     Tablet .. 650 milliGRAM(s) Oral every 6 hours PRN Mild Pain (1 - 3)

## 2023-03-10 NOTE — CHART NOTE - NSCHARTNOTEFT_GEN_A_CORE
Medicine PA-  Cd initially @ 2035 for pt in rapid afib 130's- 140, asymptomatic, VSS but BP has been soft, 110/76. Given NS 250cc bolus and cardizem 5mg IVP which helped for a short period but then he needed cardizem 10mg IVP and lopressor 5mg for 130's HR. Pt has been asymptomatic, denies CP and SOB. He was annoyed about taking coumadin tonight and refused to take it despite discussion. Will continue to monitor closely.    Vital Signs Last 24 Hrs  T(C): 36.8 (10 Mar 2023 00:00), Max: 36.9 (09 Mar 2023 20:00)  T(F): 98.2 (10 Mar 2023 00:00), Max: 98.4 (09 Mar 2023 20:00)  HR: 98 (10 Mar 2023 04:00) (60 - 132)  BP: 112/78 (10 Mar 2023 04:00) (92/71 - 131/79)  BP(mean): 85 (10 Mar 2023 04:00) (70 - 92)  RR: 15 (10 Mar 2023 04:00) (15 - 19)  SpO2: 96% (10 Mar 2023 04:00) (95% - 100%)    Parameters below as of 09 Mar 2023 20:00  Patient On (Oxygen Delivery Method): nasal cannula

## 2023-03-10 NOTE — PROGRESS NOTE ADULT - SUBJECTIVE AND OBJECTIVE BOX
Patient feels well.  Right hand and leg moving better.    FUNCTIONAL PROGRESS  3/9 SLP  Speech Language Pathology Recommendations: 1. Soft/bite sized/mildly thick liquids2. Oral care3. Aspiration precautions4. HOB upright with PO intake5. Crush meds in apple sauce6. Small bites/sips7. Assistance with PO intake8. Speech/language evaluation9. SLP to follow as schedule permits     3/9 PT  Bed Mobility Analysis:     · Bed Mobility Limitations	decreased ability to use legs for bridging/pushing; decreased ability to use arms for pushing/pulling  · Impairments Contributing to Impaired Bed Mobility	decreased strength; cognition; impaired balance    Transfer: Sit to Stand:     · Level of [place]	maximum assist (25% patients effort)  · Physical Assist/Nonphysical Assist	2 person assist; nonverbal cues (demo/gestures); verbal cues  · Weight-Bearing Restrictions	full weight-bearing  · Assistive Device	rolling walker    Transfer: Stand to Sit:     · Level of [place]	maximum assist (25% patients effort)  · Physical Assist/Nonphysical Assist	2 person assist; nonverbal cues (demo/gestures); verbal cues  · Weight-Bearing Restrictions	full weight-bearing  · Assistive Device	rolling walker    Sit/Stand Transfer Safety Analysis:     · Transfer Safety Concerns Noted	losing balance; decreased weight-shifting ability; decreased step length  · Impairments Contributing to Impaired Transfers	decreased strength; impaired balance; cognition    Gait Skills:     · Level of [place]	unable to perform; Pt limited by pain, spO2 @ 82%, pt returned to semifowler position with 2L O2.    Stair Negotiation:     · Level of [place]	unable to perform    3/9 OT  Bathing Training:     · Level of [place]	maximum assist (25% patients effort)    Upper Body Dressing Training:     · Level of [place]	contact guard    Lower Body Dressing Training:     · Level of [place]	maximum assist (25% patients effort)    Toilet Hygiene Training:     · Level of [place]	moderate assist (50% patients effort)    Grooming Training:     · Level of [place]	stand-by assist    Eating/Self-Feeding Training:     · Level of [place]	independent    VITALS  T(C): 36.4 (03-10-23 @ 07:30), Max: 36.9 (23 @ 20:00)  HR: 103 (03-10-23 @ 06:00) (66 - 132)  BP: 107/84 (03-10-23 @ 06:00) (92/71 - 128/79)  RR: 17 (03-10-23 @ 06:00) (15 - 19)  SpO2: 97% (03-10-23 @ 06:00) (96% - 100%)  Wt(kg): --    MEDICATIONS   acetaminophen     Tablet .. 650 milliGRAM(s) every 6 hours PRN  albuterol/ipratropium for Nebulization 3 milliLiter(s) every 6 hours  aspirin enteric coated 81 milliGRAM(s) daily  atorvastatin 40 milliGRAM(s) at bedtime  budesonide 160 MICROgram(s)/formoterol 4.5 MICROgram(s) Inhaler 2 Puff(s) two times a day  diltiazem Injectable 10 milliGRAM(s) every 6 hours PRN  furosemide    Tablet 40 milliGRAM(s) daily  metoprolol tartrate 50 milliGRAM(s) every 12 hours  OLANZapine 10 milliGRAM(s) at bedtime  oxybutynin XL 10 milliGRAM(s) <User Schedule>  sertraline 50 milliGRAM(s) daily  tamsulosin 0.4 milliGRAM(s) at bedtime      RECENT LABS/IMAGING  - Reviewed                        11.3   4.76  )-----------( 220      ( 10 Mar 2023 05:24 )             37.1     03-10    137  |  100  |  20.6<H>  ----------------------------<  117<H>  3.9   |  25.0  |  1.26    Ca    9.2      10 Mar 2023 05:24    TPro  6.5<L>  /  Alb  3.4  /  TBili  0.5  /  DBili  x   /  AST  14  /  ALT  14  /  AlkPhos  88  03-10    PT/INR - ( 10 Mar 2023 05:24 )   PT: 22.6 sec;   INR: 1.94 ratio         PTT - ( 10 Mar 2023 05:24 )  PTT:31.2 sec  Urinalysis Basic - ( 08 Mar 2023 13:40 )    Color: Yellow / Appearance: Clear / S.015 / pH: x  Gluc: x / Ketone: Negative  / Bili: Negative / Urobili: Negative mg/dL   Blood: x / Protein: Negative / Nitrite: Negative   Leuk Esterase: Negative / RBC: x / WBC x   Sq Epi: x / Non Sq Epi: x / Bacteria: x          RADIAL A DOPPLER - Right radial artery appears occluded in the mid and distal forearm and at the level of the wrist and ulnar artery appears occluded throughout the forearm and at the level of the wrist with question trickle flow in the radial artery and mid forearm and ulnar arteries in the mid and distal forearm; differential includes an embolic etiology.    HEAD CT - Moderate chronic microvascular changes without evidence of   an acute transcortical infarction or hemorrhage    ----------------------------------------------------------------------------------------  PHYSICAL EXAM  Constitutional - NAD, Comfortable  Extremities - BLE swelling  Neurologic Exam -                    Cognitive - AAOx self, situation     Motor - 4+/5 strength throughout      Sensory - Intact to LT  Psychiatric - Mood stable, Affect WNL  ----------------------------------------------------------------------------------------  ASSESSMENT/PLAN  88yMale with functional deficits after concern for CVA found to have a right radial artery occlusion  CAD/Right UE weakness - ASA, Lipitor   AFIB - Cardizem  CHF - Lasix  HTN - Lopressor  Sleep - Zyprexa  Pain - Tylenol  DVT PPX - SCDs, heparin  Rehab/Impaired mobility - At this time, recommend KELSEY, patient DOES NOT meet acute inpatient rehabilitation criteria. Patient needs a more prolonged stay to achieve transition to community living and would not be able to tolerate a comprehensive/intense rehab program of 3hours/day.     Will sign off at this time. Thank you for allowing me to be part of your patient's care. Please reconsult PMR for additional rehab recommendations or dispo needs if functional status changes. Discussed management with rehab clinical care team/rehab liaison.

## 2023-03-10 NOTE — PROGRESS NOTE ADULT - ASSESSMENT
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 No LVO  No t-PA as INR 2.3, symptoms resolved  - Monitor closely, Q2 Neurocheck, VS  - ASA, Statin  - Discontinue Heparin; Coumadin resumed as INR therapeutic  - MRI pending  - TTE as noted above  - Neurology follow up  - PT, OT, SLP and PMR follow up.     Right Radial Artery Occlusion:   Patient without any ischemic signs or symptoms  INR therapeutic  Evaluated by Vascular Surgery - opine chronic occlusion without any ischemia  - Resumed Coumadin. Heparin discontinued  Last nigth refused dose of coumadin, will give him tonight, will monitor INR     Paranoid Behaviour  - Sertraline  - Olanzapine at bedtime  - Delirium precautions  - Psych consult if no improvement    CHFrEF  PseudoAS  CAD S/P CABG  Chronic AF  Normoxic without any symptoms  Rate controlled  - I/O, daily weight, salt and fluid restriction  - Furosemide 40mg daily  - ASA, Statin, Metoprolol  - Coumadin  - He is on O2, will wean off   encouraged to use IS     AAA  Peripheral mural Thrombus  - Control BP  - Coumadin  - No intervention as per prior hospitalization review      Hx PE, DVT, FVL  therapeutic INR  - Coumadin    COPD  GIGI  Normoxic  - Monitor SpO  - Budenoside/Formoterol  - DuoNebs PRN    BPH  - Tamsulosin  - Oxybutynin    VTE Prophylaxis  - Therapeutic INR  Mobilize  PT    Guarded Prognosis - at risk for decompensation, bleeding  At risk for readmission  DNR DNI    Palliative consultation    PT and OT josué

## 2023-03-11 LAB
ALBUMIN SERPL ELPH-MCNC: 3.3 G/DL — SIGNIFICANT CHANGE UP (ref 3.3–5.2)
ALP SERPL-CCNC: 86 U/L — SIGNIFICANT CHANGE UP (ref 40–120)
ALT FLD-CCNC: 12 U/L — SIGNIFICANT CHANGE UP
ANION GAP SERPL CALC-SCNC: 9 MMOL/L — SIGNIFICANT CHANGE UP (ref 5–17)
AST SERPL-CCNC: 14 U/L — SIGNIFICANT CHANGE UP
BILIRUB SERPL-MCNC: 0.5 MG/DL — SIGNIFICANT CHANGE UP (ref 0.4–2)
BUN SERPL-MCNC: 18.7 MG/DL — SIGNIFICANT CHANGE UP (ref 8–20)
CALCIUM SERPL-MCNC: 9.3 MG/DL — SIGNIFICANT CHANGE UP (ref 8.4–10.5)
CHLORIDE SERPL-SCNC: 103 MMOL/L — SIGNIFICANT CHANGE UP (ref 96–108)
CO2 SERPL-SCNC: 28 MMOL/L — SIGNIFICANT CHANGE UP (ref 22–29)
CREAT SERPL-MCNC: 1.18 MG/DL — SIGNIFICANT CHANGE UP (ref 0.5–1.3)
EGFR: 59 ML/MIN/1.73M2 — LOW
GLUCOSE SERPL-MCNC: 101 MG/DL — HIGH (ref 70–99)
HCT VFR BLD CALC: 36.9 % — LOW (ref 39–50)
HGB BLD-MCNC: 11.2 G/DL — LOW (ref 13–17)
INR BLD: 1.81 RATIO — HIGH (ref 0.88–1.16)
MCHC RBC-ENTMCNC: 27.1 PG — SIGNIFICANT CHANGE UP (ref 27–34)
MCHC RBC-ENTMCNC: 30.4 GM/DL — LOW (ref 32–36)
MCV RBC AUTO: 89.1 FL — SIGNIFICANT CHANGE UP (ref 80–100)
PLATELET # BLD AUTO: 196 K/UL — SIGNIFICANT CHANGE UP (ref 150–400)
POTASSIUM SERPL-MCNC: 4 MMOL/L — SIGNIFICANT CHANGE UP (ref 3.5–5.3)
POTASSIUM SERPL-SCNC: 4 MMOL/L — SIGNIFICANT CHANGE UP (ref 3.5–5.3)
PROT SERPL-MCNC: 6.2 G/DL — LOW (ref 6.6–8.7)
PROTHROM AB SERPL-ACNC: 21.1 SEC — HIGH (ref 10.5–13.4)
RBC # BLD: 4.14 M/UL — LOW (ref 4.2–5.8)
RBC # FLD: 15.4 % — HIGH (ref 10.3–14.5)
SODIUM SERPL-SCNC: 140 MMOL/L — SIGNIFICANT CHANGE UP (ref 135–145)
WBC # BLD: 5.14 K/UL — SIGNIFICANT CHANGE UP (ref 3.8–10.5)
WBC # FLD AUTO: 5.14 K/UL — SIGNIFICANT CHANGE UP (ref 3.8–10.5)

## 2023-03-11 PROCEDURE — 99232 SBSQ HOSP IP/OBS MODERATE 35: CPT

## 2023-03-11 PROCEDURE — 99233 SBSQ HOSP IP/OBS HIGH 50: CPT

## 2023-03-11 RX ORDER — WARFARIN SODIUM 2.5 MG/1
3 TABLET ORAL ONCE
Refills: 0 | Status: COMPLETED | OUTPATIENT
Start: 2023-03-11 | End: 2023-03-11

## 2023-03-11 RX ADMIN — Medication 650 MILLIGRAM(S): at 21:37

## 2023-03-11 RX ADMIN — Medication 650 MILLIGRAM(S): at 11:56

## 2023-03-11 RX ADMIN — TAMSULOSIN HYDROCHLORIDE 0.4 MILLIGRAM(S): 0.4 CAPSULE ORAL at 21:33

## 2023-03-11 RX ADMIN — ATORVASTATIN CALCIUM 40 MILLIGRAM(S): 80 TABLET, FILM COATED ORAL at 21:33

## 2023-03-11 RX ADMIN — Medication 3 MILLILITER(S): at 08:22

## 2023-03-11 RX ADMIN — Medication 650 MILLIGRAM(S): at 22:37

## 2023-03-11 RX ADMIN — SERTRALINE 50 MILLIGRAM(S): 25 TABLET, FILM COATED ORAL at 11:51

## 2023-03-11 RX ADMIN — NYSTATIN CREAM 1 APPLICATION(S): 100000 CREAM TOPICAL at 17:14

## 2023-03-11 RX ADMIN — OLANZAPINE 10 MILLIGRAM(S): 15 TABLET, FILM COATED ORAL at 21:33

## 2023-03-11 RX ADMIN — Medication 650 MILLIGRAM(S): at 12:26

## 2023-03-11 RX ADMIN — WARFARIN SODIUM 3 MILLIGRAM(S): 2.5 TABLET ORAL at 21:33

## 2023-03-11 RX ADMIN — Medication 40 MILLIGRAM(S): at 05:42

## 2023-03-11 RX ADMIN — NYSTATIN CREAM 1 APPLICATION(S): 100000 CREAM TOPICAL at 05:43

## 2023-03-11 RX ADMIN — Medication 10 MILLIGRAM(S): at 21:33

## 2023-03-11 RX ADMIN — Medication 50 MILLIGRAM(S): at 17:14

## 2023-03-11 RX ADMIN — POLYETHYLENE GLYCOL 3350 17 GRAM(S): 17 POWDER, FOR SOLUTION ORAL at 11:53

## 2023-03-11 RX ADMIN — LOSARTAN POTASSIUM 25 MILLIGRAM(S): 100 TABLET, FILM COATED ORAL at 11:53

## 2023-03-11 RX ADMIN — Medication 50 MILLIGRAM(S): at 05:41

## 2023-03-11 RX ADMIN — BUDESONIDE AND FORMOTEROL FUMARATE DIHYDRATE 2 PUFF(S): 160; 4.5 AEROSOL RESPIRATORY (INHALATION) at 20:50

## 2023-03-11 RX ADMIN — Medication 3 MILLILITER(S): at 15:33

## 2023-03-11 RX ADMIN — Medication 3 MILLILITER(S): at 20:50

## 2023-03-11 RX ADMIN — BUDESONIDE AND FORMOTEROL FUMARATE DIHYDRATE 2 PUFF(S): 160; 4.5 AEROSOL RESPIRATORY (INHALATION) at 08:23

## 2023-03-11 RX ADMIN — Medication 81 MILLIGRAM(S): at 11:51

## 2023-03-11 NOTE — PROGRESS NOTE ADULT - SUBJECTIVE AND OBJECTIVE BOX
88 yr old male with hypertension,  atrial fibrillation, PE/DVT s/p IVC filter, Factor V Leiden on coumadin CAD s/p CABG, stroke COPD, GIGI, AAA, infrarenal 6.1 cm  on prior ct presented with right sided weakness started at 11am  3/8/23 [facility] by [facility] report. Upon review patient notes  numbness  to right hand,  arm and leg denied slurred speech, changes in vision, weakness, vision. Patient on coumadin thinks that took it day of presentation.  Noted symptoms  were improving on ER eval, ER  NIH 0- INR 2.30, excluded from Tenecteplase . It was noted also thinks that had an old stroke with some residual numbness/weakness on the same side in the past - notes sometimes gets worse . Patient states at this time he feels back to normal.               Vital Signs Last 24 Hrs  T(C): 36.3 (11 Mar 2023 18:00), Max: 36.8 (11 Mar 2023 16:18)  T(F): 97.3 (11 Mar 2023 18:00), Max: 98.2 (11 Mar 2023 16:18)  HR: 87 (11 Mar 2023 18:00) (75 - 108)  BP: 97/62 (11 Mar 2023 18:00) (97/62 - 119/66)  BP(mean): 74 (11 Mar 2023 16:00) (69 - 92)  RR: 22 (11 Mar 2023 18:00) (18 - 22)  SpO2: 97% (11 Mar 2023 18:00) (95% - 100%)    Parameters below as of 11 Mar 2023 18:00  Patient On (Oxygen Delivery Method): room air        MEDICATIONS    acetaminophen     Tablet .. 650 milliGRAM(s) Oral every 6 hours PRN  albuterol/ipratropium for Nebulization 3 milliLiter(s) Nebulizer every 6 hours  aspirin enteric coated 81 milliGRAM(s) Oral daily  atorvastatin 40 milliGRAM(s) Oral at bedtime  bisacodyl Suppository 10 milliGRAM(s) Rectal daily PRN  budesonide 160 MICROgram(s)/formoterol 4.5 MICROgram(s) Inhaler 2 Puff(s) Inhalation two times a day  furosemide    Tablet 40 milliGRAM(s) Oral daily  losartan 25 milliGRAM(s) Oral daily  metoprolol tartrate 50 milliGRAM(s) Oral every 12 hours  nystatin Powder 1 Application(s) Topical two times a day  OLANZapine 10 milliGRAM(s) Oral at bedtime  oxybutynin XL 10 milliGRAM(s) Oral <User Schedule>  polyethylene glycol 3350 17 Gram(s) Oral daily  sertraline 50 milliGRAM(s) Oral daily  tamsulosin 0.4 milliGRAM(s) Oral at bedtime  warfarin 3 milliGRAM(s) Oral once         LABS:  CBC Full  -  ( 11 Mar 2023 06:35 )  WBC Count : 5.14 K/uL  RBC Count : 4.14 M/uL  Hemoglobin : 11.2 g/dL  Hematocrit : 36.9 %  Platelet Count - Automated : 196 K/uL  Mean Cell Volume : 89.1 fl  Mean Cell Hemoglobin : 27.1 pg  Mean Cell Hemoglobin Concentration : 30.4 gm/dL  Auto Neutrophil # : x  Auto Lymphocyte # : x  Auto Monocyte # : x  Auto Eosinophil # : x  Auto Basophil # : x  Auto Neutrophil % : x  Auto Lymphocyte % : x  Auto Monocyte % : x  Auto Eosinophil % : x  Auto Basophil % : x      03-11    140  |  103  |  18.7  ----------------------------<  101<H>  4.0   |  28.0  |  1.18    Ca    9.3      11 Mar 2023 06:35    TPro  6.2<L>  /  Alb  3.3  /  TBili  0.5  /  DBili  x   /  AST  14  /  ALT  12  /  AlkPhos  86  03-11    LIVER FUNCTIONS - ( 11 Mar 2023 06:35 )  Alb: 3.3 g/dL / Pro: 6.2 g/dL / ALK PHOS: 86 U/L / ALT: 12 U/L / AST: 14 U/L / GGT: x           Hemoglobin A1C:       PT/INR - ( 11 Mar 2023 06:35 )   PT: 21.1 sec;   INR: 1.81 ratio         PTT - ( 10 Mar 2023 05:24 )  PTT:31.2 sec    NEUROLOGICAL EXAM:  Mental status: The patient is awake and alert and has normal attention span.  The patient is oriented to self, place, month, year, disoriented to date , perseverates to 24 .   The patient is able to name objects, follow commands     Cranial nerves: Pupils equal and react symmetrically to light. There is no visual field deficit to confrontation. Extraocular motion is full with no nystagmus. There is no ptosis. Facial sensation is intact. Slight right facial palsy that corrects on smile,    Tongue is midline.    Motor: There is normal bulk and tone.  There is no tremor.  Strength is 5/5 in the right arm and leg. Right hand subtle  drift   Strength is 5/5 in the left arm and leg.    gait not assessed, no ataxia appreciated     LABS:                        11.6   5.56  )-----------( 207      ( 09 Mar 2023 06:06 )             38.9    03-09    138  |  101  |  19.9  ----------------------------<  106<H>  3.8   |  26.0  |  1.09    Ca    8.9      09 Mar 2023 06:06    TPro  6.6  /  Alb  3.4  /  TBili  0.4  /  DBili  x   /  AST  18  /  ALT  20  /  AlkPhos  89  03-08  PT/INR - ( 08 Mar 2023 12:50 )   PT: 26.9 sec;   INR: 2.30 ratio         PTT - ( 09 Mar 2023 05:20 )  PTT:105.5 sec      IMAGING: Reviewed by me.     CT Angio Head Neck Stroke Protocol w/ IV Cont (03.08.23 @ 13:22)   7 mL area of increased Tmax in the right parietal lobe suggesting brain   at risk versus artifact. No core infarct. No large vessel occlusion. 50%   stenosis in left carotid bulb/proximal internal carotid artery. Consider   MRI of the brain as clinically warranted. Additional findings above.    CT Brain Stroke Protocol (03.08.23 @ 12:40)    Moderate chronic microvascular changes without evidence of   an acute transcortical infarction or hemorrhage.       MR Head No Cont (03.10.23 @ 15:46) >    IMPRESSION:    1. Left medial posterior frontal cortical and subcortical acute infarction    2. Left lateral posterior frontal cortical and subcortical remote   infarction ; additional tiny left anterior frontal cortical remote   infarctions    3. Left thalamic and left callosal body remote deep infarctions    4. Ischemic white matter disease greater than typical for age    5. Diffuse brain volume loss typical for age    LEONARD JONAS MD; Attending Radiologist

## 2023-03-11 NOTE — PROGRESS NOTE ADULT - SUBJECTIVE AND OBJECTIVE BOX
[facility] Division of Hospital Medicine    Chief Complaint:  R sided weakness     SUBJECTIVE / OVERNIGHT EVENTS:  Pt reports he feels well   Has no complaints today     Patient denies chest pain, SOB, abd pain, N/V, fever, chills, dysuria or any other complaints. All remainder ROS negative.     MEDICATIONS  (STANDING):  albuterol/ipratropium for Nebulization 3 milliLiter(s) Nebulizer every 6 hours  aspirin enteric coated 81 milliGRAM(s) Oral daily  atorvastatin 40 milliGRAM(s) Oral at bedtime  budesonide 160 MICROgram(s)/formoterol 4.5 MICROgram(s) Inhaler 2 Puff(s) Inhalation two times a day  furosemide    Tablet 40 milliGRAM(s) Oral daily  losartan 25 milliGRAM(s) Oral daily  metoprolol tartrate 50 milliGRAM(s) Oral every 12 hours  nystatin Powder 1 Application(s) Topical two times a day  OLANZapine 10 milliGRAM(s) Oral at bedtime  oxybutynin XL 10 milliGRAM(s) Oral <User Schedule>  polyethylene glycol 3350 17 Gram(s) Oral daily  sertraline 50 milliGRAM(s) Oral daily  tamsulosin 0.4 milliGRAM(s) Oral at bedtime  warfarin 3 milliGRAM(s) Oral once    MEDICATIONS  (PRN):  acetaminophen     Tablet .. 650 milliGRAM(s) Oral every 6 hours PRN Mild Pain (1 - 3)  bisacodyl Suppository 10 milliGRAM(s) Rectal daily PRN Constipation        I&O's Summary    10 Mar 2023 07:01  -  11 Mar 2023 07:00  --------------------------------------------------------  IN: 0 mL / OUT: 800 mL / NET: -800 mL    11 Mar 2023 06:01  -  11 Mar 2023 11:08  --------------------------------------------------------  IN: 0 mL / OUT: 200 mL / NET: -200 mL        PHYSICAL EXAM:  Vital Signs Last 24 Hrs  T(C): 36.7 (11 Mar 2023 10:00), Max: 36.7 (10 Mar 2023 12:00)  T(F): 98 (11 Mar 2023 10:00), Max: 98.1 (10 Mar 2023 12:00)  HR: 78 (11 Mar 2023 10:00) (75 - 113)  BP: 114/67 (11 Mar 2023 10:00) (94/56 - 115/79)  BP(mean): 80 (11 Mar 2023 10:00) (66 - 84)  RR: 22 (11 Mar 2023 10:00) (16 - 22)  SpO2: 95% (11 Mar 2023 10:00) (92% - 100%)    Parameters below as of 11 Mar 2023 10:00  Patient On (Oxygen Delivery Method): room air          CONSTITUTIONAL: NAD, sitting up in bed  ENMT: Moist oral mucosa, EOMI   RESPIRATORY: Normal respiratory effort; lungs with reduced BS bilaterally  CARDIOVASCULAR: Regular rate and rhythm, normal S1 and S2   ABDOMEN: Nontender to palpation, normoactive bowel sounds  MUSCULOSKELETAL:  No clubbing or cyanosis of digits   PSYCH: A+O to person, place, and time; affect appropriate  NEUROLOGY: No gross sensory or motor deficits   SKIN: warm and dry       LABS:                        11.2   5.14  )-----------( 196      ( 11 Mar 2023 06:35 )             36.9     03-11    140  |  103  |  18.7  ----------------------------<  101<H>  4.0   |  28.0  |  1.18    Ca    9.3      11 Mar 2023 06:35    TPro  6.2<L>  /  Alb  3.3  /  TBili  0.5  /  DBili  x   /  AST  14  /  ALT  12  /  AlkPhos  86  03-11    PT/INR - ( 11 Mar 2023 06:35 )   PT: 21.1 sec;   INR: 1.81 ratio         PTT - ( 10 Mar 2023 05:24 )  PTT:31.2 sec        Culture - Urine (collected 08 Mar 2023 13:40)  Source: Clean Catch Clean Catch (Midstream)  Final Report (09 Mar 2023 19:10):    <10,000 CFU/mL Normal Urogenital Sangeetha

## 2023-03-11 NOTE — PROGRESS NOTE ADULT - ASSESSMENT
ASSESSMENT: 88 yr old male with hypertension,  atrial fibrillation, PE/DVT s/p IVC filter, Factor V Leiden; on coumadin CAD s/p CABG, stroke , COPD, GIGI, AAA, infrarenal 6.1 cm  on prior ct presented with right sided numbness started at around 11am  3/8/23 at [facility] by ER report. CT head without acute infarction or hemorrhage. Excluded from Tenecteplasea as INR > 1.7 (currently 2.30).  CT angiogram without evidence of large vessel occlusion- not thrombectomy ca ndidate. Moderate left carotid stenosis.  Etiology of symptoms possibly TIA , rule out cerebral infarction and cervical disease.Would screen for infection/seizure as possible recrudescences of prior symptoms.     NEURO: Continue close monitoring for neurologic deterioration, permissive HTN overall < 160mmHg (anticoagulation use in setting of AAA), currently 110-140mmHg would avoid further hypotension and rapid fluctuations, titrate statin to LDL goal less than 70, MRI Brain w/o, can obtain carotid US to evaluate carotid stenosis if patient would be a candidate for intervention based on goals of care and workup as noted ,  routine EEG, Physical therapy/OT/Speech eval/treatment.     ANTITHROMBOTIC THERAPY: neurologically suggest to continue therapeutic anticoagulation with warfarin as per home regimen, confirm INR goal 2-3 with outpatient prescriber/heme/cardio.  If so, then patient is therapeutically anticoagulated monitor INR closely.      PULMONARY:   protecting airway, saturating well     CARDIOVASCULAR: check TTE, cardiac monitoring                             GASTROINTESTINAL:  dysphagia screen  complete/ pass  Diet: Puree, advance as tolerated    RENAL: BUN/Cr 19.9/1.09, maintain adequate hydration as tolerated , good urine output      Na Goal: Greater than 135    HEMATOLOGY: H/H 11.6/38.9, with mild anemia, monitor for bleeding, Platelets 207, hematology followup      DVT ppx:   therapeutically anticoagulated with Warfarin    ID: afebrile, no leukocytosis, screen for infection    DISPOSITION: Rehab, depending on PT eval once stable and workup is complete      CORE MEASURES:     Admission NIHSS: 4  Tenecteplase : [] YES [x] NO   LDL/HDL/A1C: 58/34/5.9  Depression Screen:p   Statin Therapy: as noted   Dysphagia Screen: [x] PASS [] FAIL -   Smoking [] YES [x] NO      Afib [x] YES [] NO     Stroke Education [x] YES [] NO

## 2023-03-11 NOTE — CHART NOTE - NSCHARTNOTEFT_GEN_A_CORE
Reviewed patient's telemetry, and heart rate is much better controlled. Continue home current medications at this time. No further inpatient cardiac work up. Will sign off at this time, please feel free to call with any questions or issues that may arise.

## 2023-03-11 NOTE — PROGRESS NOTE ADULT - ASSESSMENT
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 No LVO  - MRI: Left medial posterior frontal cortical and subcortical acute infarction. Left lateral posterior frontal cortical and subcortical remote infarction ; additional tiny left anterior frontal cortical remote infarctions. Left thalamic and left callosal body remote deep infarctions  - No t-PA as INR was therapeutic and symptoms resolved  - c/w Neurochecks  - ASA, Statin  - Coumadin resumed  - TTE noted   - Neurology follow up requested today   - PT, OT, SLP and PMR follow up    Right Radial Artery Occlusion  - Patient without any ischemic signs or symptoms  - Evaluated by Vascular Surgery - chronic occlusion without any ischemia  - Resumed Coumadin, monitor INR     Paranoid Behaviour  - Sertraline  - Olanzapine at bedtime  - Delirium precautions    CHFrEF  Pseudo severe AS  CAD S/P CABG  Chronic AF  - I/O, daily weight, salt and fluid restriction  - Furosemide 40mg daily  - ASA, Statin, Metoprolol  - Coumadin    AAA  Peripheral mural Thrombus  - Control BP  - Coumadin  - No intervention as per prior hospitalization review    Hx PE, DVT, FVL  - Coumadin    COPD  GIGI  - Budenoside/Formoterol  - DuoNebs PRN    BPH  - Tamsulosin  - Oxybutynin    VTE Prophylaxis - Coumadin     Guarded Prognosis  At risk for readmission  DNR DNI    NO[facility] Daughter Jasmina Tapia is HCP. She lives in [place] but can be reached at number listed in chart, [phone]

## 2023-03-12 LAB
ANION GAP SERPL CALC-SCNC: 13 MMOL/L — SIGNIFICANT CHANGE UP (ref 5–17)
BASOPHILS # BLD AUTO: 0.05 K/UL — SIGNIFICANT CHANGE UP (ref 0–0.2)
BASOPHILS NFR BLD AUTO: 0.7 % — SIGNIFICANT CHANGE UP (ref 0–2)
BUN SERPL-MCNC: 20.4 MG/DL — HIGH (ref 8–20)
CALCIUM SERPL-MCNC: 9.4 MG/DL — SIGNIFICANT CHANGE UP (ref 8.4–10.5)
CHLORIDE SERPL-SCNC: 101 MMOL/L — SIGNIFICANT CHANGE UP (ref 96–108)
CO2 SERPL-SCNC: 24 MMOL/L — SIGNIFICANT CHANGE UP (ref 22–29)
CREAT SERPL-MCNC: 1.25 MG/DL — SIGNIFICANT CHANGE UP (ref 0.5–1.3)
EGFR: 55 ML/MIN/1.73M2 — LOW
EOSINOPHIL # BLD AUTO: 0.49 K/UL — SIGNIFICANT CHANGE UP (ref 0–0.5)
EOSINOPHIL NFR BLD AUTO: 7.3 % — HIGH (ref 0–6)
GLUCOSE SERPL-MCNC: 106 MG/DL — HIGH (ref 70–99)
HCT VFR BLD CALC: 42.5 % — SIGNIFICANT CHANGE UP (ref 39–50)
HGB BLD-MCNC: 12.6 G/DL — LOW (ref 13–17)
IMM GRANULOCYTES NFR BLD AUTO: 0.4 % — SIGNIFICANT CHANGE UP (ref 0–0.9)
INR BLD: 1.83 RATIO — HIGH (ref 0.88–1.16)
LYMPHOCYTES # BLD AUTO: 0.66 K/UL — LOW (ref 1–3.3)
LYMPHOCYTES # BLD AUTO: 9.9 % — LOW (ref 13–44)
MAGNESIUM SERPL-MCNC: 1.9 MG/DL — SIGNIFICANT CHANGE UP (ref 1.6–2.6)
MCHC RBC-ENTMCNC: 27 PG — SIGNIFICANT CHANGE UP (ref 27–34)
MCHC RBC-ENTMCNC: 29.6 GM/DL — LOW (ref 32–36)
MCV RBC AUTO: 91 FL — SIGNIFICANT CHANGE UP (ref 80–100)
MONOCYTES # BLD AUTO: 0.71 K/UL — SIGNIFICANT CHANGE UP (ref 0–0.9)
MONOCYTES NFR BLD AUTO: 10.6 % — SIGNIFICANT CHANGE UP (ref 2–14)
NEUTROPHILS # BLD AUTO: 4.76 K/UL — SIGNIFICANT CHANGE UP (ref 1.8–7.4)
NEUTROPHILS NFR BLD AUTO: 71.1 % — SIGNIFICANT CHANGE UP (ref 43–77)
PLATELET # BLD AUTO: 194 K/UL — SIGNIFICANT CHANGE UP (ref 150–400)
POTASSIUM SERPL-MCNC: 4.1 MMOL/L — SIGNIFICANT CHANGE UP (ref 3.5–5.3)
POTASSIUM SERPL-SCNC: 4.1 MMOL/L — SIGNIFICANT CHANGE UP (ref 3.5–5.3)
PROTHROM AB SERPL-ACNC: 21.3 SEC — HIGH (ref 10.5–13.4)
RBC # BLD: 4.67 M/UL — SIGNIFICANT CHANGE UP (ref 4.2–5.8)
RBC # FLD: 15.5 % — HIGH (ref 10.3–14.5)
SODIUM SERPL-SCNC: 138 MMOL/L — SIGNIFICANT CHANGE UP (ref 135–145)
WBC # BLD: 6.7 K/UL — SIGNIFICANT CHANGE UP (ref 3.8–10.5)
WBC # FLD AUTO: 6.7 K/UL — SIGNIFICANT CHANGE UP (ref 3.8–10.5)

## 2023-03-12 PROCEDURE — 99233 SBSQ HOSP IP/OBS HIGH 50: CPT

## 2023-03-12 PROCEDURE — 93308 TTE F-UP OR LMTD: CPT | Mod: 26

## 2023-03-12 PROCEDURE — 99232 SBSQ HOSP IP/OBS MODERATE 35: CPT

## 2023-03-12 RX ORDER — SPIRONOLACTONE 25 MG/1
25 TABLET, FILM COATED ORAL DAILY
Refills: 0 | Status: DISCONTINUED | OUTPATIENT
Start: 2023-03-12 | End: 2023-03-14

## 2023-03-12 RX ORDER — MAGNESIUM SULFATE 500 MG/ML
1 VIAL (ML) INJECTION ONCE
Refills: 0 | Status: COMPLETED | OUTPATIENT
Start: 2023-03-12 | End: 2023-03-12

## 2023-03-12 RX ORDER — METOPROLOL TARTRATE 50 MG
5 TABLET ORAL EVERY 6 HOURS
Refills: 0 | Status: COMPLETED | OUTPATIENT
Start: 2023-03-12 | End: 2023-03-12

## 2023-03-12 RX ORDER — WARFARIN SODIUM 2.5 MG/1
3 TABLET ORAL ONCE
Refills: 0 | Status: COMPLETED | OUTPATIENT
Start: 2023-03-12 | End: 2023-03-12

## 2023-03-12 RX ADMIN — NYSTATIN CREAM 1 APPLICATION(S): 100000 CREAM TOPICAL at 17:33

## 2023-03-12 RX ADMIN — Medication 50 MILLIGRAM(S): at 17:39

## 2023-03-12 RX ADMIN — OLANZAPINE 10 MILLIGRAM(S): 15 TABLET, FILM COATED ORAL at 21:21

## 2023-03-12 RX ADMIN — Medication 650 MILLIGRAM(S): at 18:15

## 2023-03-12 RX ADMIN — BUDESONIDE AND FORMOTEROL FUMARATE DIHYDRATE 2 PUFF(S): 160; 4.5 AEROSOL RESPIRATORY (INHALATION) at 09:07

## 2023-03-12 RX ADMIN — Medication 3 MILLILITER(S): at 20:52

## 2023-03-12 RX ADMIN — TAMSULOSIN HYDROCHLORIDE 0.4 MILLIGRAM(S): 0.4 CAPSULE ORAL at 21:21

## 2023-03-12 RX ADMIN — Medication 650 MILLIGRAM(S): at 17:39

## 2023-03-12 RX ADMIN — Medication 100 GRAM(S): at 11:31

## 2023-03-12 RX ADMIN — Medication 40 MILLIGRAM(S): at 04:40

## 2023-03-12 RX ADMIN — SERTRALINE 50 MILLIGRAM(S): 25 TABLET, FILM COATED ORAL at 11:31

## 2023-03-12 RX ADMIN — Medication 3 MILLILITER(S): at 09:07

## 2023-03-12 RX ADMIN — Medication 81 MILLIGRAM(S): at 11:31

## 2023-03-12 RX ADMIN — ATORVASTATIN CALCIUM 40 MILLIGRAM(S): 80 TABLET, FILM COATED ORAL at 21:21

## 2023-03-12 RX ADMIN — Medication 5 MILLIGRAM(S): at 16:08

## 2023-03-12 RX ADMIN — NYSTATIN CREAM 1 APPLICATION(S): 100000 CREAM TOPICAL at 06:34

## 2023-03-12 RX ADMIN — Medication 50 MILLIGRAM(S): at 04:40

## 2023-03-12 RX ADMIN — LOSARTAN POTASSIUM 25 MILLIGRAM(S): 100 TABLET, FILM COATED ORAL at 04:40

## 2023-03-12 RX ADMIN — WARFARIN SODIUM 3 MILLIGRAM(S): 2.5 TABLET ORAL at 21:21

## 2023-03-12 RX ADMIN — BUDESONIDE AND FORMOTEROL FUMARATE DIHYDRATE 2 PUFF(S): 160; 4.5 AEROSOL RESPIRATORY (INHALATION) at 20:53

## 2023-03-12 RX ADMIN — POLYETHYLENE GLYCOL 3350 17 GRAM(S): 17 POWDER, FOR SOLUTION ORAL at 11:31

## 2023-03-12 NOTE — PROGRESS NOTE ADULT - SUBJECTIVE AND OBJECTIVE BOX
[facility] Division of Hospital Medicine    Chief Complaint: right sided weakness/right radial artery occlusion     SUBJECTIVE / OVERNIGHT EVENTS: Overnight, patient had multiple episode of PVCs. Denies numbness and tingling.     Patient denies chest pain, SOB, abd pain, N/V, fever, chills, dysuria or any other complaints. All remainder ROS negative.     MEDICATIONS  (STANDING):  albuterol/ipratropium for Nebulization 3 milliLiter(s) Nebulizer every 6 hours  aspirin enteric coated 81 milliGRAM(s) Oral daily  atorvastatin 40 milliGRAM(s) Oral at bedtime  budesonide 160 MICROgram(s)/formoterol 4.5 MICROgram(s) Inhaler 2 Puff(s) Inhalation two times a day  furosemide    Tablet 40 milliGRAM(s) Oral daily  losartan 25 milliGRAM(s) Oral daily  magnesium sulfate  IVPB 1 Gram(s) IV Intermittent once  metoprolol tartrate 50 milliGRAM(s) Oral every 12 hours  nystatin Powder 1 Application(s) Topical two times a day  OLANZapine 10 milliGRAM(s) Oral at bedtime  oxybutynin XL 10 milliGRAM(s) Oral <User Schedule>  polyethylene glycol 3350 17 Gram(s) Oral daily  sertraline 50 milliGRAM(s) Oral daily  tamsulosin 0.4 milliGRAM(s) Oral at bedtime  warfarin 3 milliGRAM(s) Oral once    MEDICATIONS  (PRN):  acetaminophen     Tablet .. 650 milliGRAM(s) Oral every 6 hours PRN Mild Pain (1 - 3)  bisacodyl Suppository 10 milliGRAM(s) Rectal daily PRN Constipation        I&O's Summary    11 Mar 2023 06:01  -  12 Mar 2023 07:00  --------------------------------------------------------  IN: 240 mL / OUT: 1200 mL / NET: -960 mL        PHYSICAL EXAM:  Vital Signs Last 24 Hrs  T(C): 36.9 (12 Mar 2023 07:34), Max: 36.9 (12 Mar 2023 07:34)  T(F): 98.5 (12 Mar 2023 07:34), Max: 98.5 (12 Mar 2023 07:34)  HR: 101 (12 Mar 2023 10:00) (77 - 108)  BP: 101/62 (12 Mar 2023 10:00) (97/62 - 123/83)  BP(mean): 71 (12 Mar 2023 10:00) (71 - 96)  RR: 20 (12 Mar 2023 10:00) (17 - 22)  SpO2: 95% (12 Mar 2023 10:00) (94% - 97%)    Parameters below as of 12 Mar 2023 10:00  Patient On (Oxygen Delivery Method): room air    CONSTITUTIONAL: NAD, sitting up in bed  RESPIRATORY: Normal respiratory effort; lungs with reduced BS bilaterally  CARDIOVASCULAR: Regular rate and rhythm, normal S1 and S2   ABDOMEN: Nontender to palpation, normoactive bowel sounds  MUSCULOSKELETAL:  No clubbing or cyanosis of digits   PSYCH: A+O to person, place, and time; affect appropriate  NEUROLOGY: No gross sensory or motor deficits   SKIN: warm and dry     LABS:                        12.6   6.70  )-----------( 194      ( 12 Mar 2023 07:55 )             42.5     03-12    138  |  101  |  20.4<H>  ----------------------------<  106<H>  4.1   |  24.0  |  1.25    Ca    9.4      12 Mar 2023 07:55  Mg     1.9     03-12    TPro  6.2<L>  /  Alb  3.3  /  TBili  0.5  /  DBili  x   /  AST  14  /  ALT  12  /  AlkPhos  86  03-11    PT/INR - ( 12 Mar 2023 07:55 )   PT: 21.3 sec;   INR: 1.83 ratio                   CAPILLARY BLOOD GLUCOSE            RADIOLOGY & ADDITIONAL TESTS:  Results Reviewed:   Imaging Personally Reviewed:  Electrocardiogram Personally Reviewed:

## 2023-03-12 NOTE — PROGRESS NOTE ADULT - SUBJECTIVE AND OBJECTIVE BOX
88 yr old male with hypertension,  atrial fibrillation, PE/DVT s/p IVC filter, Factor V Leiden on coumadin CAD s/p CABG, stroke COPD, GIGI, AAA, infrarenal 6.1 cm  on prior ct presented with right sided weakness started at 11am  3/8/23 at [facility] by ER report. Upon review patient notes  numbness  to right hand,  arm and leg denied slurred speech, changes in vision, weakness, vision. Patient on coumadin thinks that took it day of presentation.  Noted symptoms  were improving on ER eval, ER  NIH 0- INR 2.30, excluded from Tenecteplase . It was noted also thinks that had an old stroke with some residual numbness/weakness on the same side in the past - notes sometimes gets worse . Patient states at this time he feels back to normal.             Vital Signs Last 24 Hrs  T(C): 36.5 (12 Mar 2023 15:45), Max: 36.9 (12 Mar 2023 07:34)  T(F): 97.7 (12 Mar 2023 15:45), Max: 98.5 (12 Mar 2023 07:34)  HR: 117 (12 Mar 2023 17:30) (77 - 136)  BP: 111/83 (12 Mar 2023 17:30) (101/62 - 123/83)  BP(mean): 91 (12 Mar 2023 17:30) (71 - 96)  RR: 20 (12 Mar 2023 16:00) (17 - 20)  SpO2: 96% (12 Mar 2023 16:00) (94% - 97%)    Parameters below as of 12 Mar 2023 16:00  Patient On (Oxygen Delivery Method): room air        MEDICATIONS    acetaminophen     Tablet .. 650 milliGRAM(s) Oral every 6 hours PRN  albuterol/ipratropium for Nebulization 3 milliLiter(s) Nebulizer every 6 hours  aspirin enteric coated 81 milliGRAM(s) Oral daily  atorvastatin 40 milliGRAM(s) Oral at bedtime  bisacodyl Suppository 10 milliGRAM(s) Rectal daily PRN  budesonide 160 MICROgram(s)/formoterol 4.5 MICROgram(s) Inhaler 2 Puff(s) Inhalation two times a day  furosemide    Tablet 40 milliGRAM(s) Oral daily  losartan 25 milliGRAM(s) Oral daily  metoprolol tartrate 50 milliGRAM(s) Oral every 12 hours  nystatin Powder 1 Application(s) Topical two times a day  OLANZapine 10 milliGRAM(s) Oral at bedtime  oxybutynin XL 10 milliGRAM(s) Oral <User Schedule>  polyethylene glycol 3350 17 Gram(s) Oral daily  sertraline 50 milliGRAM(s) Oral daily  spironolactone 25 milliGRAM(s) Oral daily  tamsulosin 0.4 milliGRAM(s) Oral at bedtime  warfarin 3 milliGRAM(s) Oral once         LABS:  CBC Full  -  ( 12 Mar 2023 07:55 )  WBC Count : 6.70 K/uL  RBC Count : 4.67 M/uL  Hemoglobin : 12.6 g/dL  Hematocrit : 42.5 %  Platelet Count - Automated : 194 K/uL  Mean Cell Volume : 91.0 fl  Mean Cell Hemoglobin : 27.0 pg  Mean Cell Hemoglobin Concentration : 29.6 gm/dL  Auto Neutrophil # : 4.76 K/uL  Auto Lymphocyte # : 0.66 K/uL  Auto Monocyte # : 0.71 K/uL  Auto Eosinophil # : 0.49 K/uL  Auto Basophil # : 0.05 K/uL  Auto Neutrophil % : 71.1 %  Auto Lymphocyte % : 9.9 %  Auto Monocyte % : 10.6 %  Auto Eosinophil % : 7.3 %  Auto Basophil % : 0.7 %      03-12    138  |  101  |  20.4<H>  ----------------------------<  106<H>  4.1   |  24.0  |  1.25    Ca    9.4      12 Mar 2023 07:55  Mg     1.9     03-12    TPro  6.2<L>  /  Alb  3.3  /  TBili  0.5  /  DBili  x   /  AST  14  /  ALT  12  /  AlkPhos  86  03-11    LIVER FUNCTIONS - ( 11 Mar 2023 06:35 )  Alb: 3.3 g/dL / Pro: 6.2 g/dL / ALK PHOS: 86 U/L / ALT: 12 U/L / AST: 14 U/L / GGT: x           Hemoglobin A1C:       PT/INR - ( 12 Mar 2023 07:55 )   PT: 21.3 sec;   INR: 1.83 ratio           NEUROLOGICAL EXAM:  Mental status: The patient is awake and alert and has normal attention span.  The patient is oriented to self, place, month, year, disoriented to date , perseverates to 24 .   The patient is able to name objects, follow commands     Cranial nerves: Pupils equal and react symmetrically to light. There is no visual field deficit to confrontation. Extraocular motion is full with no nystagmus. There is no ptosis. Facial sensation is intact. Slight right facial palsy that corrects on smile,    Tongue is midline.    Motor: There is normal bulk and tone.  There is no tremor.  Strength is 5/5 in the right arm and leg. Right hand subtle  drift   Strength is 5/5 in the left arm and leg.    gait not assessed, no ataxia appreciated     LABS:                        11.6   5.56  )-----------( 207      ( 09 Mar 2023 06:06 )             38.9    03-09    138  |  101  |  19.9  ----------------------------<  106<H>  3.8   |  26.0  |  1.09    Ca    8.9      09 Mar 2023 06:06    TPro  6.6  /  Alb  3.4  /  TBili  0.4  /  DBili  x   /  AST  18  /  ALT  20  /  AlkPhos  89  03-08  PT/INR - ( 08 Mar 2023 12:50 )   PT: 26.9 sec;   INR: 2.30 ratio         PTT - ( 09 Mar 2023 05:20 )  PTT:105.5 sec      IMAGING: Reviewed by me.     CT Angio Head Neck Stroke Protocol w/ IV Cont (03.08.23 @ 13:22)   7 mL area of increased Tmax in the right parietal lobe suggesting brain   at risk versus artifact. No core infarct. No large vessel occlusion. 50%   stenosis in left carotid bulb/proximal internal carotid artery. Consider   MRI of the brain as clinically warranted. Additional findings above.    CT Brain Stroke Protocol (03.08.23 @ 12:40)    Moderate chronic microvascular changes without evidence of   an acute transcortical infarction or hemorrhage.       MR Head No Cont (03.10.23 @ 15:46) >    IMPRESSION:    1. Left medial posterior frontal cortical and subcortical acute infarction    2. Left lateral posterior frontal cortical and subcortical remote   infarction ; additional tiny left anterior frontal cortical remote   infarctions    3. Left thalamic and left callosal body remote deep infarctions    4. Ischemic white matter disease greater than typical for age    5. Diffuse brain volume loss typical for age    LEONARD JONAS MD; Attending Radiologist

## 2023-03-12 NOTE — PROGRESS NOTE ADULT - ASSESSMENT
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    Acute CVA   - Now back to baseline   - CT brain without any acute infarction  - CT Perfusion with right parietal lobe at risk vs artefact. No LVO  - MRI: Left medial posterior frontal cortical and subcortical acute infarction. Left lateral posterior frontal cortical and subcortical remote infarction ; additional tiny left anterior frontal cortical remote infarctions. Left thalamic and left callosal body remote deep infarctions  - No t-PA as INR was therapeutic and symptoms resolved  - c/w neurochecks  - ASA, Statin  - Coumadin resumed  - TTE noted   - Neurology follow up requested today   - PT, OT, SLP and PMR follow up  - Switched to q4h neurochecks    Right Radial Artery Occlusion  - Patient without any ischemic signs or symptoms  - Evaluated by Vascular Surgery - chronic occlusion without any ischemia  - Resumed Coumadin, monitor INR     Paranoid Behaviour  - Sertraline  - Olanzapine at bedtime  - Delirium precautions    CHFrEF  Pseudo severe AS  CAD S/P CABG  Chronic AF  - I/O, daily weight, salt and fluid restriction  - Furosemide 40mg daily  - ASA, Statin, Metoprolol  - Coumadin  - F/u PT/INR  - F/u TTE  - F/u cardio recs- c/w losartan. Added spironolactone. Will start farxiga as tolerated    AAA  Peripheral mural Thrombus  - Control BP  - Coumadin  - No intervention as per prior hospitalization review    Hx PE, DVT, FVL  - Coumadin    COPD  GIGI  - Budesonide/Formoterol  - Duonebs PRN    BPH  - Tamsulosin  - Oxybutynin    VTE Prophylaxis - Coumadin     Guarded Prognosis  At risk for readmission  CODE STATUS- DNR/DNI    [facility] Daughter Jasmina Tapia is HCP. She lives in [place] but can be reached at number listed in chart, [phone] on 3/12    Dispo- Acute. Likely [facility] ([facility]).

## 2023-03-13 LAB
ALBUMIN SERPL ELPH-MCNC: 3.1 G/DL — LOW (ref 3.3–5.2)
ALP SERPL-CCNC: 92 U/L — SIGNIFICANT CHANGE UP (ref 40–120)
ALT FLD-CCNC: 12 U/L — SIGNIFICANT CHANGE UP
ANION GAP SERPL CALC-SCNC: 12 MMOL/L — SIGNIFICANT CHANGE UP (ref 5–17)
APPEARANCE UR: CLEAR — SIGNIFICANT CHANGE UP
AST SERPL-CCNC: 15 U/L — SIGNIFICANT CHANGE UP
BILIRUB SERPL-MCNC: 0.7 MG/DL — SIGNIFICANT CHANGE UP (ref 0.4–2)
BILIRUB UR-MCNC: NEGATIVE — SIGNIFICANT CHANGE UP
BUN SERPL-MCNC: 19.9 MG/DL — SIGNIFICANT CHANGE UP (ref 8–20)
CALCIUM SERPL-MCNC: 9 MG/DL — SIGNIFICANT CHANGE UP (ref 8.4–10.5)
CHLORIDE SERPL-SCNC: 100 MMOL/L — SIGNIFICANT CHANGE UP (ref 96–108)
CO2 SERPL-SCNC: 25 MMOL/L — SIGNIFICANT CHANGE UP (ref 22–29)
COLOR SPEC: YELLOW — SIGNIFICANT CHANGE UP
CREAT SERPL-MCNC: 1.15 MG/DL — SIGNIFICANT CHANGE UP (ref 0.5–1.3)
DIFF PNL FLD: NEGATIVE — SIGNIFICANT CHANGE UP
EGFR: 61 ML/MIN/1.73M2 — SIGNIFICANT CHANGE UP
GLUCOSE SERPL-MCNC: 106 MG/DL — HIGH (ref 70–99)
GLUCOSE UR QL: NEGATIVE MG/DL — SIGNIFICANT CHANGE UP
HCT VFR BLD CALC: 39.5 % — SIGNIFICANT CHANGE UP (ref 39–50)
HGB BLD-MCNC: 12.1 G/DL — LOW (ref 13–17)
INR BLD: 1.79 RATIO — HIGH (ref 0.88–1.16)
KETONES UR-MCNC: NEGATIVE — SIGNIFICANT CHANGE UP
LEUKOCYTE ESTERASE UR-ACNC: NEGATIVE — SIGNIFICANT CHANGE UP
MCHC RBC-ENTMCNC: 27.2 PG — SIGNIFICANT CHANGE UP (ref 27–34)
MCHC RBC-ENTMCNC: 30.6 GM/DL — LOW (ref 32–36)
MCV RBC AUTO: 88.8 FL — SIGNIFICANT CHANGE UP (ref 80–100)
NITRITE UR-MCNC: NEGATIVE — SIGNIFICANT CHANGE UP
PH UR: 7 — SIGNIFICANT CHANGE UP (ref 5–8)
PLATELET # BLD AUTO: 191 K/UL — SIGNIFICANT CHANGE UP (ref 150–400)
POTASSIUM SERPL-MCNC: 3.9 MMOL/L — SIGNIFICANT CHANGE UP (ref 3.5–5.3)
POTASSIUM SERPL-SCNC: 3.9 MMOL/L — SIGNIFICANT CHANGE UP (ref 3.5–5.3)
PROT SERPL-MCNC: 6.6 G/DL — SIGNIFICANT CHANGE UP (ref 6.6–8.7)
PROT UR-MCNC: NEGATIVE — SIGNIFICANT CHANGE UP
PROTHROM AB SERPL-ACNC: 20.9 SEC — HIGH (ref 10.5–13.4)
RBC # BLD: 4.45 M/UL — SIGNIFICANT CHANGE UP (ref 4.2–5.8)
RBC # FLD: 15.5 % — HIGH (ref 10.3–14.5)
SODIUM SERPL-SCNC: 137 MMOL/L — SIGNIFICANT CHANGE UP (ref 135–145)
SP GR SPEC: 1 — LOW (ref 1.01–1.02)
UROBILINOGEN FLD QL: NEGATIVE MG/DL — SIGNIFICANT CHANGE UP
WBC # BLD: 5.09 K/UL — SIGNIFICANT CHANGE UP (ref 3.8–10.5)
WBC # FLD AUTO: 5.09 K/UL — SIGNIFICANT CHANGE UP (ref 3.8–10.5)

## 2023-03-13 PROCEDURE — 99222 1ST HOSP IP/OBS MODERATE 55: CPT

## 2023-03-13 PROCEDURE — 99232 SBSQ HOSP IP/OBS MODERATE 35: CPT

## 2023-03-13 PROCEDURE — 99233 SBSQ HOSP IP/OBS HIGH 50: CPT

## 2023-03-13 RX ORDER — SODIUM CHLORIDE 9 MG/ML
500 INJECTION INTRAMUSCULAR; INTRAVENOUS; SUBCUTANEOUS ONCE
Refills: 0 | Status: COMPLETED | OUTPATIENT
Start: 2023-03-13 | End: 2023-03-13

## 2023-03-13 RX ORDER — WARFARIN SODIUM 2.5 MG/1
3 TABLET ORAL ONCE
Refills: 0 | Status: COMPLETED | OUTPATIENT
Start: 2023-03-13 | End: 2023-03-13

## 2023-03-13 RX ORDER — SODIUM CHLORIDE 9 MG/ML
250 INJECTION INTRAMUSCULAR; INTRAVENOUS; SUBCUTANEOUS ONCE
Refills: 0 | Status: COMPLETED | OUTPATIENT
Start: 2023-03-13 | End: 2023-03-13

## 2023-03-13 RX ADMIN — SERTRALINE 50 MILLIGRAM(S): 25 TABLET, FILM COATED ORAL at 17:35

## 2023-03-13 RX ADMIN — BUDESONIDE AND FORMOTEROL FUMARATE DIHYDRATE 2 PUFF(S): 160; 4.5 AEROSOL RESPIRATORY (INHALATION) at 20:20

## 2023-03-13 RX ADMIN — OLANZAPINE 10 MILLIGRAM(S): 15 TABLET, FILM COATED ORAL at 21:46

## 2023-03-13 RX ADMIN — SODIUM CHLORIDE 500 MILLILITER(S): 9 INJECTION INTRAMUSCULAR; INTRAVENOUS; SUBCUTANEOUS at 13:45

## 2023-03-13 RX ADMIN — Medication 10 MILLIGRAM(S): at 21:47

## 2023-03-13 RX ADMIN — Medication 50 MILLIGRAM(S): at 05:08

## 2023-03-13 RX ADMIN — Medication 3 MILLILITER(S): at 20:20

## 2023-03-13 RX ADMIN — ATORVASTATIN CALCIUM 40 MILLIGRAM(S): 80 TABLET, FILM COATED ORAL at 21:46

## 2023-03-13 RX ADMIN — WARFARIN SODIUM 3 MILLIGRAM(S): 2.5 TABLET ORAL at 21:47

## 2023-03-13 RX ADMIN — Medication 40 MILLIGRAM(S): at 05:08

## 2023-03-13 RX ADMIN — LOSARTAN POTASSIUM 25 MILLIGRAM(S): 100 TABLET, FILM COATED ORAL at 05:08

## 2023-03-13 RX ADMIN — Medication 650 MILLIGRAM(S): at 17:34

## 2023-03-13 RX ADMIN — NYSTATIN CREAM 1 APPLICATION(S): 100000 CREAM TOPICAL at 17:35

## 2023-03-13 RX ADMIN — NYSTATIN CREAM 1 APPLICATION(S): 100000 CREAM TOPICAL at 05:08

## 2023-03-13 RX ADMIN — TAMSULOSIN HYDROCHLORIDE 0.4 MILLIGRAM(S): 0.4 CAPSULE ORAL at 21:46

## 2023-03-13 RX ADMIN — Medication 3 MILLILITER(S): at 03:28

## 2023-03-13 RX ADMIN — Medication 81 MILLIGRAM(S): at 17:35

## 2023-03-13 RX ADMIN — SODIUM CHLORIDE 500 MILLILITER(S): 9 INJECTION INTRAMUSCULAR; INTRAVENOUS; SUBCUTANEOUS at 17:34

## 2023-03-13 RX ADMIN — SPIRONOLACTONE 25 MILLIGRAM(S): 25 TABLET, FILM COATED ORAL at 05:08

## 2023-03-13 RX ADMIN — POLYETHYLENE GLYCOL 3350 17 GRAM(S): 17 POWDER, FOR SOLUTION ORAL at 17:37

## 2023-03-13 NOTE — PROGRESS NOTE ADULT - SUBJECTIVE AND OBJECTIVE BOX
[facility] Division of Hospital Medicine    Chief Complaint: right sided weakness/right radial artery occlusion     SUBJECTIVE / OVERNIGHT EVENTS: No acute events overnight. HD stable.     Patient denies chest pain, SOB, abd pain, N/V, fever, chills, dysuria or any other complaints. All remainder ROS negative.     MEDICATIONS  (STANDING):  albuterol/ipratropium for Nebulization 3 milliLiter(s) Nebulizer every 6 hours  aspirin enteric coated 81 milliGRAM(s) Oral daily  atorvastatin 40 milliGRAM(s) Oral at bedtime  budesonide 160 MICROgram(s)/formoterol 4.5 MICROgram(s) Inhaler 2 Puff(s) Inhalation two times a day  furosemide    Tablet 40 milliGRAM(s) Oral daily  losartan 25 milliGRAM(s) Oral daily  metoprolol tartrate 50 milliGRAM(s) Oral every 12 hours  nystatin Powder 1 Application(s) Topical two times a day  OLANZapine 10 milliGRAM(s) Oral at bedtime  oxybutynin XL 10 milliGRAM(s) Oral <User Schedule>  polyethylene glycol 3350 17 Gram(s) Oral daily  sertraline 50 milliGRAM(s) Oral daily  spironolactone 25 milliGRAM(s) Oral daily  tamsulosin 0.4 milliGRAM(s) Oral at bedtime  warfarin 3 milliGRAM(s) Oral once    MEDICATIONS  (PRN):  acetaminophen     Tablet .. 650 milliGRAM(s) Oral every 6 hours PRN Mild Pain (1 - 3)  bisacodyl Suppository 10 milliGRAM(s) Rectal daily PRN Constipation        I&O's Summary    12 Mar 2023 07:01  -  13 Mar 2023 07:00  --------------------------------------------------------  IN: 460 mL / OUT: 850 mL / NET: -390 mL        PHYSICAL EXAM:  Vital Signs Last 24 Hrs  T(C): 36.4 (13 Mar 2023 07:48), Max: 36.7 (12 Mar 2023 20:00)  T(F): 97.6 (13 Mar 2023 07:48), Max: 98.1 (13 Mar 2023 00:00)  HR: 90 (13 Mar 2023 08:00) (79 - 136)  BP: 103/65 (13 Mar 2023 08:00) (92/68 - 141/58)  BP(mean): 75 (13 Mar 2023 08:00) (75 - 91)  RR: 20 (13 Mar 2023 08:00) (19 - 20)  SpO2: 95% (13 Mar 2023 08:00) (95% - 98%)    Parameters below as of 13 Mar 2023 08:00  Patient On (Oxygen Delivery Method): room air    CONSTITUTIONAL: NAD, sitting up in bed  RESPIRATORY: Normal respiratory effort; lungs with reduced BS bilaterally  CARDIOVASCULAR: Regular rate and rhythm, normal S1 and S2   ABDOMEN: Nontender to palpation, normoactive bowel sounds  MUSCULOSKELETAL:  No clubbing or cyanosis of digits   PSYCH: A+O to person, place, and time; affect appropriate  NEUROLOGY: No gross sensory or motor deficits     LABS:                        12.1   5.09  )-----------( 191      ( 13 Mar 2023 06:00 )             39.5     03-13    137  |  100  |  19.9  ----------------------------<  106<H>  3.9   |  25.0  |  1.15    Ca    9.0      13 Mar 2023 06:00  Mg     1.9     03-12    TPro  6.6  /  Alb  3.1<L>  /  TBili  0.7  /  DBili  x   /  AST  15  /  ALT  12  /  AlkPhos  92  03-13    PT/INR - ( 13 Mar 2023 06:00 )   PT: 20.9 sec;   INR: 1.79 ratio                   CAPILLARY BLOOD GLUCOSE            RADIOLOGY & ADDITIONAL TESTS:  Results Reviewed:   Imaging Personally Reviewed:  Electrocardiogram Personally Reviewed:

## 2023-03-13 NOTE — CONSULT NOTE ADULT - ASSESSMENT
88 year old man with history of atrial fibrillation, FVL/PE/DVT s/p IVC filter, CABG,  CVA, COPD, currently admitted to the hospital with acute CVA. The patient was found to have a right radial artery occlusion. Vascular surgery consulted; no intervention offered at this time. It appears that the patient's thrombus is likely chronic. Hematology consulted to discuss restarting AC with different agent due to new arterial thrombosis. It is unclear if the patient has a history of low VTE-- he did not provide a good history and was an unreliable historian. Overall, I feel that the patient's condition warrants continue AC, and would recommend maintaining coumadin for now and send APLS work up given history of arterial thrombosis. It is important to note that the patient's risk factors for arterial thrombosis include atrial fibrillation, carotid stenosis.   - Please send lupus AC, beta-2 glycoprotein, and anticardiolipin antibodies. Presence of these antibodies would suggest that coumadin is the optimal option for AC.   - continue coumadin for now and follow up as outpatient.    88 year old man with history of atrial fibrillation, FVL/PE/DVT s/p IVC filter, CABG,  CVA, COPD, currently admitted to the hospital with acute CVA. The patient was found to have a right radial artery occlusion. Vascular surgery consulted; no intervention offered at this time. It appears that the patient's thrombus is likely chronic. Hematology consulted to discuss restarting AC with different agent due to new arterial thrombosis. It is unclear if the patient has a history of low VTE-- he did not provide a good history and was an unreliable historian. Overall, I feel that the patient's condition warrants continue AC, and would recommend maintaining coumadin for now and send APLS work up given history of arterial thrombosis. It is important to note that the patient's risk factors for arterial thrombosis include atrial fibrillation, carotid stenosis.   - Please send lupus AC, beta-2 glycoprotein, and anticardiolipin antibodies. Presence of these antibodies would suggest that coumadin is the optimal option for AC. Goal INR 2-3.   - continue coumadin for now and follow up as outpatient.   - Continue antiplatelet therapies as per neurology.  IV discontinued, cath removed intact

## 2023-03-13 NOTE — CONSULT NOTE ADULT - SUBJECTIVE AND OBJECTIVE BOX
REASON FOR CONSULTATION    HPI:  pt. is an 89 y/o male was sent in from [facility] as per documentation was noted to have new RUE/ RLE weakness and numbness and with diminished rt. sided radial and pedal pulses. pt. stated that he went to bed fine and in the morning staff at the facility noted rt. sided weakness. pt. has known infrarenal 6.1 cm AAA. pt. thinks that he is ok and reports no sig. weakness of RUE/ RLE. no speech/ swallow difficulty. no facial droop, no vision change. no dizziness reported. no cp, no abd. pain, no n/v/d. no hematemesis, no hemoptysis, no rectal blood reported. pt. knows that he is in the hospital, knows current president name and current year. pt. passed bedside swallow eval per pt's nurse. pt's molst with dnr/dni reviewed, signed during his recent admission.  At the time of my initial consultation, the patient did not provide a good history, appeared tired, and only answered my questions in succinct responses, usually with yes or no. He denies worsening weakness, numbness, and tingling in his hands.         REVIEW OF SYSTEMS:    REVIEW OF SYSTEMS: negative except as above; limited due to poor patient participation      PAST MEDICAL & SURGICAL HISTORY:  Hypertension      Hyperlipemia      PE (pulmonary embolism)      Marisol filter in place      Atrial fibrillation, unspecified type      CAD S/P percutaneous coronary angioplasty      History of COPD      Cerebrovascular accident (CVA)      Atheroma      Aortic stenosis      Factor V Leiden      Prostate CA      S/P IVC filter      S/P cataract surgery      S/P CABG (coronary artery bypass graft)      Status post right knee replacement          SOCIAL HISTORY:    FAMILY HISTORY:  Family history of diabetes mellitus (Child)        SOCIAL HISTORY:    Allergies    No Known Allergies    Intolerances    OHS (Unknown)      MEDICATIONS  (STANDING):  albuterol/ipratropium for Nebulization 3 milliLiter(s) Nebulizer every 6 hours  aspirin enteric coated 81 milliGRAM(s) Oral daily  atorvastatin 40 milliGRAM(s) Oral at bedtime  budesonide 160 MICROgram(s)/formoterol 4.5 MICROgram(s) Inhaler 2 Puff(s) Inhalation two times a day  furosemide    Tablet 40 milliGRAM(s) Oral daily  losartan 25 milliGRAM(s) Oral daily  metoprolol tartrate 50 milliGRAM(s) Oral every 12 hours  nystatin Powder 1 Application(s) Topical two times a day  OLANZapine 10 milliGRAM(s) Oral at bedtime  oxybutynin XL 10 milliGRAM(s) Oral <User Schedule>  polyethylene glycol 3350 17 Gram(s) Oral daily  sertraline 50 milliGRAM(s) Oral daily  spironolactone 25 milliGRAM(s) Oral daily  tamsulosin 0.4 milliGRAM(s) Oral at bedtime  warfarin 3 milliGRAM(s) Oral once    MEDICATIONS  (PRN):  acetaminophen     Tablet .. 650 milliGRAM(s) Oral every 6 hours PRN Mild Pain (1 - 3)  bisacodyl Suppository 10 milliGRAM(s) Rectal daily PRN Constipation      Vital Signs Last 24 Hrs  T(C): 36.4 (13 Mar 2023 15:51), Max: 36.7 (12 Mar 2023 20:00)  T(F): 97.5 (13 Mar 2023 15:51), Max: 98.1 (13 Mar 2023 00:00)  HR: 104 (13 Mar 2023 17:20) (79 - 115)  BP: 92/62 (13 Mar 2023 17:20) (90/60 - 141/58)  BP(mean): 89 (13 Mar 2023 16:00) (75 - 89)  RR: 20 (13 Mar 2023 16:00) (16 - 20)  SpO2: 98% (13 Mar 2023 16:00) (81% - 99%)    Parameters below as of 13 Mar 2023 16:00  Patient On (Oxygen Delivery Method): room air        PHYSICAL EXAM:    GENERAL: NAD, well developed; patient appeared drowsy   HEAD:  Atraumatic, Normocephalic  EYES: EOMI, PERRLA, conjunctiva and sclera clear  ENMT: No tonsillar erythema, exudates, or enlargement; Moist mucous membranes, Good dentition, No lesions  NECK: Supple, No JVD, Normal thyroid  NERVOUS SYSTEM:  Alert & Oriented X3, Good concentration; Motor Strength 5/5 B/L upper and lower extremities; DTRs 2+ intact and symmetric  CHEST/LUNG: Clear to percussion bilaterally; No rales, rhonchi, wheezing, or rubs  HEART: Regular rate and rhythm; No murmurs, rubs, or gallops  ABDOMEN: Soft, Nontender, Nondistended; Bowel sounds present  EXTREMITIES:  2+ Peripheral Pulses, No clubbing, cyanosis, or edema  LYMPH: No lymphadenopathy noted  SKIN: No rashes or lesions      LABS:                        12.1   5.09  )-----------( 191      ( 13 Mar 2023 06:00 )             39.5     03-13    137  |  100  |  19.9  ----------------------------<  106<H>  3.9   |  25.0  |  1.15    Ca    9.0      13 Mar 2023 06:00  Mg     1.9     03-12    TPro  6.6  /  Alb  3.1<L>  /  TBili  0.7  /  DBili  x   /  AST  15  /  ALT  12  /  AlkPhos  92  13    PT/INR - ( 13 Mar 2023 06:00 )   PT: 20.9 sec;   INR: 1.79 ratio           Urinalysis Basic - ( 13 Mar 2023 14:50 )    Color: Yellow / Appearance: Clear / S.005 / pH: x  Gluc: x / Ketone: Negative  / Bili: Negative / Urobili: Negative mg/dL   Blood: x / Protein: Negative / Nitrite: Negative   Leuk Esterase: Negative / RBC: x / WBC x   Sq Epi: x / Non Sq Epi: x / Bacteria: x          RADIOLOGY & ADDITIONAL STUDIES:  < from: MR Head No Cont (03.10.23 @ 15:46) >  IMPRESSION:    1. Left medial posterior frontal cortical and subcortical acute infarction    2. Left lateral posterior frontal cortical and subcortical remote   infarction ; additional tiny left anterior frontal cortical remote   infarctions    3. Left thalamic and left callosal body remote deep infarctions    4. Ischemic white matter disease greater than typical for age    5. Diffuse brain volume loss typical for age    --- End of Report ---      < end of copied text >  < from: US Duplex Arterial Upper Ext Ltd, Right (23 @ 17:50) >  IMPRESSION:    Right radial artery appears occluded in the mid and distal forearm and at   the level of the wrist and ulnar artery appears occluded throughout the   forearm and at the level of the wrist with question trickle flow in the   radial artery and mid forearm and ulnar arteries in the mid and distal   forearm; differential includes an embolic etiology.    The findings were discussed with Dr. Arriaga on 3/8/2023 6:35 PM    < end of copied text >  < from: CT Angio Abdomen and Pelvis w/ IV Cont (23 @ 13:37) >  IMPRESSION:  Essentially no change from prior study. Aortic aneurysms as above        < end of copied text >      PATHOLOGY:

## 2023-03-13 NOTE — PROGRESS NOTE ADULT - ASSESSMENT
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    Acute CVA   - Now back to baseline   - CT brain without any acute infarction  - CT Perfusion with right parietal lobe at risk vs artefact. No LVO  - MRI: Left medial posterior frontal cortical and subcortical acute infarction. Left lateral posterior frontal cortical and subcortical remote infarction ; additional tiny left anterior frontal cortical remote infarctions. Left thalamic and left callosal body remote deep infarctions  - No t-PA as INR was therapeutic and symptoms resolved  - c/w neurochecks  - ASA, Statin  - Coumadin resumed  - TTE noted   - Neurology follow up requested today   - PT, OT, SLP and PMR follow up  - C/w q4h neurochecks    Right Radial Artery Occlusion  - Patient without any ischemic signs or symptoms  - Evaluated by Vascular Surgery - chronic occlusion without any ischemia  - Resumed Coumadin, monitor INR   - Discussed with [facility], patient adherent to coumadin  - Will touch base with heme/onc in regards to switching AC given recurrent infarction despite therapeutic INR.     Paranoid Behaviour  - Sertraline  - Olanzapine at bedtime  - Delirium precautions    CHFrEF  Pseudo severe AS  CAD S/P CABG  Chronic AF  - I/O, daily weight, salt and fluid restriction  - Furosemide 40mg daily  - ASA, Statin, Metoprolol  - Coumadin  - F/u PT/INR  - F/u TTE  - F/u cardio recs- c/w losartan and spironolactone. Will start farxiga as tolerated    AAA  Peripheral mural Thrombus  - Control BP  - Coumadin  - No intervention as per prior hospitalization review    Hx PE, DVT, FVL  - Coumadin    COPD  GIGI  - Budesonide/Formoterol  - Duonebs PRN    BPH  - Tamsulosin  - Oxybutynin    VTE Prophylaxis - Coumadin     Guarded Prognosis  At risk for readmission  CODE STATUS- DNR/DNI    [facility] Daughter Jasmina Tapia is HCP. She lives in [facility] but can be reached at number listed in chart, [phone] on 3/13.     Dispo- Acute. Likely [facility] ([facility]).

## 2023-03-13 NOTE — PROGRESS NOTE ADULT - NS ATTEND AMEND GEN_ALL_CORE FT
Seen and examined with the PA.  Plan discussed with PA.  I agree with as written above with modifications as below    stroke  has multiple indications for anticoagualtion- Afib, low EF, DVT/PE  continue therapeutic anticoagualtion.    will follow with you    Harvey Santana MD PhD   [id number]

## 2023-03-13 NOTE — PROGRESS NOTE ADULT - SUBJECTIVE AND OBJECTIVE BOX
Preliminary note, offical recommendations pending attending review/signature   [facility] Stroke Team  Progress Note     HPI:  88 yr old male with hypertension,  atrial fibrillation, PE/DVT s/p IVC filter, Factor V Leiden on coumadin CAD s/p CABG, stroke COPD, GIGI, AAA, infrarenal 6.1 cm  on prior ct presented with right sided weakness started at 11am  3/8/23 at [facility] by ER report. Upon review patient notes  numbness  to right hand,  arm and leg denied slurred speech, changes in vision, weakness, vision. Patient on coumadin thinks that took it day of presentation.  Noted symptoms  were improving on ER eval, ER  NIH 0- INR 2.30, excluded from Tenecteplase . It was noted also thinks that had an old stroke with some residual numbness/weakness on the same side in the past - notes sometimes gets worse . Patient states at this time he feels back to normal.       Admission NIHSS  Pre-MRS  ICH Score (if applicable)    SUBJECTIVE: No events overnight.  No new neurologic complaints.  ROS reported negative unless otherwise noted.    acetaminophen     Tablet .. 650 milliGRAM(s) Oral every 6 hours PRN  albuterol/ipratropium for Nebulization 3 milliLiter(s) Nebulizer every 6 hours  aspirin enteric coated 81 milliGRAM(s) Oral daily  atorvastatin 40 milliGRAM(s) Oral at bedtime  bisacodyl Suppository 10 milliGRAM(s) Rectal daily PRN  budesonide 160 MICROgram(s)/formoterol 4.5 MICROgram(s) Inhaler 2 Puff(s) Inhalation two times a day  furosemide    Tablet 40 milliGRAM(s) Oral daily  losartan 25 milliGRAM(s) Oral daily  metoprolol tartrate 50 milliGRAM(s) Oral every 12 hours  nystatin Powder 1 Application(s) Topical two times a day  OLANZapine 10 milliGRAM(s) Oral at bedtime  oxybutynin XL 10 milliGRAM(s) Oral <User Schedule>  polyethylene glycol 3350 17 Gram(s) Oral daily  sertraline 50 milliGRAM(s) Oral daily  spironolactone 25 milliGRAM(s) Oral daily  tamsulosin 0.4 milliGRAM(s) Oral at bedtime      PHYSICAL EXAM:   Vital Signs Last 24 Hrs  T(C): 36.4 (13 Mar 2023 07:48), Max: 36.7 (12 Mar 2023 20:00)  T(F): 97.6 (13 Mar 2023 07:48), Max: 98.1 (13 Mar 2023 00:00)  HR: 106 (13 Mar 2023 04:00) (77 - 136)  BP: 106/72 (13 Mar 2023 04:00) (92/68 - 141/58)  BP(mean): 83 (13 Mar 2023 04:00) (71 - 92)  RR: 20 (13 Mar 2023 04:00) (19 - 20)  SpO2: 96% (13 Mar 2023 04:00) (94% - 98%)    Parameters below as of 13 Mar 2023 04:00  Patient On (Oxygen Delivery Method): room air    EXAM PENDING   General: No acute distress    NEUROLOGICAL EXAM:  Mental status: The patient is awake and alert and has normal attention span.  The patient is oriented to self, place, month, year, disoriented to date , perseverates to 24 .   The patient is able to name objects, follow commands     Cranial nerves: Pupils equal and react symmetrically to light. There is no visual field deficit to confrontation. Extraocular motion is full with no nystagmus. There is no ptosis. Facial sensation is intact. Slight right facial palsy that corrects on smile,    Tongue is midline.    Motor: There is normal bulk and tone.  There is no tremor.  Strength is 5/5 in the right arm and leg. Right hand subtle  drift   Strength is 5/5 in the left arm and leg.    LABS:                        12.1   5.09  )-----------( 191      ( 13 Mar 2023 06:00 )             39.5    03-13    137  |  100  |  19.9  ----------------------------<  106<H>  3.9   |  25.0  |  1.15    Ca    9.0      13 Mar 2023 06:00  Mg     1.9     03-12    TPro  6.6  /  Alb  3.1<L>  /  TBili  0.7  /  DBili  x   /  AST  15  /  ALT  12  /  AlkPhos  92  03-13  PT/INR - ( 13 Mar 2023 06:00 )   PT: 20.9 sec;   INR: 1.79 ratio               IMAGING: Reviewed by me.   MR Head No Cont (03.10.23 @ 15:46)   1. Left medial posterior frontal cortical and subcortical acute infarction  2. Left lateral posterior frontal cortical and subcortical remote   infarction ; additional tiny left anterior frontal cortical remote   infarctions  3. Left thalamic and left callosal body remote deep infarctions  4. Ischemic white matter disease greater than typical for age  5. Diffuse brain volume loss typical for age    CT Angio Head Neck Stroke Protocol w/ IV Cont (03.08.23 @ 13:22)   7 mL area of increased Tmax in the right parietal lobe suggesting brain   at risk versus artifact. No core infarct. No large vessel occlusion. 50%   stenosis in left carotid bulb/proximal internal carotid artery. Consider   MRI of the brain as clinically warranted. Additional findings above.    CT Brain Stroke Protocol (03.08.23 @ 12:40)    Moderate chronic microvascular changes without evidence of   an acute transcortical infarction or hemorrhage.     US Duplex Carotid Arteries Complete, Bilateral (02.23.23 @ 11:03)   IMPRESSION: No significant hemodynamic stenosis of either carotid artery.    TTE Echo Complete w/ Contrast w/ Doppler (02.21.23 @ 11:34)   Summary:   1. Endocardial visualization was enhanced with intravenous echo   contrast. No LV thrombus.   2. Left ventricular ejection fraction, by visual estimation, is 25 to   30%.   3. Severely decreased global left ventricular systolic function.   4. Abnormal septal motion consistent with post-operative status.   5. There is mild eccentric left ventricular hypertrophy.   6. The mitralin-flow pattern reveals no discernable A-wave, therefore   no comment on diastolic function can be made.   7. Normal right ventricular size and function, estimated PASP least 42   mmHg.   8. Severely enlarged left atrium.   9. Right atrial enlargement.  10. Moderate mitral annular calcification.  11. Mild-moderate tricuspid regurgitation.  12. Severe calcific aortic valve stenosis with low gradient,   dimensionless index 0.23.  13. There is moderate aortic root calcification.  14. Borderline dilatation of the aortic root, sinuses of Valsalva and   ascending aorta 3.9 cm, index to BSA 1.6 cm/m2 within normal.  15. Compared to the outpatient TTE study from 9/2022 prevoiusly LV EF 37%   and known low gradient aortic valve stenosis, but visually on current   study appears severely stenotic.          Preliminary note, offical recommendations pending attending review/signature   [facility] Stroke Team  Progress Note     HPI:  88 yr old male with hypertension,  atrial fibrillation, PE/DVT s/p IVC filter, Factor V Leiden on coumadin CAD s/p CABG, stroke COPD, GIGI, AAA, infrarenal 6.1 cm  on prior ct presented with right sided weakness started at 11am  3/8/23 at [facility] by ER report. Upon review patient notes  numbness  to right hand,  arm and leg denied slurred speech, changes in vision, weakness, vision. Patient on coumadin thinks that took it day of presentation.  Noted symptoms  were improving on ER eval, ER  NIH 0- INR 2.30, excluded from Tenecteplase . It was noted also thinks that had an old stroke with some residual numbness/weakness on the same side in the past - notes sometimes gets worse . Patient stated at this time he feels back to normal.        SUBJECTIVE: No events overnight.  No new neurologic complaints.  ROS reported negative unless otherwise noted.    acetaminophen     Tablet .. 650 milliGRAM(s) Oral every 6 hours PRN  albuterol/ipratropium for Nebulization 3 milliLiter(s) Nebulizer every 6 hours  aspirin enteric coated 81 milliGRAM(s) Oral daily  atorvastatin 40 milliGRAM(s) Oral at bedtime  bisacodyl Suppository 10 milliGRAM(s) Rectal daily PRN  budesonide 160 MICROgram(s)/formoterol 4.5 MICROgram(s) Inhaler 2 Puff(s) Inhalation two times a day  furosemide    Tablet 40 milliGRAM(s) Oral daily  losartan 25 milliGRAM(s) Oral daily  metoprolol tartrate 50 milliGRAM(s) Oral every 12 hours  nystatin Powder 1 Application(s) Topical two times a day  OLANZapine 10 milliGRAM(s) Oral at bedtime  oxybutynin XL 10 milliGRAM(s) Oral <User Schedule>  polyethylene glycol 3350 17 Gram(s) Oral daily  sertraline 50 milliGRAM(s) Oral daily  spironolactone 25 milliGRAM(s) Oral daily  tamsulosin 0.4 milliGRAM(s) Oral at bedtime      PHYSICAL EXAM:   Vital Signs Last 24 Hrs  T(C): 36.4 (13 Mar 2023 07:48), Max: 36.7 (12 Mar 2023 20:00)  T(F): 97.6 (13 Mar 2023 07:48), Max: 98.1 (13 Mar 2023 00:00)  HR: 106 (13 Mar 2023 04:00) (77 - 136)  BP: 106/72 (13 Mar 2023 04:00) (92/68 - 141/58)  BP(mean): 83 (13 Mar 2023 04:00) (71 - 92)  RR: 20 (13 Mar 2023 04:00) (19 - 20)  SpO2: 96% (13 Mar 2023 04:00) (94% - 98%)    Parameters below as of 13 Mar 2023 04:00  Patient On (Oxygen Delivery Method): room air       General: No acute distress    NEUROLOGICAL EXAM:  Mental status: The patient is awake and alert and has normal attention span.  The patient is oriented to self, notes [place] for location, oriented to month and year, disoriented to date. The patient is able to name objects, follow commands, repetition intact.     Cranial nerves: Pupils equal and react symmetrically to light. There is no visual field deficit to confrontation. Extraocular motion is full with no nystagmus. There is no ptosis. Facial sensation is intact. Slight right facial palsy that corrects on smile,    Tongue is midline.    Motor: There is normal bulk and tone.  There is no tremor.  Strength is 5/5 in the right arm and leg. Right hand subtle  drift   Strength is 5/5 in the left arm and leg.    LABS:                        12.1   5.09  )-----------( 191      ( 13 Mar 2023 06:00 )             39.5    03-13    137  |  100  |  19.9  ----------------------------<  106<H>  3.9   |  25.0  |  1.15    Ca    9.0      13 Mar 2023 06:00  Mg     1.9     03-12    TPro  6.6  /  Alb  3.1<L>  /  TBili  0.7  /  DBili  x   /  AST  15  /  ALT  12  /  AlkPhos  92  03-13  PT/INR - ( 13 Mar 2023 06:00 )   PT: 20.9 sec;   INR: 1.79 ratio               IMAGING: Reviewed by me.   MR Head No Cont (03.10.23 @ 15:46)   1. Left medial posterior frontal cortical and subcortical acute infarction  2. Left lateral posterior frontal cortical and subcortical remote   infarction ; additional tiny left anterior frontal cortical remote   infarctions  3. Left thalamic and left callosal body remote deep infarctions  4. Ischemic white matter disease greater than typical for age  5. Diffuse brain volume loss typical for age    CT Angio Head Neck Stroke Protocol w/ IV Cont (03.08.23 @ 13:22)   7 mL area of increased Tmax in the right parietal lobe suggesting brain   at risk versus artifact. No core infarct. No large vessel occlusion. 50%   stenosis in left carotid bulb/proximal internal carotid artery. Consider   MRI of the brain as clinically warranted. Additional findings above.    CT Brain Stroke Protocol (03.08.23 @ 12:40)    Moderate chronic microvascular changes without evidence of   an acute transcortical infarction or hemorrhage.     US Duplex Carotid Arteries Complete, Bilateral (02.23.23 @ 11:03)   IMPRESSION: No significant hemodynamic stenosis of either carotid artery.    TTE Echo Complete w/ Contrast w/ Doppler (02.21.23 @ 11:34)   Summary:   1. Endocardial visualization was enhanced with intravenous echo   contrast. No LV thrombus.   2. Left ventricular ejection fraction, by visual estimation, is 25 to   30%.   3. Severely decreased global left ventricular systolic function.   4. Abnormal septal motion consistent with post-operative status.   5. There is mild eccentric left ventricular hypertrophy.   6. The mitralin-flow pattern reveals no discernable A-wave, therefore   no comment on diastolic function can be made.   7. Normal right ventricular size and function, estimated PASP least 42   mmHg.   8. Severely enlarged left atrium.   9. Right atrial enlargement.  10. Moderate mitral annular calcification.  11. Mild-moderate tricuspid regurgitation.  12. Severe calcific aortic valve stenosis with low gradient,   dimensionless index 0.23.  13. There is moderate aortic root calcification.  14. Borderline dilatation of the aortic root, sinuses of Valsalva and   ascending aorta 3.9 cm, index to BSA 1.6 cm/m2 within normal.  15. Compared to the outpatient TTE study from 9/2022 prevoiusly LV EF 37%   and known low gradient aortic valve stenosis, but visually on current   study appears severely stenotic.          [facility] Stroke Team  Progress Note     HPI:  88 yr old male with hypertension,  atrial fibrillation, PE/DVT s/p IVC filter, Factor V Leiden on coumadin CAD s/p CABG, stroke COPD, GIGI, AAA, infrarenal 6.1 cm  on prior ct presented with right sided weakness started at 11am  3/8/23 at [facility] by ER report. Upon review patient notes  numbness  to right hand,  arm and leg denied slurred speech, changes in vision, weakness, vision. Patient on coumadin thinks that took it day of presentation.  Noted symptoms  were improving on ER eval, ER  NIH 0- INR 2.30, excluded from Tenecteplase . It was noted also thinks that had an old stroke with some residual numbness/weakness on the same side in the past - notes sometimes gets worse . Patient stated at this time he feels back to normal.        SUBJECTIVE: No events overnight.  No new neurologic complaints.  ROS reported negative unless otherwise noted.    acetaminophen     Tablet .. 650 milliGRAM(s) Oral every 6 hours PRN  albuterol/ipratropium for Nebulization 3 milliLiter(s) Nebulizer every 6 hours  aspirin enteric coated 81 milliGRAM(s) Oral daily  atorvastatin 40 milliGRAM(s) Oral at bedtime  bisacodyl Suppository 10 milliGRAM(s) Rectal daily PRN  budesonide 160 MICROgram(s)/formoterol 4.5 MICROgram(s) Inhaler 2 Puff(s) Inhalation two times a day  furosemide    Tablet 40 milliGRAM(s) Oral daily  losartan 25 milliGRAM(s) Oral daily  metoprolol tartrate 50 milliGRAM(s) Oral every 12 hours  nystatin Powder 1 Application(s) Topical two times a day  OLANZapine 10 milliGRAM(s) Oral at bedtime  oxybutynin XL 10 milliGRAM(s) Oral <User Schedule>  polyethylene glycol 3350 17 Gram(s) Oral daily  sertraline 50 milliGRAM(s) Oral daily  spironolactone 25 milliGRAM(s) Oral daily  tamsulosin 0.4 milliGRAM(s) Oral at bedtime      PHYSICAL EXAM:   Vital Signs Last 24 Hrs  T(C): 36.4 (13 Mar 2023 07:48), Max: 36.7 (12 Mar 2023 20:00)  T(F): 97.6 (13 Mar 2023 07:48), Max: 98.1 (13 Mar 2023 00:00)  HR: 106 (13 Mar 2023 04:00) (77 - 136)  BP: 106/72 (13 Mar 2023 04:00) (92/68 - 141/58)  BP(mean): 83 (13 Mar 2023 04:00) (71 - 92)  RR: 20 (13 Mar 2023 04:00) (19 - 20)  SpO2: 96% (13 Mar 2023 04:00) (94% - 98%)    Parameters below as of 13 Mar 2023 04:00  Patient On (Oxygen Delivery Method): room air       General: No acute distress    NEUROLOGICAL EXAM:  Mental status: The patient is awake and alert and has normal attention span.  The patient is oriented to self, notes [place] for location, oriented to month and year, disoriented to date. The patient is able to name objects, follow commands, repetition intact.     Cranial nerves: Pupils equal and react symmetrically to light. There is no visual field deficit to confrontation. Extraocular motion is full with no nystagmus. There is no ptosis. Facial sensation is intact. Slight right facial palsy that corrects on smile,    Tongue is midline.    Motor: There is normal bulk and tone.  There is no tremor.  Strength is 5/5 in the right arm and leg. Right hand subtle  drift   Strength is 5/5 in the left arm and leg.    LABS:                        12.1   5.09  )-----------( 191      ( 13 Mar 2023 06:00 )             39.5    03-13    137  |  100  |  19.9  ----------------------------<  106<H>  3.9   |  25.0  |  1.15    Ca    9.0      13 Mar 2023 06:00  Mg     1.9     03-12    TPro  6.6  /  Alb  3.1<L>  /  TBili  0.7  /  DBili  x   /  AST  15  /  ALT  12  /  AlkPhos  92  03-13  PT/INR - ( 13 Mar 2023 06:00 )   PT: 20.9 sec;   INR: 1.79 ratio               IMAGING: Reviewed by me.   MR Head No Cont (03.10.23 @ 15:46)   1. Left medial posterior frontal cortical and subcortical acute infarction  2. Left lateral posterior frontal cortical and subcortical remote   infarction ; additional tiny left anterior frontal cortical remote   infarctions  3. Left thalamic and left callosal body remote deep infarctions  4. Ischemic white matter disease greater than typical for age  5. Diffuse brain volume loss typical for age    CT Angio Head Neck Stroke Protocol w/ IV Cont (03.08.23 @ 13:22)   7 mL area of increased Tmax in the right parietal lobe suggesting brain   at risk versus artifact. No core infarct. No large vessel occlusion. 50%   stenosis in left carotid bulb/proximal internal carotid artery. Consider   MRI of the brain as clinically warranted. Additional findings above.    CT Brain Stroke Protocol (03.08.23 @ 12:40)    Moderate chronic microvascular changes without evidence of   an acute transcortical infarction or hemorrhage.     US Duplex Carotid Arteries Complete, Bilateral (02.23.23 @ 11:03)   IMPRESSION: No significant hemodynamic stenosis of either carotid artery.    TTE Echo Complete w/ Contrast w/ Doppler (02.21.23 @ 11:34)   Summary:   1. Endocardial visualization was enhanced with intravenous echo   contrast. No LV thrombus.   2. Left ventricular ejection fraction, by visual estimation, is 25 to   30%.   3. Severely decreased global left ventricular systolic function.   4. Abnormal septal motion consistent with post-operative status.   5. There is mild eccentric left ventricular hypertrophy.   6. The mitralin-flow pattern reveals no discernable A-wave, therefore   no comment on diastolic function can be made.   7. Normal right ventricular size and function, estimated PASP least 42   mmHg.   8. Severely enlarged left atrium.   9. Right atrial enlargement.  10. Moderate mitral annular calcification.  11. Mild-moderate tricuspid regurgitation.  12. Severe calcific aortic valve stenosis with low gradient,   dimensionless index 0.23.  13. There is moderate aortic root calcification.  14. Borderline dilatation of the aortic root, sinuses of Valsalva and   ascending aorta 3.9 cm, index to BSA 1.6 cm/m2 within normal.  15. Compared to the outpatient TTE study from 9/2022 prevoiusly LV EF 37%   and known low gradient aortic valve stenosis, but visually on current   study appears severely stenotic.

## 2023-03-13 NOTE — PROGRESS NOTE ADULT - ASSESSMENT
*PRELIMINARY NOTE   ASSESSMENT: 88 yr old male with hypertension,  atrial fibrillation, PE/DVT s/p IVC filter, Factor V Leiden; on coumadin CAD s/p CABG, stroke , COPD, GIGI, AAA, infrarenal 6.1 cm  on prior ct presented with right sided numbness started at around 11am  3/8/23 at [facility] by ER report. CT head without acute infarction or hemorrhage. Excluded from [facility] as INR > 1.7 (currently 2.30).  CT angiogram without evidence of large vessel occlusion- not thrombectomy ca ndidate. Moderate left carotid stenosis. MRI head demonstrates left hemispheric posterior frontal cortical and subcortical acute infarctions.  Remote thalamic and callosal infarcts.  Etiology possibly cardioembolic in setting of atrial fibrillation vs. hypercoagulable states as patient with history of factor V Leiden.        NEURO: Continue close monitoring for neurologic deterioration, permissive HTN overall < 160mmHg (anticoagulation use in setting of AAA),  110-140mmHg would avoid further hypotension and rapid fluctuations, titrate statin to LDL goal less than 70, MRI Brain w/o as noted, US carotid 2/23/23 with no hemodynamically significant stenosis ,  routine EEG, Physical therapy/OT/Speech eval/treatment.     ANTITHROMBOTIC THERAPY: neurologically suggest to continue therapeutic anticoagulation. Noted Warfarin as  home regimen.  Would confirm INR goal 2-3 with outpatient prescriber/heme/cardio and optimal agent as patient presenting INR 2.3 with acute infarction, additionally clarify adherence .     PULMONARY:   protecting airway, saturating well     CARDIOVASCULAR:  TTE as noted on 2/21/23 with EF 25-30% , cardiac monitoring to ensure rate control, cardiology following for further recommendations. AAA monitoring per cardiology.                               GASTROINTESTINAL:  dysphagia screen  complete/ pass  Diet: Puree, advance as tolerated    RENAL: BUN/Cr 19.9/1.15, maintain adequate hydration as tolerated , monitor urine output      Na Goal: Greater than 135    HEMATOLOGY: H/H 12.1/39.5, with mild anemia, monitor for bleeding, Platelets 191, hematology followup as patient with recurrent infarction despite presentation of therapeutic INR. Clarify Adherence      DVT ppx:   therapeutically anticoagulated with Warfarin    ID: afebrile, no leukocytosis, screen for infection    Other: Vascular follow up for right radial artery occlusion: no indication for any vascular intervention. Occlusion likely chronic. Suggested to continue therapeutic anticoagulation.     DISPOSITION: Rehab, depending on PT eval once stable and workup is complete      CORE MEASURES:     Admission NIHSS: 4  Tenecteplase : [] YES [x] NO   LDL/HDL/A1C: 58/34/5.9  Depression Screen:p   Statin Therapy: as noted   Dysphagia Screen: [x] PASS [] FAIL -   Smoking [] YES [x] NO      Afib [x] YES [] NO     Stroke Education [x] YES [] NO       ASSESSMENT: 88 yr old male with hypertension,  atrial fibrillation, PE/DVT s/p IVC filter, Factor V Leiden; on coumadin CAD s/p CABG, stroke , COPD, GIGI, AAA, infrarenal 6.1 cm  on prior ct presented with right sided numbness started at around 11am  3/8/23 at [facility] by ER report. CT head without acute infarction or hemorrhage. Excluded from [facility] as INR > 1.7 (currently 2.30).  CT angiogram without evidence of large vessel occlusion- not thrombectomy ca ndidate. Moderate left carotid stenosis. MRI head demonstrates left hemispheric posterior frontal cortical and subcortical acute infarctions.  Remote thalamic and callosal infarcts.  Etiology possibly cardioembolic in setting of atrial fibrillation vs. hypercoagulable states as patient with history of factor V Leiden.        NEURO: Continue close monitoring for neurologic deterioration, permissive HTN overall < 160mmHg (anticoagulation use in setting of AAA),  110-140mmHg would avoid further hypotension and rapid fluctuations, titrate statin to LDL goal less than 70, MRI Brain w/o as noted, US carotid 2/23/23 with no hemodynamically significant stenosis ,  routine EEG if clinical suspicion for seizure as reported confusion prior, Physical therapy/OT/Speech eval/treatment.     ANTITHROMBOTIC THERAPY: neurologically suggest to continue therapeutic anticoagulation. Noted Warfarin as  home regimen.  Would confirm INR goal 2-3 with outpatient prescriber/heme/cardio and optimal agent as patient presenting INR 2.3 with acute infarction, additionally clarify adherence .     PULMONARY:   protecting airway, saturating well     CARDIOVASCULAR:  TTE as noted on 2/21/23 with EF 25-30% , cardiac monitoring to ensure rate control, cardiology following for further recommendations. AAA monitoring per cardiology.                               GASTROINTESTINAL:  dysphagia screen  complete/ pass  Diet: Puree, advance as tolerated    RENAL: BUN/Cr 19.9/1.15, maintain adequate hydration as tolerated , monitor urine output      Na Goal: Greater than 135    HEMATOLOGY: H/H 12.1/39.5, with mild anemia, monitor for bleeding, Platelets 191, hematology followup as patient with recurrent infarction despite presentation of therapeutic INR. Clarify Adherence      DVT ppx:   therapeutically anticoagulated with Warfarin    ID: afebrile, no leukocytosis, screen for infection    Other: Vascular follow up for right radial artery occlusion: no indication for any vascular intervention. Occlusion likely chronic. Suggested to continue therapeutic anticoagulation.     DISPOSITION: Rehab, depending on PT eval once stable and workup is complete      CORE MEASURES:     Admission NIHSS: 4  Tenecteplase : [] YES [x] NO   LDL/HDL/A1C: 58/34/5.9  Depression Screen:p   Statin Therapy: as noted   Dysphagia Screen: [x] PASS [] FAIL -   Smoking [] YES [x] NO      Afib [x] YES [] NO     Stroke Education [x] YES [] NO

## 2023-03-14 LAB
ALBUMIN SERPL ELPH-MCNC: 2.9 G/DL — LOW (ref 3.3–5.2)
ALP SERPL-CCNC: 83 U/L — SIGNIFICANT CHANGE UP (ref 40–120)
ALT FLD-CCNC: 12 U/L — SIGNIFICANT CHANGE UP
ANION GAP SERPL CALC-SCNC: 10 MMOL/L — SIGNIFICANT CHANGE UP (ref 5–17)
ANION GAP SERPL CALC-SCNC: 16 MMOL/L — SIGNIFICANT CHANGE UP (ref 5–17)
APTT BLD: 27.1 SEC — LOW (ref 27.5–35.5)
AST SERPL-CCNC: 15 U/L — SIGNIFICANT CHANGE UP
BILIRUB SERPL-MCNC: 0.5 MG/DL — SIGNIFICANT CHANGE UP (ref 0.4–2)
BUN SERPL-MCNC: 22.2 MG/DL — HIGH (ref 8–20)
BUN SERPL-MCNC: 24.9 MG/DL — HIGH (ref 8–20)
CALCIUM SERPL-MCNC: 9 MG/DL — SIGNIFICANT CHANGE UP (ref 8.4–10.5)
CALCIUM SERPL-MCNC: 9.6 MG/DL — SIGNIFICANT CHANGE UP (ref 8.4–10.5)
CHLORIDE SERPL-SCNC: 102 MMOL/L — SIGNIFICANT CHANGE UP (ref 96–108)
CHLORIDE SERPL-SCNC: 103 MMOL/L — SIGNIFICANT CHANGE UP (ref 96–108)
CO2 SERPL-SCNC: 20 MMOL/L — LOW (ref 22–29)
CO2 SERPL-SCNC: 26 MMOL/L — SIGNIFICANT CHANGE UP (ref 22–29)
CREAT SERPL-MCNC: 1.31 MG/DL — HIGH (ref 0.5–1.3)
CREAT SERPL-MCNC: 1.38 MG/DL — HIGH (ref 0.5–1.3)
EGFR: 49 ML/MIN/1.73M2 — LOW
EGFR: 52 ML/MIN/1.73M2 — LOW
GLUCOSE BLDC GLUCOMTR-MCNC: 129 MG/DL — HIGH (ref 70–99)
GLUCOSE SERPL-MCNC: 131 MG/DL — HIGH (ref 70–99)
GLUCOSE SERPL-MCNC: 94 MG/DL — SIGNIFICANT CHANGE UP (ref 70–99)
HCT VFR BLD CALC: 38.5 % — LOW (ref 39–50)
HCT VFR BLD CALC: 41.6 % — SIGNIFICANT CHANGE UP (ref 39–50)
HGB BLD-MCNC: 11.6 G/DL — LOW (ref 13–17)
HGB BLD-MCNC: 12.7 G/DL — LOW (ref 13–17)
INR BLD: 1.91 RATIO — HIGH (ref 0.88–1.16)
INR BLD: 2.36 RATIO — HIGH (ref 0.88–1.16)
LACTATE SERPL-SCNC: 2.7 MMOL/L — HIGH (ref 0.5–2)
MAGNESIUM SERPL-MCNC: 2 MG/DL — SIGNIFICANT CHANGE UP (ref 1.6–2.6)
MCHC RBC-ENTMCNC: 27 PG — SIGNIFICANT CHANGE UP (ref 27–34)
MCHC RBC-ENTMCNC: 27 PG — SIGNIFICANT CHANGE UP (ref 27–34)
MCHC RBC-ENTMCNC: 30.1 GM/DL — LOW (ref 32–36)
MCHC RBC-ENTMCNC: 30.5 GM/DL — LOW (ref 32–36)
MCV RBC AUTO: 88.5 FL — SIGNIFICANT CHANGE UP (ref 80–100)
MCV RBC AUTO: 89.5 FL — SIGNIFICANT CHANGE UP (ref 80–100)
MRSA PCR RESULT.: SIGNIFICANT CHANGE UP
PHOSPHATE SERPL-MCNC: 3.3 MG/DL — SIGNIFICANT CHANGE UP (ref 2.4–4.7)
PLATELET # BLD AUTO: 191 K/UL — SIGNIFICANT CHANGE UP (ref 150–400)
PLATELET # BLD AUTO: 203 K/UL — SIGNIFICANT CHANGE UP (ref 150–400)
POTASSIUM SERPL-MCNC: 4.3 MMOL/L — SIGNIFICANT CHANGE UP (ref 3.5–5.3)
POTASSIUM SERPL-MCNC: 5 MMOL/L — SIGNIFICANT CHANGE UP (ref 3.5–5.3)
POTASSIUM SERPL-SCNC: 4.3 MMOL/L — SIGNIFICANT CHANGE UP (ref 3.5–5.3)
POTASSIUM SERPL-SCNC: 5 MMOL/L — SIGNIFICANT CHANGE UP (ref 3.5–5.3)
PROCALCITONIN SERPL-MCNC: 0.13 NG/ML — HIGH (ref 0.02–0.1)
PROT SERPL-MCNC: 6.1 G/DL — LOW (ref 6.6–8.7)
PROTHROM AB SERPL-ACNC: 22.3 SEC — HIGH (ref 10.5–13.4)
PROTHROM AB SERPL-ACNC: 27.6 SEC — HIGH (ref 10.5–13.4)
RBC # BLD: 4.3 M/UL — SIGNIFICANT CHANGE UP (ref 4.2–5.8)
RBC # BLD: 4.7 M/UL — SIGNIFICANT CHANGE UP (ref 4.2–5.8)
RBC # FLD: 15.6 % — HIGH (ref 10.3–14.5)
RBC # FLD: 15.6 % — HIGH (ref 10.3–14.5)
S AUREUS DNA NOSE QL NAA+PROBE: DETECTED
SODIUM SERPL-SCNC: 138 MMOL/L — SIGNIFICANT CHANGE UP (ref 135–145)
SODIUM SERPL-SCNC: 138 MMOL/L — SIGNIFICANT CHANGE UP (ref 135–145)
TROPONIN T SERPL-MCNC: 0.02 NG/ML — SIGNIFICANT CHANGE UP (ref 0–0.06)
WBC # BLD: 4.89 K/UL — SIGNIFICANT CHANGE UP (ref 3.8–10.5)
WBC # BLD: 9.05 K/UL — SIGNIFICANT CHANGE UP (ref 3.8–10.5)
WBC # FLD AUTO: 4.89 K/UL — SIGNIFICANT CHANGE UP (ref 3.8–10.5)
WBC # FLD AUTO: 9.05 K/UL — SIGNIFICANT CHANGE UP (ref 3.8–10.5)

## 2023-03-14 PROCEDURE — 71045 X-RAY EXAM CHEST 1 VIEW: CPT | Mod: 26

## 2023-03-14 PROCEDURE — 99233 SBSQ HOSP IP/OBS HIGH 50: CPT | Mod: FS

## 2023-03-14 PROCEDURE — 93010 ELECTROCARDIOGRAM REPORT: CPT

## 2023-03-14 PROCEDURE — 99233 SBSQ HOSP IP/OBS HIGH 50: CPT

## 2023-03-14 PROCEDURE — 99232 SBSQ HOSP IP/OBS MODERATE 35: CPT

## 2023-03-14 RX ORDER — METOPROLOL TARTRATE 50 MG
5 TABLET ORAL ONCE
Refills: 0 | Status: COMPLETED | OUTPATIENT
Start: 2023-03-14 | End: 2023-03-14

## 2023-03-14 RX ORDER — METOPROLOL TARTRATE 50 MG
5 TABLET ORAL EVERY 6 HOURS
Refills: 0 | Status: DISCONTINUED | OUTPATIENT
Start: 2023-03-14 | End: 2023-03-14

## 2023-03-14 RX ORDER — PIPERACILLIN AND TAZOBACTAM 4; .5 G/20ML; G/20ML
3.38 INJECTION, POWDER, LYOPHILIZED, FOR SOLUTION INTRAVENOUS ONCE
Refills: 0 | Status: COMPLETED | OUTPATIENT
Start: 2023-03-15 | End: 2023-03-15

## 2023-03-14 RX ORDER — CHLORHEXIDINE GLUCONATE 213 G/1000ML
1 SOLUTION TOPICAL
Refills: 0 | Status: DISCONTINUED | OUTPATIENT
Start: 2023-03-14 | End: 2023-03-22

## 2023-03-14 RX ORDER — METOPROLOL TARTRATE 50 MG
50 TABLET ORAL EVERY 6 HOURS
Refills: 0 | Status: DISCONTINUED | OUTPATIENT
Start: 2023-03-14 | End: 2023-03-14

## 2023-03-14 RX ORDER — SODIUM CHLORIDE 9 MG/ML
500 INJECTION INTRAMUSCULAR; INTRAVENOUS; SUBCUTANEOUS ONCE
Refills: 0 | Status: COMPLETED | OUTPATIENT
Start: 2023-03-14 | End: 2023-03-14

## 2023-03-14 RX ORDER — SODIUM CHLORIDE 9 MG/ML
250 INJECTION INTRAMUSCULAR; INTRAVENOUS; SUBCUTANEOUS ONCE
Refills: 0 | Status: COMPLETED | OUTPATIENT
Start: 2023-03-14 | End: 2023-03-14

## 2023-03-14 RX ORDER — VANCOMYCIN HCL 1 G
1500 VIAL (EA) INTRAVENOUS ONCE
Refills: 0 | Status: COMPLETED | OUTPATIENT
Start: 2023-03-14 | End: 2023-03-15

## 2023-03-14 RX ORDER — SODIUM CHLORIDE 9 MG/ML
1000 INJECTION INTRAMUSCULAR; INTRAVENOUS; SUBCUTANEOUS
Refills: 0 | Status: COMPLETED | OUTPATIENT
Start: 2023-03-14 | End: 2023-03-14

## 2023-03-14 RX ORDER — DILTIAZEM HCL 120 MG
5 CAPSULE, EXT RELEASE 24 HR ORAL ONCE
Refills: 0 | Status: COMPLETED | OUTPATIENT
Start: 2023-03-14 | End: 2023-03-14

## 2023-03-14 RX ORDER — PHENYLEPHRINE HYDROCHLORIDE 10 MG/ML
0.5 INJECTION INTRAVENOUS
Qty: 40 | Refills: 0 | Status: DISCONTINUED | OUTPATIENT
Start: 2023-03-14 | End: 2023-03-16

## 2023-03-14 RX ORDER — PIPERACILLIN AND TAZOBACTAM 4; .5 G/20ML; G/20ML
3.38 INJECTION, POWDER, LYOPHILIZED, FOR SOLUTION INTRAVENOUS ONCE
Refills: 0 | Status: COMPLETED | OUTPATIENT
Start: 2023-03-14 | End: 2023-03-14

## 2023-03-14 RX ORDER — DIGOXIN 250 MCG
500 TABLET ORAL ONCE
Refills: 0 | Status: COMPLETED | OUTPATIENT
Start: 2023-03-14 | End: 2023-03-14

## 2023-03-14 RX ORDER — VANCOMYCIN HCL 1 G
VIAL (EA) INTRAVENOUS
Refills: 0 | Status: DISCONTINUED | OUTPATIENT
Start: 2023-03-14 | End: 2023-03-14

## 2023-03-14 RX ORDER — PIPERACILLIN AND TAZOBACTAM 4; .5 G/20ML; G/20ML
3.38 INJECTION, POWDER, LYOPHILIZED, FOR SOLUTION INTRAVENOUS EVERY 12 HOURS
Refills: 0 | Status: DISCONTINUED | OUTPATIENT
Start: 2023-03-15 | End: 2023-03-16

## 2023-03-14 RX ORDER — ACETAMINOPHEN 500 MG
1000 TABLET ORAL ONCE
Refills: 0 | Status: COMPLETED | OUTPATIENT
Start: 2023-03-14 | End: 2023-03-14

## 2023-03-14 RX ORDER — WARFARIN SODIUM 2.5 MG/1
3 TABLET ORAL ONCE
Refills: 0 | Status: COMPLETED | OUTPATIENT
Start: 2023-03-14 | End: 2023-03-14

## 2023-03-14 RX ADMIN — Medication 40 MILLIGRAM(S): at 05:22

## 2023-03-14 RX ADMIN — Medication 50 MILLIGRAM(S): at 05:22

## 2023-03-14 RX ADMIN — SODIUM CHLORIDE 500 MILLILITER(S): 9 INJECTION INTRAMUSCULAR; INTRAVENOUS; SUBCUTANEOUS at 21:02

## 2023-03-14 RX ADMIN — Medication 5 MILLIGRAM(S): at 20:20

## 2023-03-14 RX ADMIN — Medication 81 MILLIGRAM(S): at 11:00

## 2023-03-14 RX ADMIN — Medication 1000 MILLIGRAM(S): at 21:30

## 2023-03-14 RX ADMIN — SERTRALINE 50 MILLIGRAM(S): 25 TABLET, FILM COATED ORAL at 11:00

## 2023-03-14 RX ADMIN — POLYETHYLENE GLYCOL 3350 17 GRAM(S): 17 POWDER, FOR SOLUTION ORAL at 11:01

## 2023-03-14 RX ADMIN — PIPERACILLIN AND TAZOBACTAM 200 GRAM(S): 4; .5 INJECTION, POWDER, LYOPHILIZED, FOR SOLUTION INTRAVENOUS at 22:14

## 2023-03-14 RX ADMIN — Medication 50 MILLIGRAM(S): at 17:10

## 2023-03-14 RX ADMIN — SODIUM CHLORIDE 250 MILLILITER(S): 9 INJECTION INTRAMUSCULAR; INTRAVENOUS; SUBCUTANEOUS at 19:30

## 2023-03-14 RX ADMIN — Medication 3 MILLILITER(S): at 14:51

## 2023-03-14 RX ADMIN — LOSARTAN POTASSIUM 25 MILLIGRAM(S): 100 TABLET, FILM COATED ORAL at 05:22

## 2023-03-14 RX ADMIN — SODIUM CHLORIDE 250 MILLILITER(S): 9 INJECTION INTRAMUSCULAR; INTRAVENOUS; SUBCUTANEOUS at 09:00

## 2023-03-14 RX ADMIN — Medication 5 MILLIGRAM(S): at 20:30

## 2023-03-14 RX ADMIN — SODIUM CHLORIDE 100 MILLILITER(S): 9 INJECTION INTRAMUSCULAR; INTRAVENOUS; SUBCUTANEOUS at 22:09

## 2023-03-14 RX ADMIN — Medication 5 MILLIGRAM(S): at 19:33

## 2023-03-14 RX ADMIN — Medication 650 MILLIGRAM(S): at 06:22

## 2023-03-14 RX ADMIN — TAMSULOSIN HYDROCHLORIDE 0.4 MILLIGRAM(S): 0.4 CAPSULE ORAL at 22:13

## 2023-03-14 RX ADMIN — WARFARIN SODIUM 3 MILLIGRAM(S): 2.5 TABLET ORAL at 22:14

## 2023-03-14 RX ADMIN — Medication 400 MILLIGRAM(S): at 20:52

## 2023-03-14 RX ADMIN — Medication 3 MILLILITER(S): at 04:08

## 2023-03-14 RX ADMIN — ATORVASTATIN CALCIUM 40 MILLIGRAM(S): 80 TABLET, FILM COATED ORAL at 22:14

## 2023-03-14 RX ADMIN — Medication 3 MILLILITER(S): at 08:21

## 2023-03-14 RX ADMIN — NYSTATIN CREAM 1 APPLICATION(S): 100000 CREAM TOPICAL at 05:21

## 2023-03-14 RX ADMIN — Medication 5 MILLIGRAM(S): at 18:41

## 2023-03-14 RX ADMIN — BUDESONIDE AND FORMOTEROL FUMARATE DIHYDRATE 2 PUFF(S): 160; 4.5 AEROSOL RESPIRATORY (INHALATION) at 08:21

## 2023-03-14 RX ADMIN — SPIRONOLACTONE 25 MILLIGRAM(S): 25 TABLET, FILM COATED ORAL at 05:22

## 2023-03-14 RX ADMIN — Medication 500 MICROGRAM(S): at 20:35

## 2023-03-14 RX ADMIN — SODIUM CHLORIDE 500 MILLILITER(S): 9 INJECTION INTRAMUSCULAR; INTRAVENOUS; SUBCUTANEOUS at 22:48

## 2023-03-14 RX ADMIN — Medication 650 MILLIGRAM(S): at 05:22

## 2023-03-14 NOTE — PROGRESS NOTE ADULT - NS ATTEND AMEND GEN_ALL_CORE FT
patient has advanced heart failuire.  was seen by cardiology team and signed off as no active issues.   admitted with piossible cerebrovascular accident   reconsulted as EF was found to be severe dysfucntion. which is old . no new changes.  patient is DNR. advanced heart dfailure.  not a candidate for advance theraopies. ct outpaitent GDMT.  not a candidate for ICD.   treatmetn of cerebrovascular accident   No further in-patient cardiac work-up/management is needed.  Follow-up in cardiology office in 2 weeks.

## 2023-03-14 NOTE — PROVIDER CONTACT NOTE (OTHER) - BACKGROUND
Spoke with Mr. Wilkerson and scheduled his 6 month follow-up for 1/4/22 with Patience Perez, MARKEL and labs.    Cardio previously signed off. Dr Gan called [facility] cardio to ask recs for pt. None

## 2023-03-14 NOTE — PROVIDER CONTACT NOTE (OTHER) - RECOMMENDATIONS
Give 5mg IV push Cardizem
Give 5mg IV push lopressor x1 now. Administer NS IVF bolus 250cc over 1 hour.

## 2023-03-14 NOTE — PROGRESS NOTE ADULT - SUBJECTIVE AND OBJECTIVE BOX
89 yo F hx dementia (A&Ox1 at baseline), hypothyroid, glaucoma, macular degeneration, UTIs, and hyponatremia presented to ED after a mechanical fall while getting out of bed. 11/29 CT revealed R proximal humerus fracture and COVID PCR positive. Ortho consulted, initially recs no surgery. 11/29 Xray Pelvis: Stable intact aligned partially visualized upper end of a previously seen left total hip prosthesis. (-) fx; XT Head (-) Department of Cardiology  [facility]/[place] [address] [address] [id number]  Telephone: [phone]. Fax:[phone]    Cardiology PA F/U Note Post PCI  SP: LHC  3 stents to CA. Full report to follow     Denies complaints of chest pain, SOB, dizziness, or palpitations. Asymptomatic    Vital Signs Last 24 Hrs  T(C): 36.9 (14 Mar 2023 16:32), Max: 36.9 (14 Mar 2023 16:32)  T(F): 98.4 (14 Mar 2023 16:32), Max: 98.4 (14 Mar 2023 16:32)  HR: 52 on monitor at 20:30   BP: 122/95 (14 Mar 2023 18:30) (102/67 - 129/85)  BP(mean): 93 (14 Mar 2023 12:00) (76 - 100)  RR: 18 (14 Mar 2023 16:32) (18 - 19)  SpO2: 98% (14 Mar 2023 16:32) (92% - 98%)    Parameters below as of 14 Mar 2023 16:32  Patient On (Oxygen Delivery Method): nasal cannula               11.6   4.89  )-----------( 191      ( 14 Mar 2023 05:55 )             38.5     03-14    138  |  103  |  22.2<H>  ----------------------------<  94  4.3   |  26.0  |  1.31<H>    Ca    9.0      14 Mar 2023 05:55    TPro  6.1<L>  /  Alb  2.9<L>  /  TBili  0.5  /  DBili  x   /  AST  15  /  ALT  12  /  AlkPhos  83  03-14    PT/INR - ( 14 Mar 2023 05:55 )   PT: 22.3 sec;   INR: 1.91 ratio      PHYSICAL EXAM:   Constitutional: Comfortable . No acute distress.   HEENT: Atraumatic and normocephalic , neck is supple . no JVD.   CNS: A&Ox3. No focal deficits.   Respiratory: CTAB, unlabored. No wheezing, No crackles or rhonchi   Cardiovascular: + Bradycardic  normal s1 s2. No murmur. No rubs or gallop.  Gastrointestinal: Soft, non-tender. +Bowel sounds.   Extremities: 2+ Peripheral Pulses, No edema  Psychiatric: Calm . no agitation.   Skin: Warm and dry    Assessment and Plan:    pt. is an 87 y/o male was sent in from [facility] rehab as per documentation was noted to have new RUE/ RLE weakness and numbness and with diminished rt. sided radial and pedal pulses. pt. stated that he went to bed fine and in the morning staff at the facility noted rt. sided weakness. pt. has known infrarenal 6.1 cm AAA. pt. thinks that he is ok and reports no sig. weakness of RUE/ RLE. no speech/ swallow difficulty. no facial droop, no vision change. no dizziness reported. no cp, no abd. pain, no n/v/d. no hematemesis, no hemoptysis, no rectal blood reported. pt. knows that he is in the hospital, knows current president name and current year. pt. passed bedside swallow eval per pt's nurse. pt's molst with dnr/dni reviewed, signed during his recent admission.      Pt is now [facility] [place] with 3 stents to RCA  EKG post intervention shows    Plan:   - Bedrest for  1    hours  - Continue current meds. Discussed with Dr Zuleta Pt got Brillinta 90 mg in the lab  - AM labs, and EKG ordered  - Medication compliance, groin post procedural care and instructions reviewed with patient,   - Diet instruction given for low cholesterol and low sodium  - Cardiac rehab info provided to optimize full recovery. Referral and communication to cardiac rehab completed  - Plan for discharge home when stable  - all questions answered and education completed  - Case discussed with Dr Zuleta

## 2023-03-14 NOTE — RAPID RESPONSE TEAM SUMMARY - NSADDTLFINDINGSRRT_GEN_ALL_CORE
BP: 114/76  HR: 142  RR: 25  Temp: 98.1F oral, 105F rectal  SpO2: 87% on 2L NC  Blood Sugar: 129  EKG with rapid AFib @ 161bpm.

## 2023-03-14 NOTE — CHART NOTE - NSCHARTNOTEFT_GEN_A_CORE
Patient seen for 2 hour Rapid Response follow-up.   Patient resting comfortably, in NAD. Patient continues to deny any complaints, however he is a poor historian.     Vital Signs:  T(F): 104.1  HR: 135  BP: 74/48  RR: 26  SpO2: 100% on NRB mask                          12.7   9.05  )-----------( 203      ( 14 Mar 2023 20:35 )             41.6   03-14    138  |  102  |  24.9<H>  ----------------------------<  131<H>  5.0   |  20.0<L>  |  1.38<H>    Ca    9.6      14 Mar 2023 20:35  Phos  3.3     03-14  Mg     2.0     03-14    TPro  6.1<L>  /  Alb  2.9<L>  /  TBili  0.5  /  DBili  x   /  AST  15  /  ALT  12  /  AlkPhos  83  03-14    Lactate: 2.7    Additional 500cc NS bolus ordered for hypotension and elevated lactate.   Unable to get urine sample at this time. Will start empiric dosing of Vancomycin and Zosyn for unknown source of fever.   Left voicemail for HCP, daughter Jasmina. Spoke with niece, Melanie who will try and get in touch with her. Patient seen for 2 hour Rapid Response follow-up.   Patient resting comfortably, in NAD. Patient continues to deny any complaints, however he is a poor historian.     Vital Signs:  T(F): 104.1  HR: 135  BP: 74/48  RR: 26  SpO2: 100% on NRB mask                          12.7   9.05  )-----------( 203      ( 14 Mar 2023 20:35 )             41.6   03-14    138  |  102  |  24.9<H>  ----------------------------<  131<H>  5.0   |  20.0<L>  |  1.38<H>    Ca    9.6      14 Mar 2023 20:35  Phos  3.3     03-14  Mg     2.0     03-14    TPro  6.1<L>  /  Alb  2.9<L>  /  TBili  0.5  /  DBili  x   /  AST  15  /  ALT  12  /  AlkPhos  83  03-14    Lactate: 2.7    Additional 500cc NS bolus ordered for hypotension and elevated lactate.   Unable to get urine sample at this time. Will start empiric dosing of Vancomycin and Zosyn for unknown source of fever.   Left voicemail for HCP, daughter Jasmina. Spoke with niece, Melanie who will try and get in touch with her.    BP unresponsive to IVF bolus, MICU consulted.  Will transfer to MICU.

## 2023-03-14 NOTE — RAPID RESPONSE TEAM SUMMARY - NSSITUATIONBACKGROUNDRRT_GEN_ALL_CORE
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 Found to have acute CVA.  Patient with poorly controlled afib since around 5pm. He had received 50mg Lopressor, 5mg Cardizem IVP, and just received 5mg Lopressor IVP prior to Rapid Response being called for HR 160s-200. Patient states he is doing well, denies any complaints, however appears to be poor historian, AOx2 at baseline.

## 2023-03-14 NOTE — PROVIDER CONTACT NOTE (OTHER) - SITUATION
Second call to Dr. Gan for pt who remains in afib with RVR despite PO metoprolol.
Pt given PO metoprolol and IV cardizem with no significant change in HR. Pt remains Afib with RVR up to 170. /85

## 2023-03-14 NOTE — RAPID RESPONSE TEAM SUMMARY - NSMEDICATIONSRRT_GEN_ALL_CORE
5mg Lopressor IVP x2 doses for total of 3 doses  0.5mg Digoxin IVP  Dr. Grayson spoke with Dr. Hernandez about above recommendations  1g Ofirmev

## 2023-03-14 NOTE — PROGRESS NOTE ADULT - SUBJECTIVE AND OBJECTIVE BOX
[facility] Division of Hospital Medicine    Chief Complaint: right sided weakness / right radial artery occlusion     SUBJECTIVE / OVERNIGHT EVENTS: No acute events overnight. HD stable.     Patient denies chest pain, SOB, abd pain, N/V, fever, chills, dysuria or any other complaints. All remainder ROS negative.     MEDICATIONS  (STANDING):  albuterol/ipratropium for Nebulization 3 milliLiter(s) Nebulizer every 6 hours  aspirin enteric coated 81 milliGRAM(s) Oral daily  atorvastatin 40 milliGRAM(s) Oral at bedtime  budesonide 160 MICROgram(s)/formoterol 4.5 MICROgram(s) Inhaler 2 Puff(s) Inhalation two times a day  furosemide    Tablet 40 milliGRAM(s) Oral daily  losartan 25 milliGRAM(s) Oral daily  metoprolol tartrate 50 milliGRAM(s) Oral every 12 hours  nystatin Powder 1 Application(s) Topical two times a day  OLANZapine 10 milliGRAM(s) Oral at bedtime  oxybutynin XL 10 milliGRAM(s) Oral <User Schedule>  polyethylene glycol 3350 17 Gram(s) Oral daily  sertraline 50 milliGRAM(s) Oral daily  spironolactone 25 milliGRAM(s) Oral daily  tamsulosin 0.4 milliGRAM(s) Oral at bedtime  warfarin 3 milliGRAM(s) Oral once    MEDICATIONS  (PRN):  acetaminophen     Tablet .. 650 milliGRAM(s) Oral every 6 hours PRN Mild Pain (1 - 3)  bisacodyl Suppository 10 milliGRAM(s) Rectal daily PRN Constipation        I&O's Summary    13 Mar 2023 07:  -  14 Mar 2023 07:00  --------------------------------------------------------  IN: 1070 mL / OUT: 850 mL / NET: 220 mL    14 Mar 2023 07:  -  14 Mar 2023 12:34  --------------------------------------------------------  IN: 750 mL / OUT: 150 mL / NET: 600 mL        PHYSICAL EXAM:  Vital Signs Last 24 Hrs  T(C): 36.5 (14 Mar 2023 08:08), Max: 36.8 (14 Mar 2023 00:00)  T(F): 97.7 (14 Mar 2023 08:08), Max: 98.3 (14 Mar 2023 00:00)  HR: 97 (14 Mar 2023 12:00) (3 - 115)  BP: 125/82 (14 Mar 2023 12:00) (92/62 - 129/85)  BP(mean): 93 (14 Mar 2023 12:00) (76 - 100)  RR: 19 (14 Mar 2023 12:00) (16 - 20)  SpO2: 95% (14 Mar 2023 12:00) (81% - 99%)    Parameters below as of 14 Mar 2023 12:00  Patient On (Oxygen Delivery Method): room air            CONSTITUTIONAL: NAD, well-developed, well-groomed  ENMT: Moist oral mucosa, no pharyngeal injection or exudates; normal dentition  RESPIRATORY: Normal respiratory effort; lungs are clear to auscultation bilaterally  CARDIOVASCULAR: Regular rate and rhythm, normal S1 and S2, no murmur/rub/gallop; No lower extremity edema; Peripheral pulses are 2+ bilaterally  ABDOMEN: Nontender to palpation, normoactive bowel sounds, no rebound/guarding; No hepatosplenomegaly  MUSCLOSKELETAL:  Normal gait; no clubbing or cyanosis of digits; no joint swelling or tenderness to palpation  PSYCH: A+O to person, place, and time; affect appropriate  NEUROLOGY: CN 2-12 are intact and symmetric; no gross sensory deficits;   SKIN: No rashes; no palpable lesions    LABS:                        11.6   4.89  )-----------( 191      ( 14 Mar 2023 05:55 )             38.5     03-14    138  |  103  |  22.2<H>  ----------------------------<  94  4.3   |  26.0  |  1.31<H>    Ca    9.0      14 Mar 2023 05:55    TPro  6.1<L>  /  Alb  2.9<L>  /  TBili  0.5  /  DBili  x   /  AST  15  /  ALT  12  /  AlkPhos  83  03-14    PT/INR - ( 14 Mar 2023 05:55 )   PT: 22.3 sec;   INR: 1.91 ratio               Urinalysis Basic - ( 13 Mar 2023 14:50 )    Color: Yellow / Appearance: Clear / S.005 / pH: x  Gluc: x / Ketone: Negative  / Bili: Negative / Urobili: Negative mg/dL   Blood: x / Protein: Negative / Nitrite: Negative   Leuk Esterase: Negative / RBC: x / WBC x   Sq Epi: x / Non Sq Epi: x / Bacteria: x        CAPILLARY BLOOD GLUCOSE            RADIOLOGY & ADDITIONAL TESTS:  Results Reviewed:   Imaging Personally Reviewed:  Electrocardiogram Personally Reviewed:                                           [facility] Division of Hospital Medicine    Chief Complaint: right sided weakness / right radial artery occlusion     SUBJECTIVE / OVERNIGHT EVENTS: No acute events overnight. HD stable.     Patient denies chest pain, SOB, abd pain, N/V, fever, chills, dysuria or any other complaints. All remainder ROS negative.     MEDICATIONS  (STANDING):  albuterol/ipratropium for Nebulization 3 milliLiter(s) Nebulizer every 6 hours  aspirin enteric coated 81 milliGRAM(s) Oral daily  atorvastatin 40 milliGRAM(s) Oral at bedtime  budesonide 160 MICROgram(s)/formoterol 4.5 MICROgram(s) Inhaler 2 Puff(s) Inhalation two times a day  furosemide    Tablet 40 milliGRAM(s) Oral daily  losartan 25 milliGRAM(s) Oral daily  metoprolol tartrate 50 milliGRAM(s) Oral every 12 hours  nystatin Powder 1 Application(s) Topical two times a day  OLANZapine 10 milliGRAM(s) Oral at bedtime  oxybutynin XL 10 milliGRAM(s) Oral <User Schedule>  polyethylene glycol 3350 17 Gram(s) Oral daily  sertraline 50 milliGRAM(s) Oral daily  spironolactone 25 milliGRAM(s) Oral daily  tamsulosin 0.4 milliGRAM(s) Oral at bedtime  warfarin 3 milliGRAM(s) Oral once    MEDICATIONS  (PRN):  acetaminophen     Tablet .. 650 milliGRAM(s) Oral every 6 hours PRN Mild Pain (1 - 3)  bisacodyl Suppository 10 milliGRAM(s) Rectal daily PRN Constipation        I&O's Summary    13 Mar 2023 07:  -  14 Mar 2023 07:00  --------------------------------------------------------  IN: 1070 mL / OUT: 850 mL / NET: 220 mL    14 Mar 2023 07:  -  14 Mar 2023 12:34  --------------------------------------------------------  IN: 750 mL / OUT: 150 mL / NET: 600 mL        PHYSICAL EXAM:  Vital Signs Last 24 Hrs  T(C): 36.5 (14 Mar 2023 08:08), Max: 36.8 (14 Mar 2023 00:00)  T(F): 97.7 (14 Mar 2023 08:08), Max: 98.3 (14 Mar 2023 00:00)  HR: 97 (14 Mar 2023 12:00) (3 - 115)  BP: 125/82 (14 Mar 2023 12:00) (92/62 - 129/85)  BP(mean): 93 (14 Mar 2023 12:00) (76 - 100)  RR: 19 (14 Mar 2023 12:00) (16 - 20)  SpO2: 95% (14 Mar 2023 12:00) (81% - 99%)    Parameters below as of 14 Mar 2023 12:00  Patient On (Oxygen Delivery Method): room air        CONSTITUTIONAL: NAD, sitting up in bed  RESPIRATORY: Normal respiratory effort; lungs with reduced BS bilaterally  CARDIOVASCULAR: Regular rate and rhythm, normal S1 and S2   ABDOMEN: Nontender to palpation, normoactive bowel sounds  MUSCULOSKELETAL:  No clubbing or cyanosis of digits   PSYCH: A+O to person, place, and time; affect appropriate  NEUROLOGY: No gross sensory or motor deficits       LABS:                        11.6   4.89  )-----------( 191      ( 14 Mar 2023 05:55 )             38.5     03-14    138  |  103  |  22.2<H>  ----------------------------<  94  4.3   |  26.0  |  1.31<H>    Ca    9.0      14 Mar 2023 05:55    TPro  6.1<L>  /  Alb  2.9<L>  /  TBili  0.5  /  DBili  x   /  AST  15  /  ALT  12  /  AlkPhos  83  03-14    PT/INR - ( 14 Mar 2023 05:55 )   PT: 22.3 sec;   INR: 1.91 ratio               Urinalysis Basic - ( 13 Mar 2023 14:50 )    Color: Yellow / Appearance: Clear / S.005 / pH: x  Gluc: x / Ketone: Negative  / Bili: Negative / Urobili: Negative mg/dL   Blood: x / Protein: Negative / Nitrite: Negative   Leuk Esterase: Negative / RBC: x / WBC x   Sq Epi: x / Non Sq Epi: x / Bacteria: x        CAPILLARY BLOOD GLUCOSE            RADIOLOGY & ADDITIONAL TESTS:  Results Reviewed:   Imaging Personally Reviewed:  < from: TTE Echo Complete w/ Contrast w/ Doppler (23 @ 12:55) >   1. Left ventricular ejection fraction, by visual estimation, is 20 to   25%.   2. Moderately to severely decreased global left ventricular systolic   function.   3. [place] is abnormal as described above.   4. The mitral in-flow pattern reveals no discernable A-wave, therefore   no comment on diastolic function can be made.   5. No LV thrombus.   6. Moderately reduced RV systolic function.   7. There is no evidence of pericardial effusion.   8. Mild thickening of the anterior and posterior mitral valve leaflets.   9. Moderate mitral annular calcification.  10. Mild-moderate tricuspid regurgitation.  11. Aortic Stenosis with V max 3.1 m/sec, Mean gradient of 23 mm Hg.  12. Endocardial visualization was enhanced with intravenous echo contrast.    < end of copied text >        Electrocardiogram Personally Reviewed:

## 2023-03-14 NOTE — PROVIDER CONTACT NOTE (CHANGE IN STATUS NOTIFICATION) - SITUATION
Pt recevied from 3T SDU Afib with RVR up to 130's Pt recevied from 3T SDU Afib with RVR up to 130's /75

## 2023-03-14 NOTE — PROGRESS NOTE ADULT - ASSESSMENT
89 y/o male with PMHx of HFrEF,  HTN, HLD, Factor V Leiden, PE (S/P IVC filter), A-fib, CAD/CABG x2 with LIMA-LAD and SVG-PDA with AV fibroelastoma resection in 2019, COPD, CVA, AS, and prostate CA who was sent in from [facility] rehab as per documentation was noted to have new RUE/ RLE weakness and numbness and with diminished rt. sided radial and pedal pulses. Found to have uncontrolled afib, subsequently found to have CVA.

## 2023-03-14 NOTE — PROVIDER CONTACT NOTE (CHANGE IN STATUS NOTIFICATION) - ASSESSMENT
Pt appears anxious, asked Dr. Gan if IV rate control meds may be beneficial at this time however pt is due for PO metoprolol.

## 2023-03-14 NOTE — PROGRESS NOTE ADULT - SUBJECTIVE AND OBJECTIVE BOX
[facility] PHYSICIAN PARTNERS                                                         CARDIOLOGY AT [facility]                                                                  [address], [place] [address]                                                         Telephone: [phone]. Fax:[phone]                                                                             PROGRESS NOTE    Reason for follow up: HFrEF  Update: EF is functionally the same, no further inpatient cardiology workup or recommendations, will sign off       Review of symptoms:   confused     Vitals:  T(C): 36.5 (03-14-23 @ 08:08), Max: 36.8 (03-14-23 @ 00:00)  HR: 78 (03-14-23 @ 14:50) (3 - 791)  BP: 125/82 (03-14-23 @ 12:00) (92/62 - 129/85)  RR: 19 (03-14-23 @ 12:00) (18 - 20)  SpO2: 94% (03-14-23 @ 14:50) (92% - 98%)  Wt(kg): --  I&O's Summary    13 Mar 2023 07:01  -  14 Mar 2023 07:00  --------------------------------------------------------  IN: 1070 mL / OUT: 850 mL / NET: 220 mL    14 Mar 2023 07:01  -  14 Mar 2023 15:42  --------------------------------------------------------  IN: 750 mL / OUT: 275 mL / NET: 475 mL    PHYSICAL EXAM:  Appearance: Comfortable. No acute distress, obese   HEENT:  Atraumatic. Normocephalic.  Normal oral mucosa  Neurologic: pleasantly confused   Cardiovascular: RRR S1 S2, No murmur, no rubs/gallops. No JVD  Respiratory: Lungs clear to auscultation, unlabored   Gastrointestinal:  Soft, Non-tender, + BS  Lower Extremities: 2+ Peripheral Pulses, No clubbing, cyanosis, or edema  Psychiatry: Patient is calm. No agitation.   Skin: warm and dry.    CURRENT CARDIAC MEDICATIONS:  furosemide    Tablet 40 milliGRAM(s) Oral daily  losartan 25 milliGRAM(s) Oral daily  metoprolol tartrate 50 milliGRAM(s) Oral every 12 hours  spironolactone 25 milliGRAM(s) Oral daily      CURRENT OTHER MEDICATIONS:  albuterol/ipratropium for Nebulization 3 milliLiter(s) Nebulizer every 6 hours  budesonide 160 MICROgram(s)/formoterol 4.5 MICROgram(s) Inhaler 2 Puff(s) Inhalation two times a day  acetaminophen     Tablet .. 650 milliGRAM(s) Oral every 6 hours PRN Mild Pain (1 - 3)  OLANZapine 10 milliGRAM(s) Oral at bedtime  sertraline 50 milliGRAM(s) Oral daily  bisacodyl Suppository 10 milliGRAM(s) Rectal daily PRN Constipation  polyethylene glycol 3350 17 Gram(s) Oral daily  atorvastatin 40 milliGRAM(s) Oral at bedtime  aspirin enteric coated 81 milliGRAM(s) Oral daily  nystatin Powder 1 Application(s) Topical two times a day, Stop order after: 7 Days  oxybutynin XL 10 milliGRAM(s) Oral <User Schedule>  tamsulosin 0.4 milliGRAM(s) Oral at bedtime  warfarin 3 milliGRAM(s) Oral once, Stop order after: 1 Doses      LABS:	 	  ( 08 Mar 2023 12:50 )  Troponin T  0.01 ,  CPK  X    , CKMB  X    , BNP X                                  11.6   4.89  )-----------( 191      ( 14 Mar 2023 05:55 )             38.5     03-14    138  |  103  |  22.2<H>  ----------------------------<  94  4.3   |  26.0  |  1.31<H>    Ca    9.0      14 Mar 2023 05:55    TPro  6.1<L>  /  Alb  2.9<L>  /  TBili  0.5  /  DBili  x   /  AST  15  /  ALT  12  /  AlkPhos  83  03-14    PT/INR/PTT ( 14 Mar 2023 05:55 )                       :                       :      22.3         :       X                     .        .                   .              .           .       1.91        .                                       Lipid Profile: Date: 03-09 @ 06:06  Total cholesterol 124; Direct LDL: --; HDL: 34; Triglycerides:159

## 2023-03-15 LAB
ALBUMIN SERPL ELPH-MCNC: 3.2 G/DL — LOW (ref 3.3–5.2)
ALBUMIN SERPL ELPH-MCNC: 3.6 G/DL — SIGNIFICANT CHANGE UP (ref 3.3–5.2)
ALP SERPL-CCNC: 81 U/L — SIGNIFICANT CHANGE UP (ref 40–120)
ALP SERPL-CCNC: 87 U/L — SIGNIFICANT CHANGE UP (ref 40–120)
ALT FLD-CCNC: 12 U/L — SIGNIFICANT CHANGE UP
ALT FLD-CCNC: 13 U/L — SIGNIFICANT CHANGE UP
AMMONIA BLD-MCNC: 15 UMOL/L — SIGNIFICANT CHANGE UP (ref 11–55)
ANION GAP SERPL CALC-SCNC: 11 MMOL/L — SIGNIFICANT CHANGE UP (ref 5–17)
ANION GAP SERPL CALC-SCNC: 14 MMOL/L — SIGNIFICANT CHANGE UP (ref 5–17)
APPEARANCE UR: CLEAR — SIGNIFICANT CHANGE UP
AST SERPL-CCNC: 18 U/L — SIGNIFICANT CHANGE UP
AST SERPL-CCNC: 18 U/L — SIGNIFICANT CHANGE UP
B CEREUS GROUP DNA BLD POS QL NAA+PROBE: SIGNIFICANT CHANGE UP
BASOPHILS # BLD AUTO: 0 K/UL — SIGNIFICANT CHANGE UP (ref 0–0.2)
BASOPHILS NFR BLD AUTO: 0 % — SIGNIFICANT CHANGE UP (ref 0–2)
BILIRUB SERPL-MCNC: 0.8 MG/DL — SIGNIFICANT CHANGE UP (ref 0.4–2)
BILIRUB SERPL-MCNC: 0.9 MG/DL — SIGNIFICANT CHANGE UP (ref 0.4–2)
BILIRUB UR-MCNC: NEGATIVE — SIGNIFICANT CHANGE UP
BUN SERPL-MCNC: 27.1 MG/DL — HIGH (ref 8–20)
BUN SERPL-MCNC: 27.7 MG/DL — HIGH (ref 8–20)
BURR CELLS BLD QL SMEAR: PRESENT — SIGNIFICANT CHANGE UP
CALCIUM SERPL-MCNC: 8.9 MG/DL — SIGNIFICANT CHANGE UP (ref 8.4–10.5)
CALCIUM SERPL-MCNC: 9.3 MG/DL — SIGNIFICANT CHANGE UP (ref 8.4–10.5)
CHLORIDE SERPL-SCNC: 101 MMOL/L — SIGNIFICANT CHANGE UP (ref 96–108)
CHLORIDE SERPL-SCNC: 103 MMOL/L — SIGNIFICANT CHANGE UP (ref 96–108)
CK SERPL-CCNC: 108 U/L — SIGNIFICANT CHANGE UP (ref 30–200)
CO2 SERPL-SCNC: 22 MMOL/L — SIGNIFICANT CHANGE UP (ref 22–29)
CO2 SERPL-SCNC: 26 MMOL/L — SIGNIFICANT CHANGE UP (ref 22–29)
COLOR SPEC: YELLOW — SIGNIFICANT CHANGE UP
CREAT SERPL-MCNC: 1.39 MG/DL — HIGH (ref 0.5–1.3)
CREAT SERPL-MCNC: 1.67 MG/DL — HIGH (ref 0.5–1.3)
DIFF PNL FLD: ABNORMAL
DIGOXIN SERPL-MCNC: 0.9 NG/ML — SIGNIFICANT CHANGE UP (ref 0.8–2)
EGFR: 39 ML/MIN/1.73M2 — LOW
EGFR: 49 ML/MIN/1.73M2 — LOW
ELLIPTOCYTES BLD QL SMEAR: SIGNIFICANT CHANGE UP
EOSINOPHIL # BLD AUTO: 0 K/UL — SIGNIFICANT CHANGE UP (ref 0–0.5)
EOSINOPHIL NFR BLD AUTO: 0 % — SIGNIFICANT CHANGE UP (ref 0–6)
EPI CELLS # UR: SIGNIFICANT CHANGE UP
GLUCOSE SERPL-MCNC: 105 MG/DL — HIGH (ref 70–99)
GLUCOSE SERPL-MCNC: 96 MG/DL — SIGNIFICANT CHANGE UP (ref 70–99)
GLUCOSE UR QL: NEGATIVE MG/DL — SIGNIFICANT CHANGE UP
GRAM STN FLD: SIGNIFICANT CHANGE UP
HCT VFR BLD CALC: 37.6 % — LOW (ref 39–50)
HCT VFR BLD CALC: 41.2 % — SIGNIFICANT CHANGE UP (ref 39–50)
HGB BLD-MCNC: 11.6 G/DL — LOW (ref 13–17)
HGB BLD-MCNC: 12.6 G/DL — LOW (ref 13–17)
INR BLD: 2.35 RATIO — HIGH (ref 0.88–1.16)
KETONES UR-MCNC: NEGATIVE — SIGNIFICANT CHANGE UP
LACTATE SERPL-SCNC: 2.2 MMOL/L — HIGH (ref 0.5–2)
LEUKOCYTE ESTERASE UR-ACNC: ABNORMAL
LYMPHOCYTES # BLD AUTO: 0.3 K/UL — LOW (ref 1–3.3)
LYMPHOCYTES # BLD AUTO: 2.6 % — LOW (ref 13–44)
MAGNESIUM SERPL-MCNC: 2 MG/DL — SIGNIFICANT CHANGE UP (ref 1.8–2.6)
MANUAL SMEAR VERIFICATION: SIGNIFICANT CHANGE UP
MCHC RBC-ENTMCNC: 27.2 PG — SIGNIFICANT CHANGE UP (ref 27–34)
MCHC RBC-ENTMCNC: 27.3 PG — SIGNIFICANT CHANGE UP (ref 27–34)
MCHC RBC-ENTMCNC: 30.6 GM/DL — LOW (ref 32–36)
MCHC RBC-ENTMCNC: 30.9 GM/DL — LOW (ref 32–36)
MCV RBC AUTO: 88.5 FL — SIGNIFICANT CHANGE UP (ref 80–100)
MCV RBC AUTO: 89 FL — SIGNIFICANT CHANGE UP (ref 80–100)
METHOD TYPE: SIGNIFICANT CHANGE UP
MONOCYTES # BLD AUTO: 0.6 K/UL — SIGNIFICANT CHANGE UP (ref 0–0.9)
MONOCYTES NFR BLD AUTO: 5.2 % — SIGNIFICANT CHANGE UP (ref 2–14)
MSSA DNA SPEC QL NAA+PROBE: SIGNIFICANT CHANGE UP
NEUTROPHILS # BLD AUTO: 10.48 K/UL — HIGH (ref 1.8–7.4)
NEUTROPHILS NFR BLD AUTO: 91.3 % — HIGH (ref 43–77)
NITRITE UR-MCNC: NEGATIVE — SIGNIFICANT CHANGE UP
OVALOCYTES BLD QL SMEAR: SIGNIFICANT CHANGE UP
PH UR: 6 — SIGNIFICANT CHANGE UP (ref 5–8)
PHOSPHATE SERPL-MCNC: 2.8 MG/DL — SIGNIFICANT CHANGE UP (ref 2.4–4.7)
PLAT MORPH BLD: NORMAL — SIGNIFICANT CHANGE UP
PLATELET # BLD AUTO: 161 K/UL — SIGNIFICANT CHANGE UP (ref 150–400)
PLATELET # BLD AUTO: 200 K/UL — SIGNIFICANT CHANGE UP (ref 150–400)
POIKILOCYTOSIS BLD QL AUTO: SIGNIFICANT CHANGE UP
POTASSIUM SERPL-MCNC: 4.2 MMOL/L — SIGNIFICANT CHANGE UP (ref 3.5–5.3)
POTASSIUM SERPL-MCNC: 4.6 MMOL/L — SIGNIFICANT CHANGE UP (ref 3.5–5.3)
POTASSIUM SERPL-SCNC: 4.2 MMOL/L — SIGNIFICANT CHANGE UP (ref 3.5–5.3)
POTASSIUM SERPL-SCNC: 4.6 MMOL/L — SIGNIFICANT CHANGE UP (ref 3.5–5.3)
PROT SERPL-MCNC: 6.4 G/DL — LOW (ref 6.6–8.7)
PROT SERPL-MCNC: 6.7 G/DL — SIGNIFICANT CHANGE UP (ref 6.6–8.7)
PROT UR-MCNC: 30 MG/DL
PROTHROM AB SERPL-ACNC: 27.5 SEC — HIGH (ref 10.5–13.4)
RAPID RVP RESULT: SIGNIFICANT CHANGE UP
RBC # BLD: 4.25 M/UL — SIGNIFICANT CHANGE UP (ref 4.2–5.8)
RBC # BLD: 4.63 M/UL — SIGNIFICANT CHANGE UP (ref 4.2–5.8)
RBC # FLD: 15.7 % — HIGH (ref 10.3–14.5)
RBC # FLD: 15.8 % — HIGH (ref 10.3–14.5)
RBC BLD AUTO: ABNORMAL
RBC CASTS # UR COMP ASSIST: SIGNIFICANT CHANGE UP /HPF (ref 0–4)
SARS-COV-2 RNA SPEC QL NAA+PROBE: SIGNIFICANT CHANGE UP
SODIUM SERPL-SCNC: 137 MMOL/L — SIGNIFICANT CHANGE UP (ref 135–145)
SODIUM SERPL-SCNC: 140 MMOL/L — SIGNIFICANT CHANGE UP (ref 135–145)
SP GR SPEC: 1.01 — SIGNIFICANT CHANGE UP (ref 1.01–1.02)
SPECIMEN SOURCE: SIGNIFICANT CHANGE UP
SPECIMEN SOURCE: SIGNIFICANT CHANGE UP
UROBILINOGEN FLD QL: 1 MG/DL
VANCOMYCIN TROUGH SERPL-MCNC: 10.5 UG/ML — SIGNIFICANT CHANGE UP (ref 10–20)
VARIANT LYMPHS # BLD: 0.9 % — SIGNIFICANT CHANGE UP (ref 0–6)
WBC # BLD: 11.48 K/UL — HIGH (ref 3.8–10.5)
WBC # BLD: 14.46 K/UL — HIGH (ref 3.8–10.5)
WBC # FLD AUTO: 11.48 K/UL — HIGH (ref 3.8–10.5)
WBC # FLD AUTO: 14.46 K/UL — HIGH (ref 3.8–10.5)
WBC UR QL: SIGNIFICANT CHANGE UP /HPF (ref 0–5)

## 2023-03-15 PROCEDURE — 99232 SBSQ HOSP IP/OBS MODERATE 35: CPT

## 2023-03-15 PROCEDURE — 99233 SBSQ HOSP IP/OBS HIGH 50: CPT | Mod: FS

## 2023-03-15 PROCEDURE — 99222 1ST HOSP IP/OBS MODERATE 55: CPT

## 2023-03-15 PROCEDURE — 93010 ELECTROCARDIOGRAM REPORT: CPT

## 2023-03-15 RX ORDER — MIDODRINE HYDROCHLORIDE 2.5 MG/1
5 TABLET ORAL EVERY 8 HOURS
Refills: 0 | Status: DISCONTINUED | OUTPATIENT
Start: 2023-03-15 | End: 2023-03-22

## 2023-03-15 RX ORDER — METOPROLOL TARTRATE 50 MG
25 TABLET ORAL
Refills: 0 | Status: DISCONTINUED | OUTPATIENT
Start: 2023-03-15 | End: 2023-03-18

## 2023-03-15 RX ORDER — ACETAMINOPHEN 500 MG
1000 TABLET ORAL ONCE
Refills: 0 | Status: COMPLETED | OUTPATIENT
Start: 2023-03-15 | End: 2023-03-15

## 2023-03-15 RX ORDER — FLUTICASONE PROPIONATE AND SALMETEROL 50; 250 UG/1; UG/1
1 POWDER ORAL; RESPIRATORY (INHALATION)
Qty: 0 | Refills: 0 | DISCHARGE

## 2023-03-15 RX ORDER — IPRATROPIUM/ALBUTEROL SULFATE 18-103MCG
3 AEROSOL WITH ADAPTER (GRAM) INHALATION EVERY 6 HOURS
Refills: 0 | Status: DISCONTINUED | OUTPATIENT
Start: 2023-03-15 | End: 2023-03-22

## 2023-03-15 RX ORDER — OXYBUTYNIN CHLORIDE 5 MG
0 TABLET ORAL
Qty: 0 | Refills: 0 | DISCHARGE

## 2023-03-15 RX ORDER — SODIUM CHLORIDE 9 MG/ML
500 INJECTION, SOLUTION INTRAVENOUS
Refills: 0 | Status: DISCONTINUED | OUTPATIENT
Start: 2023-03-15 | End: 2023-03-16

## 2023-03-15 RX ORDER — WARFARIN SODIUM 2.5 MG/1
3 TABLET ORAL ONCE
Refills: 0 | Status: COMPLETED | OUTPATIENT
Start: 2023-03-15 | End: 2023-03-15

## 2023-03-15 RX ORDER — HALOPERIDOL DECANOATE 100 MG/ML
2.5 INJECTION INTRAMUSCULAR ONCE
Refills: 0 | Status: COMPLETED | OUTPATIENT
Start: 2023-03-15 | End: 2023-03-15

## 2023-03-15 RX ORDER — VANCOMYCIN HCL 1 G
1000 VIAL (EA) INTRAVENOUS ONCE
Refills: 0 | Status: COMPLETED | OUTPATIENT
Start: 2023-03-15 | End: 2023-03-15

## 2023-03-15 RX ORDER — ACETAMINOPHEN 500 MG
650 TABLET ORAL EVERY 6 HOURS
Refills: 0 | Status: DISCONTINUED | OUTPATIENT
Start: 2023-03-15 | End: 2023-03-22

## 2023-03-15 RX ORDER — LOSARTAN POTASSIUM 100 MG/1
0.5 TABLET, FILM COATED ORAL
Qty: 0 | Refills: 0 | DISCHARGE

## 2023-03-15 RX ADMIN — Medication 1000 MILLIGRAM(S): at 17:16

## 2023-03-15 RX ADMIN — MIDODRINE HYDROCHLORIDE 5 MILLIGRAM(S): 2.5 TABLET ORAL at 08:59

## 2023-03-15 RX ADMIN — Medication 1000 MILLIGRAM(S): at 11:45

## 2023-03-15 RX ADMIN — PIPERACILLIN AND TAZOBACTAM 25 GRAM(S): 4; .5 INJECTION, POWDER, LYOPHILIZED, FOR SOLUTION INTRAVENOUS at 13:03

## 2023-03-15 RX ADMIN — Medication 3 MILLILITER(S): at 03:26

## 2023-03-15 RX ADMIN — ATORVASTATIN CALCIUM 40 MILLIGRAM(S): 80 TABLET, FILM COATED ORAL at 21:30

## 2023-03-15 RX ADMIN — CHLORHEXIDINE GLUCONATE 1 APPLICATION(S): 213 SOLUTION TOPICAL at 01:22

## 2023-03-15 RX ADMIN — Medication 400 MILLIGRAM(S): at 17:15

## 2023-03-15 RX ADMIN — PHENYLEPHRINE HYDROCHLORIDE 22.5 MICROGRAM(S)/KG/MIN: 10 INJECTION INTRAVENOUS at 00:52

## 2023-03-15 RX ADMIN — MIDODRINE HYDROCHLORIDE 5 MILLIGRAM(S): 2.5 TABLET ORAL at 21:30

## 2023-03-15 RX ADMIN — POLYETHYLENE GLYCOL 3350 17 GRAM(S): 17 POWDER, FOR SOLUTION ORAL at 12:46

## 2023-03-15 RX ADMIN — Medication 25 MILLIGRAM(S): at 21:45

## 2023-03-15 RX ADMIN — PHENYLEPHRINE HYDROCHLORIDE 22.5 MICROGRAM(S)/KG/MIN: 10 INJECTION INTRAVENOUS at 18:22

## 2023-03-15 RX ADMIN — HALOPERIDOL DECANOATE 2.5 MILLIGRAM(S): 100 INJECTION INTRAMUSCULAR at 17:12

## 2023-03-15 RX ADMIN — Medication 10 MILLIGRAM(S): at 21:31

## 2023-03-15 RX ADMIN — PIPERACILLIN AND TAZOBACTAM 25 GRAM(S): 4; .5 INJECTION, POWDER, LYOPHILIZED, FOR SOLUTION INTRAVENOUS at 00:52

## 2023-03-15 RX ADMIN — Medication 650 MILLIGRAM(S): at 23:07

## 2023-03-15 RX ADMIN — Medication 250 MILLIGRAM(S): at 18:21

## 2023-03-15 RX ADMIN — Medication 650 MILLIGRAM(S): at 22:16

## 2023-03-15 RX ADMIN — Medication 300 MILLIGRAM(S): at 04:04

## 2023-03-15 RX ADMIN — PIPERACILLIN AND TAZOBACTAM 25 GRAM(S): 4; .5 INJECTION, POWDER, LYOPHILIZED, FOR SOLUTION INTRAVENOUS at 06:15

## 2023-03-15 RX ADMIN — NYSTATIN CREAM 1 APPLICATION(S): 100000 CREAM TOPICAL at 17:12

## 2023-03-15 RX ADMIN — NYSTATIN CREAM 1 APPLICATION(S): 100000 CREAM TOPICAL at 05:11

## 2023-03-15 RX ADMIN — WARFARIN SODIUM 3 MILLIGRAM(S): 2.5 TABLET ORAL at 21:31

## 2023-03-15 RX ADMIN — SERTRALINE 50 MILLIGRAM(S): 25 TABLET, FILM COATED ORAL at 12:45

## 2023-03-15 RX ADMIN — Medication 81 MILLIGRAM(S): at 12:46

## 2023-03-15 RX ADMIN — TAMSULOSIN HYDROCHLORIDE 0.4 MILLIGRAM(S): 0.4 CAPSULE ORAL at 21:30

## 2023-03-15 RX ADMIN — Medication 400 MILLIGRAM(S): at 23:38

## 2023-03-15 RX ADMIN — BUDESONIDE AND FORMOTEROL FUMARATE DIHYDRATE 2 PUFF(S): 160; 4.5 AEROSOL RESPIRATORY (INHALATION) at 21:09

## 2023-03-15 RX ADMIN — BUDESONIDE AND FORMOTEROL FUMARATE DIHYDRATE 2 PUFF(S): 160; 4.5 AEROSOL RESPIRATORY (INHALATION) at 09:37

## 2023-03-15 RX ADMIN — OLANZAPINE 10 MILLIGRAM(S): 15 TABLET, FILM COATED ORAL at 21:30

## 2023-03-15 RX ADMIN — Medication 400 MILLIGRAM(S): at 10:30

## 2023-03-15 RX ADMIN — MIDODRINE HYDROCHLORIDE 5 MILLIGRAM(S): 2.5 TABLET ORAL at 12:45

## 2023-03-15 NOTE — CONSULT NOTE ADULT - NS PANP COMMENT GEN_ALL_CORE FT
87 y/o M with extensive cardiac history, advance heart failure not candidate for advance therapy/icd, factor V leiden on coumadin, admitted to medicine for acute CVA on the left, with associated right side weakness.  Rapid response was called for rapid afib and fever, s/p BB and CCB and digoxin, and became hypotensive.  Refractory to IVF, and needing vasopressor.  CXR shows likely new Left mid lung zone infiltrates, concern for pneumonia process.  Will admitted to MICU for likely distributive shock in the setting of possible sepsis from pneumonia.  Cannot receive 30cc/kg due to HF with reduced EF.  Started on Vanc and Zosyn.  Overall prognosis is very guarded, cannot reach HCP for GOC.  Will continue critical care trial until further GOC established with family.  Palliative care consult placed.

## 2023-03-15 NOTE — SWALLOW BEDSIDE ASSESSMENT ADULT - POSITIONING
VIRTUAL VISIT    This visit is a  VIRTUAL visit.  Pt is either an established patient of mine or is a patient of one of my clinic partners who is currently unavailable.  Pt is in [place] and identity has been established.   Pt understands that we are limiting office visits due to the coronavirus pandemic and consents to a virtual visit with charges submitted to their insurance.   This visit was performed via live interactive two-way video using ZOOM.    Clinician Location: [address]   Due to nature of the the virtual modality the only components of physical exam that could be done are the elements supported by video and audio observation.      Patient ID: Levi is a 22 year old male.    Chief Complaint   Patient presents with   • Video Visit   • Convey Results       HISTORY OF PRESENT ILLNESS:  Pt verbally consented to a VIRTUAL VISIT and they state they are speaking to me from parked car  Discussed test results and follow-up plans with throat: See assessment and plan for details    ACTIVE PROBLEMS:  Patient Active Problem List   Diagnosis   • Asthma   • Chronic daily headache   • Encounter for well adult exam with abnormal findings   • Migraine   • Thoracic myofascial strain   • Other dysphagia   • Infectious mononucleosis       PAST MEDICAL HISTORY:  History reviewed and updated.  PAST SURGICAL HISTORY:  History reviewed and updated.  FAMILY HISTORY:  History reviewed and updated.  SOCIAL HISTORY: History reviewed and updated.  Tobacco Use:  reports that he has never smoked. He has never used smokeless tobacco.  Alcohol Use:  reports no history of alcohol use.  Drug Use:  reports no history of drug use.    ALLERGIES:  ALLERGIES:  Patient has no known allergies.    MEDICATIONS:  Current Outpatient Medications   Medication   • albuterol (ProAir HFA) 108 (90 Base) MCG/ACT inhaler   • omeprazole (PriLOSEC) 40 MG capsule   • fluticasone-vilanterol (Breo Ellipta) 100-25 MCG/INH 
inhaler     No current facility-administered medications for this visit.     Denies OTC, herbals or supplements other than what is listed in med list      SCREENING:  Depression Screening:    PHQ2/9:  Initial depression screening score:: 0       OBJECTIVE     Due to the nature of this visit no vital exams were able to be taken in office       Physical Exam  Constitutional: General Appearance: well-developed, NAD  Psychiatric: good judgement; normal mood and affect, active and alert and oriented to time, place, and person. recent memory normal and remote memory normal.  Head: Head: normocephalic and atraumatic.  Eyes: Lids and Conjunctivae: no discharge or pallor and non-injected. Sclerae: non-icteric  ENMT: no external nasal lesions, no mouth or lip lesions  Neck: supple, FROM  RESP: Does not sound audibly congested, is able to hold a conversation without any difficulty, does not appear short of breath or dyspneic, does not cough during the visit  Musculoskeletal: Bilateral upper and lower extremities with FROM  Neurologic: Cranial Nerves grossly intact, no tremors: speech normal  Skin: nonicteric       ASSESSMENT/PLAN  Problem List Items Addressed This Visit        ENT    RESOLVED: Pharyngitis, acute     History: At last visit was seen for continued throat pain.  He was found to have blisters in the tonsils and soft palate and was suspected he had herpangina.  He was prescribed miracle mouthwash for symptoms.  Currently denies any dysphagia, sores in his mouth or sore throat  Assessment: established condition   Plan: Still suspect he had a herpangina which caused his acute symptoms.  This is condition has now resolved and no further treatment or testing is needed            Infectious Diseases    Infectious mononucleosis - Primary     History: Discussed results  Assessment: established condition   Plan: Reviewed recent results that show Bhaskar-Barr virus positive IgG but IgM is negative.  Explained to patient what 
this means.  Suspect he may have had mononucleosis in the past but not recently.  No need for further evaluation or treatment and no concerns with contact sports at this time             Patient was advised to call if they experience any new or worsening symptoms.  Patient verbalized understanding of all that was discussed and is in agreement with plan of care.  Did advise patient to contact the office via portal or phone call if they have any remaining questions.  CALL started: 4:21pm  Call ended: 4:26pm    Return if symptoms worsen or fail to improve.  Electronically signed by: Yumiko Charlton MD  1/20/2022  
upright (90 degrees)
upright (90 degrees)

## 2023-03-15 NOTE — DIETITIAN INITIAL EVALUATION ADULT - NSICDXPASTMEDICALHX_GEN_ALL_CORE_FT
PAST MEDICAL HISTORY:  Aortic stenosis     Atheroma     Atrial fibrillation, unspecified type     CAD S/P percutaneous coronary angioplasty     Cerebrovascular accident (CVA)     Factor V Leiden     [place] filter in place     History of COPD     Hyperlipemia     Hypertension     PE (pulmonary embolism)     Prostate CA

## 2023-03-15 NOTE — CONSULT NOTE ADULT - SUBJECTIVE AND OBJECTIVE BOX
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 He has had recurrent falls and admitted for same recently and was treated for both COPD and CHF exacerbation. I saw the pt during that admission and he lacked capacity to make complex medical decisions- his dtr and HCP Jasmina elected DNR/DNI orders.  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  Pt sent to [facility] rehab but was readmitted 3/8 when developed RUE/RLE weakness with diminished radial and pedal pulses. MRI brain revealed left medial posterior frontal cortical and subcortical acute infarction with course further c/b AF w/ RVR, fever of 105F, and hypotension for which he was xferred to MICU and started on phenylephrine gtt.  We are consulted for goals of care.          PERTINENT PMH REVIEWED: Yes    PAST MEDICAL & SURGICAL HISTORY:  as above      SOCIAL HISTORY:                       ~5yrs ago  Daughter Jasmina lives in [place] and is his HCP  Has a niece who lives locally                      Surrogate/HCP/Guardian: Phone#: daughter Jasmina ([phone])    FAMILY HISTORY:  Family history of diabetes mellitus (Child)    Allergies  No Known Allergies    ADVANCE DIRECTIVES/TREATMENT PREFERENCES:  DNR/DNI - MOLST, continue all other medical treatments    Baseline ADLs (prior to admission):  Dependent      Karnofsky/Palliative Performance Status Version 2:  %  http://npcrc.org/files/news/palliative_performance_scale_ppsv2.pdf    PRESENT SYMPTOMS:     Dyspnea: Yes No  Nausea/Vomiting: Yes No  Anxiety:  Yes No  Depression: Yes No  Fatigue: Yes No  Loss of appetite: Yes No  Constipation: Yes No    PAIN:              Character-            Duration-            Effect-            Factors-            Frequency-            Location-            Severity-    PAIN AD SCORE:  [email or web address] (press ctrl + left click to view)    REVIEW OF SYSTEMS:   Full review of systems performed and was otherwise negative except as noted above.  Due to altered mental status, unable to obtain subjective information from the patient. History was obtained, to the extent possible, from review of the chart and collateral sources of information.    MEDICATIONS  (STANDING):  albuterol/ipratropium for Nebulization 3 milliLiter(s) Nebulizer every 6 hours  aspirin enteric coated 81 milliGRAM(s) Oral daily  atorvastatin 40 milliGRAM(s) Oral at bedtime  budesonide 160 MICROgram(s)/formoterol 4.5 MICROgram(s) Inhaler 2 Puff(s) Inhalation two times a day  chlorhexidine 2% Cloths 1 Application(s) Topical <User Schedule>  midodrine 5 milliGRAM(s) Oral every 8 hours  nystatin Powder 1 Application(s) Topical two times a day  OLANZapine 10 milliGRAM(s) Oral at bedtime  oxybutynin XL 10 milliGRAM(s) Oral <User Schedule>  phenylephrine    Infusion 0.5 MICROgram(s)/kG/Min (22.5 mL/Hr) IV Continuous <Continuous>  piperacillin/tazobactam IVPB.. 3.375 Gram(s) IV Intermittent every 8 hours  polyethylene glycol 3350 17 Gram(s) Oral daily  sertraline 50 milliGRAM(s) Oral daily  tamsulosin 0.4 milliGRAM(s) Oral at bedtime    MEDICATIONS  (PRN):  acetaminophen     Tablet .. 650 milliGRAM(s) Oral every 6 hours PRN Mild Pain (1 - 3)  bisacodyl Suppository 10 milliGRAM(s) Rectal daily PRN Constipation      PHYSICAL EXAM:    Vital Signs Last 24 Hrs  T(C): 38.2 (15 Mar 2023 08:45), Max: 40.6 (14 Mar 2023 20:35)  T(F): 100.8 (15 Mar 2023 08:45), Max: 105.1 (14 Mar 2023 20:35)  HR: 101 (15 Mar 2023 08:45) (76 - 791)  BP: 105/62 (15 Mar 2023 08:30) (65/53 - 153/128)  BP(mean): 75 (15 Mar 2023 08:30) (55 - 138)  RR: 31 (15 Mar 2023 08:45) (13 - 42)  SpO2: 95% (15 Mar 2023 08:45) (87% - 100%)    Parameters below as of 15 Mar 2023 08:00  Patient On (Oxygen Delivery Method): nasal cannula    General: pt lying in bed in NAD  HEENT: NCAT; MMM  Lungs: breathing unlabored; symmetric chest expansion  GI: soft; NTND  MSK: No apparent deformities  Neuro: fluent speech; awake and conversant; oriented x 3  Skin: no rash  Psych: calm; appropriate speech and affect    LABS:                        12.6   14.46 )-----------( 200      ( 15 Mar 2023 03:40 )             41.2     03-15    140  |  103  |  27.1<H>  ----------------------------<  105<H>  4.6   |  26.0  |  1.67<H>    Ca    9.3      15 Mar 2023 03:40  Phos  3.3     03-14  Mg     2.0     03-14    TPro  6.7  /  Alb  3.6  /  TBili  0.8  /  DBili  x   /  AST  18  /  ALT  13  /  AlkPhos  87  03-15    PT/INR - ( 15 Mar 2023 03:40 )   PT: 27.5 sec;   INR: 2.35 ratio         PTT - ( 14 Mar 2023 20:35 )  PTT:27.1 sec    RADIOLOGY & ADDITIONAL STUDIES:  MRI brain:  IMPRESSION:    1. Left medial posterior frontal cortical and subcortical acute infarction    2. Left lateral posterior frontal cortical and subcortical remote   infarction ; additional tiny left anterior frontal cortical remote   infarctions    3. Left thalamic and left callosal body remote deep infarctions    4. Ischemic white matter disease greater than typical for age    5. Diffuse brain volume loss typical for age    --- End of Report ---    LEONARD JONAS MD; Attending Radiologist  This document has been electronically signed. Mar 10 2023  4:02PM    NEUROLOGIC MEDICATIONS/OPIOIDS/BENZODIAZEPINES IN PAST 24HRS    acetaminophen   IVPB ..   400 mL/Hr IV Intermittent (03-14-23 @ 20:52)    sertraline   50 milliGRAM(s) Oral (03-14-23 @ 11:00)       HPI:  89yo M w/ PMH of AF and Factor V Leiden deficiency with PE/DVT s/p IVCF and on coumadin, CAD s/p CABG, CVA, HFrEF (35-40%), moderate AS, COPD, large infrarenal AAA, ?prostate CA s/p recent XRT and GIGI on CPAP. He has had recurrent falls and admitted for same recently and was treated for both COPD and CHF exacerbation. I saw the pt during that admission and he lacked capacity to make complex medical decisions- his dtr and HCP Jasmina elected DNR/DNI orders.  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  Pt sent to [facility] rehab but was readmitted 3/8 when developed RUE/RLE weakness with diminished radial and pedal pulses. MRI brain revealed left medial posterior frontal cortical and subcortical acute infarction with course further c/b AF w/ RVR, fever of 105F, and hypotension for which he was xferred to MICU and started on phenylephrine gtt.  We are consulted for goals of care.    Pt seen lying in bed in NAD.  He reports no pain or SOB or nausea.  "I feel good".  Pt's thought process/speech was intermittently tangential and difficult to follow.      PERTINENT PMH REVIEWED: Yes    PAST MEDICAL & SURGICAL HISTORY:  as above      SOCIAL HISTORY:                       ~5yrs ago  Daughter Jasmina lives in [facility] and is his HCP  Has a niece who lives locally                      Surrogate/HCP/Guardian: Phone#: daughter Jasmina ([phone])    FAMILY HISTORY:  Family history of diabetes mellitus (Child)    Allergies  No Known Allergies    ADVANCE DIRECTIVES/TREATMENT PREFERENCES:  DNR/DNI - MOLST, continue all other medical treatments    Baseline ADLs (prior to admission):  Dependent      Karnofsky/Palliative Performance Status Version 2:  50%  http://npcrc.org/files/news/palliative_performance_scale_ppsv2.pdf    PRESENT SYMPTOMS:     Dyspnea: No  Nausea/Vomiting: No  Anxiety:  No  Depression: No  Fatigue: No  Loss of appetite: No  Constipation: No    PAIN:  denies            Character-            Duration-            Effect-            Factors-            Frequency-            Location-            Severity-    REVIEW OF SYSTEMS:   Full review of systems performed and was otherwise negative except as noted above.    MEDICATIONS  (STANDING):  albuterol/ipratropium for Nebulization 3 milliLiter(s) Nebulizer every 6 hours  aspirin enteric coated 81 milliGRAM(s) Oral daily  atorvastatin 40 milliGRAM(s) Oral at bedtime  budesonide 160 MICROgram(s)/formoterol 4.5 MICROgram(s) Inhaler 2 Puff(s) Inhalation two times a day  chlorhexidine 2% Cloths 1 Application(s) Topical <User Schedule>  midodrine 5 milliGRAM(s) Oral every 8 hours  nystatin Powder 1 Application(s) Topical two times a day  OLANZapine 10 milliGRAM(s) Oral at bedtime  oxybutynin XL 10 milliGRAM(s) Oral <User Schedule>  phenylephrine    Infusion 0.5 MICROgram(s)/kG/Min (22.5 mL/Hr) IV Continuous <Continuous>  piperacillin/tazobactam IVPB.. 3.375 Gram(s) IV Intermittent every 8 hours  polyethylene glycol 3350 17 Gram(s) Oral daily  sertraline 50 milliGRAM(s) Oral daily  tamsulosin 0.4 milliGRAM(s) Oral at bedtime    MEDICATIONS  (PRN):  acetaminophen     Tablet .. 650 milliGRAM(s) Oral every 6 hours PRN Mild Pain (1 - 3)  bisacodyl Suppository 10 milliGRAM(s) Rectal daily PRN Constipation    PHYSICAL EXAM:    Vital Signs Last 24 Hrs  T(C): 38.2 (15 Mar 2023 08:45), Max: 40.6 (14 Mar 2023 20:35)  T(F): 100.8 (15 Mar 2023 08:45), Max: 105.1 (14 Mar 2023 20:35)  HR: 101 (15 Mar 2023 08:45) (76 - 791)  BP: 105/62 (15 Mar 2023 08:30) (65/53 - 153/128)  BP(mean): 75 (15 Mar 2023 08:30) (55 - 138)  RR: 31 (15 Mar 2023 08:45) (13 - 42)  SpO2: 95% (15 Mar 2023 08:45) (87% - 100%)    Parameters below as of 15 Mar 2023 08:00  Patient On (Oxygen Delivery Method): nasal cannula    General: pt lying in bed in NAD  HEENT: NCAT; MM dry  Lungs: breathing unlabored; symmetric chest expansion  GI: soft; NTND  MSK: No apparent deformities  Neuro: fluent speech; awake and conversant;  thought process/speech was intermittently tangential and difficult to follow  Skin: no rash  Psych: calm;  thought process/speech was intermittently tangential and difficult to follow    LABS:                        12.6   14.46 )-----------( 200      ( 15 Mar 2023 03:40 )             41.2     03-15    140  |  103  |  27.1<H>  ----------------------------<  105<H>  4.6   |  26.0  |  1.67<H>    Ca    9.3      15 Mar 2023 03:40  Phos  3.3     03-14  Mg     2.0     03-14    TPro  6.7  /  Alb  3.6  /  TBili  0.8  /  DBili  x   /  AST  18  /  ALT  13  /  AlkPhos  87  03-15    PT/INR - ( 15 Mar 2023 03:40 )   PT: 27.5 sec;   INR: 2.35 ratio         PTT - ( 14 Mar 2023 20:35 )  PTT:27.1 sec    RADIOLOGY & ADDITIONAL STUDIES:  MRI brain:  IMPRESSION:    1. Left medial posterior frontal cortical and subcortical acute infarction    2. Left lateral posterior frontal cortical and subcortical remote   infarction ; additional tiny left anterior frontal cortical remote   infarctions    3. Left thalamic and left callosal body remote deep infarctions    4. Ischemic white matter disease greater than typical for age    5. Diffuse brain volume loss typical for age    --- End of Report ---    LEONARD JONAS MD; Attending Radiologist  This document has been electronically signed. Mar 10 2023  4:02PM    NEUROLOGIC MEDICATIONS/OPIOIDS/BENZODIAZEPINES IN PAST 24HRS    acetaminophen   IVPB ..   400 mL/Hr IV Intermittent (03-14-23 @ 20:52)    sertraline   50 milliGRAM(s) Oral (03-14-23 @ 11:00)

## 2023-03-15 NOTE — PROGRESS NOTE ADULT - SUBJECTIVE AND OBJECTIVE BOX
[facility]                                                         CARDIOLOGY AT [place] [address]                                                         Telephone: [phone]. Fax:[phone]                                                                             PROGRESS NOTE    Reason for follow up: Afib RVR, HFrEF  Update: given digoxin yesterday, dig level 0.9 today      Review of symptoms:   Cardiac:  No chest pain. No dyspnea. No palpitations.  Respiratory: no cough. No dyspnea  Gastrointestinal: No diarrhea. No abdominal pain. No bleeding.   Neuro: No focal neuro complaints.    Vitals:  T(C): 38.1 (03-15-23 @ 13:30), Max: 40.6 (03-14-23 @ 20:35)  HR: 89 (03-15-23 @ 13:30) (76 - 791)  BP: 108/71 (03-15-23 @ 13:30) (60/52 - 153/128)  RR: 27 (03-15-23 @ 13:30) (13 - 42)  SpO2: 98% (03-15-23 @ 13:30) (87% - 100%)  Wt(kg): --  I&O's Summary    14 Mar 2023 07:01  -  15 Mar 2023 07:00  --------------------------------------------------------  IN: 1541.5 mL / OUT: 700 mL / NET: 841.5 mL    15 Mar 2023 07:01  -  15 Mar 2023 13:58  --------------------------------------------------------  IN: 480.2 mL / OUT: 85 mL / NET: 395.2 mL      Weight (kg): 120.1 (03-15 @ 00:45)    PHYSICAL EXAM:  Appearance: Comfortable. No acute distress  HEENT:  Atraumatic. Normocephalic.  Normal oral mucosa  Neurologic: A & O x 3, no gross focal deficits.  Cardiovascular: irreg irregular S1 S2, No murmur, no rubs/gallops. No JVD  Respiratory: Lungs diminished  Gastrointestinal:  Soft, Non-tender, + BS  Lower Extremities: 2+ Peripheral Pulses, generalized edema  Psychiatry: Patient is calm. No agitation.   Skin: warm and dry.    CURRENT CARDIAC MEDICATIONS:  midodrine 5 milliGRAM(s) Oral every 8 hours  phenylephrine    Infusion 0.5 MICROgram(s)/kG/Min IV Continuous <Continuous>      CURRENT OTHER MEDICATIONS:  albuterol/ipratropium for Nebulization 3 milliLiter(s) Nebulizer every 6 hours PRN Shortness of Breath and/or Wheezing  budesonide 160 MICROgram(s)/formoterol 4.5 MICROgram(s) Inhaler 2 Puff(s) Inhalation two times a day  piperacillin/tazobactam IVPB.. 3.375 Gram(s) IV Intermittent every 12 hours  acetaminophen     Tablet .. 650 milliGRAM(s) Oral every 6 hours PRN Mild Pain (1 - 3)  OLANZapine 10 milliGRAM(s) Oral at bedtime  sertraline 50 milliGRAM(s) Oral daily  bisacodyl Suppository 10 milliGRAM(s) Rectal daily PRN Constipation  polyethylene glycol 3350 17 Gram(s) Oral daily  atorvastatin 40 milliGRAM(s) Oral at bedtime  aspirin enteric coated 81 milliGRAM(s) Oral daily  chlorhexidine 2% Cloths 1 Application(s) Topical <User Schedule>  multiple electrolytes Injection Type 1 500 milliLiter(s) (125 mL/Hr) IV Continuous <Continuous>, Stop order after: 1 Doses  nystatin Powder 1 Application(s) Topical two times a day, Stop order after: 7 Days  oxybutynin XL 10 milliGRAM(s) Oral <User Schedule>  tamsulosin 0.4 milliGRAM(s) Oral at bedtime  warfarin 3 milliGRAM(s) Oral once      LABS:	 	  ( 15 Mar 2023 12:30 )  Troponin T  X    ,  CPK  108  , CKMB  X    , BNP X        , ( 14 Mar 2023 20:35 )  Troponin T  0.02 ,  CPK  X    , CKMB  X    , BNP X        , ( 08 Mar 2023 12:50 )  Troponin T  0.01 ,  CPK  X    , CKMB  X    , BNP X                                  11.6   11.48 )-----------( 161      ( 15 Mar 2023 12:30 )             37.6     03-15    137  |  101  |  27.7<H>  ----------------------------<  96  4.2   |  22.0  |  1.39<H>    Ca    8.9      15 Mar 2023 12:30  Phos  2.8     03-15  Mg     2.0     03-15    TPro  6.4<L>  /  Alb  3.2<L>  /  TBili  0.9  /  DBili  x   /  AST  18  /  ALT  12  /  AlkPhos  81  03-15    PT/INR/PTT ( 15 Mar 2023 03:40 )                       :                       :      27.5         :       X                     .        .                   .              .           .       2.35        .                                       Lipid Profile: Date: 03-09 @ 06:06  Total cholesterol 124; Direct LDL: --; HDL: 34; Triglycerides:159    HgA1c:   TSH:     TELEMETRY: Afib mostly rate controlled  ECG:    DIAGNOSTIC TESTING:  [ ] Echocardiogram:   < from: TTE Echo Complete w/ Contrast w/ Doppler (03.12.23 @ 12:55) >  PHYSICIAN INTERPRETATION:  Left Ventricle: Endocardialvisualization was enhanced with intravenous   echo contrast. The left ventricular internal cavity size is normal.  Global LV systolic function was moderately to severely decreased. Left   ventricular ejection fraction, by visual estimation, is 20 to 25%. The   mitral in-flow pattern reveals no discernable A-wave, therefore no   comment on diastolic function can be made.  No LV thrombus.      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    Right Ventricle: The right ventricular size is normal. RV systolic   function is moderately reduced.  Pericardium: There is no evidence of pericardial effusion.  Mitral Valve: Mild thickening of the anterior and posterior mitral valve   leaflets. Mitral leaflet mobility is normal. There is moderate mitral   annular calcification.  Tricuspid Valve: Mild-moderate tricuspid regurgitation is visualized.  Aortic Valve: Aortic Stenosis with V max 3.1 m/sec, Mean gradient of 23   mm Hg.  Venous: The inferior vena cava was normal sized, with respiratory size   variation greater than 50%.      Summary:   1. Left ventricular ejection fraction, by visual estimation, is 20 to   25%.   2. Moderately to severely decreased global left ventricular systolic   function.   3. [place] is abnormal as described above.   4. The mitral in-flow pattern reveals no discernable A-wave, therefore   no comment on diastolic function can be made.   5. No LV thrombus.   6. Moderately reduced RV systolic function.   7. There is no evidence of pericardial effusion.   8. Mild thickening of the anterior and posterior mitral valve leaflets.   9. Moderate mitral annular calcification.  10. Mild-moderate tricuspid regurgitation.  11. Aortic Stenosis with V max 3.1 m/sec, Mean gradient of 23 mm Hg.  12. Endocardial visualization was enhanced with intravenous echo contrast.    MD Monica Electronically signed on 3/13/2023 at 12:00:06 PM    < end of copied text >  [ ]  Catheterization:  [ ] Stress Test:    OTHER:

## 2023-03-15 NOTE — PROGRESS NOTE ADULT - SUBJECTIVE AND OBJECTIVE BOX
Preliminary note, offical recommendations pending attending review/signature   [facility] Stroke Team  Progress Note     HPI:  88 yr old male with hypertension,  atrial fibrillation, PE/DVT s/p IVC filter, Factor V Leiden on coumadin CAD s/p CABG, stroke COPD, GIGI, AAA, infrarenal 6.1 cm  on prior ct presented with right sided weakness started at 11am  3/8/23 at [facility] by ER report. Upon review patient notes  numbness  to right hand,  arm and leg denied slurred speech, changes in vision, weakness, vision. Patient on coumadin thinks that took it day of presentation.  Noted symptoms  were improving on ER eval, ER  NIH 0- INR 2.30, excluded from Tenecteplase . It was noted also thinks that had an old stroke with some residual numbness/weakness on the same side in the past - notes sometimes gets worse . Patient stated at this time he feels back to normal.     SUBJECTIVE: No events overnight.  No new neurologic complaints.  ROS reported negative unless otherwise noted.    acetaminophen     Tablet .. 650 milliGRAM(s) Oral every 6 hours PRN  albuterol/ipratropium for Nebulization 3 milliLiter(s) Nebulizer every 6 hours  aspirin enteric coated 81 milliGRAM(s) Oral daily  atorvastatin 40 milliGRAM(s) Oral at bedtime  bisacodyl Suppository 10 milliGRAM(s) Rectal daily PRN  budesonide 160 MICROgram(s)/formoterol 4.5 MICROgram(s) Inhaler 2 Puff(s) Inhalation two times a day  chlorhexidine 2% Cloths 1 Application(s) Topical <User Schedule>  midodrine 5 milliGRAM(s) Oral every 8 hours  nystatin Powder 1 Application(s) Topical two times a day  OLANZapine 10 milliGRAM(s) Oral at bedtime  oxybutynin XL 10 milliGRAM(s) Oral <User Schedule>  phenylephrine    Infusion 0.5 MICROgram(s)/kG/Min IV Continuous <Continuous>  piperacillin/tazobactam IVPB.. 3.375 Gram(s) IV Intermittent every 8 hours  polyethylene glycol 3350 17 Gram(s) Oral daily  sertraline 50 milliGRAM(s) Oral daily  tamsulosin 0.4 milliGRAM(s) Oral at bedtime      PHYSICAL EXAM:   Vital Signs Last 24 Hrs  T(C): 37.8 (15 Mar 2023 07:30), Max: 40.6 (14 Mar 2023 20:35)  T(F): 100 (15 Mar 2023 07:30), Max: 105.1 (14 Mar 2023 20:35)  HR: 88 (15 Mar 2023 07:30) (76 - 791)  BP: 100/74 (15 Mar 2023 07:15) (65/53 - 153/128)  BP(mean): 85 (15 Mar 2023 07:15) (55 - 138)  RR: 24 (15 Mar 2023 07:30) (15 - 42)  SpO2: 96% (15 Mar 2023 07:30) (87% - 100%)    Parameters below as of 15 Mar 2023 06:15  Patient On (Oxygen Delivery Method): nasal cannula  O2 Flow (L/min): 2    EXAM PENDING   General: No acute distress    NEUROLOGICAL EXAM:  Mental status: The patient is awake and alert and has normal attention span.  The patient is oriented to self, notes [place] for location, oriented to month and year, disoriented to date. The patient is able to name objects, follow commands, repetition intact.     Cranial nerves: Pupils equal and react symmetrically to light. There is no visual field deficit to confrontation. Extraocular motion is full with no nystagmus. There is no ptosis. Facial sensation is intact. Slight right facial palsy that corrects on smile,    Tongue is midline.    Motor: There is normal bulk and tone.  There is no tremor.  Strength is 5/5 in the right arm and leg. Right hand subtle  drift   Strength is 5/5 in the left arm and leg.    LABS:                        12.6   14.46 )-----------( 200      ( 15 Mar 2023 03:40 )             41.2    03-15    140  |  103  |  27.1<H>  ----------------------------<  105<H>  4.6   |  26.0  |  1.67<H>    Ca    9.3      15 Mar 2023 03:40  Phos  3.3     03-14  Mg     2.0     03-14    TPro  6.7  /  Alb  3.6  /  TBili  0.8  /  DBili  x   /  AST  18  /  ALT  13  /  AlkPhos  87  03-15  PT/INR - ( 15 Mar 2023 03:40 )   PT: 27.5 sec;   INR: 2.35 ratio         PTT - ( 14 Mar 2023 20:35 )  PTT:27.1 sec      IMAGING: Reviewed by me.   MR Head No Cont (03.10.23 @ 15:46)   1. Left medial posterior frontal cortical and subcortical acute infarction  2. Left lateral posterior frontal cortical and subcortical remote   infarction ; additional tiny left anterior frontal cortical remote   infarctions  3. Left thalamic and left callosal body remote deep infarctions  4. Ischemic white matter disease greater than typical for age  5. Diffuse brain volume loss typical for age    CT Angio Head Neck Stroke Protocol w/ IV Cont (03.08.23 @ 13:22)   7 mL area of increased Tmax in the right parietal lobe suggesting brain   at risk versus artifact. No core infarct. No large vessel occlusion. 50%   stenosis in left carotid bulb/proximal internal carotid artery. Consider   MRI of the brain as clinically warranted. Additional findings above.    CT Brain Stroke Protocol (03.08.23 @ 12:40)    Moderate chronic microvascular changes without evidence of   an acute transcortical infarction or hemorrhage.     US Duplex Carotid Arteries Complete, Bilateral (02.23.23 @ 11:03)   IMPRESSION: No significant hemodynamic stenosis of either carotid artery.    TTE Echo Complete w/ Contrast w/ Doppler (02.21.23 @ 11:34)   Summary:   1. Endocardial visualization was enhanced with intravenous echo   contrast. No LV thrombus.   2. Left ventricular ejection fraction, by visual estimation, is 25 to   30%.   3. Severely decreased global left ventricular systolic function.   4. Abnormal septal motion consistent with post-operative status.   5. There is mild eccentric left ventricular hypertrophy.   6. The mitralin-flow pattern reveals no discernable A-wave, therefore   no comment on diastolic function can be made.   7. Normal right ventricular size and function, estimated PASP least 42   mmHg.   8. Severely enlarged left atrium.   9. Right atrial enlargement.  10. Moderate mitral annular calcification.  11. Mild-moderate tricuspid regurgitation.  12. Severe calcific aortic valve stenosis with low gradient,   dimensionless index 0.23.  13. There is moderate aortic root calcification.  14. Borderline dilatation of the aortic root, sinuses of Valsalva and   ascending aorta 3.9 cm, index to BSA 1.6 cm/m2 within normal.  15. Compared to the outpatient TTE study from 9/2022 prevoiusly LV EF 37%   and known low gradient aortic valve stenosis, but visually on current   study appears severely stenotic.          Preliminary note, offical recommendations pending attending review/signature   [facility] Stroke Team  Progress Note     HPI:  88 yr old male with hypertension,  atrial fibrillation, PE/DVT s/p IVC filter, Factor V Leiden on coumadin CAD s/p CABG, stroke COPD, GIGI, AAA, infrarenal 6.1 cm  on prior ct presented with right sided weakness started at 11am  3/8/23 at [facility] by ER report. Upon review patient notes  numbness  to right hand,  arm and leg denied slurred speech, changes in vision, weakness, vision. Patient on coumadin thinks that took it day of presentation.  Noted symptoms  were improving on ER eval, ER  NIH 0- INR 2.30, excluded from Tenecteplase . It was noted also thinks that had an old stroke with some residual numbness/weakness on the same side in the past - notes sometimes gets worse . Patient stated at this time he feels back to normal.     SUBJECTIVE: Noted rapid atrial fibrillation overnight- transferred to ICU.  No new neurologic complaints.  ROS reported negative unless otherwise noted.    acetaminophen     Tablet .. 650 milliGRAM(s) Oral every 6 hours PRN  albuterol/ipratropium for Nebulization 3 milliLiter(s) Nebulizer every 6 hours  aspirin enteric coated 81 milliGRAM(s) Oral daily  atorvastatin 40 milliGRAM(s) Oral at bedtime  bisacodyl Suppository 10 milliGRAM(s) Rectal daily PRN  budesonide 160 MICROgram(s)/formoterol 4.5 MICROgram(s) Inhaler 2 Puff(s) Inhalation two times a day  chlorhexidine 2% Cloths 1 Application(s) Topical <User Schedule>  midodrine 5 milliGRAM(s) Oral every 8 hours  nystatin Powder 1 Application(s) Topical two times a day  OLANZapine 10 milliGRAM(s) Oral at bedtime  oxybutynin XL 10 milliGRAM(s) Oral <User Schedule>  phenylephrine    Infusion 0.5 MICROgram(s)/kG/Min IV Continuous <Continuous>  piperacillin/tazobactam IVPB.. 3.375 Gram(s) IV Intermittent every 8 hours  polyethylene glycol 3350 17 Gram(s) Oral daily  sertraline 50 milliGRAM(s) Oral daily  tamsulosin 0.4 milliGRAM(s) Oral at bedtime      PHYSICAL EXAM:   Vital Signs Last 24 Hrs  T(C): 37.8 (15 Mar 2023 07:30), Max: 40.6 (14 Mar 2023 20:35)  T(F): 100 (15 Mar 2023 07:30), Max: 105.1 (14 Mar 2023 20:35)  HR: 88 (15 Mar 2023 07:30) (76 - 791)  BP: 100/74 (15 Mar 2023 07:15) (65/53 - 153/128)  BP(mean): 85 (15 Mar 2023 07:15) (55 - 138)  RR: 24 (15 Mar 2023 07:30) (15 - 42)  SpO2: 96% (15 Mar 2023 07:30) (87% - 100%)    Parameters below as of 15 Mar 2023 06:15  Patient On (Oxygen Delivery Method): nasal cannula  O2 Flow (L/min): 2    EXAM PENDING   General: No acute distress    NEUROLOGICAL EXAM:  Mental status: The patient is awake and alert and has normal attention span.  The patient is oriented to self, notes [place] for location, oriented to month and year, disoriented to date. The patient is able to name objects, follow commands, repetition intact.     Cranial nerves: Pupils equal and react symmetrically to light. There is no visual field deficit to confrontation. Extraocular motion is full with no nystagmus. There is no ptosis. Facial sensation is intact. Slight right facial palsy that corrects on smile,    Tongue is midline.    Motor: There is normal bulk and tone.  There is no tremor.  Strength is 5/5 in the right arm and leg. Right hand subtle  drift   Strength is 5/5 in the left arm and leg.    LABS:                        12.6   14.46 )-----------( 200      ( 15 Mar 2023 03:40 )             41.2    03-15    140  |  103  |  27.1<H>  ----------------------------<  105<H>  4.6   |  26.0  |  1.67<H>    Ca    9.3      15 Mar 2023 03:40  Phos  3.3     03-14  Mg     2.0     03-14    TPro  6.7  /  Alb  3.6  /  TBili  0.8  /  DBili  x   /  AST  18  /  ALT  13  /  AlkPhos  87  03-15  PT/INR - ( 15 Mar 2023 03:40 )   PT: 27.5 sec;   INR: 2.35 ratio         PTT - ( 14 Mar 2023 20:35 )  PTT:27.1 sec      IMAGING: Reviewed by me.   MR Head No Cont (03.10.23 @ 15:46)   1. Left medial posterior frontal cortical and subcortical acute infarction  2. Left lateral posterior frontal cortical and subcortical remote   infarction ; additional tiny left anterior frontal cortical remote   infarctions  3. Left thalamic and left callosal body remote deep infarctions  4. Ischemic white matter disease greater than typical for age  5. Diffuse brain volume loss typical for age    CT Angio Head Neck Stroke Protocol w/ IV Cont (03.08.23 @ 13:22)   7 mL area of increased Tmax in the right parietal lobe suggesting brain   at risk versus artifact. No core infarct. No large vessel occlusion. 50%   stenosis in left carotid bulb/proximal internal carotid artery. Consider   MRI of the brain as clinically warranted. Additional findings above.    CT Brain Stroke Protocol (03.08.23 @ 12:40)    Moderate chronic microvascular changes without evidence of   an acute transcortical infarction or hemorrhage.     US Duplex Carotid Arteries Complete, Bilateral (02.23.23 @ 11:03)   IMPRESSION: No significant hemodynamic stenosis of either carotid artery.    TTE Echo Complete w/ Contrast w/ Doppler (02.21.23 @ 11:34)   Summary:   1. Endocardial visualization was enhanced with intravenous echo   contrast. No LV thrombus.   2. Left ventricular ejection fraction, by visual estimation, is 25 to   30%.   3. Severely decreased global left ventricular systolic function.   4. Abnormal septal motion consistent with post-operative status.   5. There is mild eccentric left ventricular hypertrophy.   6. The mitralin-flow pattern reveals no discernable A-wave, therefore   no comment on diastolic function can be made.   7. Normal right ventricular size and function, estimated PASP least 42   mmHg.   8. Severely enlarged left atrium.   9. Right atrial enlargement.  10. Moderate mitral annular calcification.  11. Mild-moderate tricuspid regurgitation.  12. Severe calcific aortic valve stenosis with low gradient,   dimensionless index 0.23.  13. There is moderate aortic root calcification.  14. Borderline dilatation of the aortic root, sinuses of Valsalva and   ascending aorta 3.9 cm, index to BSA 1.6 cm/m2 within normal.  15. Compared to the outpatient TTE study from 9/2022 prevoiusly LV EF 37%   and known low gradient aortic valve stenosis, but visually on current   study appears severely stenotic.     TTE Echo Complete w/ Contrast w/ Doppler (03.12.23 @ 12:55)   Summary:   1. Left ventricular ejection fraction, by visual estimation, is 20 to   25%.   2. Moderately to severely decreased global left ventricular systolic   function.   3. [place] is abnormal as described above.   4. The mitral in-flow pattern reveals no discernable A-wave, therefore   no comment on diastolic function can be made.   5. No LV thrombus.   6. Moderately reduced RV systolic function.   7. There is no evidence of pericardial effusion.   8. Mild thickening of the anterior and posterior mitral valve leaflets.   9. Moderate mitral annular calcification.  10. Mild-moderate tricuspid regurgitation.  11. Aortic Stenosis with V max 3.1 m/sec, Mean gradient of 23 mm Hg.  12. Endocardial visualization was enhanced with intravenous echo contrast.               Preliminary note, offical recommendations pending attending review/signature   [facility] Stroke Team  Progress Note     HPI:  88 yr old male with hypertension,  atrial fibrillation, PE/DVT s/p IVC filter, Factor V Leiden on coumadin CAD s/p CABG, stroke COPD, GIGI, AAA, infrarenal 6.1 cm  on prior ct presented with right sided weakness started at 11am  3/8/23 at [facility] by ER report. Upon review patient notes  numbness  to right hand,  arm and leg denied slurred speech, changes in vision, weakness, vision. Patient on coumadin thinks that took it day of presentation.  Noted symptoms  were improving on ER eval, ER  NIH 0- INR 2.30, excluded from Tenecteplase . It was noted also thinks that had an old stroke with some residual numbness/weakness on the same side in the past - notes sometimes gets worse . Patient stated at this time he feels back to normal.     SUBJECTIVE: Noted rapid atrial fibrillation overnight- transferred to ICU.  No new neurologic complaints.  ROS reported negative unless otherwise noted. Feeling better since he had coffee/yogurt.     acetaminophen     Tablet .. 650 milliGRAM(s) Oral every 6 hours PRN  albuterol/ipratropium for Nebulization 3 milliLiter(s) Nebulizer every 6 hours  aspirin enteric coated 81 milliGRAM(s) Oral daily  atorvastatin 40 milliGRAM(s) Oral at bedtime  bisacodyl Suppository 10 milliGRAM(s) Rectal daily PRN  budesonide 160 MICROgram(s)/formoterol 4.5 MICROgram(s) Inhaler 2 Puff(s) Inhalation two times a day  chlorhexidine 2% Cloths 1 Application(s) Topical <User Schedule>  midodrine 5 milliGRAM(s) Oral every 8 hours  nystatin Powder 1 Application(s) Topical two times a day  OLANZapine 10 milliGRAM(s) Oral at bedtime  oxybutynin XL 10 milliGRAM(s) Oral <User Schedule>  phenylephrine    Infusion 0.5 MICROgram(s)/kG/Min IV Continuous <Continuous>  piperacillin/tazobactam IVPB.. 3.375 Gram(s) IV Intermittent every 8 hours  polyethylene glycol 3350 17 Gram(s) Oral daily  sertraline 50 milliGRAM(s) Oral daily  tamsulosin 0.4 milliGRAM(s) Oral at bedtime      PHYSICAL EXAM:   Vital Signs Last 24 Hrs  T(C): 37.8 (15 Mar 2023 07:30), Max: 40.6 (14 Mar 2023 20:35)  T(F): 100 (15 Mar 2023 07:30), Max: 105.1 (14 Mar 2023 20:35)  HR: 88 (15 Mar 2023 07:30) (76 - 791)  BP: 100/74 (15 Mar 2023 07:15) (65/53 - 153/128)  BP(mean): 85 (15 Mar 2023 07:15) (55 - 138)  RR: 24 (15 Mar 2023 07:30) (15 - 42)  SpO2: 96% (15 Mar 2023 07:30) (87% - 100%)    Parameters below as of 15 Mar 2023 06:15  Patient On (Oxygen Delivery Method): nasal cannula  O2 Flow (L/min): 2       General: No acute distress    NEUROLOGICAL EXAM:  Mental status: The patient is awake and alert and has normal attention span.  The patient is oriented to self,  [facility], re-orients self to March 2023, disoriented to date. The patient is able to name objects, follow commands, repetition intact.     Cranial nerves: Pupils equal and react symmetrically to light. There is no visual field deficit to confrontation. Extraocular motion is full with no nystagmus. There is no ptosis. Facial sensation is intact. Slight right facial palsy that corrects on smile,    Tongue is midline.    Motor: There is normal bulk and tone.  There is no tremor.  Strength is 5/5 in the right arm and leg. Right hand subtle pronator drift   Strength is 5/5 in the left arm and leg.    Sensation intact throughout, symmetric, no extinction     LABS:                        12.6   14.46 )-----------( 200      ( 15 Mar 2023 03:40 )             41.2    03-15    140  |  103  |  27.1<H>  ----------------------------<  105<H>  4.6   |  26.0  |  1.67<H>    Ca    9.3      15 Mar 2023 03:40  Phos  3.3     03-14  Mg     2.0     03-14    TPro  6.7  /  Alb  3.6  /  TBili  0.8  /  DBili  x   /  AST  18  /  ALT  13  /  AlkPhos  87  03-15  PT/INR - ( 15 Mar 2023 03:40 )   PT: 27.5 sec;   INR: 2.35 ratio         PTT - ( 14 Mar 2023 20:35 )  PTT:27.1 sec      IMAGING: Reviewed by me.   MR Head No Cont (03.10.23 @ 15:46)   1. Left medial posterior frontal cortical and subcortical acute infarction  2. Left lateral posterior frontal cortical and subcortical remote   infarction ; additional tiny left anterior frontal cortical remote   infarctions  3. Left thalamic and left callosal body remote deep infarctions  4. Ischemic white matter disease greater than typical for age  5. Diffuse brain volume loss typical for age    CT Angio Head Neck Stroke Protocol w/ IV Cont (03.08.23 @ 13:22)   7 mL area of increased Tmax in the right parietal lobe suggesting brain   at risk versus artifact. No core infarct. No large vessel occlusion. 50%   stenosis in left carotid bulb/proximal internal carotid artery. Consider   MRI of the brain as clinically warranted. Additional findings above.    CT Brain Stroke Protocol (03.08.23 @ 12:40)    Moderate chronic microvascular changes without evidence of   an acute transcortical infarction or hemorrhage.     US Duplex Carotid Arteries Complete, Bilateral (02.23.23 @ 11:03)   IMPRESSION: No significant hemodynamic stenosis of either carotid artery.    TTE Echo Complete w/ Contrast w/ Doppler (02.21.23 @ 11:34)   Summary:   1. Endocardial visualization was enhanced with intravenous echo   contrast. No LV thrombus.   2. Left ventricular ejection fraction, by visual estimation, is 25 to   30%.   3. Severely decreased global left ventricular systolic function.   4. Abnormal septal motion consistent with post-operative status.   5. There is mild eccentric left ventricular hypertrophy.   6. The mitralin-flow pattern reveals no discernable A-wave, therefore   no comment on diastolic function can be made.   7. Normal right ventricular size and function, estimated PASP least 42   mmHg.   8. Severely enlarged left atrium.   9. Right atrial enlargement.  10. Moderate mitral annular calcification.  11. Mild-moderate tricuspid regurgitation.  12. Severe calcific aortic valve stenosis with low gradient,   dimensionless index 0.23.  13. There is moderate aortic root calcification.  14. Borderline dilatation of the aortic root, sinuses of Valsalva and   ascending aorta 3.9 cm, index to BSA 1.6 cm/m2 within normal.  15. Compared to the outpatient TTE study from 9/2022 prevoiusly LV EF 37%   and known low gradient aortic valve stenosis, but visually on current   study appears severely stenotic.     TTE Echo Complete w/ Contrast w/ Doppler (03.12.23 @ 12:55)   Summary:   1. Left ventricular ejection fraction, by visual estimation, is 20 to   25%.   2. Moderately to severely decreased global left ventricular systolic   function.   3. [place] is abnormal as described above.   4. The mitral in-flow pattern reveals no discernable A-wave, therefore   no comment on diastolic function can be made.   5. No LV thrombus.   6. Moderately reduced RV systolic function.   7. There is no evidence of pericardial effusion.   8. Mild thickening of the anterior and posterior mitral valve leaflets.   9. Moderate mitral annular calcification.  10. Mild-moderate tricuspid regurgitation.  11. Aortic Stenosis with V max 3.1 m/sec, Mean gradient of 23 mm Hg.  12. Endocardial visualization was enhanced with intravenous echo contrast.               [facility] Stroke Team  Progress Note     HPI:  88 yr old male with hypertension,  atrial fibrillation, PE/DVT s/p IVC filter, Factor V Leiden on coumadin CAD s/p CABG, stroke COPD, GIGI, AAA, infrarenal 6.1 cm  on prior ct presented with right sided weakness started at 11am  3/8/23 at [facility] by ER report. Upon review patient notes  numbness  to right hand,  arm and leg denied slurred speech, changes in vision, weakness, vision. Patient on coumadin thinks that took it day of presentation.  Noted symptoms  were improving on ER eval, ER  NIH 0- INR 2.30, excluded from Tenecteplase . It was noted also thinks that had an old stroke with some residual numbness/weakness on the same side in the past - notes sometimes gets worse . Patient stated at this time he feels back to normal.     SUBJECTIVE: Noted rapid atrial fibrillation overnight- transferred to ICU.  No new neurologic complaints.  ROS reported negative unless otherwise noted. Feeling better since he had coffee/yogurt.     acetaminophen     Tablet .. 650 milliGRAM(s) Oral every 6 hours PRN  albuterol/ipratropium for Nebulization 3 milliLiter(s) Nebulizer every 6 hours  aspirin enteric coated 81 milliGRAM(s) Oral daily  atorvastatin 40 milliGRAM(s) Oral at bedtime  bisacodyl Suppository 10 milliGRAM(s) Rectal daily PRN  budesonide 160 MICROgram(s)/formoterol 4.5 MICROgram(s) Inhaler 2 Puff(s) Inhalation two times a day  chlorhexidine 2% Cloths 1 Application(s) Topical <User Schedule>  midodrine 5 milliGRAM(s) Oral every 8 hours  nystatin Powder 1 Application(s) Topical two times a day  OLANZapine 10 milliGRAM(s) Oral at bedtime  oxybutynin XL 10 milliGRAM(s) Oral <User Schedule>  phenylephrine    Infusion 0.5 MICROgram(s)/kG/Min IV Continuous <Continuous>  piperacillin/tazobactam IVPB.. 3.375 Gram(s) IV Intermittent every 8 hours  polyethylene glycol 3350 17 Gram(s) Oral daily  sertraline 50 milliGRAM(s) Oral daily  tamsulosin 0.4 milliGRAM(s) Oral at bedtime      PHYSICAL EXAM:   Vital Signs Last 24 Hrs  T(C): 37.8 (15 Mar 2023 07:30), Max: 40.6 (14 Mar 2023 20:35)  T(F): 100 (15 Mar 2023 07:30), Max: 105.1 (14 Mar 2023 20:35)  HR: 88 (15 Mar 2023 07:30) (76 - 791)  BP: 100/74 (15 Mar 2023 07:15) (65/53 - 153/128)  BP(mean): 85 (15 Mar 2023 07:15) (55 - 138)  RR: 24 (15 Mar 2023 07:30) (15 - 42)  SpO2: 96% (15 Mar 2023 07:30) (87% - 100%)    Parameters below as of 15 Mar 2023 06:15  Patient On (Oxygen Delivery Method): nasal cannula  O2 Flow (L/min): 2       General: No acute distress    NEUROLOGICAL EXAM:  Mental status: The patient is awake and alert and has normal attention span.  The patient is oriented to self,  [facility], re-orients self to March 2023, disoriented to date. The patient is able to name objects, follow commands, repetition intact.     Cranial nerves: Pupils equal and react symmetrically to light. There is no visual field deficit to confrontation. Extraocular motion is full with no nystagmus. There is no ptosis. Facial sensation is intact. Slight right facial palsy that corrects on smile,    Tongue is midline.    Motor: There is normal bulk and tone.  There is no tremor.  Strength is 5/5 in the right arm and leg. Right hand subtle pronator drift   Strength is 5/5 in the left arm and leg.    Sensation intact throughout, symmetric, no extinction     LABS:                        12.6   14.46 )-----------( 200      ( 15 Mar 2023 03:40 )             41.2    03-15    140  |  103  |  27.1<H>  ----------------------------<  105<H>  4.6   |  26.0  |  1.67<H>    Ca    9.3      15 Mar 2023 03:40  Phos  3.3     03-14  Mg     2.0     03-14    TPro  6.7  /  Alb  3.6  /  TBili  0.8  /  DBili  x   /  AST  18  /  ALT  13  /  AlkPhos  87  03-15  PT/INR - ( 15 Mar 2023 03:40 )   PT: 27.5 sec;   INR: 2.35 ratio         PTT - ( 14 Mar 2023 20:35 )  PTT:27.1 sec      IMAGING: Reviewed by me.   MR Head No Cont (03.10.23 @ 15:46)   1. Left medial posterior frontal cortical and subcortical acute infarction  2. Left lateral posterior frontal cortical and subcortical remote   infarction ; additional tiny left anterior frontal cortical remote   infarctions  3. Left thalamic and left callosal body remote deep infarctions  4. Ischemic white matter disease greater than typical for age  5. Diffuse brain volume loss typical for age    CT Angio Head Neck Stroke Protocol w/ IV Cont (03.08.23 @ 13:22)   7 mL area of increased Tmax in the right parietal lobe suggesting brain   at risk versus artifact. No core infarct. No large vessel occlusion. 50%   stenosis in left carotid bulb/proximal internal carotid artery. Consider   MRI of the brain as clinically warranted. Additional findings above.    CT Brain Stroke Protocol (03.08.23 @ 12:40)    Moderate chronic microvascular changes without evidence of   an acute transcortical infarction or hemorrhage.     US Duplex Carotid Arteries Complete, Bilateral (02.23.23 @ 11:03)   IMPRESSION: No significant hemodynamic stenosis of either carotid artery.    TTE Echo Complete w/ Contrast w/ Doppler (02.21.23 @ 11:34)   Summary:   1. Endocardial visualization was enhanced with intravenous echo   contrast. No LV thrombus.   2. Left ventricular ejection fraction, by visual estimation, is 25 to   30%.   3. Severely decreased global left ventricular systolic function.   4. Abnormal septal motion consistent with post-operative status.   5. There is mild eccentric left ventricular hypertrophy.   6. The mitralin-flow pattern reveals no discernable A-wave, therefore   no comment on diastolic function can be made.   7. Normal right ventricular size and function, estimated PASP least 42   mmHg.   8. Severely enlarged left atrium.   9. Right atrial enlargement.  10. Moderate mitral annular calcification.  11. Mild-moderate tricuspid regurgitation.  12. Severe calcific aortic valve stenosis with low gradient,   dimensionless index 0.23.  13. There is moderate aortic root calcification.  14. Borderline dilatation of the aortic root, sinuses of Valsalva and   ascending aorta 3.9 cm, index to BSA 1.6 cm/m2 within normal.  15. Compared to the outpatient TTE study from 9/2022 prevoiusly LV EF 37%   and known low gradient aortic valve stenosis, but visually on current   study appears severely stenotic.     TTE Echo Complete w/ Contrast w/ Doppler (03.12.23 @ 12:55)   Summary:   1. Left ventricular ejection fraction, by visual estimation, is 20 to   25%.   2. Moderately to severely decreased global left ventricular systolic   function.   3. [place] is abnormal as described above.   4. The mitral in-flow pattern reveals no discernable A-wave, therefore   no comment on diastolic function can be made.   5. No LV thrombus.   6. Moderately reduced RV systolic function.   7. There is no evidence of pericardial effusion.   8. Mild thickening of the anterior and posterior mitral valve leaflets.   9. Moderate mitral annular calcification.  10. Mild-moderate tricuspid regurgitation.  11. Aortic Stenosis with V max 3.1 m/sec, Mean gradient of 23 mm Hg.  12. Endocardial visualization was enhanced with intravenous echo contrast.

## 2023-03-15 NOTE — PROGRESS NOTE ADULT - NS ATTEND AMEND GEN_ALL_CORE FT
Seen and examined with the PA.  Plan discussed with PA.  I agree with as written above with modifications as below    will follow with you    Harvey Santana MD PhD   [id number]

## 2023-03-15 NOTE — CONSULT NOTE ADULT - ASSESSMENT
89 y/o M with a h/o HTN, AF, PE/DVT s/p IVCF, Factor V Leiden (on warfarin), CAD s/p CABG, CHFrEF, CVA, COPD, GIGI on CPAP, with:    # Distributive shock  # AF with RVR  # MILAGRO  # Acute CVA    Transfer to MICU.    Developed profound hypotension s/p several doses of AV-torie agents. Also with concern for sepsis component given high fever, however there is no overt infectious source at this time and serum procalcitonin level is low. Questionable new left perihilar lung infiltrate on repeat CXR? Will obtain urine specimen after alcaraz catheter is inserted. Blood cultures have been sent. Started on empiric Zosyn and vancomycin for now.    - given a total of 1L crystalloid bolus without improvement in hemodynamics on reassessment  - good candidate for early vasopressor therapy given his underlying advanced heart failure  - start phenylephrine infusion, titrate to maintain a MAP > 65  - HR much better controlled after multiple doses of metoprolol, diltiazem, and digoxin, at this point would utilize amiodarone if severe tachycardia recurs  - continue warfarin, trend coags, target INR 2-3  - MILAGRO likely pre-renal in nature, optimize end-organ perfusion as above, caution with further IVF  - trend BUN/Cr, electrolytes, acid-base balance, and monitor hourly UOP  - no indication for urgent hemodialysis at this time  - continue aspirin and statin    Case discussed with MICU physician, Dr. Carreon.

## 2023-03-15 NOTE — DIETITIAN INITIAL EVALUATION ADULT - NS FNS DIET ORDER
Diet, DASH/TLC:   Sodium & Cholesterol Restricted  Soft and Bite Sized (SOFTBTSZ)  Mildly Thick Liquids (MILDTHICKLIQS) (03-14-23 @ 22:41)

## 2023-03-15 NOTE — DIETITIAN INITIAL EVALUATION ADULT - PERTINENT LABORATORY DATA
03-15    140  |  103  |  27.1<H>  ----------------------------<  105<H>  4.6   |  26.0  |  1.67<H>    Ca    9.3      15 Mar 2023 03:40  Phos  3.3     03-14  Mg     2.0     03-14    TPro  6.7  /  Alb  3.6  /  TBili  0.8  /  DBili  x   /  AST  18  /  ALT  13  /  AlkPhos  87  03-15  POCT Blood Glucose.: 129 mg/dL (03-14-23 @ 20:11)  A1C with Estimated Average Glucose Result: 5.9 % (03-09-23 @ 06:06)  A1C with Estimated Average Glucose Result: 6.0 % (02-23-23 @ 05:30)

## 2023-03-15 NOTE — PROGRESS NOTE ADULT - ASSESSMENT
ASSESSMENT: 88 yr old male with hypertension,  atrial fibrillation, PE/DVT s/p IVC filter, Factor V Leiden; on coumadin CAD s/p CABG, stroke , COPD, GIGI, AAA, infrarenal 6.1 cm  on prior ct presented with right sided numbness started at around 11am  3/8/23 at [facility] by ER report. CT head without acute infarction or hemorrhage. Excluded from Tenecteplasea as INR > 1.7 (currently 2.30).  CT angiogram without evidence of large vessel occlusion- not thrombectomy ca ndidate. Moderate left carotid stenosis. MRI head demonstrates left hemispheric posterior frontal cortical and subcortical acute infarctions.  Remote thalamic and callosal infarcts.  Etiology possibly cardioembolic in setting of atrial fibrillation vs. hypercoagulable states as patient with history of factor V Leiden.        NEURO: Continue close monitoring for neurologic deterioration, permissive HTN overall < 160mmHg (anticoagulation use in setting of AAA),  110-140mmHg would avoid further hypotension and rapid fluctuations, titrate statin to LDL goal less than 70, MRI Brain w/o as noted, US carotid 2/23/23 with no hemodynamically significant stenosis ,  routine EEG if clinical suspicion for seizure as reported confusion prior, Physical therapy/OT/Speech eval/treatment.     ANTITHROMBOTIC THERAPY: neurologically suggest to continue therapeutic anticoagulation. Noted Warfarin as  home regimen.  Would confirm INR goal 2-3 with outpatient prescriber/heme/cardio and optimal agent as patient presenting INR 2.3 with acute infarction, additionally clarify adherence .     PULMONARY:   protecting airway, saturating well     CARDIOVASCULAR:  TTE as noted on 2/21/23 with EF 25-30% , repeat EF 20-25%, cardiac monitoring to ensure rate control, cardiology following for further recommendations: suggested GDMT- not candidate for advance therapies as noted. AAA monitoring per cardiology.                               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 Clarify Adherence and maintain close monitoring.     DVT ppx:   therapeutically anticoagulated with Warfarin if INR 2-3. If below- consider alternate pharmacological DVT ppx     ID: afebrile,  leukocytosis, screen for infection    Other: Vascular follow up for right radial artery occlusion: no indication for any vascular intervention. Occlusion likely chronic. Suggested to continue therapeutic anticoagulation.     DISPOSITION: Rehab, depending on PT eval once stable and workup is complete      CORE MEASURES:     Admission NIHSS: 4  Tenecteplase : [] YES [x] NO   LDL/HDL/A1C: 58/34/5.9  Depression Screen:p   Statin Therapy: as noted   Dysphagia Screen: [x] PASS [] FAIL -   Smoking [] YES [x] NO      Afib [x] YES [] NO     Stroke Education [x] YES [] NO       ASSESSMENT: 88 yr old male with hypertension,  atrial fibrillation, PE/DVT s/p IVC filter, Factor V Leiden; on coumadin CAD s/p CABG, stroke , COPD, GIGI, AAA, infrarenal 6.1 cm  on prior ct presented with right sided numbness started at around 11am  3/8/23 [facility] by ER report. CT head without acute infarction or hemorrhage. Excluded from Tenecteplase[facility] as INR > 1.7 (currently 2.30).  CT angiogram without evidence of large vessel occlusion- not thrombectomy ca ndidate. Moderate left carotid stenosis. MRI head demonstrates left hemispheric posterior frontal cortical and subcortical acute infarctions.  Remote thalamic and callosal infarcts.  Etiology possibly cardioembolic in setting of atrial fibrillation vs. hypercoagulable states as patient with history of factor V Leiden.        NEURO: neurologically without acute change, improved orientation to time today, Continue close monitoring for neurologic deterioration with q2 stroke neuro checks for now to ensure neurological stability in setting of hemodynamic changes (rapid a fib/hypotension), repeat CT head for any acute change in status. permissive HTN overall < 160mmHg (anticoagulation use in setting of AAA),  110-140mmHg would avoid further hypotension and rapid fluctuations, titrate statin to LDL goal less than 70, MRI Brain w/o as noted, US carotid 2/23/23 with no hemodynamically significant stenosis ,  routine EEG if clinical suspicion for seizure as reported confusion prior, Physical therapy/OT/Speech eval/treatment.     ANTITHROMBOTIC THERAPY: neurologically suggest to continue therapeutic anticoagulation, permitting no medical contraindication. Noted Warfarin as  home regimen.  Would confirm INR goal 2-3 with outpatient prescriber/heme/cardio and optimal agent as patient presenting INR 2.3 with acute infarction, additionally clarify adherence .      PULMONARY:   protecting airway, saturating well     CARDIOVASCULAR:  TTE as noted on 2/21/23 with EF 25-30% , repeat EF 20-25%, cardiac monitoring to ensure rate control, cardiology following for further recommendations: suggested GDMT- not candidate for advance therapies as noted. AAA monitoring per cardiology.                               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 Clarify Adherence and maintain close monitoring.     DVT ppx:   therapeutically anticoagulated with Warfarin if INR 2-3. If below- consider alternate pharmacological DVT ppx     ID: afebrile,  leukocytosis, screen for infection    Other: Vascular follow up for right radial artery occlusion: no indication for any vascular intervention. Occlusion likely chronic. Suggested to continue therapeutic anticoagulation.     DISPOSITION: Rehab, depending on PT eval once stable and workup is complete      CORE MEASURES:     Admission NIHSS: 4  Tenecteplase : [] YES [x] NO   LDL/HDL/A1C: 58/34/5.9  Depression Screen:p   Statin Therapy: as noted   Dysphagia Screen: [x] PASS [] FAIL -   Smoking [] YES [x] NO      Afib [x] YES [] NO     Stroke Education [x] YES [] NO

## 2023-03-15 NOTE — PROGRESS NOTE ADULT - NS ATTEND AMEND GEN_ALL_CORE FT
advance heart failure. DNR and DNI.  hypotensiona dn atrial fibrillation . received dig bolus . on ajay   off beta-blocker . advance heart failure. DNR and DNI.  hypotensiona dn atrial fibrillation .  sepsis and bacteremia. septic shock  rapid atrial fibrillation likely due to sepsis induced.    received dig bolus . on ajay   off beta-blocker  due to shock.   ct treatment of sepsis.   no significnat Congestion due to rapid afib.   intermittent rate control  as needed.   ct ICU care.    ICU to discuss goals of care. probably will be able to get out of this acute sepsis. unclear source of bacteremia.  will discuss with ICU about if they need any other cardiac input or ICU team will manage rate control as they control the sepsis.

## 2023-03-15 NOTE — CONSULT NOTE ADULT - SUBJECTIVE AND OBJECTIVE BOX
Patient is a 88y old  Male who presents with a chief complaint of right sided weakness / right radial artery occlusion. (14 Mar 2023 15:41)      BRIEF HOSPITAL COURSE: 87 y/o M with a h/o HTN, AF, PE/DVT s/p IVCF, Factor V Leiden (on warfarin), CAD s/p CABG, CHFrEF, CVA, COPD, GIGI on CPAP, admitted on 3/8 from NH with reported RUE weakness/numbness. Treated for acute CVA (did not receive thrombolytic). MRI brain (3/10) revealed left medial posterior frontal cortical and subcortical acute infarction.       Events last 24 hours: Patient developed uncontrolled AF with RVR overnight and received multiple doses of IV and PO AV-torie agents and digoxin from [facility] Response [facility]. At this time he was also noted to have rectal temperature of 105'F. MICU eval requested as he eventually developed progressive hypotension (SBP 60s) despite 1L crystalloid bolus. Started on IV vasopressor.      PAST MEDICAL & SURGICAL HISTORY:  Hypertension      Hyperlipemia      PE (pulmonary embolism)      [place] filter in place      Atrial fibrillation, unspecified type      CAD S/P percutaneous coronary angioplasty      History of COPD      Cerebrovascular accident (CVA)      Atheroma      Aortic stenosis      Factor V Leiden      Prostate CA      S/P IVC filter      S/P cataract surgery      S/P CABG (coronary artery bypass graft)      Status post right knee replacement        Allergies    No Known Allergies    Intolerances    OHS (Unknown)    FAMILY HISTORY:  Family history of diabetes mellitus (Child)        Review of Systems:  CONSTITUTIONAL: No fever, chills, or fatigue  EYES: No eye pain, visual disturbances, or discharge  ENMT:  No difficulty hearing, tinnitus, vertigo; No sinus or throat pain  NECK: No pain or stiffness  RESPIRATORY: No cough, wheezing, chills or hemoptysis; No shortness of breath  CARDIOVASCULAR: No chest pain, palpitations, dizziness, or leg swelling  GASTROINTESTINAL: No abdominal or epigastric pain. No nausea, vomiting, or hematemesis; No diarrhea or constipation. No melena or hematochezia.  GENITOURINARY: No dysuria, frequency, hematuria, or incontinence  NEUROLOGICAL: No headaches, memory loss, loss of strength, numbness, or tremors  SKIN: No itching, burning, rashes, or lesions   MUSCULOSKELETAL: No joint pain or swelling; No muscle, back, or extremity pain  PSYCHIATRIC: No depression, anxiety, mood swings, or difficulty sleeping        Medications:  vancomycin  IVPB 1500 milliGRAM(s) IV Intermittent once  phenylephrine    Infusion 0.5 MICROgram(s)/kG/Min IV Continuous <Continuous>  albuterol/ipratropium for Nebulization 3 milliLiter(s) Nebulizer every 6 hours  budesonide 160 MICROgram(s)/formoterol 4.5 MICROgram(s) Inhaler 2 Puff(s) Inhalation two times a day  acetaminophen     Tablet .. 650 milliGRAM(s) Oral every 6 hours PRN  OLANZapine 10 milliGRAM(s) Oral at bedtime  sertraline 50 milliGRAM(s) Oral daily  aspirin enteric coated 81 milliGRAM(s) Oral daily  bisacodyl Suppository 10 milliGRAM(s) Rectal daily PRN  polyethylene glycol 3350 17 Gram(s) Oral daily  oxybutynin XL 10 milliGRAM(s) Oral <User Schedule>  tamsulosin 0.4 milliGRAM(s) Oral at bedtime  atorvastatin 40 milliGRAM(s) Oral at bedtime  chlorhexidine 2% Cloths 1 Application(s) Topical <User Schedule>  nystatin Powder 1 Application(s) Topical two times a day            ICU Vital Signs Last 24 Hrs  T(C): 38.9 (14 Mar 2023 23:38), Max: 40.1 (14 Mar 2023 22:22)  T(F): 102 (14 Mar 2023 23:38), Max: 104.1 (14 Mar 2023 22:22)  HR: 120 (14 Mar 2023 23:38) (3 - 791)  BP: 67/51 (14 Mar 2023 23:38) (65/53 - 125/82)  BP(mean): 93 (14 Mar 2023 12:00) (76 - 93)  ABP: --  ABP(mean): --  RR: 27 (14 Mar 2023 23:38) (18 - 28)  SpO2: 100% (14 Mar 2023 23:38) (87% - 100%)    O2 Parameters below as of 14 Mar 2023 23:38  Patient On (Oxygen Delivery Method): mask, nonrebreather          Vital Signs Last 24 Hrs  T(C): 38.9 (14 Mar 2023 23:38), Max: 40.1 (14 Mar 2023 22:22)  T(F): 102 (14 Mar 2023 23:38), Max: 104.1 (14 Mar 2023 22:22)  HR: 120 (14 Mar 2023 23:38) (3 - 791)  BP: 67/51 (14 Mar 2023 23:38) (65/53 - 125/82)  BP(mean): 93 (14 Mar 2023 12:00) (76 - 93)  RR: 27 (14 Mar 2023 23:38) (18 - 28)  SpO2: 100% (14 Mar 2023 23:38) (87% - 100%)    Parameters below as of 14 Mar 2023 23:38  Patient On (Oxygen Delivery Method): mask, nonrebreather            I&O's Detail    13 Mar 2023 07:01  -  14 Mar 2023 07:00  --------------------------------------------------------  IN:    Oral Fluid: 320 mL    Sodium Chloride 0.9% Bolus: 750 mL  Total IN: 1070 mL    OUT:    Intermittent Catheterization - Urethral (mL): 500 mL    Voided (mL): 350 mL  Total OUT: 850 mL    Total NET: 220 mL      14 Mar 2023 07:01  -  15 Mar 2023 00:04  --------------------------------------------------------  IN:    Oral Fluid: 500 mL    Sodium Chloride 0.9% Bolus: 250 mL  Total IN: 750 mL    OUT:    Voided (mL): 375 mL  Total OUT: 375 mL    Total NET: 375 mL            LABS:                        12.7   9.05  )-----------( 203      ( 14 Mar 2023 20:35 )             41.6     03-14    138  |  102  |  24.9<H>  ----------------------------<  131<H>  5.0   |  20.0<L>  |  1.38<H>    Ca    9.6      14 Mar 2023 20:35  Phos  3.3     03-14  Mg     2.0     03-14    TPro  6.1<L>  /  Alb  2.9<L>  /  TBili  0.5  /  DBili  x   /  AST  15  /  ALT  12  /  AlkPhos  83  -      CARDIAC MARKERS ( 14 Mar 2023 20:35 )  x     / 0.02 ng/mL / x     / x     / x          CAPILLARY BLOOD GLUCOSE      POCT Blood Glucose.: 129 mg/dL (14 Mar 2023 20:11)    PT/INR - ( 14 Mar 2023 20:35 )   PT: 27.6 sec;   INR: 2.36 ratio         PTT - ( 14 Mar 2023 20:35 )  PTT:27.1 sec  Urinalysis Basic - ( 13 Mar 2023 14:50 )    Color: Yellow / Appearance: Clear / S.005 / pH: x  Gluc: x / Ketone: Negative  / Bili: Negative / Urobili: Negative mg/dL   Blood: x / Protein: Negative / Nitrite: Negative   Leuk Esterase: Negative / RBC: x / WBC x   Sq Epi: x / Non Sq Epi: x / Bacteria: x      CULTURES:  Culture Results:   <10,000 CFU/mL Normal Urogenital Sangeetha ( @ 13:40)        Physical Examination:    General: No acute distress.  Alert, somnolent, obese    HEENT: Pupils equal, reactive to light.  Symmetric.    PULM: Clear to auscultation bilaterally, no significant sputum production    CVS: tachycardic, irreg irreg rhythm, no murmurs, rubs, or gallops    ABD: Soft, nondistended, nontender, normoactive bowel sounds, no masses    EXT: No edema, nontender    SKIN: Warm and well perfused, no rashes noted.    NEURO: awake and alert, answers most questions appropriately and follows commands, communication a bit difficult as he is hypophonic      RADIOLOGY:     < from: MR Head No Cont (03.10.23 @ 15:46) >  FINDINGS:    BRAIN:   The brain demonstrates acute infarction at the left posterior   frontal parasagittal vertex. This includes the precentral gyrus near the   midline. This acute infarct demonstrates diffusion restriction.This   infarct is brightly hyperintense on diffusion weighted images,   intermediate hyperintense on the long TR images, low signal intensity on   ADC images and is largely inconspicuous on the remaining pulse sequences.   This lesion involves cerebral cortex and atrophy and underlying   subcortical white matter. Directly lateral to this acute infarction is   cortical and subcortical remote infarction. Remote infarction is also   present within the left thalamus and the left anterior centrum semiovale   white matter extending into the left lateral body of the corpus callosum.   Additional tiny cortical infarcts appear to be present in the left   frontal operculum.    The brain also demonstrates multiple small patchy lesions within the   cerebral hemispheric white matter that are more typical for ischemic   white matter disease. These lesions are hyperintense on the long TR   images, otherwise inconspicuous. This disease is scattered in the   subcortical and deeper white matter of each cerebral hemisphere, most   extensive in the periatrial regions. Ventral pontine involvement is also   evident. No intracranial hemorrhage is recognized.  No mass effect is   found in the brain.    CSF SPACES:   The ventricles, sulci and basal cisterns  appear mild to   moderately dilated reflecting diffuse brain volume loss.    VESSELS:   The vertebral and internal carotid arteries demonstrate   expected flow voids indicating their patency.  The left vertebral artery   is dominant caliber. The posterior circulation is tortuous.    HEAD AND NECK STRUCTURES:   The orbits demonstrate lens replacement.    Paranasal sinuses are clear.  The nasal cavity demonstrates right to left   septal deviation and leftward directed septal spurs..  The central skull   base appears intact.  The nasopharynx is symmetric.  The temporal bones   demonstrate mastoid effusions more extensive on the right than the left.    The calvarium appears unremarkable.      IMPRESSION:    1. Left medial posterior frontal cortical and subcortical acute infarction    2. Left lateral posterior frontal cortical and subcortical remote   infarction ; additional tiny left anterior frontal cortical remote   infarctions    3. Left thalamic and left callosal body remote deep infarctions    4. Ischemic white matter disease greater than typical for age    5. Diffuse brain volume loss typical for age            CRITICAL CARE TIME SPENT: 45 mins  Time spent evaluating/treating patient with medical issues that pose a high risk for life threatening deterioration and/or end-organ damage, reviewing data/labs/imaging, discussing case with multidisciplinary team, discussing plan/goals of care with patient/family. Non-inclusive of procedure time.

## 2023-03-15 NOTE — SWALLOW BEDSIDE ASSESSMENT ADULT - SWALLOW EVAL: DIAGNOSIS
Oral stage generally functional for trials consumed puree, soft & bite-sized, thin, mild & moderately thick liquids. Cannot exclude pharyngeal dysphagia w/trials at the bedside due to intermittent wet vocal quality & delayed throat clear w/all consistencies.

## 2023-03-15 NOTE — SWALLOW BEDSIDE ASSESSMENT ADULT - SLP PERTINENT HISTORY OF CURRENT PROBLEM
As per MD note, "87yo M w/ PMH of AF and Factor V Leiden deficiency with PE/DVT s/p IVCF and on coumadin, CAD s/p CABG, CVA, HFrEF (35-40%), moderate AS, COPD, large infrarenal AAA, ?prostate CA s/p recent XRT and GIGI on CPAP. He has had recurrent falls and admitted for same recently and was treated for both COPD and CHF exacerbation. I saw the pt during that admission and he lacked capacity to make complex medical decisions- his dtr and HCP Jasmina elected DNR/DNI orders.  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  Pt sent to [facility] rehab but was readmitted 3/8 when developed RUE/RLE weakness with diminished radial and pedal pulses. MRI brain revealed left medial posterior frontal cortical and subcortical acute infarction with course further c/b AF w/ RVR, fever of 105F, and hypotension for which he was xferred to MICU and started on phenylephrine gtt".

## 2023-03-15 NOTE — PROGRESS NOTE ADULT - ASSESSMENT
87 y/o male with PMHx of HFrEF,  HTN, HLD, Factor V Leiden, PE (S/P IVC filter), A-fib, CAD/CABG x2 with LIMA-LAD and SVG-PDA with AV fibroelastoma resection in 2019, COPD, CVA, AS, and prostate CA who was sent in from [facility] rehab as per documentation was noted to have new RUE/ RLE weakness and numbness and with diminished rt. sided radial and pedal pulses. Found to have uncontrolled afib, subsequently found to have CVA.

## 2023-03-15 NOTE — DIETITIAN INITIAL EVALUATION ADULT - PERTINENT MEDS FT
MEDICATIONS  (STANDING):  acetaminophen   IVPB .. 1000 milliGRAM(s) IV Intermittent once  aspirin enteric coated 81 milliGRAM(s) Oral daily  atorvastatin 40 milliGRAM(s) Oral at bedtime  budesonide 160 MICROgram(s)/formoterol 4.5 MICROgram(s) Inhaler 2 Puff(s) Inhalation two times a day  chlorhexidine 2% Cloths 1 Application(s) Topical <User Schedule>  midodrine 5 milliGRAM(s) Oral every 8 hours  multiple electrolytes Injection Type 1 500 milliLiter(s) (125 mL/Hr) IV Continuous <Continuous>  nystatin Powder 1 Application(s) Topical two times a day  OLANZapine 10 milliGRAM(s) Oral at bedtime  oxybutynin XL 10 milliGRAM(s) Oral <User Schedule>  phenylephrine    Infusion 0.5 MICROgram(s)/kG/Min (22.5 mL/Hr) IV Continuous <Continuous>  piperacillin/tazobactam IVPB.. 3.375 Gram(s) IV Intermittent every 8 hours  polyethylene glycol 3350 17 Gram(s) Oral daily  sertraline 50 milliGRAM(s) Oral daily  tamsulosin 0.4 milliGRAM(s) Oral at bedtime  warfarin 3 milliGRAM(s) Oral once    MEDICATIONS  (PRN):  acetaminophen     Tablet .. 650 milliGRAM(s) Oral every 6 hours PRN Mild Pain (1 - 3)  albuterol/ipratropium for Nebulization 3 milliLiter(s) Nebulizer every 6 hours PRN Shortness of Breath and/or Wheezing  bisacodyl Suppository 10 milliGRAM(s) Rectal daily PRN Constipation

## 2023-03-15 NOTE — SWALLOW BEDSIDE ASSESSMENT ADULT - COMMENTS
Pt known to this service from current admission. Seen for bedside dysphagia evaluation on 3/9/23, rx made for soft & bite-sized, w/mildly thick liquids. Pt s/p change in status, w/transfer to MICU on 3/14/23, reconsult placed for SLP for re-assess of swallow.

## 2023-03-15 NOTE — CONSULT NOTE ADULT - ASSESSMENT
89yo M w/ PMH of AF and Factor V Leiden deficiency with PE/DVT s/p IVCF and on coumadin, CAD s/p CABG, CVA, HFrEF (35-40%), moderate AS, COPD, large infrarenal AAA, ?prostate CA s/p recent XRT and GIGI on CPAP. He has had recurrent falls and admitted for same recently and was treated for both COPD and CHF exacerbation. I saw the pt during that admission and he lacked capacity to make complex medical decisions- his dtr and HCP Jasmina elected DNR/DNI orders.  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  Pt sent to [facility] rehab but was readmitted 3/8 when developed RUE/RLE weakness with diminished radial and pedal pulses. MRI brain revealed left medial posterior frontal cortical and subcortical acute infarction with course further c/b AF w/ RVR, fever of 105F, and hypotension for which he was xferred to MICU and started on phenylephrine gtt.  We are consulted for goals of care.  #Goals of care  - pt has demonstrated an inability to maintain a linear/rational discussion and, as such, lacks capacity to understand and make complex medical decisions.  - daughter Jasmina lives in [facility] ([phone]) - niece Melanie lives locally ([phone])  - daughter Jasmina is HCP - preeviously reviewed form in chart  - per my d/w Jasmina during his recent admission, pt is DNR/DNI    #AMS   - behavioral health c/s'd last admission  - pt with visual hallucinations and tangential non-linear speech and thought process - discussed with psych - likely cognitive impairment and would benefit from outpt neurocognitive testing  - Would avoid/minimize known deliriogenic drugs such as benzodiazepines and anticholinergics.  Encourage staff and family to reorient frequently.  Keep shades open during day in effort to maintain day/night cycle.   89yo M w/ PMH of AF and Factor V Leiden deficiency with PE/DVT s/p IVCF and on coumadin, CAD s/p CABG, CVA, HFrEF (35-40%), moderate AS, COPD, large infrarenal AAA, ?prostate CA s/p recent XRT and GIGI on CPAP. He has had recurrent falls and admitted for same recently and was treated for both COPD and CHF exacerbation. I saw the pt during that admission and he lacked capacity to make complex medical decisions- his dtr and HCP Jasmina elected DNR/DNI orders.  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  Pt sent to [facility] rehab but was readmitted 3/8 when developed RUE/RLE weakness with diminished radial and pedal pulses. MRI brain revealed left medial posterior frontal cortical and subcortical acute infarction with course further c/b AF w/ RVR, fever of 105F, and hypotension for which he was xferred to MICU and started on phenylephrine gtt.  We are consulted for goals of care.  #Goals of care  - pt again demonstrated an inability to maintain a linear/rational discussion and, as such, lacks capacity to understand and make complex medical decisions.  - daughter Jasmina lives in [facility] ([phone]) - niece Melanie lives locally ([phone])  - daughter Jasmina is HCP - previously reviewed HCP form in chart  - per my d/w Jasmina during his recent admission, pt is DNR/DNI and MOLST completed at that time  - I attempted to call Jasmina but no answer - per pt, she is arriving tonight from [place] - will plan to meet with her tomorrow  - Will cont to follow and remain available to assist with future goals of care discussions if/when the need arises.    #AMS   - behavioral health c/s'd last admission  - pt with vtangential non-linear speech and thought process - discussed with psych during last admission - likely cognitive impairment and would benefit from outpt neurocognitive testing  - In order to mitigate the risk of delirium, would avoid/minimize known deliriogenic drugs such as benzodiazepines and anticholinergics.  Encourage staff and family to reorient frequently.  Keep shades open during day in effort to maintain day/night cycle.

## 2023-03-15 NOTE — DIETITIAN INITIAL EVALUATION ADULT - ADD RECOMMEND
Rx: MVI daily. Continue bowel regimen PRN. Encourage po intake, monitor diet tolerance, and provide assistance at meals as needed. Obtain daily weights to monitor trends.

## 2023-03-15 NOTE — SWALLOW BEDSIDE ASSESSMENT ADULT - SLP GENERAL OBSERVATIONS
Recd awake/upright in bed, A&A Ox2, reduced cognition, confused, 0/10 pain pre/post, tolerating RA SpO2 94%.
Pt received sitting upright in bed, NC, A&OX3, however with reduced cognition and confusion.

## 2023-03-16 LAB
ALBUMIN SERPL ELPH-MCNC: 2.8 G/DL — LOW (ref 3.3–5.2)
ALP SERPL-CCNC: 75 U/L — SIGNIFICANT CHANGE UP (ref 40–120)
ALT FLD-CCNC: 12 U/L — SIGNIFICANT CHANGE UP
ANION GAP SERPL CALC-SCNC: 11 MMOL/L — SIGNIFICANT CHANGE UP (ref 5–17)
AST SERPL-CCNC: 17 U/L — SIGNIFICANT CHANGE UP
BASOPHILS # BLD AUTO: 0.04 K/UL — SIGNIFICANT CHANGE UP (ref 0–0.2)
BASOPHILS NFR BLD AUTO: 0.5 % — SIGNIFICANT CHANGE UP (ref 0–2)
BILIRUB SERPL-MCNC: 0.6 MG/DL — SIGNIFICANT CHANGE UP (ref 0.4–2)
BUN SERPL-MCNC: 25 MG/DL — HIGH (ref 8–20)
CALCIUM SERPL-MCNC: 8.5 MG/DL — SIGNIFICANT CHANGE UP (ref 8.4–10.5)
CHLORIDE SERPL-SCNC: 102 MMOL/L — SIGNIFICANT CHANGE UP (ref 96–108)
CO2 SERPL-SCNC: 23 MMOL/L — SIGNIFICANT CHANGE UP (ref 22–29)
CREAT SERPL-MCNC: 1.32 MG/DL — HIGH (ref 0.5–1.3)
DRVVT RATIO: 1.03 RATIO — SIGNIFICANT CHANGE UP (ref 0–1.21)
DRVVT SCREEN TO CONFIRM RATIO: SIGNIFICANT CHANGE UP
EGFR: 52 ML/MIN/1.73M2 — LOW
EOSINOPHIL # BLD AUTO: 0.08 K/UL — SIGNIFICANT CHANGE UP (ref 0–0.5)
EOSINOPHIL NFR BLD AUTO: 1.1 % — SIGNIFICANT CHANGE UP (ref 0–6)
GLUCOSE SERPL-MCNC: 121 MG/DL — HIGH (ref 70–99)
HCT VFR BLD CALC: 36.5 % — LOW (ref 39–50)
HGB BLD-MCNC: 11.3 G/DL — LOW (ref 13–17)
IMM GRANULOCYTES NFR BLD AUTO: 0.5 % — SIGNIFICANT CHANGE UP (ref 0–0.9)
LYMPHOCYTES # BLD AUTO: 0.38 K/UL — LOW (ref 1–3.3)
LYMPHOCYTES # BLD AUTO: 5.1 % — LOW (ref 13–44)
MAGNESIUM SERPL-MCNC: 2 MG/DL — SIGNIFICANT CHANGE UP (ref 1.6–2.6)
MCHC RBC-ENTMCNC: 27.3 PG — SIGNIFICANT CHANGE UP (ref 27–34)
MCHC RBC-ENTMCNC: 31 GM/DL — LOW (ref 32–36)
MCV RBC AUTO: 88.2 FL — SIGNIFICANT CHANGE UP (ref 80–100)
MONOCYTES # BLD AUTO: 0.49 K/UL — SIGNIFICANT CHANGE UP (ref 0–0.9)
MONOCYTES NFR BLD AUTO: 6.6 % — SIGNIFICANT CHANGE UP (ref 2–14)
NEUTROPHILS # BLD AUTO: 6.4 K/UL — SIGNIFICANT CHANGE UP (ref 1.8–7.4)
NEUTROPHILS NFR BLD AUTO: 86.2 % — HIGH (ref 43–77)
NORMALIZED SCT PPP-RTO: 0.68 RATIO — SIGNIFICANT CHANGE UP (ref 0–1.16)
NORMALIZED SCT PPP-RTO: SIGNIFICANT CHANGE UP
PHOSPHATE SERPL-MCNC: 3.2 MG/DL — SIGNIFICANT CHANGE UP (ref 2.4–4.7)
PLATELET # BLD AUTO: 143 K/UL — LOW (ref 150–400)
POTASSIUM SERPL-MCNC: 4 MMOL/L — SIGNIFICANT CHANGE UP (ref 3.5–5.3)
POTASSIUM SERPL-SCNC: 4 MMOL/L — SIGNIFICANT CHANGE UP (ref 3.5–5.3)
PROT SERPL-MCNC: 6.1 G/DL — LOW (ref 6.6–8.7)
RBC # BLD: 4.14 M/UL — LOW (ref 4.2–5.8)
RBC # FLD: 15.7 % — HIGH (ref 10.3–14.5)
SODIUM SERPL-SCNC: 136 MMOL/L — SIGNIFICANT CHANGE UP (ref 135–145)
VANCOMYCIN FLD-MCNC: 12.2 UG/ML — SIGNIFICANT CHANGE UP
WBC # BLD: 7.43 K/UL — SIGNIFICANT CHANGE UP (ref 3.8–10.5)
WBC # FLD AUTO: 7.43 K/UL — SIGNIFICANT CHANGE UP (ref 3.8–10.5)

## 2023-03-16 PROCEDURE — 99233 SBSQ HOSP IP/OBS HIGH 50: CPT | Mod: FS

## 2023-03-16 PROCEDURE — 93971 EXTREMITY STUDY: CPT | Mod: 26,LT

## 2023-03-16 PROCEDURE — 99291 CRITICAL CARE FIRST HOUR: CPT

## 2023-03-16 PROCEDURE — 99223 1ST HOSP IP/OBS HIGH 75: CPT

## 2023-03-16 PROCEDURE — 99232 SBSQ HOSP IP/OBS MODERATE 35: CPT

## 2023-03-16 PROCEDURE — 74230 X-RAY XM SWLNG FUNCJ C+: CPT | Mod: 26

## 2023-03-16 RX ORDER — DIGOXIN 250 MCG
250 TABLET ORAL ONCE
Refills: 0 | Status: DISCONTINUED | OUTPATIENT
Start: 2023-03-16 | End: 2023-03-20

## 2023-03-16 RX ORDER — WARFARIN SODIUM 2.5 MG/1
3 TABLET ORAL ONCE
Refills: 0 | Status: COMPLETED | OUTPATIENT
Start: 2023-03-16 | End: 2023-03-16

## 2023-03-16 RX ORDER — DIGOXIN 250 MCG
250 TABLET ORAL ONCE
Refills: 0 | Status: COMPLETED | OUTPATIENT
Start: 2023-03-16 | End: 2023-03-16

## 2023-03-16 RX ORDER — VANCOMYCIN HCL 1 G
1250 VIAL (EA) INTRAVENOUS
Refills: 0 | Status: DISCONTINUED | OUTPATIENT
Start: 2023-03-16 | End: 2023-03-16

## 2023-03-16 RX ORDER — ACETAMINOPHEN 500 MG
1000 TABLET ORAL ONCE
Refills: 0 | Status: COMPLETED | OUTPATIENT
Start: 2023-03-16 | End: 2023-03-16

## 2023-03-16 RX ORDER — SODIUM CHLORIDE 9 MG/ML
1000 INJECTION, SOLUTION INTRAVENOUS
Refills: 0 | Status: COMPLETED | OUTPATIENT
Start: 2023-03-16 | End: 2023-03-16

## 2023-03-16 RX ORDER — NAFCILLIN 10 G/100ML
2 INJECTION, POWDER, FOR SOLUTION INTRAVENOUS EVERY 4 HOURS
Refills: 0 | Status: DISCONTINUED | OUTPATIENT
Start: 2023-03-16 | End: 2023-03-22

## 2023-03-16 RX ORDER — KETOROLAC TROMETHAMINE 30 MG/ML
15 SYRINGE (ML) INJECTION ONCE
Refills: 0 | Status: DISCONTINUED | OUTPATIENT
Start: 2023-03-16 | End: 2023-03-16

## 2023-03-16 RX ADMIN — Medication 15 MILLIGRAM(S): at 22:23

## 2023-03-16 RX ADMIN — MIDODRINE HYDROCHLORIDE 5 MILLIGRAM(S): 2.5 TABLET ORAL at 05:18

## 2023-03-16 RX ADMIN — Medication 15 MILLIGRAM(S): at 23:20

## 2023-03-16 RX ADMIN — SERTRALINE 50 MILLIGRAM(S): 25 TABLET, FILM COATED ORAL at 13:16

## 2023-03-16 RX ADMIN — NYSTATIN CREAM 1 APPLICATION(S): 100000 CREAM TOPICAL at 04:57

## 2023-03-16 RX ADMIN — Medication 400 MILLIGRAM(S): at 14:27

## 2023-03-16 RX ADMIN — SODIUM CHLORIDE 50 MILLILITER(S): 9 INJECTION, SOLUTION INTRAVENOUS at 13:15

## 2023-03-16 RX ADMIN — NAFCILLIN 200 GRAM(S): 10 INJECTION, POWDER, FOR SOLUTION INTRAVENOUS at 21:14

## 2023-03-16 RX ADMIN — ATORVASTATIN CALCIUM 40 MILLIGRAM(S): 80 TABLET, FILM COATED ORAL at 21:03

## 2023-03-16 RX ADMIN — Medication 81 MILLIGRAM(S): at 13:14

## 2023-03-16 RX ADMIN — Medication 650 MILLIGRAM(S): at 20:30

## 2023-03-16 RX ADMIN — NAFCILLIN 200 GRAM(S): 10 INJECTION, POWDER, FOR SOLUTION INTRAVENOUS at 13:15

## 2023-03-16 RX ADMIN — MIDODRINE HYDROCHLORIDE 5 MILLIGRAM(S): 2.5 TABLET ORAL at 21:03

## 2023-03-16 RX ADMIN — POLYETHYLENE GLYCOL 3350 17 GRAM(S): 17 POWDER, FOR SOLUTION ORAL at 13:13

## 2023-03-16 RX ADMIN — TAMSULOSIN HYDROCHLORIDE 0.4 MILLIGRAM(S): 0.4 CAPSULE ORAL at 21:02

## 2023-03-16 RX ADMIN — BUDESONIDE AND FORMOTEROL FUMARATE DIHYDRATE 2 PUFF(S): 160; 4.5 AEROSOL RESPIRATORY (INHALATION) at 09:21

## 2023-03-16 RX ADMIN — Medication 1000 MILLIGRAM(S): at 00:20

## 2023-03-16 RX ADMIN — MIDODRINE HYDROCHLORIDE 5 MILLIGRAM(S): 2.5 TABLET ORAL at 13:14

## 2023-03-16 RX ADMIN — BUDESONIDE AND FORMOTEROL FUMARATE DIHYDRATE 2 PUFF(S): 160; 4.5 AEROSOL RESPIRATORY (INHALATION) at 20:41

## 2023-03-16 RX ADMIN — OLANZAPINE 10 MILLIGRAM(S): 15 TABLET, FILM COATED ORAL at 21:03

## 2023-03-16 RX ADMIN — Medication 1000 MILLIGRAM(S): at 15:20

## 2023-03-16 RX ADMIN — Medication 250 MICROGRAM(S): at 15:40

## 2023-03-16 RX ADMIN — NYSTATIN CREAM 1 APPLICATION(S): 100000 CREAM TOPICAL at 17:28

## 2023-03-16 RX ADMIN — NAFCILLIN 200 GRAM(S): 10 INJECTION, POWDER, FOR SOLUTION INTRAVENOUS at 17:27

## 2023-03-16 RX ADMIN — PIPERACILLIN AND TAZOBACTAM 25 GRAM(S): 4; .5 INJECTION, POWDER, LYOPHILIZED, FOR SOLUTION INTRAVENOUS at 01:18

## 2023-03-16 RX ADMIN — Medication 25 MILLIGRAM(S): at 17:27

## 2023-03-16 RX ADMIN — CHLORHEXIDINE GLUCONATE 1 APPLICATION(S): 213 SOLUTION TOPICAL at 04:57

## 2023-03-16 RX ADMIN — Medication 650 MILLIGRAM(S): at 21:30

## 2023-03-16 RX ADMIN — WARFARIN SODIUM 3 MILLIGRAM(S): 2.5 TABLET ORAL at 21:03

## 2023-03-16 NOTE — SWALLOW VFSS/MBS ASSESSMENT ADULT - COMMENTS
As per MD note: "pt. is an 87 y/o male was sent in from [facility] rehab as per documentation was noted to have new RUE/ RLE weakness and numbness and with diminished rt. sided radial and pedal pulses. pt. stated that he went to bed fine and in the morning staff at the facility noted rt. sided weakness. pt. has known infrarenal 6.1 cm AAA. pt. thinks that he is ok and reports no sig. weakness of RUE/ RLE. no speech/ swallow difficulty. no facial droop, no vision change. no dizziness reported. no cp, no abd. pain, no n/v/d. no hematemesis, no hemoptysis, no rectal blood reported. pt. knows that he is in the hospital, knows current president name and current year. pt. passed bedside swallow eval per pt's nurse. pt's molst with dnr/dni reviewed, signed during his recent admission."

## 2023-03-16 NOTE — PROGRESS NOTE ADULT - SUBJECTIVE AND OBJECTIVE BOX
INTERVAL HISTORY:  Pt seen sitting in chair in ICU in NAD  Denies pain or SOB or nausea  Remains confused (AAOx2)    PRESENT SYMPTOMS:     Dyspnea: No  Nausea/Vomiting: No  Anxiety:  No  Depression: No  Fatigue: No  Loss of appetite: No  Constipation: No    PAIN: denies            Character-            Duration-            Effect-            Factors-            Frequency-            Location-            Severity-    REVIEW OF SYSTEMS:   Full review of systems performed and was otherwise negative except as noted above.    MEDICATIONS  (STANDING):  aspirin enteric coated 81 milliGRAM(s) Oral daily  atorvastatin 40 milliGRAM(s) Oral at bedtime  budesonide 160 MICROgram(s)/formoterol 4.5 MICROgram(s) Inhaler 2 Puff(s) Inhalation two times a day  chlorhexidine 2% Cloths 1 Application(s) Topical <User Schedule>  metoprolol tartrate 25 milliGRAM(s) Oral two times a day  midodrine 5 milliGRAM(s) Oral every 8 hours  multiple electrolytes Injection Type 1 1000 milliLiter(s) (50 mL/Hr) IV Continuous <Continuous>  nystatin Powder 1 Application(s) Topical two times a day  OLANZapine 10 milliGRAM(s) Oral at bedtime  oxybutynin XL 10 milliGRAM(s) Oral <User Schedule>  piperacillin/tazobactam IVPB.. 3.375 Gram(s) IV Intermittent every 12 hours  polyethylene glycol 3350 17 Gram(s) Oral daily  sertraline 50 milliGRAM(s) Oral daily  tamsulosin 0.4 milliGRAM(s) Oral at bedtime  warfarin 3 milliGRAM(s) Oral once    MEDICATIONS  (PRN):  acetaminophen     Tablet .. 650 milliGRAM(s) Oral every 6 hours PRN Temp greater or equal to 38C (100.4F), Mild Pain (1 - 3), Moderate Pain (4 - 6)  albuterol/ipratropium for Nebulization 3 milliLiter(s) Nebulizer every 6 hours PRN Shortness of Breath and/or Wheezing  bisacodyl Suppository 10 milliGRAM(s) Rectal daily PRN Constipation      PHYSICAL EXAM:  Vital Signs Last 24 Hrs  T(C): 38.3 (16 Mar 2023 10:15), Max: 39.9 (15 Mar 2023 23:00)  T(F): 100.9 (16 Mar 2023 10:15), Max: 103.8 (15 Mar 2023 23:00)  HR: 102 (16 Mar 2023 10:15) (79 - 146)  BP: 111/85 (16 Mar 2023 10:15) (60/52 - 126/77)  BP(mean): 92 (16 Mar 2023 10:15) (56 - 98)  RR: 21 (16 Mar 2023 10:15) (16 - 37)  SpO2: 98% (16 Mar 2023 10:15) (91% - 100%)    Parameters below as of 16 Mar 2023 10:15  Patient On (Oxygen Delivery Method): room air      General: Pt sitting in chair in NAD  HEENT: NCAT; MM dry  Resp: breathing unlabored; symmetric chest expansion B/L  MSK: no contractures/deformities  Skin: no rash  Neuro: awake and oriented x 2 (did not know the year); no myoclonus  Psych: calm;  thought process/speech was intermittently tangential and difficult to follow    LABS:                        11.3   7.43  )-----------( 143      ( 16 Mar 2023 03:15 )             36.5     03-16    136  |  102  |  25.0<H>  ----------------------------<  121<H>  4.0   |  23.0  |  1.32<H>    Ca    8.5      16 Mar 2023 03:15  Phos  3.2     03-16  Mg     2.0     03-16    TPro  6.1<L>  /  Alb  2.8<L>  /  TBili  0.6  /  DBili  x   /  AST  17  /  ALT  12  /  AlkPhos  75  03-16    PT/INR - ( 15 Mar 2023 03:40 )   PT: 27.5 sec;   INR: 2.35 ratio         PTT - ( 14 Mar 2023 20:35 )  PTT:27.1 sec    RADIOLOGY & ADDITIONAL STUDIES:    NEUROLOGIC MEDICATIONS/OPIOIDS/BENZODIAZEPINES IN PAST 24HRS:    acetaminophen     Tablet ..   650 milliGRAM(s) Oral (03-15-23 @ 22:16)    acetaminophen   IVPB ..   400 mL/Hr IV Intermittent (03-15-23 @ 23:38)    acetaminophen   IVPB ..   400 mL/Hr IV Intermittent (03-15-23 @ 17:15)    haloperidol    Injectable   2.5 milliGRAM(s) IntraMuscular (03-15-23 @ 17:12)    OLANZapine   10 milliGRAM(s) Oral (03-15-23 @ 21:30)    sertraline   50 milliGRAM(s) Oral (03-15-23 @ 12:45)    ADVANCE DIRECTIVES/TREATMENT PREFERENCES:  Code Status: DNR/I  MOLST (if not Full Code):  HCP/Surrogate: dtelli Freedman

## 2023-03-16 NOTE — PHARMACOTHERAPY INTERVENTION NOTE - COMMENTS
Per policy automatic ID consult for staph aureus bacteremia. Informed team and ID.   Repeat blood cultures recommended.
updated based on transfer records    star chandler list
Vanco level 12.2 today will redose and order a level for tomorrow am
Blood cultures with MSSA and gram positive rods, discussed with PA, redosed vancomycin x 1. Level in AM.
qtc 556 hold zoloft check daily qtc

## 2023-03-16 NOTE — CONSULT NOTE ADULT - SUBJECTIVE AND OBJECTIVE BOX
[facility]  INFECTIOUS DISEASES at [place] and [place]  =======================================================                               Marques Mike MD#   Alexa Yanes MD*                             Gabrielle Baird MD*   Bisi Sandoval MD*            Diplomates American Board of Internal Medicine & Infectious Diseases                # [place] Office - Appt - Tel  [phone] Fax [phone]                * [place] Office - Appt - Tel [phone] Fax [phone]                                  Hospital Consult line:  [phone]  =======================================================      [id number]  SANDI SANTOS    Patient is a poor historian. History obtained from the chart.     CC: Patient is a 88y old  Male who presents with a chief complaint of right sided weakness / right radial artery occlusion. (16 Mar 2023 10:49)      88y  Male with h/o HTN, AF, PE/DVT s/p IVCF, Factor V Leiden (on warfarin), CAD s/p CABG, CHFrEF, CVA, COPD, GIGI on CPAP. Patient was admitted on 3/8 from NH with reported RUE weakness/numbness. Treated for acute CVA (did not receive thrombolytic). MRI brain (3/10) revealed left medial posterior frontal cortical and subcortical acute infarction. Hospital course complicated by uncontrolled AF with RVR requiring Rapid Response Team, Fever to 105'F, Transfer to MICU for hypotension requiring IV vasopressors and Started on empiric Zosyn and vancomycin. Blood cultures with MSSA. Vancomycin was stopped. ID input requested.       Past Medical & Surgical Hx:  Hypertension  Hyperlipemia  PE (pulmonary embolism)  Marisol filter in place  Atrial fibrillation, unspecified type  CAD S/P percutaneous coronary angioplasty  History of COPD  Cerebrovascular accident (CVA)  Atheroma  Aortic stenosis  Factor V Leiden  Prostate CA  S/P IVC filter  S/P cataract surgery  S/P CABG (coronary artery bypass graft)  Status post right knee replacement      Social Hx:  Former smoker      FAMILY HISTORY:  Family history of diabetes mellitus (Child)      Allergies  No Known Allergies      REVIEW OF SYSTEMS:  CONSTITUTIONAL:  No Fever or chills  HEENT:  No diplopia or blurred vision.  No earache, sore throat or runny nose.  CARDIOVASCULAR:  No chest pain  RESPIRATORY:  No cough, shortness of breath  GASTROINTESTINAL:  No nausea, vomiting or diarrhea.  GENITOURINARY:  Sy  MUSCULOSKELETAL:  no joint aches, no muscle pain  SKIN:  No change in skin, hair or nails.  NEUROLOGIC:  No Headaches, seizures  PSYCHIATRIC:  No disorder of thought or mood.  ENDOCRINE:  No heat or cold intolerance  HEMATOLOGICAL:  No easy bruising or bleeding.       Physical Exam:  GEN: NAD  HEENT: normocephalic and atraumatic. EOMI. PERRL.    NECK: Supple.   LUNGS: CTA B/L.  HEART: RRR  ABDOMEN: Soft, NT, ND.  +BS.    : Sy  EXTREMITIES: Without  edema.  MSK: No joint swelling  NEUROLOGIC: No Focal Deficits   PSYCHIATRIC: Confused   SKIN: No rash      Vitals:  T(F): 100.9 (16 Mar 2023 10:15), Max: 103.8 (15 Mar 2023 23:00)  HR: 102 (16 Mar 2023 10:15)  BP: 111/85 (16 Mar 2023 10:15)  RR: 21 (16 Mar 2023 10:15)  SpO2: 98% (16 Mar 2023 10:15) (94% - 100%)  temp max in last 48H T(F): , Max: 105.1 (23 @ 20:35)    Current Antibiotics:  piperacillin/tazobactam IVPB.. 3.375 Gram(s) IV Intermittent every 12 hours    Other medications:  aspirin enteric coated 81 milliGRAM(s) Oral daily  atorvastatin 40 milliGRAM(s) Oral at bedtime  budesonide 160 MICROgram(s)/formoterol 4.5 MICROgram(s) Inhaler 2 Puff(s) Inhalation two times a day  chlorhexidine 2% Cloths 1 Application(s) Topical <User Schedule>  metoprolol tartrate 25 milliGRAM(s) Oral two times a day  midodrine 5 milliGRAM(s) Oral every 8 hours  multiple electrolytes Injection Type 1 1000 milliLiter(s) IV Continuous <Continuous>  nystatin Powder 1 Application(s) Topical two times a day  OLANZapine 10 milliGRAM(s) Oral at bedtime  oxybutynin XL 10 milliGRAM(s) Oral <User Schedule>  polyethylene glycol 3350 17 Gram(s) Oral daily  sertraline 50 milliGRAM(s) Oral daily  tamsulosin 0.4 milliGRAM(s) Oral at bedtime  warfarin 3 milliGRAM(s) Oral once                 11.3   7.43  )-----------( 143      ( 16 Mar 2023 03:15 )             36.5     03-16    136  |  102  |  25.0<H>  ----------------------------<  121<H>  4.0   |  23.0  |  1.32<H>    Ca    8.5      16 Mar 2023 03:15  Phos  3.2     03-16  Mg     2.0     -16    TPro  6.1<L>  /  Alb  2.8<L>  /  TBili  0.6  /  DBili  x   /  AST  17  /  ALT  12  /  AlkPhos  75  03-16    RECENT CULTURES:  15 @ 09:00    RVP  NotDetec    -14 @ 20:57 .Blood Blood-Peripheral Blood Culture PCR    Growth in aerobic and anaerobic bottles: Staphylococcus aureus  Growth in aerobic bottle: Bacillus species not anthracis  "Susceptibilities not performed"  ***Blood Panel PCR results on this specimen are available  approximately 3 hours after the Gram stain result.***  Gram stain, PCR, and/or culture results may not always  correspond due to difference in methodologies.  ************************************************************  This PCR assay was performed by multiplex PCR. This  Assay tests for 66 bacterial and resistance gene targets.  Please refer to the [facility] test directory  at [email or web address] for details.  Growth in anaerobic bottle: Gram Positive Cocci in Clusters  Growth in aerobic bottle: Gram Positive Cocci in Clusters and Gram  Positive Rods    03-08 @ 13:40 Clean Catch Clean Catch (Midstream)     <10,000 CFU/mL Normal Urogenital Sangeetha      WBC Count: 7.43 K/uL (23 @ 03:15)  WBC Count: 11.48 K/uL (03-15-23 @ 12:30)  WBC Count: 14.46 K/uL (03-15-23 @ 03:40)  WBC Count: 9.05 K/uL (23 @ 20:35)  WBC Count: 4.89 K/uL (23 @ 05:55)  WBC Count: 5.09 K/uL (23 @ 06:00)  WBC Count: 6.70 K/uL (23 @ 07:55)    Creatinine, Serum: 1.32 mg/dL (23 @ 03:15)  Creatinine, Serum: 1.39 mg/dL (03-15-23 @ 12:30)  Creatinine, Serum: 1.67 mg/dL (03-15-23 @ 03:40)  Creatinine, Serum: 1.38 mg/dL (23 @ 20:35)  Creatinine, Serum: 1.31 mg/dL (23 @ 05:55)  Creatinine, Serum: 1.15 mg/dL (23 @ 06:00)  Creatinine, Serum: 1.25 mg/dL (23 @ 07:55)    Procalcitonin, Serum: 0.13 ng/mL (23 @ 20:35)     SARS-CoV-2: NotDetec (03-15-23 @ 09:00)  COVID-19 PCR: NotDetec (23 @ 12:50)  COVID-19 PCR: NotDetec (23 @ 11:00)  COVID-19 PCR: NotDetec (23 @ 13:30)      Urinalysis Basic - ( 15 Mar 2023 00:39 )    Color: Yellow / Appearance: Clear / S.015 / pH: x  Gluc: x / Ketone: Negative  / Bili: Negative / Urobili: 1 mg/dL   Blood: x / Protein: 30 mg/dL / Nitrite: Negative   Leuk Esterase: Trace / RBC: 0-2 /HPF / WBC 0-2 /HPF   Sq Epi: x / Non Sq Epi: Few / Bacteria: x

## 2023-03-16 NOTE — CONSULT NOTE ADULT - PROVIDER SPECIALTY LIST ADULT
Critical Care
Vascular Surgery
Neurology
Rehab Medicine
Heme/Onc
Infectious Disease
Palliative Care
Cardiology

## 2023-03-16 NOTE — PROGRESS NOTE ADULT - SUBJECTIVE AND OBJECTIVE BOX
====================ASSESSMENT/PLAN==============    ====================== NEUROLOGY=====================  acetaminophen     Tablet .. 650 milliGRAM(s) Oral every 6 hours PRN Temp greater or equal to 38C (100.4F), Mild Pain (1 - 3), Moderate Pain (4 - 6)  OLANZapine 10 milliGRAM(s) Oral at bedtime  sertraline 50 milliGRAM(s) Oral daily    ==================== RESPIRATORY======================  Mechanical Ventilation:    albuterol/ipratropium for Nebulization 3 milliLiter(s) Nebulizer every 6 hours PRN Shortness of Breath and/or Wheezing  budesonide 160 MICROgram(s)/formoterol 4.5 MICROgram(s) Inhaler 2 Puff(s) Inhalation two times a day    ====================CARDIOVASCULAR==================  metoprolol tartrate 25 milliGRAM(s) Oral two times a day  midodrine 5 milliGRAM(s) Oral every 8 hours    ===================HEMATOLOGIC/ONC ===================  aspirin enteric coated 81 milliGRAM(s) Oral daily  warfarin 3 milliGRAM(s) Oral once    ===================== RENAL =========================  Continue monitoring urine output  oxybutynin XL 10 milliGRAM(s) Oral <User Schedule>  tamsulosin 0.4 milliGRAM(s) Oral at bedtime    Avoid Nephrotoxic agents   Adjusting medications for renal  function   Replacing electrolytes as needed with Goal K> 4, PO> 3, Mg> 2  ==================== GASTROINTESTINAL===================  bisacodyl Suppository 10 milliGRAM(s) Rectal daily PRN Constipation  multiple electrolytes Injection Type 1 1000 milliLiter(s) (50 mL/Hr) IV Continuous <Continuous>  polyethylene glycol 3350 17 Gram(s) Oral daily     diet:  =======================    ENDOCRINE  =====================  atorvastatin 40 milliGRAM(s) Oral at bedtime    ========================INFECTIOUS DISEASE================  piperacillin/tazobactam IVPB.. 3.375 Gram(s) IV Intermittent every 12 hours      ____________________________________________      SANDI SANTOS  MRN-[id number]  Patient is a 88y old  Male who presents with a chief complaint of right sided weakness / right radial artery occlusion. (15 Mar 2023 13:58)    HPI:  pt. is an 89 y/o male was sent in from [facility] rehab as per documentation was noted to have new RUE/ RLE weakness and numbness and with diminished rt. sided radial and pedal pulses. pt. stated that he went to bed fine and in the morning staff at the facility noted rt. sided weakness. pt. has known infrarenal 6.1 cm AAA. pt. thinks that he is ok and reports no sig. weakness of RUE/ RLE. no speech/ swallow difficulty. no facial droop, no vision change. no dizziness reported. no cp, no abd. pain, no n/v/d. no hematemesis, no hemoptysis, no rectal blood reported. pt. knows that he is in the hospital, knows current president name and current year. pt. passed bedside swallow eval per pt's nurse. pt's molst with dnr/dni reviewed, signed during his recent admission.  (08 Mar 2023 18:13)      Last 24 hours:    weaned off of phenylephrine infusion since the evening of March 15  Overnight delirium resolved this morning  Discontinued IV vancomycin this morning, infectious disease called, repeat blood cultures ordered for tomorrow, left upper extremity ultrasound ordered to rule out abscess  Discontinue Sy catheter, every 6 bladder scan  Pending modified barium swallow later today  Heart rate better controlled, metoprolol p.o. with holding parameters more for A-fib RVR  Minimal worsening creatinine, gentle hydration with normal salt total 1 L at 50 cc/h ordered  Incentive spirometry, physical therapy ordered    REVIEW OF SYSTEMS:  11 systems reviewed. No other symptoms besides mentioned above      Physical Exam:  Vital Signs Last 24 Hrs  T(C): 36.6 (16 Mar 2023 08:00), Max: 39.9 (15 Mar 2023 23:00)  T(F): 97.8 (16 Mar 2023 08:00), Max: 103.8 (15 Mar 2023 23:00)  HR: 85 (16 Mar 2023 07:00) (79 - 146)  BP: 108/72 (16 Mar 2023 07:00) (60/52 - 126/77)  BP(mean): 83 (16 Mar 2023 07:00) (52 - 98)  RR: 20 (16 Mar 2023 07:00) (16 - 37)  SpO2: 96% (16 Mar 2023 07:00) (91% - 100%)    Parameters below as of 16 Mar 2023 04:00  Patient On (Oxygen Delivery Method): nasal cannula  O2 Flow (L/min): 2      Gen:  Awake, alert, oriented *3 in NAD  CNS: no acute neurological findings, ANNA   Neck: no JVD  RES : Air entry equal b/l, no Adventitious breath sounds                     CVS: Normal S1/S2, regular rhythm and normal rate   Abd: Soft, non-distended. Bowel sounds present.  Skin: No acute rash.  Ext:  no edema b/l LE, PP+    ============================I/O===========================   I&O's Detail    15 Mar 2023 07:01  -  16 Mar 2023 07:00  --------------------------------------------------------  IN:    IV PiggyBack: 250 mL    IV PiggyBack: 100 mL    IV PiggyBack: 275 mL    multiple electrolytes Injection Type 1.: 500 mL    Oral Fluid: 250 mL    Phenylephrine: 45.8 mL  Total IN: 1420.8 mL    OUT:    Indwelling Catheter - Urethral (mL): 1235 mL  Total OUT: 1235 mL    Total NET: 185.8 mL        ============================ LABS =========================                        11.3   7.43  )-----------( 143      ( 16 Mar 2023 03:15 )             36.5     03-16    136  |  102  |  25.0<H>  ----------------------------<  121<H>  4.0   |  23.0  |  1.32<H>    Ca    8.5      16 Mar 2023 03:15  Phos  3.2     03-16  Mg     2.0     03-16    TPro  6.1<L>  /  Alb  2.8<L>  /  TBili  0.6  /  DBili  x   /  AST  17  /  ALT  12  /  AlkPhos  75  03-16    LIVER FUNCTIONS - ( 16 Mar 2023 03:15 )  Alb: 2.8 g/dL / Pro: 6.1 g/dL / ALK PHOS: 75 U/L / ALT: 12 U/L / AST: 17 U/L / GGT: x           PT/INR - ( 15 Mar 2023 03:40 )   PT: 27.5 sec;   INR: 2.35 ratio         PTT - ( 14 Mar 2023 20:35 )  PTT:27.1 sec    Urinalysis Basic - ( 15 Mar 2023 00:39 )    Color: Yellow / Appearance: Clear / S.015 / pH: x  Gluc: x / Ketone: Negative  / Bili: Negative / Urobili: 1 mg/dL   Blood: x / Protein: 30 mg/dL / Nitrite: Negative   Leuk Esterase: Trace / RBC: 0-2 /HPF / WBC 0-2 /HPF   Sq Epi: x / Non Sq Epi: Few / Bacteria: x      ======================Micro/Rad/Cardio=================  Culture: Reviewed   CXR: Reviewed  Echo:Reviewed        Patient requires continuous monitoring   Care plan discussed with ICU care team, family and consultants  Patient remained critical; I have spent 50 minutes providing non-routine care, revaluated multiple times during the day.     ====================ASSESSMENT/PLAN==============   88-year-old  male with essential hypertension atrial fibrillation pulmonary embolism/DVT status post IVC filter factor V Leiden on chronic warfarin, coronary artery disease status post CABG heart failure with reduced EF CVA COPD sleep apnea on CPAP with initial admission for ischemic stroke with hospital course complicated by MSSA bacteremia most likely from left upper extremity skin soft tissue infection with distributive shock atrial fibrillation with RVR acute kidney injury with possible acute on chronic ICU delirium     patient remains DNR/DNI   guarded prognosis   plan of care discussed with patient's daughter at bedside  ====================== NEUROLOGY=====================  acetaminophen     Tablet .. 650 milliGRAM(s) Oral every 6 hours PRN Temp greater or equal to 38C (100.4F), Mild Pain (1 - 3), Moderate Pain (4 - 6)  OLANZapine 10 milliGRAM(s) Oral at bedtime  sertraline 50 milliGRAM(s) Oral daily   waxing and waning delirium   neurology following   neurochecks as per neurology   stroke work-up finish through the hospital course   modified barium swallow done earlier today    ==================== RESPIRATORY======================   oxygenating ventilating okay for now on supplemental nasal cannula  albuterol/ipratropium for Nebulization 3 milliLiter(s) Nebulizer every 6 hours PRN Shortness of Breath and/or Wheezing  budesonide 160 MICROgram(s)/formoterol 4.5 MICROgram(s) Inhaler 2 Puff(s) Inhalation two times a day    ====================CARDIOVASCULAR==================  metoprolol tartrate 25 milliGRAM(s) Oral two times a day  midodrine 5 milliGRAM(s) Oral every 8 hours   IV fluid bolus and maintenance till tomorrow for now at a very lower rate    ===================HEMATOLOGIC/ONC ===================  aspirin enteric coated 81 milliGRAM(s) Oral daily  warfarin 3 milliGRAM(s) Oral once, daily INR check    ===================== RENAL =========================  Continue monitoring urine output  oxybutynin XL 10 milliGRAM(s) Oral <User Schedule>  tamsulosin 0.4 milliGRAM(s) Oral at bedtime   to remove indwelling Sy catheter  Avoid Nephrotoxic agents   Adjusting medications for renal  function   Replacing electrolytes as needed with Goal K> 4, PO> 3, Mg> 2  ==================== GASTROINTESTINAL===================  bisacodyl Suppository 10 milliGRAM(s) Rectal daily PRN Constipation  multiple electrolytes Injection Type 1 1000 milliLiter(s) (50 mL/Hr) IV Continuous <Continuous>  polyethylene glycol 3350 17 Gram(s) Oral daily     diet: TLC  =======================    ENDOCRINE  =====================  atorvastatin 40 milliGRAM(s) Oral at bedtime    ========================INFECTIOUS DISEASE================  piperacillin/tazobactam IVPB.. 3.375 Gram(s) IV Intermittent every 12 hours: Changed to nafcillin   infectious disease consulted   repeat blood cultures   transthoracic echocardiogram not suggestive of endocarditis, UMER as per ID   left upper extremity ultrasound pending    ____________________________________________      DANIELLESANDI  MRN-[id number]  Patient is a 88y old  Male who presents with a chief complaint of right sided weakness / right radial artery occlusion. (15 Mar 2023 13:58)    HPI:  pt. is an 89 y/o male was sent in from [facility] rehab as per documentation was noted to have new RUE/ RLE weakness and numbness and with diminished rt. sided radial and pedal pulses. pt. stated that he went to bed fine and in the morning staff at the facility noted rt. sided weakness. pt. has known infrarenal 6.1 cm AAA. pt. thinks that he is ok and reports no sig. weakness of RUE/ RLE. no speech/ swallow difficulty. no facial droop, no vision change. no dizziness reported. no cp, no abd. pain, no n/v/d. no hematemesis, no hemoptysis, no rectal blood reported. pt. knows that he is in the hospital, knows current president name and current year. pt. passed bedside swallow eval per pt's nurse. pt's molst with dnr/dni reviewed, signed during his recent admission.  (08 Mar 2023 18:13)      Last 24 hours:    weaned off of phenylephrine infusion since the evening of March 15  Overnight delirium resolved this morning  Discontinued IV vancomycin this morning, infectious disease called, repeat blood cultures ordered for tomorrow, left upper extremity ultrasound ordered to rule out abscess  Discontinue Sy catheter, every 6 bladder scan  Pending modified barium swallow later today  Heart rate better controlled, metoprolol p.o. with holding parameters more for A-fib RVR  Minimal worsening creatinine, gentle hydration with normal salt total 1 L at 50 cc/h ordered  Incentive spirometry, physical therapy ordered  Later in the day ICU course complicated by fever of 103 with A-fib RVR with heart rate in 170s and systolic and high 70s and 80s, received IV Tylenol  1 g, cooling blanket and ice packs, to 50 mcg of digoxin and half liter IV fluid bolus with improvement in the heart rate with minimal improvement in the blood pressure as well as temperature    REVIEW OF SYSTEMS:  11 systems reviewed. No other symptoms besides mentioned above      Physical Exam:  Vital Signs Last 24 Hrs  T(C): 36.6 (16 Mar 2023 08:00), Max: 39.9 (15 Mar 2023 23:00)  T(F): 97.8 (16 Mar 2023 08:00), Max: 103.8 (15 Mar 2023 23:00)  HR: 85 (16 Mar 2023 07:00) (79 - 146)  BP: 108/72 (16 Mar 2023 07:00) (60/52 - 126/77)  BP(mean): 83 (16 Mar 2023 07:00) (52 - 98)  RR: 20 (16 Mar 2023 07:00) (16 - 37)  SpO2: 96% (16 Mar 2023 07:00) (91% - 100%)    Parameters below as of 16 Mar 2023 04:00  Patient On (Oxygen Delivery Method): nasal cannula  O2 Flow (L/min): 2      Gen:  Awake, alert, oriented *2 in NAD initially but was delirious and lethargic when patient was febrile  CNS: no acute neurological findings, ANNA   Neck: no JVD  RES : Air entry equal b/l, no Adventitious breath sounds                     CVS: Normal S1/S2,  irregular with increased rate  Abd: Soft, non-distended. Bowel sounds present.  Skin:  bilateral upper and lower extremity purpuric changes  Ext: edema b/l LE, PP+    ============================I/O===========================   I&O's Detail    15 Mar 2023 07:01  -  16 Mar 2023 07:00  --------------------------------------------------------  IN:    IV PiggyBack: 250 mL    IV PiggyBack: 100 mL    IV PiggyBack: 275 mL    multiple electrolytes Injection Type 1.: 500 mL    Oral Fluid: 250 mL    Phenylephrine: 45.8 mL  Total IN: 1420.8 mL    OUT:    Indwelling Catheter - Urethral (mL): 1235 mL  Total OUT: 1235 mL    Total NET: 185.8 mL        ============================ LABS =========================                        11.3   7.43  )-----------( 143      ( 16 Mar 2023 03:15 )             36.5     03-16    136  |  102  |  25.0<H>  ----------------------------<  121<H>  4.0   |  23.0  |  1.32<H>    Ca    8.5      16 Mar 2023 03:15  Phos  3.2     03-16  Mg     2.0     03-16    TPro  6.1<L>  /  Alb  2.8<L>  /  TBili  0.6  /  DBili  x   /  AST  17  /  ALT  12  /  AlkPhos  75  03-16    LIVER FUNCTIONS - ( 16 Mar 2023 03:15 )  Alb: 2.8 g/dL / Pro: 6.1 g/dL / ALK PHOS: 75 U/L / ALT: 12 U/L / AST: 17 U/L / GGT: x           PT/INR - ( 15 Mar 2023 03:40 )   PT: 27.5 sec;   INR: 2.35 ratio         PTT - ( 14 Mar 2023 20:35 )  PTT:27.1 sec    Urinalysis Basic - ( 15 Mar 2023 00:39 )    Color: Yellow / Appearance: Clear / S.015 / pH: x  Gluc: x / Ketone: Negative  / Bili: Negative / Urobili: 1 mg/dL   Blood: x / Protein: 30 mg/dL / Nitrite: Negative   Leuk Esterase: Trace / RBC: 0-2 /HPF / WBC 0-2 /HPF   Sq Epi: x / Non Sq Epi: Few / Bacteria: x      ======================Micro/Rad/Cardio=================  Culture: Reviewed   CXR: Reviewed  Echo:Reviewed        Patient requires continuous monitoring   Care plan discussed with ICU care team, family and consultants  Patient remained critical; I have spent 80 minutes providing non-routine care, revaluated multiple times during the day.

## 2023-03-16 NOTE — CONSULT NOTE ADULT - CONSULT REASON
Stroke evaluation
right upper ext perfusion deficit
Atrial fibrillation/PE/Arterial thrombosis
MSSA bacteremia
Hypotension
"GOC with family, patient DNI/DNR   admitted to micu for septic shock need pressor"
Weakness
A-fib with R radial artery occlusion

## 2023-03-16 NOTE — PROGRESS NOTE ADULT - NS ATTEND AMEND GEN_ALL_CORE FT
still has fevers . today atrial fibrillation with rapid ventricular rate .  on midodrine.  left arm likley thrombophlebitis.  US left arm pending  not in heart failure . appears comfortable.   recommend beta-blocker . dig Iv 0.25 mg IV Q6 2 doses only if Heart rate > 130 and dig level in am. ct beta-blocker   if not controlled, then may consider IV amiodarone for long term rate control since not able to tolearte beta-blocker due to low BP.

## 2023-03-16 NOTE — SWALLOW VFSS/MBS ASSESSMENT ADULT - PHARYNGEAL PHASE COMMENTS
Cued successive swallow not effective in reducing stasis Throat clear noted post swallow     Stasis reduced with puree assist Initial episode of aspiration with fed by ST via tspn, not captured on fluoro, with subsequent cough     No airway entry observed with subsequent trials with fed by ST or when self-fed Cued successive swallow not effective in reducing stasis    Increased WOB noted post swallow: no change in 02 sats Initial episode of aspiration when fed by ST via tspn, not captured on fluoro, with subsequent cough     No airway entry observed with subsequent trials with fed by ST or when self-fed stasis cleared with cued successive swallow    Increased WOB noted post swallow: 02 sats: 100%

## 2023-03-16 NOTE — CONSULT NOTE ADULT - REASON FOR ADMISSION
right sided weakness / right radial artery occlusion.

## 2023-03-16 NOTE — CONSULT NOTE ADULT - CONSULT REQUESTED DATE/TIME
08-Mar-2023 18:47
09-Mar-2023 07:53
08-Mar-2023 13:43
14-Mar-2023 23:00
09-Mar-2023
13-Mar-2023 17:47
15-Mar-2023 09:09
16-Mar-2023 11:21

## 2023-03-16 NOTE — CONSULT NOTE ADULT - ASSESSMENT
88y  Male with h/o HTN, AF, PE/DVT s/p IVCF, Factor V Leiden (on warfarin), CAD s/p CABG, CHFrEF, CVA, COPD, GIGI on CPAP. Patient was admitted on 3/8 from NH with reported RUE weakness/numbness. Treated for acute CVA (did not receive thrombolytic). MRI brain (3/10) revealed left medial posterior frontal cortical and subcortical acute infarction. Hospital course complicated by uncontrolled AF with RVR requiring Rapid Response Team, Fever to 105'F, Transfer to MICU for hypotension requiring IV vasopressors and Started on empiric Zosyn and vancomycin. Blood cultures with MSSA. Vancomycin was stopped.       MSSA bacteremia/septic shock  Fever  Leukocytosis  LUE cellulitis  r/o LUE abscess   MILAGRO      - Blood cultures 3/14 reporting MSSA  - Repeat blood cultures ordered   - RVP/COVID 19 PCR 3/15 negative   - Check TTE  - UA negative for UTI  - Check LUE US  - Procalcitonin level 0.13  - D/C Zosyn   - D/C Vancomycin   - Start Nafcillin   - Follow up cultures  - Trend Fever  - Trend WBC        Will Follow    d/w Dr Hare and clinical pharmacy

## 2023-03-16 NOTE — SWALLOW VFSS/MBS ASSESSMENT ADULT - ADDITIONAL RECOMMENDATIONS
Will follow to check PO tolerance & for dysphagia tx (soft/bite-sized solids with ST), as schedule permits

## 2023-03-16 NOTE — PROGRESS NOTE ADULT - ASSESSMENT
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 sided radial and pedal pulses. Found to have uncontrolled afib, subsequently found to have CVA.

## 2023-03-16 NOTE — SWALLOW VFSS/MBS ASSESSMENT ADULT - SLP PERTINENT HISTORY OF CURRENT PROBLEM
Pt known to this dept & was seen at bedside this admission, with dysphagia. Last seen 3/15 with RX for NPO & MBS (seen prior to change in status at bedside with RX for soft/bite-sized solids & mildly thick fluids)

## 2023-03-16 NOTE — SWALLOW VFSS/MBS ASSESSMENT ADULT - ROSENBEK'S PENETRATION ASPIRATION SCALE
(1) no aspiration, contrast does not enter airway Initial tspn only: NOT captured on fluoro; Penetartion/aspiration scale score: 1 with all subsequent trials by TSPN & CUP when self-fed/(7) contrast passes glottis, visible subglottic residue remains despite patient’s response (aspiration) Initial tspn only: NOT captured on fluoro; Penetration/aspiration scale score: 1 (NO penetration/aspiration) with all subsequent trials by TSPN & CUP when self-fed/(7) contrast passes glottis, visible subglottic residue remains despite patient’s response (aspiration)

## 2023-03-16 NOTE — SWALLOW VFSS/MBS ASSESSMENT ADULT - ORAL PHASE
Delayed oral transit time/Uncontrolled bolus / spillover in preet-pharynx/Uncontrolled bolus / spillover in hypopharynx Delayed oral transit time/Uncontrolled bolus / spillover in preet-pharynx Uncontrolled bolus / spillover in preet-pharynx/Uncontrolled bolus / spillover in hypopharynx Please take medications as prescribed and follow up with your doctor  for re-evaluation.      Cough, Adult      Coughing is a reflex that clears your throat and your airways (respiratory system). Coughing helps to heal and protect your lungs. It is normal to cough occasionally, but a cough that happens with other symptoms or lasts a long time may be a sign of a condition that needs treatment. An acute cough may only last 2–3 weeks, while a chronic cough may last 8 or more weeks.    Coughing is commonly caused by:  •Infection of the respiratory systemby viruses or bacteria.      •Breathing in substances that irritate your lungs.      •Allergies.      •Asthma.      •Mucus that runs down the back of your throat (postnasal drip).      •Smoking.      •Acid backing up from the stomach into the esophagus (gastroesophageal reflux).      •Certain medicines.      •Chronic lung problems.      •Other medical conditions such as heart failure or a blood clot in the lung (pulmonary embolism).        Follow these instructions at home:    Medicines     •Take over-the-counter and prescription medicines only as told by your health care provider.      •Talk with your health care provider before you take a cough suppressant medicine.        Lifestyle      •Avoid cigarette smoke. Do not use any products that contain nicotine or tobacco, such as cigarettes, e-cigarettes, and chewing tobacco. If you need help quitting, ask your health care provider.      •Drink enough fluid to keep your urine pale yellow.      •Avoid caffeine.      • Do not drink alcohol if your health care provider tells you not to drink.        General instructions      •Pay close attention to changes in your cough. Tell your health care provider about them.      •Always cover your mouth when you cough.      •Avoid things that make you cough, such as perfume, candles, cleaning products, or campfire or tobacco smoke.      •If the air is dry, use a cool mist vaporizer or humidifier in your bedroom or your home to help loosen secretions.      •If your cough is worse at night, try to sleep in a semi-upright position.      •Rest as needed.      •Keep all follow-up visits as told by your health care provider. This is important.        Contact a health care provider if you:    •Have new symptoms.      •Cough up pus.      •Have a cough that does not get better after 2–3 weeks or gets worse.      •Cannot control your cough with cough suppressant medicines and you are losing sleep.      •Have pain that gets worse or pain that is not helped with medicine.      •Have a fever.      •Have unexplained weight loss.      •Have night sweats.        Get help right away if:    •You cough up blood.      •You have difficulty breathing.      •Your heartbeat is very fast.      These symptoms may represent a serious problem that is an emergency. Do not wait to see if the symptoms will go away. Get medical help right away. Call your local emergency services (911 in the [place] Do not drive yourself to the hospital.       Summary    •Coughing is a reflex that clears your throat and your airways. It is normal to cough occasionally, but a cough that happens with other symptoms or lasts a long time may be a sign of a condition that needs treatment.      •Take over-the-counter and prescription medicines only as told by your health care provider.      •Always cover your mouth when you cough.      •Contact a health care provider if you have new symptoms or a cough that does not get better after 2–3 weeks or gets worse.      This information is not intended to replace advice given to you by your health care provider. Make sure you discuss any questions you have with your health care provider.

## 2023-03-16 NOTE — SWALLOW VFSS/MBS ASSESSMENT ADULT - RECOMMENDED FEEDING/EATING TECHNIQUES
swallow x2 after each bite/sip/allow for swallow between intakes/alternate food with liquid/crush medication (when feasible)/maintain upright posture during/after eating for 30 mins/oral hygiene/position upright (90 degrees)/provide rest periods between swallows/small sips/bites

## 2023-03-16 NOTE — PROGRESS NOTE ADULT - ASSESSMENT
87yo M w/ PMH of AF and Factor V Leiden deficiency with PE/DVT s/p IVCF and on coumadin, CAD s/p CABG, CVA, HFrEF (35-40%), COPD, large infrarenal AAA, ?prostate CA s/p recent XRT and GIGI on CPAP. Recent hospital stay for COPD/CHF exacerbation readmitted with RUE/RLE weakness and MRI brain revealed left medial posterior frontal cortical and subcortical acute infarction with course further c/b AF w/ RVR, fever of 105F, and hypotension for which he was xferred to MICU and started on phenylephrine gtt.  We are consulted for goals of care.  #Goals of care  - pt again demonstrated an inability to maintain a linear/rational discussion and, as such, lacks capacity to understand and make complex medical decisions.  - daughter Jasmina is HCP - she lives in [place] ([phone]) but reportedly arrived in  3/15 - wilder Navarro lives locally ([phone])  - per my d/w Jasmina during his recent admission, pt is DNR/DNI and MOLST completed at that time  - I attempted to call Jasmina but no answer - per pt, she arrived from [place] last night - will call again later  - pt now off pressor and likely downgrade today.  Pt with multiple medical issues and poor insight and now with new CVA (no apparent new neuro deficits) - I cannot find any records re: documented dx of prostate CA with XRT?  Will need to clarify with dtr.  Overall, pt at high risk of readmission and clinical deterioration.  - Will cont to follow and remain available to assist with future goals of care discussions if/when the need arises.    #AMS   - behavioral health c/s'd last admission  - pt with tangential non-linear speech and thought process - discussed with psych during last admission - likely cognitive impairment and would benefit from outpt neurocognitive testing  - In order to mitigate the risk of delirium, would avoid/minimize known deliriogenic drugs such as benzodiazepines and anticholinergics.  Encourage staff and family to reorient frequently.  Keep shades open during day in effort to maintain day/night cycle.

## 2023-03-16 NOTE — SWALLOW VFSS/MBS ASSESSMENT ADULT - SLP GENERAL OBSERVATIONS
Pt received & seen seated upright via stretcher in radiology, awake/alert, accompanied by EARNEST Be, on monitor: 02 sats: 100% on room air, reduced cognition, 0/10 pain pre/post

## 2023-03-16 NOTE — SWALLOW VFSS/MBS ASSESSMENT ADULT - DIAGNOSTIC IMPRESSIONS
Moderate oral dysphagia across administered trials, further impacted by cognition, with reduced lingual strength & coordination.   Functional pharyngeal stage of swallow for puree, mildly thick fluids via cup & thin fluids via cup. Moderate pharyngeal dysphagia for soft/bite-sized solids & easy to chew solids with reduced tongue base retraction & decreased hyolaryngeal excursion with resultant stasis in valleculae & pyriform sinus (stasis greater with easy to chew solids versus soft/bite-sized), which reduced with puree assist. One episode of aspiration (accompanied by cough response) noted for initial tspn presentation of mildly thick fluids (fed by ST), NOT captured on fluoro, which was NOT duplicated with subsequent trials when fed by ST or self-fed.    Pt with intermittent increased WOB noted post intake with no change in 02 sats. Additionally, pt with periods of increased HR to 130-150: baseline as per EARNEST Be

## 2023-03-16 NOTE — PROGRESS NOTE ADULT - SUBJECTIVE AND OBJECTIVE BOX
[facility]                                                         CARDIOLOGY AT [place] [address]                                                         Telephone: [phone]. Fax:[phone]                                                                             PROGRESS NOTE    Reason for follow up: Afib RVR, HFrEF  Update: off ajay, started on metoprolol 25mg PO BID. hemodynamically more stable       Review of symptoms:   Cardiac:  No chest pain. No dyspnea. No palpitations.  Respiratory: no cough. No dyspnea  Gastrointestinal: No diarrhea. No abdominal pain. No bleeding.   Neuro: No focal neuro complaints.    Vitals:  T(C): 38.3 (03-16-23 @ 10:15), Max: 39.9 (03-15-23 @ 23:00)  HR: 102 (03-16-23 @ 10:15) (79 - 146)  BP: 111/85 (03-16-23 @ 10:15) (77/46 - 126/77)  RR: 21 (03-16-23 @ 10:15) (16 - 37)  SpO2: 98% (03-16-23 @ 10:15) (91% - 100%)  Wt(kg): --  I&O's Summary    15 Mar 2023 07:01  -  16 Mar 2023 07:00  --------------------------------------------------------  IN: 1420.8 mL / OUT: 1235 mL / NET: 185.8 mL      Weight (kg): 120.1 (03-15 @ 00:45)    PHYSICAL EXAM:  Appearance: Comfortable. No acute distress  HEENT:  Atraumatic. Normocephalic.  Normal oral mucosa  Neurologic: A & O x 3, no gross focal deficits.  Cardiovascular: irreg irregular S1 S2, No murmur, no rubs/gallops. No JVD  Respiratory: Lungs clear to auscultation, unlabored   Gastrointestinal:  Soft, Non-tender, + BS  Lower Extremities: 2+ Peripheral Pulses, No clubbing, cyanosis, or edema  Psychiatry: Patient is calm. No agitation.   Skin: warm and dry.    CURRENT CARDIAC MEDICATIONS:  metoprolol tartrate 25 milliGRAM(s) Oral two times a day  midodrine 5 milliGRAM(s) Oral every 8 hours      CURRENT OTHER MEDICATIONS:  albuterol/ipratropium for Nebulization 3 milliLiter(s) Nebulizer every 6 hours PRN Shortness of Breath and/or Wheezing  budesonide 160 MICROgram(s)/formoterol 4.5 MICROgram(s) Inhaler 2 Puff(s) Inhalation two times a day  piperacillin/tazobactam IVPB.. 3.375 Gram(s) IV Intermittent every 12 hours  acetaminophen     Tablet .. 650 milliGRAM(s) Oral every 6 hours PRN Temp greater or equal to 38C (100.4F), Mild Pain (1 - 3), Moderate Pain (4 - 6)  OLANZapine 10 milliGRAM(s) Oral at bedtime  sertraline 50 milliGRAM(s) Oral daily  bisacodyl Suppository 10 milliGRAM(s) Rectal daily PRN Constipation  polyethylene glycol 3350 17 Gram(s) Oral daily  atorvastatin 40 milliGRAM(s) Oral at bedtime  aspirin enteric coated 81 milliGRAM(s) Oral daily  chlorhexidine 2% Cloths 1 Application(s) Topical <User Schedule>  multiple electrolytes Injection Type 1 1000 milliLiter(s) (50 mL/Hr) IV Continuous <Continuous>, Stop order after: 1 Doses  nystatin Powder 1 Application(s) Topical two times a day, Stop order after: 7 Days  oxybutynin XL 10 milliGRAM(s) Oral <User Schedule>  tamsulosin 0.4 milliGRAM(s) Oral at bedtime  warfarin 3 milliGRAM(s) Oral once, Stop order after: 1 Doses      LABS:	 	  ( 15 Mar 2023 12:30 )  Troponin T  X    ,  CPK  108  , CKMB  X    , BNP X        , ( 14 Mar 2023 20:35 )  Troponin T  0.02 ,  CPK  X    , CKMB  X    , BNP X        , ( 08 Mar 2023 12:50 )  Troponin T  0.01 ,  CPK  X    , CKMB  X    , BNP X                                  11.3   7.43  )-----------( 143      ( 16 Mar 2023 03:15 )             36.5     03-16    136  |  102  |  25.0<H>  ----------------------------<  121<H>  4.0   |  23.0  |  1.32<H>    Ca    8.5      16 Mar 2023 03:15  Phos  3.2     03-16  Mg     2.0     03-16    TPro  6.1<L>  /  Alb  2.8<L>  /  TBili  0.6  /  DBili  x   /  AST  17  /  ALT  12  /  AlkPhos  75  03-16    PT/INR/PTT ( 15 Mar 2023 03:40 )                       :                       :      27.5         :       X                     .        .                   .              .           .       2.35        .                                       Lipid Profile: Date: 03-09 @ 06:06  Total cholesterol 124; Direct LDL: --; HDL: 34; Triglycerides:159    HgA1c:   TSH:     TELEMETRY:  Afib 110  ECG:    DIAGNOSTIC TESTING:  [ ] Echocardiogram: < from: TTE Echo Complete w/ Contrast w/ Doppler (03.12.23 @ 12:55) >  HYSICIAN INTERPRETATION:  Left Ventricle: Endocardialvisualization was enhanced with intravenous   echo contrast. The left ventricular internal cavity size is normal.  Global LV systolic function was moderately to severely decreased. Left   ventricular ejection fraction, by visual estimation, is 20 to 25%. The   mitral in-flow pattern reveals no discernable A-wave, therefore no   comment on diastolic function can be made.  No LV thrombus.      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    Right Ventricle: The right ventricular size is normal. RV systolic   function is moderately reduced.  Pericardium: There is no evidence of pericardial effusion.  Mitral Valve: Mild thickening of the anterior and posterior mitral valve   leaflets. Mitral leaflet mobility is normal. There is moderate mitral   annular calcification.  Tricuspid Valve: Mild-moderate tricuspid regurgitation is visualized.  Aortic Valve: Aortic Stenosis with V max 3.1 m/sec, Mean gradient of 23   mm Hg.  Venous: The inferior vena cava was normal sized, with respiratory size   variation greater than 50%.      Summary:   1. Left ventricular ejection fraction, by visual estimation, is 20 to   25%.   2. Moderately to severely decreased global left ventricular systolic   function.   3. [place] is abnormal as described above.   4. The mitral in-flow pattern reveals no discernable A-wave, therefore   no comment on diastolic function can be made.   5. No LV thrombus.   6. Moderately reduced RV systolic function.   7. There is no evidence of pericardial effusion.   8. Mild thickening of the anterior and posterior mitral valve leaflets.   9. Moderate mitral annular calcification.  10. Mild-moderate tricuspid regurgitation.  11. Aortic Stenosis with V max 3.1 m/sec, Mean gradient of 23 mm Hg.  12. Endocardial visualization was enhanced with intravenous echo contrast.    MD Monica Electronically signed on 3/13/2023 at 12:00:06 PM    < end of copied text >    [ ]  Catheterization:  [ ] Stress Test:    OTHER:

## 2023-03-16 NOTE — PHARMACOTHERAPY INTERVENTION NOTE - NSPHARMCOMMASP
ASP - Renal dose adjustment
ASP - Renal dose adjustment
ASP - Recommend ID Consult
ASP - Dose optimization/Non-Renal dose adjustment

## 2023-03-16 NOTE — PROVIDER CONTACT NOTE (CHANGE IN STATUS NOTIFICATION) - ACTION/TREATMENT ORDERED:
Cardiologist at bedside, noted to continue digoxin and metoprolol and check dig level in am labs ,  telephone order per TRACEY Herbert, give digoxin 0.25mg ivp x once.

## 2023-03-17 ENCOUNTER — TRANSCRIPTION ENCOUNTER (OUTPATIENT)
Age: 88
End: 2023-03-17

## 2023-03-17 LAB
-  AMPICILLIN/SULBACTAM: SIGNIFICANT CHANGE UP
-  CEFAZOLIN: SIGNIFICANT CHANGE UP
-  CLINDAMYCIN: SIGNIFICANT CHANGE UP
-  ERYTHROMYCIN: SIGNIFICANT CHANGE UP
-  GENTAMICIN: SIGNIFICANT CHANGE UP
-  OXACILLIN: SIGNIFICANT CHANGE UP
-  RIFAMPIN: SIGNIFICANT CHANGE UP
-  TETRACYCLINE: SIGNIFICANT CHANGE UP
-  TRIMETHOPRIM/SULFAMETHOXAZOLE: SIGNIFICANT CHANGE UP
-  VANCOMYCIN: SIGNIFICANT CHANGE UP
ANION GAP SERPL CALC-SCNC: 10 MMOL/L — SIGNIFICANT CHANGE UP (ref 5–17)
BUN SERPL-MCNC: 24 MG/DL — HIGH (ref 8–20)
CALCIUM SERPL-MCNC: 8.3 MG/DL — LOW (ref 8.4–10.5)
CHLORIDE SERPL-SCNC: 104 MMOL/L — SIGNIFICANT CHANGE UP (ref 96–108)
CO2 SERPL-SCNC: 24 MMOL/L — SIGNIFICANT CHANGE UP (ref 22–29)
CREAT SERPL-MCNC: 1.4 MG/DL — HIGH (ref 0.5–1.3)
CULTURE RESULTS: SIGNIFICANT CHANGE UP
EGFR: 48 ML/MIN/1.73M2 — LOW
GLUCOSE SERPL-MCNC: 106 MG/DL — HIGH (ref 70–99)
GRAM STN FLD: SIGNIFICANT CHANGE UP
GRAM STN FLD: SIGNIFICANT CHANGE UP
HCT VFR BLD CALC: 31.2 % — LOW (ref 39–50)
HGB BLD-MCNC: 9.7 G/DL — LOW (ref 13–17)
INR BLD: 5.41 RATIO — CRITICAL HIGH (ref 0.88–1.16)
MAGNESIUM SERPL-MCNC: 2 MG/DL — SIGNIFICANT CHANGE UP (ref 1.6–2.6)
MCHC RBC-ENTMCNC: 27.2 PG — SIGNIFICANT CHANGE UP (ref 27–34)
MCHC RBC-ENTMCNC: 31.1 GM/DL — LOW (ref 32–36)
MCV RBC AUTO: 87.4 FL — SIGNIFICANT CHANGE UP (ref 80–100)
METHOD TYPE: SIGNIFICANT CHANGE UP
ORGANISM # SPEC MICROSCOPIC CNT: SIGNIFICANT CHANGE UP
PHOSPHATE SERPL-MCNC: 4 MG/DL — SIGNIFICANT CHANGE UP (ref 2.4–4.7)
PLATELET # BLD AUTO: 119 K/UL — LOW (ref 150–400)
POTASSIUM SERPL-MCNC: 3.8 MMOL/L — SIGNIFICANT CHANGE UP (ref 3.5–5.3)
POTASSIUM SERPL-SCNC: 3.8 MMOL/L — SIGNIFICANT CHANGE UP (ref 3.5–5.3)
PROTHROM AB SERPL-ACNC: 63.8 SEC — HIGH (ref 10.5–13.4)
RBC # BLD: 3.57 M/UL — LOW (ref 4.2–5.8)
RBC # FLD: 15.6 % — HIGH (ref 10.3–14.5)
SODIUM SERPL-SCNC: 138 MMOL/L — SIGNIFICANT CHANGE UP (ref 135–145)
SPECIMEN SOURCE: SIGNIFICANT CHANGE UP
SPECIMEN SOURCE: SIGNIFICANT CHANGE UP
WBC # BLD: 5.13 K/UL — SIGNIFICANT CHANGE UP (ref 3.8–10.5)
WBC # FLD AUTO: 5.13 K/UL — SIGNIFICANT CHANGE UP (ref 3.8–10.5)

## 2023-03-17 PROCEDURE — 99233 SBSQ HOSP IP/OBS HIGH 50: CPT | Mod: FS

## 2023-03-17 PROCEDURE — 99233 SBSQ HOSP IP/OBS HIGH 50: CPT

## 2023-03-17 PROCEDURE — 99232 SBSQ HOSP IP/OBS MODERATE 35: CPT

## 2023-03-17 RX ADMIN — NAFCILLIN 200 GRAM(S): 10 INJECTION, POWDER, FOR SOLUTION INTRAVENOUS at 05:31

## 2023-03-17 RX ADMIN — MIDODRINE HYDROCHLORIDE 5 MILLIGRAM(S): 2.5 TABLET ORAL at 22:54

## 2023-03-17 RX ADMIN — NAFCILLIN 200 GRAM(S): 10 INJECTION, POWDER, FOR SOLUTION INTRAVENOUS at 13:13

## 2023-03-17 RX ADMIN — Medication 25 MILLIGRAM(S): at 17:32

## 2023-03-17 RX ADMIN — Medication 650 MILLIGRAM(S): at 23:23

## 2023-03-17 RX ADMIN — Medication 650 MILLIGRAM(S): at 12:23

## 2023-03-17 RX ADMIN — Medication 650 MILLIGRAM(S): at 06:55

## 2023-03-17 RX ADMIN — CHLORHEXIDINE GLUCONATE 1 APPLICATION(S): 213 SOLUTION TOPICAL at 05:41

## 2023-03-17 RX ADMIN — MIDODRINE HYDROCHLORIDE 5 MILLIGRAM(S): 2.5 TABLET ORAL at 05:33

## 2023-03-17 RX ADMIN — MIDODRINE HYDROCHLORIDE 5 MILLIGRAM(S): 2.5 TABLET ORAL at 13:14

## 2023-03-17 RX ADMIN — ATORVASTATIN CALCIUM 40 MILLIGRAM(S): 80 TABLET, FILM COATED ORAL at 22:54

## 2023-03-17 RX ADMIN — NAFCILLIN 200 GRAM(S): 10 INJECTION, POWDER, FOR SOLUTION INTRAVENOUS at 09:04

## 2023-03-17 RX ADMIN — Medication 650 MILLIGRAM(S): at 12:08

## 2023-03-17 RX ADMIN — TAMSULOSIN HYDROCHLORIDE 0.4 MILLIGRAM(S): 0.4 CAPSULE ORAL at 22:56

## 2023-03-17 RX ADMIN — Medication 650 MILLIGRAM(S): at 22:53

## 2023-03-17 RX ADMIN — Medication 3 MILLILITER(S): at 09:26

## 2023-03-17 RX ADMIN — NAFCILLIN 200 GRAM(S): 10 INJECTION, POWDER, FOR SOLUTION INTRAVENOUS at 17:32

## 2023-03-17 RX ADMIN — Medication 650 MILLIGRAM(S): at 07:05

## 2023-03-17 RX ADMIN — NYSTATIN CREAM 1 APPLICATION(S): 100000 CREAM TOPICAL at 05:40

## 2023-03-17 RX ADMIN — Medication 10 MILLIGRAM(S): at 22:55

## 2023-03-17 RX ADMIN — NAFCILLIN 200 GRAM(S): 10 INJECTION, POWDER, FOR SOLUTION INTRAVENOUS at 01:14

## 2023-03-17 RX ADMIN — POLYETHYLENE GLYCOL 3350 17 GRAM(S): 17 POWDER, FOR SOLUTION ORAL at 11:51

## 2023-03-17 RX ADMIN — OLANZAPINE 10 MILLIGRAM(S): 15 TABLET, FILM COATED ORAL at 22:55

## 2023-03-17 RX ADMIN — BUDESONIDE AND FORMOTEROL FUMARATE DIHYDRATE 2 PUFF(S): 160; 4.5 AEROSOL RESPIRATORY (INHALATION) at 09:26

## 2023-03-17 RX ADMIN — BUDESONIDE AND FORMOTEROL FUMARATE DIHYDRATE 2 PUFF(S): 160; 4.5 AEROSOL RESPIRATORY (INHALATION) at 21:41

## 2023-03-17 RX ADMIN — NAFCILLIN 200 GRAM(S): 10 INJECTION, POWDER, FOR SOLUTION INTRAVENOUS at 22:55

## 2023-03-17 RX ADMIN — Medication 25 MILLIGRAM(S): at 05:33

## 2023-03-17 NOTE — PROGRESS NOTE ADULT - ASSESSMENT
87 y/o male with PMHx of HFrEF,  HTN, HLD, Factor V Leiden, PE (S/P IVC filter), A-fib, CAD/CABG x2 with LIMA-LAD and SVG-PDA with AV fibroelastoma resection in 2019, COPD, CVA, AS, and prostate CA who was sent in from [facility] rehab as per documentation was noted to have new RUE/ RLE weakness and numbness and with diminished rt. sided radial and pedal pulses. Found to have uncontrolled afib, subsequently found to have CVA.

## 2023-03-17 NOTE — PROGRESS NOTE ADULT - SUBJECTIVE AND OBJECTIVE BOX
[facility]                                                         CARDIOLOGY AT [place] [address]                                                         Telephone: [phone]. Fax:[phone]                                                                             PROGRESS NOTE    Reason for follow up:   Update:       Review of symptoms:   Cardiac:  No chest pain. No dyspnea. No palpitations.  Respiratory: no cough. No dyspnea  Gastrointestinal: No diarrhea. No abdominal pain. No bleeding.   Neuro: No focal neuro complaints.    Vitals:  T(C): 36.8 (03-17-23 @ 08:00), Max: 39.6 (03-16-23 @ 13:30)  HR: 81 (03-17-23 @ 08:00) (73 - 158)  BP: 105/72 (03-17-23 @ 08:00) (85/58 - 131/72)  RR: 24 (03-17-23 @ 08:00) (0 - 32)  SpO2: 96% (03-17-23 @ 08:00) (85% - 99%)  Wt(kg): --  I&O's Summary    16 Mar 2023 07:01  -  17 Mar 2023 07:00  --------------------------------------------------------  IN: 1070 mL / OUT: 1275 mL / NET: -205 mL    17 Mar 2023 07:01  -  17 Mar 2023 09:43  --------------------------------------------------------  IN: 100 mL / OUT: 0 mL / NET: 100 mL      Weight (kg): 120.1 (03-15 @ 00:45)    PHYSICAL EXAM:  Appearance: Comfortable. No acute distress  HEENT:  Atraumatic. Normocephalic.  Normal oral mucosa  Neurologic: A & O x 3, no gross focal deficits.  Cardiovascular: RRR S1 S2, No murmur, no rubs/gallops. No JVD  Respiratory: Lungs clear to auscultation, unlabored   Gastrointestinal:  Soft, Non-tender, + BS  Lower Extremities: 2+ Peripheral Pulses, No clubbing, cyanosis, or edema  Psychiatry: Patient is calm. No agitation.   Skin: warm and dry.    CURRENT CARDIAC MEDICATIONS:  digoxin  Injectable 250 MICROGram(s) IV Push once  metoprolol tartrate 25 milliGRAM(s) Oral two times a day  midodrine 5 milliGRAM(s) Oral every 8 hours      CURRENT OTHER MEDICATIONS:  albuterol/ipratropium for Nebulization 3 milliLiter(s) Nebulizer every 6 hours PRN Shortness of Breath and/or Wheezing  budesonide 160 MICROgram(s)/formoterol 4.5 MICROgram(s) Inhaler 2 Puff(s) Inhalation two times a day  nafcillin  IVPB 2 Gram(s) IV Intermittent every 4 hours  acetaminophen     Tablet .. 650 milliGRAM(s) Oral every 6 hours PRN Temp greater or equal to 38C (100.4F), Mild Pain (1 - 3), Moderate Pain (4 - 6)  OLANZapine 10 milliGRAM(s) Oral at bedtime  bisacodyl Suppository 10 milliGRAM(s) Rectal daily PRN Constipation  polyethylene glycol 3350 17 Gram(s) Oral daily  atorvastatin 40 milliGRAM(s) Oral at bedtime  aspirin enteric coated 81 milliGRAM(s) Oral daily  chlorhexidine 2% Cloths 1 Application(s) Topical <User Schedule>  oxybutynin XL 10 milliGRAM(s) Oral <User Schedule>  tamsulosin 0.4 milliGRAM(s) Oral at bedtime      LABS:	 	  ( 15 Mar 2023 12:30 )  Troponin T  X    ,  CPK  108  , CKMB  X    , BNP X        , ( 14 Mar 2023 20:35 )  Troponin T  0.02 ,  CPK  X    , CKMB  X    , BNP X        , ( 08 Mar 2023 12:50 )  Troponin T  0.01 ,  CPK  X    , CKMB  X    , BNP X                                  9.7    5.13  )-----------( 119      ( 17 Mar 2023 05:50 )             31.2     03-17    138  |  104  |  24.0<H>  ----------------------------<  106<H>  3.8   |  24.0  |  1.40<H>    Ca    8.3<L>      17 Mar 2023 05:50  Phos  4.0     03-17  Mg     2.0     03-17    TPro  6.1<L>  /  Alb  2.8<L>  /  TBili  0.6  /  DBili  x   /  AST  17  /  ALT  12  /  AlkPhos  75  03-16    PT/INR/PTT ( 15 Mar 2023 03:40 )                       :                       :      27.5         :       X                     .        .                   .              .           .       2.35        .                                       Lipid Profile: Date: 03-09 @ 06:06  Total cholesterol 124; Direct LDL: --; HDL: 34; Triglycerides:159    HgA1c:   TSH:     TELEMETRY:   ECG:    DIAGNOSTIC TESTING:  [ ] Echocardiogram:   [ ]  Catheterization:  [ ] Stress Test:    OTHER: 	                                                                [facility]                                                         CARDIOLOGY AT [place] [address]                                                         Telephone: [phone]. Fax:[phone]                                                                             PROGRESS NOTE    Reason for follow up: Afib   rate controlled  Update:       Review of symptoms:   Cardiac:  No chest pain. No dyspnea. No palpitations.  Respiratory: no cough. No dyspnea  Gastrointestinal: No diarrhea. No abdominal pain. No bleeding.   Neuro: No focal neuro complaints.    Vitals:  T(C): 36.8 (03-17-23 @ 08:00), Max: 39.6 (03-16-23 @ 13:30)  HR: 81 (03-17-23 @ 08:00) (73 - 158)  BP: 105/72 (03-17-23 @ 08:00) (85/58 - 131/72)  RR: 24 (03-17-23 @ 08:00) (0 - 32)  SpO2: 96% (03-17-23 @ 08:00) (85% - 99%)  Wt(kg): --  I&O's Summary    16 Mar 2023 07:01  -  17 Mar 2023 07:00  --------------------------------------------------------  IN: 1070 mL / OUT: 1275 mL / NET: -205 mL    17 Mar 2023 07:01  -  17 Mar 2023 09:43  --------------------------------------------------------  IN: 100 mL / OUT: 0 mL / NET: 100 mL      Weight (kg): 120.1 (03-15 @ 00:45)    PHYSICAL EXAM:  Appearance: Comfortable. No acute distress  HEENT:  Atraumatic. Normocephalic.  Normal oral mucosa  Neurologic: A & O x 3, no gross focal deficits.  Cardiovascular: RRR S1 S2, No murmur, no rubs/gallops. No JVD  Respiratory: Lungs clear to auscultation, unlabored   Gastrointestinal:  Soft, Non-tender, + BS  Lower Extremities: 2+ Peripheral Pulses, No clubbing, cyanosis, or edema  Psychiatry: Patient is calm. No agitation.   Skin: warm and dry.    CURRENT CARDIAC MEDICATIONS:  digoxin  Injectable 250 MICROGram(s) IV Push once  metoprolol tartrate 25 milliGRAM(s) Oral two times a day  midodrine 5 milliGRAM(s) Oral every 8 hours      CURRENT OTHER MEDICATIONS:  albuterol/ipratropium for Nebulization 3 milliLiter(s) Nebulizer every 6 hours PRN Shortness of Breath and/or Wheezing  budesonide 160 MICROgram(s)/formoterol 4.5 MICROgram(s) Inhaler 2 Puff(s) Inhalation two times a day  nafcillin  IVPB 2 Gram(s) IV Intermittent every 4 hours  acetaminophen     Tablet .. 650 milliGRAM(s) Oral every 6 hours PRN Temp greater or equal to 38C (100.4F), Mild Pain (1 - 3), Moderate Pain (4 - 6)  OLANZapine 10 milliGRAM(s) Oral at bedtime  bisacodyl Suppository 10 milliGRAM(s) Rectal daily PRN Constipation  polyethylene glycol 3350 17 Gram(s) Oral daily  atorvastatin 40 milliGRAM(s) Oral at bedtime  aspirin enteric coated 81 milliGRAM(s) Oral daily  chlorhexidine 2% Cloths 1 Application(s) Topical <User Schedule>  oxybutynin XL 10 milliGRAM(s) Oral <User Schedule>  tamsulosin 0.4 milliGRAM(s) Oral at bedtime      LABS:	 	  ( 15 Mar 2023 12:30 )  Troponin T  X    ,  CPK  108  , CKMB  X    , BNP X        , ( 14 Mar 2023 20:35 )  Troponin T  0.02 ,  CPK  X    , CKMB  X    , BNP X        , ( 08 Mar 2023 12:50 )  Troponin T  0.01 ,  CPK  X    , CKMB  X    , BNP X                                  9.7    5.13  )-----------( 119      ( 17 Mar 2023 05:50 )             31.2     03-17    138  |  104  |  24.0<H>  ----------------------------<  106<H>  3.8   |  24.0  |  1.40<H>    Ca    8.3<L>      17 Mar 2023 05:50  Phos  4.0     03-17  Mg     2.0     03-17    TPro  6.1<L>  /  Alb  2.8<L>  /  TBili  0.6  /  DBili  x   /  AST  17  /  ALT  12  /  AlkPhos  75  03-16    PT/INR/PTT ( 15 Mar 2023 03:40 )                       :                       :      27.5         :       X                     .        .                   .              .           .       2.35        .                                       Lipid Profile: Date: 03-09 @ 06:06  Total cholesterol 124; Direct LDL: --; HDL: 34; Triglycerides:159    HgA1c:   TSH:     TELEMETRY:   ECG:    DIAGNOSTIC TESTING:  [ ] Echocardiogram:   [ ]  Catheterization:  [ ] Stress Test:    OTHER:

## 2023-03-17 NOTE — PROGRESS NOTE ADULT - SUBJECTIVE AND OBJECTIVE BOX
Preliminary note, offical recommendations pending attending review/signature   [facility] Stroke Team  Progress Note     HPI:  88 yr old male with hypertension,  atrial fibrillation, PE/DVT s/p IVC filter, Factor V Leiden on coumadin CAD s/p CABG, stroke COPD, GIGI, AAA, infrarenal 6.1 cm  on prior ct presented with right sided weakness started at 11am  3/8/23 at [facility] by ER report. Upon review patient notes  numbness  to right hand,  arm and leg denied slurred speech, changes in vision, weakness, vision. Patient on coumadin thinks that took it day of presentation.  Noted symptoms  were improving on ER eval, ER  NIH 0- INR 2.30, excluded from Tenecteplase . It was noted also thinks that had an old stroke with some residual numbness/weakness on the same side in the past - notes sometimes gets worse . Patient stated at this time he feels back to normal.     SUBJECTIVE: No events overnight.  No new neurologic complaints.  ROS reported negative unless otherwise noted.    acetaminophen     Tablet .. 650 milliGRAM(s) Oral every 6 hours PRN  albuterol/ipratropium for Nebulization 3 milliLiter(s) Nebulizer every 6 hours PRN  aspirin enteric coated 81 milliGRAM(s) Oral daily  atorvastatin 40 milliGRAM(s) Oral at bedtime  bisacodyl Suppository 10 milliGRAM(s) Rectal daily PRN  budesonide 160 MICROgram(s)/formoterol 4.5 MICROgram(s) Inhaler 2 Puff(s) Inhalation two times a day  chlorhexidine 2% Cloths 1 Application(s) Topical <User Schedule>  digoxin  Injectable 250 MICROGram(s) IV Push once  metoprolol tartrate 25 milliGRAM(s) Oral two times a day  midodrine 5 milliGRAM(s) Oral every 8 hours  nafcillin  IVPB 2 Gram(s) IV Intermittent every 4 hours  OLANZapine 10 milliGRAM(s) Oral at bedtime  oxybutynin XL 10 milliGRAM(s) Oral <User Schedule>  polyethylene glycol 3350 17 Gram(s) Oral daily  tamsulosin 0.4 milliGRAM(s) Oral at bedtime      PHYSICAL EXAM:   Vital Signs Last 24 Hrs  T(C): 36.3 (17 Mar 2023 07:00), Max: 39.6 (16 Mar 2023 13:30)  T(F): 97.4 (17 Mar 2023 07:00), Max: 103.3 (16 Mar 2023 13:30)  HR: 75 (17 Mar 2023 07:00) (73 - 158)  BP: 117/79 (17 Mar 2023 07:00) (85/58 - 131/72)  BP(mean): 92 (17 Mar 2023 07:00) (56 - 102)  RR: 20 (17 Mar 2023 07:00) (0 - 32)  SpO2: 98% (17 Mar 2023 07:00) (85% - 100%)    Parameters below as of 17 Mar 2023 04:00  Patient On (Oxygen Delivery Method): nasal cannula    O2 Concentration (%): 2    EXAM PENDING   General: No acute distress    NEUROLOGICAL EXAM:  Mental status: The patient is awake and alert and has normal attention span.  The patient is oriented to self,  [facility], re-orients self to March 2023, disoriented to date. The patient is able to name objects, follow commands, repetition intact.     Cranial nerves: Pupils equal and react symmetrically to light. There is no visual field deficit to confrontation. Extraocular motion is full with no nystagmus. There is no ptosis. Facial sensation is intact. Slight right facial palsy that corrects on smile,    Tongue is midline.    Motor: There is normal bulk and tone.  There is no tremor.  Strength is 5/5 in the right arm and leg. Right hand subtle pronator drift   Strength is 5/5 in the left arm and leg.    Sensation intact throughout, symmetric, no extinction     LABS:                        9.7    5.13  )-----------( 119      ( 17 Mar 2023 05:50 )             31.2    03-17    138  |  104  |  24.0<H>  ----------------------------<  106<H>  3.8   |  24.0  |  1.40<H>    Ca    8.3<L>      17 Mar 2023 05:50  Phos  4.0     03-17  Mg     2.0     03-17    TPro  6.1<L>  /  Alb  2.8<L>  /  TBili  0.6  /  DBili  x   /  AST  17  /  ALT  12  /  AlkPhos  75  03-16        IMAGING: Reviewed by me.     MR Head No Cont (03.10.23 @ 15:46)   1. Left medial posterior frontal cortical and subcortical acute infarction  2. Left lateral posterior frontal cortical and subcortical remote   infarction ; additional tiny left anterior frontal cortical remote   infarctions  3. Left thalamic and left callosal body remote deep infarctions  4. Ischemic white matter disease greater than typical for age  5. Diffuse brain volume loss typical for age    CT Angio Head Neck Stroke Protocol w/ IV Cont (03.08.23 @ 13:22)   7 mL area of increased Tmax in the right parietal lobe suggesting brain   at risk versus artifact. No core infarct. No large vessel occlusion. 50%   stenosis in left carotid bulb/proximal internal carotid artery. Consider   MRI of the brain as clinically warranted. Additional findings above.    CT Brain Stroke Protocol (03.08.23 @ 12:40)    Moderate chronic microvascular changes without evidence of   an acute transcortical infarction or hemorrhage.     US Duplex Carotid Arteries Complete, Bilateral (02.23.23 @ 11:03)   IMPRESSION: No significant hemodynamic stenosis of either carotid artery.    TTE Echo Complete w/ Contrast w/ Doppler (02.21.23 @ 11:34)   Summary:   1. Endocardial visualization was enhanced with intravenous echo   contrast. No LV thrombus.   2. Left ventricular ejection fraction, by visual estimation, is 25 to   30%.   3. Severely decreased global left ventricular systolic function.   4. Abnormal septal motion consistent with post-operative status.   5. There is mild eccentric left ventricular hypertrophy.   6. The mitralin-flow pattern reveals no discernable A-wave, therefore   no comment on diastolic function can be made.   7. Normal right ventricular size and function, estimated PASP least 42   mmHg.   8. Severely enlarged left atrium.   9. Right atrial enlargement.  10. Moderate mitral annular calcification.  11. Mild-moderate tricuspid regurgitation.  12. Severe calcific aortic valve stenosis with low gradient,   dimensionless index 0.23.  13. There is moderate aortic root calcification.  14. Borderline dilatation of the aortic root, sinuses of Valsalva and   ascending aorta 3.9 cm, index to BSA 1.6 cm/m2 within normal.  15. Compared to the outpatient TTE study from 9/2022 prevoiusly LV EF 37%   and known low gradient aortic valve stenosis, but visually on current   study appears severely stenotic.     TTE Echo Complete w/ Contrast w/ Doppler (03.12.23 @ 12:55)   Summary:   1. Left ventricular ejection fraction, by visual estimation, is 20 to   25%.   2. Moderately to severely decreased global left ventricular systolic   function.   3. [place] is abnormal as described above.   4. The mitral in-flow pattern reveals no discernable A-wave, therefore   no comment on diastolic function can be made.   5. No LV thrombus.   6. Moderately reduced RV systolic function.   7. There is no evidence of pericardial effusion.   8. Mild thickening of the anterior and posterior mitral valve leaflets.   9. Moderate mitral annular calcification.  10. Mild-moderate tricuspid regurgitation.  11. Aortic Stenosis with V max 3.1 m/sec, Mean gradient of 23 mm Hg.  12. Endocardial visualization was enhanced with intravenous echo contrast.           Preliminary note, offical recommendations pending attending review/signature   [facility] Stroke Team  Progress Note     HPI:  88 yr old male with hypertension,  atrial fibrillation, PE/DVT s/p IVC filter, Factor V Leiden on coumadin CAD s/p CABG, stroke COPD, GIGI, AAA, infrarenal 6.1 cm  on prior ct presented with right sided weakness started at 11am  3/8/23 at [facility] by ER report. Upon review patient notes  numbness  to right hand,  arm and leg denied slurred speech, changes in vision, weakness, vision. Patient on coumadin thinks that took it day of presentation.  Noted symptoms  were improving on ER eval, ER  NIH 0- INR 2.30, excluded from Tenecteplase . It was noted also thinks that had an old stroke with some residual numbness/weakness on the same side in the past - notes sometimes gets worse . Patient stated at this time he feels back to normal.     SUBJECTIVE: No events overnight.  No new neurologic complaints.  ROS reported negative unless otherwise noted. Notes headache the other day, got a pill. None at this time.     acetaminophen     Tablet .. 650 milliGRAM(s) Oral every 6 hours PRN  albuterol/ipratropium for Nebulization 3 milliLiter(s) Nebulizer every 6 hours PRN  aspirin enteric coated 81 milliGRAM(s) Oral daily  atorvastatin 40 milliGRAM(s) Oral at bedtime  bisacodyl Suppository 10 milliGRAM(s) Rectal daily PRN  budesonide 160 MICROgram(s)/formoterol 4.5 MICROgram(s) Inhaler 2 Puff(s) Inhalation two times a day  chlorhexidine 2% Cloths 1 Application(s) Topical <User Schedule>  digoxin  Injectable 250 MICROGram(s) IV Push once  metoprolol tartrate 25 milliGRAM(s) Oral two times a day  midodrine 5 milliGRAM(s) Oral every 8 hours  nafcillin  IVPB 2 Gram(s) IV Intermittent every 4 hours  OLANZapine 10 milliGRAM(s) Oral at bedtime  oxybutynin XL 10 milliGRAM(s) Oral <User Schedule>  polyethylene glycol 3350 17 Gram(s) Oral daily  tamsulosin 0.4 milliGRAM(s) Oral at bedtime      PHYSICAL EXAM:   Vital Signs Last 24 Hrs  T(C): 36.3 (17 Mar 2023 07:00), Max: 39.6 (16 Mar 2023 13:30)  T(F): 97.4 (17 Mar 2023 07:00), Max: 103.3 (16 Mar 2023 13:30)  HR: 75 (17 Mar 2023 07:00) (73 - 158)  BP: 117/79 (17 Mar 2023 07:00) (85/58 - 131/72)  BP(mean): 92 (17 Mar 2023 07:00) (56 - 102)  RR: 20 (17 Mar 2023 07:00) (0 - 32)  SpO2: 98% (17 Mar 2023 07:00) (85% - 100%)    Parameters below as of 17 Mar 2023 04:00  Patient On (Oxygen Delivery Method): nasal cannula    O2 Concentration (%): 2      General: No acute distress    NEUROLOGICAL EXAM:  Mental status: The patient is awake and alert and has normal attention span.  The patient is oriented to self,  notes [facility] then notes [facility],  March 2023, disoriented to date. The patient is able to name objects, follow commands, repetition intact.     Cranial nerves: Pupils equal and react symmetrically to light. There is no visual field deficit to confrontation. Extraocular motion is full with no nystagmus. There is no ptosis. Facial sensation is intact. Slight right facial palsy that corrects on smile,    Tongue is midline.    Motor: There is normal bulk and tone.  There is no tremor. Subtle b/l UE drifts- symmetric.   Strength is 5/5 in the right arm and leg.   Strength is 5/5 in the left arm and leg.    Sensation intact throughout, symmetric, no extinction     LABS:                        9.7    5.13  )-----------( 119      ( 17 Mar 2023 05:50 )             31.2    03-17    138  |  104  |  24.0<H>  ----------------------------<  106<H>  3.8   |  24.0  |  1.40<H>    Ca    8.3<L>      17 Mar 2023 05:50  Phos  4.0     03-17  Mg     2.0     03-17    TPro  6.1<L>  /  Alb  2.8<L>  /  TBili  0.6  /  DBili  x   /  AST  17  /  ALT  12  /  AlkPhos  75  03-16        IMAGING: Reviewed by me.     MR Head No Cont (03.10.23 @ 15:46)   1. Left medial posterior frontal cortical and subcortical acute infarction  2. Left lateral posterior frontal cortical and subcortical remote   infarction ; additional tiny left anterior frontal cortical remote   infarctions  3. Left thalamic and left callosal body remote deep infarctions  4. Ischemic white matter disease greater than typical for age  5. Diffuse brain volume loss typical for age    CT Angio Head Neck Stroke Protocol w/ IV Cont (03.08.23 @ 13:22)   7 mL area of increased Tmax in the right parietal lobe suggesting brain   at risk versus artifact. No core infarct. No large vessel occlusion. 50%   stenosis in left carotid bulb/proximal internal carotid artery. Consider   MRI of the brain as clinically warranted. Additional findings above.    CT Brain Stroke Protocol (03.08.23 @ 12:40)    Moderate chronic microvascular changes without evidence of   an acute transcortical infarction or hemorrhage.     US Duplex Carotid Arteries Complete, Bilateral (02.23.23 @ 11:03)   IMPRESSION: No significant hemodynamic stenosis of either carotid artery.    TTE Echo Complete w/ Contrast w/ Doppler (02.21.23 @ 11:34)   Summary:   1. Endocardial visualization was enhanced with intravenous echo   contrast. No LV thrombus.   2. Left ventricular ejection fraction, by visual estimation, is 25 to   30%.   3. Severely decreased global left ventricular systolic function.   4. Abnormal septal motion consistent with post-operative status.   5. There is mild eccentric left ventricular hypertrophy.   6. The mitralin-flow pattern reveals no discernable A-wave, therefore   no comment on diastolic function can be made.   7. Normal right ventricular size and function, estimated PASP least 42   mmHg.   8. Severely enlarged left atrium.   9. Right atrial enlargement.  10. Moderate mitral annular calcification.  11. Mild-moderate tricuspid regurgitation.  12. Severe calcific aortic valve stenosis with low gradient,   dimensionless index 0.23.  13. There is moderate aortic root calcification.  14. Borderline dilatation of the aortic root, sinuses of Valsalva and   ascending aorta 3.9 cm, index to BSA 1.6 cm/m2 within normal.  15. Compared to the outpatient TTE study from 9/2022 prevoiusly LV EF 37%   and known low gradient aortic valve stenosis, but visually on current   study appears severely stenotic.     TTE Echo Complete w/ Contrast w/ Doppler (03.12.23 @ 12:55)   Summary:   1. Left ventricular ejection fraction, by visual estimation, is 20 to   25%.   2. Moderately to severely decreased global left ventricular systolic   function.   3. [place] is abnormal as described above.   4. The mitral in-flow pattern reveals no discernable A-wave, therefore   no comment on diastolic function can be made.   5. No LV thrombus.   6. Moderately reduced RV systolic function.   7. There is no evidence of pericardial effusion.   8. Mild thickening of the anterior and posterior mitral valve leaflets.   9. Moderate mitral annular calcification.  10. Mild-moderate tricuspid regurgitation.  11. Aortic Stenosis with V max 3.1 m/sec, Mean gradient of 23 mm Hg.  12. Endocardial visualization was enhanced with intravenous echo contrast.           [facility] Stroke Team  Progress Note     HPI:  88 yr old male with hypertension,  atrial fibrillation, PE/DVT s/p IVC filter, Factor V Leiden on coumadin CAD s/p CABG, stroke COPD, GIGI, AAA, infrarenal 6.1 cm  on prior ct presented with right sided weakness started at 11am  3/8/23 at [facility] by ER report. Upon review patient notes  numbness  to right hand,  arm and leg denied slurred speech, changes in vision, weakness, vision. Patient on coumadin thinks that took it day of presentation.  Noted symptoms  were improving on ER eval, ER  NIH 0- INR 2.30, excluded from Tenecteplase . It was noted also thinks that had an old stroke with some residual numbness/weakness on the same side in the past - notes sometimes gets worse . Patient stated at this time he feels back to normal.     SUBJECTIVE: No events overnight.  No new neurologic complaints.  ROS reported negative unless otherwise noted. Notes headache the other day, got a pill. None at this time.     acetaminophen     Tablet .. 650 milliGRAM(s) Oral every 6 hours PRN  albuterol/ipratropium for Nebulization 3 milliLiter(s) Nebulizer every 6 hours PRN  aspirin enteric coated 81 milliGRAM(s) Oral daily  atorvastatin 40 milliGRAM(s) Oral at bedtime  bisacodyl Suppository 10 milliGRAM(s) Rectal daily PRN  budesonide 160 MICROgram(s)/formoterol 4.5 MICROgram(s) Inhaler 2 Puff(s) Inhalation two times a day  chlorhexidine 2% Cloths 1 Application(s) Topical <User Schedule>  digoxin  Injectable 250 MICROGram(s) IV Push once  metoprolol tartrate 25 milliGRAM(s) Oral two times a day  midodrine 5 milliGRAM(s) Oral every 8 hours  nafcillin  IVPB 2 Gram(s) IV Intermittent every 4 hours  OLANZapine 10 milliGRAM(s) Oral at bedtime  oxybutynin XL 10 milliGRAM(s) Oral <User Schedule>  polyethylene glycol 3350 17 Gram(s) Oral daily  tamsulosin 0.4 milliGRAM(s) Oral at bedtime      PHYSICAL EXAM:   Vital Signs Last 24 Hrs  T(C): 36.3 (17 Mar 2023 07:00), Max: 39.6 (16 Mar 2023 13:30)  T(F): 97.4 (17 Mar 2023 07:00), Max: 103.3 (16 Mar 2023 13:30)  HR: 75 (17 Mar 2023 07:00) (73 - 158)  BP: 117/79 (17 Mar 2023 07:00) (85/58 - 131/72)  BP(mean): 92 (17 Mar 2023 07:00) (56 - 102)  RR: 20 (17 Mar 2023 07:00) (0 - 32)  SpO2: 98% (17 Mar 2023 07:00) (85% - 100%)    Parameters below as of 17 Mar 2023 04:00  Patient On (Oxygen Delivery Method): nasal cannula    O2 Concentration (%): 2      General: No acute distress    NEUROLOGICAL EXAM:  Mental status: The patient is awake and alert and has normal attention span.  The patient is oriented to self,  notes [facility] [place] then notes [facility],  March 2023, disoriented to date. The patient is able to name objects, follow commands, repetition intact.     Cranial nerves: Pupils equal and react symmetrically to light. There is no visual field deficit to confrontation. Extraocular motion is full with no nystagmus. There is no ptosis. Facial sensation is intact. Slight right facial palsy that corrects on smile,    Tongue is midline.    Motor: There is normal bulk and tone.  There is no tremor. Subtle b/l UE drifts- symmetric.   Strength is 5/5 in the right arm and leg.   Strength is 5/5 in the left arm and leg.    Sensation intact throughout, symmetric, no extinction     LABS:                        9.7    5.13  )-----------( 119      ( 17 Mar 2023 05:50 )             31.2    03-17    138  |  104  |  24.0<H>  ----------------------------<  106<H>  3.8   |  24.0  |  1.40<H>    Ca    8.3<L>      17 Mar 2023 05:50  Phos  4.0     03-17  Mg     2.0     03-17    TPro  6.1<L>  /  Alb  2.8<L>  /  TBili  0.6  /  DBili  x   /  AST  17  /  ALT  12  /  AlkPhos  75  03-16    PT/INR - ( 17 Mar 2023 11:00 )   PT: 63.8 sec;   INR: 5.41 ratio             IMAGING: Reviewed by me.     MR Head No Cont (03.10.23 @ 15:46)   1. Left medial posterior frontal cortical and subcortical acute infarction  2. Left lateral posterior frontal cortical and subcortical remote   infarction ; additional tiny left anterior frontal cortical remote   infarctions  3. Left thalamic and left callosal body remote deep infarctions  4. Ischemic white matter disease greater than typical for age  5. Diffuse brain volume loss typical for age    CT Angio Head Neck Stroke Protocol w/ IV Cont (03.08.23 @ 13:22)   7 mL area of increased Tmax in the right parietal lobe suggesting brain   at risk versus artifact. No core infarct. No large vessel occlusion. 50%   stenosis in left carotid bulb/proximal internal carotid artery. Consider   MRI of the brain as clinically warranted. Additional findings above.    CT Brain Stroke Protocol (03.08.23 @ 12:40)    Moderate chronic microvascular changes without evidence of   an acute transcortical infarction or hemorrhage.     US Duplex Carotid Arteries Complete, Bilateral (02.23.23 @ 11:03)   IMPRESSION: No significant hemodynamic stenosis of either carotid artery.    TTE Echo Complete w/ Contrast w/ Doppler (02.21.23 @ 11:34)   Summary:   1. Endocardial visualization was enhanced with intravenous echo   contrast. No LV thrombus.   2. Left ventricular ejection fraction, by visual estimation, is 25 to   30%.   3. Severely decreased global left ventricular systolic function.   4. Abnormal septal motion consistent with post-operative status.   5. There is mild eccentric left ventricular hypertrophy.   6. The mitralin-flow pattern reveals no discernable A-wave, therefore   no comment on diastolic function can be made.   7. Normal right ventricular size and function, estimated PASP least 42   mmHg.   8. Severely enlarged left atrium.   9. Right atrial enlargement.  10. Moderate mitral annular calcification.  11. Mild-moderate tricuspid regurgitation.  12. Severe calcific aortic valve stenosis with low gradient,   dimensionless index 0.23.  13. There is moderate aortic root calcification.  14. Borderline dilatation of the aortic root, sinuses of Valsalva and   ascending aorta 3.9 cm, index to BSA 1.6 cm/m2 within normal.  15. Compared to the outpatient TTE study from 9/2022 prevoiusly LV EF 37%   and known low gradient aortic valve stenosis, but visually on current   study appears severely stenotic.     TTE Echo Complete w/ Contrast w/ Doppler (03.12.23 @ 12:55)   Summary:   1. Left ventricular ejection fraction, by visual estimation, is 20 to   25%.   2. Moderately to severely decreased global left ventricular systolic   function.   3. [place] is abnormal as described above.   4. The mitral in-flow pattern reveals no discernable A-wave, therefore   no comment on diastolic function can be made.   5. No LV thrombus.   6. Moderately reduced RV systolic function.   7. There is no evidence of pericardial effusion.   8. Mild thickening of the anterior and posterior mitral valve leaflets.   9. Moderate mitral annular calcification.  10. Mild-moderate tricuspid regurgitation.  11. Aortic Stenosis with V max 3.1 m/sec, Mean gradient of 23 mm Hg.  12. Endocardial visualization was enhanced with intravenous echo contrast.

## 2023-03-17 NOTE — PROGRESS NOTE ADULT - ASSESSMENT
88y  Male with h/o HTN, AF, PE/DVT s/p IVCF, Factor V Leiden (on warfarin), CAD s/p CABG, CHFrEF, CVA, COPD, GIGI on CPAP. Patient was admitted on 3/8 from NH with reported RUE weakness/numbness. Treated for acute CVA (did not receive thrombolytic). MRI brain (3/10) revealed left medial posterior frontal cortical and subcortical acute infarction. Hospital course complicated by uncontrolled AF with RVR requiring Rapid Response Team, Fever to 105'F, Transfer to MICU for hypotension requiring IV vasopressors and Started on empiric Zosyn and vancomycin. Blood cultures with MSSA. Vancomycin was stopped.       MSSA bacteremia/septic shock  Fever  Leukocytosis  LUE cellulitis  LUE abscess  ruled out   MILAGRO      - Blood cultures 3/14 reporting MSSA  - Repeat blood cultures pending  - RVP/COVID 19 PCR 3/15 negative   - TTE with no endocarditis   - UA negative for UTI  - LUE US with cephalic vein thrombosis, no abscess   - No clear source of bacteremia  - Check UMER   - Procalcitonin level 0.13  - Continue  Nafcillin   - Follow up cultures  - Trend Fever  - Trend WBC        Will Follow    d/w Dr Hare and clinical pharmacy

## 2023-03-17 NOTE — PROGRESS NOTE ADULT - SUBJECTIVE AND OBJECTIVE BOX
[facility]  INFECTIOUS DISEASES at [place] and [place]  =======================================================                               Marques Mike MD#   Alexa Yanes MD*                             Gabrielle Baird MD*   Bisi Sandoval MD*            Diplomates American Board of Internal Medicine & Infectious Diseases                # [place] Office - Appt - Tel  [phone] Fax [phone]                * [place] Office - Appt - Tel [phone] Fax [phone]                                  Hospital Consult line:  [phone]  =======================================================    SANDI SANTOS [id number]    Follow up: MSSA bacteremia/septic shock          Allergies:  No Known Allergies  OHS (Unknown)       REVIEW OF SYSTEMS:  CONSTITUTIONAL:  No Fever or chills  HEENT:   No diplopia or blurred vision.  No earache, sore throat or runny nose.  CARDIOVASCULAR:  No Chest Pain  RESPIRATORY:  No cough, shortness of breath  GASTROINTESTINAL:  No nausea, vomiting or diarrhea.  GENITOURINARY:  No dysuria, frequency or urgency. No Blood in urine  MUSCULOSKELETAL:  no joint aches, no muscle pain  SKIN:  No change in skin, hair or nails.  NEUROLOGIC:  No Headaches, seizures   PSYCHIATRIC:  No disorder of thought or mood.  ENDOCRINE:  No heat or cold intolerance  HEMATOLOGICAL:  No easy bruising or bleeding.       Physical Exam:  GEN: NAD  HEENT: normocephalic and atraumatic. EOMI. PERRL.    NECK: Supple.   LUNGS: CTA B/L.  HEART: RRR  ABDOMEN: Soft, NT, ND.  +BS.    : No CVA tenderness  EXTREMITIES: Without  edema.  MSK: No joint swelling  NEUROLOGIC: No Focal Deficits   PSYCHIATRIC: Appropriate affect .  SKIN: No rash      Vitals:  T(F): 98.2 (17 Mar 2023 11:00), Max: 103.3 (16 Mar 2023 14:30)  HR: 88 (17 Mar 2023 13:00)  BP: 88/52 (17 Mar 2023 13:00)  RR: 21 (17 Mar 2023 13:00)  SpO2: 95% (17 Mar 2023 13:00) (85% - 98%)  temp max in last 48H T(F): , Max: 103.8 (03-15-23 @ 23:00)    Current Antibiotics:  nafcillin  IVPB 2 Gram(s) IV Intermittent every 4 hours    Other medications:  atorvastatin 40 milliGRAM(s) Oral at bedtime  budesonide 160 MICROgram(s)/formoterol 4.5 MICROgram(s) Inhaler 2 Puff(s) Inhalation two times a day  chlorhexidine 2% Cloths 1 Application(s) Topical <User Schedule>  digoxin  Injectable 250 MICROGram(s) IV Push once  metoprolol tartrate 25 milliGRAM(s) Oral two times a day  midodrine 5 milliGRAM(s) Oral every 8 hours  OLANZapine 10 milliGRAM(s) Oral at bedtime  oxybutynin XL 10 milliGRAM(s) Oral <User Schedule>  polyethylene glycol 3350 17 Gram(s) Oral daily  tamsulosin 0.4 milliGRAM(s) Oral at bedtime                            9.7    5.13  )-----------( 119      ( 17 Mar 2023 05:50 )             31.2     03-17    138  |  104  |  24.0<H>  ----------------------------<  106<H>  3.8   |  24.0  |  1.40<H>    Ca    8.3<L>      17 Mar 2023 05:50  Phos  4.0     03-17  Mg     2.0     03-17    TPro  6.1<L>  /  Alb  2.8<L>  /  TBili  0.6  /  DBili  x   /  AST  17  /  ALT  12  /  AlkPhos  75  03-16    RECENT CULTURES:  03-15 @ 09:00    [facility]    03-14 @ 20:57 .Blood Blood-Peripheral Blood Culture PCR  Staphylococcus aureus    Growth in aerobic and anaerobic bottles: Staphylococcus aureus  Growth in aerobic bottle: Bacillus species not anthracis  "Susceptibilities not performed"  ***Blood Panel PCR results on this specimen are available  approximately 3 hours after the Gram stain result.***  Gram stain, PCR, and/or culture results may not always  correspond due to difference in methodologies.  ************************************************************  This PCR assay was performed by multiplex PCR. This  Assay tests for 66 bacterial and resistance gene targets.  Please refer to the [facility] test directory  at [email or web address] for details.  Growth in anaerobic bottle: Gram Positive Cocci in Clusters  Growth in aerobic bottle: Gram Positive Cocci in Clusters and Gram  Positive Rods    03-08 @ 13:40 Clean Catch Clean Catch (Midstream)     <10,000 CFU/mL Normal Urogenital Sangeetha      WBC Count: 5.13 K/uL (03-17-23 @ 05:50)  WBC Count: 7.43 K/uL (03-16-23 @ 03:15)  WBC Count: 11.48 K/uL (03-15-23 @ 12:30)  WBC Count: 14.46 K/uL (03-15-23 @ 03:40)  WBC Count: 9.05 K/uL (03-14-23 @ 20:35)  WBC Count: 4.89 K/uL (03-14-23 @ 05:55)  WBC Count: 5.09 K/uL (03-13-23 @ 06:00)    Creatinine, Serum: 1.40 mg/dL (03-17-23 @ 05:50)  Creatinine, Serum: 1.32 mg/dL (03-16-23 @ 03:15)  Creatinine, Serum: 1.39 mg/dL (03-15-23 @ 12:30)  Creatinine, Serum: 1.67 mg/dL (03-15-23 @ 03:40)  Creatinine, Serum: 1.38 mg/dL (03-14-23 @ 20:35)  Creatinine, Serum: 1.31 mg/dL (03-14-23 @ 05:55)  Creatinine, Serum: 1.15 mg/dL (03-13-23 @ 06:00)    Procalcitonin, Serum: 0.13 ng/mL (03-14-23 @ 20:35)     SARS-CoV-2: NotDetec (03-15-23 @ 09:00)  COVID-19 PCR: NotDetec (03-08-23 @ 12:50)  COVID-19 PCR: NotDetec (03-02-23 @ 11:00)  COVID-19 PCR: NotDetec (02-19-23 @ 13:30)      < from: US Duplex Venous Upper Ext Ltd, Left (03.16.23 @ 19:17) >  ACC: [id number] EXAM:  US DPLX UPR EXT VEINS LTD LT   ORDERED BY: JADA CALDERON     PROCEDURE DATE:  03/16/2023          INTERPRETATION:  CLINICAL INFORMATION: Left arm pain and swelling after   IV infiltration    COMPARISON: None available.    TECHNIQUE: Duplex sonography of the LEFT UPPER extremity veins with color   and spectral Doppler, with and without compression.    FINDINGS:    The left internal jugular, subclavian, axillary and brachial veins are   patent and compressible where applicable.  The basilic veins (superficial   veins) are patent and without thrombus. 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    Doppler examination shows normal spontaneous and phasic flow.    IMPRESSION:  No evidence of left upper extremity deep venous thrombosis.  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 Left ventricular ejection fraction, by visual estimation, is 20 to   25%.   2. Moderately to severely decreased global left ventricular systolic   function.   3. [place] is abnormal as described above.   4. The mitral in-flow pattern reveals no discernable A-wave, therefore   no comment on diastolic function can be made.   5. No LV thrombus.   6. Moderately reduced RV systolic function.   7. There is no evidence of pericardial effusion.   8. Mild thickening of the anterior and posterior mitral valve leaflets.   9. Moderate mitral annular calcification.  10. Mild-moderate tricuspid regurgitation.  11. Aortic Stenosis with V max 3.1 m/sec, Mean gradient of 23 mm Hg.  12. Endocardial visualization was enhanced with intravenous echo contrast.    < end of copied text >

## 2023-03-17 NOTE — PROGRESS NOTE ADULT - NS ATTEND AMEND GEN_ALL_CORE FT
Rate controlled.  Sepsis controlled. Heart rate controlled beta-blocker .   uptitrate beta-blocker as toelrated .  low dose ACE/ARB on discharge.   will sign off. please call for further questions.

## 2023-03-17 NOTE — PROGRESS NOTE ADULT - SUBJECTIVE AND OBJECTIVE BOX
cc: Patient is a 88y old  Male who presents with a chief complaint of right sided weakness / right radial artery occlusion. 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  Course was C/B MSSA bacteremia, sepsis with shock requiring IV pressors, and AFib with RVR. id / cardio following downgraded to medicine 3/17/23       interval hx: patient seen and eval. comfortable. in no acute distres.s awake , alert x 3 ,  mild slurred speech. mild rt sided upper / lower ext weakness ,     ICU Vital Signs Last 24 Hrs  T(C): 37.3 (17 Mar 2023 00:00), Max: 39.6 (16 Mar 2023 13:30)  T(F): 99.1 (17 Mar 2023 00:00), Max: 103.3 (16 Mar 2023 13:30)  HR: 89 (17 Mar 2023 00:00) (79 - 158)  BP: 96/62 (17 Mar 2023 00:00) (84/60 - 131/72)  BP(mean): 73 (17 Mar 2023 00:00) (56 - 102)  ABP: --  ABP(mean): --  RR: 22 (17 Mar 2023 00:00) (0 - 32)  SpO2: 85% (17 Mar 2023 00:00) (85% - 100%)    O2 Parameters below as of 17 Mar 2023 00:00  Patient On (Oxygen Delivery Method): nasal cannula  O2 Flow (L/min): 2      Physical Examination:  GENERAL: awake, alert x 3 , following commands   HEENT: neck supple , mild slurred speech   PULM: CTA anteriorly  CVS: +S1, S2, irrr, +sm noted   ABD: Soft  EXTREMITIES: No pedal edema B/L  NEURO: awake, alert x 3 , mild slurred speech , rt upper ext 4/5, rt lower 4/5 , left upper and lower 5/5 . no facial droop       CULTURES:  Rapid RVP Result: NotDetec (03-15 @ 09:00)  Culture Results:   Growth in aerobic and anaerobic bottles: Staphylococcus aureus  Growth in aerobic bottle: Bacillus species not anthracis  "Susceptibilities not performed"  ***Blood Panel PCR results on this specimen are available  approximately 3 hours after the Gram stain result.***  Gram stain, PCR, and/or culture results may not always  correspond due to difference in methodologies.  ************************************************************  This PCR assay was performed by multiplex PCR. This  Assay tests for 66 bacterial and resistance gene targets.  Please refer to the [facility] test directory  at [email or web address] for details. (03-14 @ 20:57)  Culture Results:   Growth in aerobic and anaerobic bottles: Staphylococcus aureus  See previous culture  [id number] (03-14 @ 20:57)      MEDICATIONS  (STANDING):  aspirin enteric coated 81 milliGRAM(s) Oral daily  atorvastatin 40 milliGRAM(s) Oral at bedtime  budesonide 160 MICROgram(s)/formoterol 4.5 MICROgram(s) Inhaler 2 Puff(s) Inhalation two times a day  chlorhexidine 2% Cloths 1 Application(s) Topical <User Schedule>  digoxin  Injectable 250 MICROGram(s) IV Push once  metoprolol tartrate 25 milliGRAM(s) Oral two times a day  midodrine 5 milliGRAM(s) Oral every 8 hours  nafcillin  IVPB 2 Gram(s) IV Intermittent every 4 hours  OLANZapine 10 milliGRAM(s) Oral at bedtime  oxybutynin XL 10 milliGRAM(s) Oral <User Schedule>  polyethylene glycol 3350 17 Gram(s) Oral daily  tamsulosin 0.4 milliGRAM(s) Oral at bedtime    MEDICATIONS  (PRN):  acetaminophen     Tablet .. 650 milliGRAM(s) Oral every 6 hours PRN Temp greater or equal to 38C (100.4F), Mild Pain (1 - 3), Moderate Pain (4 - 6)  albuterol/ipratropium for Nebulization 3 milliLiter(s) Nebulizer every 6 hours PRN Shortness of Breath and/or Wheezing  bisacodyl Suppository 10 milliGRAM(s) Rectal daily PRN Constipation    MR Head No Cont (03.10.23 @ 15:46)   1. Left medial posterior frontal cortical and subcortical acute infarction  2. Left lateral posterior frontal cortical and subcortical remote   infarction ; additional tiny left anterior frontal cortical remote   infarctions  3. Left thalamic and left callosal body remote deep infarctions  4. Ischemic white matter disease greater than typical for age  5. Diffuse brain volume loss typical for age    CT Angio Head Neck Stroke Protocol w/ IV Cont (03.08.23 @ 13:22)   7 mL area of increased Tmax in the right parietal lobe suggesting brain   at risk versus artifact. No core infarct. No large vessel occlusion. 50%   stenosis in left carotid bulb/proximal internal carotid artery. Consider   MRI of the brain as clinically warranted. Additional findings above.    CT Brain Stroke Protocol (03.08.23 @ 12:40)    Moderate chronic microvascular changes without evidence of   an acute transcortical infarction or hemorrhage.     US Duplex Carotid Arteries Complete, Bilateral (02.23.23 @ 11:03)   IMPRESSION: No significant hemodynamic stenosis of either carotid artery.    TTE Echo Complete w/ Contrast w/ Doppler (02.21.23 @ 11:34)   Summary:   1. Endocardial visualization was enhanced with intravenous echo   contrast. No LV thrombus.   2. Left ventricular ejection fraction, by visual estimation, is 25 to   30%.   3. Severely decreased global left ventricular systolic function.   4. Abnormal septal motion consistent with post-operative status.   5. There is mild eccentric left ventricular hypertrophy.   6. The mitralin-flow pattern reveals no discernable A-wave, therefore   no comment on diastolic function can be made.   7. Normal right ventricular size and function, estimated PASP least 42   mmHg.   8. Severely enlarged left atrium.   9. Right atrial enlargement.  10. Moderate mitral annular calcification.  11. Mild-moderate tricuspid regurgitation.  12. Severe calcific aortic valve stenosis with low gradient,   dimensionless index 0.23.  13. There is moderate aortic root calcification.  14. Borderline dilatation of the aortic root, sinuses of Valsalva and   ascending aorta 3.9 cm, index to BSA 1.6 cm/m2 within normal.  15. Compared to the outpatient TTE study from 9/2022 prevoiusly LV EF 37%   and known low gradient aortic valve stenosis, but visually on current   study appears severely stenotic.     TTE Echo Complete w/ Contrast w/ Doppler (03.12.23 @ 12:55)   Summary:   1. Left ventricular ejection fraction, by visual estimation, is 20 to   25%.   2. Moderately to severely decreased global left ventricular systolic   function.   3. [place] is abnormal as described above.   4. The mitral in-flow pattern reveals no discernable A-wave, therefore   no comment on diastolic function can be made.   5. No LV thrombus.   6. Moderately reduced RV systolic function.   7. There is no evidence of pericardial effusion.   8. Mild thickening of the anterior and posterior mitral valve leaflets.   9. Moderate mitral annular calcification.  10. Mild-moderate tricuspid regurgitation.  11. Aortic Stenosis with V max 3.1 m/sec, Mean gradient of 23 mm Hg.  12. Endocardial visualization was enhanced with intravenous echo contrast.

## 2023-03-17 NOTE — CHART NOTE - NSCHARTNOTEFT_GEN_A_CORE
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  Course was C/B MSSA bacteremia, sepsis with shock requiring IV pressors, and AFib with RVR. Initially received vanc/zosyn 3/14-16, continued on nafcillin, blood cultures preliminary results show staph aureus, no sensitivities available. Phenylephrine stopped yesterday, BP stable, continued on midodrine.     Impression/Plan:    MSSA bacteremia  Sepsis with shock - s/p pressors  No evidence of vegetation on TTE  LUE cellulitis    Fevers/ID  - S/p Vanc/Zosyn  - C/w Nafcillin (3/16- ); f/u final cultures  - ID on board  - TTE performed; Given DNR/DNI status, holding on UMER/aggressive management  - C/w Midodrine 5mg Q8H  - PRN Acetaminophen    HFrEF - EF 20-25%  - BB - Lopressor 25mg BID  - ASA 81  - Continue DGMT at tolerated  - Monitor electrolytes closely  - Replete electrolytes to maintain K>4 and Mg>2  - Cardiology on board    AFib with RVR  - S/p dig x1  - Rate control with BB  - Trend INR  - C/w Coumadin - INR 2-3  - Monitor for s/s bleeding    F/E/N/PPx/Lines  - Maintain euvolemia  - Replete lytes as above  - DASH diet  - PIV    Ethics/Dispo  - DNR/DNI  - Palliative care following  - Daughter Jasmina is HCP  - Stable for downgrade to stepdown BRIEF HOSPITAL COURSE:   88M Factor V Leiden deficiency, 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  Course was C/B MSSA bacteremia, sepsis with shock requiring IV pressors, and AFib with RVR. Initially received vanc/zosyn 3/14-16, blood cultures preliminary results show staph aureus, no sensitivities available, vanc discontinued, continued on nafcillin per ID. Phenylephrine stopped yesterday, BP stable, continued on midodrine.     Impression/Plan:    MSSA bacteremia  Sepsis with shock - s/p pressors  No evidence of vegetation on TTE  LUE cellulitis    Fevers/ID  - S/p Vanc/Zosyn  - C/w Nafcillin (3/16- ); f/u final cultures  - ID on board  - TTE negative for vegetations; Given DNR/DNI status, holding on UMER/aggressive management  - C/w Midodrine 5mg Q8H  - PRN Acetaminophen    HFrEF - EF 20-25%  - BB - Lopressor 25mg BID  - ASA 81  - Continue DGMT at tolerated  - Monitor electrolytes closely  - Replete electrolytes to maintain K>4 and Mg>2  - Cardiology on board    AFib with RVR  - S/p dig x1  - Rate control with BB  - Trend INR  - C/w Coumadin - INR 2-3  - Monitor for s/s bleeding    F/E/N/PPx/Lines  - Maintain euvolemia  - Replete lytes as above  - DASH diet  - PIV    Ethics/Dispo  - DNR/DNI  - Palliative care following  - Daughter Jasmina is HCP  - Stable for downgrade to stepdown

## 2023-03-17 NOTE — PROGRESS NOTE ADULT - ASSESSMENT
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  Course was c/b MSSA bacteremia, sepsis with shock requiring IV pressors, and AFib with RVR.     Impression/Plan:    MSSA bacteremia  Sepsis with shock - s/p pressors  No evidence of vegetation on TTE  LUE cellulitis  Fevers  - S/p Vanc/Zosyn  - C/w Nafcillin (3/16- ); f/u final cultures  - ID on board  - Given DNR/DNI status, holding on UMER/aggressive management  - C/w Midodrine 5mg Q8H  - PRN Acetaminophen    HFrEF - EF 20-25%  - BB - Lopressor 25mg BID  - ASA 81  - Resume GDMT as hemodynamic status improves/remains stable  - Monitor electrolytes closely  - Replete electrolytes to maintain K>4 and Mg>2  - Cardiology on board    AFib with RVR  - S/p dig x1  - Rate control with BB  - Trend INR  - C/w Coumadin - INR 2-3  - Monitor for s/s bleeding    F/E/N/PPx/Lines  - Maintain euvolemia  - Replete lytes as above  - DASH diet  - PIV    Ethics/Dispo  - DNR/DNI  - Palliative care following  - Daughter Jasmina is HCP  - C/w care in ICU given fevers    Deep Alejo M.D.  , Pulmonary & Critical Care Medicine  [facility]  Pulmonary and Sleep Medicine at [place]  [address]  [place] 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  Course was C/B MSSA bacteremia, sepsis with shock requiring IV pressors, and AFib with RVR. id / cardio following downgraded to medicine 3/17/23     > acute cva / likely embolic   - CT angiogram without evidence of large vessel occlusion- not thrombectomy ca ndidate. Moderate left carotid stenosis.   - MRI head demonstrates left hemispheric posterior frontal cortical and subcortical acute infarctions, thalamic and callosal infarcts.  < from: MR Head No Cont (03.10.23 @ 15:46) >Left medial posterior frontal cortical and subcortical acute infarction. Left lateral posterior frontal cortical and subcortical remote infarction ; additional tiny left anterior frontal cortical remote   infarctions. Left thalamic and left callosal body remote deep infarctions. Ischemic white matter disease greater than typical for age. Diffuse brain volume loss typical for age  - source likely cardioembolic in setting of atrial fibrillation   - hx of factor V Leiden def   - c/w aspirin / statin / coumadin for inr goal 2-3       >MSSA bacteremia  -Sepsis with shock - s/p pressors  -No evidence of vegetation on TTE, ? need for UMER , deferred at this time   -LUE cellulitis  - S/p Vanc/Zosyn, now on  Nafcillin (3/16- ); f/u final cultures  - ID following     >chronic systolic chf / HFrEF - EF 20-25%  - doesnot seem to be in acute failure at this time   -  bb low dose   - Monitor electrolytes and replete   - Cardiology following     >AFib with RVR  - S/p dig x1  - low dose bb . wean off midodrine when possible   - s/p heparin gtt , now on coumadin , Coumadin dosing for INR 2-3. INR today noted 5. 41. will discuss with neuro re reversal     > hypotension   - on midodrine,  taper down dose     > dvt ppx ;  on  coumadin

## 2023-03-17 NOTE — PROGRESS NOTE ADULT - SUBJECTIVE AND OBJECTIVE BOX
Patient is a 88y old  Male who presents with a chief complaint of right sided weakness / right radial artery occlusion. (16 Mar 2023 11:21)      BRIEF HOSPITAL COURSE:   88M Factor V Leiden deficiency, 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  Course was C/B MSSA bacteremia, sepsis with shock requiring IV pressors, and AFib with RVR.       PAST MEDICAL & SURGICAL HISTORY:  Hypertension      Hyperlipemia      PE (pulmonary embolism)      Marisol filter in place      Atrial fibrillation, unspecified type      CAD S/P percutaneous coronary angioplasty      History of COPD      Cerebrovascular accident (CVA)      Atheroma      Aortic stenosis      Factor V Leiden      Prostate CA      S/P IVC filter      S/P cataract surgery      S/P CABG (coronary artery bypass graft)      Status post right knee replacement            Medications:  nafcillin  IVPB 2 Gram(s) IV Intermittent every 4 hours    digoxin  Injectable 250 MICROGram(s) IV Push once  metoprolol tartrate 25 milliGRAM(s) Oral two times a day  midodrine 5 milliGRAM(s) Oral every 8 hours    albuterol/ipratropium for Nebulization 3 milliLiter(s) Nebulizer every 6 hours PRN  budesonide 160 MICROgram(s)/formoterol 4.5 MICROgram(s) Inhaler 2 Puff(s) Inhalation two times a day    acetaminophen     Tablet .. 650 milliGRAM(s) Oral every 6 hours PRN  OLANZapine 10 milliGRAM(s) Oral at bedtime      aspirin enteric coated 81 milliGRAM(s) Oral daily    bisacodyl Suppository 10 milliGRAM(s) Rectal daily PRN  polyethylene glycol 3350 17 Gram(s) Oral daily    oxybutynin XL 10 milliGRAM(s) Oral <User Schedule>  tamsulosin 0.4 milliGRAM(s) Oral at bedtime    atorvastatin 40 milliGRAM(s) Oral at bedtime        chlorhexidine 2% Cloths 1 Application(s) Topical <User Schedule>  nystatin Powder 1 Application(s) Topical two times a day            ICU Vital Signs Last 24 Hrs  T(C): 37.3 (17 Mar 2023 00:00), Max: 39.6 (16 Mar 2023 13:30)  T(F): 99.1 (17 Mar 2023 00:00), Max: 103.3 (16 Mar 2023 13:30)  HR: 89 (17 Mar 2023 00:00) (79 - 158)  BP: 96/62 (17 Mar 2023 00:00) (84/60 - 131/72)  BP(mean): 73 (17 Mar 2023 00:00) (56 - 102)  ABP: --  ABP(mean): --  RR: 22 (17 Mar 2023 00:00) (0 - 32)  SpO2: 85% (17 Mar 2023 00:00) (85% - 100%)    O2 Parameters below as of 17 Mar 2023 00:00  Patient On (Oxygen Delivery Method): nasal cannula  O2 Flow (L/min): 2              I&O's Detail    15 Mar 2023 07:01  -  16 Mar 2023 07:00  --------------------------------------------------------  IN:    IV PiggyBack: 250 mL    IV PiggyBack: 100 mL    IV PiggyBack: 275 mL    multiple electrolytes Injection Type 1.: 500 mL    Oral Fluid: 250 mL    Phenylephrine: 45.8 mL  Total IN: 1420.8 mL    OUT:    Indwelling Catheter - Urethral (mL): 1235 mL  Total OUT: 1235 mL    Total NET: 185.8 mL      16 Mar 2023 07:01  -  17 Mar 2023 01:25  --------------------------------------------------------  IN:    IV PiggyBack: 200 mL    IV PiggyBack: 100 mL    multiple electrolytes Injection Type 1.: 200 mL    Oral Fluid: 420 mL  Total IN: 920 mL    OUT:    Incontinent per Condom Catheter (mL): 400 mL    Indwelling Catheter - Urethral (mL): 400 mL    Phenylephrine: 0 mL    Voided (mL): 200 mL  Total OUT: 1000 mL    Total NET: -80 mL            LABS:                        11.3   7.43  )-----------( 143      ( 16 Mar 2023 03:15 )             36.5     03-16    136  |  102  |  25.0<H>  ----------------------------<  121<H>  4.0   |  23.0  |  1.32<H>    Ca    8.5      16 Mar 2023 03:15  Phos  3.2     03-16  Mg     2.0     03-16    TPro  6.1<L>  /  Alb  2.8<L>  /  TBili  0.6  /  DBili  x   /  AST  17  /  ALT  12  /  AlkPhos  75  03-16      CARDIAC MARKERS ( 15 Mar 2023 12:30 )  x     / x     / 108 U/L / x     / x          CAPILLARY BLOOD GLUCOSE        PT/INR - ( 15 Mar 2023 03:40 )   PT: 27.5 sec;   INR: 2.35 ratio             CULTURES:  Rapid RVP Result: NotDetec (03-15 @ 09:00)  Culture Results:   Growth in aerobic and anaerobic bottles: Staphylococcus aureus  Growth in aerobic bottle: Bacillus species not anthracis  "Susceptibilities not performed"  ***Blood Panel PCR results on this specimen are available  approximately 3 hours after the Gram stain result.***  Gram stain, PCR, and/or culture results may not always  correspond due to difference in methodologies.  ************************************************************  This PCR assay was performed by multiplex PCR. This  Assay tests for 66 bacterial and resistance gene targets.  Please refer to the [facility] test directory  at [email or web address] for details. (03-14 @ 20:57)  Culture Results:   Growth in aerobic and anaerobic bottles: Staphylococcus aureus  See previous culture  [id number] (03-14 @ 20:57)      Physical Examination:  GENERAL: In NAD   HEENT: NC/AT  NECK: Supple, trachea midline  PULM: CTA anteriorly  CVS: +S1, S2  ABD: Soft  EXTREMITIES: No pedal edema B/L  NEURO: Grossly non-focal    DEVICES:     RADIOLOGY:   < from: US Duplex Venous Upper Ext Ltd, Left (03.16.23 @ 19:17) >    ACC: [id number] EXAM:  US DPLX UPR EXT VEINS LTD LT   ORDERED BY: JADA CALDERON     PROCEDURE DATE:  03/16/2023          INTERPRETATION:  CLINICAL INFORMATION: Left arm pain and swelling after   IV infiltration    COMPARISON: None available.    TECHNIQUE: Duplex sonography of the LEFT UPPER extremity veins with color   and spectral Doppler, with and without compression.    FINDINGS:    The left internal jugular, subclavian, axillary and brachial veins are   patent and compressible where applicable.  The basilic veins (superficial   veins) are patent and without thrombus. 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    Doppler examination shows normal spontaneous and phasic flow.    IMPRESSION:  No evidence of left upper extremity deep venous thrombosis.  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 Left ventricular ejection fraction, by visual estimation, is 20 to   25%.   2. Moderately to severely decreased global left ventricular systolic   function.   3. [place] is abnormal as described above.   4. The mitral in-flow pattern reveals no discernable A-wave, therefore   no comment on diastolic function can be made.   5. No LV thrombus.   6. Moderately reduced RV systolic function.   7. There is no evidence of pericardial effusion.   8. Mild thickening of the anterior and posterior mitral valve leaflets.   9. Moderate mitral annular calcification.  10. Mild-moderate tricuspid regurgitation.  11. Aortic Stenosis with V max 3.1 m/sec, Mean gradient of 23 mm Hg.  12. Endocardial visualization was enhanced with intravenous echo contrast.    MD Monica Electronically signed on 3/13/2023 at 12:00:06 PM            *** Final ***    < end of copied text >      LYNNE KENT MD; Attending Radiologist  This document has been electronically signed. Mar 16 2023  9:57PM    < end of copied text >            Antibiotics Course:    nafcillin  IVPB   200 mL/Hr (03-17-23 @ 01:14)   200 mL/Hr (03-16-23 @ 21:14)   200 mL/Hr (03-16-23 @ 17:27)   200 mL/Hr (03-16-23 @ 13:15)    piperacillin/tazobactam IVPB.   200 mL/Hr (03-14-23 @ 22:14)    piperacillin/tazobactam IVPB.-   25 mL/Hr (03-15-23 @ 06:15)    piperacillin/tazobactam IVPB.-   25 mL/Hr (03-15-23 @ 00:52)    piperacillin/tazobactam IVPB..   25 mL/Hr (03-16-23 @ 01:18)   25 mL/Hr (03-15-23 @ 13:03)    vancomycin  IVPB   300 mL/Hr (03-15-23 @ 04:04)    vancomycin  IVPB   250 mL/Hr (03-15-23 @ 18:21)       cc: Patient is a 88y old  Male who presents with a chief complaint of right sided weakness / right radial artery occlusion. 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  Course was C/B MSSA bacteremia, sepsis with shock requiring IV pressors, and AFib with RVR. id / cardio following downgraded to medicine 3/17/23       interval hx: patient seen and eval. comfortable. in no acute distres.s awake , alert x 3 ,  mild slurred speech. mild rt sided upper / lower ext weakness ,     ICU Vital Signs Last 24 Hrs  T(C): 37.3 (17 Mar 2023 00:00), Max: 39.6 (16 Mar 2023 13:30)  T(F): 99.1 (17 Mar 2023 00:00), Max: 103.3 (16 Mar 2023 13:30)  HR: 89 (17 Mar 2023 00:00) (79 - 158)  BP: 96/62 (17 Mar 2023 00:00) (84/60 - 131/72)  BP(mean): 73 (17 Mar 2023 00:00) (56 - 102)  ABP: --  ABP(mean): --  RR: 22 (17 Mar 2023 00:00) (0 - 32)  SpO2: 85% (17 Mar 2023 00:00) (85% - 100%)    O2 Parameters below as of 17 Mar 2023 00:00  Patient On (Oxygen Delivery Method): nasal cannula  O2 Flow (L/min): 2      Physical Examination:  GENERAL: awake, alert x 3 , following commands   HEENT: neck supple , mild slurred speech   PULM: CTA anteriorly  CVS: +S1, S2, irrr, +sm noted   ABD: Soft  EXTREMITIES: No pedal edema B/L  NEURO: awake, alert x 3 , mild slurred speech , rt upper ext 4/5, rt lower 4/5 , left upper and lower 5/5 . no facial droop       CULTURES:  Rapid RVP Result: NotDetec (03-15 @ 09:00)  Culture Results:   Growth in aerobic and anaerobic bottles: Staphylococcus aureus  Growth in aerobic bottle: Bacillus species not anthracis  "Susceptibilities not performed"  ***Blood Panel PCR results on this specimen are available  approximately 3 hours after the Gram stain result.***  Gram stain, PCR, and/or culture results may not always  correspond due to difference in methodologies.  ************************************************************  This PCR assay was performed by multiplex PCR. This  Assay tests for 66 bacterial and resistance gene targets.  Please refer to the [facility] test directory  at [email or web address] for details. (03-14 @ 20:57)  Culture Results:   Growth in aerobic and anaerobic bottles: Staphylococcus aureus  See previous culture  [id number] (03-14 @ 20:57)      MEDICATIONS  (STANDING):  aspirin enteric coated 81 milliGRAM(s) Oral daily  atorvastatin 40 milliGRAM(s) Oral at bedtime  budesonide 160 MICROgram(s)/formoterol 4.5 MICROgram(s) Inhaler 2 Puff(s) Inhalation two times a day  chlorhexidine 2% Cloths 1 Application(s) Topical <User Schedule>  digoxin  Injectable 250 MICROGram(s) IV Push once  metoprolol tartrate 25 milliGRAM(s) Oral two times a day  midodrine 5 milliGRAM(s) Oral every 8 hours  nafcillin  IVPB 2 Gram(s) IV Intermittent every 4 hours  OLANZapine 10 milliGRAM(s) Oral at bedtime  oxybutynin XL 10 milliGRAM(s) Oral <User Schedule>  polyethylene glycol 3350 17 Gram(s) Oral daily  tamsulosin 0.4 milliGRAM(s) Oral at bedtime    MEDICATIONS  (PRN):  acetaminophen     Tablet .. 650 milliGRAM(s) Oral every 6 hours PRN Temp greater or equal to 38C (100.4F), Mild Pain (1 - 3), Moderate Pain (4 - 6)  albuterol/ipratropium for Nebulization 3 milliLiter(s) Nebulizer every 6 hours PRN Shortness of Breath and/or Wheezing  bisacodyl Suppository 10 milliGRAM(s) Rectal daily PRN Constipation    MR Head No Cont (03.10.23 @ 15:46)   1. Left medial posterior frontal cortical and subcortical acute infarction  2. Left lateral posterior frontal cortical and subcortical remote   infarction ; additional tiny left anterior frontal cortical remote   infarctions  3. Left thalamic and left callosal body remote deep infarctions  4. Ischemic white matter disease greater than typical for age  5. Diffuse brain volume loss typical for age    CT Angio Head Neck Stroke Protocol w/ IV Cont (03.08.23 @ 13:22)   7 mL area of increased Tmax in the right parietal lobe suggesting brain   at risk versus artifact. No core infarct. No large vessel occlusion. 50%   stenosis in left carotid bulb/proximal internal carotid artery. Consider   MRI of the brain as clinically warranted. Additional findings above.    CT Brain Stroke Protocol (03.08.23 @ 12:40)    Moderate chronic microvascular changes without evidence of   an acute transcortical infarction or hemorrhage.     US Duplex Carotid Arteries Complete, Bilateral (02.23.23 @ 11:03)   IMPRESSION: No significant hemodynamic stenosis of either carotid artery.    TTE Echo Complete w/ Contrast w/ Doppler (02.21.23 @ 11:34)   Summary:   1. Endocardial visualization was enhanced with intravenous echo   contrast. No LV thrombus.   2. Left ventricular ejection fraction, by visual estimation, is 25 to   30%.   3. Severely decreased global left ventricular systolic function.   4. Abnormal septal motion consistent with post-operative status.   5. There is mild eccentric left ventricular hypertrophy.   6. The mitralin-flow pattern reveals no discernable A-wave, therefore   no comment on diastolic function can be made.   7. Normal right ventricular size and function, estimated PASP least 42   mmHg.   8. Severely enlarged left atrium.   9. Right atrial enlargement.  10. Moderate mitral annular calcification.  11. Mild-moderate tricuspid regurgitation.  12. Severe calcific aortic valve stenosis with low gradient,   dimensionless index 0.23.  13. There is moderate aortic root calcification.  14. Borderline dilatation of the aortic root, sinuses of Valsalva and   ascending aorta 3.9 cm, index to BSA 1.6 cm/m2 within normal.  15. Compared to the outpatient TTE study from 9/2022 prevoiusly LV EF 37%   and known low gradient aortic valve stenosis, but visually on current   study appears severely stenotic.     TTE Echo Complete w/ Contrast w/ Doppler (03.12.23 @ 12:55)   Summary:   1. Left ventricular ejection fraction, by visual estimation, is 20 to   25%.   2. Moderately to severely decreased global left ventricular systolic   function.   3. [place] is abnormal as described above.   4. The mitral in-flow pattern reveals no discernable A-wave, therefore   no comment on diastolic function can be made.   5. No LV thrombus.   6. Moderately reduced RV systolic function.   7. There is no evidence of pericardial effusion.   8. Mild thickening of the anterior and posterior mitral valve leaflets.   9. Moderate mitral annular calcification.  10. Mild-moderate tricuspid regurgitation.  11. Aortic Stenosis with V max 3.1 m/sec, Mean gradient of 23 mm Hg.  12. Endocardial visualization was enhanced with intravenous echo contrast.

## 2023-03-17 NOTE — PROGRESS NOTE ADULT - ASSESSMENT
ASSESSMENT: 88 yr old male with hypertension,  atrial fibrillation, PE/DVT s/p IVC filter, Factor V Leiden; on coumadin CAD s/p CABG, stroke , COPD, GIGI, AAA, infrarenal 6.1 cm  on prior ct presented with right sided numbness started at around 11am  3/8/23 at [facility] by ER report. CT head without acute infarction or hemorrhage. Excluded from [facility] as INR > 1.7 (currently 2.30).  CT angiogram without evidence of large vessel occlusion- not thrombectomy ca ndidate. Moderate left carotid stenosis. MRI head demonstrates left hemispheric posterior frontal cortical and subcortical acute infarctions.  Remote thalamic and callosal infarcts.  Etiology possibly cardioembolic in setting of atrial fibrillation vs. hypercoagulable states as patient with history of factor V Leiden.        NEURO: neurologically without acute change, improved orientation to time today, Continue close monitoring for neurologic deterioration with q2 stroke neuro checks for now to ensure neurological stability in setting of hemodynamic changes (rapid a fib/hypotension), repeat CT head for any acute change in status. permissive HTN overall < 160mmHg (anticoagulation use in setting of AAA),  110-140mmHg would avoid further hypotension and rapid fluctuations, titrate statin to LDL goal less than 70, MRI Brain w/o as noted, US carotid 2/23/23 with no hemodynamically significant stenosis ,   Physical therapy/OT/Speech eval/treatment.     ANTITHROMBOTIC THERAPY: neurologically suggest to continue therapeutic anticoagulation, permitting no medical contraindication. Noted Warfarin as  home regimen.  Would confirm INR goal 2-3 with outpatient prescriber/heme/cardio and optimal agent as patient presenting INR 2.3 with acute infarction, additionally clarify adherence .      PULMONARY:   protecting airway, saturating well     CARDIOVASCULAR:  TTE as noted on 2/21/23 with EF 25-30% , repeat EF 20-25%, cardiac monitoring to ensure rate control, cardiology following for further recommendations: suggested GDMT- not candidate for advance therapies as noted. AAA monitoring per cardiology.                               GASTROINTESTINAL:  dysphagia screen  complete/ pass  Diet: Puree, advance as tolerated    RENAL: BUN/Cr elevated, MILAGRO on CKD 3B, maintain adequate hydration as tolerated , nephrology follow up, monitor urine output      Na Goal: Greater than 135    HEMATOLOGY: H/H with downtrend, screen for bleeding, Platelets 119- follow up thrombocytopenia closely , hematology followup as patient with recurrent infarction despite presentation of therapeutic INR. Clarify Adherence and maintain close monitoring.     DVT ppx:   therapeutically anticoagulated with Warfarin if INR 2-3. If below- consider alternate pharmacological DVT ppx     ID: afebrile,  leukocytosis resolved, monitor for si/sx of infection     Other: Vascular follow up for right radial artery occlusion: no indication for any vascular intervention. Occlusion likely chronic. Suggested to continue therapeutic anticoagulation.     DISPOSITION: Rehab, depending on PT eval once stable and workup is complete      CORE MEASURES:     Admission NIHSS: 4  Tenecteplase : [] YES [x] NO   LDL/HDL/A1C: 58/34/5.9  Depression Screen:p   Statin Therapy: as noted   Dysphagia Screen: [x] PASS [] FAIL -   Smoking [] YES [x] NO      Afib [x] YES [] NO     Stroke Education [x] YES [] NO       ASSESSMENT: 88 yr old male with hypertension,  atrial fibrillation, PE/DVT s/p IVC filter, Factor V Leiden; on coumadin CAD s/p CABG, stroke , COPD, GIGI, AAA, infrarenal 6.1 cm  on prior ct presented with right sided numbness started at around 11am  3/8/23 at [facility] by ER report. CT head without acute infarction or hemorrhage. Excluded from [facility] as INR > 1.7 (currently 2.30).  CT angiogram without evidence of large vessel occlusion- not thrombectomy ca ndidate. Moderate left carotid stenosis. MRI head demonstrates left hemispheric posterior frontal cortical and subcortical acute infarctions.  Remote thalamic and callosal infarcts.  Etiology possibly cardioembolic in setting of atrial fibrillation vs. hypercoagulable states as patient with history of factor V Leiden.        NEURO: neurologically without acute change, improved overall vs. admission neurologically, Continue close monitoring for neurologic deterioration with q2 stroke neuro checks for now to ensure neurological stability in setting of hemodynamic changes (rapid a fib/hypotension), repeat CT head for any acute change in status. permissive HTN overall < 160mmHg (anticoagulation use in setting of AAA),  110-140mmHg would avoid further hypotension and rapid fluctuations, titrate statin to LDL goal less than 70, MRI Brain w/o as noted, US carotid 2/23/23 with no hemodynamically significant stenosis ,   Physical therapy/OT/Speech eval/treatment.     ANTITHROMBOTIC THERAPY: neurologically suggest to continue therapeutic anticoagulation, permitting no medical contraindication. Noted Warfarin as  home regimen.  Would confirm INR goal 2-3 with outpatient prescriber/heme/cardio and optimal agent as patient presenting INR 2.3 with acute infarction, additionally clarify adherence .      PULMONARY:   protecting airway, saturating well     CARDIOVASCULAR:  TTE as noted on 2/21/23 with EF 25-30% , repeat EF 20-25%, cardiac monitoring to ensure rate control, cardiology following for further recommendations: suggested GDMT- not candidate for advance therapies as noted. AAA monitoring per cardiology.                               GASTROINTESTINAL:  dysphagia screen  complete/ pass  Diet: Puree, advance as tolerated    RENAL: BUN/Cr elevated, MILAGRO on CKD 3B, maintain adequate hydration as tolerated , nephrology follow up, monitor urine output      Na Goal: Greater than 135    HEMATOLOGY: H/H with downtrend, screen for bleeding, Platelets 119- follow up thrombocytopenia closely , hematology followup as patient with recurrent infarction despite presentation of therapeutic INR. Clarify Adherence and maintain close monitoring.     DVT ppx:   therapeutically anticoagulated with Warfarin if INR 2-3. If below- consider alternate pharmacological DVT ppx     ID: afebrile,  leukocytosis resolved, monitor for si/sx of infection     Other: Vascular follow up for right radial artery occlusion: no indication for any vascular intervention. Occlusion likely chronic. Suggested to continue therapeutic anticoagulation.     DISPOSITION: Rehab, depending on PT eval once stable and workup is complete      CORE MEASURES:     Admission NIHSS: 4  Tenecteplase : [] YES [x] NO   LDL/HDL/A1C: 58/34/5.9  Depression Screen:p   Statin Therapy: as noted   Dysphagia Screen: [x] PASS [] FAIL -   Smoking [] YES [x] NO      Afib [x] YES [] NO     Stroke Education [x] YES [] NO       ASSESSMENT: 88 yr old male with hypertension,  atrial fibrillation, PE/DVT s/p IVC filter, Factor V Leiden; on coumadin CAD s/p CABG, stroke , COPD, GIGI, AAA, infrarenal 6.1 cm  on prior ct presented with right sided numbness started at around 11am  3/8/23 at [facility] by ER report. CT head without acute infarction or hemorrhage. Excluded from [facility] as INR > 1.7 (currently 2.30).  CT angiogram without evidence of large vessel occlusion- not thrombectomy ca ndidate. Moderate left carotid stenosis. MRI head demonstrates left hemispheric posterior frontal cortical and subcortical acute infarctions.  Remote thalamic and callosal infarcts.  Etiology possibly cardioembolic in setting of atrial fibrillation vs. hypercoagulable states as patient with history of factor V Leiden.        NEURO: neurologically without acute change, improved overall vs. admission neurologically, Continue close monitoring for neurologic deterioration with q2 stroke neuro checks for now to ensure neurological stability in setting of hemodynamic changes (rapid a fib/hypotension), repeat CT head for any acute change in status. permissive HTN overall < 160mmHg (anticoagulation use in setting of AAA),  110-140mmHg would avoid further hypotension and rapid fluctuations, titrate statin to LDL goal less than 70, MRI Brain w/o as noted, US carotid 2/23/23 with no hemodynamically significant stenosis ,   Physical therapy/OT/Speech eval/treatment.     ANTITHROMBOTIC THERAPY: neurologically suggest to continue therapeutic anticoagulation, permitting no medical contraindication. Noted Warfarin as  home regimen.  Would confirm INR goal 2-3 with outpatient prescriber/heme/cardio and optimal agent as patient presenting INR 2.3 with acute infarction, additionally clarify adherence .      PULMONARY:   protecting airway, saturating well     CARDIOVASCULAR:  TTE as noted on 2/21/23 with EF 25-30% , repeat EF 20-25%, cardiac monitoring to ensure rate control, cardiology following for further recommendations: suggested GDMT- not candidate for advance therapies as noted. AAA monitoring per cardiology.                               GASTROINTESTINAL:  dysphagia screen  complete/ pass  Diet: Puree, advance as tolerated    RENAL: BUN/Cr elevated, MILAGRO on CKD 3B, maintain adequate hydration as tolerated , nephrology follow up, monitor urine output      Na Goal: Greater than 135    HEMATOLOGY: H/H with downtrend, screen for bleeding, Platelets 119- follow up thrombocytopenia closely , hematology followup as patient with recurrent infarction despite presentation of therapeutic INR. Clarify Adherence and maintain close monitoring.     DVT ppx:   therapeutically anticoagulated with Warfarin if INR 2-3. If below- consider alternate pharmacological DVT ppx     ID: afebrile,  leukocytosis resolved, monitor for si/sx of infection , currently undergoing tx for MSSA bacteremia.     Other: Vascular follow up for right radial artery occlusion: no indication for any vascular intervention. Occlusion likely chronic. Suggested to continue therapeutic anticoagulation.     DISPOSITION: Rehab, depending on PT eval once stable and workup is complete      CORE MEASURES:     Admission NIHSS: 4  Tenecteplase : [] YES [x] NO   LDL/HDL/A1C: 58/34/5.9  Depression Screen:p   Statin Therapy: as noted   Dysphagia Screen: [x] PASS [] FAIL    Smoking [] YES [x] NO      Afib [x] YES [] NO     Stroke Education [x] YES [] NO

## 2023-03-17 NOTE — PROGRESS NOTE ADULT - NS ATTEND AMEND GEN_ALL_CORE FT
Seen and examined with the PA.  Plan discussed with PA.  I agree with as written above with modifications as below    s/p CVA  on coumadin- INR 5.41 today  would hold coumadin and let drift down  Avoid subtherapeutic state and check daily INRs  also concern for bleeding (Hgb drop over past few days) was shared with Dr To    will follow with you    Harvey Santana MD PhD   [id number]

## 2023-03-17 NOTE — PROGRESS NOTE ADULT - ASSESSMENT
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  Course was C/B MSSA bacteremia, sepsis with shock requiring IV pressors, and AFib with RVR. id / cardio following downgraded to medicine 3/17/23     > acute cva / likely embolic   - CT angiogram without evidence of large vessel occlusion- not thrombectomy ca ndidate. Moderate left carotid stenosis.   - MRI head demonstrates left hemispheric posterior frontal cortical and subcortical acute infarctions, thalamic and callosal infarcts.  < from: MR Head No Cont (03.10.23 @ 15:46) >Left medial posterior frontal cortical and subcortical acute infarction. Left lateral posterior frontal cortical and subcortical remote infarction ; additional tiny left anterior frontal cortical remote   infarctions. Left thalamic and left callosal body remote deep infarctions. Ischemic white matter disease greater than typical for age. Diffuse brain volume loss typical for age  - source likely cardioembolic in setting of atrial fibrillation   - hx of factor V Leiden def   - c/w statin. hold aspirin for now due  to high inr.  INR noted to be 5.46. hold coumadin  for now. discussed with neurology / no need for reversal at thsi time. monitor for  inr goal 2-3       >MSSA bacteremia  -Sepsis with shock - s/p pressors  -No evidence of vegetation on TTE, ? need for UMER , deferred at this time   -LUE cellulitis  - S/p Vanc/Zosyn, now on  Nafcillin (3/16- ); f/u final cultures  - ID following     >chronic systolic chf / HFrEF - EF 20-25%  - doesnot seem to be in acute failure at this time   -  bb low dose   - Monitor electrolytes and replete   - Cardiology following     >AFib with RVR  - S/p dig x1  - low dose bb . wean off midodrine when possible   -hold aspirin for now due  to high inr.  INR noted to be 5.46. hold coumadin  for now. discussed with neurology / no need for reversal at thsi time. monitor for  inr goal 2-3     > coagulopathy   - due to coumadin     > acute anemia   - on ac   - check fecal occult     > hypotension   - on midodrine,  taper down dose     > dvt ppx ;  on  coumadin

## 2023-03-17 NOTE — PROGRESS NOTE ADULT - PROBLEM SELECTOR PLAN 4
.  -Hx CABG  -Trop neg  -GDMT: ASA. statin  - no angina/anginal equivalents .  -Hx CABG  -Trop neg  -GDMT: ASA. statin  - no angina/anginal equivalents      No further inpatient cardiac work up at this time.  Will sign off.  Please reconsult if needed.

## 2023-03-18 LAB
ALBUMIN SERPL ELPH-MCNC: 3 G/DL — LOW (ref 3.3–5.2)
ALP SERPL-CCNC: 79 U/L — SIGNIFICANT CHANGE UP (ref 40–120)
ALT FLD-CCNC: 19 U/L — SIGNIFICANT CHANGE UP
ANION GAP SERPL CALC-SCNC: 12 MMOL/L — SIGNIFICANT CHANGE UP (ref 5–17)
APTT BLD: 45.2 SEC — HIGH (ref 27.5–35.5)
AST SERPL-CCNC: 25 U/L — SIGNIFICANT CHANGE UP
BILIRUB SERPL-MCNC: 0.5 MG/DL — SIGNIFICANT CHANGE UP (ref 0.4–2)
BUN SERPL-MCNC: 24.9 MG/DL — HIGH (ref 8–20)
CALCIUM SERPL-MCNC: 8.6 MG/DL — SIGNIFICANT CHANGE UP (ref 8.4–10.5)
CHLORIDE SERPL-SCNC: 104 MMOL/L — SIGNIFICANT CHANGE UP (ref 96–108)
CO2 SERPL-SCNC: 24 MMOL/L — SIGNIFICANT CHANGE UP (ref 22–29)
CREAT SERPL-MCNC: 1.59 MG/DL — HIGH (ref 0.5–1.3)
CULTURE RESULTS: SIGNIFICANT CHANGE UP
CULTURE RESULTS: SIGNIFICANT CHANGE UP
EGFR: 42 ML/MIN/1.73M2 — LOW
GLUCOSE SERPL-MCNC: 111 MG/DL — HIGH (ref 70–99)
HCT VFR BLD CALC: 32.6 % — LOW (ref 39–50)
HGB BLD-MCNC: 9.9 G/DL — LOW (ref 13–17)
INR BLD: 8.8 RATIO — CRITICAL HIGH (ref 0.88–1.16)
MAGNESIUM SERPL-MCNC: 2.1 MG/DL — SIGNIFICANT CHANGE UP (ref 1.6–2.6)
MCHC RBC-ENTMCNC: 27 PG — SIGNIFICANT CHANGE UP (ref 27–34)
MCHC RBC-ENTMCNC: 30.4 GM/DL — LOW (ref 32–36)
MCV RBC AUTO: 89.1 FL — SIGNIFICANT CHANGE UP (ref 80–100)
OB PNL STL: NEGATIVE — SIGNIFICANT CHANGE UP
PLATELET # BLD AUTO: 152 K/UL — SIGNIFICANT CHANGE UP (ref 150–400)
POTASSIUM SERPL-MCNC: 4.1 MMOL/L — SIGNIFICANT CHANGE UP (ref 3.5–5.3)
POTASSIUM SERPL-SCNC: 4.1 MMOL/L — SIGNIFICANT CHANGE UP (ref 3.5–5.3)
PROT SERPL-MCNC: 5.7 G/DL — LOW (ref 6.6–8.7)
PROTHROM AB SERPL-ACNC: 104.3 SEC — HIGH (ref 10.5–13.4)
RBC # BLD: 3.66 M/UL — LOW (ref 4.2–5.8)
RBC # FLD: 15.9 % — HIGH (ref 10.3–14.5)
SODIUM SERPL-SCNC: 139 MMOL/L — SIGNIFICANT CHANGE UP (ref 135–145)
SPECIMEN SOURCE: SIGNIFICANT CHANGE UP
SPECIMEN SOURCE: SIGNIFICANT CHANGE UP
WBC # BLD: 5.06 K/UL — SIGNIFICANT CHANGE UP (ref 3.8–10.5)
WBC # FLD AUTO: 5.06 K/UL — SIGNIFICANT CHANGE UP (ref 3.8–10.5)

## 2023-03-18 PROCEDURE — 99233 SBSQ HOSP IP/OBS HIGH 50: CPT

## 2023-03-18 RX ORDER — METOPROLOL TARTRATE 50 MG
50 TABLET ORAL
Refills: 0 | Status: DISCONTINUED | OUTPATIENT
Start: 2023-03-18 | End: 2023-03-20

## 2023-03-18 RX ADMIN — ATORVASTATIN CALCIUM 40 MILLIGRAM(S): 80 TABLET, FILM COATED ORAL at 21:29

## 2023-03-18 RX ADMIN — OLANZAPINE 10 MILLIGRAM(S): 15 TABLET, FILM COATED ORAL at 21:29

## 2023-03-18 RX ADMIN — BUDESONIDE AND FORMOTEROL FUMARATE DIHYDRATE 2 PUFF(S): 160; 4.5 AEROSOL RESPIRATORY (INHALATION) at 08:42

## 2023-03-18 RX ADMIN — MIDODRINE HYDROCHLORIDE 5 MILLIGRAM(S): 2.5 TABLET ORAL at 06:54

## 2023-03-18 RX ADMIN — Medication 50 MILLIGRAM(S): at 16:05

## 2023-03-18 RX ADMIN — NAFCILLIN 200 GRAM(S): 10 INJECTION, POWDER, FOR SOLUTION INTRAVENOUS at 13:18

## 2023-03-18 RX ADMIN — Medication 650 MILLIGRAM(S): at 22:30

## 2023-03-18 RX ADMIN — BUDESONIDE AND FORMOTEROL FUMARATE DIHYDRATE 2 PUFF(S): 160; 4.5 AEROSOL RESPIRATORY (INHALATION) at 19:43

## 2023-03-18 RX ADMIN — NAFCILLIN 200 GRAM(S): 10 INJECTION, POWDER, FOR SOLUTION INTRAVENOUS at 21:29

## 2023-03-18 RX ADMIN — NAFCILLIN 200 GRAM(S): 10 INJECTION, POWDER, FOR SOLUTION INTRAVENOUS at 01:36

## 2023-03-18 RX ADMIN — TAMSULOSIN HYDROCHLORIDE 0.4 MILLIGRAM(S): 0.4 CAPSULE ORAL at 21:29

## 2023-03-18 RX ADMIN — NAFCILLIN 200 GRAM(S): 10 INJECTION, POWDER, FOR SOLUTION INTRAVENOUS at 16:05

## 2023-03-18 RX ADMIN — Medication 650 MILLIGRAM(S): at 21:30

## 2023-03-18 RX ADMIN — NAFCILLIN 200 GRAM(S): 10 INJECTION, POWDER, FOR SOLUTION INTRAVENOUS at 10:18

## 2023-03-18 RX ADMIN — CHLORHEXIDINE GLUCONATE 1 APPLICATION(S): 213 SOLUTION TOPICAL at 06:54

## 2023-03-18 RX ADMIN — NAFCILLIN 200 GRAM(S): 10 INJECTION, POWDER, FOR SOLUTION INTRAVENOUS at 06:54

## 2023-03-18 NOTE — PROGRESS NOTE ADULT - SUBJECTIVE AND OBJECTIVE BOX
CC: right sided weakness / right radial artery occlusion. (17 Mar 2023 12:13)    INTERVAL HPI/OVERNIGHT EVENTS:  no acute events  elevated HR on cardiac monitor    Vital Signs Last 24 Hrs  T(C): 37.1 (18 Mar 2023 16:54), Max: 37.2 (17 Mar 2023 22:39)  T(F): 98.7 (18 Mar 2023 16:54), Max: 98.9 (17 Mar 2023 22:39)  HR: 90 (18 Mar 2023 21:22) (77 - 153)  BP: 129/72 (18 Mar 2023 21:22) (93/60 - 147/90)  BP(mean): 71 (18 Mar 2023 06:00) (71 - 90)  RR: 18 (18 Mar 2023 16:54) (18 - 24)  SpO2: 100% (18 Mar 2023 19:43) (94% - 100%)    Parameters below as of 18 Mar 2023 20:02  Patient On (Oxygen Delivery Method): nasal cannula,2 liters    PHYSICAL EXAM:  General: in no acute distress  Eyes: PERRLA, EOMI; conjunctiva and sclera clear  Head: Normocephalic; atraumatic  ENMT: No nasal discharge; airway clear  Neck: Supple; non tender; no masses  Respiratory: No wheezes, rales or rhonchi  Cardiovascular: irregular tachycardic rate and rhythm  Gastrointestinal: Soft non-tender non-distended; Normal bowel sounds  Genitourinary: No costovertebral angle tenderness  Extremities: no edema; LUE with erythema and thrombophlebitis of the RUE  Vascular: Peripheral pulses palpable 2+ bilaterally  Neurological: Alert and oriented x4; decreased strength in LUE compared to RUE, decreased sensation as well on the left compared to right lower ext  Skin: Warm and dry. No acute rash  Lymph Nodes: No acute cervical adenopathy  Musculoskeletal: Normal gait, tone, without deformities  Psychiatric: Cooperative and appropriate    I&O's Detail    17 Mar 2023 07:01  -  18 Mar 2023 07:00  --------------------------------------------------------  IN:    IV PiggyBack: 200 mL    Oral Fluid: 180 mL  Total IN: 380 mL    OUT:    Incontinent per Condom Catheter (mL): 525 mL  Total OUT: 525 mL    Total NET: -145 mL      18 Mar 2023 07:01  -  18 Mar 2023 22:36  --------------------------------------------------------  IN:  Total IN: 0 mL    OUT:    Voided (mL): 420 mL  Total OUT: 420 mL    Total NET: -420 mL               9.9    5.06  )-----------( 152      ( 18 Mar 2023 07:30 )             32.6     18 Mar 2023 07:30    139    |  104    |  24.9   ----------------------------<  111    4.1     |  24.0   |  1.59     Ca    8.6        18 Mar 2023 07:30  Phos  4.0       17 Mar 2023 05:50  Mg     2.1       18 Mar 2023 07:30    TPro  5.7    /  Alb  3.0    /  TBili  0.5    /  DBili  x      /  AST  25     /  ALT  19     /  AlkPhos  79     18 Mar 2023 07:30    PT/INR - ( 18 Mar 2023 07:30 )   PT: 104.3 sec;   INR: 8.80 ratio         PTT - ( 18 Mar 2023 07:30 )  PTT:45.2 sec  CAPILLARY BLOOD GLUCOSE      LIVER FUNCTIONS - ( 18 Mar 2023 07:30 )  Alb: 3.0 g/dL / Pro: 5.7 g/dL / ALK PHOS: 79 U/L / ALT: 19 U/L / AST: 25 U/L / GGT: x           MEDICATIONS  (STANDING):  atorvastatin 40 milliGRAM(s) Oral at bedtime  budesonide 160 MICROgram(s)/formoterol 4.5 MICROgram(s) Inhaler 2 Puff(s) Inhalation two times a day  chlorhexidine 2% Cloths 1 Application(s) Topical <User Schedule>  digoxin  Injectable 250 MICROGram(s) IV Push once  metoprolol tartrate 50 milliGRAM(s) Oral two times a day  midodrine 5 milliGRAM(s) Oral every 8 hours  nafcillin  IVPB 2 Gram(s) IV Intermittent every 4 hours  OLANZapine 10 milliGRAM(s) Oral at bedtime  oxybutynin XL 10 milliGRAM(s) Oral <User Schedule>  polyethylene glycol 3350 17 Gram(s) Oral daily  tamsulosin 0.4 milliGRAM(s) Oral at bedtime    MEDICATIONS  (PRN):  acetaminophen     Tablet .. 650 milliGRAM(s) Oral every 6 hours PRN Temp greater or equal to 38C (100.4F), Mild Pain (1 - 3), Moderate Pain (4 - 6)  albuterol/ipratropium for Nebulization 3 milliLiter(s) Nebulizer every 6 hours PRN Shortness of Breath and/or Wheezing  bisacodyl Suppository 10 milliGRAM(s) Rectal daily PRN Constipation      RADIOLOGY & ADDITIONAL TESTS:

## 2023-03-18 NOTE — PROGRESS NOTE ADULT - ASSESSMENT
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  Course was C/B MSSA bacteremia, sepsis with shock requiring IV pressors, and AFib with RVR. id / cardio following downgraded to medicine 3/17/23     #acute cva / likely embolic in the setting of a-fib and factor V leiden deficiency  - today with elevated INR of 8  - hold coumadin for now - no signs of active bleeding  - continue to trend  - likely altered due to antibiotics  - continue cardiac monitor    #a-fib with RVR  - cardiology recs appreciated  - increase metoprolol to 50mg BID PO  - continue tele    #MSSA bacteremia  - Sepsis with shock - s/p pressors  - No evidence of vegetation on TTE, ? need for UMER , deferred at this time   - LUE thrombophlebitis may be the source  - S/p Vanc/Zosyn, now on  Nafcillin (3/16- ); f/u final cultures from 3/17  - ID following     #chronic systolic chf / HFrEF - EF 20-25%  - does not seem to be in acute failure at this time   - metoprolol as above  - Monitor electrolytes and replete   - Cardiology following     #coagulopathy   - due to coumadin     #acute anemia - continue to trend  - on ac   - OB stool negative    #hypotension - increased BP today but increasing the metoprolol  - on midodrine,  taper down dose     #dvt ppx ;  on  coumadin, held today due to high INR

## 2023-03-19 LAB
ALBUMIN SERPL ELPH-MCNC: 3.1 G/DL — LOW (ref 3.3–5.2)
ALP SERPL-CCNC: 84 U/L — SIGNIFICANT CHANGE UP (ref 40–120)
ALT FLD-CCNC: 17 U/L — SIGNIFICANT CHANGE UP
ANION GAP SERPL CALC-SCNC: 11 MMOL/L — SIGNIFICANT CHANGE UP (ref 5–17)
AST SERPL-CCNC: 23 U/L — SIGNIFICANT CHANGE UP
BILIRUB SERPL-MCNC: 0.6 MG/DL — SIGNIFICANT CHANGE UP (ref 0.4–2)
BUN SERPL-MCNC: 23.3 MG/DL — HIGH (ref 8–20)
CALCIUM SERPL-MCNC: 8.8 MG/DL — SIGNIFICANT CHANGE UP (ref 8.4–10.5)
CHLORIDE SERPL-SCNC: 103 MMOL/L — SIGNIFICANT CHANGE UP (ref 96–108)
CO2 SERPL-SCNC: 25 MMOL/L — SIGNIFICANT CHANGE UP (ref 22–29)
CREAT SERPL-MCNC: 1.5 MG/DL — HIGH (ref 0.5–1.3)
CULTURE RESULTS: SIGNIFICANT CHANGE UP
EGFR: 44 ML/MIN/1.73M2 — LOW
GLUCOSE SERPL-MCNC: 96 MG/DL — SIGNIFICANT CHANGE UP (ref 70–99)
HCT VFR BLD CALC: 34.1 % — LOW (ref 39–50)
HGB BLD-MCNC: 10.2 G/DL — LOW (ref 13–17)
INR BLD: 6.48 RATIO — CRITICAL HIGH (ref 0.88–1.16)
MCHC RBC-ENTMCNC: 26.6 PG — LOW (ref 27–34)
MCHC RBC-ENTMCNC: 29.9 GM/DL — LOW (ref 32–36)
MCV RBC AUTO: 89 FL — SIGNIFICANT CHANGE UP (ref 80–100)
PLATELET # BLD AUTO: 168 K/UL — SIGNIFICANT CHANGE UP (ref 150–400)
POTASSIUM SERPL-MCNC: 4 MMOL/L — SIGNIFICANT CHANGE UP (ref 3.5–5.3)
POTASSIUM SERPL-SCNC: 4 MMOL/L — SIGNIFICANT CHANGE UP (ref 3.5–5.3)
PROT SERPL-MCNC: 6.1 G/DL — LOW (ref 6.6–8.7)
PROTHROM AB SERPL-ACNC: 76.6 SEC — HIGH (ref 10.5–13.4)
RBC # BLD: 3.83 M/UL — LOW (ref 4.2–5.8)
RBC # FLD: 16 % — HIGH (ref 10.3–14.5)
SODIUM SERPL-SCNC: 139 MMOL/L — SIGNIFICANT CHANGE UP (ref 135–145)
SPECIMEN SOURCE: SIGNIFICANT CHANGE UP
WBC # BLD: 5.42 K/UL — SIGNIFICANT CHANGE UP (ref 3.8–10.5)
WBC # FLD AUTO: 5.42 K/UL — SIGNIFICANT CHANGE UP (ref 3.8–10.5)

## 2023-03-19 PROCEDURE — 99233 SBSQ HOSP IP/OBS HIGH 50: CPT

## 2023-03-19 RX ADMIN — TAMSULOSIN HYDROCHLORIDE 0.4 MILLIGRAM(S): 0.4 CAPSULE ORAL at 21:25

## 2023-03-19 RX ADMIN — Medication 10 MILLIGRAM(S): at 21:24

## 2023-03-19 RX ADMIN — Medication 650 MILLIGRAM(S): at 22:05

## 2023-03-19 RX ADMIN — Medication 50 MILLIGRAM(S): at 05:32

## 2023-03-19 RX ADMIN — BUDESONIDE AND FORMOTEROL FUMARATE DIHYDRATE 2 PUFF(S): 160; 4.5 AEROSOL RESPIRATORY (INHALATION) at 08:07

## 2023-03-19 RX ADMIN — Medication 50 MILLIGRAM(S): at 17:43

## 2023-03-19 RX ADMIN — NAFCILLIN 200 GRAM(S): 10 INJECTION, POWDER, FOR SOLUTION INTRAVENOUS at 14:04

## 2023-03-19 RX ADMIN — CHLORHEXIDINE GLUCONATE 1 APPLICATION(S): 213 SOLUTION TOPICAL at 05:33

## 2023-03-19 RX ADMIN — BUDESONIDE AND FORMOTEROL FUMARATE DIHYDRATE 2 PUFF(S): 160; 4.5 AEROSOL RESPIRATORY (INHALATION) at 20:21

## 2023-03-19 RX ADMIN — NAFCILLIN 200 GRAM(S): 10 INJECTION, POWDER, FOR SOLUTION INTRAVENOUS at 01:38

## 2023-03-19 RX ADMIN — POLYETHYLENE GLYCOL 3350 17 GRAM(S): 17 POWDER, FOR SOLUTION ORAL at 12:11

## 2023-03-19 RX ADMIN — ATORVASTATIN CALCIUM 40 MILLIGRAM(S): 80 TABLET, FILM COATED ORAL at 21:25

## 2023-03-19 RX ADMIN — NAFCILLIN 200 GRAM(S): 10 INJECTION, POWDER, FOR SOLUTION INTRAVENOUS at 05:32

## 2023-03-19 RX ADMIN — NAFCILLIN 200 GRAM(S): 10 INJECTION, POWDER, FOR SOLUTION INTRAVENOUS at 17:43

## 2023-03-19 RX ADMIN — NAFCILLIN 200 GRAM(S): 10 INJECTION, POWDER, FOR SOLUTION INTRAVENOUS at 10:18

## 2023-03-19 RX ADMIN — Medication 650 MILLIGRAM(S): at 10:34

## 2023-03-19 RX ADMIN — Medication 650 MILLIGRAM(S): at 21:35

## 2023-03-19 RX ADMIN — MIDODRINE HYDROCHLORIDE 5 MILLIGRAM(S): 2.5 TABLET ORAL at 14:04

## 2023-03-19 RX ADMIN — OLANZAPINE 10 MILLIGRAM(S): 15 TABLET, FILM COATED ORAL at 21:24

## 2023-03-19 RX ADMIN — NAFCILLIN 200 GRAM(S): 10 INJECTION, POWDER, FOR SOLUTION INTRAVENOUS at 21:29

## 2023-03-19 RX ADMIN — Medication 650 MILLIGRAM(S): at 11:30

## 2023-03-19 NOTE — PROVIDER CONTACT NOTE (CRITICAL VALUE NOTIFICATION) - PERSON GIVING RESULT:
Abi /Sailaja
Joellen Pereznaida Lozano
Tatum Real in Lab
Babita LUCAS
norma callejas
core  maddie Fernandez
Lab

## 2023-03-19 NOTE — PROGRESS NOTE ADULT - ASSESSMENT
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  Course was C/B MSSA bacteremia, sepsis with shock requiring IV pressors, and AFib with RVR. id / cardio following downgraded to medicine 3/17/23     #acute cva / likely embolic in the setting of a-fib and factor V leiden deficiency  - INR as high as 8 on 3/18 but today trending down to 6 with holding coumadin  - hold coumadin for now - no signs of active bleeding  - continue to trend  - likely altered due to antibiotics  - continue cardiac monitor  - will discuss with cardiology/neuro if they feel coumadin failure due to therapeutic INR on admission  - uncertain if there is a better option    #a-fib with RVR  - cardiology recs appreciated  - increase metoprolol to 50mg BID PO  - continue tele  - therapeutic INR    #MSSA bacteremia  - Sepsis with shock - s/p pressors  - No evidence of vegetation on TTE   - LUE thrombophlebitis may be the source  - S/p Vanc/Zosyn, now on  Nafcillin (3/16- ); final cultures from 3/17 so far are clear  - may not need UMER if clearing  - ID following     #chronic systolic chf / HFrEF - EF 20-25%  - does not seem to be in acute failure at this time   - metoprolol as above  - Monitor electrolytes and replete   - Cardiology following     #coagulopathy   - due to coumadin     #acute anemia - continue to trend  - on ac   - OB stool negative    #hypotension - increased BP today but increasing the metoprolol  - on midodrine,  taper down dose     #dvt ppx ;  on  coumadin, held today due to high INR    DISPO: still acute; discussed plan with daughter today who is concerned about long term placement plan' ultimately back to rehab after this admission with consideration of transitioning to long term care.   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  Course was C/B MSSA bacteremia, sepsis with shock requiring IV pressors, and AFib with RVR. id / cardio following downgraded to medicine 3/17/23     #acute cva / likely embolic in the setting of a-fib and factor V leiden deficiency  - INR as high as 8 on 3/18 but today trending down to 6 with holding coumadin  - hold coumadin for now - no signs of active bleeding  - continue to trend  - likely altered due to antibiotics  - continue cardiac monitor  - will discuss with cardiology/neuro if they feel coumadin failure due to therapeutic INR on admission  - uncertain if there is a better option  - neurochecks daily    #a-fib with RVR  - cardiology recs appreciated  - increase metoprolol to 50mg BID PO  - continue tele  - therapeutic INR    #MSSA bacteremia  - Sepsis with shock - s/p pressors  - No evidence of vegetation on TTE   - LUE thrombophlebitis may be the source  - S/p Vanc/Zosyn, now on  Nafcillin (3/16- ); final cultures from 3/17 so far are clear  - may not need UMER if clearing  - ID following     #chronic systolic chf / HFrEF - EF 20-25%  - does not seem to be in acute failure at this time   - metoprolol as above  - Monitor electrolytes and replete   - Cardiology following     #coagulopathy   - due to coumadin     #acute anemia - continue to trend  - on ac   - OB stool negative    #hypotension - increased BP today but increasing the metoprolol  - on midodrine,  taper down dose     #dvt ppx ;  on  coumadin, held today due to high INR    DISPO: still acute; discussed plan with daughter today who is concerned about long term placement plan' ultimately back to rehab after this admission with consideration of transitioning to long term care.

## 2023-03-19 NOTE — PROVIDER CONTACT NOTE (CRITICAL VALUE NOTIFICATION) - TEST AND RESULT REPORTED:
Blood culture: growth on aerobic bottle gram positive cocci and clusters
Elevated INR 6.48.
Gram + cocci in clusters - blood culture
lactate 2.2
INR 5.41
blood culture from 3/14 growth on 2x Anarobic bottle gran Positive coccyx in cluster
INR 8.80

## 2023-03-19 NOTE — PROGRESS NOTE ADULT - SUBJECTIVE AND OBJECTIVE BOX
CC: right sided weakness / right radial artery occlusion. (17 Mar 2023 12:13)    INTERVAL HPI/OVERNIGHT EVENTS:  no acute events    Vital Signs Last 24 Hrs  T(C): 36.6 (19 Mar 2023 10:42), Max: 37.1 (18 Mar 2023 16:54)  T(F): 97.9 (19 Mar 2023 10:42), Max: 98.7 (18 Mar 2023 16:54)  HR: 78 (19 Mar 2023 10:42) (77 - 108)  BP: 122/71 (19 Mar 2023 10:42) (122/71 - 135/79)  BP(mean): --  RR: 19 (19 Mar 2023 10:42) (18 - 19)  SpO2: 95% (19 Mar 2023 10:42) (94% - 100%)    Parameters below as of 19 Mar 2023 08:48  Patient On (Oxygen Delivery Method): nasal cannula,2 lpm    PHYSICAL EXAM:  General: in no acute distress  Eyes: PERRLA, EOMI; conjunctiva and sclera clear  Head: Normocephalic; atraumatic  ENMT: No nasal discharge; airway clear  Neck: Supple; non tender; no masses  Respiratory: No wheezes, rales or rhonchi  Cardiovascular: irregular tachycardic rate and rhythm  Gastrointestinal: Soft non-tender non-distended; Normal bowel sounds  Genitourinary: No costovertebral angle tenderness  Extremities: no edema; LUE with erythema and thrombophlebitis of the RUE  Vascular: Peripheral pulses palpable 2+ bilaterally  Neurological: Alert and oriented x4; decreased strength in LUE compared to RUE, decreased sensation as well on the left compared to right lower ext  Skin: Warm and dry. No acute rash  Psychiatric: Cooperative and appropriate    I&O's Detail    17 Mar 2023 07:01  -  18 Mar 2023 07:00  --------------------------------------------------------  IN:    IV PiggyBack: 200 mL    Oral Fluid: 180 mL  Total IN: 380 mL    OUT:    Incontinent per Condom Catheter (mL): 525 mL  Total OUT: 525 mL    Total NET: -145 mL      18 Mar 2023 07:01  -  18 Mar 2023 22:36  --------------------------------------------------------  IN:  Total IN: 0 mL    OUT:    Voided (mL): 420 mL  Total OUT: 420 mL    Total NET: -420 mL               9.9    5.06  )-----------( 152      ( 18 Mar 2023 07:30 )             32.6     18 Mar 2023 07:30    139    |  104    |  24.9   ----------------------------<  111    4.1     |  24.0   |  1.59     Ca    8.6        18 Mar 2023 07:30  Phos  4.0       17 Mar 2023 05:50  Mg     2.1       18 Mar 2023 07:30    TPro  5.7    /  Alb  3.0    /  TBili  0.5    /  DBili  x      /  AST  25     /  ALT  19     /  AlkPhos  79     18 Mar 2023 07:30    PT/INR - ( 18 Mar 2023 07:30 )   PT: 104.3 sec;   INR: 8.80 ratio         PTT - ( 18 Mar 2023 07:30 )  PTT:45.2 sec  CAPILLARY BLOOD GLUCOSE      LIVER FUNCTIONS - ( 18 Mar 2023 07:30 )  Alb: 3.0 g/dL / Pro: 5.7 g/dL / ALK PHOS: 79 U/L / ALT: 19 U/L / AST: 25 U/L / GGT: x           MEDICATIONS  (STANDING):  atorvastatin 40 milliGRAM(s) Oral at bedtime  budesonide 160 MICROgram(s)/formoterol 4.5 MICROgram(s) Inhaler 2 Puff(s) Inhalation two times a day  chlorhexidine 2% Cloths 1 Application(s) Topical <User Schedule>  digoxin  Injectable 250 MICROGram(s) IV Push once  metoprolol tartrate 50 milliGRAM(s) Oral two times a day  midodrine 5 milliGRAM(s) Oral every 8 hours  nafcillin  IVPB 2 Gram(s) IV Intermittent every 4 hours  OLANZapine 10 milliGRAM(s) Oral at bedtime  oxybutynin XL 10 milliGRAM(s) Oral <User Schedule>  polyethylene glycol 3350 17 Gram(s) Oral daily  tamsulosin 0.4 milliGRAM(s) Oral at bedtime    MEDICATIONS  (PRN):  acetaminophen     Tablet .. 650 milliGRAM(s) Oral every 6 hours PRN Temp greater or equal to 38C (100.4F), Mild Pain (1 - 3), Moderate Pain (4 - 6)  albuterol/ipratropium for Nebulization 3 milliLiter(s) Nebulizer every 6 hours PRN Shortness of Breath and/or Wheezing  bisacodyl Suppository 10 milliGRAM(s) Rectal daily PRN Constipation      RADIOLOGY & ADDITIONAL TESTS:

## 2023-03-19 NOTE — CHART NOTE - NSCHARTNOTEFT_GEN_A_CORE
Asked by the nurse and the pt's daughter to see the pt's Left arm.   Pt. denies increased pain, but states its warm, and red.    Vital Signs Last 24 Hrs  T(C): 36.5 (19 Mar 2023 17:27), Max: 36.9 (19 Mar 2023 04:53)  T(F): 97.7 (19 Mar 2023 17:27), Max: 98.4 (19 Mar 2023 04:53)  HR: 94 (19 Mar 2023 17:27) (77 - 98)  BP: 157/64 (19 Mar 2023 17:27) (122/71 - 157/64)  RR: 18 (19 Mar 2023 17:27) (18 - 19)  SpO2: 97% (19 Mar 2023 17:27) (95% - 100%)    Parameters below as of 19 Mar 2023 08:48  Patient On (Oxygen Delivery Method): nasal cannula,2 lpm    Ext: LUE + erythema, + induration, + swelling, + increased warmth, pulses present, ROM intact, sensation/strength  intact but decreased comparing to the RUE    A:P: Thrombophlebitis LUE  Pt. is already being treated for MSSA bacteriemia on Ampicillin, s/p Vanc/Zosyn                        10.2   5.42  )-----------( 168      ( 19 Mar 2023 03:57 )             34.1   Pt. is afebrile at present    Elevate LUE  Apply cold compresses frequently for 15min to the LUE affected area  The area of erythema was traced  Encourage LUE simple movements  Continue to monitor H/H- no signs of active bleeding  On AC- with Hx: +Coagulopathy, AFIB--- On Coumadin but held for now due to high INR 6.48  Monitor PT/INR  Discussed with the nurse  Case discussed and agreed with the attending Asked by the nurse and the pt's daughter to see the pt's Left arm.   Pt. denies increased pain, but states its warm, and red. Pt's daughter at the bedside and feels the redness is more today.  Pt. and pt's daughter are very pleasant.    Vital Signs Last 24 Hrs  T(C): 36.5 (19 Mar 2023 17:27), Max: 36.9 (19 Mar 2023 04:53)  T(F): 97.7 (19 Mar 2023 17:27), Max: 98.4 (19 Mar 2023 04:53)  HR: 94 (19 Mar 2023 17:27) (77 - 98)  BP: 157/64 (19 Mar 2023 17:27) (122/71 - 157/64)  RR: 18 (19 Mar 2023 17:27) (18 - 19)  SpO2: 97% (19 Mar 2023 17:27) (95% - 100%)    Parameters below as of 19 Mar 2023 08:48  Patient On (Oxygen Delivery Method): nasal cannula,2 lpm    Ext: LUE + erythema, + induration, + swelling, + increased warmth, pulses present, ROM intact, sensation/strength  intact but decreased comparing to the RUE    A:P: Thrombophlebitis LUE  Pt. is already being treated for MSSA bacteriemia on Ampicillin, s/p Vanc/Zosyn                        10.2   5.42  )-----------( 168      ( 19 Mar 2023 03:57 )             34.1   Pt. is afebrile at present    Elevate LUE  Apply cold compresses frequently for 15min to the LUE affected area  The area of erythema was traced  Encourage LUE simple movements  Continue to monitor H/H- no signs of active bleeding  On AC- with Hx: +Coagulopathy, AFIB--- On Coumadin but held for now due to high INR 6.48  Monitor PT/INR  Discussed with the nurse  Case discussed and agreed with the attending

## 2023-03-19 NOTE — PROVIDER CONTACT NOTE (CRITICAL VALUE NOTIFICATION) - ACTION/TREATMENT ORDERED:
Asma PA aware of elevated INR.
Hold ASA dose. Will consult with Neuro team in regards to INR
No new orders
500cc normasol over 4 hours given, monitor temperature, keep less than 37C, keep on warming blanket
No new orders.
Vacomycin added, control temperature to 37 on cooling blanket
Will re draw, and stool sample sent to lab

## 2023-03-20 DIAGNOSIS — R78.81 BACTEREMIA: ICD-10-CM

## 2023-03-20 LAB
INR BLD: 5.19 RATIO — CRITICAL HIGH (ref 0.88–1.16)
PROTHROM AB SERPL-ACNC: 61.2 SEC — HIGH (ref 10.5–13.4)
SARS-COV-2 RNA SPEC QL NAA+PROBE: SIGNIFICANT CHANGE UP

## 2023-03-20 PROCEDURE — 99232 SBSQ HOSP IP/OBS MODERATE 35: CPT

## 2023-03-20 PROCEDURE — 99497 ADVNCD CARE PLAN 30 MIN: CPT | Mod: 25

## 2023-03-20 PROCEDURE — 99233 SBSQ HOSP IP/OBS HIGH 50: CPT

## 2023-03-20 PROCEDURE — 99232 SBSQ HOSP IP/OBS MODERATE 35: CPT | Mod: FS

## 2023-03-20 RX ORDER — METOPROLOL TARTRATE 50 MG
37.5 TABLET ORAL
Refills: 0 | Status: DISCONTINUED | OUTPATIENT
Start: 2023-03-20 | End: 2023-03-21

## 2023-03-20 RX ADMIN — Medication 37.5 MILLIGRAM(S): at 17:55

## 2023-03-20 RX ADMIN — Medication 650 MILLIGRAM(S): at 11:20

## 2023-03-20 RX ADMIN — NAFCILLIN 200 GRAM(S): 10 INJECTION, POWDER, FOR SOLUTION INTRAVENOUS at 09:26

## 2023-03-20 RX ADMIN — BUDESONIDE AND FORMOTEROL FUMARATE DIHYDRATE 2 PUFF(S): 160; 4.5 AEROSOL RESPIRATORY (INHALATION) at 21:32

## 2023-03-20 RX ADMIN — Medication 50 MILLIGRAM(S): at 05:19

## 2023-03-20 RX ADMIN — CHLORHEXIDINE GLUCONATE 1 APPLICATION(S): 213 SOLUTION TOPICAL at 05:17

## 2023-03-20 RX ADMIN — NAFCILLIN 200 GRAM(S): 10 INJECTION, POWDER, FOR SOLUTION INTRAVENOUS at 17:55

## 2023-03-20 RX ADMIN — NAFCILLIN 200 GRAM(S): 10 INJECTION, POWDER, FOR SOLUTION INTRAVENOUS at 21:44

## 2023-03-20 RX ADMIN — NAFCILLIN 200 GRAM(S): 10 INJECTION, POWDER, FOR SOLUTION INTRAVENOUS at 14:57

## 2023-03-20 RX ADMIN — ATORVASTATIN CALCIUM 40 MILLIGRAM(S): 80 TABLET, FILM COATED ORAL at 21:37

## 2023-03-20 RX ADMIN — NAFCILLIN 200 GRAM(S): 10 INJECTION, POWDER, FOR SOLUTION INTRAVENOUS at 05:17

## 2023-03-20 RX ADMIN — TAMSULOSIN HYDROCHLORIDE 0.4 MILLIGRAM(S): 0.4 CAPSULE ORAL at 21:37

## 2023-03-20 RX ADMIN — NAFCILLIN 200 GRAM(S): 10 INJECTION, POWDER, FOR SOLUTION INTRAVENOUS at 01:32

## 2023-03-20 RX ADMIN — OLANZAPINE 10 MILLIGRAM(S): 15 TABLET, FILM COATED ORAL at 21:37

## 2023-03-20 RX ADMIN — POLYETHYLENE GLYCOL 3350 17 GRAM(S): 17 POWDER, FOR SOLUTION ORAL at 11:20

## 2023-03-20 RX ADMIN — Medication 650 MILLIGRAM(S): at 12:18

## 2023-03-20 NOTE — DIETITIAN NUTRITION RISK NOTIFICATION - ADDITIONAL COMMENTS/DIETITIAN RECOMMENDATIONS
1) Add Ensure BID   2) Rx MVI daily   3) SLP intervention as feasible for possible consistency upgrade  4) Encourage HBV protein sources   5) Provide encouragement/assistance as needed during mealtimes to inc PO   6) Monitor weights daily for trend/accuracy  1) Change to regular diet, consistency Minced/moist with thin liquids per SLP recommendations (MBS completed 3/16)  2)Add Ensure BID   3) Rx MVI daily   4) SLP intervention as feasible for possible consistency upgrade  5) Encourage HBV protein sources   6) Provide encouragement/assistance as needed during mealtimes to inc PO   7) Monitor weights daily for trend/accuracy

## 2023-03-20 NOTE — PROGRESS NOTE ADULT - ASSESSMENT
88y  Male with h/o HTN, AF, PE/DVT s/p IVCF, Factor V Leiden (on warfarin), CAD s/p CABG, CHFrEF, CVA, COPD, GIGI on CPAP. Patient was admitted on 3/8 from NH with reported RUE weakness/numbness. Treated for acute CVA (did not receive thrombolytic). MRI brain (3/10) revealed left medial posterior frontal cortical and subcortical acute infarction. Hospital course complicated by uncontrolled AF with RVR requiring Rapid Response Team, Fever to 105'F, Transfer to MICU for hypotension requiring IV vasopressors and Started on empiric Zosyn and vancomycin. Blood cultures with MSSA. Vancomycin was stopped.       MSSA bacteremia/septic shock  Fever  Leukocytosis  LUE cellulitis  LUE abscess  ruled out   MILAGRO      - Blood cultures 3/14 and 3/16 reporting MSSA  - Repeat blood cultures 3/17 and 3/18 no growth   - RVP/COVID 19 PCR 3/15 negative   - TTE with no endocarditis   - UA negative for UTI  - LUE US with cephalic vein thrombosis, no abscess   - No clear source of bacteremia  - Check UMER   - Procalcitonin level 0.13  - Continue  Nafcillin   - Follow up cultures  - Trend Fever  - Trend WBC        Will Follow    d/w Dr Ruiz and clinical pharmacy

## 2023-03-20 NOTE — PROGRESS NOTE ADULT - ASSESSMENT
89yo M w/ PMH of AF and Factor V Leiden deficiency with PE/DVT s/p IVCF and on coumadin, CAD s/p CABG, CVA, HFrEF (35-40%), COPD, large infrarenal AAA, ?prostate CA s/p recent XRT and GIGI on CPAP. Recent hospital stay for COPD/CHF exacerbation readmitted with RUE/RLE weakness and MRI brain revealed left medial posterior frontal cortical and subcortical acute infarction with course further c/b AF w/ RVR, fever of 105F, and hypotension for which he was xferred to MICU and started on phenylephrine gtt.  We are consulted for goals of care.  #Goals of care  - pt again demonstrated an inability to maintain a linear/rational discussion and, as such, lacks capacity to understand and make complex medical decisions.  - daughter Jasmina is HCP - she lives in [facility] ([phone]) but is now in [place] visiting  - Called and spoke with Jasmina.  Please see details in GOC discussion above. I reviewed code status and she confirmed our last discussion -- DNR/DNI.  - Will cont to follow and remain available to assist with future goals of care discussions if/when the need arises.    #AMS   - behavioral health c/s'd last admission  - pt with tangential non-linear speech and thought process - discussed with psych during last admission - likely cognitive impairment and would benefit from outpt neurocognitive testing  - In order to mitigate the risk of delirium, would avoid/minimize known deliriogenic drugs such as benzodiazepines and anticholinergics.  Encourage staff and family to reorient frequently.  Keep shades open during day in effort to maintain day/night cycle.

## 2023-03-20 NOTE — PROGRESS NOTE ADULT - SUBJECTIVE AND OBJECTIVE BOX
[facility]                                                         CARDIOLOGY AT [place] [address]                                                         Telephone: [phone]. Fax:[phone]                                                                             PROGRESS NOTE    Reason for follow up: Bacteremia  Update: ID requesting UMER due to MSSA bacteremia      Review of symptoms:   Cardiac:  No chest pain. No dyspnea. No palpitations.  Respiratory: no cough. No dyspnea   Gastrointestinal: No diarrhea. No abdominal pain. No bleeding.   Neuro: No focal neuro complaints.    Vitals:  T(C): 36.7 (03-20-23 @ 15:03), Max: 36.8 (03-19-23 @ 21:21)  HR: 90 (03-20-23 @ 15:03) (52 - 99)  BP: 132/86 (03-20-23 @ 15:03) (119/76 - 157/64)  RR: 18 (03-20-23 @ 15:03) (18 - 18)  SpO2: 98% (03-20-23 @ 15:03) (97% - 99%)  Wt(kg): --  I&O's Summary    19 Mar 2023 07:01  -  20 Mar 2023 07:00  --------------------------------------------------------  IN: 300 mL / OUT: 800 mL / NET: -500 mL    20 Mar 2023 07:01  -  20 Mar 2023 16:14  --------------------------------------------------------  IN: 0 mL / OUT: 150 mL / NET: -150 mL          PHYSICAL EXAM:  Appearance: Comfortable. No acute distress  HEENT:  Atraumatic. Normocephalic.  Normal oral mucosa  Neurologic: A & O x 2 with period of confusion, no gross focal deficits.  Cardiovascular: RRR S1 S2, No murmur, no rubs/gallops. No JVD  Respiratory: Lungs clear to auscultation, unlabored   Gastrointestinal:  Soft, Non-tender, + BS  Lower Extremities: 2+ Peripheral Pulses, No clubbing, cyanosis, or edema  Psychiatry: Patient is calm. No agitation.   Skin: warm and dry.    CURRENT CARDIAC MEDICATIONS:  digoxin  Injectable 250 MICROGram(s) IV Push once  metoprolol tartrate 37.5 milliGRAM(s) Oral two times a day  midodrine 5 milliGRAM(s) Oral every 8 hours      CURRENT OTHER MEDICATIONS:  albuterol/ipratropium for Nebulization 3 milliLiter(s) Nebulizer every 6 hours PRN Shortness of Breath and/or Wheezing  budesonide 160 MICROgram(s)/formoterol 4.5 MICROgram(s) Inhaler 2 Puff(s) Inhalation two times a day  nafcillin  IVPB 2 Gram(s) IV Intermittent every 4 hours  acetaminophen     Tablet .. 650 milliGRAM(s) Oral every 6 hours PRN Temp greater or equal to 38C (100.4F), Mild Pain (1 - 3), Moderate Pain (4 - 6)  OLANZapine 10 milliGRAM(s) Oral at bedtime  bisacodyl Suppository 10 milliGRAM(s) Rectal daily PRN Constipation  polyethylene glycol 3350 17 Gram(s) Oral daily  atorvastatin 40 milliGRAM(s) Oral at bedtime  chlorhexidine 2% Cloths 1 Application(s) Topical <User Schedule>  oxybutynin XL 10 milliGRAM(s) Oral <User Schedule>  tamsulosin 0.4 milliGRAM(s) Oral at bedtime      LABS:	 	  ( 15 Mar 2023 12:30 )  Troponin T  X    ,  CPK  108  , CKMB  X    , BNP X        , ( 14 Mar 2023 20:35 )  Troponin T  0.02 ,  CPK  X    , CKMB  X    , BNP X        , ( 08 Mar 2023 12:50 )  Troponin T  0.01 ,  CPK  X    , CKMB  X    , BNP X                                  10.2   5.42  )-----------( 168      ( 19 Mar 2023 03:57 )             34.1     03-19    139  |  103  |  23.3<H>  ----------------------------<  96  4.0   |  25.0  |  1.50<H>    Ca    8.8      19 Mar 2023 03:57    TPro  6.1<L>  /  Alb  3.1<L>  /  TBili  0.6  /  DBili  x   /  AST  23  /  ALT  17  /  AlkPhos  84  03-19    PT/INR/PTT ( 20 Mar 2023 06:15 )                       :                       :      61.2         :       X                     .        .                   .              .           .       5.19        .                                       Lipid Profile: Date: 03-09 @ 06:06  Total cholesterol 124; Direct LDL: --; HDL: 34; Triglycerides:159    HgA1c:   TSH:     TELEMETRY: Afib rate controlled  ECG:    DIAGNOSTIC TESTING:  [ ] Echocardiogram: < from: TTE Echo Complete w/ Contrast w/ Doppler (03.12.23 @ 12:55) >  PHYSICIAN INTERPRETATION:  Left Ventricle: Endocardialvisualization was enhanced with intravenous   echo contrast. The left ventricular internal cavity size is normal.  Global LV systolic function was moderately to severely decreased. Left   ventricular ejection fraction, by visual estimation, is 20 to 25%. The   mitral in-flow pattern reveals no discernable A-wave, therefore no   comment on diastolic function can be made.  No LV thrombus.      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    Right Ventricle: The right ventricular size is normal. RV systolic   function is moderately reduced.  Pericardium: There is no evidence of pericardial effusion.  Mitral Valve: Mild thickening of the anterior and posterior mitral valve   leaflets. Mitral leaflet mobility is normal. There is moderate mitral   annular calcification.  Tricuspid Valve: Mild-moderate tricuspid regurgitation is visualized.  Aortic Valve: Aortic Stenosis with V max 3.1 m/sec, Mean gradient of 23   mm Hg.  Venous: The inferior vena cava was normal sized, with respiratory size   variation greater than 50%.      Summary:   1. Left ventricular ejection fraction, by visual estimation, is 20 to   25%.   2. Moderately to severely decreased global left ventricular systolic   function.   3. [place] is abnormal as described above.   4. The mitral in-flow pattern reveals no discernable A-wave, therefore   no comment on diastolic function can be made.   5. No LV thrombus.   6. Moderately reduced RV systolic function.   7. There is no evidence of pericardial effusion.   8. Mild thickening of the anterior and posterior mitral valve leaflets.   9. Moderate mitral annular calcification.  10. Mild-moderate tricuspid regurgitation.  11. Aortic Stenosis with V max 3.1 m/sec, Mean gradient of 23 mm Hg.  12. Endocardial visualization was enhanced with intravenous echo contrast.    MD Monica Electronically signed on 3/13/2023 at 12:00:06 PM    < end of copied text >    [ ]  Catheterization:   [ ] Stress Test:    OTHER:

## 2023-03-20 NOTE — PROGRESS NOTE ADULT - PROBLEM SELECTOR PLAN 2
-Echo 02/2023: 25-30, PASP 41.7  -Stress echo 02/2023: pseudo severe AS  -GDMT: furosemide, metoprolol, statin
.   - workup as per medicine/heme/onc   - concern for coumadin failure, as per heme/onc reccs  - continue coumadin, continue ASA
.   - workup as per medicine/heme/onc   - concern for coumadin failure, as per heme/onc reccs  - continue coumadin, continue ASA
.   -Echo 02/2023: EF 25-30%, no significant change on repeat  - Stress echo 02/2023: pseudo severe AS  - continue GDMT as tolerated       Beta Blocker: metoprolol 37.5mg PO BID, increase to 50mg PO bid if indicated        RAAS Inhibitor: add losartan when off midodrine       MRA: will add as tolerated       Diuretic: euvolemic on exam. hold diuresis for now  - Strict I&O's  - Daily standing weights (if able).  - Keep K > 4, Mg > 2.    - Monitor renal function   - pt does not appear overloaded on exam  - continue midodrine for hypotension, wean as tolerated.
- workup as per medicine/heme/onc   - concern for coumadin failure, as per heme/onc reccs
.   - workup as per medicine/heme/onc   - continue coumadin, continue ASA

## 2023-03-20 NOTE — PROGRESS NOTE ADULT - CONVERSATION DETAILS
Pt remains tangential and lacking in insight into his medical condition.  Called and spoke with pt's HCP/dtr Jasmina.  She understands his current medical condition and just spoke to Dr Puente and knows that a UMER is recommended so as to determine the optimal duration of IV abx.  She shared her more global concern about her father's lack of insight and his deconditioned state and that it would not be safe for him to be at home.  I told her I shared this concern and thought it best/safest for him to be under medical supervision in SN[facility].    I reviewed code status and she confirmed our last discussion -- DNR/DNI.

## 2023-03-20 NOTE — PROGRESS NOTE ADULT - PROBLEM SELECTOR PROBLEM 4
CAD (coronary artery disease)
Atrial fibrillation
Systolic CHF, chronic
Systolic CHF, chronic

## 2023-03-20 NOTE — PROGRESS NOTE ADULT - PROBLEM SELECTOR PLAN 1
.  - MSSA bacteremia on Bcx from 3/14 + 3/16  - continue ABx as per ID  - ID requesting UMER to rule out endocarditis  - discussed with patient and patient's HCP his daughter Jasmina. They are both agreeable to UMER  - no loose teeth, no difficulty swallowing  - Pt has been having some cognitive deficits, periods of confusion at times. Daughter Jasmina available to consent for patient in the event patient cannot consent to procedure.  - Keep NPO after midnight
-Not rate controlled  -Has not been receiving PO metoprolol dosing, was receiving intermittent IVP metoprolol. Last 24hrs - 48hrs received dig IVP 0.125, cardizem 5 IVP x1, metoprolol 5 IVP x3, cardizem PRN 10mg q6 x 5    -Plan resume metoprolol 50 BID, give all doses. Stop all cardizem (EF < 40%)  -C/w warfarin
.   -Echo 02/2023: EF 25-30%, no significant change on repeat  - Stress echo 02/2023: pseudo severe AS  - EKG NSR now rate controlled  - continue GDMT as tolerated       Beta Blocker: metoprolol 25mg PO BID,        RAAS Inhibitor: will restart losartan when BP able to tolerate, still hypotensive and on midodrine       MRA: will add as tolerated       Diuretic: euvolemic on exam. hold diuresis for now  - Strict I&O's  - Daily standing weights (if able).  - Keep K > 4, Mg > 2.    - Monitor renal function   - pt does not appear overloaded on exam  - continue midodrine for hypotension
.   -Echo 02/2023: EF 25-30%, no significant change on repeat  - Stress echo 02/2023: pseudo severe AS  - continue GDMT: Metoprolol, losartan, spironolactone, lipitor, lasix, when off ajay and BP able to tolerate  - pt does not appear overloaded on exam
-Echo 02/2023: 25-30  -Stress echo 02/2023: pseudo severe AS  - called back for repeat echo that was done as part of stroke workup   - EF is 20-25%, this was done by visual estimation. The 5% different in EF is neglible and both scores 20-25% as well as 25-30% are consistent with severely reduced LVEF which is known   - continue GDMT: Metoprolol, losartan, spironolactone, lipitor, lasix,   - pt does not appear overloaded  - no further inpatient cardiology workup or recommendations, will sign off
.   -Echo 02/2023: EF 25-30%, no significant change on repeat  - Stress echo 02/2023: pseudo severe AS  - continue GDMT as tolerated       Beta Blocker: metoprolol 25mg PO BID, uptitrate for rate control       RAAS Inhibitor: will restart losartan when BP able to tolerate       MRA: will add as tolerated       Diuretic: euvolemic on exam. hold diuresis for now  - Strict I&O's  - Daily standing weights (if able).  - Keep K > 4, Mg > 2.    - Monitor renal function   - pt does not appear overloaded on exam  - continue midodrine for hypotension

## 2023-03-20 NOTE — PROGRESS NOTE ADULT - SUBJECTIVE AND OBJECTIVE BOX
CC: right sided weakness / right radial artery occlusion. (17 Mar 2023 12:13)    INTERVAL HPI/OVERNIGHT EVENTS:  no acute events    Vital Signs Last 24 Hrs  T(C): 36.7 (20 Mar 2023 11:00), Max: 36.8 (19 Mar 2023 21:21)  T(F): 98.1 (20 Mar 2023 11:00), Max: 98.3 (19 Mar 2023 21:21)  HR: 99 (20 Mar 2023 11:00) (52 - 99)  BP: 127/75 (20 Mar 2023 11:00) (119/76 - 157/64)  BP(mean): --  RR: 18 (20 Mar 2023 11:00) (18 - 18)  SpO2: 99% (20 Mar 2023 11:00) (97% - 99%)    Parameters below as of 20 Mar 2023 11:00  Patient On (Oxygen Delivery Method): nasal cannula  O2 Flow (L/min): 2    PHYSICAL EXAM:  General: in no acute distress  Eyes: PERRLA, EOMI; conjunctiva and sclera clear  Head: Normocephalic; atraumatic  ENMT: No nasal discharge; airway clear  Neck: Supple; non tender; no masses  Respiratory: No wheezes, rales or rhonchi  Cardiovascular: irregular tachycardic rate and rhythm  Gastrointestinal: Soft non-tender non-distended; Normal bowel sounds  Genitourinary: No costovertebral angle tenderness  Extremities: no edema; LUE with erythema and thrombophlebitis of the RUE  Vascular: Peripheral pulses palpable 2+ bilaterally  Neurological: Alert and oriented x4; decreased strength in LUE compared to RUE, decreased sensation as well on the left compared to right lower ext  Skin: Warm and dry. No acute rash  Psychiatric: Cooperative and appropriate    I&O's Detail    17 Mar 2023 07:01  -  18 Mar 2023 07:00  --------------------------------------------------------  IN:    IV PiggyBack: 200 mL    Oral Fluid: 180 mL  Total IN: 380 mL    OUT:    Incontinent per Condom Catheter (mL): 525 mL  Total OUT: 525 mL    Total NET: -145 mL      18 Mar 2023 07:01  -  18 Mar 2023 22:36  --------------------------------------------------------  IN:  Total IN: 0 mL    OUT:    Voided (mL): 420 mL  Total OUT: 420 mL    Total NET: -420 mL               9.9    5.06  )-----------( 152      ( 18 Mar 2023 07:30 )             32.6     18 Mar 2023 07:30    139    |  104    |  24.9   ----------------------------<  111    4.1     |  24.0   |  1.59     Ca    8.6        18 Mar 2023 07:30  Phos  4.0       17 Mar 2023 05:50  Mg     2.1       18 Mar 2023 07:30    TPro  5.7    /  Alb  3.0    /  TBili  0.5    /  DBili  x      /  AST  25     /  ALT  19     /  AlkPhos  79     18 Mar 2023 07:30    PT/INR - ( 18 Mar 2023 07:30 )   PT: 104.3 sec;   INR: 8.80 ratio         PTT - ( 18 Mar 2023 07:30 )  PTT:45.2 sec  CAPILLARY BLOOD GLUCOSE      LIVER FUNCTIONS - ( 18 Mar 2023 07:30 )  Alb: 3.0 g/dL / Pro: 5.7 g/dL / ALK PHOS: 79 U/L / ALT: 19 U/L / AST: 25 U/L / GGT: x           MEDICATIONS  (STANDING):  atorvastatin 40 milliGRAM(s) Oral at bedtime  budesonide 160 MICROgram(s)/formoterol 4.5 MICROgram(s) Inhaler 2 Puff(s) Inhalation two times a day  chlorhexidine 2% Cloths 1 Application(s) Topical <User Schedule>  digoxin  Injectable 250 MICROGram(s) IV Push once  metoprolol tartrate 50 milliGRAM(s) Oral two times a day  midodrine 5 milliGRAM(s) Oral every 8 hours  nafcillin  IVPB 2 Gram(s) IV Intermittent every 4 hours  OLANZapine 10 milliGRAM(s) Oral at bedtime  oxybutynin XL 10 milliGRAM(s) Oral <User Schedule>  polyethylene glycol 3350 17 Gram(s) Oral daily  tamsulosin 0.4 milliGRAM(s) Oral at bedtime    MEDICATIONS  (PRN):  acetaminophen     Tablet .. 650 milliGRAM(s) Oral every 6 hours PRN Temp greater or equal to 38C (100.4F), Mild Pain (1 - 3), Moderate Pain (4 - 6)  albuterol/ipratropium for Nebulization 3 milliLiter(s) Nebulizer every 6 hours PRN Shortness of Breath and/or Wheezing  bisacodyl Suppository 10 milliGRAM(s) Rectal daily PRN Constipation      RADIOLOGY & ADDITIONAL TESTS: CC: right sided weakness / right radial artery occlusion. (17 Mar 2023 12:13)    INTERVAL HPI/OVERNIGHT EVENTS:  no acute events    Vital Signs Last 24 Hrs  T(C): 36.7 (20 Mar 2023 11:00), Max: 36.8 (19 Mar 2023 21:21)  T(F): 98.1 (20 Mar 2023 11:00), Max: 98.3 (19 Mar 2023 21:21)  HR: 99 (20 Mar 2023 11:00) (52 - 99)  BP: 127/75 (20 Mar 2023 11:00) (119/76 - 157/64)  BP(mean): --  RR: 18 (20 Mar 2023 11:00) (18 - 18)  SpO2: 99% (20 Mar 2023 11:00) (97% - 99%)    Parameters below as of 20 Mar 2023 11:00  Patient On (Oxygen Delivery Method): nasal cannula  O2 Flow (L/min): 2    PHYSICAL EXAM:  General: in no acute distress  Eyes: PERRLA, EOMI; conjunctiva and sclera clear  Head: Normocephalic; atraumatic  ENMT: No nasal discharge; airway clear  Neck: Supple; non tender; no masses  Respiratory: No wheezes, rales or rhonchi  Cardiovascular: irregular tachycardic rate and rhythm  Gastrointestinal: Soft non-tender non-distended; Normal bowel sounds  Genitourinary: No costovertebral angle tenderness  Extremities: no edema; LUE with erythema and thrombophlebitis of the RUE  Vascular: Peripheral pulses palpable 2+ bilaterally  Neurological: Alert and oriented x4; decreased strength in RUE compared to LUE, decreased sensation as well on the right compared to left lower ext  Skin: Warm and dry. No acute rash  Psychiatric: Cooperative and appropriate    I&O's Detail    17 Mar 2023 07:01  -  18 Mar 2023 07:00  --------------------------------------------------------  IN:    IV PiggyBack: 200 mL    Oral Fluid: 180 mL  Total IN: 380 mL    OUT:    Incontinent per Condom Catheter (mL): 525 mL  Total OUT: 525 mL    Total NET: -145 mL      18 Mar 2023 07:01  -  18 Mar 2023 22:36  --------------------------------------------------------  IN:  Total IN: 0 mL    OUT:    Voided (mL): 420 mL  Total OUT: 420 mL    Total NET: -420 mL               9.9    5.06  )-----------( 152      ( 18 Mar 2023 07:30 )             32.6     18 Mar 2023 07:30    139    |  104    |  24.9   ----------------------------<  111    4.1     |  24.0   |  1.59     Ca    8.6        18 Mar 2023 07:30  Phos  4.0       17 Mar 2023 05:50  Mg     2.1       18 Mar 2023 07:30    TPro  5.7    /  Alb  3.0    /  TBili  0.5    /  DBili  x      /  AST  25     /  ALT  19     /  AlkPhos  79     18 Mar 2023 07:30    PT/INR - ( 18 Mar 2023 07:30 )   PT: 104.3 sec;   INR: 8.80 ratio         PTT - ( 18 Mar 2023 07:30 )  PTT:45.2 sec  CAPILLARY BLOOD GLUCOSE      LIVER FUNCTIONS - ( 18 Mar 2023 07:30 )  Alb: 3.0 g/dL / Pro: 5.7 g/dL / ALK PHOS: 79 U/L / ALT: 19 U/L / AST: 25 U/L / GGT: x           MEDICATIONS  (STANDING):  atorvastatin 40 milliGRAM(s) Oral at bedtime  budesonide 160 MICROgram(s)/formoterol 4.5 MICROgram(s) Inhaler 2 Puff(s) Inhalation two times a day  chlorhexidine 2% Cloths 1 Application(s) Topical <User Schedule>  digoxin  Injectable 250 MICROGram(s) IV Push once  metoprolol tartrate 50 milliGRAM(s) Oral two times a day  midodrine 5 milliGRAM(s) Oral every 8 hours  nafcillin  IVPB 2 Gram(s) IV Intermittent every 4 hours  OLANZapine 10 milliGRAM(s) Oral at bedtime  oxybutynin XL 10 milliGRAM(s) Oral <User Schedule>  polyethylene glycol 3350 17 Gram(s) Oral daily  tamsulosin 0.4 milliGRAM(s) Oral at bedtime    MEDICATIONS  (PRN):  acetaminophen     Tablet .. 650 milliGRAM(s) Oral every 6 hours PRN Temp greater or equal to 38C (100.4F), Mild Pain (1 - 3), Moderate Pain (4 - 6)  albuterol/ipratropium for Nebulization 3 milliLiter(s) Nebulizer every 6 hours PRN Shortness of Breath and/or Wheezing  bisacodyl Suppository 10 milliGRAM(s) Rectal daily PRN Constipation      RADIOLOGY & ADDITIONAL TESTS:

## 2023-03-20 NOTE — PHYSICAL THERAPY INITIAL EVALUATION ADULT - PERTINENT HX OF CURRENT PROBLEM, REHAB EVAL
pt. is an 89 y/o male was sent in from [facility] rehab as per documentation was noted to have new RUE/ RLE weakness and numbness and with diminished rt. 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
pt. is an 87 y/o male was sent in from [facility] rehab as per documentation was noted to have new RUE/ RLE weakness and numbness and with diminished rt. 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

## 2023-03-20 NOTE — PHYSICAL THERAPY INITIAL EVALUATION ADULT - PLANNED THERAPY INTERVENTIONS, PT EVAL
balance training/bed mobility training/gait training/postural re-education/strengthening/transfer training
balance training/bed mobility training/gait training/ROM/strengthening/transfer training

## 2023-03-20 NOTE — PHYSICAL THERAPY INITIAL EVALUATION ADULT - DIAGNOSIS, PT EVAL
Pt with dec functional mobility sec to dec strength/ cognition
Pt with dec functional mobility sec to dec strength/ cognition

## 2023-03-20 NOTE — PROGRESS NOTE ADULT - NSPROGADDITIONALINFOA_GEN_ALL_CORE
Assessment and recommendations are final when note is signed by the attending.

## 2023-03-20 NOTE — PHYSICAL THERAPY INITIAL EVALUATION ADULT - IMPAIRMENTS FOUND, PT EVAL
aerobic capacity/endurance/cognitive impairment/gait, locomotion, and balance/gross motor/muscle strength/poor safety awareness
gait, locomotion, and balance

## 2023-03-20 NOTE — PHYSICAL THERAPY INITIAL EVALUATION ADULT - IMPAIRED TRANSFERS: SIT/STAND, REHAB EVAL
retropulsion/impaired postural control/decreased strength
impaired balance/cognition/decreased strength

## 2023-03-20 NOTE — PROGRESS NOTE ADULT - PROBLEM SELECTOR PROBLEM 1
Chronic HFrEF (heart failure with reduced ejection fraction)
Chronic HFrEF (heart failure with reduced ejection fraction)
MSSA bacteremia
Atrial fibrillation
Chronic HFrEF (heart failure with reduced ejection fraction)
Chronic HFrEF (heart failure with reduced ejection fraction)
Right sided weakness
Right sided weakness

## 2023-03-20 NOTE — PROGRESS NOTE ADULT - SUBJECTIVE AND OBJECTIVE BOX
INTERVAL HISTORY:  Pt seen lying in bed in NAD  Pt reports no pain or SOB  He is eager to go home but could not seem to integrate information about risk of leaving without IV abx or planned UMER    PRESENT SYMPTOMS:     Dyspnea: No  Nausea/Vomiting: No  Anxiety:  No  Depression: No  Fatigue: No  Loss of appetite: No  Constipation: No    PAIN: denies            Character-            Duration-            Effect-            Factors-            Frequency-            Location-            Severity-    REVIEW OF SYSTEMS:   Full review of systems performed and was otherwise negative except as noted above.    MEDICATIONS  (STANDING):  atorvastatin 40 milliGRAM(s) Oral at bedtime  budesonide 160 MICROgram(s)/formoterol 4.5 MICROgram(s) Inhaler 2 Puff(s) Inhalation two times a day  chlorhexidine 2% Cloths 1 Application(s) Topical <User Schedule>  digoxin  Injectable 250 MICROGram(s) IV Push once  metoprolol tartrate 37.5 milliGRAM(s) Oral two times a day  midodrine 5 milliGRAM(s) Oral every 8 hours  nafcillin  IVPB 2 Gram(s) IV Intermittent every 4 hours  OLANZapine 10 milliGRAM(s) Oral at bedtime  oxybutynin XL 10 milliGRAM(s) Oral <User Schedule>  polyethylene glycol 3350 17 Gram(s) Oral daily  tamsulosin 0.4 milliGRAM(s) Oral at bedtime    MEDICATIONS  (PRN):  acetaminophen     Tablet .. 650 milliGRAM(s) Oral every 6 hours PRN Temp greater or equal to 38C (100.4F), Mild Pain (1 - 3), Moderate Pain (4 - 6)  albuterol/ipratropium for Nebulization 3 milliLiter(s) Nebulizer every 6 hours PRN Shortness of Breath and/or Wheezing  bisacodyl Suppository 10 milliGRAM(s) Rectal daily PRN Constipation      PHYSICAL EXAM:  Vital Signs Last 24 Hrs  T(C): 36.7 (20 Mar 2023 11:00), Max: 36.8 (19 Mar 2023 21:21)  T(F): 98.1 (20 Mar 2023 11:00), Max: 98.3 (19 Mar 2023 21:21)  HR: 99 (20 Mar 2023 11:00) (52 - 99)  BP: 127/75 (20 Mar 2023 11:00) (119/76 - 157/64)  BP(mean): --  RR: 18 (20 Mar 2023 11:00) (18 - 18)  SpO2: 99% (20 Mar 2023 11:00) (97% - 99%)    Parameters below as of 20 Mar 2023 11:00  Patient On (Oxygen Delivery Method): nasal cannula  O2 Flow (L/min): 2    General: Pt lying in bed in NAD  HEENT: NCAT; MMM  Resp: breathing unlabored; symmetric chest expansion B/L  MSK: no contractures/deformities  Skin: amorphous erythematous rash noted on LUE  Neuro: awake and oriented x 3; no myoclonus  Psych: calm; thought process/speech was intermittently tangential and difficult to follow    LABS:                        10.2   5.42  )-----------( 168      ( 19 Mar 2023 03:57 )             34.1     03-19    139  |  103  |  23.3<H>  ----------------------------<  96  4.0   |  25.0  |  1.50<H>    Ca    8.8      19 Mar 2023 03:57    TPro  6.1<L>  /  Alb  3.1<L>  /  TBili  0.6  /  DBili  x   /  AST  23  /  ALT  17  /  AlkPhos  84  03-19    PT/INR - ( 20 Mar 2023 06:15 )   PT: 61.2 sec;   INR: 5.19 ratio        RADIOLOGY & ADDITIONAL STUDIES:    NEUROLOGIC MEDICATIONS/OPIOIDS/BENZODIAZEPINES IN PAST 24HRS:  acetaminophen     Tablet ..   650 milliGRAM(s) Oral (03-20-23 @ 11:20)   650 milliGRAM(s) Oral (03-19-23 @ 21:35)    OLANZapine   10 milliGRAM(s) Oral (03-19-23 @ 21:24)    ADVANCE DIRECTIVES/TREATMENT PREFERENCES:  Code Status: DNR/DNO  MOLST (if not Full Code): yes  HCP/Surrogate: daughter Jamsina

## 2023-03-20 NOTE — PHYSICAL THERAPY INITIAL EVALUATION ADULT - GENERAL OBSERVATIONS, REHAB EVAL
Pt received in semifowler position without O2, RN present.
Pt received in semifowler position without O2, RN present.

## 2023-03-20 NOTE — PROGRESS NOTE ADULT - ASSESSMENT
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 rehab as per documentation was noted to have new RUE/ RLE weakness and numbness and with diminished rt. sided radial and pedal pulses. Found to have uncontrolled afib, subsequently found to have CVA.

## 2023-03-20 NOTE — PROGRESS NOTE ADULT - SUBJECTIVE AND OBJECTIVE BOX
[facility]  INFECTIOUS DISEASES at [place] and [place]  =======================================================                               Marques Mike MD#   Alexa Yanes MD*                             Gabrielle Baird MD*   Bisi Sandoval MD*            Diplomates American Board of Internal Medicine & Infectious Diseases                # [place] Office - Appt - Tel  [phone] Fax [phone]                * [place] Office - Appt - Tel [phone] Fax [phone]                                  Hospital Consult line:  [phone]  =======================================================    SANDI SANTOS [id number]    Follow up: MSSA bacteremia/septic shock    No fevers     Allergies:  No Known Allergies  OHS (Unknown)       REVIEW OF SYSTEMS:  CONSTITUTIONAL:  No Fever or chills  HEENT:   No diplopia or blurred vision.  No earache, sore throat or runny nose.  CARDIOVASCULAR:  No Chest Pain  RESPIRATORY:  No cough, shortness of breath  GASTROINTESTINAL:  No nausea, vomiting or diarrhea.  GENITOURINARY:  No dysuria, frequency or urgency. No Blood in urine  MUSCULOSKELETAL:  no joint aches, no muscle pain  SKIN:  No change in skin, hair or nails.  NEUROLOGIC:  No Headaches, seizures   PSYCHIATRIC:  No disorder of thought or mood.  ENDOCRINE:  No heat or cold intolerance  HEMATOLOGICAL:  No easy bruising or bleeding.       Physical Exam:  GEN: NAD  HEENT: normocephalic and atraumatic. EOMI. PERRL.    NECK: Supple.   LUNGS: CTA B/L.  HEART: RRR  ABDOMEN: Soft, NT, ND.  +BS.    : No CVA tenderness  EXTREMITIES: Without  edema.  MSK: No joint swelling  NEUROLOGIC: No Focal Deficits   PSYCHIATRIC: Appropriate affect .  SKIN: Left antecubital area with improved erythema and swelling. no warmth.       Vitals:  T(F): 98.1 (20 Mar 2023 11:00), Max: 98.3 (19 Mar 2023 21:21)  HR: 99 (20 Mar 2023 11:00)  BP: 127/75 (20 Mar 2023 11:00)  RR: 18 (20 Mar 2023 11:00)  SpO2: 99% (20 Mar 2023 11:00) (97% - 99%)  temp max in last 48H T(F): , Max: 98.7 (03-18-23 @ 16:54)    Current Antibiotics:  nafcillin  IVPB 2 Gram(s) IV Intermittent every 4 hours    Other medications:  atorvastatin 40 milliGRAM(s) Oral at bedtime  budesonide 160 MICROgram(s)/formoterol 4.5 MICROgram(s) Inhaler 2 Puff(s) Inhalation two times a day  chlorhexidine 2% Cloths 1 Application(s) Topical <User Schedule>  digoxin  Injectable 250 MICROGram(s) IV Push once  metoprolol tartrate 50 milliGRAM(s) Oral two times a day  midodrine 5 milliGRAM(s) Oral every 8 hours  OLANZapine 10 milliGRAM(s) Oral at bedtime  oxybutynin XL 10 milliGRAM(s) Oral <User Schedule>  polyethylene glycol 3350 17 Gram(s) Oral daily  tamsulosin 0.4 milliGRAM(s) Oral at bedtime                            10.2   5.42  )-----------( 168      ( 19 Mar 2023 03:57 )             34.1     03-19    139  |  103  |  23.3<H>  ----------------------------<  96  4.0   |  25.0  |  1.50<H>    Ca    8.8      19 Mar 2023 03:57    TPro  6.1<L>  /  Alb  3.1<L>  /  TBili  0.6  /  DBili  x   /  AST  23  /  ALT  17  /  AlkPhos  84  03-19      RECENT CULTURES:  03-18 @ 07:30 .Blood Blood     No growth to date.    03-17 @ 06:09 .Blood Blood     No growth to date.    03-17 @ 05:50 .Blood Blood     No growth to date.    03-16 @ 13:06 .Blood Blood-Peripheral     Growth in aerobic bottle: Staphylococcus aureus  See previous culture [id number]  Growth in aerobic bottle: Gram Positive Cocci in Clusters    03-16 @ 13:00 .Blood Blood-Peripheral     Growth in aerobic bottle: Staphylococcus aureus  See previous culture [id number]  Growth in aerobic bottle: Gram Positive Cocci in Clusters    03-15 @ 09:00    [facility]    03-14 @ 20:57 .Blood Blood-Peripheral Blood Culture PCR  Staphylococcus aureus    Growth in aerobic and anaerobic bottles: Staphylococcus aureus  Growth in aerobic bottle: Bacillus species not anthracis  "Susceptibilities not performed"  ***Blood Panel PCR results on this specimen are available  approximately 3 hours after the Gram stain result.***  Gram stain, PCR, and/or culture results may not always  correspond due to difference in methodologies.  ************************************************************  This PCR assay was performed by multiplex PCR. This  Assay tests for 66 bacterial and resistance gene targets.  Please refer to the [facility] test directory  at [email or web address] for details.  Growth in anaerobic bottle: Gram Positive Cocci in Clusters  Growth in aerobic bottle: Gram Positive Cocci in Clusters and Gram  Positive Rods    03-08 @ 13:40 Clean Catch Clean Catch (Midstream)     <10,000 CFU/mL Normal Urogenital Sangeetha      WBC Count: 5.42 K/uL (03-19-23 @ 03:57)  WBC Count: 5.06 K/uL (03-18-23 @ 07:30)  WBC Count: 5.13 K/uL (03-17-23 @ 05:50)  WBC Count: 7.43 K/uL (03-16-23 @ 03:15)  WBC Count: 11.48 K/uL (03-15-23 @ 12:30)    Creatinine, Serum: 1.50 mg/dL (03-19-23 @ 03:57)  Creatinine, Serum: 1.59 mg/dL (03-18-23 @ 07:30)  Creatinine, Serum: 1.40 mg/dL (03-17-23 @ 05:50)  Creatinine, Serum: 1.32 mg/dL (03-16-23 @ 03:15)  Creatinine, Serum: 1.39 mg/dL (03-15-23 @ 12:30)    Procalcitonin, Serum: 0.13 ng/mL (03-14-23 @ 20:35)     SARS-CoV-2: NotDetec (03-15-23 @ 09:00)  COVID-19 PCR: NotDetec (03-08-23 @ 12:50)  COVID-19 PCR: NotDetec (03-02-23 @ 11:00)  COVID-19 PCR: NotDetec (02-19-23 @ 13:30)        < from: US Duplex Venous Upper Ext Ltd, Left (03.16.23 @ 19:17) >  ACC: [id number] EXAM:  US DPLX UPR EXT VEINS LTD LT   ORDERED BY: JADA CALDERON     PROCEDURE DATE:  03/16/2023          INTERPRETATION:  CLINICAL INFORMATION: Left arm pain and swelling after   IV infiltration    COMPARISON: None available.    TECHNIQUE: Duplex sonography of the LEFT UPPER extremity veins with color   and spectral Doppler, with and without compression.    FINDINGS:    The left internal jugular, subclavian, axillary and brachial veins are   patent and compressible where applicable.  The basilic veins (superficial   veins) are patent and without thrombus. 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    Doppler examination shows normal spontaneous and phasic flow.    IMPRESSION:  No evidence of left upper extremity deep venous thrombosis.  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 Left ventricular ejection fraction, by visual estimation, is 20 to   25%.   2. Moderately to severely decreased global left ventricular systolic   function.   3. Perkins is abnormal as described above.   4. The mitral in-flow pattern reveals no discernable A-wave, therefore   no comment on diastolic function can be made.   5. No LV thrombus.   6. Moderately reduced RV systolic function.   7. There is no evidence of pericardial effusion.   8. Mild thickening of the anterior and posterior mitral valve leaflets.   9. Moderate mitral annular calcification.  10. Mild-moderate tricuspid regurgitation.  11. Aortic Stenosis with V max 3.1 m/sec, Mean gradient of 23 mm Hg.  12. Endocardial visualization was enhanced with intravenous echo contrast.    < end of copied text >

## 2023-03-20 NOTE — PROGRESS NOTE ADULT - PROBLEM SELECTOR PROBLEM 3
Atrial fibrillation
Occlusion of radial artery
CAD (coronary artery disease)
Occlusion of radial artery
Atrial fibrillation
CVA (cerebrovascular accident)

## 2023-03-20 NOTE — CHART NOTE - NSCHARTNOTEFT_GEN_A_CORE
Source: Patient [x ]  Family [ ]   other [ x] 1:1    Current Diet:   Diet, DASH/TLC:   Sodium & Cholesterol Restricted  Soft and Bite Sized (SOFTBTSZ)  Mildly Thick Liquids (MILDTHICKLIQS) (03-14-23 @ 22:41)    Patient reports [ ] nausea  [ ] vomiting [ ] diarrhea [ ] constipation  [ x]chewing problems [ x] swallowing issues  [ ] other:     PO intake:  < 50% [ x]   50-75%  [x ]   %  [ ]  other :    Source for PO intake [x ] Patient [ ] family [x ] chart [ x] staff [ ] other    Current Weight:   (3/20) 260 lbs RD bedscale   (3/17)  270.2 lbs  (3/15)  264.7 lbs     % Weight Change: Unclear accuracy of weight 2/2 inconsistency, will continue to monitor     Pertinent Medications: MEDICATIONS  (STANDING):  atorvastatin 40 milliGRAM(s) Oral at bedtime  budesonide 160 MICROgram(s)/formoterol 4.5 MICROgram(s) Inhaler 2 Puff(s) Inhalation two times a day  chlorhexidine 2% Cloths 1 Application(s) Topical <User Schedule>  digoxin  Injectable 250 MICROGram(s) IV Push once  metoprolol tartrate 50 milliGRAM(s) Oral two times a day  midodrine 5 milliGRAM(s) Oral every 8 hours  nafcillin  IVPB 2 Gram(s) IV Intermittent every 4 hours  OLANZapine 10 milliGRAM(s) Oral at bedtime  oxybutynin XL 10 milliGRAM(s) Oral <User Schedule>  polyethylene glycol 3350 17 Gram(s) Oral daily  tamsulosin 0.4 milliGRAM(s) Oral at bedtime    MEDICATIONS  (PRN):  acetaminophen     Tablet .. 650 milliGRAM(s) Oral every 6 hours PRN Temp greater or equal to 38C (100.4F), Mild Pain (1 - 3), Moderate Pain (4 - 6)  albuterol/ipratropium for Nebulization 3 milliLiter(s) Nebulizer every 6 hours PRN Shortness of Breath and/or Wheezing  bisacodyl Suppository 10 milliGRAM(s) Rectal daily PRN Constipation    Pertinent Labs: CBC Full  -  ( 19 Mar 2023 03:57 )  WBC Count : 5.42 K/uL  RBC Count : 3.83 M/uL  Hemoglobin : 10.2 g/dL  Hematocrit : 34.1 %  Platelet Count - Automated : 168 K/uL  Mean Cell Volume : 89.0 fl  Mean Cell Hemoglobin : 26.6 pg  Mean Cell Hemoglobin Concentration : 29.9 gm/dL    Skin: IAD  Edema: 1+ L arm    Nutrition focused physical exam conducted - found signs of malnutrition [ ]absent [x ]present    Subcutaneous fat loss: [ x] Orbital fat pads region, [ ]Buccal fat region, [ ]Triceps region,  [ ]Ribs region    Muscle wasting: [x ]Temples region, [ ]Clavicle region, [ ]Shoulder region, [ ]Scapula region, [ ]Interosseous region,  [ ]thigh region, [ ]Calf region    Estimated Needs:   [x ] no change since previous assessment  [ ] recalculated:     Current Nutrition Diagnosis: Pt presents at high nutrition risk secondary to malnutrition (moderate acute) related to inability to meet sufficient protein-energy needs in setting of TIA, dysphagia, advanced age, multiple comorbidities as evidenced by mild muscle/fat loss, meeting <75% EER >7 days, possible weight loss.   S/p SLP eval (3/15) recommending NPO with MBS, MBS completed (3/16) recommending Minced/moist/thin current diet order reads for Soft & Bite Sized/mild thick, tolerating per CNA. Pt dislikes diet consistency and states not eating well, asking for menu items not allowed on Soft & Bite Sized. Discussed with SLP for possible re-eval for upgrade. Fecal incontinence noted, last documented BM 3/19.     Recommendations:   1) Add Ensure BID   2) Rx MVI daily   3) SLP intervention as feasible for possible consistency upgrade  4) Encourage HBV protein sources   5) Provide encouragement/assistance as needed during mealtimes to inc PO   6) Monitor weights daily for trend/accuracy     Monitoring and Evaluation:   [x ] PO intake [ ] Tolerance to diet prescription [X] Weights  [X] Follow up per protocol [X] Labs: Source: Patient [x ]  Family [ ]   other [ x] 1:1    Current Diet:   Diet, DASH/TLC:   Sodium & Cholesterol Restricted  Soft and Bite Sized (SOFTBTSZ)  Mildly Thick Liquids (MILDTHICKLIQS) (03-14-23 @ 22:41)    Patient reports [ ] nausea  [ ] vomiting [ ] diarrhea [ ] constipation  [ x]chewing problems [ x] swallowing issues  [ ] other:     PO intake:  < 50% [ x]   50-75%  [x ]   %  [ ]  other :    Source for PO intake [x ] Patient [ ] family [x ] chart [ x] staff [ ] other    Current Weight:   (3/20) 260 lbs RD bedscale   (3/17)  270.2 lbs  (3/15)  264.7 lbs     % Weight Change: Unclear accuracy of weight 2/2 inconsistency, will continue to monitor     Pertinent Medications: MEDICATIONS  (STANDING):  atorvastatin 40 milliGRAM(s) Oral at bedtime  budesonide 160 MICROgram(s)/formoterol 4.5 MICROgram(s) Inhaler 2 Puff(s) Inhalation two times a day  chlorhexidine 2% Cloths 1 Application(s) Topical <User Schedule>  digoxin  Injectable 250 MICROGram(s) IV Push once  metoprolol tartrate 50 milliGRAM(s) Oral two times a day  midodrine 5 milliGRAM(s) Oral every 8 hours  nafcillin  IVPB 2 Gram(s) IV Intermittent every 4 hours  OLANZapine 10 milliGRAM(s) Oral at bedtime  oxybutynin XL 10 milliGRAM(s) Oral <User Schedule>  polyethylene glycol 3350 17 Gram(s) Oral daily  tamsulosin 0.4 milliGRAM(s) Oral at bedtime    MEDICATIONS  (PRN):  acetaminophen     Tablet .. 650 milliGRAM(s) Oral every 6 hours PRN Temp greater or equal to 38C (100.4F), Mild Pain (1 - 3), Moderate Pain (4 - 6)  albuterol/ipratropium for Nebulization 3 milliLiter(s) Nebulizer every 6 hours PRN Shortness of Breath and/or Wheezing  bisacodyl Suppository 10 milliGRAM(s) Rectal daily PRN Constipation    Pertinent Labs: CBC Full  -  ( 19 Mar 2023 03:57 )  WBC Count : 5.42 K/uL  RBC Count : 3.83 M/uL  Hemoglobin : 10.2 g/dL  Hematocrit : 34.1 %  Platelet Count - Automated : 168 K/uL  Mean Cell Volume : 89.0 fl  Mean Cell Hemoglobin : 26.6 pg  Mean Cell Hemoglobin Concentration : 29.9 gm/dL    Skin: IAD  Edema: 1+ L arm    Nutrition focused physical exam conducted - found signs of malnutrition [ ]absent [x ]present    Subcutaneous fat loss: [ x] Orbital fat pads region, [ ]Buccal fat region, [ ]Triceps region,  [ ]Ribs region    Muscle wasting: [x ]Temples region, [ ]Clavicle region, [ ]Shoulder region, [ ]Scapula region, [ ]Interosseous region,  [ ]thigh region, [ ]Calf region    Estimated Needs:   [x ] no change since previous assessment  [ ] recalculated:     Current Nutrition Diagnosis: Pt presents at high nutrition risk secondary to malnutrition (moderate acute) related to inability to meet sufficient protein-energy needs in setting of TIA, dysphagia, advanced age, multiple comorbidities as evidenced by mild muscle/fat loss, meeting <75% EER >7 days, possible weight loss.   S/p SLP eval (3/15) recommending NPO with MBS, MBS completed (3/16) recommending Minced/moist/thin current diet order reads for Soft & Bite Sized/mild thick, tolerating per CNA. Pt dislikes diet consistency and states not eating well, asking for menu items not allowed on Soft & Bite Sized. Discussed with SLP for possible re-eval for upgrade. Fecal incontinence noted, last documented BM 3/19.     Recommendations:   1) Change to regular diet, consistency Minced/moist with thin liquids per SLP recommendations (MBS completed 3/16)  2)Add Ensure BID   3) Rx MVI daily   4) SLP intervention as feasible for possible consistency upgrade  5) Encourage HBV protein sources   6) Provide encouragement/assistance as needed during mealtimes to inc PO   7) Monitor weights daily for trend/accuracy     Monitoring and Evaluation:   [x ] PO intake [ ] Tolerance to diet prescription [X] Weights  [X] Follow up per protocol [X] Labs:

## 2023-03-20 NOTE — PHYSICAL THERAPY INITIAL EVALUATION ADULT - ACTIVE RANGE OF MOTION EXAMINATION, REHAB EVAL
b/l UE/LE grossly to mid-range
bilateral upper extremity Active ROM was WFL (within functional limits)/bilateral  lower extremity Active ROM was WFL (within functional limits)

## 2023-03-20 NOTE — PHYSICAL THERAPY INITIAL EVALUATION ADULT - LEVEL OF INDEPENDENCE: GAIT, REHAB EVAL
moderate assist (50% patients effort)
Pt limited by pain, spO2 @ 82%, pt returned to semifowler position with 2L O2./unable to perform

## 2023-03-20 NOTE — PROGRESS NOTE ADULT - ASSESSMENT
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  Course was C/B MSSA bacteremia, sepsis with shock requiring IV pressors, and AFib with RVR. id / cardio following downgraded to medicine 3/17/23     #acute cva / likely embolic in the setting of a-fib and factor V leiden deficiency  - INR as high as 8 on 3/18 but today trending down to 6 with holding coumadin  - hold coumadin for now - no signs of active bleeding  - continue to trend  - likely altered due to antibiotics  - continue cardiac monitor  - will discuss with cardiology/neuro if they feel coumadin failure due to therapeutic INR on admission  - uncertain if there is a better option  - neurochecks daily    #a-fib with RVR  - cardiology recs appreciated  - increase metoprolol to 50mg BID PO  - continue tele  - therapeutic INR    #MSSA bacteremia  - Sepsis with shock - s/p pressors  - No evidence of vegetation on TTE   - LUE thrombophlebitis may be the source  - S/p Vanc/Zosyn, now on  Nafcillin (3/16- ); final cultures from 3/17 so far are clear  - may not need UMER if clearing  - ID following     #chronic systolic chf / HFrEF - EF 20-25%  - does not seem to be in acute failure at this time   - metoprolol as above  - Monitor electrolytes and replete   - Cardiology following     #coagulopathy   - due to coumadin     #acute anemia - continue to trend  - on ac   - OB stool negative    #hypotension - increased BP today but increasing the metoprolol  - on midodrine,  taper down dose     #dvt ppx ;  on  coumadin, held today due to high INR    DISPO: still acute; discussed plan with daughter today who is concerned about long term placement plan' ultimately back to rehab after this admission with consideration of transitioning to long term care.   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  Course was C/B MSSA bacteremia, sepsis with shock requiring IV pressors, and AFib with RVR. 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   - INR today is 5  - continue to hold coumadin  - heme/onc consulted earlier in course for determination of coumadin vs other AC  - they asked for lupus antibodies which were collected as part of 'lupus profile' but have not resulted  - will send ab again for cardiolipin and glycoprotein  - continue coumadin for now as they recommend  - continue to trend INR as well - likely altered due to antibiotics  - continue cardiac monitor  - neurology deferring AC to hematology  - neurochecks daily    #a-fib with RVR - now rate controlled  - cardiology recs appreciated  - c/w metoprolol to 50mg BID PO  - continue tele  - therapeutic INR    #MSSA bacteremia  - Sepsis with shock - s/p pressors  - No evidence of vegetation on TTE   - LUE thrombophlebitis may be the source  - S/p Vanc/Zosyn, now on  Nafcillin (3/16- ); final cultures from 3/17 so far are clear  - as per ID since bacteremic for 3 days needs UMER to determine 2 vs 6 weeks of IV abx  - ID following   - will reconsult cardiology    #chronic systolic chf / HFrEF - EF 20-25%  - does not seem to be in acute failure at this time   - metoprolol as above  - Monitor electrolytes and replete   - Cardiology following     #coagulopathy   - due to coumadin     #acute anemia - continue to trend  - on ac   - OB stool negative    #hypotension - increased BP today but increasing the metoprolol  - on midodrine,  taper down dose     #dvt ppx ;  on  coumadin, held today due to high INR    DISPO: still acute; discussed plan with daughter today

## 2023-03-20 NOTE — PHYSICAL THERAPY INITIAL EVALUATION ADULT - ADDITIONAL COMMENTS
pt [place] alone in a [place] with 7 +7 steps to enter(+rail); has a cane and RW. has aides 4 hours/day, 5 days/week. Pt currently from [facility] rehab.
pt lives alone in a [place] with 7 +7 steps to enter(+rail); has a cane and RW. has aides 4 hours/day, 5 days/week. Pt currently from [facility] rehab.

## 2023-03-20 NOTE — PROGRESS NOTE ADULT - PROBLEM SELECTOR PLAN 3
.   - hx of factor V leiden  - continue coumadin based on INR, continue ASA  - INR 5.19 today, recheck tomorrow prior to procedure
.   - continue metoprolol when able, patient is now on midodrine to wean off ajay and hypotensive  - s/p 0.5mg Digoxin with improvement in rate
-Hx CABG  -Trop neg  -GDMT: ASA. statin
.   - continue metoprolol uptitrate as needed
- rate controlled on current meds  - continue metoprolol
.   - continue metoprolol  uptitrate as needed  - s/p 0.5mg Digoxin with improvement in rate

## 2023-03-20 NOTE — DIETITIAN NUTRITION RISK NOTIFICATION - TREATMENT: THE FOLLOWING DIET HAS BEEN RECOMMENDED
Diet, DASH/TLC:   Sodium & Cholesterol Restricted  Soft and Bite Sized (SOFTBTSZ)  Mildly Thick Liquids (MILDTHICKLIQS) (03-14-23 @ 22:41) [Active]

## 2023-03-20 NOTE — PROGRESS NOTE ADULT - PROBLEM SELECTOR PROBLEM 2
CVA (cerebrovascular accident)
Chronic HFrEF (heart failure with reduced ejection fraction)
Chronic HFrEF (heart failure with reduced ejection fraction)
CVA (cerebrovascular accident)

## 2023-03-20 NOTE — PROGRESS NOTE ADULT - NS ATTEND AMEND GEN_ALL_CORE FT
89 y/o M with PMH AF, HFrEF (LVEF 25-30% 2/2023), AAA (declined vascular intervention), LFLG severe AS (s/p dobutamine stress echocardiogram which showed pseudo-AS with no contractile reserve, no indication for TAVR/intervention), CAD s/p CABG (LIMA-LAD, SVG-PDA), s/p AV fibroelastoma resection 2019, Factor V Leiden with prior DVT/PE s/p IVC filter, admitted with RRA occlusion, found to have uncontrolled AF. AF is now under control but the patient was noted to have blood cultures positive for MSSA.   - Echo without evidence of vegetation or severe valvular disease  - INR currently very elevated, unable to assess with UMRE at the moment  - Plan to evaluate with UMER with INR is more acceptable, continue to hold coumadin

## 2023-03-21 LAB
CARDIOLIPIN AB SER-ACNC: NEGATIVE — SIGNIFICANT CHANGE UP
INR BLD: 3.09 RATIO — HIGH (ref 0.88–1.16)
PROTHROM AB SERPL-ACNC: 36.2 SEC — HIGH (ref 10.5–13.4)

## 2023-03-21 PROCEDURE — 99232 SBSQ HOSP IP/OBS MODERATE 35: CPT | Mod: FS

## 2023-03-21 PROCEDURE — 93312 ECHO TRANSESOPHAGEAL: CPT | Mod: 26

## 2023-03-21 PROCEDURE — 99233 SBSQ HOSP IP/OBS HIGH 50: CPT

## 2023-03-21 PROCEDURE — 99232 SBSQ HOSP IP/OBS MODERATE 35: CPT

## 2023-03-21 PROCEDURE — 93320 DOPPLER ECHO COMPLETE: CPT | Mod: 26

## 2023-03-21 PROCEDURE — 93325 DOPPLER ECHO COLOR FLOW MAPG: CPT | Mod: 26

## 2023-03-21 RX ORDER — METOPROLOL TARTRATE 50 MG
50 TABLET ORAL
Refills: 0 | Status: DISCONTINUED | OUTPATIENT
Start: 2023-03-21 | End: 2023-03-22

## 2023-03-21 RX ORDER — WARFARIN SODIUM 2.5 MG/1
2 TABLET ORAL ONCE
Refills: 0 | Status: COMPLETED | OUTPATIENT
Start: 2023-03-21 | End: 2023-03-21

## 2023-03-21 RX ORDER — METOPROLOL TARTRATE 50 MG
5 TABLET ORAL ONCE
Refills: 0 | Status: COMPLETED | OUTPATIENT
Start: 2023-03-21 | End: 2023-03-21

## 2023-03-21 RX ADMIN — OLANZAPINE 10 MILLIGRAM(S): 15 TABLET, FILM COATED ORAL at 21:43

## 2023-03-21 RX ADMIN — TAMSULOSIN HYDROCHLORIDE 0.4 MILLIGRAM(S): 0.4 CAPSULE ORAL at 21:43

## 2023-03-21 RX ADMIN — Medication 650 MILLIGRAM(S): at 02:37

## 2023-03-21 RX ADMIN — WARFARIN SODIUM 2 MILLIGRAM(S): 2.5 TABLET ORAL at 21:43

## 2023-03-21 RX ADMIN — BUDESONIDE AND FORMOTEROL FUMARATE DIHYDRATE 2 PUFF(S): 160; 4.5 AEROSOL RESPIRATORY (INHALATION) at 19:57

## 2023-03-21 RX ADMIN — Medication 10 MILLIGRAM(S): at 21:43

## 2023-03-21 RX ADMIN — NAFCILLIN 200 GRAM(S): 10 INJECTION, POWDER, FOR SOLUTION INTRAVENOUS at 06:09

## 2023-03-21 RX ADMIN — Medication 50 MILLIGRAM(S): at 16:53

## 2023-03-21 RX ADMIN — Medication 650 MILLIGRAM(S): at 02:07

## 2023-03-21 RX ADMIN — BUDESONIDE AND FORMOTEROL FUMARATE DIHYDRATE 2 PUFF(S): 160; 4.5 AEROSOL RESPIRATORY (INHALATION) at 08:57

## 2023-03-21 RX ADMIN — NAFCILLIN 200 GRAM(S): 10 INJECTION, POWDER, FOR SOLUTION INTRAVENOUS at 01:55

## 2023-03-21 RX ADMIN — NAFCILLIN 200 GRAM(S): 10 INJECTION, POWDER, FOR SOLUTION INTRAVENOUS at 16:05

## 2023-03-21 RX ADMIN — CHLORHEXIDINE GLUCONATE 1 APPLICATION(S): 213 SOLUTION TOPICAL at 06:11

## 2023-03-21 RX ADMIN — ATORVASTATIN CALCIUM 40 MILLIGRAM(S): 80 TABLET, FILM COATED ORAL at 21:43

## 2023-03-21 RX ADMIN — Medication 37.5 MILLIGRAM(S): at 06:10

## 2023-03-21 RX ADMIN — NAFCILLIN 200 GRAM(S): 10 INJECTION, POWDER, FOR SOLUTION INTRAVENOUS at 21:42

## 2023-03-21 RX ADMIN — NAFCILLIN 200 GRAM(S): 10 INJECTION, POWDER, FOR SOLUTION INTRAVENOUS at 10:14

## 2023-03-21 RX ADMIN — Medication 5 MILLIGRAM(S): at 02:33

## 2023-03-21 NOTE — CHART NOTE - NSCHARTNOTEFT_GEN_A_CORE
Interval Hx: RN called for patient in afib with RVR HR in 130s. Patient is a 88 year old male with known afib with RVR during this admission, seen st bedside with no complaints. Denies chest pain, SOB, difficulty breathing, palpitations.     Vitals  T(C): 36.8 (03-20-23 @ 21:33), Max: 36.8 (03-20-23 @ 21:33)  HR: 136 (03-21-23 @ 02:21) (88 - 136)  BP: 103/74 (03-21-23 @ 02:21) (103/74 - 132/86)  RR: 18 (03-21-23 @ 02:21) (18 - 18)  SpO2: 95% (03-21-23 @ 02:21) (95% - 100%)    Physical Exam  CONSTITUTIONAL: Patient laying in bed, no apparent distress  RESP: No respiratory distress, no use of accessory muscles; CTA b/l, no WRR  CV: irregularly irregular rate and rhythm, +S1S2  PSYCH: disoriented     A/P: 88 year old male with pmh of afib, admitted for acute CVA, seen today for afib with RVR    Afib with RVR  -known  -tele monitor reviewed   -metoprolol 5mg IV stat    Disorientation  -known  -Turned off TV and reoriented patient to go to sleep     Will continue to monitor and RN to notify provider with any changes in patient status

## 2023-03-21 NOTE — PROGRESS NOTE ADULT - ASSESSMENT
ASSESSMENT: 88 yr old male with hypertension,  atrial fibrillation, PE/DVT s/p IVC filter, Factor V Leiden; on coumadin CAD s/p CABG, stroke , COPD, GIGI, AAA, infrarenal 6.1 cm  on prior ct presented with right sided numbness started at around 11am  3/8/23 at [facility] by ER report. CT head without acute infarction or hemorrhage. Excluded from Tenecteplasea as INR > 1.7 (currently 2.30).  CT angiogram without evidence of large vessel occlusion- not thrombectomy ca ndidate. Moderate left carotid stenosis. MRI head demonstrates left hemispheric posterior frontal cortical and subcortical acute infarctions.  Remote thalamic and callosal infarcts.  Etiology possibly cardioembolic in setting of atrial fibrillation vs. hypercoagulable states as patient with history of factor V Leiden.        NEURO: neurologically without acute change, improved overall vs. admission neurologically, Continue close monitoring for neurologic deterioration with q2 stroke neuro checks for now to ensure neurological stability in setting of hemodynamic changes (rapid a fib/hypotension), repeat CT head for any acute change in status. permissive HTN overall < 160mmHg (anticoagulation use in setting of AAA),  110-140mmHg would avoid further hypotension and rapid fluctuations, titrate statin to LDL goal less than 70, MRI Brain w/o as noted, US carotid 2/23/23 with no hemodynamically significant stenosis ,   Physical therapy/OT/Speech eval/treatment.     ANTITHROMBOTIC THERAPY: neurologically suggest to continue therapeutic anticoagulation, permitting no medical contraindication. Noted Warfarin as  home regimen.  Would confirm INR goal 2-3 with outpatient prescriber/heme/cardio to ensure this is optimal agent as patient presented with INR 2.3 with acute infarction, additionally clarify adherence to further guide decision making.      PULMONARY:   protecting airway, saturating well     CARDIOVASCULAR:  TTE as noted on 2/21/23 with EF 25-30% , repeat EF 20-25%, cardiac monitoring to ensure rate control, cardiology following for further recommendations: suggested GDMT- not candidate for advance therapies as noted. AAA monitoring per cardiology.                               GASTROINTESTINAL:  dysphagia screen  complete/ pass  Diet: Puree, advance as tolerated    RENAL: BUN/Cr elevated, MILAGRO on CKD 3B, maintain adequate hydration as tolerated , nephrology follow up, monitor urine output      Na Goal: Greater than 135    HEMATOLOGY: H/H with downtrend but now mild improvement, screen for bleeding, Platelets 168,  , hematology followup as patient with recurrent infarction despite presentation of therapeutic INR. Clarify Adherence and maintain close monitoring of INR.     DVT ppx:   therapeutically anticoagulated with Warfarin if INR 2-3. If below- consider  pharmacological DVT ppx     ID: afebrile,  leukocytosis resolved, monitor for si/sx of infection , currently undergoing tx for MSSA bacteremia.     Other: Vascular follow up for right radial artery occlusion: no indication for any vascular intervention. Occlusion likely chronic. Suggested to continue therapeutic anticoagulation.     DISPOSITION: Rehab, depending on PT eval once stable and workup is complete      CORE MEASURES:     Admission NIHSS: 4  Tenecteplase : [] YES [x] NO   LDL/HDL/A1C: 58/34/5.9  Depression Screen:p   Statin Therapy: as noted   Dysphagia Screen: [x] PASS [] FAIL    Smoking [] YES [x] NO      Afib [x] YES [] NO     Stroke Education [x] YES [] NO

## 2023-03-21 NOTE — PROGRESS NOTE ADULT - SUBJECTIVE AND OBJECTIVE BOX
CC: right sided weakness / right radial artery occlusion. (17 Mar 2023 12:13)    INTERVAL HPI/OVERNIGHT EVENTS:  no acute events    Vital Signs Last 24 Hrs  T(C): 36.6 (21 Mar 2023 10:40), Max: 36.8 (20 Mar 2023 21:33)  T(F): 97.8 (21 Mar 2023 10:40), Max: 98.3 (20 Mar 2023 21:33)  HR: 110 (21 Mar 2023 10:40) (88 - 136)  BP: 101/64 (21 Mar 2023 10:40) (101/64 - 148/85)  BP(mean): --  RR: 18 (21 Mar 2023 10:40) (18 - 18)  SpO2: 98% (21 Mar 2023 10:40) (94% - 100%)    Parameters below as of 21 Mar 2023 10:40  Patient On (Oxygen Delivery Method): room air    PHYSICAL EXAM:  General: in no acute distress  Eyes: PERRLA, EOMI; conjunctiva and sclera clear  Head: Normocephalic; atraumatic  ENMT: No nasal discharge; airway clear  Neck: Supple; non tender; no masses  Respiratory: No wheezes, rales or rhonchi  Cardiovascular: irregular tachycardic rate and rhythm  Gastrointestinal: Soft non-tender non-distended; Normal bowel sounds  Genitourinary: No costovertebral angle tenderness  Extremities: no edema; LUE with erythema and thrombophlebitis of the RUE  Vascular: Peripheral pulses palpable 2+ bilaterally  Neurological: Alert and oriented x4; decreased strength in RUE compared to LUE, decreased sensation as well on the right compared to left lower ext  Skin: Warm and dry. No acute rash  Psychiatric: Cooperative and appropriate    I&O's Detail    17 Mar 2023 07:01  -  18 Mar 2023 07:00  --------------------------------------------------------  IN:    IV PiggyBack: 200 mL    Oral Fluid: 180 mL  Total IN: 380 mL    OUT:    Incontinent per Condom Catheter (mL): 525 mL  Total OUT: 525 mL    Total NET: -145 mL    18 Mar 2023 07:01  -  18 Mar 2023 22:36  --------------------------------------------------------  IN:  Total IN: 0 mL    OUT:    Voided (mL): 420 mL  Total OUT: 420 mL    Total NET: -420 mL               9.9    5.06  )-----------( 152      ( 18 Mar 2023 07:30 )             32.6     18 Mar 2023 07:30    139    |  104    |  24.9   ----------------------------<  111    4.1     |  24.0   |  1.59     Ca    8.6        18 Mar 2023 07:30  Phos  4.0       17 Mar 2023 05:50  Mg     2.1       18 Mar 2023 07:30    TPro  5.7    /  Alb  3.0    /  TBili  0.5    /  DBili  x      /  AST  25     /  ALT  19     /  AlkPhos  79     18 Mar 2023 07:30    PT/INR - ( 18 Mar 2023 07:30 )   PT: 104.3 sec;   INR: 8.80 ratio      PTT - ( 18 Mar 2023 07:30 )  PTT:45.2 sec  CAPILLARY BLOOD GLUCOSE    LIVER FUNCTIONS - ( 18 Mar 2023 07:30 )  Alb: 3.0 g/dL / Pro: 5.7 g/dL / ALK PHOS: 79 U/L / ALT: 19 U/L / AST: 25 U/L / GGT: x           MEDICATIONS  (STANDING):  atorvastatin 40 milliGRAM(s) Oral at bedtime  budesonide 160 MICROgram(s)/formoterol 4.5 MICROgram(s) Inhaler 2 Puff(s) Inhalation two times a day  chlorhexidine 2% Cloths 1 Application(s) Topical <User Schedule>  digoxin  Injectable 250 MICROGram(s) IV Push once  metoprolol tartrate 50 milliGRAM(s) Oral two times a day  midodrine 5 milliGRAM(s) Oral every 8 hours  nafcillin  IVPB 2 Gram(s) IV Intermittent every 4 hours  OLANZapine 10 milliGRAM(s) Oral at bedtime  oxybutynin XL 10 milliGRAM(s) Oral <User Schedule>  polyethylene glycol 3350 17 Gram(s) Oral daily  tamsulosin 0.4 milliGRAM(s) Oral at bedtime    MEDICATIONS  (PRN):  acetaminophen     Tablet .. 650 milliGRAM(s) Oral every 6 hours PRN Temp greater or equal to 38C (100.4F), Mild Pain (1 - 3), Moderate Pain (4 - 6)  albuterol/ipratropium for Nebulization 3 milliLiter(s) Nebulizer every 6 hours PRN Shortness of Breath and/or Wheezing  bisacodyl Suppository 10 milliGRAM(s) Rectal daily PRN Constipation      RADIOLOGY & ADDITIONAL TESTS:

## 2023-03-21 NOTE — PROGRESS NOTE ADULT - SUBJECTIVE AND OBJECTIVE BOX
[facility]  INFECTIOUS DISEASES at [place] and [place]  =======================================================                               Marques Mike MD#   Alexa Yanes MD*                             Gabrielle Baird MD*   Bisi Sandoval MD*            Diplomates American Board of Internal Medicine & Infectious Diseases                # [place] Office - Appt - Tel  [phone] Fax [phone]                * [place] Office - Appt - Tel [phone] Fax [phone]                                  Hospital Consult line:  [phone]  =======================================================    SANDI SANTOS [id number]    Follow up: MSSA bacteremia/septic shock    No fevers     Allergies:  No Known Allergies  OHS (Unknown)       REVIEW OF SYSTEMS:  CONSTITUTIONAL:  No Fever or chills  HEENT:   No diplopia or blurred vision.  No earache, sore throat or runny nose.  CARDIOVASCULAR:  No Chest Pain  RESPIRATORY:  No cough, shortness of breath  GASTROINTESTINAL:  No nausea, vomiting or diarrhea.  GENITOURINARY:  No dysuria, frequency or urgency. No Blood in urine  MUSCULOSKELETAL:  no joint aches, no muscle pain  SKIN:  No change in skin, hair or nails.  NEUROLOGIC:  No Headaches, seizures   PSYCHIATRIC:  No disorder of thought or mood.  ENDOCRINE:  No heat or cold intolerance  HEMATOLOGICAL:  No easy bruising or bleeding.       Physical Exam:  GEN: NAD  HEENT: normocephalic and atraumatic. EOMI. PERRL.    NECK: Supple.   LUNGS: CTA B/L.  HEART: RRR  ABDOMEN: Soft, NT, ND.  +BS.    : No CVA tenderness  EXTREMITIES: Without  edema.  MSK: No joint swelling  NEUROLOGIC: No Focal Deficits   PSYCHIATRIC: Appropriate affect .  SKIN: Left antecubital area with improved erythema and swelling. no warmth.       Vitals:  T(F): 98.1 (21 Mar 2023 04:35), Max: 98.3 (20 Mar 2023 21:33)  HR: 101 (21 Mar 2023 08:58)  BP: 148/85 (21 Mar 2023 04:35)  RR: 18 (21 Mar 2023 04:35)  SpO2: 99% (21 Mar 2023 08:58) (94% - 100%)  temp max in last 48H T(F): , Max: 98.3 (03-19-23 @ 21:21)    Current Antibiotics:  nafcillin  IVPB 2 Gram(s) IV Intermittent every 4 hours    Other medications:  atorvastatin 40 milliGRAM(s) Oral at bedtime  budesonide 160 MICROgram(s)/formoterol 4.5 MICROgram(s) Inhaler 2 Puff(s) Inhalation two times a day  chlorhexidine 2% Cloths 1 Application(s) Topical <User Schedule>  metoprolol tartrate 50 milliGRAM(s) Oral two times a day  midodrine 5 milliGRAM(s) Oral every 8 hours  OLANZapine 10 milliGRAM(s) Oral at bedtime  oxybutynin XL 10 milliGRAM(s) Oral <User Schedule>  polyethylene glycol 3350 17 Gram(s) Oral daily  tamsulosin 0.4 milliGRAM(s) Oral at bedtime      RECENT CULTURES:  03-18 @ 07:30 .Blood Blood     No growth to date.    03-17 @ 06:09 .Blood Blood     No growth to date.    03-17 @ 05:50 .Blood Blood     No growth to date.    03-16 @ 13:06 .Blood Blood-Peripheral     Growth in aerobic bottle: Staphylococcus aureus  See previous culture [id number]  Growth in aerobic bottle: Gram Positive Cocci in Clusters    03-16 @ 13:00 .Blood Blood-Peripheral     Growth in aerobic bottle: Staphylococcus aureus  See previous culture [id number]  Growth in aerobic bottle: Gram Positive Cocci in Clusters    03-15 @ 09:00    [facility]  NotDete    03-14 @ 20:57 .Blood Blood-Peripheral Blood Culture PCR  Staphylococcus aureus    Growth in aerobic and anaerobic bottles: Staphylococcus aureus  Growth in aerobic bottle: Bacillus species not anthracis  "Susceptibilities not performed"  ***Blood Panel PCR results on this specimen are available  approximately 3 hours after the Gram stain result.***  Gram stain, PCR, and/or culture results may not always  correspond due to difference in methodologies.  ************************************************************  This PCR assay was performed by multiplex PCR. This  Assay tests for 66 bacterial and resistance gene targets.  Please refer to the [facility] test directory  at [email or web address] for details.  Growth in anaerobic bottle: Gram Positive Cocci in Clusters  Growth in aerobic bottle: Gram Positive Cocci in Clusters and Gram  Positive Rods    03-08 @ 13:40 Clean Catch Clean Catch (Midstream)     <10,000 CFU/mL Normal Urogenital Sangeetha      WBC Count: 5.42 K/uL (03-19-23 @ 03:57)  WBC Count: 5.06 K/uL (03-18-23 @ 07:30)  WBC Count: 5.13 K/uL (03-17-23 @ 05:50)    Creatinine, Serum: 1.50 mg/dL (03-19-23 @ 03:57)  Creatinine, Serum: 1.59 mg/dL (03-18-23 @ 07:30)  Creatinine, Serum: 1.40 mg/dL (03-17-23 @ 05:50)    Procalcitonin, Serum: 0.13 ng/mL (03-14-23 @ 20:35)     COVID-19 PCR: NotDetec (03-20-23 @ 17:50)  SARS-CoV-2: NotDetec (03-15-23 @ 09:00)  COVID-19 PCR: NotDetec (03-08-23 @ 12:50)  COVID-19 PCR: NotDetec (03-02-23 @ 11:00)  COVID-19 PCR: NotDetec (02-19-23 @ 13:30)        < from: US Duplex Venous Upper Ext Ltd, Left (03.16.23 @ 19:17) >  ACC: [id number] EXAM:  US DPLX UPR EXT VEINS LTD LT   ORDERED BY: JADA CALDERON     PROCEDURE DATE:  03/16/2023          INTERPRETATION:  CLINICAL INFORMATION: Left arm pain and swelling after   IV infiltration    COMPARISON: None available.    TECHNIQUE: Duplex sonography of the LEFT UPPER extremity veins with color   and spectral Doppler, with and without compression.    FINDINGS:    The left internal jugular, subclavian, axillary and brachial veins are   patent and compressible where applicable.  The basilic veins (superficial   veins) are patent and without thrombus. 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    Doppler examination shows normal spontaneous and phasic flow.    IMPRESSION:  No evidence of left upper extremity deep venous thrombosis.  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 Left ventricular ejection fraction, by visual estimation, is 20 to   25%.   2. Moderately to severely decreased global left ventricular systolic   function.   3. [place] is abnormal as described above.   4. The mitral in-flow pattern reveals no discernable A-wave, therefore   no comment on diastolic function can be made.   5. No LV thrombus.   6. Moderately reduced RV systolic function.   7. There is no evidence of pericardial effusion.   8. Mild thickening of the anterior and posterior mitral valve leaflets.   9. Moderate mitral annular calcification.  10. Mild-moderate tricuspid regurgitation.  11. Aortic Stenosis with V max 3.1 m/sec, Mean gradient of 23 mm Hg.  12. Endocardial visualization was enhanced with intravenous echo contrast.    < end of copied text >

## 2023-03-21 NOTE — PROGRESS NOTE ADULT - NS ATTEND AMEND GEN_ALL_CORE FT
`89 y/o M with PMH AF, HFrEF (LVEF 25-30% 2/2023), AAA (declined vascular intervention), LFLG severe AS (s/p dobutamine stress echocardiogram which showed pseudo-AS with no contractile reserve, no indication for TAVR/intervention), CAD s/p CABG (LIMA-LAD, SVG-PDA), s/p AV fibroelastoma resection 2019, Factor V Leiden with prior DVT/PE s/p IVC filter, admitted with RRA occlusion, found to have uncontrolled AF. AF is now under control but the patient was noted to have blood cultures positive for MSSA.   - Echo without evidence of vegetation or severe valvular disease  - UMER negative for vegetation. No evidence of infective endocarditis at this time.     We will sign off. Please call with questions.

## 2023-03-21 NOTE — PROGRESS NOTE ADULT - SUBJECTIVE AND OBJECTIVE BOX
Preliminary note, offical recommendations pending attending review/signature   [facility] Stroke Team  Progress Note     HPI:  88 yr old male with hypertension,  atrial fibrillation, PE/DVT s/p IVC filter, Factor V Leiden on coumadin CAD s/p CABG, stroke COPD, GIGI, AAA, infrarenal 6.1 cm  on prior ct presented with right sided weakness started at 11am  3/8/23 at [facility] by ER report. Upon review patient notes  numbness  to right hand,  arm and leg denied slurred speech, changes in vision, weakness, vision. Patient on coumadin thinks that took it day of presentation.  Noted symptoms  were improving on ER eval, ER  NIH 0- INR 2.30, excluded from Tenecteplase . It was noted also thinks that had an old stroke with some residual numbness/weakness on the same side in the past - notes sometimes gets worse . Patient stated at this time he feels back to normal.       SUBJECTIVE: Noted Atrial Fibrillation with RVR overnight.  No new neurologic complaints.  ROS reported negative unless otherwise noted.    acetaminophen     Tablet .. 650 milliGRAM(s) Oral every 6 hours PRN  albuterol/ipratropium for Nebulization 3 milliLiter(s) Nebulizer every 6 hours PRN  atorvastatin 40 milliGRAM(s) Oral at bedtime  bisacodyl Suppository 10 milliGRAM(s) Rectal daily PRN  budesonide 160 MICROgram(s)/formoterol 4.5 MICROgram(s) Inhaler 2 Puff(s) Inhalation two times a day  chlorhexidine 2% Cloths 1 Application(s) Topical <User Schedule>  metoprolol tartrate 50 milliGRAM(s) Oral two times a day  midodrine 5 milliGRAM(s) Oral every 8 hours  nafcillin  IVPB 2 Gram(s) IV Intermittent every 4 hours  OLANZapine 10 milliGRAM(s) Oral at bedtime  oxybutynin XL 10 milliGRAM(s) Oral <User Schedule>  polyethylene glycol 3350 17 Gram(s) Oral daily  tamsulosin 0.4 milliGRAM(s) Oral at bedtime      PHYSICAL EXAM:   Vital Signs Last 24 Hrs  T(C): 36.7 (21 Mar 2023 04:35), Max: 36.8 (20 Mar 2023 21:33)  T(F): 98.1 (21 Mar 2023 04:35), Max: 98.3 (20 Mar 2023 21:33)  HR: 101 (21 Mar 2023 08:58) (88 - 136)  BP: 148/85 (21 Mar 2023 04:35) (103/74 - 148/85)  BP(mean): --  RR: 18 (21 Mar 2023 04:35) (18 - 18)  SpO2: 99% (21 Mar 2023 08:58) (94% - 100%)    Parameters below as of 21 Mar 2023 08:58  Patient On (Oxygen Delivery Method): room air      EXAM PENDING   General: No acute distress    NEUROLOGICAL EXAM:  Mental status: The patient is awake and alert and has normal attention span.  The patient is oriented to self,  notes [place] then notes [place],  March 2023, disoriented to date. The patient is able to name objects, follow commands, repetition intact.     Cranial nerves: Pupils equal and react symmetrically to light. There is no visual field deficit to confrontation. Extraocular motion is full with no nystagmus. There is no ptosis. Facial sensation is intact. Slight right facial palsy that corrects on smile,    Tongue is midline.    Motor: There is normal bulk and tone.  There is no tremor. Subtle b/l UE drifts- symmetric.   Strength is 5/5 in the right arm and leg.   Strength is 5/5 in the left arm and leg.    Sensation intact throughout, symmetric, no extinction     LABS:       PT/INR - ( 21 Mar 2023 04:20 )   PT: 36.2 sec;   INR: 3.09 ratio               IMAGING: Reviewed by me.       MR Head No Cont (03.10.23 @ 15:46)   1. Left medial posterior frontal cortical and subcortical acute infarction  2. Left lateral posterior frontal cortical and subcortical remote   infarction ; additional tiny left anterior frontal cortical remote   infarctions  3. Left thalamic and left callosal body remote deep infarctions  4. Ischemic white matter disease greater than typical for age  5. Diffuse brain volume loss typical for age    CT Angio Head Neck Stroke Protocol w/ IV Cont (03.08.23 @ 13:22)   7 mL area of increased Tmax in the right parietal lobe suggesting brain   at risk versus artifact. No core infarct. No large vessel occlusion. 50%   stenosis in left carotid bulb/proximal internal carotid artery. Consider   MRI of the brain as clinically warranted. Additional findings above.    CT Brain Stroke Protocol (03.08.23 @ 12:40)    Moderate chronic microvascular changes without evidence of   an acute transcortical infarction or hemorrhage.     US Duplex Carotid Arteries Complete, Bilateral (02.23.23 @ 11:03)   IMPRESSION: No significant hemodynamic stenosis of either carotid artery.    TTE Echo Complete w/ Contrast w/ Doppler (02.21.23 @ 11:34)   Summary:   1. Endocardial visualization was enhanced with intravenous echo   contrast. No LV thrombus.   2. Left ventricular ejection fraction, by visual estimation, is 25 to   30%.   3. Severely decreased global left ventricular systolic function.   4. Abnormal septal motion consistent with post-operative status.   5. There is mild eccentric left ventricular hypertrophy.   6. The mitralin-flow pattern reveals no discernable A-wave, therefore   no comment on diastolic function can be made.   7. Normal right ventricular size and function, estimated PASP least 42   mmHg.   8. Severely enlarged left atrium.   9. Right atrial enlargement.  10. Moderate mitral annular calcification.  11. Mild-moderate tricuspid regurgitation.  12. Severe calcific aortic valve stenosis with low gradient,   dimensionless index 0.23.  13. There is moderate aortic root calcification.  14. Borderline dilatation of the aortic root, sinuses of Valsalva and   ascending aorta 3.9 cm, index to BSA 1.6 cm/m2 within normal.  15. Compared to the outpatient TTE study from 9/2022 prevoiusly LV EF 37%   and known low gradient aortic valve stenosis, but visually on current   study appears severely stenotic.     TTE Echo Complete w/ Contrast w/ Doppler (03.12.23 @ 12:55)   Summary:   1. Left ventricular ejection fraction, by visual estimation, is 20 to   25%.   2. Moderately to severely decreased global left ventricular systolic   function.   3. [place] is abnormal as described above.   4. The mitral in-flow pattern reveals no discernable A-wave, therefore   no comment on diastolic function can be made.   5. No LV thrombus.   6. Moderately reduced RV systolic function.   7. There is no evidence of pericardial effusion.   8. Mild thickening of the anterior and posterior mitral valve leaflets.   9. Moderate mitral annular calcification.  10. Mild-moderate tricuspid regurgitation.  11. Aortic Stenosis with V max 3.1 m/sec, Mean gradient of 23 mm Hg.  12. Endocardial visualization was enhanced with intravenous echo contrast.      Preliminary note, offical recommendations pending attending review/signature   [facility] Stroke Team  Progress Note     HPI:  88 yr old male with hypertension,  atrial fibrillation, PE/DVT s/p IVC filter, Factor V Leiden on coumadin CAD s/p CABG, stroke COPD, GIGI, AAA, infrarenal 6.1 cm  on prior ct presented with right sided weakness started at 11am  3/8/23 at [facility] by ER report. Upon review patient notes  numbness  to right hand,  arm and leg denied slurred speech, changes in vision, weakness, vision. Patient on coumadin thinks that took it day of presentation.  Noted symptoms  were improving on ER eval, ER  NIH 0- INR 2.30, excluded from Tenecteplase . It was noted also thinks that had an old stroke with some residual numbness/weakness on the same side in the past - notes sometimes gets worse . Patient stated at this time he feels back to normal.       SUBJECTIVE: Noted Atrial Fibrillation with RVR overnight.  No new neurologic complaints.  ROS reported negative unless otherwise noted.    acetaminophen     Tablet .. 650 milliGRAM(s) Oral every 6 hours PRN  albuterol/ipratropium for Nebulization 3 milliLiter(s) Nebulizer every 6 hours PRN  atorvastatin 40 milliGRAM(s) Oral at bedtime  bisacodyl Suppository 10 milliGRAM(s) Rectal daily PRN  budesonide 160 MICROgram(s)/formoterol 4.5 MICROgram(s) Inhaler 2 Puff(s) Inhalation two times a day  chlorhexidine 2% Cloths 1 Application(s) Topical <User Schedule>  metoprolol tartrate 50 milliGRAM(s) Oral two times a day  midodrine 5 milliGRAM(s) Oral every 8 hours  nafcillin  IVPB 2 Gram(s) IV Intermittent every 4 hours  OLANZapine 10 milliGRAM(s) Oral at bedtime  oxybutynin XL 10 milliGRAM(s) Oral <User Schedule>  polyethylene glycol 3350 17 Gram(s) Oral daily  tamsulosin 0.4 milliGRAM(s) Oral at bedtime      PHYSICAL EXAM:   Vital Signs Last 24 Hrs  T(C): 36.7 (21 Mar 2023 04:35), Max: 36.8 (20 Mar 2023 21:33)  T(F): 98.1 (21 Mar 2023 04:35), Max: 98.3 (20 Mar 2023 21:33)  HR: 101 (21 Mar 2023 08:58) (88 - 136)  BP: 148/85 (21 Mar 2023 04:35) (103/74 - 148/85)  BP(mean): --  RR: 18 (21 Mar 2023 04:35) (18 - 18)  SpO2: 99% (21 Mar 2023 08:58) (94% - 100%)    Parameters below as of 21 Mar 2023 08:58  Patient On (Oxygen Delivery Method): room air        General: No acute distress    NEUROLOGICAL EXAM:  Mental status: The patient is awake and alert and has normal attention span.  The patient is oriented to self,  notes [place] then notes [place],  March 2023, disoriented to date. The patient is able to name objects, follow commands, repetition intact.     Cranial nerves: Pupils equal and react symmetrically to light. There is no visual field deficit to confrontation. Extraocular motion is full with no nystagmus. There is no ptosis. Facial sensation is intact. Slight right facial palsy that corrects on smile,    Tongue is midline.    Motor: There is normal bulk and tone.  There is no tremor. Subtle b/l UE drifts- symmetric.   Strength is 5/5 in the right arm and leg.   Strength is 5/5 in the left arm and leg.    Sensation intact throughout, symmetric, no extinction     LABS:       PT/INR - ( 21 Mar 2023 04:20 )   PT: 36.2 sec;   INR: 3.09 ratio               IMAGING: Reviewed by me.       MR Head No Cont (03.10.23 @ 15:46)   1. Left medial posterior frontal cortical and subcortical acute infarction  2. Left lateral posterior frontal cortical and subcortical remote   infarction ; additional tiny left anterior frontal cortical remote   infarctions  3. Left thalamic and left callosal body remote deep infarctions  4. Ischemic white matter disease greater than typical for age  5. Diffuse brain volume loss typical for age    CT Angio Head Neck Stroke Protocol w/ IV Cont (03.08.23 @ 13:22)   7 mL area of increased Tmax in the right parietal lobe suggesting brain   at risk versus artifact. No core infarct. No large vessel occlusion. 50%   stenosis in left carotid bulb/proximal internal carotid artery. Consider   MRI of the brain as clinically warranted. Additional findings above.    CT Brain Stroke Protocol (03.08.23 @ 12:40)    Moderate chronic microvascular changes without evidence of   an acute transcortical infarction or hemorrhage.     US Duplex Carotid Arteries Complete, Bilateral (02.23.23 @ 11:03)   IMPRESSION: No significant hemodynamic stenosis of either carotid artery.    TTE Echo Complete w/ Contrast w/ Doppler (02.21.23 @ 11:34)   Summary:   1. Endocardial visualization was enhanced with intravenous echo   contrast. No LV thrombus.   2. Left ventricular ejection fraction, by visual estimation, is 25 to   30%.   3. Severely decreased global left ventricular systolic function.   4. Abnormal septal motion consistent with post-operative status.   5. There is mild eccentric left ventricular hypertrophy.   6. The mitralin-flow pattern reveals no discernable A-wave, therefore   no comment on diastolic function can be made.   7. Normal right ventricular size and function, estimated PASP least 42   mmHg.   8. Severely enlarged left atrium.   9. Right atrial enlargement.  10. Moderate mitral annular calcification.  11. Mild-moderate tricuspid regurgitation.  12. Severe calcific aortic valve stenosis with low gradient,   dimensionless index 0.23.  13. There is moderate aortic root calcification.  14. Borderline dilatation of the aortic root, sinuses of Valsalva and   ascending aorta 3.9 cm, index to BSA 1.6 cm/m2 within normal.  15. Compared to the outpatient TTE study from 9/2022 prevoiusly LV EF 37%   and known low gradient aortic valve stenosis, but visually on current   study appears severely stenotic.     TTE Echo Complete w/ Contrast w/ Doppler (03.12.23 @ 12:55)   Summary:   1. Left ventricular ejection fraction, by visual estimation, is 20 to   25%.   2. Moderately to severely decreased global left ventricular systolic   function.   3. [place] is abnormal as described above.   4. The mitral in-flow pattern reveals no discernable A-wave, therefore   no comment on diastolic function can be made.   5. No LV thrombus.   6. Moderately reduced RV systolic function.   7. There is no evidence of pericardial effusion.   8. Mild thickening of the anterior and posterior mitral valve leaflets.   9. Moderate mitral annular calcification.  10. Mild-moderate tricuspid regurgitation.  11. Aortic Stenosis with V max 3.1 m/sec, Mean gradient of 23 mm Hg.  12. Endocardial visualization was enhanced with intravenous echo contrast.

## 2023-03-21 NOTE — CHART NOTE - NSCHARTNOTESELECT_GEN_ALL_CORE
Event Note
Nutrition Services
2 HR RR F/U/Event Note
Cardiology/Off Service Note
Event Note
Event Note
Transfer Note

## 2023-03-21 NOTE — PROGRESS NOTE ADULT - SUBJECTIVE AND OBJECTIVE BOX
[facility]                                                         CARDIOLOGY AT [facility]                                                                  [address], [place] [address]                                                         Telephone: [phone]. Fax:[phone]                                                                             PROGRESS NOTE    Reason for follow up: "feels ok" denies complaints of chest pain/sob/dizziness/palps   Update: confused at times   NPO for UMER today       Review of symptoms:   Cardiac:  No chest pain. No dyspnea. No palpitations.  Respiratory: no cough. No dyspnea  Gastrointestinal: No diarrhea. No abdominal pain. No bleeding.   Neuro: No focal neuro complaints.    Vitals:  T(C): 36.7 (03-21-23 @ 04:35), Max: 36.8 (03-20-23 @ 21:33)  HR: 101 (03-21-23 @ 08:58) (88 - 136)  BP: 148/85 (03-21-23 @ 04:35) (103/74 - 148/85)  RR: 18 (03-21-23 @ 04:35) (18 - 18)  SpO2: 99% (03-21-23 @ 08:58) (94% - 100%)  Wt(kg): --  I&O's Summary    20 Mar 2023 07:01  -  21 Mar 2023 07:00  --------------------------------------------------------  IN: 0 mL / OUT: 450 mL / NET: -450 mL          PHYSICAL EXAM:  Appearance:  Pale Comfortable. No acute distress  HEENT:  Atraumatic. Normocephalic.  Normal oral mucosa  Neurologic: A & O x 3, no gross focal deficits.  Cardiovascular: RRR S1 S2 IRREG 90's , 2/6 BG  murmur, no rubs/gallops. No JVD  Respiratory: Lungs clear to auscultation, unlabored   Gastrointestinal:   obese Soft, Non-tender, + BS  Lower Extremities: 2+ Peripheral Pulses, No clubbing, cyanosis, or edema  Psychiatry: Patient is calm. No agitation.   Skin: warm and dry.    CURRENT CARDIAC MEDICATIONS:  metoprolol tartrate 50 milliGRAM(s) Oral two times a day  midodrine 5 milliGRAM(s) Oral every 8 hours      CURRENT OTHER MEDICATIONS:  albuterol/ipratropium for Nebulization 3 milliLiter(s) Nebulizer every 6 hours PRN Shortness of Breath and/or Wheezing  budesonide 160 MICROgram(s)/formoterol 4.5 MICROgram(s) Inhaler 2 Puff(s) Inhalation two times a day  nafcillin  IVPB 2 Gram(s) IV Intermittent every 4 hours  acetaminophen     Tablet .. 650 milliGRAM(s) Oral every 6 hours PRN Temp greater or equal to 38C (100.4F), Mild Pain (1 - 3), Moderate Pain (4 - 6)  OLANZapine 10 milliGRAM(s) Oral at bedtime  bisacodyl Suppository 10 milliGRAM(s) Rectal daily PRN Constipation  polyethylene glycol 3350 17 Gram(s) Oral daily  atorvastatin 40 milliGRAM(s) Oral at bedtime  chlorhexidine 2% Cloths 1 Application(s) Topical <User Schedule>  oxybutynin XL 10 milliGRAM(s) Oral <User Schedule>  tamsulosin 0.4 milliGRAM(s) Oral at bedtime      LABS:	 	  ( 15 Mar 2023 12:30 )  Troponin T  X    ,  CPK  108  , CKMB  X    , BNP X        , ( 14 Mar 2023 20:35 )  Troponin T  0.02 ,  CPK  X    , CKMB  X    , BNP X        , ( 08 Mar 2023 12:50 )  Troponin T  0.01 ,  CPK  X    , CKMB  X    , BNP X                    PT/INR/PTT ( 21 Mar 2023 04:20 )                       :                       :      36.2         :       X                     .        .                   .              .           .       3.09        .                                       Lipid Profile: Date: 03-09 @ 06:06  Total cholesterol 124; Direct LDL: --; HDL: 34; Triglycerides:159    HgA1c:   TSH:     TELEMETRY: AF 90's     DIAGNOSTIC TESTING:(03.12.23 @ 12:55) >  [1. Left ventricular ejection fraction, by visual estimation, is 20 to   25%.   2. Moderately to severely decreased global left ventricular systolic   function.   3. [place] is abnormal as described above.   4. The mitral in-flow pattern reveals no discernable A-wave, therefore   no comment on diastolic function can be made.   5. No LV thrombus.   6. Moderately reduced RV systolic function.   7. There is no evidence of pericardial effusion.   8. Mild thickening of the anterior and posterior mitral valve leaflets.   9. Moderate mitral annular calcification.  10. Mild-moderate tricuspid regurgitation.  11. Aortic Stenosis with V max 3.1 m/sec, Mean gradient of 23 mm Hg.  12. Endocardial visualization was enhanced with intravenous echo contrast.    < from: MR Head No Cont (03.10.23 @ 15:46) >   Left medial posterior frontal cortical and subcortical acute infarction    2. Left lateral posterior frontal cortical and subcortical remote   infarction ; additional tiny left anterior frontal cortical remote   infarctions    3. Left thalamic and left callosal body remote deep infarctions    4. Ischemic white matter disease greater than typical for age    5. Diffuse brain volume loss typical for age    < end of copied text >

## 2023-03-21 NOTE — PROGRESS NOTE ADULT - SUBJECTIVE AND OBJECTIVE BOX
Patient received in Cardiac Catheterization Holding room for a UMER to assess for Cardioembolic source of CVA.    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 sided radial and pedal pulses. Found to have uncontrolled afib, subsequently found to have CVA.     Patient now sp UMER which revealed no clot, no PFO, or other cardioembolic source.    Pt did not gargle and may resume a po diet.    Pt A&O x 3  lungs CTA  S1S2    Resume POC as per primary team.      Patient

## 2023-03-21 NOTE — PROGRESS NOTE ADULT - NUTRITIONAL ASSESSMENT
This patient has been assessed with a concern for Malnutrition and has been determined to have a diagnosis/diagnoses of Moderate protein-calorie malnutrition.    This patient is being managed with:   Diet NPO after Midnight-     NPO Start Date: 20-Mar-2023   NPO Start Time: 23:59  Entered: Mar 20 2023  5:04PM    Diet DASH/TLC-  Sodium & Cholesterol Restricted  Soft and Bite Sized (SOFTBTSZ)  Mildly Thick Liquids (MILDTHICKLIQS)  Entered: Mar 14 2023  8:32PM    
This patient has been assessed with a concern for Malnutrition and has been determined to have a diagnosis/diagnoses of Moderate protein-calorie malnutrition.    This patient is being managed with:   Diet DASH/TLC-  Sodium & Cholesterol Restricted  Soft and Bite Sized (SOFTBTSZ)  Mildly Thick Liquids (MILDTHICKLIQS)  Entered: Mar 14 2023  8:32PM

## 2023-03-21 NOTE — PROGRESS NOTE ADULT - ASSESSMENT
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  Course was C/B MSSA bacteremia, sepsis with shock requiring IV pressors, and AFib with RVR. 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   - INR today is 5  - continue to hold coumadin  - heme/onc consulted earlier in course for determination of coumadin vs other AC  - they asked for lupus antibodies which were collected as part of 'lupus profile' but have not resulted  - sent ab again for cardiolipin and glycoprotein  - continue coumadin for now as they recommended  - continue to trend INR as well - likely altered due to antibiotics  - will restart low dose coumadin tonite since INR 3  - continue cardiac monitor since he is in and out of a-fib with RVR  - neurochecks daily for now    #a-fib with RVR - now rate controlled  - cardiology recs appreciated  - c/w metoprolol to 50mg BID PO - will increase dose to 75 tonite  - continue tele  - therapeutic INR now  - continue low dose coumadin tonite    #MSSA bacteremia  - Sepsis with shock - s/p pressors  - No evidence of vegetation on TTE   - LUE thrombophlebitis may be the source  - S/p Vanc/Zosyn, now on  Nafcillin (3/16- ); final cultures from 3/17 so far are clear  - as per ID since bacteremic for 3 days needs UMER to determine 2 vs 6 weeks of IV abx  - ID following   - UMER today - will follow results    #chronic systolic chf / HFrEF - EF 20-25%  - does not seem to be in acute failure at this time   - metoprolol as above  - Monitor electrolytes and replete   - Cardiology following     #coagulopathy   - due to coumadin     #acute anemia - continue to trend  - on ac   - OB stool negative    #hypotension   - increased BP today but increasing the metoprolol  - on midodrine,  taper down dose as soon as on stable dose of metoprolol    #dvt ppx ;  on  coumadin, will continue tonite    DISPO: still acute; discussed plan with daughter, palliative, ID today

## 2023-03-22 ENCOUNTER — TRANSCRIPTION ENCOUNTER (OUTPATIENT)
Age: 88
End: 2023-03-22

## 2023-03-22 VITALS
OXYGEN SATURATION: 98 % | DIASTOLIC BLOOD PRESSURE: 74 MMHG | TEMPERATURE: 98 F | SYSTOLIC BLOOD PRESSURE: 112 MMHG | HEART RATE: 94 BPM

## 2023-03-22 LAB
ANION GAP SERPL CALC-SCNC: 11 MMOL/L — SIGNIFICANT CHANGE UP (ref 5–17)
APTT BLD: 38.1 SEC — HIGH (ref 27.5–35.5)
B2 GLYCOPROT1 AB SER QL: NEGATIVE — SIGNIFICANT CHANGE UP
BUN SERPL-MCNC: 22 MG/DL — HIGH (ref 8–20)
CALCIUM SERPL-MCNC: 8.4 MG/DL — SIGNIFICANT CHANGE UP (ref 8.4–10.5)
CHLORIDE SERPL-SCNC: 106 MMOL/L — SIGNIFICANT CHANGE UP (ref 96–108)
CO2 SERPL-SCNC: 23 MMOL/L — SIGNIFICANT CHANGE UP (ref 22–29)
CREAT SERPL-MCNC: 1.17 MG/DL — SIGNIFICANT CHANGE UP (ref 0.5–1.3)
CULTURE RESULTS: SIGNIFICANT CHANGE UP
CULTURE RESULTS: SIGNIFICANT CHANGE UP
EGFR: 60 ML/MIN/1.73M2 — SIGNIFICANT CHANGE UP
GLUCOSE SERPL-MCNC: 120 MG/DL — HIGH (ref 70–99)
HCT VFR BLD CALC: 30.2 % — LOW (ref 39–50)
HGB BLD-MCNC: 9.1 G/DL — LOW (ref 13–17)
INR BLD: 2.06 RATIO — HIGH (ref 0.88–1.16)
MAGNESIUM SERPL-MCNC: 2 MG/DL — SIGNIFICANT CHANGE UP (ref 1.6–2.6)
MCHC RBC-ENTMCNC: 26.6 PG — LOW (ref 27–34)
MCHC RBC-ENTMCNC: 30.1 GM/DL — LOW (ref 32–36)
MCV RBC AUTO: 88.3 FL — SIGNIFICANT CHANGE UP (ref 80–100)
PHOSPHATE SERPL-MCNC: 3.6 MG/DL — SIGNIFICANT CHANGE UP (ref 2.4–4.7)
PLATELET # BLD AUTO: 284 K/UL — SIGNIFICANT CHANGE UP (ref 150–400)
POTASSIUM SERPL-MCNC: 3.6 MMOL/L — SIGNIFICANT CHANGE UP (ref 3.5–5.3)
POTASSIUM SERPL-SCNC: 3.6 MMOL/L — SIGNIFICANT CHANGE UP (ref 3.5–5.3)
PROTHROM AB SERPL-ACNC: 24.1 SEC — HIGH (ref 10.5–13.4)
RBC # BLD: 3.42 M/UL — LOW (ref 4.2–5.8)
RBC # FLD: 16.4 % — HIGH (ref 10.3–14.5)
SODIUM SERPL-SCNC: 140 MMOL/L — SIGNIFICANT CHANGE UP (ref 135–145)
SPECIMEN SOURCE: SIGNIFICANT CHANGE UP
SPECIMEN SOURCE: SIGNIFICANT CHANGE UP
WBC # BLD: 6.98 K/UL — SIGNIFICANT CHANGE UP (ref 3.8–10.5)
WBC # FLD AUTO: 6.98 K/UL — SIGNIFICANT CHANGE UP (ref 3.8–10.5)

## 2023-03-22 PROCEDURE — 36573 INSJ PICC RS&I 5 YR+: CPT

## 2023-03-22 PROCEDURE — 87040 BLOOD CULTURE FOR BACTERIA: CPT

## 2023-03-22 PROCEDURE — 71275 CT ANGIOGRAPHY CHEST: CPT | Mod: MA

## 2023-03-22 PROCEDURE — C8929: CPT

## 2023-03-22 PROCEDURE — 86146 BETA-2 GLYCOPROTEIN ANTIBODY: CPT

## 2023-03-22 PROCEDURE — 93325 DOPPLER ECHO COLOR FLOW MAPG: CPT

## 2023-03-22 PROCEDURE — 93312 ECHO TRANSESOPHAGEAL: CPT

## 2023-03-22 PROCEDURE — 80061 LIPID PANEL: CPT

## 2023-03-22 PROCEDURE — U0005: CPT

## 2023-03-22 PROCEDURE — 86901 BLOOD TYPING SEROLOGIC RH(D): CPT

## 2023-03-22 PROCEDURE — 87150 DNA/RNA AMPLIFIED PROBE: CPT

## 2023-03-22 PROCEDURE — 85730 THROMBOPLASTIN TIME PARTIAL: CPT

## 2023-03-22 PROCEDURE — 82140 ASSAY OF AMMONIA: CPT

## 2023-03-22 PROCEDURE — 80048 BASIC METABOLIC PNL TOTAL CA: CPT

## 2023-03-22 PROCEDURE — 85025 COMPLETE CBC W/AUTO DIFF WBC: CPT

## 2023-03-22 PROCEDURE — 81001 URINALYSIS AUTO W/SCOPE: CPT

## 2023-03-22 PROCEDURE — 87077 CULTURE AEROBIC IDENTIFY: CPT

## 2023-03-22 PROCEDURE — 71045 X-RAY EXAM CHEST 1 VIEW: CPT | Mod: 26

## 2023-03-22 PROCEDURE — 76942 ECHO GUIDE FOR BIOPSY: CPT | Mod: 26,59

## 2023-03-22 PROCEDURE — 94760 N-INVAS EAR/PLS OXIMETRY 1: CPT

## 2023-03-22 PROCEDURE — 84484 ASSAY OF TROPONIN QUANT: CPT

## 2023-03-22 PROCEDURE — 99233 SBSQ HOSP IP/OBS HIGH 50: CPT

## 2023-03-22 PROCEDURE — 74230 X-RAY XM SWLNG FUNCJ C+: CPT

## 2023-03-22 PROCEDURE — 99232 SBSQ HOSP IP/OBS MODERATE 35: CPT

## 2023-03-22 PROCEDURE — 0042T: CPT | Mod: MA

## 2023-03-22 PROCEDURE — 80202 ASSAY OF VANCOMYCIN: CPT

## 2023-03-22 PROCEDURE — 71045 X-RAY EXAM CHEST 1 VIEW: CPT

## 2023-03-22 PROCEDURE — 93971 EXTREMITY STUDY: CPT

## 2023-03-22 PROCEDURE — 87086 URINE CULTURE/COLONY COUNT: CPT

## 2023-03-22 PROCEDURE — 70496 CT ANGIOGRAPHY HEAD: CPT | Mod: MA

## 2023-03-22 PROCEDURE — 82550 ASSAY OF CK (CPK): CPT

## 2023-03-22 PROCEDURE — 70551 MRI BRAIN STEM W/O DYE: CPT | Mod: MA

## 2023-03-22 PROCEDURE — 82962 GLUCOSE BLOOD TEST: CPT

## 2023-03-22 PROCEDURE — 70450 CT HEAD/BRAIN W/O DYE: CPT | Mod: MA

## 2023-03-22 PROCEDURE — 80053 COMPREHEN METABOLIC PANEL: CPT

## 2023-03-22 PROCEDURE — 93005 ELECTROCARDIOGRAM TRACING: CPT

## 2023-03-22 PROCEDURE — 74174 CTA ABD&PLVS W/CONTRAST: CPT | Mod: MA

## 2023-03-22 PROCEDURE — 86147 CARDIOLIPIN ANTIBODY EA IG: CPT

## 2023-03-22 PROCEDURE — 94640 AIRWAY INHALATION TREATMENT: CPT

## 2023-03-22 PROCEDURE — 85610 PROTHROMBIN TIME: CPT

## 2023-03-22 PROCEDURE — 76937 US GUIDE VASCULAR ACCESS: CPT | Mod: 26,59

## 2023-03-22 PROCEDURE — 86850 RBC ANTIBODY SCREEN: CPT

## 2023-03-22 PROCEDURE — 83036 HEMOGLOBIN GLYCOSYLATED A1C: CPT

## 2023-03-22 PROCEDURE — U0003: CPT

## 2023-03-22 PROCEDURE — 99291 CRITICAL CARE FIRST HOUR: CPT

## 2023-03-22 PROCEDURE — 93931 UPPER EXTREMITY STUDY: CPT

## 2023-03-22 PROCEDURE — 87640 STAPH A DNA AMP PROBE: CPT

## 2023-03-22 PROCEDURE — 84145 PROCALCITONIN (PCT): CPT

## 2023-03-22 PROCEDURE — 85027 COMPLETE CBC AUTOMATED: CPT

## 2023-03-22 PROCEDURE — 83735 ASSAY OF MAGNESIUM: CPT

## 2023-03-22 PROCEDURE — 82272 OCCULT BLD FECES 1-3 TESTS: CPT

## 2023-03-22 PROCEDURE — 83605 ASSAY OF LACTIC ACID: CPT

## 2023-03-22 PROCEDURE — 99239 HOSP IP/OBS DSCHRG MGMT >30: CPT

## 2023-03-22 PROCEDURE — 93320 DOPPLER ECHO COMPLETE: CPT

## 2023-03-22 PROCEDURE — 81003 URINALYSIS AUTO W/O SCOPE: CPT

## 2023-03-22 PROCEDURE — 0225U NFCT DS DNA&RNA 21 SARSCOV2: CPT

## 2023-03-22 PROCEDURE — 86900 BLOOD TYPING SEROLOGIC ABO: CPT

## 2023-03-22 PROCEDURE — 80162 ASSAY OF DIGOXIN TOTAL: CPT

## 2023-03-22 PROCEDURE — 36415 COLL VENOUS BLD VENIPUNCTURE: CPT

## 2023-03-22 PROCEDURE — 87641 MR-STAPH DNA AMP PROBE: CPT

## 2023-03-22 PROCEDURE — 70498 CT ANGIOGRAPHY NECK: CPT | Mod: MA

## 2023-03-22 PROCEDURE — 84100 ASSAY OF PHOSPHORUS: CPT

## 2023-03-22 PROCEDURE — 87186 SC STD MICRODIL/AGAR DIL: CPT

## 2023-03-22 RX ORDER — WARFARIN SODIUM 2.5 MG/1
1 TABLET ORAL
Qty: 0 | Refills: 0 | DISCHARGE

## 2023-03-22 RX ORDER — BUDESONIDE AND FORMOTEROL FUMARATE DIHYDRATE 160; 4.5 UG/1; UG/1
2 AEROSOL RESPIRATORY (INHALATION)
Qty: 0 | Refills: 0 | DISCHARGE
Start: 2023-03-22

## 2023-03-22 RX ORDER — OLANZAPINE 15 MG/1
1 TABLET, FILM COATED ORAL
Qty: 0 | Refills: 0 | DISCHARGE
Start: 2023-03-22

## 2023-03-22 RX ORDER — METOPROLOL TARTRATE 50 MG
1 TABLET ORAL
Qty: 0 | Refills: 0 | DISCHARGE
Start: 2023-03-22

## 2023-03-22 RX ORDER — OXYBUTYNIN CHLORIDE 5 MG
1 TABLET ORAL
Qty: 0 | Refills: 0 | DISCHARGE

## 2023-03-22 RX ORDER — TAMSULOSIN HYDROCHLORIDE 0.4 MG/1
1 CAPSULE ORAL
Qty: 0 | Refills: 0 | DISCHARGE
Start: 2023-03-22

## 2023-03-22 RX ORDER — LOSARTAN POTASSIUM 100 MG/1
1 TABLET, FILM COATED ORAL
Qty: 0 | Refills: 0 | DISCHARGE

## 2023-03-22 RX ORDER — FLUTICASONE PROPIONATE AND SALMETEROL 50; 250 UG/1; UG/1
1 POWDER ORAL; RESPIRATORY (INHALATION)
Qty: 0 | Refills: 0 | DISCHARGE

## 2023-03-22 RX ORDER — ACETAMINOPHEN 500 MG
2 TABLET ORAL
Qty: 0 | Refills: 0 | DISCHARGE
Start: 2023-03-22

## 2023-03-22 RX ORDER — CHLORHEXIDINE GLUCONATE 213 G/1000ML
1 SOLUTION TOPICAL
Refills: 0 | Status: DISCONTINUED | OUTPATIENT
Start: 2023-03-22 | End: 2023-03-22

## 2023-03-22 RX ORDER — WARFARIN SODIUM 2.5 MG/1
1 TABLET ORAL
Qty: 0 | Refills: 0 | DISCHARGE
Start: 2023-03-22

## 2023-03-22 RX ORDER — NAFCILLIN 10 G/100ML
2 INJECTION, POWDER, FOR SOLUTION INTRAVENOUS
Qty: 0 | Refills: 0 | DISCHARGE
Start: 2023-03-22

## 2023-03-22 RX ORDER — WARFARIN SODIUM 2.5 MG/1
3 TABLET ORAL ONCE
Refills: 0 | Status: DISCONTINUED | OUTPATIENT
Start: 2023-03-22 | End: 2023-03-22

## 2023-03-22 RX ORDER — OXYBUTYNIN CHLORIDE 5 MG
1 TABLET ORAL
Qty: 0 | Refills: 0 | DISCHARGE
Start: 2023-03-22

## 2023-03-22 RX ORDER — SODIUM CHLORIDE 9 MG/ML
10 INJECTION INTRAMUSCULAR; INTRAVENOUS; SUBCUTANEOUS
Refills: 0 | Status: DISCONTINUED | OUTPATIENT
Start: 2023-03-22 | End: 2023-03-22

## 2023-03-22 RX ORDER — POLYETHYLENE GLYCOL 3350 17 G/17G
17 POWDER, FOR SOLUTION ORAL
Qty: 0 | Refills: 0 | DISCHARGE
Start: 2023-03-22

## 2023-03-22 RX ORDER — IPRATROPIUM/ALBUTEROL SULFATE 18-103MCG
3 AEROSOL WITH ADAPTER (GRAM) INHALATION
Qty: 0 | Refills: 0 | DISCHARGE
Start: 2023-03-22

## 2023-03-22 RX ADMIN — Medication 50 MILLIGRAM(S): at 05:36

## 2023-03-22 RX ADMIN — Medication 650 MILLIGRAM(S): at 16:50

## 2023-03-22 RX ADMIN — BUDESONIDE AND FORMOTEROL FUMARATE DIHYDRATE 2 PUFF(S): 160; 4.5 AEROSOL RESPIRATORY (INHALATION) at 08:57

## 2023-03-22 RX ADMIN — Medication 650 MILLIGRAM(S): at 10:41

## 2023-03-22 RX ADMIN — Medication 650 MILLIGRAM(S): at 09:57

## 2023-03-22 RX ADMIN — NAFCILLIN 200 GRAM(S): 10 INJECTION, POWDER, FOR SOLUTION INTRAVENOUS at 01:53

## 2023-03-22 RX ADMIN — NAFCILLIN 200 GRAM(S): 10 INJECTION, POWDER, FOR SOLUTION INTRAVENOUS at 09:57

## 2023-03-22 RX ADMIN — Medication 50 MILLIGRAM(S): at 17:08

## 2023-03-22 RX ADMIN — NAFCILLIN 200 GRAM(S): 10 INJECTION, POWDER, FOR SOLUTION INTRAVENOUS at 05:36

## 2023-03-22 RX ADMIN — NAFCILLIN 200 GRAM(S): 10 INJECTION, POWDER, FOR SOLUTION INTRAVENOUS at 13:14

## 2023-03-22 RX ADMIN — CHLORHEXIDINE GLUCONATE 1 APPLICATION(S): 213 SOLUTION TOPICAL at 05:37

## 2023-03-22 RX ADMIN — Medication 650 MILLIGRAM(S): at 03:28

## 2023-03-22 RX ADMIN — NAFCILLIN 200 GRAM(S): 10 INJECTION, POWDER, FOR SOLUTION INTRAVENOUS at 17:08

## 2023-03-22 RX ADMIN — Medication 650 MILLIGRAM(S): at 03:58

## 2023-03-22 RX ADMIN — CHLORHEXIDINE GLUCONATE 1 APPLICATION(S): 213 SOLUTION TOPICAL at 11:26

## 2023-03-22 RX ADMIN — Medication 650 MILLIGRAM(S): at 15:50

## 2023-03-22 NOTE — PROGRESS NOTE ADULT - SUBJECTIVE AND OBJECTIVE BOX
Preliminary note, offical recommendations pending attending review/signature   [facility] Stroke Team  Progress Note     HPI:  88 yr old male with hypertension,  atrial fibrillation, PE/DVT s/p IVC filter, Factor V Leiden on coumadin CAD s/p CABG, stroke COPD, GIGI, AAA, infrarenal 6.1 cm  on prior ct presented with right sided weakness started at 11am  3/8/23 at [facility] by ER report. Upon review patient notes  numbness  to right hand,  arm and leg denied slurred speech, changes in vision, weakness, vision. Patient on coumadin thinks that took it day of presentation.  Noted symptoms  were improving on ER eval, ER  NIH 0- INR 2.30, excluded from Tenecteplase . It was noted also thinks that had an old stroke with some residual numbness/weakness on the same side in the past - notes sometimes gets worse . Patient stated at this time he feels back to normal.       SUBJECTIVE: No events overnight.  No new neurologic complaints.  ROS reported negative unless otherwise noted.    acetaminophen     Tablet .. 650 milliGRAM(s) Oral every 6 hours PRN  albuterol/ipratropium for Nebulization 3 milliLiter(s) Nebulizer every 6 hours PRN  atorvastatin 40 milliGRAM(s) Oral at bedtime  bisacodyl Suppository 10 milliGRAM(s) Rectal daily PRN  budesonide 160 MICROgram(s)/formoterol 4.5 MICROgram(s) Inhaler 2 Puff(s) Inhalation two times a day  chlorhexidine 2% Cloths 1 Application(s) Topical <User Schedule>  metoprolol tartrate 50 milliGRAM(s) Oral two times a day  midodrine 5 milliGRAM(s) Oral every 8 hours  nafcillin  IVPB 2 Gram(s) IV Intermittent every 4 hours  OLANZapine 10 milliGRAM(s) Oral at bedtime  oxybutynin XL 10 milliGRAM(s) Oral <User Schedule>  polyethylene glycol 3350 17 Gram(s) Oral daily  tamsulosin 0.4 milliGRAM(s) Oral at bedtime  warfarin 3 milliGRAM(s) Oral once      PHYSICAL EXAM:   Vital Signs Last 24 Hrs  T(C): 36.4 (22 Mar 2023 05:11), Max: 36.8 (21 Mar 2023 16:42)  T(F): 97.6 (22 Mar 2023 05:11), Max: 98.2 (21 Mar 2023 16:42)  HR: 82 (22 Mar 2023 08:59) (77 - 126)  BP: 133/86 (22 Mar 2023 05:11) (101/64 - 141/91)  BP(mean): --  RR: 18 (22 Mar 2023 05:11) (17 - 21)  SpO2: 99% (22 Mar 2023 08:59) (94% - 99%)    Parameters below as of 22 Mar 2023 08:59  Patient On (Oxygen Delivery Method): nasal cannula,2 lpm      General: No acute distress    NEUROLOGICAL EXAM:  Mental status: The patient is awake and alert and has normal attention span.  The patient is oriented to self,  notes [place] then notes [place],  March 2023, disoriented to date. The patient is able to name objects, follow commands, repetition intact.     Cranial nerves: Pupils equal and react symmetrically to light. There is no visual field deficit to confrontation. Extraocular motion is full with no nystagmus. There is no ptosis. Facial sensation is intact. Slight right facial palsy that corrects on smile,    Tongue is midline.    Motor: There is normal bulk and tone.  There is no tremor. Subtle b/l UE drifts- symmetric.   Strength is 5/5 in the right arm and leg.   Strength is 5/5 in the left arm and leg.    Sensation intact throughout, symmetric, no extinction   LABS:                        9.1    6.98  )-----------( 284      ( 22 Mar 2023 04:25 )             30.2    03-22    140  |  106  |  22.0<H>  ----------------------------<  120<H>  3.6   |  23.0  |  1.17    Ca    8.4      22 Mar 2023 04:25  Phos  3.6     03-22  Mg     2.0     03-22    PT/INR - ( 22 Mar 2023 07:25 )   PT: 24.1 sec;   INR: 2.06 ratio         PTT - ( 22 Mar 2023 07:25 )  PTT:38.1 sec      IMAGING: Reviewed by me.       MR Head No Cont (03.10.23 @ 15:46)   1. Left medial posterior frontal cortical and subcortical acute infarction  2. Left lateral posterior frontal cortical and subcortical remote   infarction ; additional tiny left anterior frontal cortical remote   infarctions  3. Left thalamic and left callosal body remote deep infarctions  4. Ischemic white matter disease greater than typical for age  5. Diffuse brain volume loss typical for age    CT Angio Head Neck Stroke Protocol w/ IV Cont (03.08.23 @ 13:22)   7 mL area of increased Tmax in the right parietal lobe suggesting brain   at risk versus artifact. No core infarct. No large vessel occlusion. 50%   stenosis in left carotid bulb/proximal internal carotid artery. Consider   MRI of the brain as clinically warranted. Additional findings above.    CT Brain Stroke Protocol (03.08.23 @ 12:40)    Moderate chronic microvascular changes without evidence of   an acute transcortical infarction or hemorrhage.     US Duplex Carotid Arteries Complete, Bilateral (02.23.23 @ 11:03)   IMPRESSION: No significant hemodynamic stenosis of either carotid artery.    TTE Echo Complete w/ Contrast w/ Doppler (02.21.23 @ 11:34)   Summary:   1. Endocardial visualization was enhanced with intravenous echo   contrast. No LV thrombus.   2. Left ventricular ejection fraction, by visual estimation, is 25 to   30%.   3. Severely decreased global left ventricular systolic function.   4. Abnormal septal motion consistent with post-operative status.   5. There is mild eccentric left ventricular hypertrophy.   6. The mitralin-flow pattern reveals no discernable A-wave, therefore   no comment on diastolic function can be made.   7. Normal right ventricular size and function, estimated PASP least 42   mmHg.   8. Severely enlarged left atrium.   9. Right atrial enlargement.  10. Moderate mitral annular calcification.  11. Mild-moderate tricuspid regurgitation.  12. Severe calcific aortic valve stenosis with low gradient,   dimensionless index 0.23.  13. There is moderate aortic root calcification.  14. Borderline dilatation of the aortic root, sinuses of Valsalva and   ascending aorta 3.9 cm, index to BSA 1.6 cm/m2 within normal.  15. Compared to the outpatient TTE study from 9/2022 prevoiusly LV EF 37%   and known low gradient aortic valve stenosis, but visually on current   study appears severely stenotic.     TTE Echo Complete w/ Contrast w/ Doppler (03.12.23 @ 12:55)   Summary:   1. Left ventricular ejection fraction, by visual estimation, is 20 to   25%.   2. Moderately to severely decreased global left ventricular systolic   function.   3. [place] is abnormal as described above.   4. The mitral in-flow pattern reveals no discernable A-wave, therefore   no comment on diastolic function can be made.   5. No LV thrombus.   6. Moderately reduced RV systolic function.   7. There is no evidence of pericardial effusion.   8. Mild thickening of the anterior and posterior mitral valve leaflets.   9. Moderate mitral annular calcification.  10. Mild-moderate tricuspid regurgitation.  11. Aortic Stenosis with V max 3.1 m/sec, Mean gradient of 23 mm Hg.  12. Endocardial visualization was enhanced with intravenous echo contrast.     UMER Echo Doppler (03.21.23 @ 13:28)   Summary:   1. Left ventricular ejection fraction, by visual estimation, is <20%.   2. Severely decreased global left ventricular systolic function.   3. Moderate to severe left atrial enlargement.   4. Mildly enlarged right ventricle.   5. Severely reduced RV systolic function.   6. Spontaneous echo contrast noted in LA and LUIS.   7. Mildly enlarged right atrium.   8. There is no evidence ofpericardial effusion.   9. Mild thickening of the anterior and posterior mitral valve leaflets.  10. Trace mitral valve regurgitation.  11. Moderate tricuspid regurgitation.  12. Sclerotic aortic valve with decreased opening.  13. Aortic Valve is a trileaflet valve with significant thickening and   calcificatio, diminished excursion and doming of aortic valve leaflets.   No definite evidence of vegetation. Aortic Stenosis      Peak AV Vmax 2.46 m/sec      Peak PG is 24.3      Mean Gradient is 12.9.  14. Mild pulmonic valve regurgitation.  15. Color flow doppler and intravenous injection of agitated saline   demonstrates the presence of an intact intra atrial septum.  16. Decreased left atrial appendage velocities and no left atrial   appendage thrombus.  17. No intracavitary mass or thrombus.  18. No evidence of vegetation or vegetation on any valves.            Preliminary note, offical recommendations pending attending review/signature   [facility] Stroke Team  Progress Note     HPI:  88 yr old male with hypertension,  atrial fibrillation, PE/DVT s/p IVC filter, Factor V Leiden on coumadin CAD s/p CABG, stroke COPD, GIGI, AAA, infrarenal 6.1 cm  on prior ct presented with right sided weakness started at 11am  3/8/23 at [facility] by ER report. Upon review patient notes  numbness  to right hand,  arm and leg denied slurred speech, changes in vision, weakness, vision. Patient on coumadin thinks that took it day of presentation.  Noted symptoms  were improving on ER eval, ER  NIH 0- INR 2.30, excluded from Tenecteplase . It was noted also thinks that had an old stroke with some residual numbness/weakness on the same side in the past - notes sometimes gets worse . Patient stated at this time he feels back to normal.       SUBJECTIVE: No events overnight.  No new neurologic complaints.  ROS reported negative unless otherwise noted.    acetaminophen     Tablet .. 650 milliGRAM(s) Oral every 6 hours PRN  albuterol/ipratropium for Nebulization 3 milliLiter(s) Nebulizer every 6 hours PRN  atorvastatin 40 milliGRAM(s) Oral at bedtime  bisacodyl Suppository 10 milliGRAM(s) Rectal daily PRN  budesonide 160 MICROgram(s)/formoterol 4.5 MICROgram(s) Inhaler 2 Puff(s) Inhalation two times a day  chlorhexidine 2% Cloths 1 Application(s) Topical <User Schedule>  metoprolol tartrate 50 milliGRAM(s) Oral two times a day  midodrine 5 milliGRAM(s) Oral every 8 hours  nafcillin  IVPB 2 Gram(s) IV Intermittent every 4 hours  OLANZapine 10 milliGRAM(s) Oral at bedtime  oxybutynin XL 10 milliGRAM(s) Oral <User Schedule>  polyethylene glycol 3350 17 Gram(s) Oral daily  tamsulosin 0.4 milliGRAM(s) Oral at bedtime  warfarin 3 milliGRAM(s) Oral once      PHYSICAL EXAM:   Vital Signs Last 24 Hrs  T(C): 36.4 (22 Mar 2023 05:11), Max: 36.8 (21 Mar 2023 16:42)  T(F): 97.6 (22 Mar 2023 05:11), Max: 98.2 (21 Mar 2023 16:42)  HR: 82 (22 Mar 2023 08:59) (77 - 126)  BP: 133/86 (22 Mar 2023 05:11) (101/64 - 141/91)  BP(mean): --  RR: 18 (22 Mar 2023 05:11) (17 - 21)  SpO2: 99% (22 Mar 2023 08:59) (94% - 99%)    Parameters below as of 22 Mar 2023 08:59  Patient On (Oxygen Delivery Method): nasal cannula,2 lpm      General: No acute distress, sleeping, awakens readily to voice.    NEUROLOGICAL EXAM:  Mental status: The patient is awake and alert and has normal attention span.  The patient is oriented to self, south side, unclear on time- says we just woke up him.  The patient is able to name objects, follows simple and cross midline commands, repetition intact.     Cranial nerves: Pupils equal and react symmetrically to light. There is no visual field deficit to confrontation. Extraocular motion is full with no nystagmus. There is no ptosis. Facial sensation is intact. Slight right facial palsy that corrects on smile,    Tongue is midline.    Motor: There is normal bulk and tone.  There is no tremor. Subtle b/l UE drifts- symmetric.   Strength is 5/5 in the right arm and 3/5 hip and knee flexion, AT 5/5  Strength is 5/5 in the left arm and leg.    Sensation intact throughout, symmetric, no extinction   LABS:                        9.1    6.98  )-----------( 284      ( 22 Mar 2023 04:25 )             30.2    03-22    140  |  106  |  22.0<H>  ----------------------------<  120<H>  3.6   |  23.0  |  1.17    Ca    8.4      22 Mar 2023 04:25  Phos  3.6     03-22  Mg     2.0     03-22    PT/INR - ( 22 Mar 2023 07:25 )   PT: 24.1 sec;   INR: 2.06 ratio         PTT - ( 22 Mar 2023 07:25 )  PTT:38.1 sec      IMAGING: Reviewed by me.       MR Head No Cont (03.10.23 @ 15:46)   1. Left medial posterior frontal cortical and subcortical acute infarction  2. Left lateral posterior frontal cortical and subcortical remote   infarction ; additional tiny left anterior frontal cortical remote   infarctions  3. Left thalamic and left callosal body remote deep infarctions  4. Ischemic white matter disease greater than typical for age  5. Diffuse brain volume loss typical for age    CT Angio Head Neck Stroke Protocol w/ IV Cont (03.08.23 @ 13:22)   7 mL area of increased Tmax in the right parietal lobe suggesting brain   at risk versus artifact. No core infarct. No large vessel occlusion. 50%   stenosis in left carotid bulb/proximal internal carotid artery. Consider   MRI of the brain as clinically warranted. Additional findings above.    CT Brain Stroke Protocol (03.08.23 @ 12:40)    Moderate chronic microvascular changes without evidence of   an acute transcortical infarction or hemorrhage.     US Duplex Carotid Arteries Complete, Bilateral (02.23.23 @ 11:03)   IMPRESSION: No significant hemodynamic stenosis of either carotid artery.    TTE Echo Complete w/ Contrast w/ Doppler (02.21.23 @ 11:34)   Summary:   1. Endocardial visualization was enhanced with intravenous echo   contrast. No LV thrombus.   2. Left ventricular ejection fraction, by visual estimation, is 25 to   30%.   3. Severely decreased global left ventricular systolic function.   4. Abnormal septal motion consistent with post-operative status.   5. There is mild eccentric left ventricular hypertrophy.   6. The mitralin-flow pattern reveals no discernable A-wave, therefore   no comment on diastolic function can be made.   7. Normal right ventricular size and function, estimated PASP least 42   mmHg.   8. Severely enlarged left atrium.   9. Right atrial enlargement.  10. Moderate mitral annular calcification.  11. Mild-moderate tricuspid regurgitation.  12. Severe calcific aortic valve stenosis with low gradient,   dimensionless index 0.23.  13. There is moderate aortic root calcification.  14. Borderline dilatation of the aortic root, sinuses of Valsalva and   ascending aorta 3.9 cm, index to BSA 1.6 cm/m2 within normal.  15. Compared to the outpatient TTE study from 9/2022 prevoiusly LV EF 37%   and known low gradient aortic valve stenosis, but visually on current   study appears severely stenotic.     TTE Echo Complete w/ Contrast w/ Doppler (03.12.23 @ 12:55)   Summary:   1. Left ventricular ejection fraction, by visual estimation, is 20 to   25%.   2. Moderately to severely decreased global left ventricular systolic   function.   3. [place] is abnormal as described above.   4. The mitral in-flow pattern reveals no discernable A-wave, therefore   no comment on diastolic function can be made.   5. No LV thrombus.   6. Moderately reduced RV systolic function.   7. There is no evidence of pericardial effusion.   8. Mild thickening of the anterior and posterior mitral valve leaflets.   9. Moderate mitral annular calcification.  10. Mild-moderate tricuspid regurgitation.  11. Aortic Stenosis with V max 3.1 m/sec, Mean gradient of 23 mm Hg.  12. Endocardial visualization was enhanced with intravenous echo contrast.     UMER Echo Doppler (03.21.23 @ 13:28)   Summary:   1. Left ventricular ejection fraction, by visual estimation, is <20%.   2. Severely decreased global left ventricular systolic function.   3. Moderate to severe left atrial enlargement.   4. Mildly enlarged right ventricle.   5. Severely reduced RV systolic function.   6. Spontaneous echo contrast noted in LA and LUIS.   7. Mildly enlarged right atrium.   8. There is no evidence ofpericardial effusion.   9. Mild thickening of the anterior and posterior mitral valve leaflets.  10. Trace mitral valve regurgitation.  11. Moderate tricuspid regurgitation.  12. Sclerotic aortic valve with decreased opening.  13. Aortic Valve is a trileaflet valve with significant thickening and   calcificatio, diminished excursion and doming of aortic valve leaflets.   No definite evidence of vegetation. Aortic Stenosis      Peak AV Vmax 2.46 m/sec      Peak PG is 24.3      Mean Gradient is 12.9.  14. Mild pulmonic valve regurgitation.  15. Color flow doppler and intravenous injection of agitated saline   demonstrates the presence of an intact intra atrial septum.  16. Decreased left atrial appendage velocities and no left atrial   appendage thrombus.  17. No intracavitary mass or thrombus.  18. No evidence of vegetation or vegetation on any valves.              [facility] Stroke Team  Progress Note     HPI:  88 yr old male with hypertension,  atrial fibrillation, PE/DVT s/p IVC filter, Factor V Leiden on coumadin CAD s/p CABG, stroke COPD, GIGI, AAA, infrarenal 6.1 cm  on prior ct presented with right sided weakness started at 11am  3/8/23 at [facility] by ER report. Upon review patient notes  numbness  to right hand,  arm and leg denied slurred speech, changes in vision, weakness, vision. Patient on coumadin thinks that took it day of presentation.  Noted symptoms  were improving on ER eval, ER  NIH 0- INR 2.30, excluded from Tenecteplase . It was noted also thinks that had an old stroke with some residual numbness/weakness on the same side in the past - notes sometimes gets worse . Patient stated at this time he feels back to normal.       SUBJECTIVE: No events overnight.  No new neurologic complaints.  ROS reported negative unless otherwise noted.    acetaminophen     Tablet .. 650 milliGRAM(s) Oral every 6 hours PRN  albuterol/ipratropium for Nebulization 3 milliLiter(s) Nebulizer every 6 hours PRN  atorvastatin 40 milliGRAM(s) Oral at bedtime  bisacodyl Suppository 10 milliGRAM(s) Rectal daily PRN  budesonide 160 MICROgram(s)/formoterol 4.5 MICROgram(s) Inhaler 2 Puff(s) Inhalation two times a day  chlorhexidine 2% Cloths 1 Application(s) Topical <User Schedule>  metoprolol tartrate 50 milliGRAM(s) Oral two times a day  midodrine 5 milliGRAM(s) Oral every 8 hours  nafcillin  IVPB 2 Gram(s) IV Intermittent every 4 hours  OLANZapine 10 milliGRAM(s) Oral at bedtime  oxybutynin XL 10 milliGRAM(s) Oral <User Schedule>  polyethylene glycol 3350 17 Gram(s) Oral daily  tamsulosin 0.4 milliGRAM(s) Oral at bedtime  warfarin 3 milliGRAM(s) Oral once      PHYSICAL EXAM:   Vital Signs Last 24 Hrs  T(C): 36.4 (22 Mar 2023 05:11), Max: 36.8 (21 Mar 2023 16:42)  T(F): 97.6 (22 Mar 2023 05:11), Max: 98.2 (21 Mar 2023 16:42)  HR: 82 (22 Mar 2023 08:59) (77 - 126)  BP: 133/86 (22 Mar 2023 05:11) (101/64 - 141/91)  BP(mean): --  RR: 18 (22 Mar 2023 05:11) (17 - 21)  SpO2: 99% (22 Mar 2023 08:59) (94% - 99%)    Parameters below as of 22 Mar 2023 08:59  Patient On (Oxygen Delivery Method): nasal cannula,2 lpm      General: No acute distress, sleeping, awakens readily to voice.    NEUROLOGICAL EXAM:  Mental status: The patient is awake and alert and has normal attention span.  The patient is oriented to self, south side, unclear on time- says we just woke up him.  The patient is able to name objects, follows simple and cross midline commands, repetition intact.     Cranial nerves: Pupils equal and react symmetrically to light. There is no visual field deficit to confrontation. Extraocular motion is full with no nystagmus. There is no ptosis. Facial sensation is intact. Slight right facial palsy that corrects on smile,    Tongue is midline.    Motor: There is normal bulk and tone.  There is no tremor. Subtle b/l UE drifts- symmetric.   Strength is 5/5 in the right arm and 3/5 hip and knee flexion, AT 5/5  Strength is 5/5 in the left arm and leg.    Sensation intact throughout, symmetric, no extinction   LABS:                        9.1    6.98  )-----------( 284      ( 22 Mar 2023 04:25 )             30.2    03-22    140  |  106  |  22.0<H>  ----------------------------<  120<H>  3.6   |  23.0  |  1.17    Ca    8.4      22 Mar 2023 04:25  Phos  3.6     03-22  Mg     2.0     03-22    PT/INR - ( 22 Mar 2023 07:25 )   PT: 24.1 sec;   INR: 2.06 ratio         PTT - ( 22 Mar 2023 07:25 )  PTT:38.1 sec      IMAGING: Reviewed by me.       MR Head No Cont (03.10.23 @ 15:46)   1. Left medial posterior frontal cortical and subcortical acute infarction  2. Left lateral posterior frontal cortical and subcortical remote   infarction ; additional tiny left anterior frontal cortical remote   infarctions  3. Left thalamic and left callosal body remote deep infarctions  4. Ischemic white matter disease greater than typical for age  5. Diffuse brain volume loss typical for age    CT Angio Head Neck Stroke Protocol w/ IV Cont (03.08.23 @ 13:22)   7 mL area of increased Tmax in the right parietal lobe suggesting brain   at risk versus artifact. No core infarct. No large vessel occlusion. 50%   stenosis in left carotid bulb/proximal internal carotid artery. Consider   MRI of the brain as clinically warranted. Additional findings above.    CT Brain Stroke Protocol (03.08.23 @ 12:40)    Moderate chronic microvascular changes without evidence of   an acute transcortical infarction or hemorrhage.     US Duplex Carotid Arteries Complete, Bilateral (02.23.23 @ 11:03)   IMPRESSION: No significant hemodynamic stenosis of either carotid artery.    TTE Echo Complete w/ Contrast w/ Doppler (02.21.23 @ 11:34)   Summary:   1. Endocardial visualization was enhanced with intravenous echo   contrast. No LV thrombus.   2. Left ventricular ejection fraction, by visual estimation, is 25 to   30%.   3. Severely decreased global left ventricular systolic function.   4. Abnormal septal motion consistent with post-operative status.   5. There is mild eccentric left ventricular hypertrophy.   6. The mitralin-flow pattern reveals no discernable A-wave, therefore   no comment on diastolic function can be made.   7. Normal right ventricular size and function, estimated PASP least 42   mmHg.   8. Severely enlarged left atrium.   9. Right atrial enlargement.  10. Moderate mitral annular calcification.  11. Mild-moderate tricuspid regurgitation.  12. Severe calcific aortic valve stenosis with low gradient,   dimensionless index 0.23.  13. There is moderate aortic root calcification.  14. Borderline dilatation of the aortic root, sinuses of Valsalva and   ascending aorta 3.9 cm, index to BSA 1.6 cm/m2 within normal.  15. Compared to the outpatient TTE study from 9/2022 prevoiusly LV EF 37%   and known low gradient aortic valve stenosis, but visually on current   study appears severely stenotic.     TTE Echo Complete w/ Contrast w/ Doppler (03.12.23 @ 12:55)   Summary:   1. Left ventricular ejection fraction, by visual estimation, is 20 to   25%.   2. Moderately to severely decreased global left ventricular systolic   function.   3. [place] is abnormal as described above.   4. The mitral in-flow pattern reveals no discernable A-wave, therefore   no comment on diastolic function can be made.   5. No LV thrombus.   6. Moderately reduced RV systolic function.   7. There is no evidence of pericardial effusion.   8. Mild thickening of the anterior and posterior mitral valve leaflets.   9. Moderate mitral annular calcification.  10. Mild-moderate tricuspid regurgitation.  11. Aortic Stenosis with V max 3.1 m/sec, Mean gradient of 23 mm Hg.  12. Endocardial visualization was enhanced with intravenous echo contrast.     UMER Echo Doppler (03.21.23 @ 13:28)   Summary:   1. Left ventricular ejection fraction, by visual estimation, is <20%.   2. Severely decreased global left ventricular systolic function.   3. Moderate to severe left atrial enlargement.   4. Mildly enlarged right ventricle.   5. Severely reduced RV systolic function.   6. Spontaneous echo contrast noted in LA and LUIS.   7. Mildly enlarged right atrium.   8. There is no evidence ofpericardial effusion.   9. Mild thickening of the anterior and posterior mitral valve leaflets.  10. Trace mitral valve regurgitation.  11. Moderate tricuspid regurgitation.  12. Sclerotic aortic valve with decreased opening.  13. Aortic Valve is a trileaflet valve with significant thickening and   calcificatio, diminished excursion and doming of aortic valve leaflets.   No definite evidence of vegetation. Aortic Stenosis      Peak AV Vmax 2.46 m/sec      Peak PG is 24.3      Mean Gradient is 12.9.  14. Mild pulmonic valve regurgitation.  15. Color flow doppler and intravenous injection of agitated saline   demonstrates the presence of an intact intra atrial septum.  16. Decreased left atrial appendage velocities and no left atrial   appendage thrombus.  17. No intracavitary mass or thrombus.  18. No evidence of vegetation or vegetation on any valves.

## 2023-03-22 NOTE — PROGRESS NOTE ADULT - ASSESSMENT
88y  Male with h/o HTN, AF, PE/DVT s/p IVCF, Factor V Leiden (on warfarin), CAD s/p CABG, CHFrEF, CVA, COPD, GIGI on CPAP. Patient was admitted on 3/8 from NH with reported RUE weakness/numbness. Treated for acute CVA (did not receive thrombolytic). MRI brain (3/10) revealed left medial posterior frontal cortical and subcortical acute infarction. Hospital course complicated by uncontrolled AF with RVR requiring Rapid Response Team, Fever to 105'F, Transfer to MICU for hypotension requiring IV vasopressors and Started on empiric Zosyn and vancomycin. Blood cultures with MSSA. Vancomycin was stopped.       MSSA bacteremia/septic shock  Fever  Leukocytosis  LUE cellulitis  LUE abscess  ruled out   MILAGRO      - Blood cultures 3/14 and 3/16 reporting MSSA  - Repeat blood cultures 3/17 and 3/18 no growth   - RVP/COVID 19 PCR 3/15 negative   - CT A/P with no acute findings   - TTE with no endocarditis   - UA negative for UTI  - LUE US with cephalic vein thrombosis, no abscess   - No clear source of bacteremia, given negative scans and echocardiograms.   - UMER with no endocarditis   - Procalcitonin level 0.13  - Continue  Nafcillin   - Plan for PICC   - Will need Nafcillin till 4/16/23  - Follow up cultures  - Trend Fever  - Trend WBC        Will Follow    d/w Dr Ruiz and clinical pharmacy   88y  Male with h/o HTN, AF, PE/DVT s/p IVCF, Factor V Leiden (on warfarin), CAD s/p CABG, CHFrEF, CVA, COPD, GIGI on CPAP. Patient was admitted on 3/8 from NH with reported RUE weakness/numbness. Treated for acute CVA (did not receive thrombolytic). MRI brain (3/10) revealed left medial posterior frontal cortical and subcortical acute infarction. Hospital course complicated by uncontrolled AF with RVR requiring Rapid Response Team, Fever to 105'F, Transfer to MICU for hypotension requiring IV vasopressors and Started on empiric Zosyn and vancomycin. Blood cultures with MSSA. Vancomycin was stopped.       MSSA bacteremia/septic shock  Fever  Leukocytosis  LUE cellulitis  LUE abscess  ruled out   MILAGRO      - Blood cultures 3/14 and 3/16 reporting MSSA  - Repeat blood cultures 3/17 and 3/18 no growth   - RVP/COVID 19 PCR 3/15 negative   - CT A/P with no acute findings   - TTE with no endocarditis   - UA negative for UTI  - LUE US with cephalic vein thrombosis, no abscess   - No clear source of bacteremia, given negative scans and echocardiograms.   - UMER with no endocarditis   - Procalcitonin level 0.13  - Continue  Nafcillin   - Plan for PICC   - Will need Nafcillin till 4/16/23  - Appointment with us in 10 - 20 days - [phone]  - Weekly labs to be monitored by the [facility]  - Follow up cultures  - Trend Fever  - Trend WBC        Will sign off. Please call PRN.     d/w Dr Ruiz and clinical pharmacy

## 2023-03-22 NOTE — DISCHARGE NOTE PROVIDER - CARE PROVIDER_API CALL
Alexa Yanes)  Infectious Disease; Internal Medicine  [address] [place] [address]  Phone: [phone]  Fax: [phone]  Follow Up Time: 1 month    Soheila Reno (NP; RN)  NP in Adult Health  [address] [place] [address]  Phone: [phone]  Fax: [phone]  Follow Up Time:

## 2023-03-22 NOTE — PROGRESS NOTE ADULT - ASSESSMENT
89yo M w/ PMH of AF and Factor V Leiden deficiency with PE/DVT s/p IVCF and on coumadin, CAD s/p CABG, CVA, HFrEF (35-40%), COPD, large infrarenal AAA, ?prostate CA s/p recent XRT and GIGI on CPAP. Recent hospital stay for COPD/CHF exacerbation readmitted with RUE/RLE weakness and MRI brain revealed left medial posterior frontal cortical and subcortical acute infarction with course further c/b AF w/ RVR, fever of 105F, and hypotension for which he was xferred to MICU and started on phenylephrine gtt.  We are consulted for goals of care.  #Goals of care  - pt again demonstrated an inability to maintain a linear/rational discussion and, as such, lacks capacity to understand and make complex medical decisions.  - daughter Jasmina is HCP - she lives in [facility] ([phone]) but is now in [place] visiting  - Pt remains DNR/DNI  - UMER showed no vegetation - biV hear failure  - Will cont to follow and remain available to assist with future goals of care discussions if/when the need arises.    #AMS   - pt with tangential non-linear speech and thought process - discussed with psych during last admission - likely cognitive impairment and would benefit from outpt neurocognitive testing  - In order to mitigate the risk of delirium, would avoid/minimize known deliriogenic drugs such as benzodiazepines and anticholinergics.  Encourage staff and family to reorient frequently.  Keep shades open during day in effort to maintain day/night cycle.    I discussed with primary team attending.  Pt's goals are clear and no symptoms being actively managed at this time.  We will sign off.  Please contact us if we can be of assistance with this pt's care in the future.

## 2023-03-22 NOTE — PROGRESS NOTE ADULT - SUBJECTIVE AND OBJECTIVE BOX
[facility]  INFECTIOUS DISEASES at [place] and [place]  =======================================================                               Marques Mike MD#   Alexa Yanes MD*                             Gabrielle Baird MD*   Bisi Sandoval MD*            Diplomates American Board of Internal Medicine & Infectious Diseases                # [place] Office - Appt - Tel  [phone] Fax [phone]                * [place] Office - Appt - Tel [phone] Fax [phone]                                  Hospital Consult line:  [phone]  =======================================================    SANDI SANTOS [id number]    Follow up: MSSA bacteremia/septic shock    No fevers     Allergies:  No Known Allergies  OHS (Unknown)       REVIEW OF SYSTEMS:  CONSTITUTIONAL:  No Fever or chills  HEENT:   No diplopia or blurred vision.  No earache, sore throat or runny nose.  CARDIOVASCULAR:  No Chest Pain  RESPIRATORY:  No cough, shortness of breath  GASTROINTESTINAL:  No nausea, vomiting or diarrhea.  GENITOURINARY:  No dysuria, frequency or urgency. No Blood in urine  MUSCULOSKELETAL:  no joint aches, no muscle pain  SKIN:  No change in skin, hair or nails.  NEUROLOGIC:  No Headaches, seizures   PSYCHIATRIC:  No disorder of thought or mood.  ENDOCRINE:  No heat or cold intolerance  HEMATOLOGICAL:  No easy bruising or bleeding.       Physical Exam:  GEN: NAD  HEENT: normocephalic and atraumatic. EOMI. PERRL.    NECK: Supple.   LUNGS: CTA B/L.  HEART: RRR  ABDOMEN: Soft, NT, ND.  +BS.    : No CVA tenderness  EXTREMITIES: Without  edema.  MSK: No joint swelling  NEUROLOGIC: No Focal Deficits   PSYCHIATRIC: Appropriate affect .  SKIN: Left antecubital area with improved erythema and swelling. no warmth.       Vitals:  T(F): 97.6 (22 Mar 2023 05:11), Max: 98.2 (21 Mar 2023 16:42)  HR: 82 (22 Mar 2023 08:59)  BP: 133/86 (22 Mar 2023 05:11)  RR: 18 (22 Mar 2023 05:11)  SpO2: 99% (22 Mar 2023 08:59) (94% - 99%)  temp max in last 48H T(F): , Max: 98.3 (03-20-23 @ 21:33)    Current Antibiotics:  nafcillin  IVPB 2 Gram(s) IV Intermittent every 4 hours    Other medications:  atorvastatin 40 milliGRAM(s) Oral at bedtime  budesonide 160 MICROgram(s)/formoterol 4.5 MICROgram(s) Inhaler 2 Puff(s) Inhalation two times a day  chlorhexidine 2% Cloths 1 Application(s) Topical <User Schedule>  metoprolol tartrate 50 milliGRAM(s) Oral two times a day  midodrine 5 milliGRAM(s) Oral every 8 hours  OLANZapine 10 milliGRAM(s) Oral at bedtime  oxybutynin XL 10 milliGRAM(s) Oral <User Schedule>  polyethylene glycol 3350 17 Gram(s) Oral daily  tamsulosin 0.4 milliGRAM(s) Oral at bedtime  warfarin 3 milliGRAM(s) Oral once                            9.1    6.98  )-----------( 284      ( 22 Mar 2023 04:25 )             30.2     03-22    140  |  106  |  22.0<H>  ----------------------------<  120<H>  3.6   |  23.0  |  1.17    Ca    8.4      22 Mar 2023 04:25  Phos  3.6     03-22  Mg     2.0     03-22      RECENT CULTURES:  03-18 @ 07:30 .Blood Blood     No growth to date.    03-17 @ 06:09 .Blood Blood     No growth to date.    03-17 @ 05:50 .Blood Blood     No growth to date.    03-16 @ 13:06 .Blood Blood-Peripheral     Growth in aerobic bottle: Staphylococcus aureus  See previous culture [id number]  Growth in aerobic bottle: Gram Positive Cocci in Clusters    03-16 @ 13:00 .Blood Blood-Peripheral     Growth in aerobic bottle: Staphylococcus aureus  See previous culture [id number]  Growth in aerobic bottle: Gram Positive Cocci in Clusters    03-15 @ 09:00    RVP  NotDetec    03-14 @ 20:57 .Blood Blood-Peripheral Blood Culture PCR  Staphylococcus aureus    Growth in aerobic and anaerobic bottles: Staphylococcus aureus  Growth in aerobic bottle: Bacillus species not anthracis  "Susceptibilities not performed"  ***Blood Panel PCR results on this specimen are available  approximately 3 hours after the Gram stain result.***  Gram stain, PCR, and/or culture results may not always  correspond due to difference in methodologies.  ************************************************************  This PCR assay was performed by multiplex PCR. This  Assay tests for 66 bacterial and resistance gene targets.  Please refer to the [facility] test directory  at [email or web address] for details.  Growth in anaerobic bottle: Gram Positive Cocci in Clusters  Growth in aerobic bottle: Gram Positive Cocci in Clusters and Gram  Positive Rods    03-08 @ 13:40 Clean Catch Clean Catch (Midstream)     <10,000 CFU/mL Normal Urogenital Sangeetha      WBC Count: 6.98 K/uL (03-22-23 @ 04:25)  WBC Count: 5.42 K/uL (03-19-23 @ 03:57)  WBC Count: 5.06 K/uL (03-18-23 @ 07:30)    Creatinine, Serum: 1.17 mg/dL (03-22-23 @ 04:25)  Creatinine, Serum: 1.50 mg/dL (03-19-23 @ 03:57)  Creatinine, Serum: 1.59 mg/dL (03-18-23 @ 07:30)    Procalcitonin, Serum: 0.13 ng/mL (03-14-23 @ 20:35)     COVID-19 PCR: NotDetec (03-20-23 @ 17:50)  SARS-CoV-2: NotDetec (03-15-23 @ 09:00)  COVID-19 PCR: NotDetec (03-08-23 @ 12:50)  COVID-19 PCR: NotDetec (03-02-23 @ 11:00)        < from: UMER Echo Doppler (03.21.23 @ 13:28) >  Summary:   1. Left ventricular ejection fraction, by visual estimation, is <20%.   2. Severely decreased global left ventricular systolic function.   3. Moderate to severe left atrial enlargement.   4. Mildly enlarged right ventricle.   5. Severely reduced RV systolic function.   6. Spontaneous echo contrast noted in LA and LUIS.   7. Mildly enlarged right atrium.   8. There is no evidence ofpericardial effusion.   9. Mild thickening of the anterior and posterior mitral valve leaflets.  10. Trace mitral valve regurgitation.  11. Moderate tricuspid regurgitation.  12. Sclerotic aortic valve with decreased opening.  13. Aortic Valve is a trileaflet valve with significant thickening and   calcificatio, diminished excursion and doming of aortic valve leaflets.   No definite evidence of vegetation. Aortic Stenosis      Peak AV Vmax 2.46 m/sec      Peak PG is 24.3      Mean Gradient is 12.9.  14. Mild pulmonic valve regurgitation.  15. Color flow doppler and intravenous injection of agitated saline   demonstrates the presence of an intact intra atrial septum.  16. Decreased left atrial appendage velocities and no left atrial   appendage thrombus.  17. No intracavitary mass or thrombus.  18. No evidence of vegetation or vegetation on any valves.    < end of copied text >        < from: TTE Echo Complete w/ Contrast w/ Doppler (03.12.23 @ 12:55) >  Summary:   1. Left ventricular ejection fraction, by visual estimation, is 20 to   25%.   2. Moderately to severely decreased global left ventricular systolic   function.   3. [place] is abnormal as described above.   4. The mitral in-flow pattern reveals no discernable A-wave, therefore   no comment on diastolic function can be made.   5. No LV thrombus.   6. Moderately reduced RV systolic function.   7. There is no evidence of pericardial effusion.   8. Mild thickening of the anterior and posterior mitral valve leaflets.   9. Moderate mitral annular calcification.  10. Mild-moderate tricuspid regurgitation.  11. Aortic Stenosis with V max 3.1 m/sec, Mean gradient of 23 mm Hg.  12. Endocardial visualization was enhanced with intravenous echo contrast.    < end of copied text >

## 2023-03-22 NOTE — PROGRESS NOTE ADULT - REASON FOR ADMISSION
right sided weakness / right radial artery occlusion.

## 2023-03-22 NOTE — DISCHARGE NOTE PROVIDER - ATTENDING DISCHARGE PHYSICAL EXAMINATION:
Vital Signs Last 24 Hrs  T(C): 36.8 (03-22-23 @ 16:58), Max: 36.8 (03-22-23 @ 16:58)  T(F): 98.2 (03-22-23 @ 16:58), Max: 98.2 (03-22-23 @ 16:58)  HR: 94 (03-22-23 @ 16:58) (77 - 119)  BP: 112/74 (03-22-23 @ 16:58) (111/62 - 133/86)  BP(mean): --  RR: 18 (03-22-23 @ 10:02) (18 - 18)  SpO2: 98% (03-22-23 @ 16:58) (97% - 99%)

## 2023-03-22 NOTE — DISCHARGE NOTE PROVIDER - NSDCQMSTROKERISK_NEU_ALL_CORE
Atrial fibrillation/Blood clotting problems/High blood pressure/High cholesterol/History of a stroke or TIA

## 2023-03-22 NOTE — DISCHARGE NOTE PROVIDER - NSDCMRMEDTOKEN_GEN_ALL_CORE_FT
acetaminophen 325 mg oral tablet: 2 tab(s) orally every 6 hours, As needed, Temp greater or equal to 38C (100.4F), Mild Pain (1 - 3), Moderate Pain (4 - 6)  aspirin 81 mg oral tablet, chewable: 1 tab(s) orally once a day  atorvastatin 40 mg oral tablet: 1 tab(s) orally once a day (at bedtime)  bisacodyl 10 mg rectal suppository: 1 suppository(ies) rectal once a day, As needed, Constipation  budesonide-formoterol 160 mcg-4.5 mcg/inh inhalation aerosol: 2 puff(s) inhaled 2 times a day  furosemide 40 mg oral tablet: 1 tab(s) orally once a day  ipratropium-albuterol 0.5 mg-2.5 mg/3 mL inhalation solution: 3 milliliter(s) inhaled every 6 hours, As needed, Shortness of Breath and/or Wheezing  metoprolol tartrate 50 mg oral tablet: 1 tab(s) orally 2 times a day  nafcillin: 2 gram(s) intravenous every 4 hours  OLANZapine 10 mg oral tablet: 1 tab(s) orally once a day (at bedtime)  oxybutynin 10 mg/24 hr oral tablet, extended release: 1 tab(s) orally once a day  polyethylene glycol 3350 oral powder for reconstitution: 17 gram(s) orally once a day  sertraline 50 mg oral tablet: 1 tab(s) orally once a day  tamsulosin 0.4 mg oral capsule: 1 cap(s) orally once a day (at bedtime)  warfarin 3 mg oral tablet: 1 tab(s) orally once a day (at bedtime)

## 2023-03-22 NOTE — PROGRESS NOTE ADULT - NS ATTEND AMEND GEN_ALL_CORE FT
Agree with above.     Left MCA CVA in setting of atrial fibrillation.    Continue anticoagulation/statin.    For rehab.

## 2023-03-22 NOTE — DISCHARGE NOTE PROVIDER - NSDCFUADDAPPT_GEN_ALL_CORE_FT
STAR pt    Yellow STAR folder given to pt  No CHHA due to KELSEY  No need for Vivo meds to bed, no review needed    Patient's [facility] appointment has been rescheduled with [facility] with Nurse Practitioner Soheila on 4/10/23 at 4:30 PM   .   Address: [address] [place] [address]  If you are unable to attend your pre-scheduled appt, please contact the office directly at  [phone] to reschedule     Patient and patient's daughter, Jasmina ([phone]), are in agreement for patient to return to [facility] today at 4pm for subacute rehab. Babita, admissions coordinator for [facility] is aware of patient's follow up appointment with NP Soheila on 4/10/23 at 4:30pm- [address] [place] [address]

## 2023-03-22 NOTE — DISCHARGE NOTE PROVIDER - NSDCCPCAREPLAN_GEN_ALL_CORE_FT
PRINCIPAL DISCHARGE DIAGNOSIS  Diagnosis: Cerebrovascular accident (CVA)  Assessment and Plan of Treatment: continue with coumadin  follow up with neurology on discharge  follow up with your hematologist oncologist as well  check INR daily until therapeutic  when coming off antibiotics will also need to check INR frequently to avoid super/sub therapeutic readings      SECONDARY DISCHARGE DIAGNOSES  Diagnosis: A-fib  Assessment and Plan of Treatment: continue with coumadin and metoprolol as prescribed    Diagnosis: Chronic systolic congestive heart failure  Assessment and Plan of Treatment: continue metoprolol, lasix, follow up with cardiology on discharge  can discontinue midodrine  if blood pressure allows add low dose spironolactone    Diagnosis: MSSA bacteremia  Assessment and Plan of Treatment: plan to continue with nafcilin for total of 4 weeks from start date of 3/16/2023

## 2023-03-22 NOTE — DISCHARGE NOTE PROVIDER - DETAILS OF MALNUTRITION DIAGNOSIS/DIAGNOSES
This patient has been assessed with a concern for Malnutrition and was treated during this hospitalization for the following Nutrition diagnosis/diagnoses:     -  03/20/2023: Moderate protein-calorie malnutrition

## 2023-03-22 NOTE — DISCHARGE NOTE PROVIDER - NSDCFUSCHEDAPPT_GEN_ALL_CORE_FT
Soheila Reno Physician [facility]  CARDIOLOGY [address] [facility]  Scheduled Appointment: 04/10/2023

## 2023-03-22 NOTE — PROGRESS NOTE ADULT - ASSESSMENT
ASSESSMENT: 88 yr old male with hypertension,  atrial fibrillation, PE/DVT s/p IVC filter, Factor V Leiden; on coumadin CAD s/p CABG, stroke , COPD, GIGI, AAA, infrarenal 6.1 cm  on prior ct presented with right sided numbness started at around 11am  3/8/23 at [facility] by ER report. CT head without acute infarction or hemorrhage. Excluded from Tenecteplasea as INR > 1.7 (currently 2.30).  CT angiogram without evidence of large vessel occlusion- not thrombectomy ca ndidate. Moderate left carotid stenosis. MRI head demonstrates left hemispheric posterior frontal cortical and subcortical acute infarctions.  Remote thalamic and callosal infarcts.  Etiology possibly cardioembolic in setting of atrial fibrillation vs. hypercoagulable states as patient with history of factor V Leiden.        NEURO: neurologically without acute change, improved overall vs. admission neurologically, Continue close monitoring for neurologic deterioration with q2 stroke neuro checks for now to ensure neurological stability in setting of hemodynamic changes (rapid a fib/hypotension), if stable can transition to q 4, repeat CT head for any acute change in status. permissive HTN overall < 160mmHg (anticoagulation use in setting of AAA),  110-140mmHg would avoid further hypotension and rapid fluctuations, titrate statin to LDL goal less than 70, MRI Brain w/o as noted, US carotid 2/23/23 with no hemodynamically significant stenosis ,   Physical therapy/OT/Speech eval/treatment.     ANTITHROMBOTIC THERAPY: neurologically suggest to continue therapeutic anticoagulation, permitting no medical contraindication. Noted Warfarin as  home regimen.  Would confirm INR goal 2-3 with outpatient prescriber/heme/cardio to ensure this is optimal agent as patient presented with INR 2.3 with acute infarction, additionally clarify adherence to further guide decision making as if he failed Warfarin may require heme/cardio/medical input for alternate agent.      PULMONARY:   protecting airway, saturating well     CARDIOVASCULAR:  TTE/UMER as noted on 2/21/23 with EF 25-30% , repeat EF 20-25% on TTE, UMER now demonstrates < 20%, cardiac monitoring to ensure rate control, cardiology following for further recommendations,  AAA monitoring per cardiology.                               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 Clarify Adherence and maintain close monitoring of INR.     DVT ppx:   therapeutically anticoagulated with Warfarin if INR 2-3. If below- consider  pharmacological DVT ppx pending INR goal.     ID: afebrile,  leukocytosis resolved, monitor for si/sx of infection , currently undergoing tx for MSSA bacteremia.     Other: Vascular follow up for right radial artery occlusion: no indication for any vascular intervention. Occlusion likely chronic. Suggested to continue therapeutic anticoagulation.     DISPOSITION: Rehab, depending on PT eval once stable and workup is complete      CORE MEASURES:     Admission NIHSS: 4  Tenecteplase : [] YES [x] NO   LDL/HDL/A1C: 58/34/5.9  Depression Screen:p   Statin Therapy: as noted   Dysphagia Screen: [x] PASS [] FAIL    Smoking [] YES [x] NO      Afib [x] YES [] NO     Stroke Education [x] YES [] NO

## 2023-03-22 NOTE — PROGRESS NOTE ADULT - NS ATTEND OPT1 GEN_ALL_CORE

## 2023-03-22 NOTE — DISCHARGE NOTE PROVIDER - HOSPITAL COURSE
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  Course was C/B MSSA bacteremia, sepsis with shock requiring IV pressors, and AFib with RVR. id / cardio following downgraded to medicine 3/17/23 .    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   - continue coumadin on discharge  - heme/onc consulted earlier in course for determination of coumadin vs other AC  - they asked for lupus antibodies which were collected as part of 'lupus profile' but have not resulted  - pending antibodies from this admission  - continue coumadin for now as recommended by heme/onc  - continue to trend INR as well - likely altered due to antibiotics  - will need daily INR    #a-fib with RVR - now rate controlled  - cardiology recs appreciated  - c/w metoprolol to 75mg BID PO  - continue coumadin  - therapeutic INR    #MSSA bacteremia  - Sepsis with shock - s/p pressors  - No evidence of vegetation on TTE   - UMER without vegetation  - LUE thrombophlebitis may be the source  - S/p Vanc/Zosyn, now on  Nafcillin (3/16- ); final cultures from 3/17 so far are clear  - will continue abx for 4 more weeks  - ID following     #chronic systolic chf / HFrEF - EF 20-25%  - continue GDMT  - DNR/DNI    #acute anemia - continue to trend  - on ac   - OB stool negative    For KELSEY today

## 2023-03-22 NOTE — PROGRESS NOTE ADULT - SUBJECTIVE AND OBJECTIVE BOX
INTERVAL HISTORY:  Pt seen lying in bed in NAD  He remains comfortable and confused    PRESENT SYMPTOMS:   Unable to obtain 2/2 AMS    PAIN:  denies            Character-            Duration-            Effect-            Factors-            Frequency-            Location-            Severity-    REVIEW OF SYSTEMS:   Unable to obtain full ROS 2/2 AMS    MEDICATIONS  (STANDING):  atorvastatin 40 milliGRAM(s) Oral at bedtime  budesonide 160 MICROgram(s)/formoterol 4.5 MICROgram(s) Inhaler 2 Puff(s) Inhalation two times a day  chlorhexidine 2% Cloths 1 Application(s) Topical <User Schedule>  chlorhexidine 2% Cloths 1 Application(s) Topical <User Schedule>  metoprolol tartrate 50 milliGRAM(s) Oral two times a day  midodrine 5 milliGRAM(s) Oral every 8 hours  nafcillin  IVPB 2 Gram(s) IV Intermittent every 4 hours  OLANZapine 10 milliGRAM(s) Oral at bedtime  oxybutynin XL 10 milliGRAM(s) Oral <User Schedule>  polyethylene glycol 3350 17 Gram(s) Oral daily  tamsulosin 0.4 milliGRAM(s) Oral at bedtime  warfarin 3 milliGRAM(s) Oral once    MEDICATIONS  (PRN):  acetaminophen     Tablet .. 650 milliGRAM(s) Oral every 6 hours PRN Temp greater or equal to 38C (100.4F), Mild Pain (1 - 3), Moderate Pain (4 - 6)  albuterol/ipratropium for Nebulization 3 milliLiter(s) Nebulizer every 6 hours PRN Shortness of Breath and/or Wheezing  bisacodyl Suppository 10 milliGRAM(s) Rectal daily PRN Constipation  sodium chloride 0.9% lock flush 10 milliLiter(s) IV Push every 1 hour PRN Pre/post blood products, medications, blood draw, and to maintain line patency      PHYSICAL EXAM:  Vital Signs Last 24 Hrs  T(C): 36.6 (22 Mar 2023 10:02), Max: 36.8 (21 Mar 2023 16:42)  T(F): 97.9 (22 Mar 2023 10:02), Max: 98.2 (21 Mar 2023 16:42)  HR: 78 (22 Mar 2023 10:02) (77 - 119)  BP: 116/65 (22 Mar 2023 13:00) (111/62 - 133/86)  BP(mean): --  RR: 18 (22 Mar 2023 10:02) (18 - 18)  SpO2: 97% (22 Mar 2023 10:02) (94% - 99%)    Parameters below as of 22 Mar 2023 10:02  Patient On (Oxygen Delivery Method): nasal cannula  O2 Flow (L/min): 2    General: Pt lying in bed in NAD  HEENT: NCAT; MM dry  Resp: breathing unlabored; symmetric chest expansion B/L  MSK: no contractures/deformities  Skin: no rash  Neuro: inattentive; no myoclonus  Psych: calm    LABS:                        9.1    6.98  )-----------( 284      ( 22 Mar 2023 04:25 )             30.2     03-22    140  |  106  |  22.0<H>  ----------------------------<  120<H>  3.6   |  23.0  |  1.17    Ca    8.4      22 Mar 2023 04:25  Phos  3.6     03-22  Mg     2.0     03-22      PT/INR - ( 22 Mar 2023 07:25 )   PT: 24.1 sec;   INR: 2.06 ratio         PTT - ( 22 Mar 2023 07:25 )  PTT:38.1 sec    RADIOLOGY & ADDITIONAL STUDIES:    NEUROLOGIC MEDICATIONS/OPIOIDS/BENZODIAZEPINES IN PAST 24HRS:  acetaminophen     Tablet ..   650 milliGRAM(s) Oral (03-22-23 @ 09:57)   650 milliGRAM(s) Oral (03-22-23 @ 03:28)    OLANZapine   10 milliGRAM(s) Oral (03-21-23 @ 21:43)    ADVANCE DIRECTIVES/TREATMENT PREFERENCES:  Code Status: DNR/DNI  MOLST (if not Full Code):  HCP/Surrogate: joie Freedman

## 2023-03-23 LAB
CULTURE RESULTS: SIGNIFICANT CHANGE UP
SPECIMEN SOURCE: SIGNIFICANT CHANGE UP

## 2023-03-24 NOTE — PROGRESS NOTE ADULT - PROVIDER SPECIALTY LIST ADULT
Cardiology
Hospitalist
Hospitalist
Infectious Disease
Intervent Cardiology
Neurology
Neurology
Weekly review of blood sugars:    KRISHAN: 6/10/2023  Gestational Age: 28w6d    LMP: Patient's last menstrual period was 09/03/2022 (exact date).    Current Diabetes Medications:  none    New Recommendations:   none    Blood sugars:        Blood Sugar Assessment:  All Fasting glucose levels are within goal  Some post prandial glucose levels are above goal, especially dinner (3/20, 3/21, 3/23)    Dietary/Other Concerns (if any):   Change flour tortillas to corn  When eating out aim for 30 grams of carbs vs 45 due to higher numbers after eating out    Patient Goals:  • Monitor blood sugars 4 times daily  • Follow CHO goals at meals and snacks  • Blood sugar Targets:   o less than 95 mg/dL fasting   o less than 120 mg/dL 2 hours post meal or  o less than 130 mg/dL 1 hour post meal.   • Send blood sugars Weekly     
Cardiology
Infectious Disease
MICU
Neurology
Palliative Care
Palliative Care
Hospitalist
Infectious Disease
Infectious Disease
Neurology
Neurology
Palliative Care
Rehab Medicine
Vascular Surgery
Cardiology
Hospitalist
Neurology
Cardiology
MICU
Cardiology

## 2023-03-27 ENCOUNTER — TRANSCRIPTION ENCOUNTER (OUTPATIENT)
Age: 88
End: 2023-03-27

## 2023-03-31 PROBLEM — D68.51 ACTIVATED PROTEIN C RESISTANCE: Chronic | Status: ACTIVE | Noted: 2023-03-08

## 2023-03-31 PROBLEM — C61 MALIGNANT NEOPLASM OF PROSTATE: Chronic | Status: ACTIVE | Noted: 2023-03-08

## 2023-04-06 ENCOUNTER — NON-APPOINTMENT (OUTPATIENT)
Age: 88
End: 2023-04-06

## 2023-04-06 ENCOUNTER — OUTPATIENT (OUTPATIENT)
Dept: OUTPATIENT SERVICES | Facility: HOSPITAL | Age: 88
LOS: 1 days | Discharge: HOME | End: 2023-04-06

## 2023-04-06 DIAGNOSIS — Z95.1 PRESENCE OF AORTOCORONARY BYPASS GRAFT: Chronic | ICD-10-CM

## 2023-04-06 DIAGNOSIS — I63.9 CEREBRAL INFARCTION, UNSPECIFIED: ICD-10-CM

## 2023-04-06 DIAGNOSIS — Z98.89 OTHER SPECIFIED POSTPROCEDURAL STATES: Chronic | ICD-10-CM

## 2023-04-06 DIAGNOSIS — Z96.651 PRESENCE OF RIGHT ARTIFICIAL KNEE JOINT: Chronic | ICD-10-CM

## 2023-04-06 DIAGNOSIS — Z98.49 CATARACT EXTRACTION STATUS, UNSPECIFIED EYE: Chronic | ICD-10-CM

## 2023-04-07 ENCOUNTER — APPOINTMENT (OUTPATIENT)
Dept: HEMATOLOGY ONCOLOGY | Facility: CLINIC | Age: 88
End: 2023-04-07
Payer: MEDICARE

## 2023-04-07 VITALS
DIASTOLIC BLOOD PRESSURE: 80 MMHG | SYSTOLIC BLOOD PRESSURE: 116 MMHG | BODY MASS INDEX: 35.76 KG/M2 | HEART RATE: 116 BPM | OXYGEN SATURATION: 94 % | WEIGHT: 264 LBS | HEIGHT: 72 IN

## 2023-04-07 DIAGNOSIS — I69.359 HEMIPLEGIA AND HEMIPARESIS FOLLOWING CEREBRAL INFARCTION AFFECTING UNSPECIFIED SIDE: ICD-10-CM

## 2023-04-07 DIAGNOSIS — I48.91 UNSPECIFIED ATRIAL FIBRILLATION: ICD-10-CM

## 2023-04-07 PROCEDURE — 99214 OFFICE O/P EST MOD 30 MIN: CPT

## 2023-04-07 RX ORDER — APIXABAN 5 MG/1
5 TABLET, FILM COATED ORAL
Qty: 120 | Refills: 2 | Status: ACTIVE | COMMUNITY
Start: 2023-04-07 | End: 1900-01-01

## 2023-04-10 ENCOUNTER — APPOINTMENT (OUTPATIENT)
Dept: CARDIOLOGY | Facility: CLINIC | Age: 88
End: 2023-04-10

## 2023-05-15 NOTE — ASSESSMENT
[FreeTextEntry1] : 88 year old M with PMHX FVL,  prostate cancer A-fib on warfarin, AAA, COPD, GIGI, on CPAP, PE 3x/dvt, stroke, coronary artery disease s/p CABG x2 presents for initial evaluation for CVA. \par \par #acute cva / likely embolic in the setting of a-fib and factor V leiden deficiency; also new clot in the R upper extremity\par -  3/20/23: B2-glycoprotein, Anticardiolipin antibody and lupus AC WNL \par - Discontinue coumadin, will send rx for Eliquis\par \par \par #Anemia \par 3/22/23 HGB 9.1, HCT 30.2, normocytic\par Likely Anemia of chronic disease as baseline of 12 in February. \par Continue to monitor at this time.  Will send work up if lower/not recovered at next visit. \par  \par F/u in 3-4 months\par \par Daughter Jasmina # [phone]

## 2023-05-15 NOTE — ADDENDUM
[FreeTextEntry1] : Documented by Natali Javier acting as scribe for Dr. Godwin on 04/07/2023 \par \par All Medical record entries made by the Scribe were at my, Dr. Godwin's, direction and personally dictated by me on 04/07/2023 . I have reviewed the chart and agree that the record accurately reflects my personal performance of the history, physical exam, assessment and plan. I have also personally directed, reviewed, and agreed with the discharge instructions.\par \par

## 2023-05-15 NOTE — HISTORY OF PRESENT ILLNESS
[de-identified] : 88 year old M with PMHX FVL,  prostate cancer A-fib on warfarin, AAA, COPD, GGII, on CPAP, PE 3x/dvt, stroke, coronary artery disease s/p CABG x2 presents for initial evaluation for CVA. \par \par \par Patient was admitted to [facility] (3/8/23-3/22/23) for right sided weakness found with L medial posterior frontal cortical and subcortical acute infarction, L lateral posterior frontal \par cortical and subcortical remote infarction, as well as additional tiny left anterior frontal cortical remote infarctions and L thalamic and L callosal body remote deep infarctions.  Course was C/B MSSA bacteremia, sepsis with shock requiring IV pressors, and AFib with RVR. 3/20/23: B2G, Anticardiolipin antibody, and lupus AC WNL\par \par \par Patient presents for an initial consultation with Daughter Jasmina\par Patient currently resides at [facility], on comfort care. \par Patient in a wheel chair. He is having difficulty breathing and has been on O2 since discharge. Denies chest pain, upper extremity swelling, leg pain.\par Recent RUE DVT\par Reports fatigue which is improving\par

## 2023-06-19 ENCOUNTER — EMERGENCY (EMERGENCY)
Facility: HOSPITAL | Age: 88
LOS: 1 days | Discharge: HOME | End: 2023-06-19
Attending: EMERGENCY MEDICINE
Payer: MEDICARE

## 2023-06-19 VITALS
TEMPERATURE: 98 F | RESPIRATION RATE: 18 BRPM | DIASTOLIC BLOOD PRESSURE: 79 MMHG | OXYGEN SATURATION: 98 % | HEART RATE: 136 BPM | SYSTOLIC BLOOD PRESSURE: 125 MMHG

## 2023-06-19 VITALS
HEART RATE: 104 BPM | OXYGEN SATURATION: 98 % | RESPIRATION RATE: 16 BRPM | SYSTOLIC BLOOD PRESSURE: 113 MMHG | DIASTOLIC BLOOD PRESSURE: 77 MMHG

## 2023-06-19 DIAGNOSIS — Z98.49 CATARACT EXTRACTION STATUS, UNSPECIFIED EYE: Chronic | ICD-10-CM

## 2023-06-19 DIAGNOSIS — Z95.1 PRESENCE OF AORTOCORONARY BYPASS GRAFT: Chronic | ICD-10-CM

## 2023-06-19 DIAGNOSIS — Z98.89 OTHER SPECIFIED POSTPROCEDURAL STATES: Chronic | ICD-10-CM

## 2023-06-19 DIAGNOSIS — Z96.651 PRESENCE OF RIGHT ARTIFICIAL KNEE JOINT: Chronic | ICD-10-CM

## 2023-06-19 LAB
ALBUMIN SERPL ELPH-MCNC: 3.5 G/DL — SIGNIFICANT CHANGE UP (ref 3.3–5.2)
ALP SERPL-CCNC: 72 U/L — SIGNIFICANT CHANGE UP (ref 40–120)
ALT FLD-CCNC: 24 U/L — SIGNIFICANT CHANGE UP
ANION GAP SERPL CALC-SCNC: 13 MMOL/L — SIGNIFICANT CHANGE UP (ref 5–17)
AST SERPL-CCNC: 25 U/L — SIGNIFICANT CHANGE UP
BASOPHILS # BLD AUTO: 0.01 K/UL — SIGNIFICANT CHANGE UP (ref 0–0.2)
BASOPHILS NFR BLD AUTO: 0.1 % — SIGNIFICANT CHANGE UP (ref 0–2)
BILIRUB SERPL-MCNC: 0.6 MG/DL — SIGNIFICANT CHANGE UP (ref 0.4–2)
BUN SERPL-MCNC: 60.2 MG/DL — HIGH (ref 8–20)
CALCIUM SERPL-MCNC: 9.1 MG/DL — SIGNIFICANT CHANGE UP (ref 8.4–10.5)
CHLORIDE SERPL-SCNC: 107 MMOL/L — SIGNIFICANT CHANGE UP (ref 96–108)
CO2 SERPL-SCNC: 24 MMOL/L — SIGNIFICANT CHANGE UP (ref 22–29)
CREAT SERPL-MCNC: 1.52 MG/DL — HIGH (ref 0.5–1.3)
EGFR: 44 ML/MIN/1.73M2 — LOW
EOSINOPHIL # BLD AUTO: 0.15 K/UL — SIGNIFICANT CHANGE UP (ref 0–0.5)
EOSINOPHIL NFR BLD AUTO: 1.3 % — SIGNIFICANT CHANGE UP (ref 0–6)
GLUCOSE SERPL-MCNC: 96 MG/DL — SIGNIFICANT CHANGE UP (ref 70–99)
HCT VFR BLD CALC: 35.8 % — LOW (ref 39–50)
HGB BLD-MCNC: 10.5 G/DL — LOW (ref 13–17)
IMM GRANULOCYTES NFR BLD AUTO: 0.3 % — SIGNIFICANT CHANGE UP (ref 0–0.9)
INR BLD: 1.93 RATIO — HIGH (ref 0.88–1.16)
LYMPHOCYTES # BLD AUTO: 0.92 K/UL — LOW (ref 1–3.3)
LYMPHOCYTES # BLD AUTO: 7.7 % — LOW (ref 13–44)
MAGNESIUM SERPL-MCNC: 2.5 MG/DL — SIGNIFICANT CHANGE UP (ref 1.6–2.6)
MCHC RBC-ENTMCNC: 24.8 PG — LOW (ref 27–34)
MCHC RBC-ENTMCNC: 29.3 GM/DL — LOW (ref 32–36)
MCV RBC AUTO: 84.6 FL — SIGNIFICANT CHANGE UP (ref 80–100)
MONOCYTES # BLD AUTO: 1.42 K/UL — HIGH (ref 0–0.9)
MONOCYTES NFR BLD AUTO: 11.9 % — SIGNIFICANT CHANGE UP (ref 2–14)
NEUTROPHILS # BLD AUTO: 9.38 K/UL — HIGH (ref 1.8–7.4)
NEUTROPHILS NFR BLD AUTO: 78.7 % — HIGH (ref 43–77)
NT-PROBNP SERPL-SCNC: HIGH PG/ML (ref 0–300)
PLATELET # BLD AUTO: 254 K/UL — SIGNIFICANT CHANGE UP (ref 150–400)
POTASSIUM SERPL-MCNC: 4.4 MMOL/L — SIGNIFICANT CHANGE UP (ref 3.5–5.3)
POTASSIUM SERPL-SCNC: 4.4 MMOL/L — SIGNIFICANT CHANGE UP (ref 3.5–5.3)
PROT SERPL-MCNC: 6.3 G/DL — LOW (ref 6.6–8.7)
PROTHROM AB SERPL-ACNC: 22.5 SEC — HIGH (ref 10.5–13.4)
RAPID RVP RESULT: SIGNIFICANT CHANGE UP
RBC # BLD: 4.23 M/UL — SIGNIFICANT CHANGE UP (ref 4.2–5.8)
RBC # FLD: 17.7 % — HIGH (ref 10.3–14.5)
SARS-COV-2 RNA SPEC QL NAA+PROBE: SIGNIFICANT CHANGE UP
SODIUM SERPL-SCNC: 143 MMOL/L — SIGNIFICANT CHANGE UP (ref 135–145)
TROPONIN T SERPL-MCNC: 0.01 NG/ML — SIGNIFICANT CHANGE UP (ref 0–0.06)
WBC # BLD: 11.92 K/UL — HIGH (ref 3.8–10.5)
WBC # FLD AUTO: 11.92 K/UL — HIGH (ref 3.8–10.5)

## 2023-06-19 PROCEDURE — 70486 CT MAXILLOFACIAL W/O DYE: CPT | Mod: MA

## 2023-06-19 PROCEDURE — 93010 ELECTROCARDIOGRAM REPORT: CPT

## 2023-06-19 PROCEDURE — 72125 CT NECK SPINE W/O DYE: CPT | Mod: MA

## 2023-06-19 PROCEDURE — 83880 ASSAY OF NATRIURETIC PEPTIDE: CPT

## 2023-06-19 PROCEDURE — 83735 ASSAY OF MAGNESIUM: CPT

## 2023-06-19 PROCEDURE — 0225U NFCT DS DNA&RNA 21 SARSCOV2: CPT

## 2023-06-19 PROCEDURE — 70486 CT MAXILLOFACIAL W/O DYE: CPT | Mod: 26,MA

## 2023-06-19 PROCEDURE — 80053 COMPREHEN METABOLIC PANEL: CPT

## 2023-06-19 PROCEDURE — 96374 THER/PROPH/DIAG INJ IV PUSH: CPT

## 2023-06-19 PROCEDURE — 72125 CT NECK SPINE W/O DYE: CPT | Mod: 26,MA

## 2023-06-19 PROCEDURE — 84484 ASSAY OF TROPONIN QUANT: CPT

## 2023-06-19 PROCEDURE — 99285 EMERGENCY DEPT VISIT HI MDM: CPT | Mod: GC

## 2023-06-19 PROCEDURE — 99285 EMERGENCY DEPT VISIT HI MDM: CPT | Mod: 25

## 2023-06-19 PROCEDURE — 93005 ELECTROCARDIOGRAM TRACING: CPT

## 2023-06-19 PROCEDURE — 70450 CT HEAD/BRAIN W/O DYE: CPT | Mod: MA

## 2023-06-19 PROCEDURE — 85025 COMPLETE CBC W/AUTO DIFF WBC: CPT

## 2023-06-19 PROCEDURE — 70450 CT HEAD/BRAIN W/O DYE: CPT | Mod: 26,MA

## 2023-06-19 PROCEDURE — 85610 PROTHROMBIN TIME: CPT

## 2023-06-19 PROCEDURE — 36415 COLL VENOUS BLD VENIPUNCTURE: CPT

## 2023-06-19 RX ORDER — ACETAMINOPHEN 500 MG
975 TABLET ORAL ONCE
Refills: 0 | Status: COMPLETED | OUTPATIENT
Start: 2023-06-19 | End: 2023-06-19

## 2023-06-19 RX ORDER — DILTIAZEM HCL 120 MG
10 CAPSULE, EXT RELEASE 24 HR ORAL ONCE
Refills: 0 | Status: COMPLETED | OUTPATIENT
Start: 2023-06-19 | End: 2023-06-19

## 2023-06-19 RX ORDER — METOPROLOL TARTRATE 50 MG
50 TABLET ORAL ONCE
Refills: 0 | Status: COMPLETED | OUTPATIENT
Start: 2023-06-19 | End: 2023-06-19

## 2023-06-19 RX ORDER — DILTIAZEM HCL 120 MG
30 CAPSULE, EXT RELEASE 24 HR ORAL ONCE
Refills: 0 | Status: COMPLETED | OUTPATIENT
Start: 2023-06-19 | End: 2023-06-19

## 2023-06-19 RX ADMIN — Medication 50 MILLIGRAM(S): at 13:06

## 2023-06-19 RX ADMIN — Medication 10 MILLIGRAM(S): at 09:51

## 2023-06-19 RX ADMIN — Medication 975 MILLIGRAM(S): at 09:51

## 2023-06-19 RX ADMIN — Medication 30 MILLIGRAM(S): at 09:51

## 2023-06-19 NOTE — ED PROVIDER NOTE - PATIENT PORTAL LINK FT
You can access the FollowMyHealth Patient Portal offered by [facility] by registering at the following website: [email or web address]. By joining [facility]’s FollowMyHealth portal, you will also be able to view your health information using other applications (apps) compatible with our system.

## 2023-06-19 NOTE — ED ADULT TRIAGE NOTE - CHIEF COMPLAINT QUOTE
patient found in hospital room on ground, has dementia, on eliquis, has contusion to crown of head, patient found in hospital room on ground, has dementia, on eliquis, has contusion to crown of head, priority CT called

## 2023-06-19 NOTE — ED PROVIDER NOTE - ATTENDING CONTRIBUTION TO CARE
The patient seen with resident    Nasal Fracture    I, Darrel Zuleta, performed the initial face to face bedside interview with this patient regarding history of present illness, review of symptoms and relevant past medical, social and family history.  I completed an independent physical examination.  I was the initial provider who evaluated this patient. I have signed out the follow up of any pending tests (i.e. labs, radiological studies) to the [facility].  I have communicated the patient’s plan of care and disposition with the resident

## 2023-06-19 NOTE — ED ADULT NURSE NOTE - CHIEF COMPLAINT QUOTE
patient found in hospital room on ground, has dementia, on eliquis, has contusion to crown of head, priority CT called

## 2023-06-19 NOTE — ED PROVIDER NOTE - CLINICAL SUMMARY MEDICAL DECISION MAKING FREE TEXT BOX
88 year old male sent in from [facility] s/p fall on eliquis. Patient in no acute distress. Priority CT obtained demonstrating  "Nondisplaced fracture of the distal tip of the nasal bones; no intracerebral hemorrhage". Imaging reviewed with daughter and patient. Per daughter patient is at his baseline mental status at this time. Noted to be rapid afib here however he missed his 9AM Metoprolol dose. Discussed with [facility]. Stable for discharge back.

## 2023-06-19 NOTE — ED ADULT TRIAGE NOTE - MEANS OF ARRIVAL
"Patient calling regarding upper abdominal pain. Reports palpitations above her umbilicus. States it's not pain, but feels strange. Per protocol, advised to got to ED now.. Verbalized understanding. States she will go later if it gets worse. Knows to call back with new or worsening symptoms.       Reason for Disposition   [1] Pain lasts > 10 minutes AND [2] age > 50    Additional Information   Negative: SEVERE difficulty breathing (e.g., struggling for each breath, speaks in single words)   Negative: Shock suspected (e.g., cold/pale/clammy skin, too weak to stand, low BP, rapid pulse)   Negative: Difficult to awaken or acting confused (e.g., disoriented, slurred speech)   Negative: Passed out (i.e., lost consciousness, collapsed and was not responding)   Negative: Visible sweat on face or sweat dripping down face   Negative: Sounds like a life-threatening emergency to the triager   Negative: [1] SEVERE pain (e.g., excruciating) AND [2] present > 1 hour   Negative: [1] Vomiting AND [2] contains red blood  (Exception: few streaks and only occurred once)   Negative: [1] Vomiting AND [2] contains black ("coffee ground") material   Negative: Blood in bowel movements  (Exception: Blood on surface of BM with constipation)   Negative: Black or tarry bowel movements (Exception: chronic-unchanged black-grey bowel movements AND is taking iron pills or Pepto-bismol)    Protocols used: ABDOMINAL PAIN - UPPER-A-AH      "
stretcher

## 2023-06-19 NOTE — ED PROVIDER NOTE - ENMT, MLM
I would advise taking probiotics to help with your symptoms. The product I recommend is Jarro-dophilus EPS by the company [employer]. This can be found at [employer] and from online distributors.    Airway patent, Nasal mucosa clear. Mouth with normal mucosa. Throat has no vesicles, no oropharyngeal exudates and uvula is midline. Scalp hematoma

## 2023-06-19 NOTE — ED ADULT TRIAGE NOTE - CADM TRG TX PRIOR TO ARRIVAL
Overdue lab reminder letter mailed out to pt.      Labs:  TSH (Assay, Thyroid Stim Hormone)      Tissue Transglutaminase Ab, IgA      Immunoglobulin A, Qn, Serum (IGA) see ambulance record

## 2023-06-19 NOTE — ED PROVIDER NOTE - OBJECTIVE STATEMENT
The patient is a 89 year old male presents with a mechanical fall got up from bed to get to the phone and  had a mechanical fall hitting his head.  No chest pain, No SOB, No abd pain, No motor No sensory loss

## 2023-06-19 NOTE — ED ADULT NURSE NOTE - OBJECTIVE STATEMENT
assumed pt care at 0830.  pt awake, alert and oriented to person, disoriented to place, time, situation.  pt states he got up out of bed to call to see if he had to work today and fell.  pt states he has multiple falls over the past 4 years.  pt noted to be in a rapid afib at this time.  respirations even and unlabored at this time.  skin warm and dry.

## 2023-06-19 NOTE — ED PROVIDER NOTE - CARE PROVIDER_API CALL
Geovani Pelaez  Otolaryngology  [address] [place] [address], [facility] [address]  [place] [address]  Phone: [phone]  Fax: [phone]  Follow Up Time:

## 2023-07-03 ENCOUNTER — OUTPATIENT (OUTPATIENT)
Dept: OUTPATIENT SERVICES | Facility: HOSPITAL | Age: 88
LOS: 1 days | Discharge: HOME | End: 2023-07-03

## 2023-07-03 DIAGNOSIS — I63.9 CEREBRAL INFARCTION, UNSPECIFIED: ICD-10-CM

## 2023-07-03 DIAGNOSIS — Z98.89 OTHER SPECIFIED POSTPROCEDURAL STATES: Chronic | ICD-10-CM

## 2023-07-03 DIAGNOSIS — Z98.49 CATARACT EXTRACTION STATUS, UNSPECIFIED EYE: Chronic | ICD-10-CM

## 2023-07-03 DIAGNOSIS — Z96.651 PRESENCE OF RIGHT ARTIFICIAL KNEE JOINT: Chronic | ICD-10-CM

## 2023-07-03 DIAGNOSIS — Z95.1 PRESENCE OF AORTOCORONARY BYPASS GRAFT: Chronic | ICD-10-CM

## 2023-07-05 ENCOUNTER — APPOINTMENT (OUTPATIENT)
Dept: HEMATOLOGY ONCOLOGY | Facility: CLINIC | Age: 88
End: 2023-07-05

## 2023-07-05 DIAGNOSIS — D64.9 ANEMIA, UNSPECIFIED: ICD-10-CM

## 2023-07-18 ENCOUNTER — APPOINTMENT (OUTPATIENT)
Dept: OTOLARYNGOLOGY | Facility: CLINIC | Age: 88
End: 2023-07-18

## 2023-08-11 NOTE — DISCHARGE NOTE NURSING/CASE MANAGEMENT/SOCIAL WORK - NURSING SECTION COMPLETE
ED Nursing Triage Note (General)   ________________________________    Adabella Plasencia is a 29 
Patient/Caregiver provided printed discharge information.

## 2023-08-30 NOTE — CONSULT NOTE ADULT - PROBLEM SELECTOR PROBLEM 1
8/30/2023    Carolann Okeefe MD  [address] [place] [address]    RE: Florida Nava       Dear Colleague,     I had the pleasure of seeing [place] Nava in the [facility].    HEART CARE ENCOUNTER CONSULTATON NOTE      ADELAIDA [facility]  [phone]      Assessment/Recommendations   Assessment/Plan:  1.  Paroxysmal atrial fibrillation.  No symptoms to suggest recurrence of atrial fibrillation.  She is in sinus rhythm based on examination today.  She is status post ablation a number of years ago.  She did have moderate enlargement of left atrium and plan for repeat echocardiogram given history of left atrial enlargement and remote history of DVT and PE.    2.  Risk modification.  As noted a few years ago her calcium score was 0.  She had a carotid ultrasound that revealed mild plaque bilaterally per report.  Cholesterol numbers from April 2023 finds a cholesterol total of 219 with an LDL of 141.  Discussed these findings today.  Abdominal ultrasound of her abdomen in 2019 that was negative for aneurysm.  I discussed the consideration of statin lower her LDL at least less than 100.  We have had this discussion in the past and she continues to be reluctant.  She is aware of the issues and will continue to keep this in mind.  She has additional questions or concerns she will let me know.    Plan 1.  Echocardiogram  2.  Follow-up 1 year or sooner if specific issues were to arise.       History of Present Illness/Subjective    HPI: [place] Nava is a 75 year old female who is seen in follow-up.  We most recently visited May 2022.  She has a history of paroxysmal atrial fibrillation with ablation in January 2019 and has had no clinical recurrence of atrial fibrillation previously noted.  She has a history of DVT and pulmonary embolism and has been seen by hematology in the past who recommended long-term anticoagulation.  She did undergo coronary calcium scoring in December 
2019 with a calcium score of 0.  I have reviewed recent notes from her primary care provider.    She has been feeling well.  She denies specific cardiovascular symptoms or complaints.  She tries to exercise regularly.    Recent Echocardiogram Results:    Echocardiogram Complete  Order: [id number]  Status: Final result       Visible to patient: Yes (seen)       Next appt: 09/14/2023 at 12:45 PM in Cardiology ([facility] ECHO OP 3)       Dx: Paroxysmal atrial fibrillation (H)    1 Result Note  Details    Reading Physician Reading Date Result Priority   Bear Wheeler MD 8/10/2018 Routine   Provider, Historical 8/10/2018      Narrative & Impression    When compared to the previous study dated 8/30/2017, no significant change.    Left ventricle ejection fraction is normal. The calculated left ventricular ejection fraction is 61%.    Moderate left atrial enlargement    Normal right ventricular size and systolic function.    No hemodynamically significant valvular heart abnormalities     Recent Coronary Angiogram Results:    Narrative & Impression   EXAM: US CAROTID BILATERAL  LOCATION: [facility]  DATE/TIME: 4/12/2023 12:44 PM CDT     INDICATION: PAF (paroxysmal atrial fibrillation) (H), Other speech disturbances.  COMPARISON: None.  TECHNIQUE: Duplex exam performed utilizing 2D gray-scale imaging, Doppler interrogation with color-flow and spectral waveform analysis. The percent diameter stenosis is determined using NASCET criteria and Society of Radiologists in Ultrasound Consensus   Criteria.     FINDINGS:     RIGHT: Mild plaque at the bifurcation. The peak systolic velocity in the ICA is less than 125 cm/sec, consistent with less than 50% stenosis. Normal velocities in the ECA. Antegrade flow within the vertebral artery.      LEFT: Mild plaque at the bifurcation. The peak systolic velocity in the ICA is less than 125 cm/sec, consistent with less than 50% stenosis. Normal velocities in the ECA. 
Antegrade flow within the vertebral artery.     VELOCITY CHART:   The following velocities were obtained in the RIGHT carotid system.  CCA: 107/33 cm/sec  ICA: 105/50 cm/sec  ECA: 114/16 cm/sec  ICA/CCA: PS 1.0     The following velocities were obtained in the LEFT carotid system.  CCA: 104/31 cm/sec  ICA: 121/40 cm/sec  ECA: 103/22 cm/sec  ICA/CCA: PS 1.2                                                                      IMPRESSION:  1.  Mild plaque formation, velocities consistent with less than 50% stenosis in the right internal carotid artery.  2.  Mild plaque formation, velocities consistent with less than 50% stenosis in the left internal carotid artery.  3.  Flow within the vertebral arteries is antegrade.  4.  Incidental note is made of a large right thyroid nodule measuring 3.1 x 3.1 x 2.4 cm. A dedicated thyroid ultrasound is recommended.     Study Result    Narrative & Impression     The total Agatston calcium score is 0. A calcium score of zero places the individual in the lowest quartile when compared to an age and gender matched control group and implies a low risk of cardiac events in the next 10 years. (less than 1% per   year).      EXAM: US ABDOMINAL AORTA  LOCATION: [facility]  DATE/TIME: 12/11/2019 10:31 AM     INDICATION: Family history of ischemic heart disease and other diseases of the circulatory system  COMPARISON: None.  TECHNIQUE: Transverse and longitudinal images of the aorta. Color flow and spectral Doppler with waveform analysis.     FINDINGS: No abdominal aortic aneurysm.  There is minimal atheromatous plaque in the abdominal aorta.     Normal caliber aorta with maximal diameter of mid/distal aorta 1.7 cm  normal common iliac arteries bilaterally measuring 1.2 cm.     IMPRESSION:  1.  No abdominal aortic aneurysm.      DATE/TIME: 4/28/2023 9:20 AM CDT     INDICATION: Thyroid US to eval large right thyroid nodule noted on recent carotid US  COMPARISON: Carotid ultrasound 
04/12/2023, CT chest 05/26/2017  TECHNIQUE: Thyroid ultrasound.      FINDINGS:  RIGHT lobe: 5.7 x 2.2 x 2.3 cm. Slightly heterogeneous echotexture. Solitary mid pole nodule.  Isthmus: 3 mm.  LEFT lobe: 4.9 x 1.6 x 1.4 cm. Heterogeneous echotexture. Subcentimeter cysts. No solid nodule.     NECK: No cervical lymphadenopathy.     NODULES:     Nodule 1: Right mid pole nodule measuring 3.2 x 2.8 x 1.8 cm   Composition: Solid or almost completely solid, 2 points   Echogenicity: Hyperechoic or isoechoic, 1 point   Shape: Wider-than-tall, 0 points   Margin: Smooth, 0 points   Echogenic Foci: None, or large comet-tail artifacts, 0 points   Point Total: 3 points. TI-RADS 3. If 2.5 cm or larger, recommend FNA; if 1.5 cm or larger, recommend follow up US at 1, 3, and 5 years.                                                                          IMPRESSION:  1.  Solitary right thyroid lobe 3.2 cm TI RADS 3 nodule. FNA is indicated per guidelines above.          Physical Examination  Review of Systems   Vitals: 126/74, weight 142 pounds, 69 and regular  Wt Readings from Last 3 Encounters:   07/11/23 63.5 kg (140 lb)   02/28/23 64 kg (141 lb)   05/11/22 64.4 kg (141 lb 14.4 oz)       General Appearance:   no distress, normal body habitus   ENT/Mouth: membranes moist, no oral lesions or bleeding gums.      EYES:  no scleral icterus, normal conjunctivae   Neck: no carotid bruits    Chest/Lungs:   lungs are clear to auscultation, no rales or wheezing, equal chest wall expansion    Cardiovascular:   Regular. Normal first and second heart sounds with no murmurs, rubs, or gallops; the carotid, radial and posterior tibial pulses are intact, Jugular venous pressure in normal limits, no edema bilaterally    Abdomen:  no  bruits, or tenderness; bowel sounds are present   Extremities: no cyanosis or clubbing   Skin: no xanthelasma, warm.    Neurologic: normal  bilateral, no tremors     Psychiatric: alert and oriented x3, calm      
  Please refer above for cardiac ROS details.        Medical History  Surgical History Family History Social History   Past Medical History:   Diagnosis Date    Atrial fibrillation (H)     Atrial fibrillation, persistent (H) 05/2016    CV x2    Bunion of right foot 2014    Diverticulosis 1995    DVT of lower extremity (deep venous thrombosis) (H) 05/2017    rt leg from atrial fib    Essential tremor 1980    Hyperlipidemia 06/2010    Osteopenia 1998    PE (pulmonary thromboembolism) (H) 05/2017    dvt rt leg and PE rt lung    Skin cancer 06/2014    Varicose veins of both lower extremities with pain 1990     Past Surgical History:   Procedure Laterality Date    CARDIOVERSION  06/26/2017    sinus x 10 min, then back to afib    DILATION AND CURETTAGE      EP ABLATION PVI Left 1/9/2018    Procedure: EP Ablation PVI;  Surgeon: Monica Miller MD;  Location: [facility];  Service:     FOOT SURGERY      MOHS MICROGRAPHIC PROCEDURE       Family History   Problem Relation Age of Onset    Tremor Father     Cancer Sister     Hypertension Mother     Colon Cancer Sister     No Known Problems Father     Atrial fibrillation Brother     Pacemaker Brother     No Known Problems Sister     Prostate Cancer Brother         Social History     Socioeconomic History    Marital status: Single     Spouse name: Not on file    Number of children: Not on file    Years of education: Not on file    Highest education level: Not on file   Occupational History    Not on file   Tobacco Use    Smoking status: Never    Smokeless tobacco: Never   Substance and Sexual Activity    Alcohol use: Yes     Alcohol/week: 1.0 standard drink of alcohol     Comment: Alcoholic Drinks/day: occasional    Drug use: No    Sexual activity: Not on file     Comment: single    Other Topics Concern    Parent/sibling w/ CABG, MI or angioplasty before 65F 55M? Not Asked   Social History Narrative    Not on file     Social Determinants of Health     Financial 
Neurocognitive disorder
Resource Strain: Not on file   Food Insecurity: Not on file   Transportation Needs: Not on file   Physical Activity: Not on file   Stress: Not on file   Social Connections: Not on file   Intimate Partner Violence: Not on file   Housing Stability: Not on file           Medications  Allergies   Current Outpatient Medications   Medication Sig Dispense Refill    biotin 5 MG CAPS 1 capsule      Cholecalciferol (VITAMIN D3 PO) Take by mouth daily      clonazePAM (KLONOPIN) 0.5 MG tablet Take one tablet in the morning and 2 tablets at night 270 tablet 0    ELIQUIS 5 MG tablet Take 5 mg by mouth 2 times daily      Lactobacillus Rhamnosus, GG, (RA PROBIOTIC DIGESTIVE CARE) CAPS Take 1 capsule by mouth daily      Magnesium 400 MG TABS Take 400 mg by mouth daily      melatonin 5 MG tablet Take 2.5 mg by mouth      metoprolol succinate ER (TOPROL-XL) 25 MG 24 hr tablet TAKE ONE TABLET BY MOUTH EVERY DAY      metoprolol tartrate (LOPRESSOR) 25 MG tablet       neomycin-polymyxin-dexamethasone (MAXITROL) 3.5-21873-6.1 ophthalmic ointment APPLY SMALL AMOUNT IN LEFT EYE TWICE DAILY      Omega-3 1000 MG capsule Take 1 g by mouth daily      prednisoLONE acetate (PRED FORTE) 1 % ophthalmic suspension       TURMERIC PO Take 1 capsule by mouth         Allergies   Allergen Reactions    Amiodarone      Tremors much worse on amiodarone          Lab Results    Chemistry/lipid CBC Cardiac Enzymes/BNP/TSH/INR   Recent Labs   Lab Test 04/24/23  1322   CHOL 219*   HDL 63   *   TRIG 75     Recent Labs   Lab Test 04/24/23  1322 08/26/21  0824 04/29/20  0839   * 150* 167*     Recent Labs   Lab Test 04/24/23  1322      POTASSIUM 4.0   CHLORIDE 100   CO2 22   GLC 87   BUN 21.5   CR 0.93   GFRESTIMATED 64   LAUREN 9.4     Recent Labs   Lab Test 04/24/23  1322 08/26/21  0824 04/29/20  0839   CR 0.93 0.83 0.84     No results for input(s): A1C in the last 43605 hours.       Recent Labs   Lab Test 01/03/18  1128   WBC 5.7   HGB 15.5   HCT 
46.5   MCV 95        Recent Labs   Lab Test 01/03/18  1128   HGB 15.5    No results for input(s): TROPONINI in the last 48870 hours.  No results for input(s): BNP, NTBNPI, NTBNP in the last 89901 hours.  Recent Labs   Lab Test 04/24/23  1358   TSH 0.49     No results for input(s): INR in the last 29002 hours.     Yung Smyth MD                                        Thank you for allowing me to participate in the care of your patient.      Sincerely,     Yung Smyth MD     [facility]  cc:   No referring provider defined for this encounter.      
Cerebrovascular accident (CVA)

## 2023-09-13 NOTE — BH CONSULTATION LIAISON PROGRESS NOTE - NSBHMSETHTCONTENT_PSY_A_CORE
Subjective   Patient ID: Nick Story is a 85 y.o. male who presents for Extremity Swelling (Left hand. 1 week).  Today he is accompanied by alone.     Subjective  Nick Story is a 85 y.o. male who presents for evaluation of edema in left hand. The edema has been moderate. Onset of symptoms was 3-5 day ago, and patient reports symptoms have gradually improved since that time. The edema is present all day. The patient states the problem is new. The swelling has been aggravated by nothing. Denies injury, bug bites, and pain. Admits fall on out stretched hand on the stairs 1 week ago, with injury to his toe and shoulder. The swelling has been relieved by ice. Associated factors include: history of stroke and left sided symptoms. Cardiac risk factors: dyslipidemia and hypertension.        Hand Injury   The incident occurred 5 to 7 days ago. The incident occurred at home. The injury mechanism was a fall. The pain is present in the left hand and left shoulder. The quality of the pain is described as aching. The pain does not radiate. The pain is moderate. The pain has been Improving since the incident. Associated symptoms include muscle weakness. Pertinent negatives include no chest pain, numbness or tingling. He has tried ice for the symptoms. The treatment provided mild relief.       Review of Systems   Respiratory:  Positive for cough and shortness of breath.    Cardiovascular:  Negative for chest pain.   Genitourinary:  Negative for difficulty urinating.   Musculoskeletal:  Positive for joint swelling and myalgias.        Left arm and hand swelling   Skin:  Negative for color change.   Neurological:  Negative for dizziness, tingling, weakness, numbness and headaches.   All other systems reviewed and are negative.      Objective   Blood Pressure 116/64   Pulse 95   Weight 80.5 kg (177 lb 6 oz)   Body Mass Index 29.52 kg/m²   BSA: 1.92 meters squared  Growth percentiles: Facility age limit for growth %estefania is 
20 years. Facility age limit for growth %estefania is 20 years.     Physical Exam  Vitals and nursing note reviewed. Exam conducted with a chaperone present.   Constitutional:       Appearance: Normal appearance.   HENT:      Head: Normocephalic.      Right Ear: Tympanic membrane normal.      Left Ear: Tympanic membrane normal.   Cardiovascular:      Rate and Rhythm: Normal rate and regular rhythm.      Pulses: Normal pulses.      Heart sounds: Normal heart sounds.   Pulmonary:      Effort: Pulmonary effort is normal.   Abdominal:      General: Abdomen is flat. Bowel sounds are normal.   Musculoskeletal:         General: Normal range of motion.      Right shoulder: Normal.      Left shoulder: Swelling and tenderness present. Normal strength.      Right upper arm: Normal.      Left upper arm: Edema present.      Right forearm: Normal.      Left forearm: Edema present.      Left hand: Swelling present. No tenderness. Normal range of motion. Normal strength. Normal sensation.   Skin:     Capillary Refill: Capillary refill takes less than 2 seconds.   Neurological:      Mental Status: He is alert and oriented to person, place, and time. Mental status is at baseline.      Sensory: No sensory deficit.      Motor: No weakness.   Psychiatric:         Mood and Affect: Mood normal.         Behavior: Behavior normal.         Assessment/Plan   Problem List Items Addressed This Visit    None  Visit Diagnoses       Left arm swelling    -  Primary    Acute pain of left shoulder        Injury of left shoulder, initial encounter        Injury due to fall, initial encounter            Assessment/Plan  Edema secondary to fall need to r/o dvt with STAT venous US.  Speak to pulmonologist regarding blood in drain and in sputum.    Recommendations: will call once we have xray of left shoulder and STAT US  The patient was also instructed to call IMMEDIATELY (i.e., day or night) if any cardiopulmonary symptoms occur, especially chest pain, 
shortness of breath, dyspnea on exertion, paroxysmal nocturnal dyspnea, or orthopnea, and these were explained.  Follow up in 1 week and as needed.  
Unremarkable

## 2024-01-01 NOTE — PROGRESS NOTE ADULT - PROBLEM/PLAN-1
Breastfeed at least 8 - 12 times daily; more if baby shows feeding cues such as hands to mouth, smacking lips, rooting or fidgeting.  Use football hold line up nose at nipple and latch lower jaw first   Use breast massage and compression while feeding to aid in let-down and emptying of breasts.  May need to lean back with feeding to stop fast flow   Use reverse pressure softening to yenni nipple to get better  latch   Alternate use of gel pads and All purpose nipples ointment to sore nipples may a;so try olive oil or coconut oil to sore nipples   Watch for signs of breast infection such as a warm, reddened, tender area in your breast, fever, flu-like symptoms. Call your OB doctor for treatment.  Use double electric breast pump or hand expression after breastfeeding as desired.    Pump to comfort if breasts are uncomfortably full or pump to empty to help increase milk supply.   Pumping too often can make too much milk and may baby   Follow-up with your baby's physician at routine 2 week visit as scheduled  Call if Kailash more yellow in color or more sleepy or not feeding as well     You are welcome to contact [facility] at [phone] with any breastfeeding questions or concerns especially if fast letdown keeps happening .    DISPLAY PLAN FREE TEXT

## 2024-03-14 NOTE — PROVIDER CONTACT NOTE (CRITICAL VALUE NOTIFICATION) - SITUATION
Mandeep laws notification of patient critical value. She will discuss with primary doctor
INR 5.78
Yes

## 2024-08-19 NOTE — ED PROVIDER NOTE - NS_EDPROVIDERDISPOUSERTYPE_ED_A_ED
No Change No Change No Change No Change Improving No Change Improving Improving No Change No Change Attending Attestation (For Attendings USE Only)... No Change Improving Improving No Change Improving Improving No Change none No Change Improving Improving

## 2024-09-17 NOTE — ED ADULT TRIAGE NOTE - NS ED NURSE AMBULANCES
Procedure cancelled secondary to endoscopy scheduling and delays with alternative physician. Rescheduled for tomorrow am at 0950. Orders replaced. Please call with any questions or concerns.      CRISTINA Montes De Oca - CNP 9/17/2024 2:18 PM     GI attending addendum:  Initial plans for EUS+/- ERCP however prior physician was delayed with the EUS machine and endoscopy team canceled/pushed procedure to tomorrow. Bili continues to climb ~10 today, most direct, no signs of cholangitis. MRCP results were again reviewed. No biliary ductal dilation, no apparent mass. Liver bx results pending.     Deni Taylor, DO     [facility]

## 2024-10-31 NOTE — CONSULT NOTE ADULT - ATTENDING COMMENTS
Quality 137: Melanoma: Continuity Of Care - Recall System: Patient information entered into a recall system that includes: target date for the next exam specified AND a process to follow up with patients regarding missed or unscheduled appointments Detail Level: Detailed When Should The Patient Follow-Up For Their Next Full-Body Skin Exam?: 1 Year pt seen and examined. On warfarin with dVt and therapeutic.   hx of paf. in nsr. pt seen and examined.   We will needt o exclude other embolic causes of CVA  plan for UMER  I agree with a/p.

## 2024-11-17 NOTE — PROGRESS NOTE ADULT - PROBLEM SELECTOR PROBLEM 4
Abdominal aortic aneurysm (AAA)
CAD (coronary atherosclerotic disease)
Abdominal aortic aneurysm (AAA)
2 seconds or less

## 2024-11-27 NOTE — DIETITIAN INITIAL EVALUATION ADULT - ENTER FROM (CAL/KG)
Megan from [facility] called checking the status of form that was faxed over on 11/19/2024.for the provider to fill out.    Requesting a call back    Best call back #[phone] ext [phone]   25

## 2025-01-02 NOTE — DISCHARGE NOTE PROVIDER - NSDCCONDITION_GEN_ALL_CORE
Anesthesia Pre Eval Note    Anesthesia ROS/Med Hx        Anesthetic Complication History:    Comment: Motion Sickness    Pulmonary Review:    The patient is a former smoker.     Neuro/Psych Review:  Patient does not have a neuro/psych history         Cardiovascular Review:  Patient does not have a cardiovascular history   Exercise tolerance: good (>4 METS)    GI/HEPATIC/RENAL Review:  Patient does not have a GI/hepatic/renalhistory       End/Other Review:    Positive for obesity class I - 30.00 - 34.99  Positive for hypothyroidism      Relevant Problems   No relevant active problems       Physical Exam     Airway   Mallampati: III  TM Distance: >3 FB  Neck ROM: Full  Neck: Patient has a beard    Cardiovascular  Cardiovascular exam normal    Dental Exam  Dental exam normal  Patient has:  Denied broken/chipped/loose teeth    Pulmonary Exam  Pulmonary exam normal    Abdominal Exam    Patient Demonstrates:  Obese      Anesthesia Plan:    ASA Status: 2  Anesthesia Type: MAC    Induction: Intravenous  Preferred Airway Type: Mask  Maintenance: TIVA    Post-op Pain Management: Per Surgeon      Checklist  Reviewed: Lab Results, Nursing Notes, Patient Summary, Beta Blocker Status, DNR Status, NPO Status, Allergies, Medications, Problem list and Past Med History  Consent/Risks Discussed Statement:  The proposed anesthetic plan, including its risks and benefits, have been discussed with the Patient along with the risks and benefits of alternatives. Questions were encouraged and answered and the patient and/or representative understands and agrees to proceed.        I discussed with the patient (and/or patient's legal representative) the risks and benefits of the proposed anesthesia plan, MAC, which may include services performed by other anesthesia providers.    Alternative anesthesia plans, if available, were reviewed with the patient (and/or patient's legal representative). Discussion has been held with the patient (and/or  patient's legal representative) regarding risks of anesthesia, which include conversion to general anesthesia, hypotension, intra-operative awareness, nausea, oral injury and vomiting and emergent situations that may require change in anesthesia plan.    The patient (and/or patient's legal representative) has indicated understanding, his/her questions have been answered, and he/she wishes to proceed with the planned anesthetic.    Blood Products: Not Anticipated     Stable

## 2025-07-08 ENCOUNTER — EMERGENCY (EMERGENCY)
Facility: HOSPITAL | Age: 89
LOS: 1 days | End: 2025-07-08
Attending: STUDENT IN AN ORGANIZED HEALTH CARE EDUCATION/TRAINING PROGRAM
Payer: MEDICARE

## 2025-07-08 VITALS
HEART RATE: 81 BPM | DIASTOLIC BLOOD PRESSURE: 73 MMHG | SYSTOLIC BLOOD PRESSURE: 109 MMHG | TEMPERATURE: 98 F | RESPIRATION RATE: 20 BRPM | OXYGEN SATURATION: 99 %

## 2025-07-08 VITALS
WEIGHT: 199.96 LBS | DIASTOLIC BLOOD PRESSURE: 64 MMHG | OXYGEN SATURATION: 98 % | SYSTOLIC BLOOD PRESSURE: 119 MMHG | HEART RATE: 57 BPM | RESPIRATION RATE: 16 BRPM | TEMPERATURE: 98 F

## 2025-07-08 DIAGNOSIS — Z95.1 PRESENCE OF AORTOCORONARY BYPASS GRAFT: Chronic | ICD-10-CM

## 2025-07-08 DIAGNOSIS — Z98.89 OTHER SPECIFIED POSTPROCEDURAL STATES: Chronic | ICD-10-CM

## 2025-07-08 DIAGNOSIS — Z96.651 PRESENCE OF RIGHT ARTIFICIAL KNEE JOINT: Chronic | ICD-10-CM

## 2025-07-08 DIAGNOSIS — Z98.49 CATARACT EXTRACTION STATUS, UNSPECIFIED EYE: Chronic | ICD-10-CM

## 2025-07-08 LAB
ALBUMIN SERPL ELPH-MCNC: 3.3 G/DL — SIGNIFICANT CHANGE UP (ref 3.3–5.2)
ALP SERPL-CCNC: 81 U/L — SIGNIFICANT CHANGE UP (ref 40–120)
ALT FLD-CCNC: 15 U/L — SIGNIFICANT CHANGE UP
ANION GAP SERPL CALC-SCNC: 14 MMOL/L — SIGNIFICANT CHANGE UP (ref 5–17)
AST SERPL-CCNC: 16 U/L — SIGNIFICANT CHANGE UP
BASOPHILS # BLD AUTO: 0.04 K/UL — SIGNIFICANT CHANGE UP (ref 0–0.2)
BASOPHILS NFR BLD AUTO: 0.5 % — SIGNIFICANT CHANGE UP (ref 0–2)
BILIRUB SERPL-MCNC: 0.5 MG/DL — SIGNIFICANT CHANGE UP (ref 0.4–2)
BUN SERPL-MCNC: 61.5 MG/DL — HIGH (ref 8–20)
BUN SERPL-MCNC: 63.8 MG/DL — HIGH (ref 8–20)
BUN SERPL-MCNC: 73.2 MG/DL — HIGH (ref 8–20)
CALCIUM SERPL-MCNC: 8.6 MG/DL — SIGNIFICANT CHANGE UP (ref 8.4–10.5)
CALCIUM SERPL-MCNC: 9 MG/DL — SIGNIFICANT CHANGE UP (ref 8.4–10.5)
CALCIUM SERPL-MCNC: 9.5 MG/DL — SIGNIFICANT CHANGE UP (ref 8.4–10.5)
CHLORIDE SERPL-SCNC: 89 MMOL/L — LOW (ref 96–108)
CHLORIDE SERPL-SCNC: 91 MMOL/L — LOW (ref 96–108)
CHLORIDE SERPL-SCNC: 96 MMOL/L — SIGNIFICANT CHANGE UP (ref 96–108)
CO2 SERPL-SCNC: 28 MMOL/L — SIGNIFICANT CHANGE UP (ref 22–29)
CO2 SERPL-SCNC: 30 MMOL/L — HIGH (ref 22–29)
CO2 SERPL-SCNC: 32 MMOL/L — HIGH (ref 22–29)
CREAT SERPL-MCNC: 1.82 MG/DL — HIGH (ref 0.5–1.3)
CREAT SERPL-MCNC: 1.91 MG/DL — HIGH (ref 0.5–1.3)
CREAT SERPL-MCNC: 2.23 MG/DL — HIGH (ref 0.5–1.3)
EGFR: 27 ML/MIN/1.73M2 — LOW
EGFR: 27 ML/MIN/1.73M2 — LOW
EGFR: 33 ML/MIN/1.73M2 — LOW
EGFR: 33 ML/MIN/1.73M2 — LOW
EGFR: 35 ML/MIN/1.73M2 — LOW
EGFR: 35 ML/MIN/1.73M2 — LOW
EOSINOPHIL # BLD AUTO: 0.35 K/UL — SIGNIFICANT CHANGE UP (ref 0–0.5)
EOSINOPHIL NFR BLD AUTO: 4.6 % — SIGNIFICANT CHANGE UP (ref 0–6)
GLUCOSE SERPL-MCNC: 132 MG/DL — HIGH (ref 70–99)
GLUCOSE SERPL-MCNC: 135 MG/DL — HIGH (ref 70–99)
GLUCOSE SERPL-MCNC: 196 MG/DL — HIGH (ref 70–99)
HCT VFR BLD CALC: 35.8 % — LOW (ref 39–50)
HGB BLD-MCNC: 11.9 G/DL — LOW (ref 13–17)
IMM GRANULOCYTES # BLD AUTO: 0.04 K/UL — SIGNIFICANT CHANGE UP (ref 0–0.07)
IMM GRANULOCYTES NFR BLD AUTO: 0.5 % — SIGNIFICANT CHANGE UP (ref 0–0.9)
LYMPHOCYTES # BLD AUTO: 0.81 K/UL — LOW (ref 1–3.3)
LYMPHOCYTES NFR BLD AUTO: 10.7 % — LOW (ref 13–44)
MAGNESIUM SERPL-MCNC: 2.1 MG/DL — SIGNIFICANT CHANGE UP (ref 1.6–2.6)
MCHC RBC-ENTMCNC: 27.8 PG — SIGNIFICANT CHANGE UP (ref 27–34)
MCHC RBC-ENTMCNC: 33.2 G/DL — SIGNIFICANT CHANGE UP (ref 32–36)
MCV RBC AUTO: 83.6 FL — SIGNIFICANT CHANGE UP (ref 80–100)
MONOCYTES # BLD AUTO: 0.94 K/UL — HIGH (ref 0–0.9)
MONOCYTES NFR BLD AUTO: 12.4 % — SIGNIFICANT CHANGE UP (ref 2–14)
NEUTROPHILS # BLD AUTO: 5.39 K/UL — SIGNIFICANT CHANGE UP (ref 1.8–7.4)
NEUTROPHILS NFR BLD AUTO: 71.3 % — SIGNIFICANT CHANGE UP (ref 43–77)
NRBC # BLD AUTO: 0 K/UL — SIGNIFICANT CHANGE UP (ref 0–0)
NRBC # FLD: 0 K/UL — SIGNIFICANT CHANGE UP (ref 0–0)
NRBC BLD AUTO-RTO: 0 /100 WBCS — SIGNIFICANT CHANGE UP (ref 0–0)
PLATELET # BLD AUTO: 229 K/UL — SIGNIFICANT CHANGE UP (ref 150–400)
PMV BLD: 9.3 FL — SIGNIFICANT CHANGE UP (ref 7–13)
POTASSIUM SERPL-MCNC: 2.4 MMOL/L — CRITICAL LOW (ref 3.5–5.3)
POTASSIUM SERPL-MCNC: 2.5 MMOL/L — CRITICAL LOW (ref 3.5–5.3)
POTASSIUM SERPL-MCNC: 3.2 MMOL/L — LOW (ref 3.5–5.3)
POTASSIUM SERPL-SCNC: 2.4 MMOL/L — CRITICAL LOW (ref 3.5–5.3)
POTASSIUM SERPL-SCNC: 2.5 MMOL/L — CRITICAL LOW (ref 3.5–5.3)
POTASSIUM SERPL-SCNC: 3.2 MMOL/L — LOW (ref 3.5–5.3)
PROT SERPL-MCNC: 6.7 G/DL — SIGNIFICANT CHANGE UP (ref 6.6–8.7)
RBC # BLD: 4.28 M/UL — SIGNIFICANT CHANGE UP (ref 4.2–5.8)
RBC # FLD: 14.7 % — HIGH (ref 10.3–14.5)
SODIUM SERPL-SCNC: 135 MMOL/L — SIGNIFICANT CHANGE UP (ref 135–145)
SODIUM SERPL-SCNC: 135 MMOL/L — SIGNIFICANT CHANGE UP (ref 135–145)
SODIUM SERPL-SCNC: 138 MMOL/L — SIGNIFICANT CHANGE UP (ref 135–145)
WBC # BLD: 7.57 K/UL — SIGNIFICANT CHANGE UP (ref 3.8–10.5)
WBC # FLD AUTO: 7.57 K/UL — SIGNIFICANT CHANGE UP (ref 3.8–10.5)

## 2025-07-08 PROCEDURE — 96376 TX/PRO/DX INJ SAME DRUG ADON: CPT

## 2025-07-08 PROCEDURE — 96375 TX/PRO/DX INJ NEW DRUG ADDON: CPT

## 2025-07-08 PROCEDURE — 96365 THER/PROPH/DIAG IV INF INIT: CPT

## 2025-07-08 PROCEDURE — 80053 COMPREHEN METABOLIC PANEL: CPT

## 2025-07-08 PROCEDURE — 93010 ELECTROCARDIOGRAM REPORT: CPT

## 2025-07-08 PROCEDURE — 93005 ELECTROCARDIOGRAM TRACING: CPT

## 2025-07-08 PROCEDURE — 96366 THER/PROPH/DIAG IV INF ADDON: CPT

## 2025-07-08 PROCEDURE — 83735 ASSAY OF MAGNESIUM: CPT

## 2025-07-08 PROCEDURE — 85025 COMPLETE CBC W/AUTO DIFF WBC: CPT

## 2025-07-08 PROCEDURE — 99285 EMERGENCY DEPT VISIT HI MDM: CPT | Mod: GC

## 2025-07-08 PROCEDURE — 80048 BASIC METABOLIC PNL TOTAL CA: CPT

## 2025-07-08 PROCEDURE — 99284 EMERGENCY DEPT VISIT MOD MDM: CPT | Mod: 25

## 2025-07-08 PROCEDURE — 36415 COLL VENOUS BLD VENIPUNCTURE: CPT

## 2025-07-08 RX ORDER — MAGNESIUM SULFATE 500 MG/ML
1 SYRINGE (ML) INJECTION ONCE
Refills: 0 | Status: COMPLETED | OUTPATIENT
Start: 2025-07-08 | End: 2025-07-08

## 2025-07-08 RX ADMIN — Medication 250 MILLILITER(S): at 07:40

## 2025-07-08 RX ADMIN — Medication 100 MILLIEQUIVALENT(S): at 09:55

## 2025-07-08 RX ADMIN — Medication 100 MILLIEQUIVALENT(S): at 02:42

## 2025-07-08 RX ADMIN — Medication 10 MILLIEQUIVALENT(S): at 10:55

## 2025-07-08 RX ADMIN — Medication 100 MILLIEQUIVALENT(S): at 04:09

## 2025-07-08 RX ADMIN — Medication 40 MILLIEQUIVALENT(S): at 15:43

## 2025-07-08 RX ADMIN — Medication 100 GRAM(S): at 03:47

## 2025-07-08 RX ADMIN — Medication 40 MILLIEQUIVALENT(S): at 02:42

## 2025-07-08 RX ADMIN — Medication 100 MILLIEQUIVALENT(S): at 10:56

## 2025-07-08 RX ADMIN — Medication 100 MILLIEQUIVALENT(S): at 05:27

## 2025-07-08 RX ADMIN — Medication 40 MILLIEQUIVALENT(S): at 09:55

## 2025-07-08 RX ADMIN — Medication 10 MILLIEQUIVALENT(S): at 11:56

## 2025-07-08 NOTE — ED ADULT NURSE REASSESSMENT NOTE - NS ED NURSE REASSESS COMMENT FT1
Pt aao3, 2LNC, vs as charted, pt tolerated po potassium well. K has improved, pt will be returned to facility, transport arranged, currently awaiting

## 2025-07-08 NOTE — ED PROVIDER NOTE - ATTENDING CONTRIBUTION TO CARE
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 Had routine blood work that resulted today with a K 1.9. Patient currently denies any chest pain, headache, vision changes, SOB, numbness, tingling, cramping or weakness. Has been compliant with home meds as per chart from [facility].    CONSTITUTIONAL: In no apparent distress.  HEENMT: Airway patent, normal appearing mouth, nose, throat, neck supple with full range of motion, no cervical adenopathy.  EYES: Pupils equal, round,  Extra-ocular movement intact, eyes are clear b/l  CARDIAC: Regular rate and rhythm, Heart sounds S1 S2 present  RESPIRATORY: No respiratory distress. No stridor, Lungs sounds clear with good aeration bilaterally.   GASTROINTESTINAL: Abdomen soft, non-tender and non-distended, no rebound, no guarding and no masses.  MUSCULOSKELETAL: Spine appears normal, movement of extremities grossly intact.  NEUROLOGICAL: Alert and interactive, no focal deficits, tone is normal, moving all extremities well,  SKIN: No cyanosis, no pallor, no jaundice, no rash      ISara, personally saw the patient with the resident, and completed the key components of the history and physical exam. I then discussed the management plan with the resident. 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 for eval. Had routine blood work that resulted today with a K 1.9. Patient currently denies any chest pain, headache, vision changes, SOB, numbness, tingling, cramping or weakness. Has been compliant with home meds as per chart from [facility].    CONSTITUTIONAL: In no apparent distress.  HEENMT: Airway patent, normal appearing mouth, nose, throat, neck supple with full range of motion  CARDIAC: Regular rate and rhythm, Heart sounds S1 S2 present  RESPIRATORY: No respiratory distress. No stridor, Lungs sounds clear with good aeration bilaterally.   GASTROINTESTINAL: Abdomen soft, non-tender and non-distended, no rebound, no guarding and no masses.  MUSCULOSKELETAL: Spine appears normal, movement of extremities grossly intact.  NEUROLOGICAL: Alert and interactive,  tone is normal, moving all extremities well,  SKIN: No cyanosis, no pallor, no jaundice, no rash      Sara HERRERA, personally saw the patient with the resident, and completed the key components of the history and physical exam. I then discussed the management plan with the resident.

## 2025-07-08 NOTE — ED ADULT NURSE REASSESSMENT NOTE - NS ED NURSE REASSESS COMMENT FT1
Assumed care of patient at 1915, report received from off-going RN. Respirations even and unlabored, resting in ED stretcher, NAD. Wheels locked, bed in lowest position, Patient discharged awaiting, transport

## 2025-07-08 NOTE — ED PROVIDER NOTE - PATIENT PORTAL LINK FT
You can access the FollowMyHealth Patient Portal offered by [facility] by registering at the following website: [email or web address]. By joining [employer]’s FollowMyHealth portal, you will also be able to view your health information using other applications (apps) compatible with our system.

## 2025-07-08 NOTE — ED ADULT NURSE REASSESSMENT NOTE - NS ED NURSE REASSESS COMMENT FT1
Pt presents aao2, 2LNC, vs as charted, pt c/o of desire to go home. ER MD at bedside to educate patient, potassium 2.5, will need more potassium before pt can be safely discharged, Pt agreed to receive additional doses of required medication. Educated on plan of care, safety maintained.

## 2025-07-08 NOTE — CHART NOTE - NSCHARTNOTEFT_GEN_A_CORE
medicine called for admission for hypokalemia. Case d/w ED attending, okay to give more PO K+ and IV K+, fu repeat BMP. Pt also noted with new O2 requirement on flowsheet. Will need hypoxia workup prior to admission to medicine if admission is required  Asked ED to call back to service once workup completed.

## 2025-07-08 NOTE — ED ADULT NURSE NOTE - SUICIDE SCREENING DEPRESSION
Left voicemail for mother informing her that I have been unable to get a hold of the school counselor to discuss the school concerns. Informed mother that I have left 3 voicemails with the school. Left call back number of [phone] if mother would like to discuss further.   
Negative

## 2025-07-08 NOTE — ED PROVIDER NOTE - NSICDXPASTMEDICALHX_GEN_ALL_CORE_FT
PAST MEDICAL HISTORY:  Aortic stenosis     Atheroma     Atrial fibrillation, unspecified type     CAD S/P percutaneous coronary angioplasty     Cerebrovascular accident (CVA)     Factor V Leiden     [place] filter in place     History of COPD     Hyperlipemia     Hypertension     PE (pulmonary embolism)     Prostate CA

## 2025-07-08 NOTE — ED ADULT NURSE NOTE - NSICDXPASTMEDICALHX_GEN_ALL_CORE_FT
PAST MEDICAL HISTORY:  Aortic stenosis     Atheroma     Atrial fibrillation, unspecified type     CAD S/P percutaneous coronary angioplasty     Cerebrovascular accident (CVA)     Factor V Leiden     [place] filter in place     History of COPD     Hyperlipemia     Hypertension     PE (pulmonary embolism)     Prostate CA

## 2025-07-08 NOTE — ED PROVIDER NOTE - OBJECTIVE STATEMENT
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 for eval. Had routine blood work that resulted today with a K 1.9. Patient currently denies any chest pain, headache, vision changes, SOB, numbness, tingling, cramping or weakness. Has been compliant with home meds as per chart from [facility].

## 2025-07-08 NOTE — ED PROVIDER NOTE - NSFOLLOWUPINSTRUCTIONS_ED_ALL_ED_FT
1) Follow up with your doctor in 1-2 days  2) Return to the ER for worsening or concerning symptoms  3) you may need to have your medications adjusted to prevent further episodes of low potassium    Hypokalemia    Hypokalemia means that the amount of potassium in the blood is lower than normal. Potassium is a chemical (electrolyte) that helps regulate the amount of fluid in the body. It also stimulates muscle tightening (contraction) and helps nerves work properly.    Normally, most of the body's potassium is inside cells, and only a very small amount is in the blood. Because the amount in the blood is so small, minor changes to potassium levels in the blood can be life-threatening.    What are the causes?  This condition may be caused by:    Antibiotic medicine.  Diarrhea or vomiting. Taking too much of a medicine that helps you have a bowel movement (laxative) can cause diarrhea and lead to hypokalemia.  Chronic kidney disease (CKD).  Medicines that help the body get rid of excess fluid (diuretics).  Eating disorders, such as bulimia.  Low magnesium levels in the body.  Sweating a lot.    What are the signs or symptoms?  Symptoms of this condition include:    Weakness.  Constipation.  Fatigue.  Muscle cramps.  Mental confusion.  Skipped heartbeats or irregular heartbeat (palpitations).  Tingling or numbness.    How is this diagnosed?  This condition is diagnosed with a blood test.    How is this treated?  This condition may be treated by:    Taking potassium supplements by mouth.  Adjusting the medicines that you take.  Eating more foods that contain a lot of potassium.    If your potassium level is very low, you may need to get potassium through an IV and be monitored in the hospital.    Follow these instructions at home:     Take over-the-counter and prescription medicines only as told by your health care provider. This includes vitamins and supplements.  Eat a healthy diet. A healthy diet includes fresh fruits and vegetables, whole grains, healthy fats, and lean proteins.  If instructed, eat more foods that contain a lot of potassium. This includes:    Nuts, such as peanuts and pistachios.  Seeds, such as sunflower seeds and pumpkin seeds.  Peas, lentils, and lima beans.  Whole grain and bran cereals and breads.  Fresh fruits and vegetables, such as apricots, avocado, bananas, cantaloupe, kiwi, oranges, tomatoes, asparagus, and potatoes.  Orange juice.  Tomato juice.  Red meats.  Yogurt.  Keep all follow-up visits as told by your health care provider. This is important.    Contact a health care provider if you:  Have weakness that gets worse.  Feel your heart pounding or racing.  Vomit.  Have diarrhea.  Have diabetes (diabetes mellitus) and you have trouble keeping your blood sugar (glucose) in your target range.    Get help right away if you:  Have chest pain.  Have shortness of breath.  Have vomiting or diarrhea that lasts for more than 2 days.  Faint.    Summary  Hypokalemia means that the amount of potassium in the blood is lower than normal.  This condition is diagnosed with a blood test.  Hypokalemia may be treated by taking potassium supplements, adjusting the medicines that you take, or eating more foods that are high in potassium.  If your potassium level is very low, you may need to get potassium through an IV and be monitored in the hospital.    ADDITIONAL NOTES AND INSTRUCTIONS    Please follow up with your Primary MD in 24-48 hr.  Seek immediate medical care for any new/worsening signs or symptoms.     Document Released: 12/18/2006 Document Revised: 7/31/2019 Document Reviewed: 7/31/2019  [employer] Patient Education ©2019 [employer] This information is not intended to replace advice given to you by your health care provider. Make sure you discuss any questions you have with your health care provider.

## 2025-07-08 NOTE — ED PROVIDER NOTE - PHYSICAL EXAMINATION
Gen: NAD, AOx3  Head: NCAT  HEENT: EOMI, oral mucosa moist, normal conjunctiva, neck supple  Lung: CTAB, no respiratory distress  CV: arrythmia, no murmur, Normal perfusion  Abd: soft, NTND  MSK: No edema, no visible deformities  Neuro: No focal neurologic deficits  Skin: No rash   Psych: normal affect

## 2025-07-08 NOTE — ED ADULT NURSE NOTE - OBJECTIVE STATEMENT
Pt presents to the ED from [facility] for hypokalemia, reported 1.9. Pt is AOx4, breathing is even and unlabored bilaterally. Pt denies HA, dizziness, chest pain, SOB, N/V/D, blurred vision, weakness, or any other complaints.

## 2025-07-08 NOTE — ED ADULT TRIAGE NOTE - CHIEF COMPLAINT QUOTE
BIBA from [facility] w/hypokalemia 1.9, sent in for evaluation.  Denies palpitations/dizziness/SOB.  DNR/DNI.  EKG in triage

## 2025-07-08 NOTE — ED ADULT NURSE REASSESSMENT NOTE - NS ED NURSE REASSESS COMMENT FT1
Pt received aao3, 2LNC, repeat vs as charted, refused to keep pulse oximeter in place, only agreeable to intermittent for vital recheck. pt tolerating PO and IV potassium well, 2nd dose, completed. Pt currently denies pain, lying comfortably in stretcher showing no distress/

## 2025-07-08 NOTE — ED ADULT NURSE NOTE - NS ED NURSE DC INFO COMPLEXITY
Medication(s) Requested: Strattera 60mg   Last office visit: 01/21/21 next appointment 6/24/21  Last refill:   Is the patient due for refill of this medication(s): Yes  PDMP review: Criteria met. Prescription printed for Physician/MIGUEL signature. No data found in pdmp.        Simple: Patient demonstrates quick and easy understanding

## 2025-07-08 NOTE — ED ADULT NURSE REASSESSMENT NOTE - NS ED NURSE REASSESS COMMENT FT1
PT hypotensive on AM vitals. PT asymptomatic at this time. MD Crandall aware. 250 saline bolus administered per orders PT hypotensive on AM vitals. PT asymptomatic at this time. MD Crandall aware. saline bolus administered per orders

## 2025-07-08 NOTE — ED PROVIDER NOTE - CLINICAL SUMMARY MEDICAL DECISION MAKING FREE TEXT BOX
On arrival afebrile, well appearing in no acute distress. VS stable. EKG without ischemic changes, . Will repeat blood work in the ED and replete as needed. Continue to monitor on tele.